# Patient Record
Sex: FEMALE | Race: WHITE | NOT HISPANIC OR LATINO | Employment: OTHER | URBAN - NONMETROPOLITAN AREA
[De-identification: names, ages, dates, MRNs, and addresses within clinical notes are randomized per-mention and may not be internally consistent; named-entity substitution may affect disease eponyms.]

---

## 2017-01-02 DIAGNOSIS — E03.9 HYPOTHYROIDISM, UNSPECIFIED TYPE: Primary | ICD-10-CM

## 2017-01-02 RX ORDER — LEVOTHYROXINE SODIUM 100 UG/1
TABLET ORAL
Qty: 90 TABLET | Refills: 1 | Status: SHIPPED | OUTPATIENT
Start: 2017-01-02 | End: 2017-05-17

## 2017-01-02 NOTE — TELEPHONE ENCOUNTER
Pt is calling because she needs a rush on her two medications because she will be out on Thursday.       metoprolol (LOPRESSOR) 100 MG tablet  Synthroid    Also pt is going to be leaving on 1/11/17 for [place] and would like the rest of her medications be refilled for 3 months because she wont be back until the end of march. Please advise.     amLODIPine (NORVASC) 5 MG tablet  lisinopril-hydrochlorothiazide (PRINZIDE,ZESTORETIC) 20-12.5 MG per tablet  cloNIDine (CATAPRES) 0.1 MG tablet

## 2017-01-02 NOTE — TELEPHONE ENCOUNTER
synthroid     Last Written Prescription Date: 05/20/16  Last Quantity: 90, # refills: 1  Last Office Visit with [facility], P or [facility] prescribing provider: 08/15/16- virtual l visit        TSH   Date Value Ref Range Status   05/13/2016 3.54 0.40 - 4.00 mU/L Final

## 2017-01-02 NOTE — TELEPHONE ENCOUNTER
Prescription approved per [facility] Refill Protocol.  Garcia Holm RN      Other two medications were just filled for 90 and 1    Garcia Holm RN

## 2017-01-03 DIAGNOSIS — E11.65 TYPE 2 DIABETES MELLITUS WITH HYPERGLYCEMIA (H): ICD-10-CM

## 2017-01-03 DIAGNOSIS — I10 ESSENTIAL HYPERTENSION WITH GOAL BLOOD PRESSURE LESS THAN 140/90: Primary | ICD-10-CM

## 2017-01-03 NOTE — TELEPHONE ENCOUNTER
Lisinopril-HCTZ      Last Written Prescription Date: 11/22/16  Last Fill Quantity: 90, # refills: 1    Last Office Visit with [facility], [facility] or [facility] prescribing provider:  7/30/16   Future Office Visit:        BP Readings from Last 3 Encounters:   07/20/16 132/68   05/13/16 130/74   05/11/15 120/76       Khoa contour strips         Last Written Prescription Date: 8/31/16  Last Fill Quantity: 300, # refills: 1  Last Office Visit with [facility], [facility] or [facility] prescribing provider:  7/20/16        BP Readings from Last 3 Encounters:   07/20/16 132/68   05/13/16 130/74   05/11/15 120/76     MICROL        6   8/15/2016  No results found for this basename: microalbumin  CREATININE   Date Value Ref Range Status   09/14/2016 0.85 0.52 - 1.04 mg/dL Final   ]  GFR ESTIMATE   Date Value Ref Range Status   09/14/2016 64 >60 mL/min/1.7m2 Final     Comment:     Non  GFR Calc   10/19/2015 67 >60 mL/min/1.7m2 Final     Comment:     Non  GFR Calc   01/12/2015 66 >60 mL/min/1.7m2 Final     Comment:     Non  GFR Calc     GFR ESTIMATE IF BLACK   Date Value Ref Range Status   09/14/2016 78 >60 mL/min/1.7m2 Final     Comment:      GFR Calc   10/19/2015 81 >60 mL/min/1.7m2 Final     Comment:      GFR Calc   01/12/2015 79 >60 mL/min/1.7m2 Final     Comment:      GFR Calc     CHOL      170   5/13/2016  HDL       51   5/13/2016  LDL       93   5/13/2016  LDL      138   10/3/2014  TRIG      130   5/13/2016  CHOLHDLRATIO      3.6   10/19/2015  AST       30   1/12/2015  ALT       56   1/12/2015  A1C      7.0   9/14/2016  A1C      6.7   5/13/2016  A1C      6.7   10/19/2015  A1C      6.2   5/11/2015  A1C      6.3   10/3/2014  POTASSIUM   Date Value Ref Range Status   09/14/2016 3.6 3.4 - 5.3 mmol/L Final

## 2017-01-04 RX ORDER — LISINOPRIL AND HYDROCHLOROTHIAZIDE 12.5; 2 MG/1; MG/1
TABLET ORAL
Qty: 90 TABLET | Refills: 0 | OUTPATIENT
Start: 2017-01-04

## 2017-01-04 NOTE — TELEPHONE ENCOUNTER
Rx was sent 11/22/2016 for 3 months and 1 refills. Patient should have medication through 5/22/2016 (lisinorpil- HCTZ)  Rx approved for darya test strips.   Patient called and notified of this.   Pharmacy notified via E-prescribe refusal  Cony Vasquez RN

## 2017-04-03 ENCOUNTER — TRANSFERRED RECORDS (OUTPATIENT)
Dept: HEALTH INFORMATION MANAGEMENT | Facility: HOSPITAL | Age: 77
End: 2017-04-03

## 2017-04-03 LAB — HBA1C MFR BLD: 5.9 % (ref 4–6)

## 2017-04-10 DIAGNOSIS — I10 ESSENTIAL HYPERTENSION WITH GOAL BLOOD PRESSURE LESS THAN 140/90: ICD-10-CM

## 2017-04-10 NOTE — TELEPHONE ENCOUNTER
metoprolol      Last Written Prescription Date: 07/06/16  Last Fill Quantity: 180, # refills: 2    Last Office Visit with [facility], [facility] or [facility] prescribing provider:  8/15/16   Future Office Visit:    Next 5 appointments (look out 90 days)     May 17, 2017  8:00 AM CDT   [facility]    [id number] [facility] [address] [phone]                    BP Readings from Last 3 Encounters:   07/20/16 132/68   05/13/16 130/74   05/11/15 120/76

## 2017-04-11 RX ORDER — METOPROLOL TARTRATE 100 MG
TABLET ORAL
Qty: 180 TABLET | Refills: 0 | Status: SHIPPED | OUTPATIENT
Start: 2017-04-11 | End: 2017-05-17

## 2017-04-11 NOTE — TELEPHONE ENCOUNTER
Prescription approved per [facility] Refill Protocol.  Due for follow up in May. Scheduled  Apurva Ocampo RN

## 2017-04-27 ENCOUNTER — HOSPITAL ENCOUNTER (OUTPATIENT)
Dept: MAMMOGRAPHY | Facility: CLINIC | Age: 77
Discharge: HOME | End: 2017-04-27
Attending: FAMILY MEDICINE | Admitting: FAMILY MEDICINE
Payer: COMMERCIAL

## 2017-04-27 DIAGNOSIS — Z12.31 VISIT FOR SCREENING MAMMOGRAM: ICD-10-CM

## 2017-04-27 PROCEDURE — G0202 SCR MAMMO BI INCL CAD: HCPCS

## 2017-05-09 NOTE — PATIENT INSTRUCTIONS
Stop Norvasc or Amlodipine   I have adjusted dose of your Prinzide   Start Metformin     Preventive Health Recommendations  Female Ages 65 +    Yearly exam:     See your health care provider every year in order to  o Review health changes.   o Discuss preventive care.    o Review your medicines if your doctor has prescribed any.      You no longer need a yearly Pap test unless you've had an abnormal Pap test in the past 10 years. If you have vaginal symptoms, such as bleeding or discharge, be sure to talk with your provider about a Pap test.      Every 1 to 2 years, have a mammogram.  If you are over 69, talk with your health care provider about whether or not you want to continue having screening mammograms.      Every 10 years, have a colonoscopy. Or, have a yearly FIT test (stool test). These exams will check for colon cancer.       Have a cholesterol test every 5 years, or more often if your doctor advises it.       Have a diabetes test (fasting glucose) every three years. If you are at risk for diabetes, you should have this test more often.       At age 65, have a bone density scan (DEXA) to check for osteoporosis (brittle bone disease).    Shots:    Get a flu shot each year.    Get a tetanus shot every 10 years.    Talk to your doctor about your pneumonia vaccines. There are now two you should receive - Pneumovax (PPSV 23) and Prevnar (PCV 13).    Talk to your doctor about the shingles vaccine.    Talk to your doctor about the hepatitis B vaccine.    Nutrition:     Eat at least 5 servings of fruits and vegetables each day.      Eat whole-grain bread, whole-wheat pasta and brown rice instead of white grains and rice.      Talk to your provider about Calcium and Vitamin D.     Lifestyle    Exercise at least 150 minutes a week (30 minutes a day, 5 days a week). This will help you control your weight and prevent disease.      Limit alcohol to one drink per day.      No smoking.       Wear sunscreen to prevent  skin cancer.       See your dentist twice a year for an exam and cleaning.      See your eye doctor every 1 to 2 years to screen for conditions such as glaucoma, macular degeneration, cataracts, etc

## 2017-05-09 NOTE — PROGRESS NOTES
SUBJECTIVE:                                                            Ni Maya is a 76 year old female who presents for Preventive Visit.      Are you in the first 12 months of your Medicare Part B coverage?  No    Healthy Habits:    Do you get at least three servings of calcium containing foods daily (dairy, green leafy vegetables, etc.)? yes    Amount of exercise or daily activities, outside of work: 3 day(s) per week    Problems taking medications regularly No    Medication side effects: No    Have you had an eye exam in the past two years? yes    Do you see a dentist twice per year? yes    Do you have sleep apnea, excessive snoring or daytime drowsiness?no    COGNITIVE SCREEN  1) Repeat 3 items (Banana, Sunrise, Chair)    2) Clock draw: NORMAL  3) 3 item recall: Recalls 2 objects   Results: NORMAL clock, 1-2 items recalled: COGNITIVE IMPAIRMENT LESS LIKELY    Mini-CogTM Copyright S Linnea. Licensed by the author for use in [facility] ClickOn; reprinted with permission (chriss@[facility]). All rights reserved.            Diabetes Follow-up    Patient is checking blood sugars: three times daily.   Blood sugar testing frequency justification: 3 times daily   Results are as follows:         am -  130-150          lunchtime - 100-120          bedtime - 110-125    Diabetic concerns: other - Tighten ess in legs      Symptoms of hypoglycemia (low blood sugar): none     Paresthesias (numbness or burning in feet) or sores: No     Date of last diabetic eye exam:      Hyperlipidemia Follow-Up      Rate your low fat/cholesterol diet?: good    Taking statin?  No    Other lipid medications/supplements?:  none     Hypertension Follow-up      Outpatient blood pressures are not being checked.    Low Salt Diet: low salt     Low back :   She reports she continues to have back pain and radiates of the right lower hip , had x rays last year , but she did not want to do more at that time , but pain is worse now and  affecting her walking     Shortness of breath with walking the stairs , no coughing . No chest pain   She reports this has been ongoing for 25 years ,   She has been having on and off  epigastric pain also for 25 years , has had some EGDs in the past.    Foot swelling , ankle feels tight by the end of the day         Amount of exercise or physical activity: None    Problems taking medications regularly: No    Medication side effects: none    Diet: low salt and low fat/cholesterol        Reviewed and updated as needed this visit by clinical staff  Tobacco  Allergies  Meds  Med Hx  Surg Hx  Fam Hx  Soc Hx        Reviewed and updated as needed this visit by Provider        Social History   Substance Use Topics     Smoking status: Never Smoker     Smokeless tobacco: Never Used     Alcohol use No       The patient does not drink >3 drinks per day nor >7 drinks per week.    Today's PHQ-2 Score:   PHQ-2 ( 1999 Pfizer) 5/17/2017 7/20/2016   Q1: Little interest or pleasure in doing things 0 0   Q2: Feeling down, depressed or hopeless 0 0   PHQ-2 Score 0 0       Do you feel safe in your environment - Yes    Do you have a Health Care Directive?: No: Advance care planning reviewed with patient; information given to patient to review.    Current providers sharing in care for this patient include:   Patient Care Team:  Violeta Lin MD as PCP - General (Family Practice)      Hearing impairment: No    Ability to successfully perform activities of daily living: Yes, no assistance needed     Fall risk:       Home safety:  none identified      The following health maintenance items are reviewed in Epic and correct as of today:  Health Maintenance   Topic Date Due     PNEUMOCOCCAL (2 of 2 - PCV13) 07/07/2005     FOOT EXAM Q1 YEAR( NO INBASKET)  05/11/2016     LIPID MONITORING Q6 MO( NO INBASKET)  11/13/2016     A1C Q6 MO( NO INBASKET)  03/14/2017     TSH Q1 YEAR (NO INBASKET)  05/13/2017     FALL RISK ASSESSMENT   05/13/2017     MICROALBUMIN Q1 YEAR( NO INBASKET)  08/15/2017     INFLUENZA VACCINE (SYSTEM ASSIGNED)  09/01/2017     BMP Q1 YR (NO INBASKET)  09/14/2017     EYE EXAM Q1 YEAR( NO INBASKET)  10/05/2017     TSH W/ FREE T4 REFLEX Q2 YEAR (NO INBASKET)  05/13/2018     MAMMO Q2 YR NO INBASKET MESSAGE  04/27/2019     ADVANCE DIRECTIVE PLANNING Q5 YRS (NO INBASKET)  05/17/2021     TETANUS IMMUNIZATION (SYSTEM ASSIGNED)  08/22/2021     COLONOSCOPY Q10 YR INBASKET MESSAGE  09/13/2023     DEXA SCAN SCREENING (SYSTEM ASSIGNED)  Completed         Pneumonia Vaccine:Adults age 65+ who received Pneumovax (PPSV23) at 65 years or older: Should be given PCV13 > 1 year after their most recent PPSV23     ROS:  Constitutional, HEENT, cardiovascular, pulmonary, gi and gu systems are negative, except as otherwise noted.    Problem list, Medication list, Allergies, and Medical/Social/Surgical histories reviewed in [facility] and updated as appropriate.  Labs reviewed in EPIC  BP Readings from Last 3 Encounters:   05/17/17 132/70   07/20/16 132/68   05/13/16 130/74    Wt Readings from Last 3 Encounters:   05/17/17 187 lb 8 oz (85 kg)   07/20/16 189 lb 3.2 oz (85.8 kg)   05/13/16 185 lb (83.9 kg)                  Patient Active Problem List   Diagnosis     Hypothyroidism     Allergic rhinitis     Symptomatic menopausal or female climacteric states     Chronic kidney disease, stage II (mild)     Edema     Obesity     Urethral stricture     MICKIE III (vulvar intraepithelial neoplasia III)     Vulval lesion     CARDIOVASCULAR SCREENING; LDL GOAL LESS THAN 130     Health Care Home     Type 2 diabetes, HbA1C goal < 8% (H)     Hyperlipidemia with target LDL less than 100     HTN, goal below 140/90     Hypothyroidism, unspecified hypothyroidism type     Type 2 diabetes mellitus with hyperglycemia (H)     ACP (advance care planning)     Essential hypertension with goal blood pressure less than 140/90     Type 2 diabetes mellitus with hyperglycemia, without  long-term current use of insulin (H)     Past Surgical History:   Procedure Laterality Date     BIOPSY VULVA/PERINEUM,ADDNL LESN  9/18/09    Wide -excision of MICKIE III- right labia      C LAPAROSCOPY, SURGICAL; W/ VAGINAL HYSTERECTOMY W/WO REMOVAL OVARY(S)/TUBES  1984    for bleeding     C LIGATE FALLOPIAN TUBE,POSTPARTUM  1978    Tubal Ligation     C REMOVAL GALLBLADDER  1995     COLONOSCOPY  9/13/2013    Procedure: COLONOSCOPY;  Colonoscopy;  Surgeon: Yanick Ramsey MD;  Location: [facility]     HC ANGIOGRAPHY ARCH  4-3-98     HC BIOPSY OF BREAST, OPEN INCISIONAL  1960    Benign     HC COLONOSCOPY THRU STOMA, DIAGNOSTIC  4-24-98    Diverticuli - due in 2008     HC ERCP W SPHINCTEROTOMY  1996 6/2/96 and 8/30/96     HC REDUCTION OF LARGE BREAST  1988      [facility] ENDOSCOPY DIAG W OR W/O BRUSH/WASH  7-       Social History   Substance Use Topics     Smoking status: Never Smoker     Smokeless tobacco: Never Used     Alcohol use No     Family History   Problem Relation Age of Onset     Cardiovascular Father      Aortic aneurysm     Hypertension Father      Hypertension Mother      GASTROINTESTINAL DISEASE Mother      GI Bleed     Lipids Sister      Hypertension Sister      Genitourinary Problems Sister      Allergies Sister      CANCER Brother      Lung     Alcohol/Drug Brother      Connective Tissue Disorder Son      Down Syndrome     Respiratory Son      Pneumonia     CANCER Maternal Grandmother      Stomach     Hypertension Maternal Grandfather      Allergies Daughter          Current Outpatient Prescriptions   Medication Sig Dispense Refill     metFORMIN (GLUCOPHAGE-XR) 500 MG 24 hr tablet Take 1 tablet (500 mg) by mouth daily (with dinner) 90 tablet 3     cloNIDine (CATAPRES) 0.1 MG tablet Take 2 tablets (0.2 mg) by mouth At Bedtime 270 tablet 1     lisinopril-hydrochlorothiazide (PRINZIDE/ZESTORETIC) 20-25 MG per tablet Take 1 tablet by mouth daily 90 tablet 3     metoprolol (LOPRESSOR) 100 MG tablet Take 1  tablet (100 mg) by mouth 2 times daily 180 tablet 3     levothyroxine (SYNTHROID/LEVOTHROID) 100 MCG tablet Take 1 tablet (100 mcg) by mouth daily 90 tablet 3     HUMBERTO CONTOUR NEXT test strip TEST THREE TIMES A  each 1     aspirin 81 MG tablet Take 1 tablet (81 mg) by mouth daily 30 tablet 1     Lancets (E-Z JECT LANCET MICRO-THIN 33G) MISC        Blood Glucose Monitoring Suppl W/DEVICE KIT 1 kit 3 times daily. 1 kit 0     fish oil-omega-3 fatty acids (FISH OIL) 1000 MG capsule Take 2 g by mouth daily. 2 caps per day       ALLEGRA 180 MG PO TABS 1 TABLET DAILY 90 Tab 3     NUTRITIONAL SUPPLEMENT OR VIBE       [DISCONTINUED] metoprolol (LOPRESSOR) 100 MG tablet TAKE ONE TABLET BY MOUTH TWICE A  tablet 0     [DISCONTINUED] levothyroxine (SYNTHROID/LEVOTHROID) 100 MCG tablet TAKE ONE TABLET BY MOUTH ONCE DAILY 90 tablet 1     [DISCONTINUED] cloNIDine (CATAPRES) 0.1 MG tablet TAKE ONE TABLET BY MOUTH EVERY MORNING AND TAKE TWO TABLETS BY MOUTH EVERY EVENING 270 tablet 1     [DISCONTINUED] lisinopril-hydrochlorothiazide (PRINZIDE,ZESTORETIC) 20-12.5 MG per tablet TAKE ONE TABLET BY MOUTH ONCE DAILY 90 tablet 1     cephALEXin (KEFLEX) 250 MG capsule Take 1 capsule (250 mg) by mouth 3 times daily (Patient not taking: Reported on 5/17/2017) 30 capsule 0     Allergies   Allergen Reactions     Sulfa Drugs Rash     Recent Labs   Lab Test  05/17/17   0838  09/14/16   0809  09/14/16   0808  05/13/16   0901  10/19/15   0906   01/12/15   1143   04/09/14   1012   A1C  7.2*  7.0*   --   6.7*  6.7*   < >   --    < >  6.5*   LDL  105*   --    --   93  115   < >   --    < >  115   HDL  49*   --    --   51  53   < >   --    --   47*   TRIG  132   --    --   130  113   < >   --    --   144   ALT  50   --    --    --    --    --   56*   --   69*   CR  0.86   --   0.85   --   0.83   --   0.84   --   0.97   GFRESTIMATED  64   --   64   --   67   --   66   --   56*   GFRESTBLACK  77   --   78   --   81   --   79   --   68  "  POTASSIUM  3.6   --   3.6   --   3.4   < >  3.4   --   3.7   TSH  3.27   --    --   3.54  3.41   --    --    < >   --     < > = values in this interval not displayed.      OBJECTIVE:                                                            /70  Pulse 68  Temp 97.7  F (36.5  C) (Temporal)  Resp 14  Ht 5' 5.25\" (1.657 m)  Wt 187 lb 8 oz (85 kg)  Breastfeeding? No  BMI 30.96 kg/m2 Estimated body mass index is 30.96 kg/(m^2) as calculated from the following:    Height as of this encounter: 5' 5.25\" (1.657 m).    Weight as of this encounter: 187 lb 8 oz (85 kg).  EXAM:   GENERAL APPEARANCE:  alert and no distress  EYES: Eyes grossly normal to inspection, PERRL and conjunctivae and sclerae normal  NECK: no adenopathy, no asymmetry, masses, or scars and thyroid normal to palpation  RESP: lungs clear to auscultation - no rales, rhonchi or wheezes  BREAST: Declined   CV: regular rate and rhythm, normal S1 S2, no S3 or S4, no murmur, click or rub, no peripheral edema and peripheral pulses strong  ABDOMEN: soft, nontender, no hepatosplenomegaly, no masses and bowel sounds normal   (female): deferred  MS: no musculoskeletal defects are noted and gait is age appropriate without ataxia  SKIN: no suspicious lesions or rashes  NEURO: Normal strength and tone, sensory exam grossly normal, mentation intact and speech normal  PSYCH: mentation appears normal and affect normal/bright  Results for orders placed or performed in visit on 05/17/17 (from the past 24 hour(s))   Hemoglobin A1c   Result Value Ref Range    Hemoglobin A1C 7.2 (H) 4.3 - 6.0 %   Lipid Profile with reflex to direct LDL   Result Value Ref Range    Cholesterol 180 <200 mg/dL    Triglycerides 132 <150 mg/dL    HDL Cholesterol 49 (L) >49 mg/dL    LDL Cholesterol Calculated 105 (H) <100 mg/dL    Non HDL Cholesterol 131 (H) <130 mg/dL   TSH with free T4 reflex   Result Value Ref Range    TSH 3.27 0.40 - 4.00 mU/L   BNP-N terminal pro   Result Value Ref " Range    N-Terminal Pro Bnp 760 (H) 0 - 450 pg/mL   Comprehensive metabolic panel (BMP + Alb, Alk Phos, ALT, AST, Total. Bili, TP)   Result Value Ref Range    Sodium 142 133 - 144 mmol/L    Potassium 3.6 3.4 - 5.3 mmol/L    Chloride 102 94 - 109 mmol/L    Carbon Dioxide 31 20 - 32 mmol/L    Anion Gap 9 3 - 14 mmol/L    Glucose 160 (H) 70 - 99 mg/dL    Urea Nitrogen 13 7 - 30 mg/dL    Creatinine 0.86 0.52 - 1.04 mg/dL    GFR Estimate 64 >60 mL/min/1.7m2    GFR Estimate If Black 77 >60 mL/min/1.7m2    Calcium 9.3 8.5 - 10.1 mg/dL    Bilirubin Total 0.4 0.2 - 1.3 mg/dL    Albumin 3.8 3.4 - 5.0 g/dL    Protein Total 7.2 6.8 - 8.8 g/dL    Alkaline Phosphatase 82 40 - 150 U/L    ALT 50 0 - 50 U/L    AST 36 0 - 45 U/L   CBC with platelets and differential   Result Value Ref Range    WBC 5.8 4.0 - 11.0 10e9/L    RBC Count 4.77 3.8 - 5.2 10e12/L    Hemoglobin 14.6 11.7 - 15.7 g/dL    Hematocrit 42.7 35.0 - 47.0 %    MCV 90 78 - 100 fl    MCH 30.6 26.5 - 33.0 pg    MCHC 34.2 31.5 - 36.5 g/dL    RDW 13.5 10.0 - 15.0 %    Platelet Count 134 (L) 150 - 450 10e9/L    Diff Method Automated Method     % Neutrophils 59.5 %    % Lymphocytes 23.6 %    % Monocytes 11.1 %    % Eosinophils 5.5 %    % Basophils 0.3 %    Absolute Neutrophil 3.4 1.6 - 8.3 10e9/L    Absolute Lymphocytes 1.4 0.8 - 5.3 10e9/L    Absolute Monocytes 0.6 0.0 - 1.3 10e9/L    Absolute Eosinophils 0.3 0.0 - 0.7 10e9/L    Absolute Basophils 0.0 0.0 - 0.2 10e9/L     ASSESSMENT / PLAN:                                                            1. Medicare annual wellness visit, subsequent      2. Type 2 diabetes mellitus with hyperglycemia, without long-term current use of insulin (H)  Discussed her Hemoglobin A1C with her , it is creeping up , was previously on Metformin , but she stopped it , because she wanted to go natural . Discussed starting extended release 500mg daily  - Hemoglobin A1c  - TSH with free T4 reflex  - Comprehensive metabolic panel (BMP + Alb, Alk  Phos, ALT, AST, Total. Bili, TP)  - Albumin Random Urine Quantitative  - DIABETES EDUCATOR REFERRAL  metFORMIN (GLUCOPHAGE-XR) 500 MG 24 hr tablet; Take 1 tablet (500 mg) by mouth daily (with dinner)  Dispense: 90 tablet; Refill: 3      3. Hyperlipidemia with target LDL less than 100  Patient will not take Statin   - Lipid Profile with reflex to direct LDL    4. Essential hypertension with goal blood pressure less than 140/90  Hold Amlodipine due to pedal edema ,Will increase HCTZ dose of the Prinzide   - Comprehensive metabolic panel (BMP + Alb, Alk Phos, ALT, AST, Total. Bili, TP)  - cloNIDine (CATAPRES) 0.1 MG tablet; Take 2 tablets (0.2 mg) by mouth At Bedtime  Dispense: 270 tablet; Refill: 1  - lisinopril-hydrochlorothiazide (PRINZIDE/ZESTORETIC) 20-25 MG per tablet; Take 1 tablet by mouth daily  Dispense: 90 tablet; Refill: 3  - metoprolol (LOPRESSOR) 100 MG tablet; Take 1 tablet (100 mg) by mouth 2 times daily  Dispense: 180 tablet; Refill: 3    5. Pedal edema  Discussed with patient , will get further work up to rule out heart failure , Amlodipine could also also cause  Swelling    will stop Amlodipine   Will increase HCTZ to 25 mg     6.SOB (shortness of breath)  With exertion , No associated cough    - BNP-N terminal pro  - CBC with platelets and differential  - Echocardiogram Complete; Future    7. Abdominal pain, epigastric  Discussed with patient about getting EGD for further evaluation, she will like to hold off on this for now , informed if cardiac work up is negative , will recommend EGD     8. Midline low back pain with right-sided sciatica, unspecified chronicity  Worsening   - ORTHO  REFERRAL    9. Other specified hypothyroidism    - TSH with free T4 reflex      - 11. Need for vaccination  - Pneumococcal vaccine 13 valent PCV13 IM (Prevnar) [60009]    End of Life Planning:  Patient currently has an advanced directive: No.  I have verified the patient's ablity to prepare an advanced  "directive/make health care decisions.  Literature was provided to assist patient in preparing an advanced directive.    COUNSELING:  Reviewed preventive health counseling, as reflected in patient instructions        Estimated body mass index is 30.96 kg/(m^2) as calculated from the following:    Height as of this encounter: 5' 5.25\" (1.657 m).    Weight as of this encounter: 187 lb 8 oz (85 kg).  Weight management plan: Continue with exercise and diet changes   reports that she has never smoked. She has never used smokeless tobacco.      Appropriate preventive services were discussed with this patient, including applicable screening as appropriate for cardiovascular disease, diabetes, osteopenia/osteoporosis, and glaucoma.  As appropriate for age/gender, discussed screening for colorectal cancer, prostate cancer, breast cancer, and cervical cancer. Checklist reviewing preventive services available has been given to the patient.    Reviewed patients plan of care and provided an AVS. The Basic Care Plan (routine screening as documented in Health Maintenance) for Ni meets the Care Plan requirement. This Care Plan has been established and reviewed with the Patient.    Counseling Resources:  ATP IV Guidelines  Pooled Cohorts Equation Calculator  Breast Cancer Risk Calculator  FRAX Risk Assessment  ICSI Preventive Guidelines  Dietary Guidelines for Americans, 2010  USDA's MyPlate  ASA Prophylaxis  Lung CA Screening      Time: I spent 40 minutes of face- face time with patient  greater than one half devoted to coordination of care for diagnosis and plan above.         Violeta Lin MD, MD  [facility]  "

## 2017-05-17 ENCOUNTER — OFFICE VISIT (OUTPATIENT)
Dept: FAMILY MEDICINE | Facility: OTHER | Age: 77
End: 2017-05-17
Payer: COMMERCIAL

## 2017-05-17 VITALS
RESPIRATION RATE: 14 BRPM | HEART RATE: 68 BPM | TEMPERATURE: 97.7 F | WEIGHT: 187.5 LBS | SYSTOLIC BLOOD PRESSURE: 132 MMHG | HEIGHT: 65 IN | BODY MASS INDEX: 31.24 KG/M2 | DIASTOLIC BLOOD PRESSURE: 70 MMHG

## 2017-05-17 DIAGNOSIS — R10.13 ABDOMINAL PAIN, EPIGASTRIC: ICD-10-CM

## 2017-05-17 DIAGNOSIS — E78.5 HYPERLIPIDEMIA WITH TARGET LDL LESS THAN 100: ICD-10-CM

## 2017-05-17 DIAGNOSIS — E03.8 OTHER SPECIFIED HYPOTHYROIDISM: ICD-10-CM

## 2017-05-17 DIAGNOSIS — E11.65 TYPE 2 DIABETES MELLITUS WITH HYPERGLYCEMIA, WITHOUT LONG-TERM CURRENT USE OF INSULIN (H): ICD-10-CM

## 2017-05-17 DIAGNOSIS — Z00.00 MEDICARE ANNUAL WELLNESS VISIT, SUBSEQUENT: Primary | ICD-10-CM

## 2017-05-17 DIAGNOSIS — Z23 NEED FOR VACCINATION: ICD-10-CM

## 2017-05-17 DIAGNOSIS — R60.0 PEDAL EDEMA: ICD-10-CM

## 2017-05-17 DIAGNOSIS — R06.02 SOB (SHORTNESS OF BREATH): ICD-10-CM

## 2017-05-17 DIAGNOSIS — E11.00 TYPE 2 DIABETES MELLITUS WITH HYPEROSMOLARITY WITHOUT COMA, WITHOUT LONG-TERM CURRENT USE OF INSULIN (H): ICD-10-CM

## 2017-05-17 DIAGNOSIS — I10 ESSENTIAL HYPERTENSION WITH GOAL BLOOD PRESSURE LESS THAN 140/90: ICD-10-CM

## 2017-05-17 DIAGNOSIS — M54.41 MIDLINE LOW BACK PAIN WITH RIGHT-SIDED SCIATICA, UNSPECIFIED CHRONICITY: ICD-10-CM

## 2017-05-17 DIAGNOSIS — E03.9 HYPOTHYROIDISM, UNSPECIFIED TYPE: ICD-10-CM

## 2017-05-17 LAB
ALBUMIN SERPL-MCNC: 3.8 G/DL (ref 3.4–5)
ALP SERPL-CCNC: 82 U/L (ref 40–150)
ALT SERPL W P-5'-P-CCNC: 50 U/L (ref 0–50)
ANION GAP SERPL CALCULATED.3IONS-SCNC: 9 MMOL/L (ref 3–14)
AST SERPL W P-5'-P-CCNC: 36 U/L (ref 0–45)
BASOPHILS # BLD AUTO: 0 10E9/L (ref 0–0.2)
BASOPHILS NFR BLD AUTO: 0.3 %
BILIRUB SERPL-MCNC: 0.4 MG/DL (ref 0.2–1.3)
BUN SERPL-MCNC: 13 MG/DL (ref 7–30)
CALCIUM SERPL-MCNC: 9.3 MG/DL (ref 8.5–10.1)
CHLORIDE SERPL-SCNC: 102 MMOL/L (ref 94–109)
CHOLEST SERPL-MCNC: 180 MG/DL
CO2 SERPL-SCNC: 31 MMOL/L (ref 20–32)
CREAT SERPL-MCNC: 0.86 MG/DL (ref 0.52–1.04)
CREAT UR-MCNC: 80 MG/DL
DIFFERENTIAL METHOD BLD: ABNORMAL
EOSINOPHIL # BLD AUTO: 0.3 10E9/L (ref 0–0.7)
EOSINOPHIL NFR BLD AUTO: 5.5 %
ERYTHROCYTE [DISTWIDTH] IN BLOOD BY AUTOMATED COUNT: 13.5 % (ref 10–15)
GFR SERPL CREATININE-BSD FRML MDRD: 64 ML/MIN/1.7M2
GLUCOSE SERPL-MCNC: 160 MG/DL (ref 70–99)
HBA1C MFR BLD: 7.2 % (ref 4.3–6)
HCT VFR BLD AUTO: 42.7 % (ref 35–47)
HDLC SERPL-MCNC: 49 MG/DL
HGB BLD-MCNC: 14.6 G/DL (ref 11.7–15.7)
LDLC SERPL CALC-MCNC: 105 MG/DL
LYMPHOCYTES # BLD AUTO: 1.4 10E9/L (ref 0.8–5.3)
LYMPHOCYTES NFR BLD AUTO: 23.6 %
MCH RBC QN AUTO: 30.6 PG (ref 26.5–33)
MCHC RBC AUTO-ENTMCNC: 34.2 G/DL (ref 31.5–36.5)
MCV RBC AUTO: 90 FL (ref 78–100)
MICROALBUMIN UR-MCNC: 13 MG/L
MICROALBUMIN/CREAT UR: 16.21 MG/G CR (ref 0–25)
MONOCYTES # BLD AUTO: 0.6 10E9/L (ref 0–1.3)
MONOCYTES NFR BLD AUTO: 11.1 %
NEUTROPHILS # BLD AUTO: 3.4 10E9/L (ref 1.6–8.3)
NEUTROPHILS NFR BLD AUTO: 59.5 %
NONHDLC SERPL-MCNC: 131 MG/DL
NT-PROBNP SERPL-MCNC: 760 PG/ML (ref 0–450)
PLATELET # BLD AUTO: 134 10E9/L (ref 150–450)
POTASSIUM SERPL-SCNC: 3.6 MMOL/L (ref 3.4–5.3)
PROT SERPL-MCNC: 7.2 G/DL (ref 6.8–8.8)
RBC # BLD AUTO: 4.77 10E12/L (ref 3.8–5.2)
SODIUM SERPL-SCNC: 142 MMOL/L (ref 133–144)
TRIGL SERPL-MCNC: 132 MG/DL
TSH SERPL DL<=0.005 MIU/L-ACNC: 3.27 MU/L (ref 0.4–4)
WBC # BLD AUTO: 5.8 10E9/L (ref 4–11)

## 2017-05-17 PROCEDURE — 82043 UR ALBUMIN QUANTITATIVE: CPT | Performed by: FAMILY MEDICINE

## 2017-05-17 PROCEDURE — 90670 PCV13 VACCINE IM: CPT | Performed by: FAMILY MEDICINE

## 2017-05-17 PROCEDURE — 80061 LIPID PANEL: CPT | Performed by: FAMILY MEDICINE

## 2017-05-17 PROCEDURE — 84443 ASSAY THYROID STIM HORMONE: CPT | Performed by: FAMILY MEDICINE

## 2017-05-17 PROCEDURE — G0009 ADMIN PNEUMOCOCCAL VACCINE: HCPCS | Performed by: FAMILY MEDICINE

## 2017-05-17 PROCEDURE — 99214 OFFICE O/P EST MOD 30 MIN: CPT | Mod: 25 | Performed by: FAMILY MEDICINE

## 2017-05-17 PROCEDURE — 80053 COMPREHEN METABOLIC PANEL: CPT | Performed by: FAMILY MEDICINE

## 2017-05-17 PROCEDURE — 36415 COLL VENOUS BLD VENIPUNCTURE: CPT | Performed by: FAMILY MEDICINE

## 2017-05-17 PROCEDURE — 83036 HEMOGLOBIN GLYCOSYLATED A1C: CPT | Performed by: FAMILY MEDICINE

## 2017-05-17 PROCEDURE — 85025 COMPLETE CBC W/AUTO DIFF WBC: CPT | Performed by: FAMILY MEDICINE

## 2017-05-17 PROCEDURE — 83880 ASSAY OF NATRIURETIC PEPTIDE: CPT | Performed by: FAMILY MEDICINE

## 2017-05-17 PROCEDURE — G0439 PPPS, SUBSEQ VISIT: HCPCS | Performed by: FAMILY MEDICINE

## 2017-05-17 RX ORDER — METOPROLOL TARTRATE 100 MG
100 TABLET ORAL 2 TIMES DAILY
Qty: 180 TABLET | Refills: 3 | Status: SHIPPED | OUTPATIENT
Start: 2017-05-17 | End: 2018-04-25

## 2017-05-17 RX ORDER — LEVOTHYROXINE SODIUM 100 UG/1
100 TABLET ORAL DAILY
Qty: 90 TABLET | Refills: 3 | Status: SHIPPED | OUTPATIENT
Start: 2017-05-17 | End: 2018-04-25

## 2017-05-17 RX ORDER — CLONIDINE HYDROCHLORIDE 0.1 MG/1
0.2 TABLET ORAL AT BEDTIME
Qty: 270 TABLET | Refills: 1 | Status: SHIPPED | OUTPATIENT
Start: 2017-05-17 | End: 2017-11-01

## 2017-05-17 RX ORDER — LISINOPRIL AND HYDROCHLOROTHIAZIDE 20; 25 MG/1; MG/1
1 TABLET ORAL DAILY
Qty: 90 TABLET | Refills: 3 | Status: SHIPPED | OUTPATIENT
Start: 2017-05-17 | End: 2017-09-18

## 2017-05-17 RX ORDER — METFORMIN HCL 500 MG
500 TABLET, EXTENDED RELEASE 24 HR ORAL
Qty: 90 TABLET | Refills: 3 | Status: SHIPPED | OUTPATIENT
Start: 2017-05-17 | End: 2018-04-25

## 2017-05-17 ASSESSMENT — PAIN SCALES - GENERAL: PAINLEVEL: MODERATE PAIN (5)

## 2017-05-17 NOTE — MR AVS SNAPSHOT
After Visit Summary   5/17/2017    Ni Maya    MRN: [id number]           Patient Information     Date Of Birth          1940        Visit Information        Provider Department      5/17/2017 8:00 AM Violeta Lin MD [facility]        Today's Diagnoses     Medicare annual wellness visit, subsequent    -  1    Type 2 diabetes mellitus with hyperglycemia, without long-term current use of insulin (H)        Hyperlipidemia with target LDL less than 100        Essential hypertension with goal blood pressure less than 140/90        Pedal edema        SOB (shortness of breath)        Abdominal pain, epigastric        Midline low back pain with right-sided sciatica, unspecified chronicity        Other specified hypothyroidism        Type 2 diabetes mellitus with hyperosmolarity without coma, without long-term current use of insulin (H)        Need for vaccination          Care Instructions     Stop Norvasc or Amlodipine   I have adjusted dose of your Prinzide   Start Metformin     Preventive Health Recommendations  Female Ages 65 +    Yearly exam:     See your health care provider every year in order to  o Review health changes.   o Discuss preventive care.    o Review your medicines if your doctor has prescribed any.      You no longer need a yearly Pap test unless you've had an abnormal Pap test in the past 10 years. If you have vaginal symptoms, such as bleeding or discharge, be sure to talk with your provider about a Pap test.      Every 1 to 2 years, have a mammogram.  If you are over 69, talk with your health care provider about whether or not you want to continue having screening mammograms.      Every 10 years, have a colonoscopy. Or, have a yearly FIT test (stool test). These exams will check for colon cancer.       Have a cholesterol test every 5 years, or more often if your doctor advises it.       Have a diabetes test (fasting glucose) every three years. If  you are at risk for diabetes, you should have this test more often.       At age 65, have a bone density scan (DEXA) to check for osteoporosis (brittle bone disease).    Shots:    Get a flu shot each year.    Get a tetanus shot every 10 years.    Talk to your doctor about your pneumonia vaccines. There are now two you should receive - Pneumovax (PPSV 23) and Prevnar (PCV 13).    Talk to your doctor about the shingles vaccine.    Talk to your doctor about the hepatitis B vaccine.    Nutrition:     Eat at least 5 servings of fruits and vegetables each day.      Eat whole-grain bread, whole-wheat pasta and brown rice instead of white grains and rice.      Talk to your provider about Calcium and Vitamin D.     Lifestyle    Exercise at least 150 minutes a week (30 minutes a day, 5 days a week). This will help you control your weight and prevent disease.      Limit alcohol to one drink per day.      No smoking.       Wear sunscreen to prevent skin cancer.       See your dentist twice a year for an exam and cleaning.      See your eye doctor every 1 to 2 years to screen for conditions such as glaucoma, macular degeneration, cataracts, etc         Follow-ups after your visit        Additional Services     DIABETES EDUCATOR REFERRAL       DIABETES SELF MANAGEMENT TRAINING (DSMT)      Your provider has referred you to Diabetes Education: [facility]: Diabetes Education - All [facility] [phone]   [email or web address]    Type of training and number of hours: Previous Diagnosis: Follow-up DSMT - 2 hours.    Medicare covers: 10 hours of initial DSMT in 12 month period from the time of first visit, plus 2 hours of follow-up DSMT annually, and additional hours as requested for insulin training.    Diabetes Type: Type 2 - Diet Control             Diabetes Co-Morbidities: hypertension               A1C Goal:  <7.0       A1C is: Lab Results       Component                Value                Date                       A1C                      7.0                 09/14/2016              If an urgent visit is needed or A1C is above 12, Care Team to call the Diabetes Education Team at [phone] or send an In Basket message to the Diabetes Education Pool (P DIAB ED-PATIENT CARE).    Diabetes Education Topics: Comprehensive Knowledge Assessment and Instruction, Knowledge: Healthy Eating and Being Active and Other:     Special Educational Needs Requiring Individual DSMT: None       MEDICAL NUTRITION THERAPY (MNT) for Diabetes    Medical Nutrition Therapy with a Registered Dietitian can be provided in coordination with Diabetes Self-Management Training to assist in achieving optimal diabetes management.    MNT Type and Hours: Previous diagnosis: Annual follow-up MNT - 2 hours                       Medicare will cover: 3 hours initial MNT in 12 month period after first visit, plus 2 hours of follow-up MNT annually    Please be aware that coverage of these services is subject to the terms and limitations of your health insurance plan.  Call member services at your health plan to determine Diabetes Self-Management Training benefits and ask which blood glucose monitor brands are covered by your plan.      Please bring the following with you to your appointment:    (1)  List of current medications   (2)  List of Blood Glucose Monitor brands that are covered by your insurance plan  (3)  Blood Glucose Monitor and log book  (4)   Food records for the 3 days prior to your visit    The Certified Diabetes Educator may make diabetes medication adjustments per the CDE Protocol and Collaborative Practice Agreement.            ORTHO  REFERRAL       [facility] is referring you to the Orthopedic  Services at [facility].       The  Representative will assist you in the coordination of your Orthopedic and Musculoskeletal Care as prescribed by your  physician.    The  Representative will call you within 1 business day to help schedule your appointment, or you may contact the  Representative at:    All areas ~ [phone]     Type of Referral : Spine: Lumbar  **Choose Medical Spine Specialist (unless patient was seen by a Medical Spine Specialist within the past 6 months).**  Surgical Evaluation is advised if the patient presents with one or more of the following red flags: Evidence of Spinal Tumor, Infection or Fracture, Cauda Equina Syndrome, Sudden or Progressive Weakness, Loss of Bowel or Bladder Control, or any other documented emergent neurological condition resulting from a Lumbar Spinal Condition. Spine Surgeon        Timeframe requested: 3 - 5 days    Coverage of these services is subject to the terms and limitations of your health insurance plan.  Please call member services at your health plan with any benefit or coverage questions.      If X-rays, CT or MRI's have been performed, please contact the facility where they were done to arrange for , prior to your scheduled appointment.  Please bring this referral request to your appointment and present it to your specialist.                  Your next 10 appointments already scheduled     May 19, 2017  1:00 PM CDT   Ech Complete with PHECHR1   [facility] ([facility])    [address] [place] [address] [phone]           1.  Please bring or wear a comfortable two-piece outfit. 2.  You may eat, drink and take your normal medicines. 3.  For any questions that cannot be answered, please contact the ordering physician              Future tests that were ordered for you today     Open Future Orders        Priority Expected Expires Ordered    Echocardiogram Complete Routine  5/17/2018 5/17/2017            Who to contact     If you have questions or need follow up information about today's clinic visit or your schedule please  "contact [facility] directly at [phone].  Normal or non-critical lab and imaging results will be communicated to you by MyChart, letter or phone within 4 business days after the clinic has received the results. If you do not hear from us within 7 days, please contact the clinic through MyChart or phone. If you have a critical or abnormal lab result, we will notify you by phone as soon as possible.  Submit refill requests through [facility] or call your pharmacy and they will forward the refill request to us. Please allow 3 business days for your refill to be completed.          Additional Information About Your Visit        [email or web address] Information     [facility] lets you send messages to your doctor, view your test results, renew your prescriptions, schedule appointments and more. To sign up, go to [email or web address] . Click on \"Log in\" on the left side of the screen, which will take you to the Welcome page. Then click on \"Sign up Now\" on the right side of the page.     You will be asked to enter the access code listed below, as well as some personal information. Please follow the directions to create your username and password.     Your access code is: [email or web address]  Expires: 8/15/2017  9:19 AM     Your access code will  in 90 days. If you need help or a new code, please call your [facility] clinic or [phone].        Care EveryWhere ID     This is your Care EveryWhere ID. This could be used by other organizations to access your [facility] medical records  [id number]        Your Vitals Were     Pulse Temperature Respirations Height Breastfeeding? BMI (Body Mass Index)    68 97.7  F (36.5  C) (Temporal) 14 5' 5.25\" (1.657 m) No 30.96 kg/m2       Blood Pressure from Last 3 Encounters:   17 132/70   16 132/68   16 130/74    Weight from Last 3 Encounters:   17 187 lb 8 oz (85 kg)   16 189 lb 3.2 oz (85.8 kg)   16 185 lb (83.9 kg)              We Performed the " Following     Albumin Random Urine Quantitative     BNP-N terminal pro     CBC with platelets and differential     Comprehensive metabolic panel (BMP + Alb, Alk Phos, ALT, AST, Total. Bili, TP)     DIABETES EDUCATOR REFERRAL     Hemoglobin A1c     Lipid Profile with reflex to direct LDL     ORTHO  REFERRAL     Pneumococcal vaccine 13 valent PCV13 IM (Prevnar) [86931]     TSH with free T4 reflex          Today's Medication Changes          These changes are accurate as of: 5/17/17  9:19 AM.  If you have any questions, ask your nurse or doctor.               Start taking these medicines.        Dose/Directions    lisinopril-hydrochlorothiazide 20-25 MG per tablet   Commonly known as:  PRINZIDE/ZESTORETIC   Used for:  Essential hypertension with goal blood pressure less than 140/90   Replaces:  lisinopril-hydrochlorothiazide 20-12.5 MG per tablet   Started by:  Violeta Lin MD        Dose:  1 tablet   Take 1 tablet by mouth daily   Quantity:  90 tablet   Refills:  3       metFORMIN 500 MG 24 hr tablet   Commonly known as:  GLUCOPHAGE-XR   Used for:  Type 2 diabetes mellitus with hyperosmolarity without coma, without long-term current use of insulin (H)   Started by:  Viloeta Lin MD        Dose:  500 mg   Take 1 tablet (500 mg) by mouth daily (with dinner)   Quantity:  90 tablet   Refills:  3         These medicines have changed or have updated prescriptions.        Dose/Directions    cloNIDine 0.1 MG tablet   Commonly known as:  CATAPRES   This may have changed:  See the new instructions.   Used for:  Essential hypertension with goal blood pressure less than 140/90   Changed by:  Violeta Lin MD        Dose:  0.2 mg   Take 2 tablets (0.2 mg) by mouth At Bedtime   Quantity:  270 tablet   Refills:  1       metoprolol 100 MG tablet   Commonly known as:  LOPRESSOR   This may have changed:  See the new instructions.   Used for:  Essential hypertension with goal blood pressure  less than 140/90   Changed by:  Violeta Lin MD        Dose:  100 mg   Take 1 tablet (100 mg) by mouth 2 times daily   Quantity:  180 tablet   Refills:  3         Stop taking these medicines if you haven't already. Please contact your care team if you have questions.     amLODIPine 5 MG tablet   Commonly known as:  NORVASC   Stopped by:  Violeta Lin MD           lisinopril-hydrochlorothiazide 20-12.5 MG per tablet   Commonly known as:  PRINZIDE/ZESTORETIC   Replaced by:  lisinopril-hydrochlorothiazide 20-25 MG per tablet   Stopped by:  Violeta Lin MD                Where to get your medicines      These medications were sent to [id number] - [place], [place] - [address] [address]  [address] [address] [facility] [address]     Phone:  [phone]     cloNIDine 0.1 MG tablet    lisinopril-hydrochlorothiazide 20-25 MG per tablet    metFORMIN 500 MG 24 hr tablet    metoprolol 100 MG tablet                Primary Care Provider Office Phone # Fax #    Violeta Lin [phone]       [facility] [address] [place] [address]        Thank you!     Thank you for choosing [facility]  for your care. Our goal is always to provide you with excellent care. Hearing back from our patients is one way we can continue to improve our services. Please take a few minutes to complete the written survey that you may receive in the mail after your visit with us. Thank you!             Your Updated Medication List - Protect others around you: Learn how to safely use, store and throw away your medicines at www.disposemymeds.org.          This list is accurate as of: 5/17/17  9:19 AM.  Always use your most recent med list.                   Brand Name Dispense Instructions for use    ALLEGRA 180 MG tablet   Generic drug:  fexofenadine     90 Tab    1 TABLET DAILY       aspirin 81 MG tablet     30 tablet    Take 1 tablet (81 mg) by mouth daily        HUMBERTO CONTOUR NEXT test strip   Generic drug:  blood glucose monitoring     250 each    TEST THREE TIMES A DAY       Blood Glucose Monitoring Suppl W/DEVICE Kit     1 kit    1 kit 3 times daily.       cephalexin 250 MG capsule    KEFLEX    30 capsule    Take 1 capsule (250 mg) by mouth 3 times daily       cloNIDine 0.1 MG tablet    CATAPRES    270 tablet    Take 2 tablets (0.2 mg) by mouth At Bedtime       E-Z JECT LANCET MICRO-THIN 33G Misc          fish oil-omega-3 fatty acids 1000 MG capsule      Take 2 g by mouth daily. 2 caps per day       levothyroxine 100 MCG tablet    SYNTHROID/LEVOTHROID    90 tablet    TAKE ONE TABLET BY MOUTH ONCE DAILY       lisinopril-hydrochlorothiazide 20-25 MG per tablet    PRINZIDE/ZESTORETIC    90 tablet    Take 1 tablet by mouth daily       metFORMIN 500 MG 24 hr tablet    GLUCOPHAGE-XR    90 tablet    Take 1 tablet (500 mg) by mouth daily (with dinner)       metoprolol 100 MG tablet    LOPRESSOR    180 tablet    Take 1 tablet (100 mg) by mouth 2 times daily       NUTRITIONAL SUPPLEMENT PO      VIBE

## 2017-05-17 NOTE — NURSING NOTE
"Chief Complaint   Patient presents with     Physical     Panel Management     Prevnar, Fall risk, A1C, Foot exam, TSH,        Initial /70  Pulse 68  Temp 97.7  F (36.5  C) (Temporal)  Resp 14  Ht 5' 5.25\" (1.657 m)  Wt 187 lb 8 oz (85 kg)  Breastfeeding? No  BMI 30.96 kg/m2 Estimated body mass index is 30.96 kg/(m^2) as calculated from the following:    Height as of this encounter: 5' 5.25\" (1.657 m).    Weight as of this encounter: 187 lb 8 oz (85 kg).  Medication Reconciliation: complete     Nikky Crabtree MA    "

## 2017-05-19 ENCOUNTER — HOSPITAL ENCOUNTER (OUTPATIENT)
Dept: CARDIOLOGY | Facility: CLINIC | Age: 77
Discharge: HOME | End: 2017-05-19
Attending: FAMILY MEDICINE | Admitting: FAMILY MEDICINE
Payer: COMMERCIAL

## 2017-05-19 DIAGNOSIS — R06.02 SOB (SHORTNESS OF BREATH): ICD-10-CM

## 2017-05-19 PROCEDURE — 25500064 ZZH RX 255 OP 636: Performed by: INTERNAL MEDICINE

## 2017-05-19 PROCEDURE — 40000264 ECHO COMPLETE WITH OPTISON

## 2017-05-19 PROCEDURE — 93306 TTE W/DOPPLER COMPLETE: CPT | Mod: 26 | Performed by: INTERNAL MEDICINE

## 2017-05-19 RX ADMIN — HUMAN ALBUMIN MICROSPHERES AND PERFLUTREN 2 ML: 10; .22 INJECTION, SOLUTION INTRAVENOUS at 13:35

## 2017-05-20 NOTE — TELEPHONE ENCOUNTER
Patient was signed out to me by outgoing ED DO at end of their shift.  Sign-out included relevant details of history, physical, and work-up to date.  Chart has been reviewed, new test results have been noted, and patient has been re-evaluated.     Vitals  Vitals:    05/20/17 1318 05/20/17 1330 05/20/17 1430 05/20/17 1438   BP: 137/84 137/84 120/74 138/75   Pulse: 74 74 68 70   Resp: 15 15 19 13   Temp:       TempSrc:   Oral    SpO2:  96% 96% 96%   Weight:       Height:         Labs  Results for orders placed or performed during the hospital encounter of 05/20/17   CBC & Auto Differential   Result Value Ref Range    WBC 8.5 4.2 - 11.0 K/mcL    RBC 5.48 4.50 - 5.90 mil/mcL    HGB 15.6 13.0 - 17.0 g/dL    HCT 44.9 39.0 - 51.0 %    MCV 81.9 78.0 - 100.0 fl    MCH 28.5 26.0 - 34.0 pg    MCHC 34.7 32.0 - 36.5 g/dL    RDW-CV 13.4 11.0 - 15.0 %     140 - 450 K/mcL    DIFF TYPE AUTOMATED DIFFERENTIAL     Neutrophil 44 %    LYMPH 41 %    MONO 11 %    EOSIN 3 %    BASO 1 %    Absolute Neutrophil 3.9 1.8 - 7.7 K/mcL    Absolute Lymph 3.5 1.0 - 4.8 K/mcL    Absolute Mono 0.9 0.3 - 0.9 K/mcL    Absolute Eos 0.2 0.1 - 0.5 K/mcL    Absolute Baso 0.1 0.0 - 0.3 K/mcL   Prothrombin Time   Result Value Ref Range    PROTIME 11.5 9.7 - 11.8 sec    INR 1.1    Partial Thromboplastin Time   Result Value Ref Range    PTT 28 22 - 30 sec   Comprehensive Metabolic Panel   Result Value Ref Range    Sodium 142 135 - 145 mmol/L    Potassium 4.1 3.4 - 5.1 mmol/L    Chloride 105 98 - 107 mmol/L    Carbon Dioxide 28 21 - 32 mmol/L    Anion Gap 13 10 - 20 mmol/L    Glucose 100 (H) 65 - 99 mg/dL    BUN 18 6 - 20 mg/dL    Creatinine 0.96 0.67 - 1.17 mg/dL    GFR Estimate,  >90     GFR Estimate, Non African American >90     BUN/Creatinine Ratio 19 7 - 25    CALCIUM 9.2 8.4 - 10.2 mg/dL    TOTAL BILIRUBIN 0.5 0.2 - 1.0 mg/dL    AST/SGOT 74 (H) <38 Units/L    ALT/SGPT 233 (H) <79 Units/L    ALK PHOSPHATASE 94 45 - 117 Units/L     Reason for call:  Medication  Reason for Call:  Medication or medication refill:    Do you use a [facility]?  Name of the pharmacy and phone number for the current request:  [facility] - [phone]    Name of the medication requested: cloNIDine (CATAPRES) 0.1 MG tablet  levothyroxine (SYNTHROID/LEVOTHROID) 100 MCG tablet  amLODIPine (NORVASC) 5 MG tablet    Other request:Pt stated she has medication to last her until the end of Feb but she will be in [place] until the end of March wanted to know if she could get these filled     Can we leave a detailed message on this number? YES    Phone number patient can be reached at: Home number on file [phone] (home)    Best Time: anytime     Call taken on 1/4/2017 at 2:45 PM by Rosmery Wood       TOTAL PROTEIN 8.4 (H) 6.4 - 8.2 g/dL    Albumin 4.3 3.6 - 5.1 g/dL    GLOBULIN 4.1 (H) 2.0 - 4.0 g/dL    A/G Ratio, Serum 1.0 1.0 - 2.4   Lipid Panel with Reflex   Result Value Ref Range    CHOLESTEROL 195 <200 mg/dL    CALCULATED  (H) <130 mg/dL    HDL 33 (L) >59 mg/dL    TRIGLYCERIDE 128 <150 mg/dL    CALCULATED NON  mg/dL    CHOL/HDL 5.9 (H) <4.5   Creatinine - Point of Care   Result Value Ref Range    Creatinine POC 0.90 0.67 - 1.17 mg/dL   Troponin I - Point of Care   Result Value Ref Range    Troponin I POC <0.10 <0.10 ng/mL   Metered blood glucose   Result Value Ref Range    Glucose Bedside POC 94 65 - 99 mg/dL     Radiology  Imaging Results         CT Head Level 1 (Final result) Result time:  05/20/17 13:06:22    Final result    Impression:    IMPRESSION:  1. Normal unenhanced computerized tomography of the brain.    2. Results of the examination were discussed with the ordering provider,  PREM NESBITT, immediately following the procedure on 5/20/2017 1:05 PM.      Narrative:    CT HEAD LEVEL 1    DATE OF EXAM:  5/20/2017 12:58 PM.    HISTORY: STROKE, left sided headache right eye lateral visual field deficit    CONTRAST MATERIAL:  None.    COMPARISON: MRI dated 2/3/2015.    TECHNIQUE: 5 mm axial images were obtained through the brain. From the  images reformatted sagittal and coronal images were obtained.    FINDINGS: The ventricles and sulci are normal and symmetrical. The  gray-white matter differentiation is preserved.    No evidence for parenchymal or extraaxial hemorrhage is seen. There is no  mass effect or shift of midline structures.    The bony calvarium is intact.  The overlying soft tissues are unremarkable.      The paranasal sinuses are normally aerated and clear..                   Medications  ED Medication Orders     Start Ordered     Status Ordering Provider    05/20/17 1450 05/20/17 1250  sodium chloride (PF) 0.9 % injection 2 mL  (Capped IV)  2 times per day       Acknowledged PREM NESBITT    05/20/17 1338 05/20/17 1337  ketorolac injection 15 mg  ONCE      Last MAR action:  Given PREM NESBITT    05/20/17 1336 05/20/17 1336  metoCLOPramide (REGLAN) injection 5 mg  ONCE      Last MAR action:  Given PREM NESBITT    05/20/17 1251 05/20/17 1250  sodium chloride (NORMAL SALINE) 0.9 % bolus 500 mL  ONCE      Last MAR action:  Completed PREM NESBITT    05/20/17 1249 05/20/17 1250  sodium chloride (PF) 0.9 % injection 2 mL  (Capped IV)  PRN      Acknowledged PREM NESBITT        ED Course:    2:14 PM I rechecked on the pt who is resting in bed. He reports improvement of his HA, numbness, and his visual disturbance. He does express recent sleep depravation, and he mentions a FHx of migraines. I explained the pt's negative CT scan, his likely migraine, especially when considering his age and lack of medical history (he denies any PMHx or med use); and the plan of care. I discussed recheck after his fluids are finished. The pt understands and agrees to all terms. All questions answered.    3:24 PM I rechecked on the pt who is resting in bed. He reports improved sx further. I discussed his likely migraine and the plan of care. The pt understands and agrees to all terms. All questions answered.    MDM: Young pt with HA and atypical features, now completely resolved. It does not appear to be stroke or TIA. Pt will be d/c with f/u with PMD.    Critical Care time spent on this patient outside of billable procedures:  None      Clinical Impression  ED Diagnosis        Final diagnosis    Atypical migraine           Follow Up:  James oJnas MD  [address]  [facility] [address]  [facility] [address]  [phone]    In 2 days         New Prescriptions    No medications on file       The patient was provided with a recommendation to follow up with a primary care provider and obtain reassessment of their blood pressure within three months.    Pt is discharged in stable  condition.    I have reviewed the information recorded by the scribe for accuracy and agree with its contents.     Trace Latif acting as scribe for Aguila Piña MD    Physician name: Aguila Piña MD  Dictation #[id number]  Scribe: Trace Piña MD  05/20/17 7669

## 2017-05-24 ENCOUNTER — TELEPHONE (OUTPATIENT)
Dept: FAMILY MEDICINE | Facility: OTHER | Age: 77
End: 2017-05-24

## 2017-05-24 NOTE — TELEPHONE ENCOUNTER
Spoke with patient gave her results, patient understands  Closing encounter  Berenice Mosher RT (R)

## 2017-05-24 NOTE — PROGRESS NOTES
Your recent echocardiogram  Shows normal ejection fraction ( normal pumping ability) you do have some mild valvular leaks and the upper parts of the heart are moderately enlarged . No acute concerns , will continue to monitor

## 2017-05-24 NOTE — TELEPHONE ENCOUNTER
Left message on answering machine for patient to call back. When call is returned give results below:      Notes Recorded by Violeta Lin MD on 5/24/2017 at 10:38 AM  Your recent echocardiogram  Shows normal ejection fraction ( normal pumping ability) you do have some mild valvular leaks and the upper parts of the heart are moderately enlarged . No acute concerns , will continue to monitor    Annette Dorado CMA ([facility])

## 2017-05-25 ENCOUNTER — OFFICE VISIT (OUTPATIENT)
Dept: NEUROSURGERY | Facility: OTHER | Age: 77
End: 2017-05-25
Payer: COMMERCIAL

## 2017-05-25 VITALS — WEIGHT: 187 LBS | HEIGHT: 65 IN | TEMPERATURE: 98.2 F | BODY MASS INDEX: 31.16 KG/M2

## 2017-05-25 DIAGNOSIS — M54.16 LUMBAR RADICULOPATHY: Primary | ICD-10-CM

## 2017-05-25 PROCEDURE — 99203 OFFICE O/P NEW LOW 30 MIN: CPT | Performed by: PHYSICIAN ASSISTANT

## 2017-05-25 NOTE — PROGRESS NOTES
Dr. Ángel Soto  [facility]  Neurosurgery Clinic Visit      CC: Low back pain    Primary care Provider: Violeta Lin      Reason For Visit:   I was asked to consult on the patient for low back pain.      HPI: Ni Maya is a 76 year old female who presents for evaluation of low back pain. States the pain has been chronic off and on and has progressive worsened over the past year. Pain is located midline and radiates to the right gluteus and occasionally into the bilateral groin. Describes the pain as intermittent and sharp. Walking causes the pain to worsen. She has been going to the chiropractor, which was providing relief, but no longer is. She cannot recall anything that makes the pain better. Denies injury or trauma. Denies bladder or bowel incontinence.    Current pain: 2/10 At worst: 10/10    Past Medical History:   Diagnosis Date     Allergic rhinitis, cause unspecified      Essential hypertension, benign      Infection of kidney, unspecified 1999     Other specified acquired hypothyroidism      Other specified types of cystitis(595.89)     1999,6-2-01, 10-10-01       Past Medical History reviewed with patient during visit.    Past Surgical History:   Procedure Laterality Date     BIOPSY VULVA/PERINEUM,ADDNL LESN  9/18/09    Wide -excision of MICKIE III- right labia      C LAPAROSCOPY, SURGICAL; W/ VAGINAL HYSTERECTOMY W/WO REMOVAL OVARY(S)/TUBES  1984    for bleeding     C LIGATE FALLOPIAN TUBE,POSTPARTUM  1978    Tubal Ligation     C REMOVAL GALLBLADDER  1995     COLONOSCOPY  9/13/2013    Procedure: COLONOSCOPY;  Colonoscopy;  Surgeon: Yanick Ramsey MD;  Location: [facility]     HC ANGIOGRAPHY ARCH  4-3-98     HC BIOPSY OF BREAST, OPEN INCISIONAL  1960    Benign     HC COLONOSCOPY THRU STOMA, DIAGNOSTIC  4-24-98    Diverticuli - due in 2008     HC ERCP W SPHINCTEROTOMY  1996    6/2/96 and 8/30/96     HC REDUCTION OF LARGE BREAST  1988      [facility]I ENDOSCOPY DIAG W OR W/O  BRUSH/WASH  7-     Past Surgical History reviewed with patient during visit.    Current Outpatient Prescriptions   Medication     metFORMIN (GLUCOPHAGE-XR) 500 MG 24 hr tablet     cloNIDine (CATAPRES) 0.1 MG tablet     lisinopril-hydrochlorothiazide (PRINZIDE/ZESTORETIC) 20-25 MG per tablet     metoprolol (LOPRESSOR) 100 MG tablet     levothyroxine (SYNTHROID/LEVOTHROID) 100 MCG tablet     HUMBERTO CONTOUR NEXT test strip     cephALEXin (KEFLEX) 250 MG capsule     aspirin 81 MG tablet     Lancets (E-Z JECT LANCET MICRO-THIN 33G) MISC     Blood Glucose Monitoring Suppl W/DEVICE KIT     fish oil-omega-3 fatty acids (FISH OIL) 1000 MG capsule     ALLEGRA 180 MG PO TABS     NUTRITIONAL SUPPLEMENT OR     No current facility-administered medications for this visit.        Allergies   Allergen Reactions     Sulfa Drugs Rash       Social History     Social History     Marital status:      Spouse name: Marco Antonio     Number of children: 2     Years of education: 17     Occupational History     Retired in 1994       Retired     Social History Main Topics     Smoking status: Never Smoker     Smokeless tobacco: Never Used     Alcohol use No     Drug use: No     Sexual activity: No     Other Topics Concern     Parent/Sibling W/ Cabg, Mi Or Angioplasty Before 65f 55m? No     Social History Narrative       Family History   Problem Relation Age of Onset     Cardiovascular Father      Aortic aneurysm     Hypertension Father      Hypertension Mother      GASTROINTESTINAL DISEASE Mother      GI Bleed     Lipids Sister      Hypertension Sister      Genitourinary Problems Sister      Allergies Sister      CANCER Brother      Lung     Alcohol/Drug Brother      Connective Tissue Disorder Son      Down Syndrome     Respiratory Son      Pneumonia     CANCER Maternal Grandmother      Stomach     Hypertension Maternal Grandfather      Allergies Daughter           ROS: 10 point ROS neg other than the symptoms noted above in the  HPI.    Vital Signs: There were no vitals taken for this visit.    Examination:  Constitutional:  Alert, well nourished, NAD.  HEENT: Normocephalic, atraumatic.   Pulmonary:  Without shortness of breath, normal effort.   Lymph: no lymphadenopathy to low back or LE.   Integumentary: Skin is free of rashes or lesions.   Cardiovascular:  No pitting edema of BLE.      Neurological:  Awake  Alert  Oriented x 3  Speech clear  Cranial nerves II - XII grossly intact  PERRL  EOMI  Face symmetric  Tongue midline  Motor exam   Shoulder Abduction:  Right:  5/5   Left:  5/5  Biceps:                      Right:  5/5   Left:  5/5  Triceps:                     Right:  5/5   Left:  5/5  Wrist Extensors:       Right:  5/5   Left:  5/5  Wrist Flexors:           Right:  5/5   Left:  5/5  Intrinsics:                   Right:  5/5   Left:  5/5  Hip Flexor:                Right: 5/5  Left:  5/5  Hip Adductor:             Right:  5/5  Left:  5/5  Hip Abductor:             Right:  5/5  Left:  5/5  Gastroc Soleus:        Right:  5/5  Left:  5/5  Tib/Ant:                      Right:  5/5  Left:  5/5  EHL:                          Right:  5/5  Left:  5/5       Sensation normal to bilateral upper and lower extremities.    Reflexes are 2+ in the patellar and Achilles. There is no clonus. Downgoing Babinski.    Reflexes are 2+ in the brachial radialis and triceps. Negative Victorina sign bilaterally.  Musculoskeletal:  Gait: Able to stand from a seated position. Normal non-antalgic, non-myelopathic gait.  Able to heel/toe walk without loss of balance  Lumbar examination reveals tenderness of the spine and right paraspinous muscles.  Hip height is symmetrical. Negative SI joint or sciatic notch tenderness. Right GT tenderness.  Straight leg raise is negative bilaterally.      Imaging:   XR of the lumbar spine from 5/19/2017 was reviewed in the office today. Reveals multilevel degenerative changes of the lumbar spine. No  fractures.    Assessment/Plan:   Ni Maya is a 76 year old female who presents for evaluation of low back pain. States the pain has been chronic off and on and has progressive worsened over the past year. Pain is located midline and radiates to the right gluteus and occasionally into the bilateral groin. She does not have a recent lumbar MRI, so I have ordered updated imaging at this time and will call her with the results. She is in agreement with this plan.        Sarah Briscoe PA-C  [facility]  [address] [address]    Tel [phone]  Pager [phone]

## 2017-05-25 NOTE — MR AVS SNAPSHOT
"              After Visit Summary   2017    Ni Maya    MRN: [id number]           Patient Information     Date Of Birth          1940        Visit Information        Provider Department      2017 9:25 AM Sarah Briscoe PA-C [facility]        Today's Diagnoses     Lumbar radiculopathy    -  1       Follow-ups after your visit        Future tests that were ordered for you today     Open Future Orders        Priority Expected Expires Ordered    MR Lumbar Spine w/o Contrast Routine  2018            Who to contact     If you have questions or need follow up information about today's clinic visit or your schedule please contact [facility] directly at [phone].  Normal or non-critical lab and imaging results will be communicated to you by Hytlehart, letter or phone within 4 business days after the clinic has received the results. If you do not hear from us within 7 days, please contact the clinic through Hytlehart or phone. If you have a critical or abnormal lab result, we will notify you by phone as soon as possible.  Submit refill requests through Pansieve or call your pharmacy and they will forward the refill request to us. Please allow 3 business days for your refill to be completed.          Additional Information About Your Visit        Hytlehart Information     Pansieve lets you send messages to your doctor, view your test results, renew your prescriptions, schedule appointments and more. To sign up, go to [email or web address] . Click on \"Log in\" on the left side of the screen, which will take you to the Welcome page. Then click on \"Sign up Now\" on the right side of the page.     You will be asked to enter the access code listed below, as well as some personal information. Please follow the directions to create your username and password.     Your access code is: [email or web address]  Expires: 8/15/2017  9:19 AM     Your access code will  in 90 " "days. If you need help or a new code, please call your [facility] clinic or [phone].        Care EveryWhere ID     This is your Care EveryWhere ID. This could be used by other organizations to access your [facility] medical records  [id number]        Your Vitals Were     Temperature Height BMI (Body Mass Index)             98.2  F (36.8  C) (Temporal) 5' 5.25\" (1.657 m) 30.88 kg/m2          Blood Pressure from Last 3 Encounters:   05/17/17 132/70   07/20/16 132/68   05/13/16 130/74    Weight from Last 3 Encounters:   05/25/17 187 lb (84.8 kg)   05/17/17 187 lb 8 oz (85 kg)   07/20/16 189 lb 3.2 oz (85.8 kg)               Primary Care Provider Office Phone # Fax #    Violeta Shari Lin [phone]       [facility] [address] [place] [address]        Thank you!     Thank you for choosing [facility]  for your care. Our goal is always to provide you with excellent care. Hearing back from our patients is one way we can continue to improve our services. Please take a few minutes to complete the written survey that you may receive in the mail after your visit with us. Thank you!             Your Updated Medication List - Protect others around you: Learn how to safely use, store and throw away your medicines at www.disposemymeds.org.          This list is accurate as of: 5/25/17  9:34 AM.  Always use your most recent med list.                   Brand Name Dispense Instructions for use    ALLEGRA 180 MG tablet   Generic drug:  fexofenadine     90 Tab    1 TABLET DAILY       aspirin 81 MG tablet     30 tablet    Take 1 tablet (81 mg) by mouth daily       HUMBERTO CONTOUR NEXT test strip   Generic drug:  blood glucose monitoring     250 each    TEST THREE TIMES A DAY       Blood Glucose Monitoring Suppl W/DEVICE Kit     1 kit    1 kit 3 times daily.       cephalexin 250 MG capsule    KEFLEX    30 capsule    Take 1 capsule (250 mg) by mouth 3 times daily       cloNIDine 0.1 " MG tablet    CATAPRES    270 tablet    Take 2 tablets (0.2 mg) by mouth At Bedtime       E-Z JECT LANCET MICRO-THIN 33G Misc          fish oil-omega-3 fatty acids 1000 MG capsule      Take 2 g by mouth daily. 2 caps per day       levothyroxine 100 MCG tablet    SYNTHROID/LEVOTHROID    90 tablet    Take 1 tablet (100 mcg) by mouth daily       lisinopril-hydrochlorothiazide 20-25 MG per tablet    PRINZIDE/ZESTORETIC    90 tablet    Take 1 tablet by mouth daily       metFORMIN 500 MG 24 hr tablet    GLUCOPHAGE-XR    90 tablet    Take 1 tablet (500 mg) by mouth daily (with dinner)       metoprolol 100 MG tablet    LOPRESSOR    180 tablet    Take 1 tablet (100 mg) by mouth 2 times daily       NUTRITIONAL SUPPLEMENT PO      VIBE

## 2017-05-25 NOTE — NURSING NOTE
"Ni Maya is a 76 year old female who presents for:  Chief Complaint   Patient presents with     Consult     midline low back pain with groin pain at times     Neurologic Problem        Initial Vitals:  Temp 98.2  F (36.8  C) (Temporal)  Ht 5' 5.25\" (1.657 m)  Wt 187 lb (84.8 kg)  BMI 30.88 kg/m2 Estimated body mass index is 30.88 kg/(m^2) as calculated from the following:    Height as of this encounter: 5' 5.25\" (1.657 m).    Weight as of this encounter: 187 lb (84.8 kg).. Body surface area is 1.98 meters squared. BP completed using cuff size: NA (Not Taken)  Data Unavailable    Do you feel safe in your environment?  Yes  Do you need any refills today? No    Nursing Comments:         Earl Strange    "

## 2017-05-31 ENCOUNTER — HOSPITAL ENCOUNTER (OUTPATIENT)
Dept: MRI IMAGING | Facility: CLINIC | Age: 77
Discharge: HOME | End: 2017-05-31
Attending: PHYSICIAN ASSISTANT | Admitting: PHYSICIAN ASSISTANT
Payer: COMMERCIAL

## 2017-05-31 DIAGNOSIS — M54.16 LUMBAR RADICULOPATHY: ICD-10-CM

## 2017-05-31 PROCEDURE — 72148 MRI LUMBAR SPINE W/O DYE: CPT

## 2017-06-01 ENCOUNTER — TELEPHONE (OUTPATIENT)
Dept: PALLIATIVE MEDICINE | Facility: CLINIC | Age: 77
End: 2017-06-01

## 2017-06-01 ENCOUNTER — TELEPHONE (OUTPATIENT)
Dept: NEUROSURGERY | Facility: CLINIC | Age: 77
End: 2017-06-01

## 2017-06-01 DIAGNOSIS — M54.16 LUMBAR RADICULOPATHY: Primary | ICD-10-CM

## 2017-06-01 NOTE — TELEPHONE ENCOUNTER
Pre-screening Questions for Radiology Injections:    Injection to be done at which interventional clinic site? [facility]    Procedure ordered by Sarah Briscoe    Procedure ordered? Right L2-3 GABRIELA    What insurance would patient like us to bill for this procedure? [employer]are      Worker's comp-Any injection DO NOT SCHEDULE and route to Portia Jaimes.      [employer] insurance - For SI joint injections, DO NOT SCHEDULE and route Portia Jaimes.      [employer]- MBB's must be scheduled at LEAST two weeks apart      [employer] - Any injection besides hip/shoulder/knee joint DO NOT SCHEDULE and route to Portia Jaimes. She will obtain PA and call pt back to schedule procedure or notify pt of denial.     HP [employer]-PA REQUIRED FOR NON-GABRIELA OR Joint injections    Any chance of pregnancy? Not Applicable   If YES, do NOT schedule and route to RN pool    Is an  needed? No     Patient has a drive home? (mandatory) YES:     Is patient taking any blood thinners (plavix, coumadin, jantoven, warfarin, heparin, pradaxa or dabigatran )? No   If hold needed, do NOT schedule, route to RN pool     Is patient taking any aspirin products? Yes - Pt takes 81mg daily; instructed to hold 0 day(s) prior to procedure.      If more than 325mg/day do NOT schedule; route to RN pool     For CERVICAL procedures, hold all aspirin products for 6 days.      Does the patient have a bleeding or clotting disorder? No     If yes, okay to schedule AND route to RN nurse pool    **For any patients with platelet count <100, must be forwarded to provider**    Is patient diabetic?  Yes  If YES, have them bring their glucometer.    Does patient have an active infection or treated for one within the past week? No     Is patient currently taking any antibiotics?  No     For patients on chronic, preventative, or prophylactic antibiotics, procedures may be scheduled.     For patients on antibiotics for active or recent infection:    Ruth Patel,  Joey Iyer-antibiotic course must have been completed for 4 days    Fam Salgado-antibiotic course must have been completed for 7 days    Is patient currently taking any steroid medications? (i.e. Prednisone, Medrol)  No     For patients on steroid medications:    Ruth Patel [place], [place]-steroid course must have been completed for 4 days    Fam Salgado-steroid course must have been completed for 7 days    Reviewed with patient:  If you are started on any steroids or antibiotics between now and your appointment, you must contact us because it may affect our ability to perform your procedure.  Yes    Is patient actively being treated for cancer or immunocompromised, including the spleen having been removed? No    If YES, do NOT schedule and route to RN pool     **For Dr. Middleton patients without spleens should have the chart sent to her**    Are you able to get on and off an exam table with minimal or no assistance? Yes  If NO, do NOT schedule and route to RN pool    Are you able to roll over and lay on your stomach with minimal or no assistance? Yes  If NO, do NOT schedule and route to RN pool     Any allergies to contrast dye, iodine, shellfish, or numbing and steroid medications? No  If YES, route to RN pool AND add allergy information to appointment notes    Allergies: Sulfa drugs        Has the patient had a flu shot or any other vaccinations within 7 days before or after the procedure.  No       Does patient have an MRI/CT?  YES:   (SI joint, hip injections, lumbar sympathetic blocks, and stellate ganglion blocks do not require an MRI)    Was the MRI done w/in the last 3 years?  Yes    Was MRI done at [facility]? Yes      If not, where was it done? N/A       If MRI was not done at [facility], [facility] or [facility] do NOT schedule and route to nursing.  If pt has an imaging disc, the injection may be scheduled but pt has to bring disc to appt. If they show up w/out disc the  injection cannot be done    Reminders (please tell patient if applicable):       Instructed pt to arrive 30 minutes early for IV start if this is for a cervical procedure, ALL sympathetic (stellate ganglion, hypogastric, or lumbar sympathetic block) and all sedation procedures (RFA, spinal cord stimulation trials).  Not Applicable    -IVs are not routinely placed for Miranda and Egyhazi cervical case       If NPO for sedation, informed patient that it is okay to take medications with sips of water (except if they are to hold blood thinners).  Not Applicable   *DO take blood pressure medication if it is prescribed*      If this is for a cervical GABRIELA, informed patient that aspirin needs to be held for 6 days.   Not Applicable      For all patients not having spinal cord stimulator (SCS) trials or radiofrequency ablations (RFAs), informed patient:  IV sedation is not provided for this procedure.  If you feel that an oral anti-anxiety medication is needed, you can discuss this further with your referring provider or primary care provider.  The Pain Clinic provider will discuss specifics of what the procedure includes at your appointment.  Most procedures last 10-20 minutes.  We use numbing medications to help with any discomfort during the procedure.  Not Applicable      Do not schedule procedures requiring IV placement in the first appointment of the day or first appointment after lunch.       For patients 85 or older we recommend having an adult stay w/ them for the remainder of the day.       Does the patient have any questions?  NO  Lisa Eduardo  [facility]

## 2017-06-01 NOTE — TELEPHONE ENCOUNTER
Called patient with MRI results. Continues to have pain radiating to right gluteus. Have referred her for right L2-3 GABRIELA. Patient in agreement with plan.

## 2017-06-05 NOTE — PROGRESS NOTES
[facility]  [address] [facility] [address]  [phone]  Dept: [phone]    PRE-OP EVALUATION:  Today's date: 2017    Ni Myaa (: 1940) presents for pre-operative evaluation assessment as requested by Dr. Garcia.  She requires evaluation and anesthesia risk assessment prior to undergoing surgery/procedure for treatment of back pain .  Proposed procedure: Inject Epidural Lumbar    Date of Surgery/ Procedure: 2017  Time of Surgery/ Procedure: 10:30am  Hospital/Surgical Facility: [facility]  Fax number for surgical facility:   Primary Physician: Violeta Lin  Type of Anesthesia Anticipated: to be determined    Patient has a Health Care Directive or Living Will:  YES     1. NO - Do you have a history of heart attack, stroke, stent, bypass or surgery on an artery in the head, neck, heart or legs?  2. NO - Do you ever have any pain or discomfort in your chest?  3. NO - Do you have a history of  Heart Failure?  4. NO - Are you troubled by shortness of breath when: walking on the level, up a slight hill or at night?  5. NO - Do you currently have a cold, bronchitis or other respiratory infection?  6. NO - Do you have a cough, shortness of breath or wheezing?  7. NO - Do you sometimes get pains in the calves of your legs when you walk?  8. NO - Do you or anyone in your family have previous history of blood clots?  9. NO - Do you or does anyone in your family have a serious bleeding problem such as prolonged bleeding following surgeries or cuts?  10. NO - Have you ever had problems with anemia or been told to take iron pills?  11. NO - Have you had any abnormal blood loss such as black, tarry or bloody stools, or abnormal vaginal bleeding?  12. NO - Have you ever had a blood transfusion?  13. NO - Have you or any of your relatives ever had problems with anesthesia?  14. NO - Do you have sleep apnea, excessive snoring or daytime drowsiness?  15. NO - Do you  have any prosthetic heart valves?  16. NO - Do you have prosthetic joints?  17. NO - Is there any chance that you may be pregnant?      HPI:                                                      Brief HPI related to upcoming procedure:  76 year old patient scheduled for Epidural injection       See problem list for active medical problems.  Problems all longstanding and stable, except as noted/documented.  See ROS for pertinent symptoms related to these conditions.                                                                                                  .  DIABETES - Patient has a longstanding history of DiabetesType Type II . Patient is being treated with oral agents and denies significant side effects. Control has been good. Complicating factors include but are not limited to: HTN .                                                                                                             .  HYPERTENSION - Patient has longstanding history of mod-severe HTN , currently denies any symptoms referable to elevated blood pressure. Specifically denies chest pain, palpitations, dyspnea, orthopnea, PND or peripheral edema. Blood pressure readings have been in normal range. Current medication regimen is as listed below. Patient denies any side effects of medication.                                                                                                                                                                                          .    MEDICAL HISTORY:                                                      Patient Active Problem List    Diagnosis Date Noted     Type 2 diabetes mellitus with hyperglycemia, without long-term current use of insulin (H) 05/17/2017     Priority: Medium     Essential hypertension with goal blood pressure less than 140/90 05/27/2016     Priority: Medium     Hypothyroidism, unspecified hypothyroidism type 10/14/2015     Priority: Medium     Type 2 diabetes mellitus with  hyperglycemia (H) 10/14/2015     Priority: Medium     HTN, goal below 140/90 11/06/2013     Priority: Medium     ACP (advance care planning) 05/17/2016     Advance Care Planning 5/17/2016: Receipt of ACP document:  Received: Health Care Directive which was witnessed or notarized on 11/22/2013.  Document not previously scanned.  Validation form completed and sent with document to be scanned.  Code Status needs to be updated to reflect choices in most recent ACP document. Notification sent to Dr. Lin for followup.  Confirmed/documented designated decision maker(s).  Added by Kassidy Sousa.             Hyperlipidemia with target LDL less than 100 10/16/2013     Diagnosis updated by automated process. Provider to review and confirm.       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 Final path MICKIE III with free surgical margins       Urethral stricture 01/03/2008     Problem list name updated by automated process. Provider to review       Edema 12/29/2006     Obesity 12/29/2006     Problem list name updated by automated process. Provider to review       Chronic kidney disease, stage II (mild) 11/27/2006     Symptomatic menopausal or female climacteric states 01/13/2005     Allergic rhinitis 10/21/2004     Problem list name updated by automated process. Provider to review       Hypothyroidism 06/25/2002     Problem list name updated by automated process. Provider to review        Past Medical History:   Diagnosis Date     Allergic  rhinitis, cause unspecified      Essential hypertension, benign      Infection of kidney, unspecified 1999     Other specified acquired hypothyroidism      Other specified types of cystitis(595.89)     1999,6-2-01, 10-10-01     Past Surgical History:   Procedure Laterality Date     BIOPSY VULVA/PERINEUM,ADDNL LESN  9/18/09    Wide -excision of MICKIE III- right labia      C LAPAROSCOPY, SURGICAL; W/ VAGINAL HYSTERECTOMY W/WO REMOVAL OVARY(S)/TUBES  1984    for bleeding     C LIGATE FALLOPIAN TUBE,POSTPARTUM  1978    Tubal Ligation     C REMOVAL GALLBLADDER  1995     COLONOSCOPY  9/13/2013    Procedure: COLONOSCOPY;  Colonoscopy;  Surgeon: Yanick Ramsey MD;  Location: [facility]     HC ANGIOGRAPHY ARCH  4-3-98     HC BIOPSY OF BREAST, OPEN INCISIONAL  1960    Benign     HC COLONOSCOPY THRU STOMA, DIAGNOSTIC  4-24-98    Diverticuli - due in 2008     HC ERCP W SPHINCTEROTOMY  1996 6/2/96 and 8/30/96     HC REDUCTION OF LARGE BREAST  1988     HC UGI ENDOSCOPY DIAG W OR W/O BRUSH/WASH  7-     Current Outpatient Prescriptions   Medication Sig Dispense Refill     metFORMIN (GLUCOPHAGE-XR) 500 MG 24 hr tablet Take 1 tablet (500 mg) by mouth daily (with dinner) 90 tablet 3     cloNIDine (CATAPRES) 0.1 MG tablet Take 2 tablets (0.2 mg) by mouth At Bedtime 270 tablet 1     lisinopril-hydrochlorothiazide (PRINZIDE/ZESTORETIC) 20-25 MG per tablet Take 1 tablet by mouth daily 90 tablet 3     metoprolol (LOPRESSOR) 100 MG tablet Take 1 tablet (100 mg) by mouth 2 times daily 180 tablet 3     levothyroxine (SYNTHROID/LEVOTHROID) 100 MCG tablet Take 1 tablet (100 mcg) by mouth daily 90 tablet 3     HUMBERTO CONTOUR NEXT test strip TEST THREE TIMES A  each 1     aspirin 81 MG tablet Take 1 tablet (81 mg) by mouth daily 30 tablet 1     Lancets (E-Z JECT LANCET MICRO-THIN 33G) MISC        Blood Glucose Monitoring Suppl W/DEVICE KIT 1 kit 3 times daily. 1 kit 0     fish oil-omega-3 fatty acids (FISH OIL) 1000 MG capsule Take 2 g  "by mouth daily. 2 caps per day       ALLEGRA 180 MG PO TABS 1 TABLET DAILY 90 Tab 3     NUTRITIONAL SUPPLEMENT OR VIBE       OTC products: None, except as noted above    Allergies   Allergen Reactions     Sulfa Drugs Rash      Latex Allergy: NO    Social History   Substance Use Topics     Smoking status: Never Smoker     Smokeless tobacco: Never Used     Alcohol use No     History   Drug Use No       REVIEW OF SYSTEMS:                                                    C: NEGATIVE for fever, chills, change in weight  I: NEGATIVE for worrisome rashes, moles or lesions  E: NEGATIVE for vision changes or irritation  E/M: NEGATIVE for ear, mouth and throat problems  R: NEGATIVE for significant cough or SOB  B: NEGATIVE for masses, tenderness or discharge  CV: NEGATIVE for chest pain, palpitations or peripheral edema  GI: NEGATIVE for nausea, abdominal pain, heartburn, or change in bowel habits  : NEGATIVE for frequency, dysuria, or hematuria  M: NEGATIVE for significant arthralgias or myalgia  N: NEGATIVE for weakness, dizziness or paresthesias  E: NEGATIVE for temperature intolerance, skin/hair changes  H: NEGATIVE for bleeding problems  P: NEGATIVE for changes in mood or affect    EXAM:                                                    /82  Pulse 84  Temp 98  F (36.7  C) (Temporal)  Resp 14  Ht 5' 5.25\" (1.657 m)  Wt 189 lb (85.7 kg)  SpO2 98%  Breastfeeding? No  BMI 31.21 kg/m2    GENERAL APPEARANCE:  alert and no distress     EYES: EOMI, PERRL     HENT: ear canals and TM's normal and nose and mouth without ulcers or lesions     NECK: no adenopathy, no asymmetry, masses, or scars and thyroid normal to palpation     RESP: lungs clear to auscultation - no rales, rhonchi or wheezes     CV: irregular  rhythm, normal S1 S2, no S3 or S4 and no murmur, click or rub     ABDOMEN:  soft, nontender, no HSM or masses and bowel sounds normal     MS: extremities normal- no gross deformities noted, no evidence of " inflammation in joints, FROM in all extremities.     SKIN: no suspicious lesions or rashes     NEURO: Normal strength and tone, sensory exam grossly normal, mentation intact and speech normal     PSYCH: mentation appears normal. and affect normal/bright     LYMPHATICS: No axillary, cervical, or supraclavicular nodes    DIAGNOSTICS:                                                    EKG: atrial fibrillation, rate , normal axis, normal intervals, no acute ST/T changes c/w ischemia, no LVH by voltage criteria, unchanged from previous tracings, noted on previous EKG when  reviewed today , but no mention in chart     Recent Labs   Lab Test  05/17/17   0838  09/14/16   0809  09/14/16   0808   01/12/15   1143   HGB  14.6   --    --    --   15.3   PLT  134*   --    --    --   134*   INR   --    --    --    --   0.95   NA  142   --   141   < >  140   POTASSIUM  3.6   --   3.6   < >  3.4   CR  0.86   --   0.85   < >  0.84   A1C  7.2*  7.0*   --    < >   --     < > = values in this interval not displayed.        IMPRESSION:                                                    Reason for surgery/procedure: Back pain   Diagnosis/reason for consult: Pre op evaluation for anesthetic risk     The proposed surgical procedure is considered LOW risk.    REVISED CARDIAC RISK INDEX  The patient has the following serious cardiovascular risks for perioperative complications such as (MI, PE, VFib and 3  AV Block):  High risk surgery (>5% cardiac complication risk)  INTERPRETATION: 0 risks: Class I (very low risk - 0.4% complication rate)    The patient has the following additional risks for perioperative complications:  No identified additional risks      ICD-10-CM    1. Preop general physical exam Z01.818 EKG 12-lead complete w/read - Clinics   2. Lumbar radiculopathy M54.16    3. Chronic atrial fibrillation (H) I48.2 CARDIOLOGY EVAL ADULT REFERRAL   4. Essential hypertension with goal blood pressure less than 140/90 I10 CARDIOLOGY EVAL  ADULT REFERRAL   5. Type 2 diabetes mellitus with hyperglycemia, without long-term current use of insulin (H) E11.65    6. Encounter for screening mammogram for breast cancer Z12.31 GENETICS REFERRAL    Daughter with breast lump , Doctor recommend genetic testing for the Brca genes     Noted A fib on EKG , but asymptomatic ,Her [id number] risk stratification score for estimation of stroke risk for nonvalvular atrial fibrillation is 5 points: 7.2% per year  Patient may likely benefit from anticoagulation  I will have her follow with Cardiologist and discuss further     RECOMMENDATIONS:                                                      --Consult hospital rounder / IM to assist post-op medical management    --Patient is to take all scheduled medications on the day of surgery EXCEPT for modifications listed below.    Recommend to defer epidural injection until further evaluation by Cardiologist        Signed Electronically by: Violeta Lin MD, MD    Copy of this evaluation report is provided to requesting physician.    [place] Preop Guidelines

## 2017-06-07 ENCOUNTER — OFFICE VISIT (OUTPATIENT)
Dept: FAMILY MEDICINE | Facility: OTHER | Age: 77
End: 2017-06-07
Payer: COMMERCIAL

## 2017-06-07 VITALS
SYSTOLIC BLOOD PRESSURE: 128 MMHG | RESPIRATION RATE: 14 BRPM | HEIGHT: 65 IN | TEMPERATURE: 98 F | BODY MASS INDEX: 31.49 KG/M2 | DIASTOLIC BLOOD PRESSURE: 82 MMHG | OXYGEN SATURATION: 98 % | WEIGHT: 189 LBS | HEART RATE: 84 BPM

## 2017-06-07 DIAGNOSIS — Z12.31 ENCOUNTER FOR SCREENING MAMMOGRAM FOR BREAST CANCER: ICD-10-CM

## 2017-06-07 DIAGNOSIS — Z01.818 PREOP GENERAL PHYSICAL EXAM: Primary | ICD-10-CM

## 2017-06-07 DIAGNOSIS — M54.16 LUMBAR RADICULOPATHY: ICD-10-CM

## 2017-06-07 DIAGNOSIS — I48.20 CHRONIC ATRIAL FIBRILLATION (H): ICD-10-CM

## 2017-06-07 DIAGNOSIS — E11.65 TYPE 2 DIABETES MELLITUS WITH HYPERGLYCEMIA, WITHOUT LONG-TERM CURRENT USE OF INSULIN (H): ICD-10-CM

## 2017-06-07 DIAGNOSIS — I10 ESSENTIAL HYPERTENSION WITH GOAL BLOOD PRESSURE LESS THAN 140/90: ICD-10-CM

## 2017-06-07 PROCEDURE — 99214 OFFICE O/P EST MOD 30 MIN: CPT | Performed by: FAMILY MEDICINE

## 2017-06-07 PROCEDURE — 93000 ELECTROCARDIOGRAM COMPLETE: CPT | Performed by: FAMILY MEDICINE

## 2017-06-07 ASSESSMENT — PAIN SCALES - GENERAL: PAINLEVEL: MILD PAIN (2)

## 2017-06-07 NOTE — MR AVS SNAPSHOT
After Visit Summary   6/7/2017    Ni Maya    MRN: [id number]           Patient Information     Date Of Birth          1940        Visit Information        Provider Department      6/7/2017 9:30 AM Violeta Lin MD [facility]'s Diagnoses     Preop general physical exam    -  1    Chronic atrial fibrillation (H)        Essential hypertension with goal blood pressure less than 140/90        Type 2 diabetes mellitus with hyperglycemia, without long-term current use of insulin (H)        Encounter for screening mammogram for breast cancer          Care Instructions      Before Your Surgery      Call your surgeon if there is any change in your health. This includes signs of a cold or flu (such as a sore throat, runny nose, cough, rash or fever).    Do not smoke, drink alcohol or take over the counter medicine (unless your surgeon or primary care doctor tells you to) for the 24 hours before and after surgery.    If you take prescribed drugs: Follow your doctor s orders about which medicines to take and which to stop until after surgery.    Eating and drinking prior to surgery: follow the instructions from your surgeon    Take a shower or bath the night before surgery. Use the soap your surgeon gave you to gently clean your skin. If you do not have soap from your surgeon, use your regular soap. Do not shave or scrub the surgery site.  Wear clean pajamas and have clean sheets on your bed.           Follow-ups after your visit        Additional Services     CARDIOLOGY EVAL ADULT REFERRAL       Your provider has referred you to:  FMG: [facility] [phone]   [email or web address]    Please be aware that coverage of these services is subject to the terms and limitations of your health insurance plan.  Call member services at your health plan with any benefit or  coverage questions.      Type of Referral:  New Cardiology Consult    Timeframe requested:  1 Week    Please bring the following to your appointment:  >>   Any x-rays, CTs or MRIs which have been performed.  Contact the facility where they were done to arrange for  prior to your scheduled appointment.    >>   List of current medications  >>   This referral request   >>   Any documents/labs given to you for this referral            GENETICS REFERRAL       Your provider has referred you to: FMG: [facility] [phone]   [email or web address]     Please be aware that coverage of these services is subject to the terms and limitations of your health insurance plan.  Call member services at your health plan with any benefit or coverage questions.      Please bring the following with you to your appointment:    (1) Any X-Rays, CTs or MRIs which have been performed.  Contact the facility where they were done to arrange for  prior to your scheduled appointment.   (2) List of current medications   (3) This referral request   (4) Any documents/labs given to you for this referral                  Your next 10 appointments already scheduled     Jun 08, 2017   Procedure with Kimani Garcia MD   [facility] ([facility])    [address]  [place] [id number] [phone]           From [address]: Exit at [address] on south side of [place]. Turn right on [address]. Turn left at stoplight on [address]. [facility] will be in view two blocks ahead            Jun 08, 2017 10:30 AM CDT   XR FLUORO NEEDLE PLACEMENT SPINE with PHCARM1   [facility] ([facility])    [address] [place] [address] [phone]           For nerve root injection, please send or bring copies of any MRIs or other scans you  have had.  Bring a list of your current medicines to your exam. (Include vitamins, minerals and over-the-counter medicines.) Leave your valuables at home.  Plan to have someone drive you home afterward.  Stop taking the following medicines (but talk to your doctor first):   If you take blood thinners, you may need to stop taking them a few days before treatment. Talk to your doctor before stopping these medicines.Stop taking Coumadin (warfarin) 3 days before treatment. Restart the day after treatment.   If you take Plavix, Ticlid, Pletal or Persantine, please ask your doctor if you should stop these medicines. You may need extra tests on the morning of your scan. You may take your other medicines as normal.  Stop all food and drink (including water) 3 hours before your test or treatment.  Please tell the doctor:   If you are allergic to X-ray dye (contrast fluid).   If you may be pregnant.  Injections take about 30 to 45 minutes. Most people spend up to 2 hours in the clinic or hospital.  Please call the Imaging Department at your exam site with any questions            Jun 21, 2017  1:00 PM CDT   New Visit with S[facility]on MD   [facility]    [address] [place] [address] [phone]              Who to contact     If you have questions or need follow up information about today's clinic visit or your schedule please contact [facility] directly at [phone].  Normal or non-critical lab and imaging results will be communicated to you by MyChart, letter or phone within 4 business days after the clinic has received the results. If you do not hear from us within 7 days, please contact the clinic through MyChart or phone. If you have a critical or abnormal lab result, we will notify you by phone as soon as possible.  Submit refill requests through [facility] or call your pharmacy and they will forward the refill request to us. Please allow 3  "business days for your refill to be completed.          Additional Information About Your Visit        MyChart Information     [employer] lets you send messages to your doctor, view your test results, renew your prescriptions, schedule appointments and more. To sign up, go to [email or web address] . Click on \"Log in\" on the left side of the screen, which will take you to the Welcome page. Then click on \"Sign up Now\" on the right side of the page.     You will be asked to enter the access code listed below, as well as some personal information. Please follow the directions to create your username and password.     Your access code is: [email or web address]  Expires: 8/15/2017  9:19 AM     Your access code will  in 90 days. If you need help or a new code, please call your [facility] clinic or [phone].        Care EveryWhere ID     This is your [facility] EveryWhere ID. This could be used by other organizations to access your [facility] medical records  [id number]        Your Vitals Were     Pulse Temperature Respirations Height Pulse Oximetry Breastfeeding?    84 98  F (36.7  C) (Temporal) 14 5' 5.25\" (1.657 m) 98% No    BMI (Body Mass Index)                   31.21 kg/m2            Blood Pressure from Last 3 Encounters:   17 128/82   17 132/70   16 132/68    Weight from Last 3 Encounters:   17 189 lb (85.7 kg)   17 187 lb (84.8 kg)   17 187 lb 8 oz (85 kg)              We Performed the Following     CARDIOLOGY EVAL ADULT REFERRAL     EKG 12-lead complete w/read - Clinics     GENETICS REFERRAL          Today's Medication Changes          These changes are accurate as of: 17 10:17 AM.  If you have any questions, ask your nurse or doctor.               Stop taking these medicines if you haven't already. Please contact your care team if you have questions.     Multi-vitamin Tabs tablet   Stopped by:  Violeta Lin MD                    Primary Care Provider Office Phone # Fax # "    Violeta Lin [phone]       [facility] [address] [place] [address]        Thank you!     Thank you for choosing [facility]  for your care. Our goal is always to provide you with excellent care. Hearing back from our patients is one way we can continue to improve our services. Please take a few minutes to complete the written survey that you may receive in the mail after your visit with us. Thank you!             Your Updated Medication List - Protect others around you: Learn how to safely use, store and throw away your medicines at www.disposemymeds.org.          This list is accurate as of: 6/7/17 10:17 AM.  Always use your most recent med list.                   Brand Name Dispense Instructions for use    ALLEGRA 180 MG tablet   Generic drug:  fexofenadine     90 Tab    1 TABLET DAILY       aspirin 81 MG tablet     30 tablet    Take 1 tablet (81 mg) by mouth daily       HUMBERTO CONTOUR NEXT test strip   Generic drug:  blood glucose monitoring     250 each    TEST THREE TIMES A DAY       Blood Glucose Monitoring Suppl W/DEVICE Kit     1 kit    1 kit 3 times daily.       cloNIDine 0.1 MG tablet    CATAPRES    270 tablet    Take 2 tablets (0.2 mg) by mouth At Bedtime       E-Z JECT LANCET MICRO-THIN 33G Misc          fish oil-omega-3 fatty acids 1000 MG capsule      Take 2 g by mouth daily. 2 caps per day       glucosamine-chondroitin 500-400 MG Caps per capsule      Take 1 capsule by mouth daily       levothyroxine 100 MCG tablet    SYNTHROID/LEVOTHROID    90 tablet    Take 1 tablet (100 mcg) by mouth daily       lisinopril-hydrochlorothiazide 20-25 MG per tablet    PRINZIDE/ZESTORETIC    90 tablet    Take 1 tablet by mouth daily       metFORMIN 500 MG 24 hr tablet    GLUCOPHAGE-XR    90 tablet    Take 1 tablet (500 mg) by mouth daily (with dinner)       metoprolol 100 MG tablet    LOPRESSOR    180 tablet    Take 1 tablet (100 mg) by mouth 2 times  daily       NUTRITIONAL SUPPLEMENT PO      VIBE

## 2017-06-07 NOTE — NURSING NOTE
"Chief Complaint   Patient presents with     Pre-Op Exam     Panel Management     fall risk, foot exam       Initial /82  Pulse 84  Temp 98  F (36.7  C) (Temporal)  Resp 14  Ht 5' 5.25\" (1.657 m)  Wt 189 lb (85.7 kg)  SpO2 98%  Breastfeeding? No  BMI 31.21 kg/m2 Estimated body mass index is 31.21 kg/(m^2) as calculated from the following:    Height as of this encounter: 5' 5.25\" (1.657 m).    Weight as of this encounter: 189 lb (85.7 kg).  Medication Reconciliation: complete     Nikky Crabtree MA    "

## 2017-06-21 ENCOUNTER — OFFICE VISIT (OUTPATIENT)
Dept: CARDIOLOGY | Facility: CLINIC | Age: 77
End: 2017-06-21
Payer: COMMERCIAL

## 2017-06-21 VITALS
BODY MASS INDEX: 33.56 KG/M2 | HEART RATE: 84 BPM | HEIGHT: 63 IN | SYSTOLIC BLOOD PRESSURE: 122 MMHG | WEIGHT: 189.4 LBS | DIASTOLIC BLOOD PRESSURE: 82 MMHG | OXYGEN SATURATION: 95 %

## 2017-06-21 DIAGNOSIS — I48.91 ATRIAL FIBRILLATION, UNSPECIFIED TYPE (H): Primary | ICD-10-CM

## 2017-06-21 DIAGNOSIS — R07.9 CHEST PAIN, UNSPECIFIED TYPE: ICD-10-CM

## 2017-06-21 PROCEDURE — 99205 OFFICE O/P NEW HI 60 MIN: CPT | Performed by: INTERNAL MEDICINE

## 2017-06-21 NOTE — LETTER
6/21/2017      RE: Ni Maya  [id number] [facility] [id number]       Dear Colleague,    Thank you for the opportunity to participate in the care of your patient, Ni Maya, at the [facility]. Please see a copy of my visit note below.    HPI and Plan:   See dictation(#[id number])    Orders Placed This Encounter   Procedures     NM Lexiscan stress test (nuc card)     Follow-Up with Cardiologist     Holter Monitor 48 hour - Adult       Orders Placed This Encounter   Medications     apixaban ANTICOAGULANT (ELIQUIS) 5 MG tablet     Sig: Take 1 tablet (5 mg) by mouth 2 times daily     Dispense:  90 tablet     Refill:  3       Medications Discontinued During This Encounter   Medication Reason     aspirin 81 MG tablet          Encounter Diagnoses   Name Primary?     Atrial fibrillation, unspecified type (H) Yes     Chest pain, unspecified type        CURRENT MEDICATIONS:  Current Outpatient Prescriptions   Medication Sig Dispense Refill     apixaban ANTICOAGULANT (ELIQUIS) 5 MG tablet Take 1 tablet (5 mg) by mouth 2 times daily 90 tablet 3     metFORMIN (GLUCOPHAGE-XR) 500 MG 24 hr tablet Take 1 tablet (500 mg) by mouth daily (with dinner) 90 tablet 3     cloNIDine (CATAPRES) 0.1 MG tablet Take 2 tablets (0.2 mg) by mouth At Bedtime 270 tablet 1     lisinopril-hydrochlorothiazide (PRINZIDE/ZESTORETIC) 20-25 MG per tablet Take 1 tablet by mouth daily 90 tablet 3     metoprolol (LOPRESSOR) 100 MG tablet Take 1 tablet (100 mg) by mouth 2 times daily 180 tablet 3     levothyroxine (SYNTHROID/LEVOTHROID) 100 MCG tablet Take 1 tablet (100 mcg) by mouth daily 90 tablet 3     Lancets (E-Z JECT LANCET MICRO-THIN 33G) MISC        fish oil-omega-3 fatty acids (FISH OIL) 1000 MG capsule Take 2 g by mouth daily. 2 caps per day       ALLEGRA 180 MG PO TABS 1 TABLET DAILY 90 Tab 3     NUTRITIONAL SUPPLEMENT OR VIBE       HUMBERTO CONTOUR NEXT test strip TEST  THREE TIMES A  each 1     Blood Glucose Monitoring Suppl W/DEVICE KIT 1 kit 3 times daily. 1 kit 0       ALLERGIES     Allergies   Allergen Reactions     Sulfa Drugs Rash       PAST MEDICAL HISTORY:  Past Medical History:   Diagnosis Date     Allergic rhinitis, cause unspecified      Essential hypertension, benign      Infection of kidney, unspecified 1999     Other specified acquired hypothyroidism      Other specified types of cystitis(595.89)     1999,6-2-01, 10-10-01       PAST SURGICAL HISTORY:  Past Surgical History:   Procedure Laterality Date     BIOPSY VULVA/PERINEUM,ADDNL LESN  9/18/09    Wide -excision of MICKIE III- right labia      C LAPAROSCOPY, SURGICAL; W/ VAGINAL HYSTERECTOMY W/WO REMOVAL OVARY(S)/TUBES  1984    for bleeding     C LIGATE FALLOPIAN TUBE,POSTPARTUM  1978    Tubal Ligation     C REMOVAL GALLBLADDER  1995     COLONOSCOPY  9/13/2013    Procedure: COLONOSCOPY;  Colonoscopy;  Surgeon: Yanick Ramsey MD;  Location: [facility]     HC ANGIOGRAPHY ARCH  4-3-98     HC BIOPSY OF BREAST, OPEN INCISIONAL  1960    Benign     HC COLONOSCOPY THRU STOMA, DIAGNOSTIC  4-24-98    Diverticuli - due in 2008     HC ERCP W SPHINCTEROTOMY  1996    6/2/96 and 8/30/96     HC REDUCTION OF LARGE BREAST  1988      UGI ENDOSCOPY DIAG W OR W/O BRUSH/WASH  7-       FAMILY HISTORY:  Family History   Problem Relation Age of Onset     Cardiovascular Father      Aortic aneurysm     Hypertension Father      Hypertension Mother      GASTROINTESTINAL DISEASE Mother      GI Bleed     Lipids Sister      Hypertension Sister      Genitourinary Problems Sister      Allergies Sister      CANCER Brother      Lung     Alcohol/Drug Brother      Connective Tissue Disorder Son      Down Syndrome     Respiratory Son      Pneumonia     CANCER Maternal Grandmother      Stomach     Hypertension Maternal Grandfather      Allergies Daughter        SOCIAL HISTORY:  Social History     Social History     Marital status:       "Spouse name: Marco Antonio     Number of children: 2     Years of education: 17     Occupational History     Retired in 1994       Retired     Social History Main Topics     Smoking status: Never Smoker     Smokeless tobacco: Never Used     Alcohol use No     Drug use: No     Sexual activity: No     Other Topics Concern     Parent/Sibling W/ Cabg, Mi Or Angioplasty Before 65f 55m? No     Social History Narrative       Review of Systems:  Skin:  Negative       Eyes:  Positive for glasses    ENT:  Negative      Respiratory:  Negative       Cardiovascular:  Negative for;palpitations;chest pain;edema;lightheadedness;dizziness;fatigue      Gastroenterology: Positive for   upper gastric pain has had for many years   Genitourinary:  Positive for      Musculoskeletal:  Negative      Neurologic:  Negative      Psychiatric:  Negative      Heme/Lymph/Imm:  Positive for allergies    Endocrine:  Positive for thyroid disorder;diabetes      Physical Exam:  Vitals: /82 (BP Location: Right arm, Patient Position: Fowlers, Cuff Size: Adult Large)  Pulse 84  Ht 1.588 m (5' 2.5\")  Wt 85.9 kg (189 lb 6.4 oz)  SpO2 95%  BMI 34.09 kg/m2    Constitutional:           Skin:           Head:           Eyes:           ENT:           Neck:           Chest:             Cardiac:                    Abdomen:           Vascular:                                          Extremities and Back:                 Neurological:             Sohan Deleon MD    CC  Violeta Lin MD  [facility]  [address] [place] [address]        "

## 2017-06-21 NOTE — LETTER
6/21/2017    Violeta Lin MD  [address] [place] [address]    RE: Ni Maya       Dear Colleague,    I had the pleasure of seeing Ni Maya in the [facility].    Cardiology consult requested by Dr. Lin.      Cardiology consult requested for atrial fibrillation.      Ms. Maya is a very pleasant 76-year-old female with history of hypertension, diabetes, chronic back pain who is being seen in the Cardiology Clinic for atrial fibrillation.  The patient was seen by Dr. Lin earlier this month for a preop assessment for an epidural steroid injection.  She was noted to have irregular heartbeat and also had an EKG that confirmed that she was in atrial fibrillation with ventricular rate of 77 beats per minute.  There were some nonspecific ST-T changes.  Today, the patient is coming to Cardiology Clinic accompanied by her .  The patient tells me that she was kind of surprised to learn that she had an irregular heartbeat.  She never felt any palpitations.  She has been struggling with chronic back pain and at one point, the plan was to try an epidural shot but the patient tells me that she has canceled getting the epidural shot as her back discomfort is getting better with conservative therapy.  Remarkably, I have an EKG done in 01/2015 that also shows the patient was in atrial fibrillation.  The patient was surprised to learn that she had atrial fibrillation documented back in 2015, as she tells me that this is the first time she has ever been told that she has atrial fibrillation.  She and her  regularly go to the gym, [employer], 3x a week.  She used to do treadmill there for 15-20 minutes but due to back issues, has been doing less and less of treadmill but still does upper body core stretches and some rowing leg exercise.  She does not get any shortness of breath or chest discomfort with these exercises but remarkably the patient tells me that  she has a longstanding history of some lower chest tightness which happens when she walks, especially after eating.  Remarkably, she tells me that she had this kind of symptom since age 28.  Her  tells me that the symptom may have progressed over the last 1 year.  Many years ago, probably more than 10-15 years ago, it looks like she had a coronary angiogram done at [facility] in [place] and was told that everything looks okay.  She denies any chest discomfort while doing exercise during the gym or at rest.  She does not have any acid reflux like heartburn symptoms, no history of peptic ulcer disease, melena or bright blood per rectum.  She is on clonidine, lisinopril, hydrochlorothiazide and 100 mg b.i.d. of metoprolol tartrate for hypertension. Today on examination, her heart rate is 84, irregular.  Her initial blood pressure was slightly elevated, systolic 140, diastolic low 90s but on repeat checking, it is 122/82.  No tobacco exposure.  No alcohol intake.  She denies having any snoring or daytime somnolence.  She also has hypothyroidism and is on levothyroxine.  TSH last month was normal.  She also had an echocardiogram done that showed normal LVEF of 60%-65%, elevated E/e prime ratio, moderate biatrial enlargement, mild mitral regurgitation, mild tricuspid regurgitation, mild aortic valve sclerosis, normal aortic root and ascending aorta.      PHYSICAL EXAMINATION:   VITAL SIGNS:  Blood pressure 122/82, heart rate 84 irregular, weight 189 pounds, BMI 34.16.   GENERAL:  The patient appears pleasant, comfortable.   NECK:  Normal JVP, no bruit.   CARDIOVASCULAR SYSTEM:  Irregular, normal rate, no murmur, rub or gallop.   RESPIRATORY SYSTEM:  Clear to auscultation bilaterally.   ABDOMEN:  Soft, nontender.   EXTREMITIES:  No pitting pedal edema.   NEUROLOGICAL:  Alert, oriented x3.   PSYCHIATRIC:  Normal affect.   SKIN:  No obvious rash.   HEENT:  No pallor or icterus.      EKG done on 06/07 was  personally reviewed by me, shows atrial fibrillation with controlled ventricular rate, nonspecific ST-T changes.     Outpatient Encounter Prescriptions as of 6/21/2017   Medication Sig Dispense Refill     apixaban ANTICOAGULANT (ELIQUIS) 5 MG tablet Take 1 tablet (5 mg) by mouth 2 times daily 90 tablet 3     metFORMIN (GLUCOPHAGE-XR) 500 MG 24 hr tablet Take 1 tablet (500 mg) by mouth daily (with dinner) 90 tablet 3     cloNIDine (CATAPRES) 0.1 MG tablet Take 2 tablets (0.2 mg) by mouth At Bedtime 270 tablet 1     lisinopril-hydrochlorothiazide (PRINZIDE/ZESTORETIC) 20-25 MG per tablet Take 1 tablet by mouth daily 90 tablet 3     metoprolol (LOPRESSOR) 100 MG tablet Take 1 tablet (100 mg) by mouth 2 times daily 180 tablet 3     levothyroxine (SYNTHROID/LEVOTHROID) 100 MCG tablet Take 1 tablet (100 mcg) by mouth daily 90 tablet 3     Lancets (E-Z JECT LANCET MICRO-THIN 33G) MISC        fish oil-omega-3 fatty acids (FISH OIL) 1000 MG capsule Take 2 g by mouth daily. 2 caps per day       ALLEGRA 180 MG PO TABS 1 TABLET DAILY 90 Tab 3     NUTRITIONAL SUPPLEMENT OR VIBE       [DISCONTINUED] HUMBERTO CONTOUR NEXT test strip TEST THREE TIMES A  each 1     [DISCONTINUED] aspirin 81 MG tablet Take 1 tablet (81 mg) by mouth daily 30 tablet 1     Blood Glucose Monitoring Suppl W/DEVICE KIT 1 kit 3 times daily. 1 kit 0     No facility-administered encounter medications on file as of 6/21/2017.       IMPRESSION AND PLAN:  A very delightful 76-year-old female with recently diagnosed atrial fibrillation, although documentation of atrial fibrillation on EKG in 2015, other comorbidities of hypertension, diabetes, dyslipidemia.  I had a long discussion with the patient regarding the pathophysiology of atrial fibrillation, rate versus rhythm control strategy and stroke prophylaxis.  Her CHADS2-VASc score is 5 (age, gender, hypertension, diabetes) and oral anticoagulation is recommended for stroke prophylaxis.  We discussed options  of Coumadin versus new oral anticoagulant agents.  We discussed risks and benefits of each of the groups.  After a long discussion, the patient wishes to start apixaban 5 mg b.i.d.  I also gave her information regarding other alternative new oral anticoagulant agents like Xarelto 20 mg daily or Pradaxa 150 mg b.i.d. and in case there is significant cost difference between apixaban and these new agents, we can switch to the one which is more affordable to the patient.  I recommended the patient can discontinue aspirin.  We also had a long discussion regarding rate versus rhythm control strategy.  We discussed about trials showing no advantage of one strategy over the other in terms of mortality benefit.  Remarkably, she appears to be essentially asymptomatic from atrial fibrillation and ventricular rate appears reasonably controlled at rest.  After a long discussion, the patient wishes to proceed with rate control strategy, which I think is reasonable given asymptomatic status, reasonably controlled ventricular rate and moderate atrial enlargement on echocardiogram.  I recommended doing a Holter for 48 hours to see the adequacy of rate control.  Exertion-related lower chest discomfort, especially after eating, is somewhat concerning for angina, although remarkably she had these kind of symptoms for many, many years as noted above.  A long time ago, it looks like she had a normal coronary angiogram but this was at least 10, if not more years ago.  I recommended doing a Lexiscan stress test to make sure there is no significant inducible ischemia that is the cause of her symptoms.  I am setting up a followup in about a month and we will go over the results of the Holter as well as the stress test.      1.  Newly diagnosed atrial fibrillation, however, atrial fibrillation documented on EKG in 2015 as well.  Essentially asymptomatic from atrial fibrillation.  Normal LV function.  CHADS2-VASc score of 5.  On metoprolol  tartrate 100 mg b.i.d.  Ventricular rate seems reasonably controlled at rest.   2.  Hypertension.   3.  Diabetes.   4.  Exertional lower chest discomfort after eating, somewhat concerning for angina.  It looks like the patient had a normal coronary angiogram many years ago.  It looks like the patient also had EGDs a couple of times without any pathology being told to the patient.   5.  Chronic back pain.  The patient was originally scheduled to get an epidural shot but the patient tells me that she has canceled it.      RECOMMENDATIONS:   1.  Start apixaban 5 mg b.i.d.   2.  Discontinue aspirin.   3.  A 48-hour Holter to see the adequacy of rate control.   4.  Lexiscan stress test to evaluate for any inducible ischemia.   5.  It looks like for now the patient has canceled her epidural shot but in case she proceeds with the epidural shot, I recommended withholding apixaban for 3 days before the epidural injection and to consider restarting apixaban after at least 24 hours of the epidural shot.   6.  Follow up in 1 month.      Thank you for involving me in the care of Ms. Maya.  Forty-five minutes of total time was spent with more than 50% counseling and coordination of care.     Sincerely,    Sohan Deleon MD

## 2017-06-21 NOTE — PROGRESS NOTES
HPI and Plan:   See dictation(#[id number])    Orders Placed This Encounter   Procedures     NM Lexiscan stress test (nuc card)     Follow-Up with Cardiologist     Holter Monitor 48 hour - Adult       Orders Placed This Encounter   Medications     apixaban ANTICOAGULANT (ELIQUIS) 5 MG tablet     Sig: Take 1 tablet (5 mg) by mouth 2 times daily     Dispense:  90 tablet     Refill:  3       Medications Discontinued During This Encounter   Medication Reason     aspirin 81 MG tablet          Encounter Diagnoses   Name Primary?     Atrial fibrillation, unspecified type (H) Yes     Chest pain, unspecified type        CURRENT MEDICATIONS:  Current Outpatient Prescriptions   Medication Sig Dispense Refill     apixaban ANTICOAGULANT (ELIQUIS) 5 MG tablet Take 1 tablet (5 mg) by mouth 2 times daily 90 tablet 3     metFORMIN (GLUCOPHAGE-XR) 500 MG 24 hr tablet Take 1 tablet (500 mg) by mouth daily (with dinner) 90 tablet 3     cloNIDine (CATAPRES) 0.1 MG tablet Take 2 tablets (0.2 mg) by mouth At Bedtime 270 tablet 1     lisinopril-hydrochlorothiazide (PRINZIDE/ZESTORETIC) 20-25 MG per tablet Take 1 tablet by mouth daily 90 tablet 3     metoprolol (LOPRESSOR) 100 MG tablet Take 1 tablet (100 mg) by mouth 2 times daily 180 tablet 3     levothyroxine (SYNTHROID/LEVOTHROID) 100 MCG tablet Take 1 tablet (100 mcg) by mouth daily 90 tablet 3     Lancets (E-Z JECT LANCET MICRO-THIN 33G) MISC        fish oil-omega-3 fatty acids (FISH OIL) 1000 MG capsule Take 2 g by mouth daily. 2 caps per day       ALLEGRA 180 MG PO TABS 1 TABLET DAILY 90 Tab 3     NUTRITIONAL SUPPLEMENT OR VIBE       HUMBERTO CONTOUR NEXT test strip TEST THREE TIMES A  each 1     Blood Glucose Monitoring Suppl W/DEVICE KIT 1 kit 3 times daily. 1 kit 0       ALLERGIES     Allergies   Allergen Reactions     Sulfa Drugs Rash       PAST MEDICAL HISTORY:  Past Medical History:   Diagnosis Date     Allergic rhinitis, cause unspecified      Essential hypertension, benign       Infection of kidney, unspecified 1999     Other specified acquired hypothyroidism      Other specified types of cystitis(595.89)     1999,6-2-01, 10-10-01       PAST SURGICAL HISTORY:  Past Surgical History:   Procedure Laterality Date     BIOPSY VULVA/PERINEUM,ADDNL LESN  9/18/09    Wide -excision of MICKIE III- right labia      C LAPAROSCOPY, SURGICAL; W/ VAGINAL HYSTERECTOMY W/WO REMOVAL OVARY(S)/TUBES  1984    for bleeding     C LIGATE FALLOPIAN TUBE,POSTPARTUM  1978    Tubal Ligation     C REMOVAL GALLBLADDER  1995     COLONOSCOPY  9/13/2013    Procedure: COLONOSCOPY;  Colonoscopy;  Surgeon: Yanick Ramsey MD;  Location: [facility]     HC ANGIOGRAPHY ARCH  4-3-98     HC BIOPSY OF BREAST, OPEN INCISIONAL  1960    Benign     HC COLONOSCOPY THRU STOMA, DIAGNOSTIC  4-24-98    Diverticuli - due in 2008     HC ERCP W SPHINCTEROTOMY  1996 6/2/96 and 8/30/96     HC REDUCTION OF LARGE BREAST  1988      UGI ENDOSCOPY DIAG W OR W/O BRUSH/WASH  7-       FAMILY HISTORY:  Family History   Problem Relation Age of Onset     Cardiovascular Father      Aortic aneurysm     Hypertension Father      Hypertension Mother      GASTROINTESTINAL DISEASE Mother      GI Bleed     Lipids Sister      Hypertension Sister      Genitourinary Problems Sister      Allergies Sister      CANCER Brother      Lung     Alcohol/Drug Brother      Connective Tissue Disorder Son      Down Syndrome     Respiratory Son      Pneumonia     CANCER Maternal Grandmother      Stomach     Hypertension Maternal Grandfather      Allergies Daughter        SOCIAL HISTORY:  Social History     Social History     Marital status:      Spouse name: Marco Antonio     Number of children: 2     Years of education: 17     Occupational History     Retired in 1994       Retired     Social History Main Topics     Smoking status: Never Smoker     Smokeless tobacco: Never Used     Alcohol use No     Drug use: No     Sexual activity: No     Other Topics Concern      "Parent/Sibling W/ Cabg, Mi Or Angioplasty Before 65f 55m? No     Social History Narrative       Review of Systems:  Skin:  Negative       Eyes:  Positive for glasses    ENT:  Negative      Respiratory:  Negative       Cardiovascular:  Negative for;palpitations;chest pain;edema;lightheadedness;dizziness;fatigue      Gastroenterology: Positive for   upper gastric pain has had for many years   Genitourinary:  Positive for      Musculoskeletal:  Negative      Neurologic:  Negative      Psychiatric:  Negative      Heme/Lymph/Imm:  Positive for allergies    Endocrine:  Positive for thyroid disorder;diabetes      Physical Exam:  Vitals: /82 (BP Location: Right arm, Patient Position: Fowlers, Cuff Size: Adult Large)  Pulse 84  Ht 1.588 m (5' 2.5\")  Wt 85.9 kg (189 lb 6.4 oz)  SpO2 95%  BMI 34.09 kg/m2    Constitutional:           Skin:           Head:           Eyes:           ENT:           Neck:           Chest:             Cardiac:                    Abdomen:           Vascular:                                          Extremities and Back:                 Neurological:                 CC  Violeta Lin MD  [facility]  [address] [place] [address]                  "

## 2017-06-21 NOTE — MR AVS SNAPSHOT
After Visit Summary   6/21/2017    Ni Maya    MRN: [id number]           Patient Information     Date Of Birth          1940        Visit Information        Provider Department      6/21/2017 1:00 PM Sohan Deleon MD [facility]        Today's Diagnoses     Atrial fibrillation, unspecified type (H)    -  1    Chest pain, unspecified type           Follow-ups after your visit        Additional Services     Follow-Up with Cardiologist                 Your next 10 appointments already scheduled     Aug 01, 2017  2:15 PM CDT   New Visit with Pratibha Mitchell GC   [facility] ([facility])    [address] [address] [place] [id number] [phone]              Future tests that were ordered for you today     Open Future Orders        Priority Expected Expires Ordered    Follow-Up with Cardiologist Routine 7/21/2017 6/21/2018 6/21/2017    NM Lexiscan stress test (nuc card) Routine 6/28/2017 6/21/2018 6/21/2017    Holter Monitor 48 hour - Adult Routine 6/21/2017 6/21/2018 6/21/2017            Who to contact     If you have questions or need follow up information about today's clinic visit or your schedule please contact [facility] directly at [phone].  Normal or non-critical lab and imaging results will be communicated to you by NuPotentialhart, letter or phone within 4 business days after the clinic has received the results. If you do not hear from us within 7 days, please contact the clinic through NuPotentialhart or phone. If you have a critical or abnormal lab result, we will notify you by phone as soon as possible.  Submit refill requests through [employer] or call your pharmacy and they will forward the refill request to us. Please allow 3 business days for your refill to be completed.          Additional Information About Your Visit        Digital Ocean Information     [employer] Ocean lets you send messages to your doctor, view your test results,  "renew your prescriptions, schedule appointments and more. To sign up, go to [email or web address] . Click on \"Log in\" on the left side of the screen, which will take you to the Welcome page. Then click on \"Sign up Now\" on the right side of the page.     You will be asked to enter the access code listed below, as well as some personal information. Please follow the directions to create your username and password.     Your access code is: [email or web address]  Expires: 8/15/2017  9:19 AM     Your access code will  in 90 days. If you need help or a new code, please call your [facility] clinic or [phone].        Care EveryWhere ID     This is your Care EveryWhere ID. This could be used by other organizations to access your [facility] medical records  [id number]        Your Vitals Were     Pulse Height Pulse Oximetry BMI (Body Mass Index)          84 1.588 m (5' 2.5\") 95% 34.09 kg/m2         Blood Pressure from Last 3 Encounters:   17 122/82   17 128/82   17 132/70    Weight from Last 3 Encounters:   17 85.9 kg (189 lb 6.4 oz)   17 85.7 kg (189 lb)   17 84.8 kg (187 lb)                 Today's Medication Changes          These changes are accurate as of: 17  1:40 PM.  If you have any questions, ask your nurse or doctor.               Start taking these medicines.        Dose/Directions    apixaban ANTICOAGULANT 5 MG tablet   Commonly known as:  ELIQUIS   Used for:  Atrial fibrillation, unspecified type (H)        Dose:  5 mg   Take 1 tablet (5 mg) by mouth 2 times daily   Quantity:  90 tablet   Refills:  3         Stop taking these medicines if you haven't already. Please contact your care team if you have questions.     aspirin 81 MG tablet                Where to get your medicines      These medications were sent to [id number] - [place], [place] - [address] [facility]  [address] [facility] [address]     Phone:  [phone]     apixaban ANTICOAGULANT 5 MG tablet "                Primary Care Provider Office Phone # Fax #    Violeta Shari Lin [phone]       [facility] [address] [place] [address]        Equal Access to Services     MYKELHEATHER JEFFERY : Hadii dimitry ku sherrello Soomaali, waaxda luqadaha, qaybta kaalmada adeegyada, david alvaradomarisela angélica. So [facility] [phone].    ATENCIÓN: Si habla español, tiene a yuen disposición servicios gratuitos de asistencia lingüística. Llame al [phone].    We comply with applicable federal civil rights laws and [place] laws. We do not discriminate on the basis of race, color, national origin, age, disability sex, sexual orientation or gender identity.            Thank you!     Thank you for choosing [facility]  for your care. Our goal is always to provide you with excellent care. Hearing back from our patients is one way we can continue to improve our services. Please take a few minutes to complete the written survey that you may receive in the mail after your visit with us. Thank you!             Your Updated Medication List - Protect others around you: Learn how to safely use, store and throw away your medicines at www.disposemymeds.org.          This list is accurate as of: 6/21/17  1:40 PM.  Always use your most recent med list.                   Brand Name Dispense Instructions for use Diagnosis    ALLEGRA 180 MG tablet   Generic drug:  fexofenadine     90 Tab    1 TABLET DAILY    Allergic rhinitis, cause unspecified       apixaban ANTICOAGULANT 5 MG tablet    ELIQUIS    90 tablet    Take 1 tablet (5 mg) by mouth 2 times daily    Atrial fibrillation, unspecified type (H)       HUMBERTO CONTOUR NEXT test strip   Generic drug:  blood glucose monitoring     250 each    TEST THREE TIMES A DAY    Type 2 diabetes mellitus with hyperglycemia (H)       Blood Glucose Monitoring Suppl W/DEVICE Kit     1 kit    1 kit 3 times daily.    Type 2 diabetes, HbA1C goal < 8% (H)        cloNIDine 0.1 MG tablet    CATAPRES    270 tablet    Take 2 tablets (0.2 mg) by mouth At Bedtime    Essential hypertension with goal blood pressure less than 140/90       E-Z JECT LANCET MICRO-THIN 33G Misc           fish oil-omega-3 fatty acids 1000 MG capsule      Take 2 g by mouth daily. 2 caps per day        levothyroxine 100 MCG tablet    SYNTHROID/LEVOTHROID    90 tablet    Take 1 tablet (100 mcg) by mouth daily    Hypothyroidism, unspecified type       lisinopril-hydrochlorothiazide 20-25 MG per tablet    PRINZIDE/ZESTORETIC    90 tablet    Take 1 tablet by mouth daily    Essential hypertension with goal blood pressure less than 140/90       metFORMIN 500 MG 24 hr tablet    GLUCOPHAGE-XR    90 tablet    Take 1 tablet (500 mg) by mouth daily (with dinner)    Type 2 diabetes mellitus with hyperosmolarity without coma, without long-term current use of insulin (H)       metoprolol 100 MG tablet    LOPRESSOR    180 tablet    Take 1 tablet (100 mg) by mouth 2 times daily    Essential hypertension with goal blood pressure less than 140/90       NUTRITIONAL SUPPLEMENT PO      VIBE

## 2017-06-22 NOTE — PROGRESS NOTES
Cardiology consult requested by Dr. Lin.      Cardiology consult requested for atrial fibrillation.      HISTORY OF PRESENT ILLNESS:  Ms. Maya is a very pleasant 76-year-old female with history of hypertension, diabetes, chronic back pain who is being seen in the Cardiology Clinic for atrial fibrillation.  The patient was seen by Dr. Lin earlier this month for a preop assessment for an epidural steroid injection.  She was noted to have irregular heartbeat and also had an EKG that confirmed that she was in atrial fibrillation with ventricular rate of 77 beats per minute.  There were some nonspecific ST-T changes.  Today, the patient is coming to Cardiology Clinic accompanied by her .  The patient tells me that she was kind of surprised to learn that she had an irregular heartbeat.  She never felt any palpitations.  She has been struggling with chronic back pain and at one point, the plan was to try an epidural shot but the patient tells me that she has canceled getting the epidural shot as her back discomfort is getting better with conservative therapy.  Remarkably, I have an EKG done in 01/2015 that also shows the patient was in atrial fibrillation.  The patient was surprised to learn that she had atrial fibrillation documented back in 2015, as she tells me that this is the first time she has ever been told that she has atrial fibrillation.  She and her  regularly go to the gym, [employer], 3x a week.  She used to do treadmill there for 15-20 minutes but due to back issues, has been doing less and less of treadmill but still does upper body core stretches and some rowing leg exercise.  She does not get any shortness of breath or chest discomfort with these exercises but remarkably the patient tells me that she has a longstanding history of some lower chest tightness which happens when she walks, especially after eating.  Remarkably, she tells me that she had this kind of symptom since age 28.  Her   tells me that the symptom may have progressed over the last 1 year.  Many years ago, probably more than 10-15 years ago, it looks like she had a coronary angiogram done at [facility] in [place] and was told that everything looks okay.  She denies any chest discomfort while doing exercise during the gym or at rest.  She does not have any acid reflux like heartburn symptoms, no history of peptic ulcer disease, melena or bright blood per rectum.  She is on clonidine, lisinopril, hydrochlorothiazide and 100 mg b.i.d. of metoprolol tartrate for hypertension. Today on examination, her heart rate is 84, irregular.  Her initial blood pressure was slightly elevated, systolic 140, diastolic low 90s but on repeat checking, it is 122/82.  No tobacco exposure.  No alcohol intake.  She denies having any snoring or daytime somnolence.  She also has hypothyroidism and is on levothyroxine.  TSH last month was normal.  She also had an echocardiogram done that showed normal LVEF of 60%-65%, elevated E/e prime ratio, moderate biatrial enlargement, mild mitral regurgitation, mild tricuspid regurgitation, mild aortic valve sclerosis, normal aortic root and ascending aorta.      PHYSICAL EXAMINATION:   VITAL SIGNS:  Blood pressure 122/82, heart rate 84 irregular, weight 189 pounds, BMI 34.16.   GENERAL:  The patient appears pleasant, comfortable.   NECK:  Normal JVP, no bruit.   CARDIOVASCULAR SYSTEM:  Irregular, normal rate, no murmur, rub or gallop.   RESPIRATORY SYSTEM:  Clear to auscultation bilaterally.   ABDOMEN:  Soft, nontender.   EXTREMITIES:  No pitting pedal edema.   NEUROLOGICAL:  Alert, oriented x3.   PSYCHIATRIC:  Normal affect.   SKIN:  No obvious rash.   HEENT:  No pallor or icterus.      EKG done on 06/07 was personally reviewed by me, shows atrial fibrillation with controlled ventricular rate, nonspecific ST-T changes.      IMPRESSION AND PLAN:  A very delightful 76-year-old female with recently diagnosed  atrial fibrillation, although documentation of atrial fibrillation on EKG in 2015, other comorbidities of hypertension, diabetes, dyslipidemia.  I had a long discussion with the patient regarding the pathophysiology of atrial fibrillation, rate versus rhythm control strategy and stroke prophylaxis.  Her CHADS2-VASc score is 5 (age, gender, hypertension, diabetes) and oral anticoagulation is recommended for stroke prophylaxis.  We discussed options of Coumadin versus new oral anticoagulant agents.  We discussed risks and benefits of each of the groups.  After a long discussion, the patient wishes to start apixaban 5 mg b.i.d.  I also gave her information regarding other alternative new oral anticoagulant agents like Xarelto 20 mg daily or Pradaxa 150 mg b.i.d. and in case there is significant cost difference between apixaban and these new agents, we can switch to the one which is more affordable to the patient.  I recommended the patient can discontinue aspirin.  We also had a long discussion regarding rate versus rhythm control strategy.  We discussed about trials showing no advantage of one strategy over the other in terms of mortality benefit.  Remarkably, she appears to be essentially asymptomatic from atrial fibrillation and ventricular rate appears reasonably controlled at rest.  After a long discussion, the patient wishes to proceed with rate control strategy, which I think is reasonable given asymptomatic status, reasonably controlled ventricular rate and moderate atrial enlargement on echocardiogram.  I recommended doing a Holter for 48 hours to see the adequacy of rate control.  Exertion-related lower chest discomfort, especially after eating, is somewhat concerning for angina, although remarkably she had these kind of symptoms for many, many years as noted above.  A long time ago, it looks like she had a normal coronary angiogram but this was at least 10, if not more years ago.  I recommended doing a  Lexiscan stress test to make sure there is no significant inducible ischemia that is the cause of her symptoms.  I am setting up a followup in about a month and we will go over the results of the Holter as well as the stress test.      1.  Newly diagnosed atrial fibrillation, however, atrial fibrillation documented on EKG in  as well.  Essentially asymptomatic from atrial fibrillation.  Normal LV function.  CHADS2-VASc score of 5.  On metoprolol tartrate 100 mg b.i.d.  Ventricular rate seems reasonably controlled at rest.   2.  Hypertension.   3.  Diabetes.   4.  Exertional lower chest discomfort after eating, somewhat concerning for angina.  It looks like the patient had a normal coronary angiogram many years ago.  It looks like the patient also had EGDs a couple of times without any pathology being told to the patient.   5.  Chronic back pain.  The patient was originally scheduled to get an epidural shot but the patient tells me that she has canceled it.      RECOMMENDATIONS:   1.  Start apixaban 5 mg b.i.d.   2.  Discontinue aspirin.   3.  A 48-hour Holter to see the adequacy of rate control.   4.  Lexiscan stress test to evaluate for any inducible ischemia.   5.  It looks like for now the patient has canceled her epidural shot but in case she proceeds with the epidural shot, I recommended withholding apixaban for 3 days before the epidural injection and to consider restarting apixaban after at least 24 hours of the epidural shot.   6.  Follow up in 1 month.      Thank you for involving me in the care of Ms. Ennis.  Forty-five minutes of total time was spent with more than 50% counseling and coordination of care.      cc:   Violeta Lin MD   [address] [address]         MARY MIRELES MD             D: 2017 13:53   T: 2017 11:21   MT: GEMMA      Name:     YOBANY ENNIS   MRN:      [id number]        Account:      [id number]   :       1940           Service Date: 06/21/2017      Document: [id number]

## 2017-06-27 ENCOUNTER — TELEPHONE (OUTPATIENT)
Dept: CARDIOLOGY | Facility: CLINIC | Age: 77
End: 2017-06-27

## 2017-06-27 ENCOUNTER — HOSPITAL ENCOUNTER (OUTPATIENT)
Dept: CARDIOLOGY | Facility: CLINIC | Age: 77
Discharge: HOME | End: 2017-06-27
Attending: INTERNAL MEDICINE | Admitting: INTERNAL MEDICINE
Payer: COMMERCIAL

## 2017-06-27 ENCOUNTER — HOSPITAL ENCOUNTER (OUTPATIENT)
Dept: NUCLEAR MEDICINE | Facility: CLINIC | Age: 77
Setting detail: NUCLEAR MEDICINE
Discharge: HOME | End: 2017-06-27
Attending: INTERNAL MEDICINE | Admitting: INTERNAL MEDICINE
Payer: COMMERCIAL

## 2017-06-27 DIAGNOSIS — R07.9 CHEST PAIN, UNSPECIFIED TYPE: ICD-10-CM

## 2017-06-27 DIAGNOSIS — I10 ESSENTIAL HYPERTENSION WITH GOAL BLOOD PRESSURE LESS THAN 140/90: ICD-10-CM

## 2017-06-27 DIAGNOSIS — I48.91 ATRIAL FIBRILLATION, UNSPECIFIED TYPE (H): ICD-10-CM

## 2017-06-27 PROCEDURE — 78452 HT MUSCLE IMAGE SPECT MULT: CPT | Mod: 26 | Performed by: INTERNAL MEDICINE

## 2017-06-27 PROCEDURE — 25000128 H RX IP 250 OP 636: Performed by: INTERNAL MEDICINE

## 2017-06-27 PROCEDURE — 93016 CV STRESS TEST SUPVJ ONLY: CPT | Performed by: INTERNAL MEDICINE

## 2017-06-27 PROCEDURE — A9502 TC99M TETROFOSMIN: HCPCS | Performed by: INTERNAL MEDICINE

## 2017-06-27 PROCEDURE — 78452 HT MUSCLE IMAGE SPECT MULT: CPT

## 2017-06-27 PROCEDURE — 34300033 ZZH RX 343: Performed by: INTERNAL MEDICINE

## 2017-06-27 PROCEDURE — 93017 CV STRESS TEST TRACING ONLY: CPT | Performed by: REHABILITATION PRACTITIONER

## 2017-06-27 PROCEDURE — 93227 XTRNL ECG REC<48 HR R&I: CPT | Performed by: INTERNAL MEDICINE

## 2017-06-27 PROCEDURE — 93018 CV STRESS TEST I&R ONLY: CPT | Performed by: INTERNAL MEDICINE

## 2017-06-27 RX ORDER — REGADENOSON 0.08 MG/ML
0.4 INJECTION, SOLUTION INTRAVENOUS ONCE
Status: COMPLETED | OUTPATIENT
Start: 2017-06-27 | End: 2017-06-27

## 2017-06-27 RX ADMIN — REGADENOSON 0.4 MG: 0.08 INJECTION, SOLUTION INTRAVENOUS at 10:07

## 2017-06-27 RX ADMIN — TETROFOSMIN 32.1 MCI.: 1.38 INJECTION, POWDER, LYOPHILIZED, FOR SOLUTION INTRAVENOUS at 10:15

## 2017-06-27 RX ADMIN — TETROFOSMIN 10.1 MCI.: 1.38 INJECTION, POWDER, LYOPHILIZED, FOR SOLUTION INTRAVENOUS at 08:57

## 2017-06-27 NOTE — TELEPHONE ENCOUNTER
Metoprolol 100 mg      Last Written Prescription Date:   Last Fill Quantity: , # refills:     Last Office Visit with [facility], P or [facility] prescribing provider:  6/7/2017   Future Office Visit:    Next 5 appointments (look out 90 days)     Jul 24, 2017 11:00 AM CDT   Return Visit with Sohan Deleon MD   [facility]    [address] [place] [id number] [phone]                    BP Readings from Last 3 Encounters:   06/21/17 122/82   06/07/17 128/82   05/17/17 132/70

## 2017-06-27 NOTE — TELEPHONE ENCOUNTER
Left voicemail on patient's home phone that stress test from today is normal. I asked that she call us at her convenience to review in detail. She had a 48 hr holter placed today and sees Dr. Deleon on 7/24 in follow up. Abhinav

## 2017-06-28 ENCOUNTER — TELEPHONE (OUTPATIENT)
Dept: CARDIOLOGY | Facility: CLINIC | Age: 77
End: 2017-06-28

## 2017-06-28 RX ORDER — METOPROLOL TARTRATE 100 MG
TABLET ORAL
Qty: 180 TABLET | Refills: 2 | OUTPATIENT
Start: 2017-06-28

## 2017-06-28 NOTE — TELEPHONE ENCOUNTER
Phone call to patient yesterday to inform her that her Lexiscan from 6/27 was normal showing no evidence for ischemia.She was in atrial fib at the time of stress test.This was done because of exertion-related lower chest discomfort. She had a 48 hr holter placed yesterday to see adequacy of rate control. She returns 7/24 to see Dr. Deleon. [facility]          Impression  1.  Myocardial perfusion imaging using single isotope technique  demonstrated small fixed distal anteroseptal defect probably secondary  to breast tissue attenuation. There is no evidence for ischemia or  infarction on this study.   2. Gated images demonstrated normal LV cavity size and systolic  function.  The left ventricular systolic function is 64%.  3. Compared to the prior study from no prior study .    Procedure  Pharmacologic stress testing was performed with Lexiscan at a rate of  0.08 mg/ml rapid bolus injection, for 15 seconds, 0.4 mg/5ml  intravenously. Low-level exercise was performed along with the  vasodilator infusion.  The heart rate was 92 at baseline and anthony to  107 beats per minute during the Lexiscan infusion. The rest blood  pressure was 144/68 mmHg and was 144/68 mm Hg during Lexiscan  infusion. The patient experienced no symptoms  during the test.    Myocardial perfusion imaging was performed at rest, approximately 45  minutes after the injection intravenously of 10.1 mCi of Tc-99m  Myoview. At peak pharmacologic effect, 10-20 seconds after Lexiscan,   the patient was injected intravenously with 32.1 mCi of  Tc-99m  Myoview. The post-stress tomographic imaging was performed  approximately 60 minutes after stress.    EKG Findings  The resting EKG demonstrated atrial fibrillation with diffuse  nonspecific ST and T wave abnormalities and poor R wave progression in  the anterior precordial leads. The stress EKG demonstrated  nondiagnostic due to baseline ECG abnormality.    Tomographic Findings  Overall, the study quality is  adequate . On the stress images, there  is a small area of mild to moderate count reduction in the distal  anteroseptal. On the rest images, similar pattern of a small area of  mild to moderate count reduction is again seen in the distal  anteroseptal. Results suggest breast tissue attenuation there is no  evidence for ischemia or infarction on this study . Gated images  demonstrated normal LV cavity size with end-diastolic volumes  measuring 54 and 56 mL and rest and stress respectively. LV systolic  function appears normal. The left ventricular ejection fraction was  calculated to be 64%. TID was absent.    STUART MONTANEZ MD            IMPRESSION AND PLAN:  A very delightful 76-year-old female with recently diagnosed atrial fibrillation, although documentation of atrial fibrillation on EKG in 2015, other comorbidities of hypertension, diabetes, dyslipidemia.  I had a long discussion with the patient regarding the pathophysiology of atrial fibrillation, rate versus rhythm control strategy and stroke prophylaxis.  Her CHADS2-VASc score is 5 (age, gender, hypertension, diabetes) and oral anticoagulation is recommended for stroke prophylaxis.  We discussed options of Coumadin versus new oral anticoagulant agents.  We discussed risks and benefits of each of the groups.  After a long discussion, the patient wishes to start apixaban 5 mg b.i.d.  I also gave her information regarding other alternative new oral anticoagulant agents like Xarelto 20 mg daily or Pradaxa 150 mg b.i.d. and in case there is significant cost difference between apixaban and these new agents, we can switch to the one which is more affordable to the patient.  I recommended the patient can discontinue aspirin.  We also had a long discussion regarding rate versus rhythm control strategy.  We discussed about trials showing no advantage of one strategy over the other in terms of mortality benefit.  Remarkably, she appears to be essentially  asymptomatic from atrial fibrillation and ventricular rate appears reasonably controlled at rest.  After a long discussion, the patient wishes to proceed with rate control strategy, which I think is reasonable given asymptomatic status, reasonably controlled ventricular rate and moderate atrial enlargement on echocardiogram.  I recommended doing a Holter for 48 hours to see the adequacy of rate control.  Exertion-related lower chest discomfort, especially after eating, is somewhat concerning for angina, although remarkably she had these kind of symptoms for many, many years as noted above.  A long time ago, it looks like she had a normal coronary angiogram but this was at least 10, if not more years ago.  I recommended doing a Lexiscan stress test to make sure there is no significant inducible ischemia that is the cause of her symptoms.  I am setting up a followup in about a month and we will go over the results of the Holter as well as the stress test.       1.  Newly diagnosed atrial fibrillation, however, atrial fibrillation documented on EKG in 2015 as well.  Essentially asymptomatic from atrial fibrillation.  Normal LV function.  CHADS2-VASc score of 5.  On metoprolol tartrate 100 mg b.i.d.  Ventricular rate seems reasonably controlled at rest.   2.  Hypertension.   3.  Diabetes.   4.  Exertional lower chest discomfort after eating, somewhat concerning for angina.  It looks like the patient had a normal coronary angiogram many years ago.  It looks like the patient also had EGDs a couple of times without any pathology being told to the patient.   5.  Chronic back pain.  The patient was originally scheduled to get an epidural shot but the patient tells me that she has canceled it.       RECOMMENDATIONS:   1.  Start apixaban 5 mg b.i.d.   2.  Discontinue aspirin.   3.  A 48-hour Holter to see the adequacy of rate control.   4.  Lexiscan stress test to evaluate for any inducible ischemia.   5.  It looks like for  now the patient has canceled her epidural shot but in case she proceeds with the epidural shot, I recommended withholding apixaban for 3 days before the epidural injection and to consider restarting apixaban after at least 24 hours of the epidural shot.   6.  Follow up in 1 month.

## 2017-07-03 ENCOUNTER — DOCUMENTATION ONLY (OUTPATIENT)
Dept: CARDIOLOGY | Facility: CLINIC | Age: 77
End: 2017-07-03

## 2017-07-24 ENCOUNTER — TELEPHONE (OUTPATIENT)
Dept: CARDIOLOGY | Facility: CLINIC | Age: 77
End: 2017-07-24

## 2017-07-24 ENCOUNTER — OFFICE VISIT (OUTPATIENT)
Dept: CARDIOLOGY | Facility: CLINIC | Age: 77
End: 2017-07-24
Payer: COMMERCIAL

## 2017-07-24 VITALS
OXYGEN SATURATION: 99 % | SYSTOLIC BLOOD PRESSURE: 128 MMHG | HEIGHT: 65 IN | HEART RATE: 80 BPM | DIASTOLIC BLOOD PRESSURE: 82 MMHG | BODY MASS INDEX: 31.75 KG/M2 | WEIGHT: 190.6 LBS

## 2017-07-24 DIAGNOSIS — I48.91 ATRIAL FIBRILLATION, UNSPECIFIED TYPE (H): ICD-10-CM

## 2017-07-24 PROCEDURE — 99213 OFFICE O/P EST LOW 20 MIN: CPT | Performed by: INTERNAL MEDICINE

## 2017-07-24 RX ORDER — OMEPRAZOLE 20 MG/1
20 TABLET, DELAYED RELEASE ORAL DAILY
Qty: 30 TABLET | COMMUNITY
Start: 2017-07-24 | End: 2017-11-01

## 2017-07-24 NOTE — LETTER
7/24/2017      RE: Ni Maya  [address] [facility] [id number]       Dear Colleague,    Thank you for the opportunity to participate in the care of your patient, Ni Maya, at the [facility]. Please see a copy of my visit note below.    HPI and Plan:   Ms. Maya is a very pleasant 77-year-old female with history of hypertension, diabetes, chronic back pain, chronic epigastric pain who I saw last month for newly diagnosed atrial fibrillation. She is essentially asymptomatic from atrial fibrillation with a [id number] score 5 and after long discussion weighing pros and cons was started on a peak 7 and rate control strategy with metoprolol. Echocardiogram showing normal LV function with moderate left atrial enlargement. She also had some chest discomfort which sounded atypical and underwent Lexiscan stress test that showed no evidence of ischemia. Today she is coming for routine follow-up. She also had a Holter evaluation to see the adequacy of rate control and the average ventricular rates were 86 bpm. Today she is coming for routine follow-up accompanied by her . Overall patient tells me that she is doing well. However after starting apixaban she has noted that the epigastric discomfort has been more consistent, it's dull, it does not get worse with exertion or with food. Patient tells me that she had this kind of discomfort for many years and in fact had endoscopy done in the past. She has not noticed any melena or bright red blood per rectum or bleeding anywhere else.    Assessment and plan  A very delightful 77-year-old female with atrial fibrillation on rate control strategy and apixaban for stroke prophylaxis. She is essentially asymptomatic from atrial fibrillation. She had long-standing history of atypical chest discomfort and Lexiscan stress test shows no evidence of ischemia. She also has long-standing history of  epigastric discomfort which has become more constant after starting apixaban. Fortunately there is no melena or bright red blood per rectum. I did offer patient to replace apixaban with an alternative oral anticoagulation like xarelto, however patient declined. I also recommend patient a trial of proton pump inhibitor like Prilosec and if she continues to have persistent epigastric pain to see her primary care physician.   If she continues to well cardiac status wise we can see her back in one year, sooner if any change in clinical status like exertional symptoms of shortness of breath chest pain or dizziness.   Orders Placed This Encounter   Procedures     Basic metabolic panel     Follow-Up with Cardiologist       Orders Placed This Encounter   Medications     omeprazole (PRILOSEC OTC) 20 MG tablet     Sig: Take 1 tablet (20 mg) by mouth daily     Dispense:  30 tablet       There are no discontinued medications.      Encounter Diagnosis   Name Primary?     Atrial fibrillation, unspecified type (H)        CURRENT MEDICATIONS:  Current Outpatient Prescriptions   Medication Sig Dispense Refill     omeprazole (PRILOSEC OTC) 20 MG tablet Take 1 tablet (20 mg) by mouth daily 30 tablet      apixaban ANTICOAGULANT (ELIQUIS) 5 MG tablet Take 1 tablet (5 mg) by mouth 2 times daily 90 tablet 3     metFORMIN (GLUCOPHAGE-XR) 500 MG 24 hr tablet Take 1 tablet (500 mg) by mouth daily (with dinner) 90 tablet 3     cloNIDine (CATAPRES) 0.1 MG tablet Take 2 tablets (0.2 mg) by mouth At Bedtime 270 tablet 1     lisinopril-hydrochlorothiazide (PRINZIDE/ZESTORETIC) 20-25 MG per tablet Take 1 tablet by mouth daily 90 tablet 3     metoprolol (LOPRESSOR) 100 MG tablet Take 1 tablet (100 mg) by mouth 2 times daily 180 tablet 3     levothyroxine (SYNTHROID/LEVOTHROID) 100 MCG tablet Take 1 tablet (100 mcg) by mouth daily 90 tablet 3     HUMBERTO CONTOUR NEXT test strip TEST THREE TIMES A  each 1     Lancets (E-Z JECT LANCET MICRO-THIN  33G) MISC        fish oil-omega-3 fatty acids (FISH OIL) 1000 MG capsule Take 2 g by mouth daily. 2 caps per day       ALLEGRA 180 MG PO TABS 1 TABLET DAILY 90 Tab 3     NUTRITIONAL SUPPLEMENT OR VIBE       Blood Glucose Monitoring Suppl W/DEVICE KIT 1 kit 3 times daily. 1 kit 0       ALLERGIES     Allergies   Allergen Reactions     Sulfa Drugs Rash       PAST MEDICAL HISTORY:  Past Medical History:   Diagnosis Date     Allergic rhinitis, cause unspecified      Cystitis NEC     1999,6-2-01, 10-10-01     Essential hypertension, benign      Infection of kidney, unspecified 1999     Other specified acquired hypothyroidism        PAST SURGICAL HISTORY:  Past Surgical History:   Procedure Laterality Date     BIOPSY VULVA/PERINEUM,ADDNL LESN  9/18/09    Wide -excision of MICKIE III- right labia      C LAPAROSCOPY, SURGICAL; W/ VAGINAL HYSTERECTOMY W/WO REMOVAL OVARY(S)/TUBES  1984    for bleeding     C LIGATE FALLOPIAN TUBE,POSTPARTUM  1978    Tubal Ligation     COLONOSCOPY  9/13/2013    Procedure: COLONOSCOPY;  Colonoscopy;  Surgeon: Yanick Ramsey MD;  Location: [facility]     HC ANGIOGRAPHY ARCH  4-3-98     HC BIOPSY OF BREAST, OPEN INCISIONAL  1960    Benign     HC COLONOSCOPY THRU STOMA, DIAGNOSTIC  4-24-98    Diverticuli - due in 2008     HC ERCP W SPHINCTEROTOMY  1996    6/2/96 and 8/30/96     HC REDUCTION OF LARGE BREAST  1988     HC REMOVAL GALLBLADDER  1995      UGI ENDOSCOPY DIAG W OR W/O BRUSH/WASH  7-       FAMILY HISTORY:  Family History   Problem Relation Age of Onset     Cardiovascular Father      Aortic aneurysm     Hypertension Father      Hypertension Mother      GASTROINTESTINAL DISEASE Mother      GI Bleed     Lipids Sister      Hypertension Sister      Genitourinary Problems Sister      Allergies Sister      CANCER Brother      Lung     Alcohol/Drug Brother      Connective Tissue Disorder Son      Down Syndrome     Respiratory Son      Pneumonia     CANCER Maternal Grandmother      Stomach      "Hypertension Maternal Grandfather      Allergies Daughter        SOCIAL HISTORY:  Social History     Social History     Marital status:      Spouse name: Marco Antonio     Number of children: 2     Years of education: 17     Occupational History     Retired in 1994       Retired     Social History Main Topics     Smoking status: Never Smoker     Smokeless tobacco: Never Used     Alcohol use No     Drug use: No     Sexual activity: No     Other Topics Concern     Parent/Sibling W/ Cabg, Mi Or Angioplasty Before 65f 55m? No     Social History Narrative       Review of Systems:  Skin:  Negative       Eyes:  Positive for glasses    ENT:  Negative      Respiratory:  Negative       Cardiovascular:  Negative for;palpitations;chest pain;edema;lightheadedness;dizziness;fatigue      Gastroenterology: Positive for   upper gastric pain has had for many years, now since starting eliquis has had the pain every day which is new, used to only have it if she over ate  Genitourinary:  Negative      Musculoskeletal:  Negative      Neurologic:  Negative      Psychiatric:  Negative      Heme/Lymph/Imm:  Positive for allergies    Endocrine:  Positive for thyroid disorder;diabetes      Physical Exam:  Vitals: /82 (BP Location: Right arm, Patient Position: Fowlers, Cuff Size: Adult Large)  Pulse 80  Ht 1.651 m (5' 5\")  Wt 86.5 kg (190 lb 9.6 oz)  SpO2 99%  BMI 31.72 kg/m2    General- appears comfortable  Neck- normal JVP, no bruit  Cardiovascular system- irregular, normal rate, no m/r/g  Respiratory system- CTA b/l  Abdomen- soft, non tender  Extremities- no pedal edema  Neurological - alert, oriented  Psych- normal affect  HEENT- no pallor        Sohan MD Adrienne    "

## 2017-07-24 NOTE — TELEPHONE ENCOUNTER
Patient called forgot to ask Dr. Deleon today at her appointment if she is okay to have an epidural injection for back pain. Writer huddled with Dr. Deleon per is instructions patient should hold Eliquis 3 days prior to injection and then resume Eliquis 2 days after injection if who ever the provider doing the injection deems it is safe to resume. Patient verbalized understanding.

## 2017-07-24 NOTE — PROGRESS NOTES
HPI and Plan:   Ms. Maya is a very pleasant 77-year-old female with history of hypertension, diabetes, chronic back pain, chronic epigastric pain who I saw last month for newly diagnosed atrial fibrillation. She is essentially asymptomatic from atrial fibrillation with a [id number] score 5 and after long discussion weighing pros and cons was started on a peak 7 and rate control strategy with metoprolol. Echocardiogram showing normal LV function with moderate left atrial enlargement. She also had some chest discomfort which sounded atypical and underwent Lexiscan stress test that showed no evidence of ischemia. Today she is coming for routine follow-up. She also had a Holter evaluation to see the adequacy of rate control and the average ventricular rates were 86 bpm. Today she is coming for routine follow-up accompanied by her . Overall patient tells me that she is doing well. However after starting apixaban she has noted that the epigastric discomfort has been more consistent, it's dull, it does not get worse with exertion or with food. Patient tells me that she had this kind of discomfort for many years and in fact had endoscopy done in the past. She has not noticed any melena or bright red blood per rectum or bleeding anywhere else.    Assessment and plan  A very delightful 77-year-old female with atrial fibrillation on rate control strategy and apixaban for stroke prophylaxis. She is essentially asymptomatic from atrial fibrillation. She had long-standing history of atypical chest discomfort and Lexiscan stress test shows no evidence of ischemia. She also has long-standing history of epigastric discomfort which has become more constant after starting apixaban. Fortunately there is no melena or bright red blood per rectum. I did offer patient to replace apixaban with an alternative oral anticoagulation like xarelto, however patient declined. I also recommend patient a trial of proton pump inhibitor like  Prilosec and if she continues to have persistent epigastric pain to see her primary care physician.   If she continues to well cardiac status wise we can see her back in one year, sooner if any change in clinical status like exertional symptoms of shortness of breath chest pain or dizziness.   Orders Placed This Encounter   Procedures     Basic metabolic panel     Follow-Up with Cardiologist       Orders Placed This Encounter   Medications     omeprazole (PRILOSEC OTC) 20 MG tablet     Sig: Take 1 tablet (20 mg) by mouth daily     Dispense:  30 tablet       There are no discontinued medications.      Encounter Diagnosis   Name Primary?     Atrial fibrillation, unspecified type (H)        CURRENT MEDICATIONS:  Current Outpatient Prescriptions   Medication Sig Dispense Refill     omeprazole (PRILOSEC OTC) 20 MG tablet Take 1 tablet (20 mg) by mouth daily 30 tablet      apixaban ANTICOAGULANT (ELIQUIS) 5 MG tablet Take 1 tablet (5 mg) by mouth 2 times daily 90 tablet 3     metFORMIN (GLUCOPHAGE-XR) 500 MG 24 hr tablet Take 1 tablet (500 mg) by mouth daily (with dinner) 90 tablet 3     cloNIDine (CATAPRES) 0.1 MG tablet Take 2 tablets (0.2 mg) by mouth At Bedtime 270 tablet 1     lisinopril-hydrochlorothiazide (PRINZIDE/ZESTORETIC) 20-25 MG per tablet Take 1 tablet by mouth daily 90 tablet 3     metoprolol (LOPRESSOR) 100 MG tablet Take 1 tablet (100 mg) by mouth 2 times daily 180 tablet 3     levothyroxine (SYNTHROID/LEVOTHROID) 100 MCG tablet Take 1 tablet (100 mcg) by mouth daily 90 tablet 3     HUMBERTO CONTOUR NEXT test strip TEST THREE TIMES A  each 1     Lancets (E-Z JECT LANCET MICRO-THIN 33G) MISC        fish oil-omega-3 fatty acids (FISH OIL) 1000 MG capsule Take 2 g by mouth daily. 2 caps per day       ALLEGRA 180 MG PO TABS 1 TABLET DAILY 90 Tab 3     NUTRITIONAL SUPPLEMENT OR VIBE       Blood Glucose Monitoring Suppl W/DEVICE KIT 1 kit 3 times daily. 1 kit 0       ALLERGIES     Allergies   Allergen  Reactions     Sulfa Drugs Rash       PAST MEDICAL HISTORY:  Past Medical History:   Diagnosis Date     Allergic rhinitis, cause unspecified      Cystitis NEC     1999,6-2-01, 10-10-01     Essential hypertension, benign      Infection of kidney, unspecified 1999     Other specified acquired hypothyroidism        PAST SURGICAL HISTORY:  Past Surgical History:   Procedure Laterality Date     BIOPSY VULVA/PERINEUM,ADDNL LESN  9/18/09    Wide -excision of MICKIE III- right labia      C LAPAROSCOPY, SURGICAL; W/ VAGINAL HYSTERECTOMY W/WO REMOVAL OVARY(S)/TUBES  1984    for bleeding     C LIGATE FALLOPIAN TUBE,POSTPARTUM  1978    Tubal Ligation     COLONOSCOPY  9/13/2013    Procedure: COLONOSCOPY;  Colonoscopy;  Surgeon: Yanick Ramsey MD;  Location: [facility]     HC ANGIOGRAPHY ARCH  4-3-98     HC BIOPSY OF BREAST, OPEN INCISIONAL  1960    Benign     HC COLONOSCOPY THRU STOMA, DIAGNOSTIC  4-24-98    Diverticuli - due in 2008     HC ERCP W SPHINCTEROTOMY  1996 6/2/96 and 8/30/96     HC REDUCTION OF LARGE BREAST  1988     HC REMOVAL GALLBLADDER  1995      UGI ENDOSCOPY DIAG W OR W/O BRUSH/WASH  7-       FAMILY HISTORY:  Family History   Problem Relation Age of Onset     Cardiovascular Father      Aortic aneurysm     Hypertension Father      Hypertension Mother      GASTROINTESTINAL DISEASE Mother      GI Bleed     Lipids Sister      Hypertension Sister      Genitourinary Problems Sister      Allergies Sister      CANCER Brother      Lung     Alcohol/Drug Brother      Connective Tissue Disorder Son      Down Syndrome     Respiratory Son      Pneumonia     CANCER Maternal Grandmother      Stomach     Hypertension Maternal Grandfather      Allergies Daughter        SOCIAL HISTORY:  Social History     Social History     Marital status:      Spouse name: Marco Antonio     Number of children: 2     Years of education: 17     Occupational History     Retired in 1994       Retired     Social History Main Topics     Smoking  "status: Never Smoker     Smokeless tobacco: Never Used     Alcohol use No     Drug use: No     Sexual activity: No     Other Topics Concern     Parent/Sibling W/ Cabg, Mi Or Angioplasty Before 65f 55m? No     Social History Narrative       Review of Systems:  Skin:  Negative       Eyes:  Positive for glasses    ENT:  Negative      Respiratory:  Negative       Cardiovascular:  Negative for;palpitations;chest pain;edema;lightheadedness;dizziness;fatigue      Gastroenterology: Positive for   upper gastric pain has had for many years, now since starting eliquis has had the pain every day which is new, used to only have it if she over ate  Genitourinary:  Negative      Musculoskeletal:  Negative      Neurologic:  Negative      Psychiatric:  Negative      Heme/Lymph/Imm:  Positive for allergies    Endocrine:  Positive for thyroid disorder;diabetes      Physical Exam:  Vitals: /82 (BP Location: Right arm, Patient Position: Fowlers, Cuff Size: Adult Large)  Pulse 80  Ht 1.651 m (5' 5\")  Wt 86.5 kg (190 lb 9.6 oz)  SpO2 99%  BMI 31.72 kg/m2    General- appears comfortable  Neck- normal JVP, no bruit  Cardiovascular system- irregular, normal rate, no m/r/g  Respiratory system- CTA b/l  Abdomen- soft, non tender  Extremities- no pedal edema  Neurological - alert, oriented  Psych- normal affect  HEENT- no pallor          DERIAN Deleon MD   [facility]  [address]  [place] [address]              "

## 2017-07-24 NOTE — MR AVS SNAPSHOT
"              After Visit Summary   7/24/2017    Ni Maya    MRN: [id number]           Patient Information     Date Of Birth          1940        Visit Information        Provider Department      7/24/2017 11:00 AM Sohan Deleon MD [facility]        Today's Diagnoses     Atrial fibrillation, unspecified type (H)           Follow-ups after your visit        Additional Services     Follow-Up with Cardiologist                 Future tests that were ordered for you today     Open Future Orders        Priority Expected Expires Ordered    Follow-Up with Cardiologist Routine 7/24/2018 12/6/2018 7/24/2017    Basic metabolic panel Routine 7/24/2018 8/28/2018 7/24/2017            Who to contact     If you have questions or need follow up information about today's clinic visit or your schedule please contact [facility] directly at [phone].  Normal or non-critical lab and imaging results will be communicated to you by MyChart, letter or phone within 4 business days after the clinic has received the results. If you do not hear from us within 7 days, please contact the clinic through MyChart or phone. If you have a critical or abnormal lab result, we will notify you by phone as soon as possible.  Submit refill requests through [employer] or call your pharmacy and they will forward the refill request to us. Please allow 3 business days for your refill to be completed.          Additional Information About Your Visit        MyChart Information     [employer] lets you send messages to your doctor, view your test results, renew your prescriptions, schedule appointments and more. To sign up, go to [email or web address] . Click on \"Log in\" on the left side of the screen, which will take you to the Welcome page. Then click on \"Sign up Now\" on the right side of the page.     You will be asked to enter the access code listed below, as well as some personal information. Please follow the " "directions to create your username and password.     Your access code is: [email or web address]  Expires: 8/15/2017  9:19 AM     Your access code will  in 90 days. If you need help or a new code, please call your [facility] clinic or [phone].        Care EveryWhere ID     This is your Care EveryWhere ID. This could be used by other organizations to access your [facility] medical records  [id number]        Your Vitals Were     Pulse Height Pulse Oximetry BMI (Body Mass Index)          80 1.651 m (5' 5\") 99% 31.72 kg/m2         Blood Pressure from Last 3 Encounters:   17 128/82   17 122/82   17 128/82    Weight from Last 3 Encounters:   17 86.5 kg (190 lb 9.6 oz)   17 85.9 kg (189 lb 6.4 oz)   17 85.7 kg (189 lb)              We Performed the Following     Follow-Up with Cardiologist        Primary Care Provider Office Phone # Fax #    Violetavivi Lin [phone]       [facility] [address] [place] [address]        Equal Access to Services     [facility] : Hadii dimitry ku hadasho Soomaali, waaxda luqadaha, qaybta kaalmada adeegyada, david cohen haymarisela lindsay. So [facility] [phone].    ATENCIÓN: Si habla español, tiene a yuen disposición servicios gratuitos de asistencia lingüística. Llame al [phone].    We comply with applicable federal civil rights laws and [place] laws. We do not discriminate on the basis of race, color, national origin, age, disability sex, sexual orientation or gender identity.            Thank you!     Thank you for choosing [facility]  for your care. Our goal is always to provide you with excellent care. Hearing back from our patients is one way we can continue to improve our services. Please take a few minutes to complete the written survey that you may receive in the mail after your visit with us. Thank you!             Your Updated Medication List - Protect others around you: Learn " how to safely use, store and throw away your medicines at www.disposemymeds.org.          This list is accurate as of: 7/24/17 11:27 AM.  Always use your most recent med list.                   Brand Name Dispense Instructions for use Diagnosis    ALLEGRA 180 MG tablet   Generic drug:  fexofenadine     90 Tab    1 TABLET DAILY    Allergic rhinitis, cause unspecified       apixaban ANTICOAGULANT 5 MG tablet    ELIQUIS    90 tablet    Take 1 tablet (5 mg) by mouth 2 times daily    Atrial fibrillation, unspecified type (H)       HUMBERTO CONTOUR NEXT test strip   Generic drug:  blood glucose monitoring     250 each    TEST THREE TIMES A DAY    Type 2 diabetes mellitus with hyperglycemia (H)       Blood Glucose Monitoring Suppl W/DEVICE Kit     1 kit    1 kit 3 times daily.    Type 2 diabetes, HbA1C goal < 8% (H)       cloNIDine 0.1 MG tablet    CATAPRES    270 tablet    Take 2 tablets (0.2 mg) by mouth At Bedtime    Essential hypertension with goal blood pressure less than 140/90       E-Z JECT LANCET MICRO-THIN 33G Misc           fish oil-omega-3 fatty acids 1000 MG capsule      Take 2 g by mouth daily. 2 caps per day        levothyroxine 100 MCG tablet    SYNTHROID/LEVOTHROID    90 tablet    Take 1 tablet (100 mcg) by mouth daily    Hypothyroidism, unspecified type       lisinopril-hydrochlorothiazide 20-25 MG per tablet    PRINZIDE/ZESTORETIC    90 tablet    Take 1 tablet by mouth daily    Essential hypertension with goal blood pressure less than 140/90       metFORMIN 500 MG 24 hr tablet    GLUCOPHAGE-XR    90 tablet    Take 1 tablet (500 mg) by mouth daily (with dinner)    Type 2 diabetes mellitus with hyperosmolarity without coma, without long-term current use of insulin (H)       metoprolol 100 MG tablet    LOPRESSOR    180 tablet    Take 1 tablet (100 mg) by mouth 2 times daily    Essential hypertension with goal blood pressure less than 140/90       NUTRITIONAL SUPPLEMENT PO      VIBE        priLOSEC OTC 20 MG  tablet   Generic drug:  omeprazole     30 tablet    Take 1 tablet (20 mg) by mouth daily

## 2017-08-02 NOTE — PROGRESS NOTES
[facility]  [address] [place] [id number]  [phone]  Dept: [phone]    PRE-OP EVALUATION:  Today's date: 2017    Ni Maya (: 1940) presents for pre-operative evaluation assessment as requested by Dr. Garcia.  She requires evaluation and anesthesia risk assessment prior to undergoing surgery/procedure for treatment of back .  Proposed procedure: Inject epidural lumbar    Date of Surgery/ Procedure: 8/10/17  Time of Surgery/ Procedure: 11:00 am  Hospital/Surgical Facility: [facility]  Primary Physician: Violeta Lin  Type of Anesthesia Anticipated: to be determined    Patient has a Health Care Directive or Living Will:  YES     1. NO - Do you have a history of heart attack, stroke, stent, bypass or surgery on an artery in the head, neck, heart or legs?  2. NO - Do you ever have any pain or discomfort in your chest?  3. NO - Do you have a history of  Heart Failure?  4. NO - Are you troubled by shortness of breath when: walking on the level, up a slight hill or at night?  5. NO - Do you currently have a cold, bronchitis or other respiratory infection?  6. NO - Do you have a cough, shortness of breath or wheezing?  7. NO - Do you sometimes get pains in the calves of your legs when you walk?  8. NO - Do you or anyone in your family have previous history of blood clots?  9. NO - Do you or does anyone in your family have a serious bleeding problem such as prolonged bleeding following surgeries or cuts?  10. YES - HAVE YOU EVER HAD PROBLEMS WITH ANEMIA OR BEEN TOLD TO TAKE IRON PILLS? Had anemia with her periods in the past  11. NO - Have you had any abnormal blood loss such as black, tarry or bloody stools, or abnormal vaginal bleeding?  12. NO - Have you ever had a blood transfusion?  13. NO - Have you or any of your relatives ever had problems with anesthesia?  14. NO - Do you have sleep apnea, excessive snoring or daytime  drowsiness?  15. NO - Do you have any prosthetic heart valves?  16. NO - Do you have prosthetic joints?  17. NO - Is there any chance that you may be pregnant?  After eating takes a walk and gets pain in middle area under ribs, and now it's starting to happen when she walks or overeats.        HPI:                                                      Brief HPI related to upcoming procedure: Patient is having a steroid injection for chronic back pain. She has recently seen cardiology for evaluation of atypical pain and new onset A.fib.       A-FIB - Patient has a new history of  A-fib currently on Metoprolol and Apixaban control . Patient does not take coumadin for stroke prevention and denies significant symptoms of lightheadedness, palpitations or dyspnea.                                                                                                                                          .    MEDICAL HISTORY:                                                    Patient Active Problem List    Diagnosis Date Noted     Type 2 diabetes mellitus with hyperglycemia, without long-term current use of insulin (H) 05/17/2017     Priority: Medium     Essential hypertension with goal blood pressure less than 140/90 05/27/2016     Priority: Medium     ACP (advance care planning) 05/17/2016     Priority: Medium     Advance Care Planning 5/17/2016: Receipt of ACP document:  Received: Health Care Directive which was witnessed or notarized on 11/22/2013.  Document not previously scanned.  Validation form completed and sent with document to be scanned.  Code Status needs to be updated to reflect choices in most recent ACP document. Notification sent to Dr. Lin for followup.  Confirmed/documented designated decision maker(s).  Added by Kassidy Sousa.             Hypothyroidism, unspecified hypothyroidism type 10/14/2015     Priority: Medium     Type 2 diabetes mellitus with hyperglycemia (H) 10/14/2015     Priority: Medium     HTN,  goal below 140/90 11/06/2013     Priority: Medium     Hyperlipidemia with target LDL less than 100 10/16/2013     Priority: Medium     Diagnosis updated by automated process. Provider to review and confirm.       Type 2 diabetes, HbA1C goal < 8% (H) 03/15/2013     Priority: Medium     Health Care Home 11/15/2012     Priority: Medium     Nahed Medeiros RN-PHN  FPA / DOLOR[facility]   [phone]    DX V65.8 REPLACED WITH [facility] CARE [facility] (04/08/2013)       CARDIOVASCULAR SCREENING; LDL GOAL LESS THAN 130 08/27/2012     Priority: Medium     Vulval lesion 06/06/2010     Priority: Medium     Noted 6 months after initial excision of vulvar mass, which was MICKIE III with clear margins  This lesion was excised today 7/7/10       MICKIE III (vulvar intraepithelial neoplasia III) 09/01/2009     Priority: Medium     S/p wide excision. Final path MICKIE III with free surgical margins       Urethral stricture 01/03/2008     Priority: Medium     Problem list name updated by automated process. Provider to review       Edema 12/29/2006     Priority: Medium     Obesity 12/29/2006     Priority: Medium     Problem list name updated by automated process. Provider to review       Chronic kidney disease, stage II (mild) 11/27/2006     Priority: Medium     Symptomatic menopausal or female climacteric states 01/13/2005     Priority: Medium     Allergic rhinitis 10/21/2004     Priority: Medium     Problem list name updated by automated process. Provider to review       Hypothyroidism 06/25/2002     Priority: Medium     Problem list name updated by automated process. Provider to review        Past Medical History:   Diagnosis Date     Allergic rhinitis, cause unspecified      Cystitis NEC     1999,6-2-01, 10-10-01     Essential hypertension, benign      Infection of kidney, unspecified 1999     Other specified acquired hypothyroidism      Past Surgical History:   Procedure Laterality Date     BIOPSY VULVA/PERINEUM,ADDNL  BLANCHE  9/18/09    Wide -excision of MICKIE III- right labia      C LAPAROSCOPY, SURGICAL; W/ VAGINAL HYSTERECTOMY W/WO REMOVAL OVARY(S)/TUBES  1984    for bleeding     C LIGATE FALLOPIAN TUBE,POSTPARTUM  1978    Tubal Ligation     COLONOSCOPY  9/13/2013    Procedure: COLONOSCOPY;  Colonoscopy;  Surgeon: Yanick Ramsey MD;  Location: [facility]     HC ANGIOGRAPHY ARCH  4-3-98     HC BIOPSY OF BREAST, OPEN INCISIONAL  1960    Benign     HC COLONOSCOPY THRU STOMA, DIAGNOSTIC  4-24-98    Diverticuli - due in 2008     HC ERCP W SPHINCTEROTOMY  1996 6/2/96 and 8/30/96     HC REDUCTION OF LARGE BREAST  1988     HC REMOVAL GALLBLADDER  1995     HC UGI ENDOSCOPY DIAG W OR W/O BRUSH/WASH  7-     Current Outpatient Prescriptions   Medication Sig Dispense Refill     omeprazole (PRILOSEC OTC) 20 MG tablet Take 1 tablet (20 mg) by mouth daily 30 tablet      apixaban ANTICOAGULANT (ELIQUIS) 5 MG tablet Take 1 tablet (5 mg) by mouth 2 times daily 90 tablet 3     metFORMIN (GLUCOPHAGE-XR) 500 MG 24 hr tablet Take 1 tablet (500 mg) by mouth daily (with dinner) 90 tablet 3     cloNIDine (CATAPRES) 0.1 MG tablet Take 2 tablets (0.2 mg) by mouth At Bedtime 270 tablet 1     lisinopril-hydrochlorothiazide (PRINZIDE/ZESTORETIC) 20-25 MG per tablet Take 1 tablet by mouth daily 90 tablet 3     metoprolol (LOPRESSOR) 100 MG tablet Take 1 tablet (100 mg) by mouth 2 times daily 180 tablet 3     levothyroxine (SYNTHROID/LEVOTHROID) 100 MCG tablet Take 1 tablet (100 mcg) by mouth daily 90 tablet 3     HUMBERTO CONTOUR NEXT test strip TEST THREE TIMES A  each 1     Lancets (E-Z JECT LANCET MICRO-THIN 33G) MISC        Blood Glucose Monitoring Suppl W/DEVICE KIT 1 kit 3 times daily. 1 kit 0     fish oil-omega-3 fatty acids (FISH OIL) 1000 MG capsule Take 2 g by mouth daily. 2 caps per day       ALLEGRA 180 MG PO TABS 1 TABLET DAILY 90 Tab 3     NUTRITIONAL SUPPLEMENT OR VIBE       OTC products: None, except as noted above    Allergies  "  Allergen Reactions     Sulfa Drugs Rash      Latex Allergy: NO    Social History   Substance Use Topics     Smoking status: Never Smoker     Smokeless tobacco: Never Used     Alcohol use No     History   Drug Use No       REVIEW OF SYSTEMS:                                                    Constitutional, neuro, ENT, endocrine, pulmonary, cardiac, gastrointestinal, genitourinary, musculoskeletal, integument and psychiatric systems are negative, except as otherwise noted.      EXAM:                                                    /83 (BP Location: Left arm, Patient Position: Chair, Cuff Size: Adult Regular)  Pulse 84  Temp 97.6  F (36.4  C) (Temporal)  Resp 16  Ht 5' 5\" (1.651 m)  Wt 189 lb 14.4 oz (86.1 kg)  BMI 31.6 kg/m2    GENERAL APPEARANCE: healthy, alert and no distress     EYES: EOMI, PERRL     HENT: ear canals and TM's normal and nose and mouth without ulcers or lesions     NECK: no adenopathy, no asymmetry, masses, or scars and thyroid normal to palpation     RESP: lungs clear to auscultation - no rales, rhonchi or wheezes     CV: regular rates and rhythm     ABDOMEN:  soft, nontender, no HSM or masses and bowel sounds normal     MS: extremities normal- no gross deformities noted, no evidence of inflammation in joints, FROM in all extremities.     SKIN: no suspicious lesions or rashes     NEURO: Normal strength and tone, sensory exam grossly normal, mentation intact and speech normal     PSYCH: mentation appears normal. and affect normal/bright     LYMPHATICS: No axillary, cervical, or supraclavicular nodes    DIAGNOSTICS:                                                    Recent stress test 6/27/17 showed no evidence of ischemia    Recent Labs   Lab Test  05/17/17   0838 04/03/17 09/14/16   0808   01/12/15   1143   HGB  14.6   --    --    --    --   15.3   PLT  134*   --    --    --    --   134*   INR   --    --    --    --    --   0.95   NA  142   --    --   141   < >  140   POTASSIUM  " 3.6   --    --   3.6   < >  3.4   CR  0.86   --    --   0.85   < >  0.84   A1C  7.2*  5.9   < >   --    < >   --     < > = values in this interval not displayed.      Component      Latest Ref Rng & Units 8/7/2017   Sodium      133 - 144 mmol/L 140   Potassium      3.4 - 5.3 mmol/L 3.6   Chloride      94 - 109 mmol/L 103   Carbon Dioxide      20 - 32 mmol/L 28   Anion Gap      3 - 14 mmol/L 9   Glucose      70 - 99 mg/dL 200 (H)   Urea Nitrogen      7 - 30 mg/dL 13   Creatinine      0.52 - 1.04 mg/dL 0.87   GFR Estimate      >60 mL/min/1.7m2 62   GFR Estimate If Black      >60 mL/min/1.7m2 76   Calcium      8.5 - 10.1 mg/dL 8.9   WBC      4.0 - 11.0 10e9/L 5.6   RBC Count      3.8 - 5.2 10e12/L 4.57   Hemoglobin      11.7 - 15.7 g/dL 14.0   Hematocrit      35.0 - 47.0 % 41.2   MCV      78 - 100 fl 90   MCH      26.5 - 33.0 pg 30.6   MCHC      31.5 - 36.5 g/dL 34.0   RDW      10.0 - 15.0 % 14.0   Platelet Count      150 - 450 10e9/L 123 (L)   INR      0.86 - 1.14 0.96     IMPRESSION:                                                    Reason for surgery/procedure: Epidural injection  Diagnosis/reason for consult: Pre Op    The proposed surgical procedure is considered INTERMEDIATE risk.    REVISED CARDIAC RISK INDEX  The patient has the following serious cardiovascular risks for perioperative complications such as (MI, PE, VFib and 3  AV Block):  No serious cardiac risks  INTERPRETATION: 0 risks: Class I (very low risk - 0.4% complication rate)    The patient has the following additional risks for perioperative complications:  No identified additional risks  The 10-year ASCVD risk score (Karena XANDER Jr, et al., 2013) is: 61.5%    Values used to calculate the score:      Age: 77 years      Sex: Female      Is Non- : No      Diabetic: Yes      Tobacco smoker: No      Systolic Blood Pressure: 148 mmHg      Is BP treated: Yes      HDL Cholesterol: 49 mg/dL      Total Cholesterol: 180 mg/dL       ICD-10-CM    1. Preop general physical exam Z01.818 Basic metabolic panel     CBC with platelets     INR   2. Chronic back pain, unspecified back location, unspecified back pain laterality M54.9 Basic metabolic panel    G89.29 CBC with platelets     INR   3. Type 2 diabetes mellitus with hyperglycemia, without long-term current use of insulin (H) E11.65    4. Atrial fibrillation, unspecified type (H) I48.91        RECOMMENDATIONS:                                                        --Patient is to take all scheduled medications on the day of surgery EXCEPT for modifications listed below.    She is off the Apixaban now and will be off 3 days before and 2 days following procedure,  She will hold metformin day of procedure.     APPROVAL GIVEN to proceed with proposed procedure, without further diagnostic evaluation       Signed Electronically by: Johanna Wagner PA-C    Copy of this evaluation report is provided to requesting physician.    [facility] Preop Guidelines

## 2017-08-02 NOTE — PATIENT INSTRUCTIONS
Stay off the blood thinner for 2 days after procedure, hold the Metformin the morning of surgery. Follow up as needed.       Before Your Surgery      Call your surgeon if there is any change in your health. This includes signs of a cold or flu (such as a sore throat, runny nose, cough, rash or fever).    Do not smoke, drink alcohol or take over the counter medicine (unless your surgeon or primary care doctor tells you to) for the 24 hours before and after surgery.    If you take prescribed drugs: Follow your doctor s orders about which medicines to take and which to stop until after surgery.    Eating and drinking prior to surgery: follow the instructions from your surgeon    Take a shower or bath the night before surgery. Use the soap your surgeon gave you to gently clean your skin. If you do not have soap from your surgeon, use your regular soap. Do not shave or scrub the surgery site.  Wear clean pajamas and have clean sheets on your bed.

## 2017-08-07 ENCOUNTER — OFFICE VISIT (OUTPATIENT)
Dept: FAMILY MEDICINE | Facility: OTHER | Age: 77
End: 2017-08-07
Payer: COMMERCIAL

## 2017-08-07 VITALS
WEIGHT: 189.9 LBS | DIASTOLIC BLOOD PRESSURE: 82 MMHG | HEART RATE: 84 BPM | RESPIRATION RATE: 16 BRPM | TEMPERATURE: 97.6 F | SYSTOLIC BLOOD PRESSURE: 148 MMHG | HEIGHT: 65 IN | BODY MASS INDEX: 31.64 KG/M2

## 2017-08-07 DIAGNOSIS — M54.9 CHRONIC BACK PAIN, UNSPECIFIED BACK LOCATION, UNSPECIFIED BACK PAIN LATERALITY: ICD-10-CM

## 2017-08-07 DIAGNOSIS — I48.91 ATRIAL FIBRILLATION, UNSPECIFIED TYPE (H): ICD-10-CM

## 2017-08-07 DIAGNOSIS — Z01.818 PREOP GENERAL PHYSICAL EXAM: Primary | ICD-10-CM

## 2017-08-07 DIAGNOSIS — E11.65 TYPE 2 DIABETES MELLITUS WITH HYPERGLYCEMIA, WITHOUT LONG-TERM CURRENT USE OF INSULIN (H): ICD-10-CM

## 2017-08-07 DIAGNOSIS — G89.29 CHRONIC BACK PAIN, UNSPECIFIED BACK LOCATION, UNSPECIFIED BACK PAIN LATERALITY: ICD-10-CM

## 2017-08-07 LAB
ANION GAP SERPL CALCULATED.3IONS-SCNC: 9 MMOL/L (ref 3–14)
BUN SERPL-MCNC: 13 MG/DL (ref 7–30)
CALCIUM SERPL-MCNC: 8.9 MG/DL (ref 8.5–10.1)
CHLORIDE SERPL-SCNC: 103 MMOL/L (ref 94–109)
CO2 SERPL-SCNC: 28 MMOL/L (ref 20–32)
CREAT SERPL-MCNC: 0.87 MG/DL (ref 0.52–1.04)
ERYTHROCYTE [DISTWIDTH] IN BLOOD BY AUTOMATED COUNT: 14 % (ref 10–15)
GFR SERPL CREATININE-BSD FRML MDRD: 62 ML/MIN/1.7M2
GLUCOSE SERPL-MCNC: 200 MG/DL (ref 70–99)
HCT VFR BLD AUTO: 41.2 % (ref 35–47)
HGB BLD-MCNC: 14 G/DL (ref 11.7–15.7)
INR PPP: 0.96 (ref 0.86–1.14)
MCH RBC QN AUTO: 30.6 PG (ref 26.5–33)
MCHC RBC AUTO-ENTMCNC: 34 G/DL (ref 31.5–36.5)
MCV RBC AUTO: 90 FL (ref 78–100)
PLATELET # BLD AUTO: 123 10E9/L (ref 150–450)
POTASSIUM SERPL-SCNC: 3.6 MMOL/L (ref 3.4–5.3)
RBC # BLD AUTO: 4.57 10E12/L (ref 3.8–5.2)
SODIUM SERPL-SCNC: 140 MMOL/L (ref 133–144)
WBC # BLD AUTO: 5.6 10E9/L (ref 4–11)

## 2017-08-07 PROCEDURE — 85610 PROTHROMBIN TIME: CPT | Performed by: PHYSICIAN ASSISTANT

## 2017-08-07 PROCEDURE — 85027 COMPLETE CBC AUTOMATED: CPT | Performed by: PHYSICIAN ASSISTANT

## 2017-08-07 PROCEDURE — 80048 BASIC METABOLIC PNL TOTAL CA: CPT | Performed by: PHYSICIAN ASSISTANT

## 2017-08-07 PROCEDURE — 36415 COLL VENOUS BLD VENIPUNCTURE: CPT | Performed by: PHYSICIAN ASSISTANT

## 2017-08-07 PROCEDURE — 99214 OFFICE O/P EST MOD 30 MIN: CPT | Performed by: PHYSICIAN ASSISTANT

## 2017-08-07 ASSESSMENT — PAIN SCALES - GENERAL: PAINLEVEL: NO PAIN (0)

## 2017-08-07 NOTE — NURSING NOTE
"Chief Complaint   Patient presents with     Pre-Op Exam     Panel Management     MyChart, Fall risk, ASA, Statin, Foot exam       Initial /82  Pulse 84  Temp 97.6  F (36.4  C) (Temporal)  Resp 16  Ht 5' 5\" (1.651 m)  Wt 189 lb 14.4 oz (86.1 kg)  BMI 31.6 kg/m2 Estimated body mass index is 31.6 kg/(m^2) as calculated from the following:    Height as of this encounter: 5' 5\" (1.651 m).    Weight as of this encounter: 189 lb 14.4 oz (86.1 kg).  Medication Reconciliation: complete  Kathi Ortiz, JENNIFER    "

## 2017-08-07 NOTE — MR AVS SNAPSHOT
After Visit Summary   8/7/2017    Ni Maya    MRN: [id number]           Patient Information     Date Of Birth          1940        Visit Information        Provider Department      8/7/2017 8:40 AM Johanna Wagner PA-C [facility]'s Diagnoses     Preop general physical exam    -  1    Chronic back pain, unspecified back location, unspecified back pain laterality        Type 2 diabetes mellitus with hyperglycemia, without long-term current use of insulin (H)        Atrial fibrillation, unspecified type (H)          Care Instructions    Stay off the blood thinner for 2 days after procedure, hold the Metformin the morning of surgery. Follow up as needed.       Before Your Surgery      Call your surgeon if there is any change in your health. This includes signs of a cold or flu (such as a sore throat, runny nose, cough, rash or fever).    Do not smoke, drink alcohol or take over the counter medicine (unless your surgeon or primary care doctor tells you to) for the 24 hours before and after surgery.    If you take prescribed drugs: Follow your doctor s orders about which medicines to take and which to stop until after surgery.    Eating and drinking prior to surgery: follow the instructions from your surgeon    Take a shower or bath the night before surgery. Use the soap your surgeon gave you to gently clean your skin. If you do not have soap from your surgeon, use your regular soap. Do not shave or scrub the surgery site.  Wear clean pajamas and have clean sheets on your bed.           Follow-ups after your visit        Follow-up notes from your care team     Return if symptoms worsen or fail to improve.      Your next 10 appointments already scheduled     Aug 10, 2017   Procedure with Kimani Garcia MD   [facility] ([facility])    [address] [place] [address]   [phone]           From [address]: Exit at [address]  [address] on [place] side of [place]. Turn right on [address]. Turn left at stoplight on [address]. [facility] will be in view two blocks ahead            Aug 10, 2017 11:00 AM CDT   XR FLUORO NEEDLE PLACEMENT SPINE with PHCARM1   [facility] ([facility])    [address] [address]   [phone]           For nerve root injection, please send or bring copies of any MRIs or other scans you have had.  Bring a list of your current medicines to your exam. (Include vitamins, minerals and over-the-counter medicines.) Leave your valuables at home.  Plan to have someone drive you home afterward.  Stop taking the following medicines (but talk to your doctor first):   If you take blood thinners, you may need to stop taking them a few days before treatment. Talk to your doctor before stopping these medicines.Stop taking Coumadin (warfarin) 3 days before treatment. Restart the day after treatment.   If you take Plavix, Ticlid, Pletal or Persantine, please ask your doctor if you should stop these medicines. You may need extra tests on the morning of your scan. You may take your other medicines as normal.  Stop all food and drink (including water) 3 hours before your test or treatment.  Please tell the doctor:   If you are allergic to X-ray dye (contrast fluid).   If you may be pregnant.  Injections take about 30 to 45 minutes. Most people spend up to 2 hours in the clinic or hospital.  Please call the Imaging Department at your exam site with any questions              Who to contact     If you have questions or need follow up information about today's clinic visit or your schedule please contact [facility] directly at [phone].  Normal or non-critical lab and imaging results will be communicated to you by MyChart, letter or phone within 4 business days after the clinic has received the results. If you do not hear from us within 7  "days, please contact the clinic through [facility] or phone. If you have a critical or abnormal lab result, we will notify you by phone as soon as possible.  Submit refill requests through [employer] [facility] or call your pharmacy and they will forward the refill request to us. Please allow 3 business days for your refill to be completed.          Additional Information About Your Visit        [facility] Information     [facility] lets you send messages to your doctor, view your test results, renew your prescriptions, schedule appointments and more. To sign up, go to [email or web address] . Click on \"Log in\" on the left side of the screen, which will take you to the Welcome page. Then click on \"Sign up Now\" on the right side of the page.     You will be asked to enter the access code listed below, as well as some personal information. Please follow the directions to create your username and password.     Your access code is: [email or web address]  Expires: 8/15/2017  9:19 AM     Your access code will  in 90 days. If you need help or a new code, please call your [place] clinic or [phone].        Care EveryWhere ID     This is your Care EveryWhere ID. This could be used by other organizations to access your [place] medical records  [id number]        Your Vitals Were     Pulse Temperature Respirations Height BMI (Body Mass Index)       84 97.6  F (36.4  C) (Temporal) 16 5' 5\" (1.651 m) 31.6 kg/m2        Blood Pressure from Last 3 Encounters:   17 148/82   17 128/82   17 122/82    Weight from Last 3 Encounters:   17 189 lb 14.4 oz (86.1 kg)   17 190 lb 9.6 oz (86.5 kg)   17 189 lb 6.4 oz (85.9 kg)              We Performed the Following     Basic metabolic panel     CBC with platelets     INR        Primary Care Provider Office Phone # Fax #    Violeta Lin [phone]        [facility] [address] [place] [address]        Equal Access to Services     " BENITEZ [facility]: Hadii aad ku hadgypsyo Sojohnnyali, waaxda luqadaha, qaybta kaalmada adeeglynette, david devinin hayaaemma lovingphill garcia young . So [facility] [phone].    ATENCIÓN: Si habla español, tiene a yuen disposición servicios gratuitos de asistencia lingüística. Llame al [phone].    We comply with applicable federal civil rights laws and [place] laws. We do not discriminate on the basis of race, color, national origin, age, disability sex, sexual orientation or gender identity.            Thank you!     Thank you for choosing [facility]  for your care. Our goal is always to provide you with excellent care. Hearing back from our patients is one way we can continue to improve our services. Please take a few minutes to complete the written survey that you may receive in the mail after your visit with us. Thank you!             Your Updated Medication List - Protect others around you: Learn how to safely use, store and throw away your medicines at www.disposemymeds.org.          This list is accurate as of: 8/7/17  9:44 AM.  Always use your most recent med list.                   Brand Name Dispense Instructions for use Diagnosis    ALLEGRA 180 MG tablet   Generic drug:  fexofenadine     90 Tab    1 TABLET DAILY    Allergic rhinitis, cause unspecified       apixaban ANTICOAGULANT 5 MG tablet    ELIQUIS    90 tablet    Take 1 tablet (5 mg) by mouth 2 times daily    Atrial fibrillation, unspecified type (H)       HUMBERTO CONTOUR NEXT test strip   Generic drug:  blood glucose monitoring     250 each    TEST THREE TIMES A DAY    Type 2 diabetes mellitus with hyperglycemia (H)       Blood Glucose Monitoring Suppl W/DEVICE Kit     1 kit    1 kit 3 times daily.    Type 2 diabetes, HbA1C goal < 8% (H)       cloNIDine 0.1 MG tablet    CATAPRES    270 tablet    Take 2 tablets (0.2 mg) by mouth At Bedtime    Essential hypertension with goal blood pressure less than 140/90       E-Z JECT LANCET MICRO-THIN 33G [facility]            fish oil-omega-3 fatty acids 1000 MG capsule      Take 2 g by mouth daily. 2 caps per day        levothyroxine 100 MCG tablet    SYNTHROID/LEVOTHROID    90 tablet    Take 1 tablet (100 mcg) by mouth daily    Hypothyroidism, unspecified type       lisinopril-hydrochlorothiazide 20-25 MG per tablet    PRINZIDE/ZESTORETIC    90 tablet    Take 1 tablet by mouth daily    Essential hypertension with goal blood pressure less than 140/90       metFORMIN 500 MG 24 hr tablet    GLUCOPHAGE-XR    90 tablet    Take 1 tablet (500 mg) by mouth daily (with dinner)    Type 2 diabetes mellitus with hyperosmolarity without coma, without long-term current use of insulin (H)       metoprolol 100 MG tablet    LOPRESSOR    180 tablet    Take 1 tablet (100 mg) by mouth 2 times daily    Essential hypertension with goal blood pressure less than 140/90       NUTRITIONAL SUPPLEMENT PO      VIBE        priLOSEC OTC 20 MG tablet   Generic drug:  omeprazole     30 tablet    Take 1 tablet (20 mg) by mouth daily

## 2017-08-09 ENCOUNTER — ANESTHESIA EVENT (OUTPATIENT)
Dept: SURGERY | Facility: CLINIC | Age: 77
End: 2017-08-09
Payer: COMMERCIAL

## 2017-08-09 ASSESSMENT — ENCOUNTER SYMPTOMS: DYSRHYTHMIAS: 1

## 2017-08-10 ENCOUNTER — ANESTHESIA (OUTPATIENT)
Dept: SURGERY | Facility: CLINIC | Age: 77
End: 2017-08-10
Payer: COMMERCIAL

## 2017-08-10 ENCOUNTER — HOSPITAL ENCOUNTER (OUTPATIENT)
Facility: CLINIC | Age: 77
Discharge: HOME | End: 2017-08-10
Attending: ANESTHESIOLOGY | Admitting: ANESTHESIOLOGY
Payer: COMMERCIAL

## 2017-08-10 ENCOUNTER — HOSPITAL ENCOUNTER (OUTPATIENT)
Dept: GENERAL RADIOLOGY | Facility: CLINIC | Age: 77
End: 2017-08-10
Attending: ANESTHESIOLOGY | Admitting: ANESTHESIOLOGY
Payer: COMMERCIAL

## 2017-08-10 VITALS — DIASTOLIC BLOOD PRESSURE: 98 MMHG | TEMPERATURE: 97.7 F | RESPIRATION RATE: 16 BRPM | SYSTOLIC BLOOD PRESSURE: 167 MMHG

## 2017-08-10 DIAGNOSIS — M54.50 LUMBAR PAIN: ICD-10-CM

## 2017-08-10 PROCEDURE — 25000128 H RX IP 250 OP 636: Performed by: PAIN MEDICINE

## 2017-08-10 PROCEDURE — 25000128 H RX IP 250 OP 636: Performed by: ANESTHESIOLOGY

## 2017-08-10 PROCEDURE — 37000008 ZZH ANESTHESIA TECHNICAL FEE, 1ST 30 MIN: Performed by: ANESTHESIOLOGY

## 2017-08-10 PROCEDURE — 62323 NJX INTERLAMINAR LMBR/SAC: CPT | Performed by: ANESTHESIOLOGY

## 2017-08-10 PROCEDURE — 25000128 H RX IP 250 OP 636: Performed by: NURSE ANESTHETIST, CERTIFIED REGISTERED

## 2017-08-10 PROCEDURE — 40000277 XR FLUORO NEEDLE PLACEMENT SPINE (WITH PROCEDURE)

## 2017-08-10 PROCEDURE — 25000125 ZZHC RX 250: Performed by: NURSE ANESTHETIST, CERTIFIED REGISTERED

## 2017-08-10 PROCEDURE — 25000125 ZZHC RX 250: Performed by: PAIN MEDICINE

## 2017-08-10 RX ORDER — MEPERIDINE HYDROCHLORIDE 25 MG/ML
12.5 INJECTION INTRAMUSCULAR; INTRAVENOUS; SUBCUTANEOUS
Status: DISCONTINUED | OUTPATIENT
Start: 2017-08-10 | End: 2017-08-10 | Stop reason: HOSPADM

## 2017-08-10 RX ORDER — ONDANSETRON 4 MG/1
4 TABLET, ORALLY DISINTEGRATING ORAL EVERY 30 MIN PRN
Status: DISCONTINUED | OUTPATIENT
Start: 2017-08-10 | End: 2017-08-10 | Stop reason: HOSPADM

## 2017-08-10 RX ORDER — TRIAMCINOLONE ACETONIDE 40 MG/ML
INJECTION, SUSPENSION INTRA-ARTICULAR; INTRAMUSCULAR PRN
Status: DISCONTINUED | OUTPATIENT
Start: 2017-08-10 | End: 2017-08-10 | Stop reason: HOSPADM

## 2017-08-10 RX ORDER — ONDANSETRON 2 MG/ML
4 INJECTION INTRAMUSCULAR; INTRAVENOUS EVERY 30 MIN PRN
Status: DISCONTINUED | OUTPATIENT
Start: 2017-08-10 | End: 2017-08-10 | Stop reason: HOSPADM

## 2017-08-10 RX ORDER — FENTANYL CITRATE 50 UG/ML
25-50 INJECTION, SOLUTION INTRAMUSCULAR; INTRAVENOUS
Status: DISCONTINUED | OUTPATIENT
Start: 2017-08-10 | End: 2017-08-10 | Stop reason: HOSPADM

## 2017-08-10 RX ORDER — SODIUM CHLORIDE, SODIUM LACTATE, POTASSIUM CHLORIDE, CALCIUM CHLORIDE 600; 310; 30; 20 MG/100ML; MG/100ML; MG/100ML; MG/100ML
INJECTION, SOLUTION INTRAVENOUS CONTINUOUS
Status: DISCONTINUED | OUTPATIENT
Start: 2017-08-10 | End: 2017-08-10 | Stop reason: HOSPADM

## 2017-08-10 RX ORDER — IOPAMIDOL 612 MG/ML
INJECTION, SOLUTION INTRATHECAL PRN
Status: DISCONTINUED | OUTPATIENT
Start: 2017-08-10 | End: 2017-08-10 | Stop reason: HOSPADM

## 2017-08-10 RX ORDER — PROPOFOL 10 MG/ML
INJECTION, EMULSION INTRAVENOUS PRN
Status: DISCONTINUED | OUTPATIENT
Start: 2017-08-10 | End: 2017-08-10

## 2017-08-10 RX ORDER — LIDOCAINE HYDROCHLORIDE 20 MG/ML
INJECTION, SOLUTION INFILTRATION; PERINEURAL PRN
Status: DISCONTINUED | OUTPATIENT
Start: 2017-08-10 | End: 2017-08-10

## 2017-08-10 RX ORDER — NALOXONE HYDROCHLORIDE 0.4 MG/ML
.1-.4 INJECTION, SOLUTION INTRAMUSCULAR; INTRAVENOUS; SUBCUTANEOUS
Status: DISCONTINUED | OUTPATIENT
Start: 2017-08-10 | End: 2017-08-10 | Stop reason: HOSPADM

## 2017-08-10 RX ADMIN — LIDOCAINE HYDROCHLORIDE 0.1 ML: 10 INJECTION, SOLUTION EPIDURAL; INFILTRATION; INTRACAUDAL; PERINEURAL at 10:48

## 2017-08-10 RX ADMIN — SODIUM CHLORIDE, POTASSIUM CHLORIDE, SODIUM LACTATE AND CALCIUM CHLORIDE: 600; 310; 30; 20 INJECTION, SOLUTION INTRAVENOUS at 10:47

## 2017-08-10 RX ADMIN — PROPOFOL 20 MG: 10 INJECTION, EMULSION INTRAVENOUS at 11:26

## 2017-08-10 RX ADMIN — PROPOFOL 20 MG: 10 INJECTION, EMULSION INTRAVENOUS at 11:28

## 2017-08-10 RX ADMIN — LIDOCAINE HYDROCHLORIDE 40 MG: 20 INJECTION, SOLUTION INFILTRATION; PERINEURAL at 11:25

## 2017-08-10 RX ADMIN — PROPOFOL 30 MG: 10 INJECTION, EMULSION INTRAVENOUS at 11:25

## 2017-08-10 NOTE — ANESTHESIA POSTPROCEDURE EVALUATION
Patient: Ni Maya    Procedure(s):  Lumbar Epidural Steroid Injection - Wound Class: I-Clean    Diagnosis:lumbar pain  Diagnosis Additional Information: No value filed.    Anesthesia Type:  MAC    Note:  Anesthesia Post Evaluation    Patient location during evaluation: Phase 2 and Bedside  Patient participation: Able to fully participate in evaluation  Level of consciousness: awake  Pain management: adequate  Cardiovascular status: acceptable, stable, hypotensive and blood pressure returned to baseline  Respiratory status: acceptable and room air  Hydration status: acceptable  PONV: none     Anesthetic complications: None          Last vitals:  Vitals:    08/10/17 1050   BP: (!) 172/110   Resp: 16   Temp: 97.7  F (36.5  C)         Electronically Signed By: NIMA Patiño CRNA  August 10, 2017  11:45 AM

## 2017-08-10 NOTE — ANESTHESIA PREPROCEDURE EVALUATION
Anesthesia Evaluation     . Pt has had prior anesthetic. Type: MAC and General    No history of anesthetic complications          ROS/MED HX    ENT/Pulmonary:  - neg pulmonary ROS     Neurologic:  - neg neurologic ROS     Cardiovascular:     (+) hypertension----. : . . . :. dysrhythmias a-fib, Irregular Heartbeat/Palpitations, . Previous cardiac testing date:results:Stress Testdate:6/27/17 results:GATED MYOCARDIAL PERFUSION SCINTIGRAPHY WITH INTRAVENOUS PHARMACOLOGIC  VASODILATATION LEXISCAN -ONE DAY STUDY      6/27/2017 11:20 AM YOBANY ENNIS 76 years Female  1940.     Indication/Clinical History: Chest pain     Impression  1.  Myocardial perfusion imaging using single isotope technique  demonstrated small fixed distal anteroseptal defect probably secondary  to breast tissue attenuation. There is no evidence for ischemia or  infarction on this study.   2. Gated images demonstrated normal LV cavity size and systolic  function.  The left ventricular systolic function is 64%.  3. Compared to the prior study from no prior study .     Procedure  Pharmacologic stress testing was performed with Lexiscan at a rate of  0.08 mg/ml rapid bolus injection, for 15 seconds, 0.4 mg/5ml  intravenously. Low-level exercise was performed along with the  vasodilator infusion.  The heart rate was 92 at baseline and anthony to  107 beats per minute during the Lexiscan infusion. The rest blood  pressure was 144/68 mmHg and was 144/68 mm Hg during Lexiscan  infusion. The patient experienced no symptoms  during the test.     Myocardial perfusion imaging was performed at rest, approximately 45  minutes after the injection intravenously of 10.1 mCi of Tc-99m  Myoview. At peak pharmacologic effect, 10-20 seconds after Lexiscan,   the patient was injected intravenously with 32.1 mCi of  Tc-99m  Myoview. The post-stress tomographic imaging was performed  approximately 60 minutes after stress.     EKG Findings  The resting EKG demonstrated  atrial fibrillation with diffuse  nonspecific ST and T wave abnormalities and poor R wave progression in  the anterior precordial leads. The stress EKG demonstrated  nondiagnostic due to baseline ECG abnormality.     Tomographic Findings  Overall, the study quality is adequate . On the stress images, there  is a small area of mild to moderate count reduction in the distal  anteroseptal. On the rest images, similar pattern of a small area of  mild to moderate count reduction is again seen in the distal  anteroseptal. Results suggest breast tissue attenuation there is no  evidence for ischemia or infarction on this study . Gated images  demonstrated normal LV cavity size with end-diastolic volumes  measuring 54 and 56 mL and rest and stress respectively. LV systolic  function appears normal. The left ventricular ejection fraction was  calculated to be 64%. TID was absent.     STUART MONTANEZ MD    Stress test - no ischemia or chest painECG reviewed date:6/7/17 results:A-Fib nonspecific ST-T wave abn date: results:          METS/Exercise Tolerance:     Hematologic:  - neg hematologic  ROS       Musculoskeletal:   (+) , , other musculoskeletal- Chronic back pain      GI/Hepatic:  - neg GI/hepatic ROS       Renal/Genitourinary:     (+) chronic renal disease, type: CRI, Pt does not require dialysis, Pt has no history of transplant,       Endo:     (+) type II DM Last HgA1c: 7.2 date: 5/17/17 Not using insulin - not using insulin pump Normal glucose range: 200's not previously admitted for DM/DKA thyroid problem hypothyroidism, .      Psychiatric:  - neg psychiatric ROS       Infectious Disease:  - neg infectious disease ROS       Malignancy:      - no malignancy   Other:    (+) H/O Chronic Pain,                   Physical Exam  Normal systems: cardiovascular and pulmonary    Airway   Mallampati: II  TM distance: >3 FB  Neck ROM: full    Dental     Cardiovascular   Rhythm and rate: irregular and normal      Pulmonary     breath sounds clear to auscultation                    Anesthesia Plan      History & Physical Review  History and physical reviewed and following examination; no interval change.    ASA Status:  3 .    NPO Status:  > 8 hours    Plan for MAC with Propofol induction.          Postoperative Care      Consents  Anesthetic plan, risks, benefits and alternatives discussed with:  Patient.  Use of blood products discussed: No .   .                          .

## 2017-08-10 NOTE — DISCHARGE INSTRUCTIONS
Home Care Instructions    [facility]  [phone]               Procedure:  Epidural Spine Injection or Joint injection    Activity:    Rest today    Do not work today    Resume normal activity tomorrow    Pain:    You may experience soreness at the injection site for one or two days    You may use an ice pack for 20 minutes every 2 hours for the first 24 hours    You may use a heating pad after the first 24 hours    You may use Tylenol  (acetaminophen) every 4 hours or other pain medicines as directed by your physician    Safety  Sedation medicine, if given may remain active for many hours.    It is important for the next 24 hours that you do not:    Drive a car    Operate machines or power tools    Consume alcohol, including beer    Sign any important papers or legal documents    You may experience numbness radiating into your legs or arms, (depending on the procedure location)  This numbness may last several hours.  Until the numb sensation returns to normal please use caution in walking, climbing stairs, stepping out of your vehicle, etc.    Common side effects of steroids:  Not everyone will experience corticosteroid side effects. If side effects are experienced they will gradually subside in the 7-10 day period following an injection.    Most common side effects include:    Flushed face and/or chest    Feeling of warmth, particularly in face but could be overall feeling of warmth    Increased blood sugar in diabetic patients    Menstrual irregularities may occur.  If taking hormone based birth control an alternate method of birth control is recommended    Sleep disturbances and/or mood swings are possible    Leg cramps    Please contact us if you have:  Severe pain   Fever more than 101.5 degrees Fahrenheit  Signs of infection (redness, swelling or drainage)      If you have questions, please contact our office at [phone] between the hours of 8:00am and 5:00pm Monday through Friday.  After  office hours you can contact the on call provider by dialing [phone].  If you need immediate attention we recommend that you go to a hospital emergency room or dial [phone].

## 2017-08-10 NOTE — ANESTHESIA CARE TRANSFER NOTE
Patient: Ni Maya    Procedure(s):  Lumbar Epidural Steroid Injection - Wound Class: I-Clean    Diagnosis: lumbar pain  Diagnosis Additional Information: No value filed.    Anesthesia Type:   MAC     Note:  Airway :Room Air  Patient transferred to:Phase II        Vitals: (Last set prior to Anesthesia Care Transfer)    CRNA VITALS  8/10/2017 1107 - 8/10/2017 1144      8/10/2017             Pulse: 87    SpO2: 98 %                Electronically Signed By: NIMA Patiño CRNA  August 10, 2017  11:44 AM

## 2017-08-10 NOTE — OP NOTE
CHIEF COMPLAINT: Back and leg pains secondary to lumbar spondylosis and lumbar disc degeneration  PROCEDURE: L2-3 Interlaminar epidural steroid injection using fluoroscopic guidance with contrast dye.   PROCEDURE DETAILS: After written informed consent was obtained from the patient, the patient was escorted to the procedure room.  The patient was placed in the prone position.  A  time out  was conducted to verify patient identity, procedure to be performed, side, site, allergies and any special requirements.  The skin over the neck and upper back region were prepped and draped in normal sterile fashion. Fluoroscopy was used to identify the L2-3 interspace in an AP view and the skin was anesthetized with 2 mL of 1% lidocaine with bicarbonate buffer.  A 20-gauge 3-1/2 inch Tuohy needle was advanced using the loss of resistance technique with preservative free normal saline with fluoroscopic guidance. After negative aspiration for CSF and blood, 0.5 cc of Isovue contrast dye was injected revealing the appropriate cervical epidurogram without evidence of intrathecal or intravascular spread. Following this, a 3-mL solution of 40 mg of triamcinolone with 2 cc preservative-free normal saline was slowly injected.  After injection of the medication, as the needle tip was withdrawn, it was flushed with local anesthetic.  The patient was monitored with blood pressure and pulse oximetry machines with the assistance of an RN throughout the procedure.  The patient was alert and responsive to questions throughout the procedure.   The patient tolerated the procedure well and was observed in the post-procedural area.  The patient was dismissed without apparent complications.     DIAGNOSIS:  1. Back and leg pains secondary to lumbar spondylosis and lumbar disc degeneration  2. Central lumbar stenosis  PLAN:  1. Performed a L2-3 interlaminar epidural steroid injection. If this is not helpful, may want to consider a different segment  to inject, particularly the L5-S1 segment.  2. The patient was instructed to call in one week with a progress update.      Kimani Garcia MD  Diplomate of the American Board of Anesthesiology, Pain Medicine

## 2017-08-10 NOTE — IP AVS SNAPSHOT
MRN:[id number]                      After Visit Summary   8/10/2017    Ni Maya    MRN: [id number]           Thank you!     Thank you for choosing [facility] for your care. Our goal is always to provide you with excellent care. Hearing back from our patients is one way we can continue to improve our services. Please take a few minutes to complete the written survey that you may receive in the mail after you visit with us. Thank you!        Patient Information     Date Of Birth          1940        About your hospital stay     You were admitted on:  August 10, 2017 You last received care in the:  [facility] Phase II    You were discharged on:  August 10, 2017       Who to Call     For medical emergencies, please call 911.  For non-urgent questions about your medical care, please call your primary care provider or clinic, [phone]  For questions related to your surgery, please call your surgery clinic        Attending Provider     Provider Specialty    Kimani Garcia MD [facility]       Primary Care Provider Office Phone # Fax #    Violeta Shari Lin [phone]      After Care Instructions     Discharge Instructions       Review outpatient procedure discharge instructions as directed by Provider.                  Further instructions from your care team       Home Care Instructions    [email or web address]  [phone]               Procedure:  Epidural Spine Injection or Joint injection    Activity:    Rest today    Do not work today    Resume normal activity tomorrow    Pain:    You may experience soreness at the injection site for one or two days    You may use an ice pack for 20 minutes every 2 hours for the first 24 hours    You may use a heating pad after the first 24 hours    You may use Tylenol  (acetaminophen) every 4 hours or other pain medicines as directed by your physician    Safety  Sedation medicine, if given may remain active for  many hours.    It is important for the next 24 hours that you do not:    Drive a car    Operate machines or power tools    Consume alcohol, including beer    Sign any important papers or legal documents    You may experience numbness radiating into your legs or arms, (depending on the procedure location)  This numbness may last several hours.  Until the numb sensation returns to normal please use caution in walking, climbing stairs, stepping out of your vehicle, etc.    Common side effects of steroids:  Not everyone will experience corticosteroid side effects. If side effects are experienced they will gradually subside in the 7-10 day period following an injection.    Most common side effects include:    Flushed face and/or chest    Feeling of warmth, particularly in face but could be overall feeling of warmth    Increased blood sugar in diabetic patients    Menstrual irregularities may occur.  If taking hormone based birth control an alternate method of birth control is recommended    Sleep disturbances and/or mood swings are possible    Leg cramps    Please contact us if you have:  Severe pain   Fever more than 101.5 degrees Fahrenheit  Signs of infection (redness, swelling or drainage)      If you have questions, please contact our office at [phone] between the hours of 8:00am and 5:00pm Monday through Friday.  After office hours you can contact the on call provider by dialing [phone].  If you need immediate attention we recommend that you go to a hospital emergency room or dial [phone].                 Pending Results     No orders found from 8/8/2017 to 8/11/2017.            Admission Information     Date & Time Provider Department Dept. Phone    8/10/2017 Kimani Garcia MD [facility] [phone]      Your Vitals Were     Blood Pressure Temperature Respirations             172/110 97.7  F (36.5  C) (Oral) 16         MyChart Information     [employer] lets you send messages to your doctor,  "view your test results, renew your prescriptions, schedule appointments and more. To sign up, go to [email or web address] . Click on \"Log in\" on the left side of the screen, which will take you to the Welcome page. Then click on \"Sign up Now\" on the right side of the page.     You will be asked to enter the access code listed below, as well as some personal information. Please follow the directions to create your username and password.     Your access code is: [email or web address]  Expires: 8/15/2017  9:19 AM     Your access code will  in 90 days. If you need help or a new code, please call your [facility] clinic or [phone].        Care EveryWhere ID     This is your Care EveryWhere ID. This could be used by other organizations to access your [facility] medical records  [id number]        Equal Access to Services     [facility] Center[facility] : Jyoti Gray, ian hernandez, jethro kay, david vidal . So [facility] [phone].    ATENCIÓN: Si habla español, tiene a yuen disposición servicios gratuitos de asistencia lingüística. Ivy al [phone].    We comply with applicable federal civil rights laws and [place] laws. We do not discriminate on the basis of race, color, national origin, age, disability sex, sexual orientation or gender identity.               Review of your medicines      CONTINUE these medicines which have NOT CHANGED        Dose / Directions    ALLEGRA 180 MG tablet   Used for:  Allergic rhinitis, cause unspecified   Generic drug:  fexofenadine        1 TABLET DAILY   Quantity:  90 Tab   Refills:  3       apixaban ANTICOAGULANT 5 MG tablet   Commonly known as:  ELIQUIS   Used for:  Atrial fibrillation, unspecified type (H)        Dose:  5 mg   Take 1 tablet (5 mg) by mouth 2 times daily   Quantity:  90 tablet   Refills:  3       HUMBERTO CONTOUR NEXT test strip   Used for:  Type 2 diabetes mellitus with hyperglycemia (H)   Generic drug:  blood glucose " monitoring        TEST THREE TIMES A DAY   Quantity:  250 each   Refills:  1       Blood Glucose Monitoring Suppl W/DEVICE Kit   Used for:  Type 2 diabetes, HbA1C goal < 8% (H)        Dose:  1 kit   1 kit 3 times daily.   Quantity:  1 kit   Refills:  0       cloNIDine 0.1 MG tablet   Commonly known as:  CATAPRES   Used for:  Essential hypertension with goal blood pressure less than 140/90        Dose:  0.2 mg   Take 2 tablets (0.2 mg) by mouth At Bedtime   Quantity:  270 tablet   Refills:  1       E-Z JECT LANCET MICRO-THIN 33G Misc        Refills:  0       fish oil-omega-3 fatty acids 1000 MG capsule        Dose:  2 g   Take 2 g by mouth daily. 2 caps per day   Refills:  0       levothyroxine 100 MCG tablet   Commonly known as:  SYNTHROID/LEVOTHROID   Used for:  Hypothyroidism, unspecified type        Dose:  100 mcg   Take 1 tablet (100 mcg) by mouth daily   Quantity:  90 tablet   Refills:  3       lisinopril-hydrochlorothiazide 20-25 MG per tablet   Commonly known as:  PRINZIDE/ZESTORETIC   Used for:  Essential hypertension with goal blood pressure less than 140/90        Dose:  1 tablet   Take 1 tablet by mouth daily   Quantity:  90 tablet   Refills:  3       metFORMIN 500 MG 24 hr tablet   Commonly known as:  GLUCOPHAGE-XR   Used for:  Type 2 diabetes mellitus with hyperosmolarity without coma, without long-term current use of insulin (H)        Dose:  500 mg   Take 1 tablet (500 mg) by mouth daily (with dinner)   Quantity:  90 tablet   Refills:  3       metoprolol 100 MG tablet   Commonly known as:  LOPRESSOR   Used for:  Essential hypertension with goal blood pressure less than 140/90        Dose:  100 mg   Take 1 tablet (100 mg) by mouth 2 times daily   Quantity:  180 tablet   Refills:  3       NUTRITIONAL SUPPLEMENT PO        VIBE   Refills:  0       priLOSEC OTC 20 MG tablet   Generic drug:  omeprazole        Dose:  20 mg   Take 1 tablet (20 mg) by mouth daily   Quantity:  30 tablet   Refills:  0                 Protect others around you: Learn how to safely use, store and throw away your medicines at www.disposemymeds.org.             Medication List: This is a list of all your medications and when to take them. Check marks below indicate your daily home schedule. Keep this list as a reference.      Medications           Morning Afternoon Evening Bedtime As Needed    ALLEGRA 180 MG tablet   1 TABLET DAILY   Generic drug:  fexofenadine                                apixaban ANTICOAGULANT 5 MG tablet   Commonly known as:  ELIQUIS   Take 1 tablet (5 mg) by mouth 2 times daily                                HUMBERTO CONTOUR NEXT test strip   TEST THREE TIMES A DAY   Generic drug:  blood glucose monitoring                                Blood Glucose Monitoring Suppl W/DEVICE Kit   1 kit 3 times daily.                                cloNIDine 0.1 MG tablet   Commonly known as:  CATAPRES   Take 2 tablets (0.2 mg) by mouth At Bedtime                                E-Z JECT LANCET MICRO-THIN 33G Misc                                fish oil-omega-3 fatty acids 1000 MG capsule   Take 2 g by mouth daily. 2 caps per day                                levothyroxine 100 MCG tablet   Commonly known as:  SYNTHROID/LEVOTHROID   Take 1 tablet (100 mcg) by mouth daily                                lisinopril-hydrochlorothiazide 20-25 MG per tablet   Commonly known as:  PRINZIDE/ZESTORETIC   Take 1 tablet by mouth daily                                metFORMIN 500 MG 24 hr tablet   Commonly known as:  GLUCOPHAGE-XR   Take 1 tablet (500 mg) by mouth daily (with dinner)                                metoprolol 100 MG tablet   Commonly known as:  LOPRESSOR   Take 1 tablet (100 mg) by mouth 2 times daily                                NUTRITIONAL SUPPLEMENT PO   VIBE                                priLOSEC OTC 20 MG tablet   Take 1 tablet (20 mg) by mouth daily   Generic drug:  omeprazole

## 2017-08-10 NOTE — IP AVS SNAPSHOT
[facility] Phase II    [address]     [place] [address]    Phone:  [phone]                                       After Visit Summary   8/10/2017    Ni Maya    MRN: [id number]           After Visit Summary Signature Page     I have received my discharge instructions, and my questions have been answered. I have discussed any challenges I see with this plan with the nurse or doctor.    ..........................................................................................................................................  Patient/Patient Representative Signature      ..........................................................................................................................................  Patient Representative Print Name and Relationship to Patient    ..................................................               ................................................  Date                                            Time    ..........................................................................................................................................  Reviewed by Signature/Title    ...................................................              ..............................................  Date                                                            Time

## 2017-08-28 DIAGNOSIS — E11.65 TYPE 2 DIABETES MELLITUS WITH HYPERGLYCEMIA (H): ICD-10-CM

## 2017-08-28 NOTE — TELEPHONE ENCOUNTER
HUMBERTO CONTOUR NEXT test strip      Last Written Prescription Date: 1/4/17  Last Fill Quantity: 250,  # refills: 1   Last Office Visit with G, [facility] or [facility] prescribing provider: 8/7/17 MARIA

## 2017-08-31 NOTE — TELEPHONE ENCOUNTER
HUMBERTO CONTOUR NEXT test strip  Prescription approved per [facility] Refill Protocol.    Please assist patient in establishing care with a new provider.     Yulisa Cat RN, BSN

## 2017-08-31 NOTE — TELEPHONE ENCOUNTER
Called patient and informed of message below.  She is checking with insurance and will call back to establish care with another provider.     Apryl Lau, [facility]  August 31, 2017

## 2017-09-11 ENCOUNTER — TELEPHONE (OUTPATIENT)
Dept: FAMILY MEDICINE | Facility: OTHER | Age: 77
End: 2017-09-11

## 2017-09-11 NOTE — TELEPHONE ENCOUNTER
Summary:    Patient is due/failing the following:   BP CHECK    Action needed:   Patient needs nurse only appointment.    Type of outreach:    Sent letter.    Questions for provider review:    None                                                                                                                                    Chen Holder       Chart routed to Care Team .      Panel Management Review      Patient has the following on her problem list:     Diabetes    ASA:     Last A1C  Lab Results   Component Value Date    A1C 7.2 05/17/2017    A1C 5.9 04/03/2017    A1C 7.0 09/14/2016    A1C 6.7 05/13/2016    A1C 6.7 10/19/2015     A1C tested: Passed    Last LDL:    Lab Results   Component Value Date    CHOL 180 05/17/2017     Lab Results   Component Value Date    HDL 49 05/17/2017     Lab Results   Component Value Date     05/17/2017     Lab Results   Component Value Date    TRIG 132 05/17/2017     Lab Results   Component Value Date    CHOLHDLRATIO 3.6 10/19/2015     Lab Results   Component Value Date    NHDL 131 05/17/2017       Is the patient on a Statin? NO             Is the patient on Aspirin? NO        Last three blood pressure readings:  BP Readings from Last 3 Encounters:   08/10/17 (!) 167/98   08/07/17 148/82   07/24/17 128/82        Tobacco History:     History   Smoking Status     Never Smoker   Smokeless Tobacco     Never Used         Hypertension   Last three blood pressure readings:  BP Readings from Last 3 Encounters:   08/10/17 (!) 167/98   08/07/17 148/82   07/24/17 128/82     Blood pressure: FAILED    HTN Guidelines:  Age 18-59 BP range:  Less than 140/90  Age 60-85 with Diabetes:  Less than 140/90  Age 60-85 without Diabetes:  less than 150/90          Composite cancer screening  Chart review shows that this patient is due/due soon for the following None

## 2017-09-14 NOTE — PROGRESS NOTES
SUBJECTIVE:                                                    Ni Maya is a 77 year old female who presents to clinic today for the following health issues:    History of Present Illness   Diabetes:     Frequency of checking blood sugars::  3 times a day    Diabetic concerns::  Other (ever since being on metformin her blood sugars are all over the place )    Hypoglycemia symptoms::  None    Paraesthesia present::  No    Eye Exam in the last year::  Yes    Date of last Diabetic Eye Exam:: next month   Hyperlipidemia:     Low fat/chol diet rating::  Fair    Taking Statins::  No    Lipid Medications or Supplements::  Fish oil/Omega 3, without side effects.  Hypertension:     Outpatient blood pressures:  Are not being checked    Dietary sodium intake::  No added salt diet    General  Patient states that she is in very good health.    Diabetes  Lab Results   Component Value Date    A1C 7.2 05/17/2017    A1C 5.9 04/03/2017    A1C 7.0 09/14/2016    A1C 6.7 05/13/2016    A1C 6.7 10/19/2015     Glucose   Date Value Ref Range Status   08/07/2017 200 (H) 70 - 99 mg/dL Final     Patient has no diabetic concerns at this time.     Hyperlipidemia  Recent Labs   Lab Test  05/17/17   0838  05/13/16   0901  10/19/15   0906  05/11/15   1036   CHOL  180  170  191  156   HDL  49*  51  53  50*   LDL  105*  93  115  88   TRIG  132  130  113  90   CHOLHDLRATIO   --    --   3.6  3.1     Patient states that her lipid levels have been higher and she anticipates she will have to get on a statin drug.      Hypertension  BP Readings from Last 3 Encounters:   09/18/17 (!) 134/92   08/10/17 (!) 167/98   08/07/17 148/82     Patient says that she recently went off of Norvasc and still takes Lisinopril and Metoprolol.     Medications  Patient denies any adverse side effects to her medications, but she does think that she is over-medicated at this point.     Memory  Patient says her  believes she has been having some memory problems  recently.     Problem list and histories reviewed & adjusted, as indicated.  Additional history: as documented    Patient Active Problem List   Diagnosis     Hypothyroidism     Allergic rhinitis     Symptomatic menopausal or female climacteric states     Chronic kidney disease, stage II (mild)     Edema     Obesity     Urethral stricture     MICKIE III (vulvar intraepithelial neoplasia III)     Vulval lesion     CARDIOVASCULAR SCREENING; LDL GOAL LESS THAN 130     Health Care Home     Type 2 diabetes, HbA1C goal < 8% (H)     Hyperlipidemia with target LDL less than 100     HTN, goal below 140/90     Hypothyroidism, unspecified hypothyroidism type     Type 2 diabetes mellitus with hyperglycemia (H)     ACP (advance care planning)     Essential hypertension with goal blood pressure less than 140/90     Type 2 diabetes mellitus with hyperglycemia, without long-term current use of insulin (H)     Past Surgical History:   Procedure Laterality Date     BIOPSY VULVA/PERINEUM,ADDNL LESN  9/18/09    Wide -excision of MICKIE III- right labia      C LAPAROSCOPY, SURGICAL; W/ VAGINAL HYSTERECTOMY W/WO REMOVAL OVARY(S)/TUBES  1984    for bleeding     C LIGATE FALLOPIAN TUBE,POSTPARTUM  1978    Tubal Ligation     COLONOSCOPY  9/13/2013    Procedure: COLONOSCOPY;  Colonoscopy;  Surgeon: Yanick Ramsey MD;  Location: [facility]     HC ANGIOGRAPHY ARCH  4-3-98     HC BIOPSY OF BREAST, OPEN INCISIONAL  1960    Benign     HC COLONOSCOPY THRU STOMA, DIAGNOSTIC  4-24-98    Diverticuli - due in 2008     HC ERCP W SPHINCTEROTOMY  1996 6/2/96 and 8/30/96     HC REDUCTION OF LARGE BREAST  1988     HC REMOVAL GALLBLADDER  1995      UGI ENDOSCOPY DIAG W OR W/O BRUSH/WASH  7-     INJECT EPIDURAL LUMBAR N/A 8/10/2017    Procedure: INJECT EPIDURAL LUMBAR;  Lumbar 2-3 Epidural Steroid Injection;  Surgeon: Kimani Garcia MD;  Location:  OR       Social History   Substance Use Topics     Smoking status: Never Smoker     Smokeless tobacco:  Never Used     Alcohol use No     Family History   Problem Relation Age of Onset     Cardiovascular Father      Aortic aneurysm     Hypertension Father      Hypertension Mother      GASTROINTESTINAL DISEASE Mother      GI Bleed     Lipids Sister      Hypertension Sister      Genitourinary Problems Sister      Allergies Sister      CANCER Brother      Lung     Alcohol/Drug Brother      Connective Tissue Disorder Son      Down Syndrome     Respiratory Son      Pneumonia     CANCER Maternal Grandmother      Stomach     Hypertension Maternal Grandfather      Allergies Daughter          ROS:  Constitutional, HEENT, cardiovascular, pulmonary, GI, , musculoskeletal, neuro, skin, endocrine and psych systems are negative, except as in HPI or otherwise noted.     This document serves as a record of the services and decisions personally performed and made by Apryl Olvera MD. It was created on her behalf by Cassius Minaya, a trained medical scribe. The creation of this document is based the provider's statements to the medical scribe.  Cassius Minaya, September 18, 2017 11:30 AM     OBJECTIVE:                                                    BP (!) 134/92  Pulse 72  Temp 97.4  F (36.3  C) (Temporal)  Resp 16  SpO2 98%  There is no height or weight on file to calculate BMI.     GENERAL: healthy, alert, well nourished, well hydrated, no distress  RESP: lungs clear to auscultation - no rales, no rhonchi, no wheezes  CV: regular rates and rhythm, normal S1 S2, no S3 or S4 and no murmur, no click or rub  SKIN: no suspicious lesions, no  To visible skin  PSYCH: Alert and oriented times 3; speech- coherent , normal rate and volume; able to articulate logical thoughts, able to abstract reason, no tangential thoughts, no hallucinations or delusions, affect- normal    No results found for this or any previous visit (from the past 24 hour(s)).     ASSESSMENT/PLAN:                                                        ICD-10-CM     1. Type 2 diabetes mellitus with hyperglycemia, without long-term current use of insulin (H) E11.65 lisinopril (PRINIVIL,ZESTRIL) 30 MG tablet     Hemoglobin A1c     Lipid panel reflex to direct LDL   2. Screening for diabetic retinopathy Z13.5    3. Screening for diabetic peripheral neuropathy Z13.89    4. Need for prophylactic vaccination and inoculation against influenza Z23 Vaccine Administration, Initial [46508]     Vaccine Administration, Initial [56521]     FLU VACCINE, INCREASED ANTIGEN, PRESV FREE, AGE 65+ [53274]     CANCELED: FLU VACCINE, INCREASED ANTIGEN, PRESV FREE, AGE 65+ [61189]   5. Chronic kidney disease, stage II (mild) N18.2 lisinopril (PRINIVIL,ZESTRIL) 30 MG tablet   6. Essential hypertension with goal blood pressure less than 140/90 I10 lisinopril (PRINIVIL,ZESTRIL) 30 MG tablet     hydrochlorothiazide (HYDRODIURIL) 25 MG tablet     Discussed medication adjustments to control blood pressure, which has recently been high, and hyperlipidemia. Information for financial assistance for more expensive medications was given to the patient -- Eliquis. Lisinopril dosage was changed to try to reduce blood pressure. Order placed for Lisinopril 30 mg and hydrochlorothiazide 25 mg (30 mg Lisinopril not offered in combination so this medication needed to be ordered separate). Medication direction, dosage, and side effects discussed with patient. Relaxation techniques to reduce diastolic blood pressure were also discussed with the patient.     Patient was offered to do her diabetic visit early today, but she opts to return in November for this visit.      Vision screening was discussed and patient was given a referral to ophthalmology for a diabetic retinopathy screening.     Patient was offered an influenza vaccination and opted to receive it today. Side effects of vaccination were acknowledged, and patient consented to administration. Flu vaccination administered in the clinic.     Patient Instructions    Follow up in November for diabetic check.     The information in this document, created by the medical scribe for me, accurately reflects the services I personally performed and the decisions made by me. I have reviewed and approved this document for accuracy.   MD Apryl Henao MD, MD  [facility]

## 2017-09-18 ENCOUNTER — OFFICE VISIT (OUTPATIENT)
Dept: FAMILY MEDICINE | Facility: OTHER | Age: 77
End: 2017-09-18
Payer: COMMERCIAL

## 2017-09-18 VITALS
HEART RATE: 72 BPM | DIASTOLIC BLOOD PRESSURE: 92 MMHG | SYSTOLIC BLOOD PRESSURE: 134 MMHG | OXYGEN SATURATION: 98 % | TEMPERATURE: 97.4 F | RESPIRATION RATE: 16 BRPM

## 2017-09-18 DIAGNOSIS — N18.2 CHRONIC KIDNEY DISEASE, STAGE II (MILD): ICD-10-CM

## 2017-09-18 DIAGNOSIS — Z23 NEED FOR PROPHYLACTIC VACCINATION AND INOCULATION AGAINST INFLUENZA: ICD-10-CM

## 2017-09-18 DIAGNOSIS — Z13.89 SCREENING FOR DIABETIC PERIPHERAL NEUROPATHY: ICD-10-CM

## 2017-09-18 DIAGNOSIS — E11.65 TYPE 2 DIABETES MELLITUS WITH HYPERGLYCEMIA, WITHOUT LONG-TERM CURRENT USE OF INSULIN (H): Primary | ICD-10-CM

## 2017-09-18 DIAGNOSIS — Z13.5 SCREENING FOR DIABETIC RETINOPATHY: ICD-10-CM

## 2017-09-18 DIAGNOSIS — E78.5 HYPERLIPIDEMIA WITH TARGET LDL LESS THAN 100: ICD-10-CM

## 2017-09-18 DIAGNOSIS — I10 ESSENTIAL HYPERTENSION WITH GOAL BLOOD PRESSURE LESS THAN 140/90: ICD-10-CM

## 2017-09-18 PROCEDURE — 90662 IIV NO PRSV INCREASED AG IM: CPT | Performed by: FAMILY MEDICINE

## 2017-09-18 PROCEDURE — G0008 ADMIN INFLUENZA VIRUS VAC: HCPCS | Performed by: FAMILY MEDICINE

## 2017-09-18 PROCEDURE — 99214 OFFICE O/P EST MOD 30 MIN: CPT | Mod: 25 | Performed by: FAMILY MEDICINE

## 2017-09-18 RX ORDER — LISINOPRIL 30 MG/1
30 TABLET ORAL DAILY
Qty: 30 TABLET | Refills: 1 | Status: SHIPPED | OUTPATIENT
Start: 2017-09-18 | End: 2017-11-01

## 2017-09-18 RX ORDER — HYDROCHLOROTHIAZIDE 25 MG/1
25 TABLET ORAL DAILY
Qty: 30 TABLET | Refills: 1 | Status: SHIPPED | OUTPATIENT
Start: 2017-09-18 | End: 2017-11-01

## 2017-09-18 ASSESSMENT — PAIN SCALES - GENERAL: PAINLEVEL: NO PAIN (0)

## 2017-09-18 NOTE — MR AVS SNAPSHOT
"              After Visit Summary   9/18/2017    Ni Maya    MRN: [id number]           Patient Information     Date Of Birth          1940        Visit Information        Provider Department      9/18/2017 11:15 AM Apryl Olvera MD [facility]        Today's Diagnoses     Type 2 diabetes mellitus with hyperglycemia, without long-term current use of insulin (H)    -  1    Screening for diabetic retinopathy        Screening for diabetic peripheral neuropathy        Need for prophylactic vaccination and inoculation against influenza        Chronic kidney disease, stage II (mild)        Essential hypertension with goal blood pressure less than 140/90          Care Instructions    Follow up in November for diabetic check.           Follow-ups after your visit        Who to contact     If you have questions or need follow up information about today's clinic visit or your schedule please contact [facility] directly at [phone].  Normal or non-critical lab and imaging results will be communicated to you by Singly[employer], letter or phone within 4 business days after the clinic has received the results. If you do not hear from us within 7 days, please contact the clinic through Singly[employer] or phone. If you have a critical or abnormal lab result, we will notify you by phone as soon as possible.  Submit refill requests through [employer] or call your pharmacy and they will forward the refill request to us. Please allow 3 business days for your refill to be completed.          Additional Information About Your Visit        MyChart Information     [employer] lets you send messages to your doctor, view your test results, renew your prescriptions, schedule appointments and more. To sign up, go to [email or web address] . Click on \"Log in\" on the left side of the screen, which will take you to the Welcome page. Then click on \"Sign up Now\" on the right side of the page.     You will be asked to " enter the access code listed below, as well as some personal information. Please follow the directions to create your username and password.     Your access code is: [email or web address]  Expires: 2017 12:21 PM     Your access code will  in 90 days. If you need help or a new code, please call your [place] clinic or [phone].        Care EveryWhere ID     This is your Care EveryWhere ID. This could be used by other organizations to access your [place] medical records  [id number]        Your Vitals Were     Pulse Temperature Respirations Pulse Oximetry          72 97.4  F (36.3  C) (Temporal) 16 98%         Blood Pressure from Last 3 Encounters:   17 (!) 134/92   08/10/17 (!) 167/98   17 148/82    Weight from Last 3 Encounters:   17 189 lb 14.4 oz (86.1 kg)   17 190 lb 9.6 oz (86.5 kg)   17 189 lb 6.4 oz (85.9 kg)              We Performed the Following     FLU VACCINE, INCREASED ANTIGEN, PRESV FREE, AGE 65+ [78354]     Vaccine Administration, Initial [82158]          Today's Medication Changes          These changes are accurate as of: 17 12:21 PM.  If you have any questions, ask your nurse or doctor.               Start taking these medicines.        Dose/Directions    hydrochlorothiazide 25 MG tablet   Commonly known as:  HYDRODIURIL   Used for:  Essential hypertension with goal blood pressure less than 140/90   Started by:  Apryl Olvera MD        Dose:  25 mg   Take 1 tablet (25 mg) by mouth daily   Quantity:  30 tablet   Refills:  1       lisinopril 30 MG tablet   Commonly known as:  PRINIVIL,ZESTRIL   Used for:  Chronic kidney disease, stage II (mild), Type 2 diabetes mellitus with hyperglycemia, without long-term current use of insulin (H), Essential hypertension with goal blood pressure less than 140/90   Started by:  Apryl Olvera MD        Dose:  30 mg   Take 1 tablet (30 mg) by mouth daily   Quantity:  30 tablet   Refills:  1         Stop taking these  medicines if you haven't already. Please contact your care team if you have questions.     lisinopril-hydrochlorothiazide 20-25 MG per tablet   Commonly known as:  PRINZIDE/ZESTORETIC   Stopped by:  Apryl Olvera MD                Where to get your medicines      These medications were sent to [id number] - [place], [place] - [address]  [address], [place] [address]     Phone:  [phone]     hydrochlorothiazide 25 MG tablet    lisinopril 30 MG tablet                Primary Care Provider Office Phone # Fax #    Violeta Shari Lin [phone]       XXX RESIGNED XXX [address] [place] [address]        Equal Access to Services     [facility]: Hadii dimitry ku hadasho Soomaali, waaxda luqadaha, qaybta kaalmada adeegyada, waxay idiin hayhanhn faviola vidal . So [facility] [phone].    ATENCIÓN: Si habla español, tiene a yuen disposición servicios gratuitos de asistencia lingüística. [facility] [phone].    We comply with applicable federal civil rights laws and [place] laws. We do not discriminate on the basis of race, color, national origin, age, disability sex, sexual orientation or gender identity.            Thank you!     Thank you for choosing [facility]  for your care. Our goal is always to provide you with excellent care. Hearing back from our patients is one way we can continue to improve our services. Please take a few minutes to complete the written survey that you may receive in the mail after your visit with us. Thank you!             Your Updated Medication List - Protect others around you: Learn how to safely use, store and throw away your medicines at www.disposemymeds.org.          This list is accurate as of: 9/18/17 12:21 PM.  Always use your most recent med list.                   Brand Name Dispense Instructions for use Diagnosis    ALLEGRA 180 MG tablet   Generic drug:  fexofenadine     90 Tab    1 TABLET DAILY     Allergic rhinitis, cause unspecified       apixaban ANTICOAGULANT 5 MG tablet    ELIQUIS    90 tablet    Take 1 tablet (5 mg) by mouth 2 times daily    Atrial fibrillation, unspecified type (H)       Blood Glucose Monitoring Suppl W/DEVICE Kit     1 kit    1 kit 3 times daily.    Type 2 diabetes, HbA1C goal < 8% (H)       blood glucose monitoring test strip    HUMBERTO CONTOUR NEXT    250 each    TEST THREE TIMES A DAY    Type 2 diabetes mellitus with hyperglycemia (H)       cloNIDine 0.1 MG tablet    CATAPRES    270 tablet    Take 2 tablets (0.2 mg) by mouth At Bedtime    Essential hypertension with goal blood pressure less than 140/90       E-Z JECT LANCET MICRO-THIN 33G Misc           fish oil-omega-3 fatty acids 1000 MG capsule      Take 2 g by mouth daily. 2 caps per day        hydrochlorothiazide 25 MG tablet    HYDRODIURIL    30 tablet    Take 1 tablet (25 mg) by mouth daily    Essential hypertension with goal blood pressure less than 140/90       levothyroxine 100 MCG tablet    SYNTHROID/LEVOTHROID    90 tablet    Take 1 tablet (100 mcg) by mouth daily    Hypothyroidism, unspecified type       lisinopril 30 MG tablet    PRINIVIL,ZESTRIL    30 tablet    Take 1 tablet (30 mg) by mouth daily    Chronic kidney disease, stage II (mild), Type 2 diabetes mellitus with hyperglycemia, without long-term current use of insulin (H), Essential hypertension with goal blood pressure less than 140/90       metFORMIN 500 MG 24 hr tablet    GLUCOPHAGE-XR    90 tablet    Take 1 tablet (500 mg) by mouth daily (with dinner)    Type 2 diabetes mellitus with hyperosmolarity without coma, without long-term current use of insulin (H)       metoprolol 100 MG tablet    LOPRESSOR    180 tablet    Take 1 tablet (100 mg) by mouth 2 times daily    Essential hypertension with goal blood pressure less than 140/90       NUTRITIONAL SUPPLEMENT PO      VIBE        priLOSEC OTC 20 MG tablet   Generic drug:  omeprazole     30 tablet    Take 1  tablet (20 mg) by mouth daily

## 2017-09-18 NOTE — NURSING NOTE
"Chief Complaint   Patient presents with     Hypertension     Panel Management     terrance, mychart, flu, fall risk, eye exam, foot exam       Initial BP (!) 134/92  Pulse 72  Temp 97.4  F (36.3  C) (Temporal)  Resp 16  SpO2 98% Estimated body mass index is 31.6 kg/(m^2) as calculated from the following:    Height as of 8/7/17: 5' 5\" (1.651 m).    Weight as of 8/7/17: 189 lb 14.4 oz (86.1 kg).  Medication Reconciliation: complete   Abigail Ferrer MA      "

## 2017-09-18 NOTE — PROGRESS NOTES
Injectable Influenza Immunization Documentation    1.  Is the person to be vaccinated sick today? No    2. Does the person to be vaccinated have an allergy to a component   of the vaccine? No    3. Has the person to be vaccinated ever had a serious reaction   to influenza vaccine in the past? No    4. Has the person to be vaccinated ever had Guillain-Barré syndrome? No    Form completed by Ni Maya

## 2017-09-18 NOTE — NURSING NOTE
Prior to injection verified patient identity using patient's name and date of birth.Patient instructed to remain in clinic for 20 minutes afterwards, and to report any adverse reaction to me immediately. Rupali Hayden CMA

## 2017-10-18 ENCOUNTER — TELEPHONE (OUTPATIENT)
Dept: FAMILY MEDICINE | Facility: OTHER | Age: 77
End: 2017-10-18

## 2017-10-26 NOTE — PROGRESS NOTES
SUBJECTIVE:                                                    Ni Maya is a 77 year old female who presents to clinic today for the following health issues:    HPI    Diabetes Follow-up      Patient is checking blood sugars: 3 times a day after each meal    Diabetic concerns: None     Symptoms of hypoglycemia (low blood sugar): none     Paresthesias (numbness or burning in feet) or sores: Yes rash on legs     Date of last diabetic eye exam: 10/2017  Lab Results   Component Value Date    A1C 6.6 11/01/2017    A1C 7.2 05/17/2017    A1C 5.9 04/03/2017    A1C 7.0 09/14/2016    A1C 6.7 05/13/2016       Hyperlipidemia Follow-Up      Rate your low fat/cholesterol diet?: unsure    Taking statin?  No    Other lipid medications/supplements?:  Fish oil/Omega 3  Recent Labs   Lab Test  05/17/17   0838  05/13/16   0901  10/19/15   0906  05/11/15   1036   CHOL  180  170  191  156   HDL  49*  51  53  50*   LDL  105*  93  115  88   TRIG  132  130  113  90   CHOLHDLRATIO   --    --   3.6  3.1       Hypertension Follow-up      Outpatient blood pressures are being checked at home.  Results are around 150/110.    Low Salt Diet: no added salt  BP Readings from Last 3 Encounters:   11/01/17 144/84   09/18/17 (!) 134/92   08/10/17 (!) 167/98     Pt has concerns that her Eliquis is causing bruising on her feet, reports not liking the medication.     Skin: Pt reports getting a rash on her mid-calf from her socks.     Back: Pt has had continuing back pain that radiates to her right side. She has had injections in the past which brought some relief.     HENT: Pt reports constant rhinorrhea. She feels embarrassed that she constantly has to carry around tissues.     Problem list and histories reviewed & adjusted, as indicated.  Additional history: as documented    Patient Active Problem List   Diagnosis     Hypothyroidism     Allergic rhinitis     Symptomatic menopausal or female climacteric states     Chronic kidney disease, stage  II (mild)     Edema     Obesity     Urethral stricture     MICKIE III (vulvar intraepithelial neoplasia III)     Vulval lesion     CARDIOVASCULAR SCREENING; LDL GOAL LESS THAN 130     Health Care Home     Type 2 diabetes, HbA1C goal < 8% (H)     Hyperlipidemia with target LDL less than 100     HTN, goal below 150/90     Hypothyroidism, unspecified hypothyroidism type     Type 2 diabetes mellitus with hyperglycemia (H)     ACP (advance care planning)     Essential hypertension with goal blood pressure less than 140/90     Type 2 diabetes mellitus with hyperglycemia, without long-term current use of insulin (H)     Past Surgical History:   Procedure Laterality Date     BIOPSY VULVA/PERINEUM,ADDNL LESN  9/18/09    Wide -excision of MICKIE III- right labia      C LAPAROSCOPY, SURGICAL; W/ VAGINAL HYSTERECTOMY W/WO REMOVAL OVARY(S)/TUBES  1984    for bleeding     C LIGATE FALLOPIAN TUBE,POSTPARTUM  1978    Tubal Ligation     COLONOSCOPY  9/13/2013    Procedure: COLONOSCOPY;  Colonoscopy;  Surgeon: Yanick Ramsey MD;  Location: [facility]     HC ANGIOGRAPHY ARCH  4-3-98     HC BIOPSY OF BREAST, OPEN INCISIONAL  1960    Benign     HC COLONOSCOPY THRU STOMA, DIAGNOSTIC  4-24-98    Diverticuli - due in 2008     HC ERCP W SPHINCTEROTOMY  1996    6/2/96 and 8/30/96     HC REDUCTION OF LARGE BREAST  1988     HC REMOVAL GALLBLADDER  1995     HC UGI ENDOSCOPY DIAG W OR W/O BRUSH/WASH  7-     INJECT EPIDURAL LUMBAR N/A 8/10/2017    Procedure: INJECT EPIDURAL LUMBAR;  Lumbar 2-3 Epidural Steroid Injection;  Surgeon: Kimani Garcia MD;  Location: [facility]       Social History   Substance Use Topics     Smoking status: Never Smoker     Smokeless tobacco: Never Used     Alcohol use No     Family History   Problem Relation Age of Onset     Cardiovascular Father      Aortic aneurysm     Hypertension Father      Hypertension Mother      GASTROINTESTINAL DISEASE Mother      GI Bleed     Lipids Sister      Hypertension Sister       Genitourinary Problems Sister      Allergies Sister      CANCER Brother      Lung     Alcohol/Drug Brother      Connective Tissue Disorder Son      Down Syndrome     Respiratory Son      Pneumonia     CANCER Maternal Grandmother      Stomach     Hypertension Maternal Grandfather      Allergies Daughter          ROS:  Constitutional, HEENT, cardiovascular, pulmonary, GI, , musculoskeletal, neuro, skin, endocrine and psych systems are negative, except as in HPI or otherwise noted.     This document serves as a record of the services and decisions personally performed and made by Apryl Olvera MD. It was created on her behalf by Annette Villeda, a trained medical scribe. The creation of this document is based the provider's statements to the medical scribe.  Annette Villeda, November 1, 2017 9:50 AM    OBJECTIVE:                                                    /84  Pulse 60  Temp 97  F (36.1  C) (Temporal)  Resp 12  Wt 184 lb (83.5 kg)  BMI 30.62 kg/m2  Body mass index is 30.62 kg/(m^2).   GENERAL: healthy, alert, well nourished, well hydrated, no distress, overweight  HENT: ear canals- normal; TMs- normal; Nose- normal; Mouth- no ulcers, no lesions  NECK: no tenderness, no adenopathy, no asymmetry, no masses, no stiffness; thyroid- normal to palpation  RESP: lungs clear to auscultation - no rales, no rhonchi, no wheezes  CV: regular rates. Irregular rhythm. Normal S1 S2, no S3 or S4 and no murmur, no click or rub, circulation in extremities normal  ABDOMEN: soft, no tenderness, no  hepatosplenomegaly, no masses, normal bowel sounds  SKIN: hematoma on medial aspect of right foot, otherwise no suspicious lesions, no rashes to visible skin  Diabetic foot exam: normal DP and PT pulses, no trophic changes or ulcerative lesions, reduced sensation at 2nd R toe, otherwise normal sensory exam. Calluses present on balls of feet where sensation is reduced.   PSYCH: Alert and oriented times 3; speech- coherent , normal rate  and volume; able to articulate logical thoughts, able to abstract reason, no tangential thoughts, no hallucinations or delusions, affect- normal    Diagnostic test results:  Results for orders placed or performed in visit on 11/01/17 (from the past 24 hour(s))   Hemoglobin A1c   Result Value Ref Range    Hemoglobin A1C 6.6 (H) 4.3 - 6.0 %          ASSESSMENT/PLAN:                                                        ICD-10-CM    1. Type 2 diabetes mellitus with hyperglycemia, without long-term current use of insulin (H) E11.65 Hemoglobin A1c     Lipid panel reflex to direct LDL     lisinopril (PRINIVIL,ZESTRIL) 30 MG tablet   2. At risk for falling Z91.81    3. Screening for diabetic retinopathy Z13.5    4. Screening for diabetic peripheral neuropathy Z13.89 FOOT EXAM  NO CHARGE [28435.114]   5. Essential hypertension with goal blood pressure less than 140/90 I10 Basic metabolic panel  (Ca, Cl, CO2, Creat, Gluc, K, Na, BUN)     lisinopril (PRINIVIL,ZESTRIL) 30 MG tablet     cloNIDine (CATAPRES) 0.1 MG tablet     hydrochlorothiazide (HYDRODIURIL) 25 MG tablet   6. Chronic kidney disease, stage II (mild) N18.2 lisinopril (PRINIVIL,ZESTRIL) 30 MG tablet   7. Venous stasis dermatitis of both lower extremities I87.2    8. Chronic atrial fibrillation (H) I48.2      HTN: Discussed stroke and bleeding risk for pt and reduction with certain medications, Aspirin, Coumadin, Eliquis. Advised pt that Eliquis is the preferred medication for her risk category. Re-checked blood pressure. Advised to avoid added salt, ham, preserved meats.     Cardio: Follow-up in 6 months for heart check-up.      Skin: Advised pt that latex band in her socks may be causing the rash on her legs, recommended to switch to diabetic socks.    Back: Advised Pt that she should continue to drink water and exercise. May refer to surgeon for disk surgery.    Medications: Reviewed and refilled medications.     Patient Instructions   Follow-up in 6 months for  Diabetic and BP Recheck      The information in this document, created by the medical scribe for me, accurately reflects the services I personally performed and the decisions made by me. I have reviewed and approved this document for accuracy.   MD Apryl Henao MD, MD  [facility]

## 2017-11-01 ENCOUNTER — OFFICE VISIT (OUTPATIENT)
Dept: FAMILY MEDICINE | Facility: OTHER | Age: 77
End: 2017-11-01
Payer: COMMERCIAL

## 2017-11-01 VITALS
DIASTOLIC BLOOD PRESSURE: 84 MMHG | RESPIRATION RATE: 12 BRPM | HEART RATE: 60 BPM | SYSTOLIC BLOOD PRESSURE: 144 MMHG | WEIGHT: 184 LBS | TEMPERATURE: 97 F | BODY MASS INDEX: 30.62 KG/M2

## 2017-11-01 DIAGNOSIS — Z13.89 SCREENING FOR DIABETIC PERIPHERAL NEUROPATHY: ICD-10-CM

## 2017-11-01 DIAGNOSIS — I48.20 CHRONIC ATRIAL FIBRILLATION (H): ICD-10-CM

## 2017-11-01 DIAGNOSIS — E11.65 TYPE 2 DIABETES MELLITUS WITH HYPERGLYCEMIA, WITHOUT LONG-TERM CURRENT USE OF INSULIN (H): Primary | ICD-10-CM

## 2017-11-01 DIAGNOSIS — I10 ESSENTIAL HYPERTENSION WITH GOAL BLOOD PRESSURE LESS THAN 140/90: ICD-10-CM

## 2017-11-01 DIAGNOSIS — Z13.5 SCREENING FOR DIABETIC RETINOPATHY: ICD-10-CM

## 2017-11-01 DIAGNOSIS — Z91.81 AT RISK FOR FALLING: ICD-10-CM

## 2017-11-01 DIAGNOSIS — I87.2 VENOUS STASIS DERMATITIS OF BOTH LOWER EXTREMITIES: ICD-10-CM

## 2017-11-01 DIAGNOSIS — N18.2 CHRONIC KIDNEY DISEASE, STAGE II (MILD): ICD-10-CM

## 2017-11-01 LAB
ANION GAP SERPL CALCULATED.3IONS-SCNC: 7 MMOL/L (ref 3–14)
BUN SERPL-MCNC: 15 MG/DL (ref 7–30)
CALCIUM SERPL-MCNC: 8.9 MG/DL (ref 8.5–10.1)
CHLORIDE SERPL-SCNC: 98 MMOL/L (ref 94–109)
CHOLEST SERPL-MCNC: 179 MG/DL
CO2 SERPL-SCNC: 32 MMOL/L (ref 20–32)
CREAT SERPL-MCNC: 1.01 MG/DL (ref 0.52–1.04)
GFR SERPL CREATININE-BSD FRML MDRD: 53 ML/MIN/1.7M2
GLUCOSE SERPL-MCNC: 130 MG/DL (ref 70–99)
HBA1C MFR BLD: 6.6 % (ref 4.3–6)
HDLC SERPL-MCNC: 52 MG/DL
LDLC SERPL CALC-MCNC: 101 MG/DL
NONHDLC SERPL-MCNC: 127 MG/DL
POTASSIUM SERPL-SCNC: 3.5 MMOL/L (ref 3.4–5.3)
SODIUM SERPL-SCNC: 137 MMOL/L (ref 133–144)
TRIGL SERPL-MCNC: 130 MG/DL

## 2017-11-01 PROCEDURE — 80061 LIPID PANEL: CPT | Performed by: FAMILY MEDICINE

## 2017-11-01 PROCEDURE — 99207 C FOOT EXAM  NO CHARGE: CPT | Mod: 25 | Performed by: FAMILY MEDICINE

## 2017-11-01 PROCEDURE — 83036 HEMOGLOBIN GLYCOSYLATED A1C: CPT | Performed by: FAMILY MEDICINE

## 2017-11-01 PROCEDURE — 36415 COLL VENOUS BLD VENIPUNCTURE: CPT | Performed by: FAMILY MEDICINE

## 2017-11-01 PROCEDURE — 80048 BASIC METABOLIC PNL TOTAL CA: CPT | Performed by: FAMILY MEDICINE

## 2017-11-01 PROCEDURE — 99214 OFFICE O/P EST MOD 30 MIN: CPT | Performed by: FAMILY MEDICINE

## 2017-11-01 RX ORDER — HYDROCHLOROTHIAZIDE 25 MG/1
25 TABLET ORAL DAILY
Qty: 90 TABLET | Refills: 3 | Status: SHIPPED | OUTPATIENT
Start: 2017-11-01 | End: 2018-11-08

## 2017-11-01 RX ORDER — LISINOPRIL 30 MG/1
30 TABLET ORAL DAILY
Qty: 90 TABLET | Refills: 3 | Status: SHIPPED | OUTPATIENT
Start: 2017-11-01 | End: 2018-11-08

## 2017-11-01 RX ORDER — CLONIDINE HYDROCHLORIDE 0.1 MG/1
0.2 TABLET ORAL AT BEDTIME
Qty: 270 TABLET | Refills: 1 | Status: SHIPPED | OUTPATIENT
Start: 2017-11-01 | End: 2017-12-19

## 2017-11-01 NOTE — MR AVS SNAPSHOT
"              After Visit Summary   11/1/2017    Ni Maya    MRN: [id number]           Patient Information     Date Of Birth          1940        Visit Information        Provider Department      11/1/2017 9:00 AM Apryl Olvera MD [facility]        Today's Diagnoses     Type 2 diabetes mellitus with hyperglycemia, without long-term current use of insulin (H)    -  1    At risk for falling        Screening for diabetic retinopathy        Screening for diabetic peripheral neuropathy        Essential hypertension with goal blood pressure less than 140/90        Chronic kidney disease, stage II (mild)        Venous stasis dermatitis of both lower extremities        Chronic atrial fibrillation (H)          Care Instructions    Follow-up in 6 months for Diabetic Recheck          Follow-ups after your visit        Follow-up notes from your care team     Return in about 6 months (around 5/1/2018).      Who to contact     If you have questions or need follow up information about today's clinic visit or your schedule please contact [facility] directly at [phone].  Normal or non-critical lab and imaging results will be communicated to you by [employer], letter or phone within 4 business days after the clinic has received the results. If you do not hear from us within 7 days, please contact the clinic through [employer] or phone. If you have a critical or abnormal lab result, we will notify you by phone as soon as possible.  Submit refill requests through [employer] or call your pharmacy and they will forward the refill request to us. Please allow 3 business days for your refill to be completed.          Additional Information About Your Visit        MyChart Information     [employer] lets you send messages to your doctor, view your test results, renew your prescriptions, schedule appointments and more. To sign up, go to [email or web address] . Click on \"Log in\" on the left side of " "the screen, which will take you to the Welcome page. Then click on \"Sign up Now\" on the right side of the page.     You will be asked to enter the access code listed below, as well as some personal information. Please follow the directions to create your username and password.     Your access code is: [email or web address]  Expires: 2017 12:21 PM     Your access code will  in 90 days. If you need help or a new code, please call your [facility] or [phone].        Care EveryWhere ID     This is your Care EveryWhere ID. This could be used by other organizations to access your [place] medical records  [id number]        Your Vitals Were     Pulse Temperature Respirations BMI (Body Mass Index)          60 97  F (36.1  C) (Temporal) 12 30.62 kg/m2         Blood Pressure from Last 3 Encounters:   17 144/84   17 (!) 134/92   08/10/17 (!) 167/98    Weight from Last 3 Encounters:   17 184 lb (83.5 kg)   17 189 lb 14.4 oz (86.1 kg)   17 190 lb 9.6 oz (86.5 kg)              We Performed the Following     Basic metabolic panel  (Ca, Cl, CO2, Creat, Gluc, K, Na, BUN)     FOOT EXAM  NO CHARGE [22766.114]     Hemoglobin A1c     Lipid panel reflex to direct LDL          Today's Medication Changes          These changes are accurate as of: 17 10:10 AM.  If you have any questions, ask your nurse or doctor.               Stop taking these medicines if you haven't already. Please contact your care team if you have questions.     priLOSEC OTC 20 MG tablet   Generic drug:  omeprazole   Stopped by:  Apryl Olvera MD                Where to get your medicines      These medications were sent to [facility] # - [place], [place] - [address] [address]  [address] [address] [address]     Phone:  [phone]     cloNIDine 0.1 MG tablet    hydrochlorothiazide 25 MG tablet    lisinopril 30 MG tablet                Primary Care Provider Office Phone # Fax #    Apryl Olvera MD " [phone]       [facility] [address] [facility] [address]        Equal Access to Services     BENITEZ GIL : Hadii aad ku hadgypsyfrancesco Marina, waaxda luqadaha, qaybta kaalmada eliza, david devinin hayaaemma lovingphill garcia laRowenamarisela lindsay. So [facility] [phone].    ATENCIÓN: Si habla español, tiene a yuen disposición servicios gratuitos de asistencia lingüística. Ivy al [phone].    We comply with applicable federal civil rights laws and [place] laws. We do not discriminate on the basis of race, color, national origin, age, disability, sex, sexual orientation, or gender identity.            Thank you!     Thank you for choosing [facility]  for your care. Our goal is always to provide you with excellent care. Hearing back from our patients is one way we can continue to improve our services. Please take a few minutes to complete the written survey that you may receive in the mail after your visit with us. Thank you!             Your Updated Medication List - Protect others around you: Learn how to safely use, store and throw away your medicines at www.disposemymeds.org.          This list is accurate as of: 11/1/17 10:10 AM.  Always use your most recent med list.                   Brand Name Dispense Instructions for use Diagnosis    ALLEGRA 180 MG tablet   Generic drug:  fexofenadine     90 Tab    1 TABLET DAILY    Allergic rhinitis, cause unspecified       apixaban ANTICOAGULANT 5 MG tablet    ELIQUIS    90 tablet    Take 1 tablet (5 mg) by mouth 2 times daily    Atrial fibrillation, unspecified type (H)       Blood Glucose Monitoring Suppl W/DEVICE Kit     1 kit    1 kit 3 times daily.    Type 2 diabetes, HbA1C goal < 8% (H)       blood glucose monitoring test strip    HUMBERTO CONTOUR NEXT    250 each    TEST THREE TIMES A DAY    Type 2 diabetes mellitus with hyperglycemia (H)       cloNIDine 0.1 MG tablet    CATAPRES    270 tablet    Take 2 tablets (0.2 mg) by mouth At Bedtime    Essential  hypertension with goal blood pressure less than 140/90       E-Z JECT LANCET MICRO-THIN 33G Misc           fish oil-omega-3 fatty acids 1000 MG capsule      Take 2 g by mouth daily. 2 caps per day        hydrochlorothiazide 25 MG tablet    HYDRODIURIL    90 tablet    Take 1 tablet (25 mg) by mouth daily    Essential hypertension with goal blood pressure less than 140/90       levothyroxine 100 MCG tablet    SYNTHROID/LEVOTHROID    90 tablet    Take 1 tablet (100 mcg) by mouth daily    Hypothyroidism, unspecified type       lisinopril 30 MG tablet    PRINIVIL,ZESTRIL    90 tablet    Take 1 tablet (30 mg) by mouth daily    Chronic kidney disease, stage II (mild), Type 2 diabetes mellitus with hyperglycemia, without long-term current use of insulin (H), Essential hypertension with goal blood pressure less than 140/90       metFORMIN 500 MG 24 hr tablet    GLUCOPHAGE-XR    90 tablet    Take 1 tablet (500 mg) by mouth daily (with dinner)    Type 2 diabetes mellitus with hyperosmolarity without coma, without long-term current use of insulin (H)       metoprolol 100 MG tablet    LOPRESSOR    180 tablet    Take 1 tablet (100 mg) by mouth 2 times daily    Essential hypertension with goal blood pressure less than 140/90       NUTRITIONAL SUPPLEMENT PO      VIBE

## 2017-11-01 NOTE — NURSING NOTE
"Chief Complaint   Patient presents with     Skin Spots     Panel Management     fall risk, lipid, asa on med list, a1c, eye exam, foot exam       Initial BP (!) 176/115 (BP Location: Left arm, Patient Position: Chair, Cuff Size: Adult Regular)  Pulse 60  Temp 97  F (36.1  C) (Temporal)  Resp 12  Wt 184 lb (83.5 kg)  BMI 30.62 kg/m2 Estimated body mass index is 30.62 kg/(m^2) as calculated from the following:    Height as of 8/7/17: 5' 5\" (1.651 m).    Weight as of this encounter: 184 lb (83.5 kg).  Medication Reconciliation: complete     Apryl Lau, [facility]  November 1, 2017      "

## 2017-11-02 ENCOUNTER — TELEPHONE (OUTPATIENT)
Dept: FAMILY MEDICINE | Facility: OTHER | Age: 77
End: 2017-11-02

## 2017-11-02 NOTE — TELEPHONE ENCOUNTER
----- Message from Apryl Olvera MD sent at 11/1/2017  7:58 PM CDT -----  Kidneys look a little dry.  Please attempt to stay hydrated. Will plan to recheck at your next visit in 6 months.  Apryl Olvera MD

## 2017-11-02 NOTE — PROGRESS NOTES
Kidneys look a little dry.  Please attempt to stay hydrated. Will plan to recheck at your next visit in 6 months.  Apryl Olvera MD

## 2017-11-13 ENCOUNTER — TELEPHONE (OUTPATIENT)
Dept: FAMILY MEDICINE | Facility: OTHER | Age: 77
End: 2017-11-13

## 2017-11-13 DIAGNOSIS — E11.65 TYPE 2 DIABETES MELLITUS WITH HYPERGLYCEMIA, WITHOUT LONG-TERM CURRENT USE OF INSULIN (H): Primary | ICD-10-CM

## 2017-11-13 RX ORDER — GLUCOSAMINE HCL/CHONDROITIN SU 500-400 MG
CAPSULE ORAL
Qty: 100 EACH | Refills: 3 | Status: SHIPPED | OUTPATIENT
Start: 2017-11-13

## 2017-11-13 RX ORDER — LANCETS
EACH MISCELLANEOUS
Qty: 100 EACH | Refills: 6 | Status: SHIPPED | OUTPATIENT
Start: 2017-11-13

## 2017-11-13 NOTE — TELEPHONE ENCOUNTER
Order in and flexible so she can choose what works for her.  Form requesting change in meter.  Apryl Olvera MD

## 2017-11-13 NOTE — TELEPHONE ENCOUNTER
Reason for Call:  Form, our goal is to have forms completed with 72 hours, however, some forms may require a visit or additional information.    Type of letter, form or note:  medical    Who is the form from?: Patient    Where did the form come from: Patient or family brought in       What clinic location was the form placed at?: [facility] - [phone]    Where the form was placed: [facility]    What number is listed as a contact on the form?: [phone]       Additional comments: none    Call taken on 11/13/2017 at 10:07 AM by Niyah Luther

## 2017-11-13 NOTE — TELEPHONE ENCOUNTER
Contacted pt, order was already sent to [facility] for DME/Glucose Meter.  Pt states no other questions or concerns.  Rupali Hayden, CMA

## 2017-11-24 ENCOUNTER — OFFICE VISIT (OUTPATIENT)
Dept: FAMILY MEDICINE | Facility: CLINIC | Age: 77
End: 2017-11-24
Payer: COMMERCIAL

## 2017-11-24 VITALS
HEART RATE: 95 BPM | RESPIRATION RATE: 16 BRPM | OXYGEN SATURATION: 96 % | SYSTOLIC BLOOD PRESSURE: 139 MMHG | DIASTOLIC BLOOD PRESSURE: 70 MMHG | TEMPERATURE: 97.1 F

## 2017-11-24 DIAGNOSIS — L03.031 CELLULITIS OF TOE OF RIGHT FOOT: Primary | ICD-10-CM

## 2017-11-24 PROCEDURE — 99213 OFFICE O/P EST LOW 20 MIN: CPT | Performed by: FAMILY MEDICINE

## 2017-11-24 RX ORDER — CEFUROXIME AXETIL 250 MG/1
TABLET ORAL
Qty: 30 TABLET | Refills: 0 | Status: ON HOLD | OUTPATIENT
Start: 2017-11-24 | End: 2017-11-28

## 2017-11-24 ASSESSMENT — PAIN SCALES - GENERAL: PAINLEVEL: EXTREME PAIN (8)

## 2017-11-24 NOTE — PROGRESS NOTES
SUBJECTIVE:                                                    Ni Maya is a 77 year old female who presents to clinic today for the following health issues:      HPI    Right foot pain --    For the past few weeks as patient said some pain involving the dorsal aspect of her right second toe. Over the last 3 days the pain has become more constant and increased in intensity along with soft tissue swelling, redness and increased warmth. She has a past history of chronic hammertoe deformities but has no injury. She is diabetic but does not use tobacco products. She has no known vascular disease. It appears her diabetes is relatively well controlled. The last A1c was 6.6%.    Onset: Ongoing for a couple of weeks     Description: Woke up with possible infected toe on right foot.   Location: 2nd toe   Character: Sharp pain while walking, Dull pain while sitting     Intensity: moderate    Progression of Symptoms: same    Accompanying Signs & Symptoms:  Other symptoms: swelling and redness    History:   Previous similar pain: no       Precipitating factors:   Trauma or overuse: no       Therapies Tried and outcome: No therapies tried.           Problem list and histories reviewed & adjusted, as indicated.  Additional history: as documented            ROS:   ROS: 10 point ROS neg or unchanged other than the symptoms noted above in the HPI.      OBJECTIVE:                                                    /70  Pulse 95  Temp 97.1  F (36.2  C) (Temporal)  Resp 16  SpO2 96%  There is no height or weight on file to calculate BMI.  GENERAL: healthy, alert and no distress  HENT: ear canals- normal; TMs- normal; Nose- normal; Mouth- no ulcers, no lesions  NECK: no tenderness, no adenopathy, no asymmetry, no masses, no stiffness; thyroid- normal to palpation  RESP: lungs clear to auscultation - no rales, no rhonchi, no wheezes  CV: regular rates and rhythm, normal S1 S2, no S3 or S4 and no murmur, no click or  rub -  ABDOMEN: soft, no tenderness, no  hepatosplenomegaly, no masses, normal bowel sounds  MS: extremities- no gross deformities noted, no edema but with redness, soft tissue swelling, tenderness and increased warmth over the dorsal aspect of the right second toe consistent with cellulitis. Strong bilateral dorsalis pedis and posterior tibial pulses. No suggestion of underlying ischemia.  NEURO: strength and tone- normal, sensory exam- grossly normal, mentation- intact, speech- normal, reflexes- symmetric  Diabetic foot exam: normal DP and PT pulses, no trophic changes or ulcerative lesions, normal sensory exam and cellulitic changes on the dorsum of the right second toe  PSYCH: Alert and oriented times 3; speech- coherent , normal rate and volume; able to articulate logical thoughts, able to abstract reason, no tangential thoughts, no hallucinations or delusions, affect- normal    Diagnostic test results:  Diagnostic Test Results:  none        ASSESSMENT/PLAN:                                                    1. Cellulitis of toe of right foot     - cefuroxime (CEFTIN) 250 MG tablet; Two tablets twice daily for five days then one tablet twice daily till finished  Dispense: 30 tablet; Refill: 0      Follow up with Provider - Follow-up for the cellulitis as needed. Follow-up for ongoing diabetic care as planned.   See Patient Instructions    The patient understood the rational for the diagnosis and treatment plan. All questions were answered to best of my ability and the patient's satisfaction.    Note: Chart documentation done in part with Dragon Voice Recognition software. Although reviewed after completion, some word and grammatical errors may remain.  Please consider this when interpreting information in this chart.    Tha Smith MD  [facility]

## 2017-11-24 NOTE — PATIENT INSTRUCTIONS
These are new changes to your current plan of care based on today's visit:    Medications stopped    Medications to be started Ceftin 250 mg -  Take two tablets twice daily for five days and then one twice daily till finished   Change dose of this medication    New treatments        Follow up appointments:    1.  FOLLOW UP WITH YOUR PRIMARY CARE PROVIDER: As needed    Tha Smith MD

## 2017-11-24 NOTE — MR AVS SNAPSHOT
"              After Visit Summary   11/24/2017    Ni Maya    MRN: [id number]           Patient Information     Date Of Birth          1940        Visit Information        Provider Department      11/24/2017 10:40 AM Tha Smith MD [facility]        Today's Diagnoses     Cellulitis of toe of right foot    -  1      Care Instructions    These are new changes to your current plan of care based on today's visit:    Medications stopped    Medications to be started Ceftin 250 mg -  Take two tablets twice daily for five days and then one twice daily till finished   Change dose of this medication    New treatments        Follow up appointments:    1.  FOLLOW UP WITH YOUR PRIMARY CARE PROVIDER: As needed    Tha Smith MD                Follow-ups after your visit        Follow-up notes from your care team     Return if symptoms worsen or fail to improve.      Who to contact     If you have questions or need follow up information about today's clinic visit or your schedule please contact [facility] directly at [phone].  Normal or non-critical lab and imaging results will be communicated to you by SaleMovehart, letter or phone within 4 business days after the clinic has received the results. If you do not hear from us within 7 days, please contact the clinic through SaleMovehart or phone. If you have a critical or abnormal lab result, we will notify you by phone as soon as possible.  Submit refill requests through Clctin or call your pharmacy and they will forward the refill request to us. Please allow 3 business days for your refill to be completed.          Additional Information About Your Visit        MyChart Information     Clctin lets you send messages to your doctor, view your test results, renew your prescriptions, schedule appointments and more. To sign up, go to [email or web address] . Click on \"Log in\" on the left side of the screen, which will take you to the " "Welcome page. Then click on \"Sign up Now\" on the right side of the page.     You will be asked to enter the access code listed below, as well as some personal information. Please follow the directions to create your username and password.     Your access code is: [email or web address]  Expires: 2017 11:21 AM     Your access code will  in 90 days. If you need help or a new code, please call your [place] clinic or [phone].        Care EveryWhere ID     This is your Care EveryWhere ID. This could be used by other organizations to access your [place] medical records  [id number]        Your Vitals Were     Pulse Temperature Respirations Pulse Oximetry          95 97.1  F (36.2  C) (Temporal) 16 96%         Blood Pressure from Last 3 Encounters:   17 140/70   17 144/84   17 (!) 134/92    Weight from Last 3 Encounters:   17 184 lb (83.5 kg)   17 189 lb 14.4 oz (86.1 kg)   17 190 lb 9.6 oz (86.5 kg)              Today, you had the following     No orders found for display         Today's Medication Changes          These changes are accurate as of: 17 11:06 AM.  If you have any questions, ask your nurse or doctor.               Start taking these medicines.        Dose/Directions    cefuroxime 250 MG tablet   Commonly known as:  CEFTIN   Used for:  Cellulitis of toe of right foot   Started by:  Tha Smith MD        Two tablets twice daily for five days then one tablet twice daily till finished   Quantity:  30 tablet   Refills:  0            Where to get your medicines      These medications were sent to [facility] # - [place], [place] - [address] [address]   [facility] [address]     Phone:  [phone]     cefuroxime 250 MG tablet                Primary Care Provider Office Phone # Fax #    Apryl Olvera [phone]       [address] [facility] [address]        Equal Access to Services     [facility] JEFFERY AH: Jyoti ross " eloy Gray, watada luqadaha, qaybta kadinh kay, david santillanemma angélica. So [facility] [phone].    ATENCIÓN: Si habla sduhir, tiene a yuen disposición servicios gratuitos de asistencia lingüística. Ivy al [phone].    We comply with applicable federal civil rights laws and [place] laws. We do not discriminate on the basis of race, color, national origin, age, disability, sex, sexual orientation, or gender identity.            Thank you!     Thank you for choosing [facility]  for your care. Our goal is always to provide you with excellent care. Hearing back from our patients is one way we can continue to improve our services. Please take a few minutes to complete the written survey that you may receive in the mail after your visit with us. Thank you!             Your Updated Medication List - Protect others around you: Learn how to safely use, store and throw away your medicines at www.disposemymeds.org.          This list is accurate as of: 11/24/17 11:06 AM.  Always use your most recent med list.                   Brand Name Dispense Instructions for use Diagnosis    alcohol swab prep pads     100 each    Use to swab area of injection/geoffrey as directed.    Type 2 diabetes mellitus with hyperglycemia, without long-term current use of insulin (H)       ALLEGRA 180 MG tablet   Generic drug:  fexofenadine     90 Tab    1 TABLET DAILY    Allergic rhinitis, cause unspecified       apixaban ANTICOAGULANT 5 MG tablet    ELIQUIS    90 tablet    Take 1 tablet (5 mg) by mouth 2 times daily    Atrial fibrillation, unspecified type (H)       blood glucose calibration solution    NO BRAND SPECIFIED    1 Bottle    To accompany: Blood Glucose Monitor Brands: per insurance.    Type 2 diabetes mellitus with hyperglycemia, without long-term current use of insulin (H)       * Blood Glucose Monitoring Suppl W/DEVICE Kit     1 kit    1 kit 3 times daily.    Type 2 diabetes, HbA1C goal <  8% (H)       * blood glucose monitoring meter device kit    no brand specified    1 kit    Use to test blood sugar 3 times daily or as directed. Preferred blood glucose meter OR supplies to accompany: Blood Glucose Monitor Brands: per insurance.    Type 2 diabetes mellitus with hyperglycemia, without long-term current use of insulin (H)       * blood glucose monitoring test strip    HUMBERTO CONTOUR NEXT    250 each    TEST THREE TIMES A DAY    Type 2 diabetes mellitus with hyperglycemia (H)       * blood glucose monitoring test strip    no brand specified    100 strip    Use to test blood sugar 3 times daily or as directed. To accompany: Blood Glucose Monitor Brands: per insurance.    Type 2 diabetes mellitus with hyperglycemia, without long-term current use of insulin (H)       cefuroxime 250 MG tablet    CEFTIN    30 tablet    Two tablets twice daily for five days then one tablet twice daily till finished    Cellulitis of toe of right foot       cloNIDine 0.1 MG tablet    CATAPRES    270 tablet    Take 2 tablets (0.2 mg) by mouth At Bedtime    Essential hypertension with goal blood pressure less than 140/90       * E-Z JECT LANCET MICRO-THIN 33G Misc           * thin lancets    NO BRAND SPECIFIED    100 each    Use with lanceting device. To accompany: Blood Glucose Monitor Brands: per insurance.    Type 2 diabetes mellitus with hyperglycemia, without long-term current use of insulin (H)       fish oil-omega-3 fatty acids 1000 MG capsule      Take 2 g by mouth daily. 2 caps per day        hydrochlorothiazide 25 MG tablet    HYDRODIURIL    90 tablet    Take 1 tablet (25 mg) by mouth daily    Essential hypertension with goal blood pressure less than 140/90       levothyroxine 100 MCG tablet    SYNTHROID/LEVOTHROID    90 tablet    Take 1 tablet (100 mcg) by mouth daily    Hypothyroidism, unspecified type       lisinopril 30 MG tablet    PRINIVIL,ZESTRIL    90 tablet    Take 1 tablet (30 mg) by mouth daily    Chronic  kidney disease, stage II (mild), Type 2 diabetes mellitus with hyperglycemia, without long-term current use of insulin (H), Essential hypertension with goal blood pressure less than 140/90       metFORMIN 500 MG 24 hr tablet    GLUCOPHAGE-XR    90 tablet    Take 1 tablet (500 mg) by mouth daily (with dinner)    Type 2 diabetes mellitus with hyperosmolarity without coma, without long-term current use of insulin (H)       metoprolol 100 MG tablet    LOPRESSOR    180 tablet    Take 1 tablet (100 mg) by mouth 2 times daily    Essential hypertension with goal blood pressure less than 140/90       NUTRITIONAL SUPPLEMENT PO      VIBE        * Notice:  This list has 6 medication(s) that are the same as other medications prescribed for you. Read the directions carefully, and ask your doctor or other care provider to review them with you.

## 2017-11-24 NOTE — NURSING NOTE
"Chief Complaint   Patient presents with     Toe Injury     Panel Management     eye exam, fall risk assesment       Initial /70  Pulse 95  Temp 97.1  F (36.2  C) (Temporal)  Resp 16  SpO2 96% Estimated body mass index is 30.62 kg/(m^2) as calculated from the following:    Height as of 8/7/17: 5' 5\" (1.651 m).    Weight as of 11/1/17: 184 lb (83.5 kg).  Medication Reconciliation: complete     Najma Cash MA       "

## 2017-11-26 ENCOUNTER — HOSPITAL ENCOUNTER (INPATIENT)
Facility: CLINIC | Age: 77
LOS: 2 days | Discharge: HOME | DRG: 603 | End: 2017-11-28
Attending: EMERGENCY MEDICINE | Admitting: FAMILY MEDICINE
Payer: COMMERCIAL

## 2017-11-26 DIAGNOSIS — L03.115 CELLULITIS OF RIGHT LOWER EXTREMITY: ICD-10-CM

## 2017-11-26 DIAGNOSIS — M10.9 ACUTE GOUT INVOLVING TOE OF RIGHT FOOT, UNSPECIFIED CAUSE: Primary | ICD-10-CM

## 2017-11-26 DIAGNOSIS — I10 ESSENTIAL HYPERTENSION WITH GOAL BLOOD PRESSURE LESS THAN 140/90: ICD-10-CM

## 2017-11-26 DIAGNOSIS — L03.031 CELLULITIS OF TOE OF RIGHT FOOT: ICD-10-CM

## 2017-11-26 PROBLEM — I48.20 CHRONIC ATRIAL FIBRILLATION (H): Status: ACTIVE | Noted: 2017-11-26

## 2017-11-26 PROBLEM — D69.6 THROMBOCYTOPENIA (H): Status: ACTIVE | Noted: 2017-11-26

## 2017-11-26 LAB
ANION GAP SERPL CALCULATED.3IONS-SCNC: 7 MMOL/L (ref 3–14)
BASOPHILS # BLD AUTO: 0 10E9/L (ref 0–0.2)
BASOPHILS NFR BLD AUTO: 0.2 %
BUN SERPL-MCNC: 13 MG/DL (ref 7–30)
CALCIUM SERPL-MCNC: 9 MG/DL (ref 8.5–10.1)
CHLORIDE SERPL-SCNC: 101 MMOL/L (ref 94–109)
CO2 SERPL-SCNC: 31 MMOL/L (ref 20–32)
CREAT SERPL-MCNC: 1 MG/DL (ref 0.52–1.04)
DIFFERENTIAL METHOD BLD: ABNORMAL
EOSINOPHIL # BLD AUTO: 0.2 10E9/L (ref 0–0.7)
EOSINOPHIL NFR BLD AUTO: 2.8 %
ERYTHROCYTE [DISTWIDTH] IN BLOOD BY AUTOMATED COUNT: 13.9 % (ref 10–15)
GFR SERPL CREATININE-BSD FRML MDRD: 54 ML/MIN/1.7M2
GLUCOSE BLDC GLUCOMTR-MCNC: 107 MG/DL (ref 70–99)
GLUCOSE BLDC GLUCOMTR-MCNC: 136 MG/DL (ref 70–99)
GLUCOSE SERPL-MCNC: 132 MG/DL (ref 70–99)
HCT VFR BLD AUTO: 43.9 % (ref 35–47)
HGB BLD-MCNC: 14.4 G/DL (ref 11.7–15.7)
IMM GRANULOCYTES # BLD: 0 10E9/L (ref 0–0.4)
IMM GRANULOCYTES NFR BLD: 0.1 %
LYMPHOCYTES # BLD AUTO: 1.3 10E9/L (ref 0.8–5.3)
LYMPHOCYTES NFR BLD AUTO: 15.4 %
MCH RBC QN AUTO: 29.4 PG (ref 26.5–33)
MCHC RBC AUTO-ENTMCNC: 32.8 G/DL (ref 31.5–36.5)
MCV RBC AUTO: 90 FL (ref 78–100)
MONOCYTES # BLD AUTO: 0.9 10E9/L (ref 0–1.3)
MONOCYTES NFR BLD AUTO: 10.3 %
NEUTROPHILS # BLD AUTO: 6.2 10E9/L (ref 1.6–8.3)
NEUTROPHILS NFR BLD AUTO: 71.2 %
PLATELET # BLD AUTO: 122 10E9/L (ref 150–450)
POTASSIUM SERPL-SCNC: 3.4 MMOL/L (ref 3.4–5.3)
RBC # BLD AUTO: 4.9 10E12/L (ref 3.8–5.2)
SODIUM SERPL-SCNC: 139 MMOL/L (ref 133–144)
WBC # BLD AUTO: 8.7 10E9/L (ref 4–11)

## 2017-11-26 PROCEDURE — 87081 CULTURE SCREEN ONLY: CPT | Performed by: FAMILY MEDICINE

## 2017-11-26 PROCEDURE — 96375 TX/PRO/DX INJ NEW DRUG ADDON: CPT

## 2017-11-26 PROCEDURE — 25000128 H RX IP 250 OP 636: Performed by: EMERGENCY MEDICINE

## 2017-11-26 PROCEDURE — 99222 1ST HOSP IP/OBS MODERATE 55: CPT | Mod: AI | Performed by: FAMILY MEDICINE

## 2017-11-26 PROCEDURE — 87040 BLOOD CULTURE FOR BACTERIA: CPT | Performed by: EMERGENCY MEDICINE

## 2017-11-26 PROCEDURE — 99285 EMERGENCY DEPT VISIT HI MDM: CPT | Mod: 25 | Performed by: EMERGENCY MEDICINE

## 2017-11-26 PROCEDURE — 96365 THER/PROPH/DIAG IV INF INIT: CPT

## 2017-11-26 PROCEDURE — 00000146 ZZHCL STATISTIC GLUCOSE BY METER IP

## 2017-11-26 PROCEDURE — 80048 BASIC METABOLIC PNL TOTAL CA: CPT | Performed by: EMERGENCY MEDICINE

## 2017-11-26 PROCEDURE — 99285 EMERGENCY DEPT VISIT HI MDM: CPT | Mod: 25

## 2017-11-26 PROCEDURE — 85025 COMPLETE CBC W/AUTO DIFF WBC: CPT | Performed by: EMERGENCY MEDICINE

## 2017-11-26 PROCEDURE — 12000000 ZZH R&B MED SURG/OB

## 2017-11-26 PROCEDURE — 25000128 H RX IP 250 OP 636: Performed by: FAMILY MEDICINE

## 2017-11-26 PROCEDURE — 25000132 ZZH RX MED GY IP 250 OP 250 PS 637: Performed by: FAMILY MEDICINE

## 2017-11-26 RX ORDER — SODIUM CHLORIDE 9 MG/ML
INJECTION, SOLUTION INTRAVENOUS CONTINUOUS
Status: DISCONTINUED | OUTPATIENT
Start: 2017-11-26 | End: 2017-11-27

## 2017-11-26 RX ORDER — NALOXONE HYDROCHLORIDE 0.4 MG/ML
.1-.4 INJECTION, SOLUTION INTRAMUSCULAR; INTRAVENOUS; SUBCUTANEOUS
Status: DISCONTINUED | OUTPATIENT
Start: 2017-11-26 | End: 2017-11-28 | Stop reason: HOSPADM

## 2017-11-26 RX ORDER — METOPROLOL TARTRATE 100 MG
100 TABLET ORAL 2 TIMES DAILY
Status: DISCONTINUED | OUTPATIENT
Start: 2017-11-26 | End: 2017-11-28 | Stop reason: HOSPADM

## 2017-11-26 RX ORDER — ACETAMINOPHEN 325 MG/1
650 TABLET ORAL EVERY 4 HOURS PRN
Status: DISCONTINUED | OUTPATIENT
Start: 2017-11-26 | End: 2017-11-27

## 2017-11-26 RX ORDER — DEXTROSE MONOHYDRATE 25 G/50ML
25-50 INJECTION, SOLUTION INTRAVENOUS
Status: DISCONTINUED | OUTPATIENT
Start: 2017-11-26 | End: 2017-11-28 | Stop reason: HOSPADM

## 2017-11-26 RX ORDER — ONDANSETRON 2 MG/ML
4 INJECTION INTRAMUSCULAR; INTRAVENOUS EVERY 6 HOURS PRN
Status: DISCONTINUED | OUTPATIENT
Start: 2017-11-26 | End: 2017-11-28 | Stop reason: HOSPADM

## 2017-11-26 RX ORDER — LISINOPRIL 10 MG/1
30 TABLET ORAL DAILY
Status: DISCONTINUED | OUTPATIENT
Start: 2017-11-27 | End: 2017-11-28 | Stop reason: HOSPADM

## 2017-11-26 RX ORDER — AMOXICILLIN 250 MG
2 CAPSULE ORAL 2 TIMES DAILY PRN
Status: DISCONTINUED | OUTPATIENT
Start: 2017-11-26 | End: 2017-11-28 | Stop reason: HOSPADM

## 2017-11-26 RX ORDER — CLONIDINE HYDROCHLORIDE 0.1 MG/1
0.2 TABLET ORAL AT BEDTIME
Status: DISCONTINUED | OUTPATIENT
Start: 2017-11-26 | End: 2017-11-28 | Stop reason: HOSPADM

## 2017-11-26 RX ORDER — HYDRALAZINE HYDROCHLORIDE 20 MG/ML
5 INJECTION INTRAMUSCULAR; INTRAVENOUS EVERY 6 HOURS PRN
Status: DISCONTINUED | OUTPATIENT
Start: 2017-11-26 | End: 2017-11-28 | Stop reason: HOSPADM

## 2017-11-26 RX ORDER — ONDANSETRON 4 MG/1
4 TABLET, ORALLY DISINTEGRATING ORAL EVERY 6 HOURS PRN
Status: DISCONTINUED | OUTPATIENT
Start: 2017-11-26 | End: 2017-11-28 | Stop reason: HOSPADM

## 2017-11-26 RX ORDER — HYDROCHLOROTHIAZIDE 25 MG/1
25 TABLET ORAL DAILY
Status: DISCONTINUED | OUTPATIENT
Start: 2017-11-27 | End: 2017-11-28 | Stop reason: HOSPADM

## 2017-11-26 RX ORDER — CLONIDINE HYDROCHLORIDE 0.1 MG/1
0.2 TABLET ORAL AT BEDTIME
Status: DISCONTINUED | OUTPATIENT
Start: 2017-11-26 | End: 2017-11-26

## 2017-11-26 RX ORDER — CEFAZOLIN SODIUM 1 G/50ML
1250 SOLUTION INTRAVENOUS EVERY 24 HOURS
Status: DISCONTINUED | OUTPATIENT
Start: 2017-11-26 | End: 2017-11-28 | Stop reason: HOSPADM

## 2017-11-26 RX ORDER — AMOXICILLIN 250 MG
1 CAPSULE ORAL 2 TIMES DAILY PRN
Status: DISCONTINUED | OUTPATIENT
Start: 2017-11-26 | End: 2017-11-28 | Stop reason: HOSPADM

## 2017-11-26 RX ORDER — METFORMIN HCL 500 MG
500 TABLET, EXTENDED RELEASE 24 HR ORAL
Status: DISCONTINUED | OUTPATIENT
Start: 2017-11-26 | End: 2017-11-28 | Stop reason: HOSPADM

## 2017-11-26 RX ORDER — HYDROCODONE BITARTRATE AND ACETAMINOPHEN 5; 325 MG/1; MG/1
1-2 TABLET ORAL EVERY 6 HOURS PRN
Status: DISCONTINUED | OUTPATIENT
Start: 2017-11-26 | End: 2017-11-28 | Stop reason: HOSPADM

## 2017-11-26 RX ORDER — CLONIDINE HYDROCHLORIDE 0.1 MG/1
0.1 TABLET ORAL EVERY MORNING
Status: DISCONTINUED | OUTPATIENT
Start: 2017-11-27 | End: 2017-11-28 | Stop reason: HOSPADM

## 2017-11-26 RX ORDER — FEXOFENADINE HCL 180 MG/1
180 TABLET ORAL DAILY
Status: DISCONTINUED | OUTPATIENT
Start: 2017-11-27 | End: 2017-11-28 | Stop reason: HOSPADM

## 2017-11-26 RX ORDER — PROCHLORPERAZINE MALEATE 5 MG
5 TABLET ORAL EVERY 6 HOURS PRN
Status: DISCONTINUED | OUTPATIENT
Start: 2017-11-26 | End: 2017-11-28 | Stop reason: HOSPADM

## 2017-11-26 RX ORDER — LEVOTHYROXINE SODIUM 100 UG/1
100 TABLET ORAL DAILY
Status: DISCONTINUED | OUTPATIENT
Start: 2017-11-27 | End: 2017-11-28 | Stop reason: HOSPADM

## 2017-11-26 RX ORDER — NICOTINE POLACRILEX 4 MG
15-30 LOZENGE BUCCAL
Status: DISCONTINUED | OUTPATIENT
Start: 2017-11-26 | End: 2017-11-28 | Stop reason: HOSPADM

## 2017-11-26 RX ORDER — PROCHLORPERAZINE 25 MG
12.5 SUPPOSITORY, RECTAL RECTAL EVERY 12 HOURS PRN
Status: DISCONTINUED | OUTPATIENT
Start: 2017-11-26 | End: 2017-11-28 | Stop reason: HOSPADM

## 2017-11-26 RX ADMIN — METFORMIN HYDROCHLORIDE 500 MG: 500 TABLET, EXTENDED RELEASE ORAL at 17:18

## 2017-11-26 RX ADMIN — HYDROCODONE BITARTRATE AND ACETAMINOPHEN 1 TABLET: 5; 325 TABLET ORAL at 16:28

## 2017-11-26 RX ADMIN — VANCOMYCIN HYDROCHLORIDE 1250 MG: 1 INJECTION, POWDER, LYOPHILIZED, FOR SOLUTION INTRAVENOUS at 14:47

## 2017-11-26 RX ADMIN — ACETAMINOPHEN 650 MG: 325 TABLET ORAL at 20:58

## 2017-11-26 RX ADMIN — METOPROLOL TARTRATE 100 MG: 100 TABLET, FILM COATED ORAL at 20:44

## 2017-11-26 RX ADMIN — TAZOBACTAM SODIUM AND PIPERACILLIN SODIUM 3.38 G: 375; 3 INJECTION, SOLUTION INTRAVENOUS at 20:59

## 2017-11-26 RX ADMIN — CLONIDINE HYDROCHLORIDE 0.2 MG: 0.1 TABLET ORAL at 20:43

## 2017-11-26 RX ADMIN — HYDRALAZINE HYDROCHLORIDE 5 MG: 20 INJECTION INTRAMUSCULAR; INTRAVENOUS at 16:28

## 2017-11-26 RX ADMIN — TAZOBACTAM SODIUM AND PIPERACILLIN SODIUM 3.38 G: 375; 3 INJECTION, SOLUTION INTRAVENOUS at 14:17

## 2017-11-26 RX ADMIN — SODIUM CHLORIDE: 9 INJECTION, SOLUTION INTRAVENOUS at 16:36

## 2017-11-26 RX ADMIN — APIXABAN 5 MG: 5 TABLET, FILM COATED ORAL at 20:44

## 2017-11-26 ASSESSMENT — ACTIVITIES OF DAILY LIVING (ADL)
AMBULATION: 0-->INDEPENDENT
DRESS: 0-->INDEPENDENT
SWALLOWING: 0-->SWALLOWS FOODS/LIQUIDS WITHOUT DIFFICULTY
RETIRED_EATING: 0-->INDEPENDENT
COGNITION: 0 - NO COGNITION ISSUES REPORTED
RETIRED_COMMUNICATION: 0-->UNDERSTANDS/COMMUNICATES WITHOUT DIFFICULTY
TOILETING: 0-->INDEPENDENT
TRANSFERRING: 0-->INDEPENDENT
FALL_HISTORY_WITHIN_LAST_SIX_MONTHS: NO
BATHING: 0-->INDEPENDENT

## 2017-11-26 ASSESSMENT — ENCOUNTER SYMPTOMS
VOMITING: 0
FEVER: 0
NAUSEA: 1
CHILLS: 1

## 2017-11-26 NOTE — IP AVS SNAPSHOT
[address] [facility]    [address] [place] [address]    Phone:  [phone]                                       After Visit Summary   11/26/2017    Ni Maya    MRN: [id number]           After Visit Summary Signature Page     I have received my discharge instructions, and my questions have been answered. I have discussed any challenges I see with this plan with the nurse or doctor.    ..........................................................................................................................................  Patient/Patient Representative Signature      ..........................................................................................................................................  Patient Representative Print Name and Relationship to Patient    ..................................................               ................................................  Date                                            Time    ..........................................................................................................................................  Reviewed by Signature/Title    ...................................................              ..............................................  Date                                                            Time

## 2017-11-26 NOTE — PROGRESS NOTES
S-(situation): Patient arrives to room 251 via cart from ED and transferred self to bed after using the bathroom.     B-(background): Cellulitis of right second toe    A-(assessment): C/o severe headache as she got into bed. BP was 207/110 and had not received any antihypertensives or analgesics in ED. Right second toe is very tender to touch, reddish purple in color,  and hot. Some redness to right foot, but patient stated it has already improved from when she came in. Elevated right foot on pillow. Afebrile. Vancomycin infusing. Scab to top of right wrist. Alert and oriented x4.     R-(recommendations): Orders reviewed with pt. Will monitor patient per MD orders.     Inpatient nursing criteria listed below were met:    Health care directives status obtained and documented: Yes  Core Measures assessed (SSI): Yes  SCD's Documented: Yes; not needed as is on Eliquis  Vaccine assessment done and vaccines ordered if appropriate: Yes  Skin issues/needs documented:Yes  Isolation needs addressed, if appropriate: NA  Fall Prevention: Care plan updated, Education given and documented Yes  MRSA swab completed for patient 55 years and older (exclude BALTA and TKA): Yes  My Chart patient sign up addressed and documented: Yes  Care Plan initiated: Yes  Education Assessment documented:Yes  Education Documented (Pre-existing chronic infection such as, MRSA/VRE need education on admission): Yes  New medication patient education completed and documented (Possible Side Effects of Common Medications handout): Yes  Home medications if not able to send immediately home with family stored here: NA   Reminder note placed in discharge instructions: NA  Discharge planning review completed (admission navigator) Yes

## 2017-11-26 NOTE — PHARMACY-VANCOMYCIN DOSING SERVICE
Pharmacy Vancomycin Initial Note  Date of Service 2017  Patient's  1940  77 year old, female    Indication: Skin and Soft Tissue Infection    Current estimated CrCl = Estimated Creatinine Clearance: 48.8 mL/min (based on Cr of 1).    Creatinine for last 3 days  2017:  1:50 PM Creatinine 1.00 mg/dL    Recent Vancomycin Level(s) for last 3 days  No results found for requested labs within last 72 hours.      Vancomycin IV Administrations (past 72 hours)      No vancomycin orders with administrations in past 72 hours.                Nephrotoxins and other renal medications (Future)    Start     Dose/Rate Route Frequency Ordered Stop    17 1400  vancomycin 1250 mg in 0.9% NaCl 250 mL PREMIX      1,250 mg  166.7 mL/hr over 90 Minutes Intravenous EVERY 24 HOURS 17 1346      17 1341  piperacillin-tazobactam (ZOSYN) infusion 3.375 g     Comments:  DO NOT USE THIS FIELD FOR ADMIN INSTRUCTIONS; INFORMATION DOES NOT SHOW ON MAR. USE THE FIELD ABOVE MARKED ADMIN INSTRUCTIONS    3.375 g  100 mL/hr over 30 Minutes Intravenous ONCE 17 1340            Contrast Orders - past 72 hours     None                Plan:  1.  Start vancomycin  1250 mg IV q24h.   2.  Goal Trough Level: 10-15 mg/L   3.  Pharmacy will check trough levels as appropriate in 1-3 Days.    4. Serum creatinine levels will be ordered daily for the first week of therapy and at least twice weekly for subsequent weeks.    5. [place] method utilized to dose vancomycin therapy: Method 2    Pau Ramos

## 2017-11-26 NOTE — H&P
[facility] History and Physical    Ni Maya MRN# [id number]   Age: 77 year old YOB: 1940     Date of Admission:  November 26, 2017    Home clinic: [facility]  Primary care provider: Apryl Olvera          Assessment and Plan:   Assessment/Plan:   Principal Problem:    Cellulitis of right lower extremity    Assessment: Patient presented with a five-day history of warmth, tenderness, and swelling of the right second toe with Ceftin 500 mg b.i.d. started 2 days ago in the clinic setting but since that time patient has had significant and rapid worsening of the erythema, warmth, and swelling with progression into the dorsal foot as well as the medial calf in association with chills but no known fever.  White blood cell count is normal and patient is not tachycardic however she is on a beta blocker chronically which might suppress cardiac response.  There is no concern for sepsis or systemic illness at this time, however given rapid worsening in a relatively short timeframe with onset of chills despite oral outpatient regimen in a patient with underlying diabetes, decision has been made to admit the patient for ongoing IV antibiotics and close monitoring.  Blood cultures ×2 obtained in the emergency department    Plan:  We'll continue with IV vancomycin and Zosyn - the area of severe erythema will be demarcated and monitored closely. Will perform vitals every 4 hours and recheck white blood cell count tomorrow to ensure stability. We'll monitor blood sugars closely and try and optimize blood sugar control during time of acute illness. Given patient's clinical presentation and failed outpatient management, it is expected patient will be hospitalized for at least 2 midnights at this time.    Active Problems:    HTN, goal below 150/90    Assessment: Patient's blood pressure is significantly elevated in the emergency room and currently is 189/102 following 190/122 on initial  presentation. Patient reports that she has been having difficulty with blood pressure control on an outpatient basis and had recent changes to her home regimen over the past few months. She has been started on metoprolol when atrial fibrillation was diagnosed and just had a increase in her lisinopril 3 weeks ago.  On review of her chart blood pressures in the clinic have been ranging from the 130s to 170s systolic and 80s to 110s diastolic.  Patient has been checking her blood pressures in the home environment and they have been ranging on average around 140-150/100-110.    Plan: We'll resume home regimen of metoprolol 100 mg twice a day, lisinopril 30 mg daily, and hydrochlorothiazide 25 mg daily but increase clonidine back to the 0.1 mg in AM and 0.2 mg qhs she has tolerated well in the past. We'll also place a p.r.n. hydralazine order for 5 mg every 6 hours as needed if systolic is over 180 or diastolic is over 120 and monitor blood pressure closely. If blood pressure does remain consistently elevated, would consider dose adjustment of her oral regimen going forward.      Hyperlipidemia with target LDL less than 100    Assessment: recent LDL was 101 and patient is not currently on a statin and had decreased it from 137 six months earlier with dietary change    Plan: no medication will be started during this hospitalization, continue outpatient follow up with PCP      Hypothyroidism    Assessment: on synthroid    Plan: continue without change      Type 2 diabetes mellitus with hyperglycemia, without long-term current use of insulin (H)    Assessment: hemoglobin A1C earlier this month was 6.6    Plan: continue with diabetic diet and metformin.  Will start monitoring blood sugars QID and provide SSI if needed to maximize diabetic control during time of acute illness      Chronic atrial fibrillation (H)    Assessment: rate controlled on the metoprolol, on Eliquis     Plan: continue home regimen without change        Thrombocytopenia (H)    Assessment: Chronic and stable in the 120 to 1:30 range for unclear etiology    Plan:  Continue to monitor      VTE:  Eliquis   Code Status:  Full Code     Core Measures   Acute MI, CHF, or stroke core measures do not apply for this admission            Chief Complaint:   Worsening right foot infection despite being on by mouth antibiotics started 2 days ago    History is obtained from the patient and her spouse         History of Present Illness:   This patient is a 77 year old  female with a significant past medical history of hypertension, type 2 diabetes controlled with metformin, chronic kidney disease stage II, and chronic A. fib on Eliquis who presents with a five-day history of right second toe warmth and swelling with worsening erythema. She was seen in the clinic 2 days ago and was felt to have a cellulitis and started on Ceftin 500 mg twice a day. Despite this the patient's symptoms have progressively worsened and now involve the dorsum of her foot as well as the medial portion of her right inner calf and she started noticing chills but no fevers. In the emergency room her heart rate is normal, however patient is on a chronic beta blocker for atrial fibrillation the blood pressure is significantly elevated in the 180s to 190s.  Patient has been afebrile and white blood cell count is normal without concern for sepsis, however given the rapid regression of the cellulitis and failed outpatient management decision has been made to admit the patient for ongoing IV antibiotics. Patient was given an initial dose of vancomycin and Zosyn in the emergency room department.          Past Medical History:     Past Medical History:   Diagnosis Date     Allergic rhinitis, cause unspecified      Essential hypertension, benign      Infection of kidney, unspecified 1999     Other specified acquired hypothyroidism      Other specified types of cystitis(595.89)     1999,6-2-01, 10-10-01              Past Surgical History:     Past Surgical History:   Procedure Laterality Date     BIOPSY VULVA/PERINEUM,ADDNL LESN  9/18/09    Wide -excision of MICKIE III- right labia      C LAPAROSCOPY, SURGICAL; W/ VAGINAL HYSTERECTOMY W/WO REMOVAL OVARY(S)/TUBES  1984    for bleeding     C LIGATE FALLOPIAN TUBE,POSTPARTUM  1978    Tubal Ligation     COLONOSCOPY  9/13/2013    Procedure: COLONOSCOPY;  Colonoscopy;  Surgeon: Yanick Ramsey MD;  Location: [facility]     HC ANGIOGRAPHY ARCH  4-3-98     HC BIOPSY OF BREAST, OPEN INCISIONAL  1960    Benign     HC COLONOSCOPY THRU STOMA, DIAGNOSTIC  4-24-98    Diverticuli - due in 2008     HC ERCP W SPHINCTEROTOMY  1996 6/2/96 and 8/30/96     HC REDUCTION OF LARGE BREAST  1988     HC REMOVAL GALLBLADDER  1995     HC UGI ENDOSCOPY DIAG W OR W/O BRUSH/WASH  7-     INJECT EPIDURAL LUMBAR N/A 8/10/2017    Procedure: INJECT EPIDURAL LUMBAR;  Lumbar 2-3 Epidural Steroid Injection;  Surgeon: Kimani Garcia MD;  Location: [facility]            Family History:     Family History   Problem Relation Age of Onset     Cardiovascular Father      Aortic aneurysm     Hypertension Father      Hypertension Mother      GASTROINTESTINAL DISEASE Mother      GI Bleed     Lipids Sister      Hypertension Sister      Genitourinary Problems Sister      Allergies Sister      CANCER Brother      Lung     Alcohol/Drug Brother      Connective Tissue Disorder Son      Down Syndrome     Respiratory Son      Pneumonia     CANCER Maternal Grandmother      Stomach     Hypertension Maternal Grandfather      Allergies Daughter             Social History:     Social History     Social History     Marital status:      Spouse name: Marco Antnoio     Number of children: 2     Years of education: 17     Occupational History     Retired in 1994       Retired     Social History Main Topics     Smoking status: Never Smoker     Smokeless tobacco: Never Used     Alcohol use No     Drug use: No     Sexual activity: No      Other Topics Concern     Parent/Sibling W/ Cabg, Mi Or Angioplasty Before 65f 55m? No     Social History Narrative             Allergies:     Allergies   Allergen Reactions     Sulfa Drugs Rash             Medications:     Current Outpatient Prescriptions   Medication Sig Dispense Refill     cefuroxime (CEFTIN) 250 MG tablet Two tablets twice daily for five days then one tablet twice daily till finished 30 tablet 0     lisinopril (PRINIVIL,ZESTRIL) 30 MG tablet Take 1 tablet (30 mg) by mouth daily 90 tablet 3     cloNIDine (CATAPRES) 0.1 MG tablet Take 2 tablets (0.2 mg) by mouth At Bedtime 270 tablet 1     hydrochlorothiazide (HYDRODIURIL) 25 MG tablet Take 1 tablet (25 mg) by mouth daily 90 tablet 3     apixaban ANTICOAGULANT (ELIQUIS) 5 MG tablet Take 1 tablet (5 mg) by mouth 2 times daily 90 tablet 3     metFORMIN (GLUCOPHAGE-XR) 500 MG 24 hr tablet Take 1 tablet (500 mg) by mouth daily (with dinner) 90 tablet 3     metoprolol (LOPRESSOR) 100 MG tablet Take 1 tablet (100 mg) by mouth 2 times daily 180 tablet 3     levothyroxine (SYNTHROID/LEVOTHROID) 100 MCG tablet Take 1 tablet (100 mcg) by mouth daily 90 tablet 3     fish oil-omega-3 fatty acids (FISH OIL) 1000 MG capsule Take 2 g by mouth daily. 2 caps per day       ALLEGRA 180 MG PO TABS 1 TABLET DAILY 90 Tab 3     NUTRITIONAL SUPPLEMENT OR VIBE       blood glucose monitoring (NO BRAND SPECIFIED) meter device kit Use to test blood sugar 3 times daily or as directed. Preferred blood glucose meter OR supplies to accompany: Blood Glucose Monitor Brands: per insurance. 1 kit 0     blood glucose monitoring (NO BRAND SPECIFIED) test strip Use to test blood sugar 3 times daily or as directed. To accompany: Blood Glucose Monitor Brands: per insurance. 100 strip 6     blood glucose calibration (NO BRAND SPECIFIED) solution To accompany: Blood Glucose Monitor Brands: per insurance. 1 Bottle 3     thin (NO BRAND SPECIFIED) lancets Use with lanceting device. To  accompany: Blood Glucose Monitor Brands: per insurance. 100 each 6     alcohol swab prep pads Use to swab area of injection/geoffrey as directed. 100 each 3     blood glucose monitoring (HUMBERTO CONTOUR NEXT) test strip TEST THREE TIMES A  each 2     Lancets (E-Z JECT LANCET MICRO-THIN 33G) MISC        Blood Glucose Monitoring Suppl W/DEVICE KIT 1 kit 3 times daily. 1 kit 0             Review of Systems:   CONSTITUTIONAL: Positive for intermittent worsening chills in the past 2 days as her cellulitis has been worsening. Patient has not taken her temperature in the home environment but has not felt flushed or feverish  INTEGUMENTARY/SKIN: Positive for right second toe redness, warmth, and swelling with progressive worsening, especially in the past 2 days with now dorsal foot and right inner calf involvement as above  E: NEGATIVE for vision changes or irritation  E/M: NEGATIVE for ear, mouth and throat problems  R: NEGATIVE for significant cough or SOB  CV: NEGATIVE for chest pain, palpitations or peripheral edema  GI: Patient did have some nausea with Ceftin antibiotic recently prescribed, however she did not miss any doses secondary to this. She has not had any vomiting, abdominal pain, or change in bowel movement  : NEGATIVE for frequency, dysuria, or hematuria  MUSCULOSKELETAL: Patient has some mild weakness secondary to tightness of her right lower foot but no other changes from her baseline  NEURO: Patient does note some numbness in her right lower foot which she attributes to the swelling pressure.  No pins and needle sensation  H: NEGATIVE for bleeding problems  P: NEGATIVE for changes in mood or affect          Physical Exam:   Blood pressure (!) 196/132, temperature 97.5  F (36.4  C), temperature source Oral, resp. rate 18, weight 185 lb (83.9 kg), SpO2 99 %, not currently breastfeeding.  Constitutional:   awake, alert, cooperative, no apparent distress, and appears stated age     Eyes:   Lids and lashes  normal, pupils equal, round and reactive to light, extra ocular muscles intact, sclera clear, conjunctiva normal     Lungs:   No increased work of breathing, good air exchange, clear to auscultation bilaterally, no crackles or wheezing     Cardiovascular:    irregularly irregular rhythm without murmur      Abdomen:    bowel sounds are present, abdomen is soft and nontender      Musculoskeletal:    patient does have bright red erythema with notable swelling and warmth present on the right second toe but also extending into the right great toe and the dorsum of the foot. Patient does have more faint erythema and warmth present on the medial aspect of the calf on the right side.  No other significant edema is noted. Patient does have normal strength on plantar flexion and dorsiflexion. She does have decreased range of motion on the right great and second toe, likely secondary to the swelling      Neurologic:   Awake, alert, oriented to name, place and time.       Skin:    erythema, warmth, and swelling in the right great and second toe in addition to the dorsum and twist smaller fact the medial right calf as described above              Data:   All laboratory data reviewed     Attestation:    I have reviewed today's vital signs, notes, medications, labs and imaging.    Electronically Signed:  Keila Garrett MD    Note: Chart documentation done in part with Dragon Voice Recognition software. Although reviewed after completion, some word and grammatical errors may remain.

## 2017-11-26 NOTE — ED PROVIDER NOTES
History     Chief Complaint   Patient presents with     Wound Check     The history is provided by the patient.     Ni Maya is a 77 year old female with a history of hypothyroidism, hyperlipidemia, hypertension, type 2 diabetes mellitus, chronic atrial fibrillation, and chronic kidney disease (stage II) who presents to the ED complaining of a wound check. Patient saw Dr. Smith on 11/24 for her red and swollen right big toe. She was started on an antibiotic at that time and has experienced nausea from it. Patient presents today because the redness and swelling has spread throughout her entire foot and now FCI up her right calf. Patient has chills but no fever. She denies emesis. Ni recently was diagnosed with atrial fibrillation.  Pain is dull, moderate and achy.        Problem List:    Patient Active Problem List    Diagnosis Date Noted     Cellulitis of right lower extremity 11/26/2017     Priority: Medium     Chronic atrial fibrillation (H) 11/26/2017     Priority: Medium     Thrombocytopenia (H) 11/26/2017     Priority: Medium     Type 2 diabetes mellitus with hyperglycemia, without long-term current use of insulin (H) 05/17/2017     Priority: Medium     Essential hypertension with goal blood pressure less than 140/90 05/27/2016     Priority: Medium     ACP (advance care planning) 05/17/2016     Priority: Medium     Advance Care Planning 5/17/2016: Receipt of ACP document:  Received: Health Care Directive which was witnessed or notarized on 11/22/2013.  Document not previously scanned.  Validation form completed and sent with document to be scanned.  Code Status needs to be updated to reflect choices in most recent ACP document. Notification sent to Dr. Lin for followup.  Confirmed/documented designated decision maker(s).  Added by Kassidy Sousa.             HTN, goal below 150/90 11/06/2013     Priority: Medium     Hyperlipidemia with target LDL less than 100 10/16/2013     Priority: Medium      Diagnosis updated by automated process. Provider to review and confirm.       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 Final path MICKIE III with free surgical margins       Urethral stricture 01/03/2008     Priority: Medium     Problem list name updated by automated process. Provider to review       Edema 12/29/2006     Priority: Medium     Obesity 12/29/2006     Priority: Medium     Problem list name updated by automated process. Provider to review       Chronic kidney disease, stage II (mild) 11/27/2006     Priority: Medium     Symptomatic menopausal or female climacteric states 01/13/2005     Priority: Medium     Allergic rhinitis 10/21/2004     Priority: Medium     Problem list name updated by automated process. Provider to review       Hypothyroidism 06/25/2002     Priority: Medium     Problem list name updated by automated process. Provider to review          Past Medical History:    Past Medical History:   Diagnosis Date     Allergic rhinitis, cause unspecified      Essential hypertension, benign      Infection of kidney, unspecified 1999     Other specified acquired hypothyroidism      Other specified types of cystitis(595.89)        Past Surgical History:    Past Surgical History:   Procedure Laterality Date     BIOPSY VULVA/PERINEUM,ADDNL LESN  9/18/09    Wide -excision of MICKIE III- right labia      C  LAPAROSCOPY, SURGICAL; W/ VAGINAL HYSTERECTOMY W/WO REMOVAL OVARY(S)/TUBES  1984    for bleeding     C LIGATE FALLOPIAN TUBE,POSTPARTUM  1978    Tubal Ligation     COLONOSCOPY  9/13/2013    Procedure: COLONOSCOPY;  Colonoscopy;  Surgeon: Yancik Ramsey MD;  Location: [facility]     HC ANGIOGRAPHY ARCH  4-3-98     HC BIOPSY OF BREAST, OPEN INCISIONAL  1960    Benign     HC COLONOSCOPY THRU STOMA, DIAGNOSTIC  4-24-98    Diverticuli - due in 2008     HC ERCP W SPHINCTEROTOMY  1996 6/2/96 and 8/30/96     HC REDUCTION OF LARGE BREAST  1988     HC REMOVAL GALLBLADDER  1995     HC UGI ENDOSCOPY DIAG W OR W/O BRUSH/WASH  7-     INJECT EPIDURAL LUMBAR N/A 8/10/2017    Procedure: INJECT EPIDURAL LUMBAR;  Lumbar 2-3 Epidural Steroid Injection;  Surgeon: Kimani Garcia MD;  Location: [facility] OR       Family History:    Family History   Problem Relation Age of Onset     Cardiovascular Father      Aortic aneurysm     Hypertension Father      Hypertension Mother      GASTROINTESTINAL DISEASE Mother      GI Bleed     Lipids Sister      Hypertension Sister      Genitourinary Problems Sister      Allergies Sister      CANCER Brother      Lung     Alcohol/Drug Brother      Connective Tissue Disorder Son      Down Syndrome     Respiratory Son      Pneumonia     CANCER Maternal Grandmother      Stomach     Hypertension Maternal Grandfather      Allergies Daughter        Social History:  Marital Status:   [2]  Social History   Substance Use Topics     Smoking status: Never Smoker     Smokeless tobacco: Never Used     Alcohol use No        Medications:      No current outpatient prescriptions on file.      Review of Systems   Constitutional: Positive for chills. Negative for fever.   Gastrointestinal: Positive for nausea (from anitbiotics). Negative for vomiting.   Musculoskeletal:        Red and swollen great and second toes to right foot. Swelling and redness has spread throughout right foot and half-way up her right calf.  "  All other systems reviewed and are negative.      Physical Exam   BP: (!) 190/122  Pulse: 95  Heart Rate: 91  Temp: 97.5  F (36.4  C)  Resp: 18  Height: 165.1 cm (5' 5\")  Weight: 83.9 kg (185 lb)  SpO2: 99 %      Physical Exam   Constitutional: She is oriented to person, place, and time. She appears well-developed and well-nourished.   HENT:   Head: Normocephalic and atraumatic.   Eyes: Conjunctivae and EOM are normal.   Neck: Normal range of motion. Neck supple.   Cardiovascular: Normal rate, regular rhythm, normal heart sounds and intact distal pulses.    Pulmonary/Chest: Effort normal and breath sounds normal. No respiratory distress.   Abdominal: Soft. Bowel sounds are normal.   Musculoskeletal: Normal range of motion. She exhibits edema (throughout second and great right toes, right foot, and proximally up the tibia). She exhibits no deformity.   Neurological: She is alert and oriented to person, place, and time.   Skin: Skin is warm. There is erythema (right great and second toe, has now spread throughout right foot and proximally custodial up the tibia).   Good capillary refill. Warm and tender throughout foot and proximally custodial up the tibia.   Psychiatric: She has a normal mood and affect. Her behavior is normal.   Nursing note and vitals reviewed.      ED Course     ED Course     Procedures               Critical Care time:  none              Results for orders placed or performed during the hospital encounter of 11/26/17 (from the past 24 hour(s))   CBC with platelets differential   Result Value Ref Range    WBC 8.7 4.0 - 11.0 10e9/L    RBC Count 4.90 3.8 - 5.2 10e12/L    Hemoglobin 14.4 11.7 - 15.7 g/dL    Hematocrit 43.9 35.0 - 47.0 %    MCV 90 78 - 100 fl    MCH 29.4 26.5 - 33.0 pg    MCHC 32.8 31.5 - 36.5 g/dL    RDW 13.9 10.0 - 15.0 %    Platelet Count 122 (L) 150 - 450 10e9/L    Diff Method Automated Method     % Neutrophils 71.2 %    % Lymphocytes 15.4 %    % Monocytes 10.3 %    % Eosinophils " 2.8 %    % Basophils 0.2 %    % Immature Granulocytes 0.1 %    Absolute Neutrophil 6.2 1.6 - 8.3 10e9/L    Absolute Lymphocytes 1.3 0.8 - 5.3 10e9/L    Absolute Monocytes 0.9 0.0 - 1.3 10e9/L    Absolute Eosinophils 0.2 0.0 - 0.7 10e9/L    Absolute Basophils 0.0 0.0 - 0.2 10e9/L    Abs Immature Granulocytes 0.0 0 - 0.4 10e9/L   Basic metabolic panel   Result Value Ref Range    Sodium 139 133 - 144 mmol/L    Potassium 3.4 3.4 - 5.3 mmol/L    Chloride 101 94 - 109 mmol/L    Carbon Dioxide 31 20 - 32 mmol/L    Anion Gap 7 3 - 14 mmol/L    Glucose 132 (H) 70 - 99 mg/dL    Urea Nitrogen 13 7 - 30 mg/dL    Creatinine 1.00 0.52 - 1.04 mg/dL    GFR Estimate 54 (L) >60 mL/min/1.7m2    GFR Estimate If Black 65 >60 mL/min/1.7m2    Calcium 9.0 8.5 - 10.1 mg/dL   Glucose by meter   Result Value Ref Range    Glucose 107 (H) 70 - 99 mg/dL       Medications   vancomycin 1250 mg in 0.9% NaCl 250 mL PREMIX (1,250 mg Intravenous New Bag 11/26/17 1447)   fexofenadine (ALLEGRA) tablet 180 mg (not administered)   apixaban ANTICOAGULANT (ELIQUIS) tablet 5 mg (not administered)   hydrochlorothiazide (HYDRODIURIL) tablet 25 mg (not administered)   levothyroxine (SYNTHROID/LEVOTHROID) tablet 100 mcg (not administered)   lisinopril (PRINIVIL/ZESTRIL) tablet 30 mg (not administered)   metFORMIN (GLUCOPHAGE-XR) 24 hr tablet 500 mg (500 mg Oral Given 11/26/17 1718)   metoprolol (LOPRESSOR) tablet 100 mg (not administered)   naloxone (NARCAN) injection 0.1-0.4 mg (not administered)   Patient is already receiving anticoagulation with heparin, enoxaparin (LOVENOX), warfarin (COUMADIN)  or other anticoagulant medication (not administered)   0.9% sodium chloride infusion ( Intravenous New Bag 11/26/17 1636)   senna-docusate (SENOKOT-S;PERICOLACE) 8.6-50 MG per tablet 1 tablet (not administered)     Or   senna-docusate (SENOKOT-S;PERICOLACE) 8.6-50 MG per tablet 2 tablet (not administered)   ondansetron (ZOFRAN-ODT) ODT tab 4 mg (not administered)      Or   ondansetron (ZOFRAN) injection 4 mg (not administered)   prochlorperazine (COMPAZINE) injection 5 mg (not administered)     Or   prochlorperazine (COMPAZINE) tablet 5 mg (not administered)     Or   prochlorperazine (COMPAZINE) Suppository 12.5 mg (not administered)   piperacillin-tazobactam (ZOSYN) infusion 3.375 g (not administered)   glucose 40 % gel 15-30 g (not administered)     Or   dextrose 50 % injection 25-50 mL (not administered)     Or   glucagon injection 1 mg (not administered)   acetaminophen (TYLENOL) tablet 650 mg (not administered)   HYDROcodone-acetaminophen (NORCO) 5-325 MG per tablet 1-2 tablet (1 tablet Oral Given 11/26/17 1628)   insulin aspart (NovoLOG) inj (RAPID ACTING) (1 Units Subcutaneous Not Given 11/26/17 1717)   insulin aspart (NovoLOG) inj (RAPID ACTING) (not administered)   hydrALAZINE (APRESOLINE) injection 5 mg (5 mg Intravenous Given 11/26/17 1628)   cloNIDine (CATAPRES) tablet 0.2 mg (not administered)   cloNIDine (CATAPRES) tablet 0.1 mg (not administered)   piperacillin-tazobactam (ZOSYN) infusion 3.375 g (0 g Intravenous Stopped 11/26/17 1447)       Assessments & Plan (with Medical Decision Making)  77-year-old diabetic female with cellulitis of the right foot that has failed outpatient treatment.  She is admitted to Dr. Garrett on IV antibiotics.       I have reviewed the nursing notes.    I have reviewed the findings, diagnosis, plan and need for follow up with the patient.       Current Discharge Medication List          Final diagnoses:   Cellulitis of right lower extremity     This document serves as a record of services personally performed by Adolfo Bishop MD. It was created on their behalf by Herve Garrett, a trained medical scribe. The creation of this record is based on the provider's personal observations and the statements of the patient. This document has been checked and approved by the attending provider.    Note: Chart documentation done in part with  [employer] Voice Recognition software. Although reviewed after completion, some word and grammatical errors may remain.    11/26/2017   [facility] EMERGENCY DEPARTMENT     Adolfo Bishop MD  11/26/17 1947

## 2017-11-26 NOTE — ED NOTES
Right big toe was red on Tuesday, saw the doctor on Friday and started on an antibiotic. Here today because of increased redness and now swelling to the right big toe, second toe and foot.

## 2017-11-26 NOTE — IP AVS SNAPSHOT
MRN:[id number]                      After Visit Summary   11/26/2017    Ni Maya    MRN: [id number]           Thank you!     Thank you for choosing [facility] for your care. Our goal is always to provide you with excellent care. Hearing back from our patients is one way we can continue to improve our services. Please take a few minutes to complete the written survey that you may receive in the mail after you visit with us. Thank you!        Patient Information     Date Of Birth          1940        Designated Caregiver       Most Recent Value    Caregiver    Will someone help with your care after discharge? yes    Name of designated caregiver Marco Antonio    Phone number of caregiver [phone]    Caregiver address same      About your hospital stay     You were admitted on:  November 26, 2017 You last received care in the:  [address]    You were discharged on:  November 28, 2017        Reason for your hospital stay       Hospitalized for suspected infection in toe (cellulitis) and probable gout in toe and improved                  Who to Call     For medical emergencies, please call 911.  For non-urgent questions about your medical care, please call your primary care provider or clinic, [phone]          Attending Provider     Provider Specialty    Adolfo Bishop MD Emergency Medicine    Keila Garrett MD Family Practice    Clinton Cuba MD Internal Medicine       Primary Care Provider Office Phone # Fax #    Apryl Olvera [phone]      After Care Instructions     Activity       Your activity upon discharge: activity as tolerated            Diet       Follow this diet upon discharge: Orders Placed This Encounter      Moderate Consistent CHO Diet                  Follow-up Appointments     Follow-up and recommended labs and tests        Follow up with primary care provider, Apryl Olvera MD, within 2 weeks, to evaluate  "medication change and for hospital follow- up.                  Your next 10 appointments already scheduled     Dec 14, 2017 12:00 PM CST   [facility] Visit with [facility]a MD   [facility]    [address] [place] [id number] [phone]           Bring a current list of meds and any records pertaining to this visit. For Physicals, please bring immunization records and any forms needing to be filled out. Please arrive 10 minutes early to complete paperwork.              Pending Results     Date and Time Order Name Status Description    11/26/2017 1340 Blood culture Preliminary     11/26/2017 1340 Blood culture Preliminary             Statement of Approval     Ordered          11/28/17 0955  I have reviewed and agree with all the recommendations and orders detailed in this document.  EFFECTIVE NOW     Approved and electronically signed by:  Clinton Cuba MD             Admission Information     Date & Time Provider Department Dept. Phone    11/26/2017 Clinton Cuba MD [address] [facility] [phone]      Your Vitals Were     Blood Pressure Pulse Temperature Respirations Height Weight    169/93 (BP Location: Left arm) 75 95.4  F (35.2  C) (Oral) 18 1.651 m (5' 5\") 86.8 kg (191 lb 5.8 oz)    Pulse Oximetry BMI (Body Mass Index)                96% 31.84 kg/m2          [employer] Information     datatracker lets you send messages to your doctor, view your test results, renew your prescriptions, schedule appointments and more. To sign up, go to [email or web address] . Click on \"Log in\" on the left side of the screen, which will take you to the Welcome page. Then click on \"Sign up Now\" on the right side of the page.     You will be asked to enter the access code listed below, as well as some personal information. Please follow the directions to create your username and password.     Your access code is: [email or web address]  Expires: 12/17/2017 " 11:21 AM     Your access code will  in 90 days. If you need help or a new code, please call your [facility] clinic or [phone].        Care EveryWhere ID     This is your Care EveryWhere ID. This could be used by other organizations to access your [facility] medical records  [id number]        Equal Access to Services     MYKELHEATHER JEFFERY : Hadii dimitry ku hadgypsyo Soomaali, waaxda luqadaha, qaybta kaalmada adecandieda, david cohen delanoemma salmeron dentonarianna vidla . So [facility] [phone].    ATENCIÓN: Si habla español, tiene a yuen disposición servicios gratuitos de asistencia lingüística. Ivy al [phone].    We comply with applicable federal civil rights laws and [place] laws. We do not discriminate on the basis of race, color, national origin, age, disability, sex, sexual orientation, or gender identity.               Review of your medicines      START taking        Dose / Directions    acetaminophen 325 MG tablet   Commonly known as:  TYLENOL        Dose:  975 mg   Take 3 tablets (975 mg) by mouth every 6 hours as needed for mild pain   Quantity:  100 tablet   Refills:  0       clindamycin 150 MG capsule   Commonly known as:  CLEOCIN        Dose:  150 mg   Take 1 capsule (150 mg) by mouth 3 times daily for 7 days   Quantity:  21 capsule   Refills:  0       colchicine 0.6 MG tablet        Take 2 tablets (1.2 mg) twice daily for 5 days, then take 2 tablets (1.2 mg) once daily   Quantity:  70 tablet   Refills:  0       naproxen 500 MG tablet   Commonly known as:  NAPROSYN        Dose:  500 mg   Take 1 tablet (500 mg) by mouth 2 times daily (with meals) for 5 days   Quantity:  10 tablet   Refills:  0         CONTINUE these medicines which may have CHANGED, or have new prescriptions. If we are uncertain of the size of tablets/capsules you have at home, strength may be listed as something that might have changed.        Dose / Directions    * cloNIDine 0.1 MG tablet   Commonly known as:  CATAPRES   This may have  changed:  Another medication with the same name was added. Make sure you understand how and when to take each.   Used for:  Essential hypertension with goal blood pressure less than 140/90        Dose:  0.2 mg   Take 2 tablets (0.2 mg) by mouth At Bedtime   Quantity:  270 tablet   Refills:  1       * cloNIDine 0.1 MG tablet   Commonly known as:  CATAPRES   This may have changed:  You were already taking a medication with the same name, and this prescription was added. Make sure you understand how and when to take each.   Used for:  Essential hypertension with goal blood pressure less than 140/90        Dose:  0.1 mg   Start taking on:  11/29/2017   Take 1 tablet (0.1 mg) by mouth every morning   Quantity:  30 tablet   Refills:  0       * Notice:  This list has 2 medication(s) that are the same as other medications prescribed for you. Read the directions carefully, and ask your doctor or other care provider to review them with you.      CONTINUE these medicines which have NOT CHANGED        Dose / Directions    alcohol swab prep pads   Used for:  Type 2 diabetes mellitus with hyperglycemia, without long-term current use of insulin (H)        Use to swab area of injection/geoffrey as directed.   Quantity:  100 each   Refills:  3       ALLEGRA 180 MG tablet   Used for:  Allergic rhinitis, cause unspecified   Generic drug:  fexofenadine        1 TABLET DAILY   Quantity:  90 Tab   Refills:  3       apixaban ANTICOAGULANT 5 MG tablet   Commonly known as:  ELIQUIS   Used for:  Atrial fibrillation, unspecified type (H)        Dose:  5 mg   Take 1 tablet (5 mg) by mouth 2 times daily   Quantity:  90 tablet   Refills:  3       blood glucose calibration solution   Commonly known as:  NO BRAND SPECIFIED   Used for:  Type 2 diabetes mellitus with hyperglycemia, without long-term current use of insulin (H)        To accompany: Blood Glucose Monitor Brands: per insurance.   Quantity:  1 Bottle   Refills:  3       * Blood Glucose  Monitoring Suppl W/DEVICE Kit   Used for:  Type 2 diabetes, HbA1C goal < 8% (H)        Dose:  1 kit   1 kit 3 times daily.   Quantity:  1 kit   Refills:  0       * blood glucose monitoring meter device kit   Commonly known as:  no brand specified   Used for:  Type 2 diabetes mellitus with hyperglycemia, without long-term current use of insulin (H)        Use to test blood sugar 3 times daily or as directed. Preferred blood glucose meter OR supplies to accompany: Blood Glucose Monitor Brands: per insurance.   Quantity:  1 kit   Refills:  0       * blood glucose monitoring test strip   Commonly known as:  HUMBERTO CONTOUR NEXT   Used for:  Type 2 diabetes mellitus with hyperglycemia (H)        TEST THREE TIMES A DAY   Quantity:  250 each   Refills:  2       * blood glucose monitoring test strip   Commonly known as:  no brand specified   Used for:  Type 2 diabetes mellitus with hyperglycemia, without long-term current use of insulin (H)        Use to test blood sugar 3 times daily or as directed. To accompany: Blood Glucose Monitor Brands: per insurance.   Quantity:  100 strip   Refills:  6       * E-Z JECT LANCET MICRO-THIN 33G Misc        Refills:  0       * thin lancets   Commonly known as:  NO BRAND SPECIFIED   Used for:  Type 2 diabetes mellitus with hyperglycemia, without long-term current use of insulin (H)        Use with lanceting device. To accompany: Blood Glucose Monitor Brands: per insurance.   Quantity:  100 each   Refills:  6       fish oil-omega-3 fatty acids 1000 MG capsule        Dose:  2 g   Take 2 g by mouth daily. 2 caps per day   Refills:  0       hydrochlorothiazide 25 MG tablet   Commonly known as:  HYDRODIURIL   Used for:  Essential hypertension with goal blood pressure less than 140/90        Dose:  25 mg   Take 1 tablet (25 mg) by mouth daily   Quantity:  90 tablet   Refills:  3       levothyroxine 100 MCG tablet   Commonly known as:  SYNTHROID/LEVOTHROID   Used for:  Hypothyroidism, unspecified  type        Dose:  100 mcg   Take 1 tablet (100 mcg) by mouth daily   Quantity:  90 tablet   Refills:  3       lisinopril 30 MG tablet   Commonly known as:  PRINIVIL,ZESTRIL   Used for:  Chronic kidney disease, stage II (mild), Type 2 diabetes mellitus with hyperglycemia, without long-term current use of insulin (H), Essential hypertension with goal blood pressure less than 140/90        Dose:  30 mg   Take 1 tablet (30 mg) by mouth daily   Quantity:  90 tablet   Refills:  3       metFORMIN 500 MG 24 hr tablet   Commonly known as:  GLUCOPHAGE-XR   Used for:  Type 2 diabetes mellitus with hyperosmolarity without coma, without long-term current use of insulin (H)        Dose:  500 mg   Take 1 tablet (500 mg) by mouth daily (with dinner)   Quantity:  90 tablet   Refills:  3       metoprolol 100 MG tablet   Commonly known as:  LOPRESSOR   Used for:  Essential hypertension with goal blood pressure less than 140/90        Dose:  100 mg   Take 1 tablet (100 mg) by mouth 2 times daily   Quantity:  180 tablet   Refills:  3       * NUTRITIONAL SUPPLEMENT PO        VIBE   Refills:  0       * NUTRITIONAL SUPPLEMENT PO        Dose:  1 oz   Take 1 oz by mouth daily Flex   Refills:  0       * Notice:  This list has 8 medication(s) that are the same as other medications prescribed for you. Read the directions carefully, and ask your doctor or other care provider to review them with you.      STOP taking     cefuroxime 250 MG tablet   Commonly known as:  CEFTIN                Where to get your medicines      These medications were sent to [id number] - [place], [place] - [address] [facility]  [address] [facility] [address]     Phone:  [phone]     clindamycin 150 MG capsule    cloNIDine 0.1 MG tablet    colchicine 0.6 MG tablet    naproxen 500 MG tablet         Some of these will need a paper prescription and others can be bought over the counter. Ask your nurse if you have questions.     You don't need a prescription  for these medications     acetaminophen 325 MG tablet               ANTIBIOTIC INSTRUCTION     You've Been Prescribed an Antibiotic - Now What?  Your healthcare team thinks that you or your loved one might have an infection. Some infections can be treated with antibiotics, which are powerful, life-saving drugs. Like all medications, antibiotics have side effects and should only be used when necessary. There are some important things you should know about your antibiotic treatment.      Your healthcare team may run tests before you start taking an antibiotic.    Your team may take samples (e.g., from your blood, urine or other areas) to run tests to look for bacteria. These test can be important to determine if you need an antibiotic at all and, if you do, which antibiotic will work best.      Within a few days, your healthcare team might change or even stop your antibiotic.    Your team may start you on an antibiotic while they are working to find out what is making you sick.    Your team might change your antibiotic because test results show that a different antibiotic would be better to treat your infection.    In some cases, once your team has more information, they learn that you do not need an antibiotic at all. They may find out that you don't have an infection, or that the antibiotic you're taking won't work against your infection. For example, an infection caused by a virus can't be treated with antibiotics. Staying on an antibiotic when you don't need it is more likely to be harmful than helpful.      You may experience side effects from your antibiotic.    Like all medications, antibiotics have side effects. Some of these can be serious.    Let you healthcare team know if you have any known allergies when you are admitted to the hospital.    One significant side effect of nearly all antibiotics is the risk of severe and sometimes deadly diarrhea caused by Clostridium difficile (C. Difficile). This occurs  when a person takes antibiotics because some good germs are destroyed. Antibiotic use allows C. diificile to take over, putting patients at high risk for this serious infection.    As a patient or caregiver, it is important to understand your or your loved one's antibiotic treatment. It is especially important for caregivers to speak up when patients can't speak for themselves. Here are some important questions to ask your healthcare team.    What infection is this antibiotic treating and how do you know I have that infection?    What side effects might occur from this antibiotic?    How long will I need to take this antibiotic?    Is it safe to take this antibiotic with other medications or supplements (e.g., vitamins) that I am taking?     Are there any special directions I need to know about taking this antibiotic? For example, should I take it with food?    How will I be monitored to know whether my infection is responding to the antibiotic?    What tests may help to make sure the right antibiotic is prescribed for me?      Information provided by:  www.cdc.gov/getsmart  U.S. Department of Health and Human Services  Centers for disease Control and Prevention  National Center for Emerging and Zoonotic Infectious Diseases  Division of Healthcare Quality Promotion         Protect others around you: Learn how to safely use, store and throw away your medicines at www.disposemymeds.org.             Medication List: This is a list of all your medications and when to take them. Check marks below indicate your daily home schedule. Keep this list as a reference.      Medications           Morning Afternoon Evening Bedtime As Needed    acetaminophen 325 MG tablet   Commonly known as:  TYLENOL   Take 3 tablets (975 mg) by mouth every 6 hours as needed for mild pain   Last time this was given:  650 mg on 11/27/2017  2:48 AM                                   alcohol swab prep pads   Use to swab area of injection/geoffrey as  directed.                                   ALLEGRA 180 MG tablet   1 TABLET DAILY   Last time this was given:  180 mg on 11/28/2017  8:39 AM   Generic drug:  fexofenadine                                   apixaban ANTICOAGULANT 5 MG tablet   Commonly known as:  ELIQUIS   Take 1 tablet (5 mg) by mouth 2 times daily   Last time this was given:  5 mg on 11/28/2017  8:38 AM                                      blood glucose calibration solution   Commonly known as:  NO BRAND SPECIFIED   To accompany: Blood Glucose Monitor Brands: per insurance.                                * Blood Glucose Monitoring Suppl W/DEVICE Kit   1 kit 3 times daily.                                * blood glucose monitoring meter device kit   Commonly known as:  no brand specified   Use to test blood sugar 3 times daily or as directed. Preferred blood glucose meter OR supplies to accompany: Blood Glucose Monitor Brands: per insurance.                                * blood glucose monitoring test strip   Commonly known as:  AdYapper CONTOUR NEXT   TEST THREE TIMES A DAY                                * blood glucose monitoring test strip   Commonly known as:  no brand specified   Use to test blood sugar 3 times daily or as directed. To accompany: Blood Glucose Monitor Brands: per insurance.                                clindamycin 150 MG capsule   Commonly known as:  CLEOCIN   Take 1 capsule (150 mg) by mouth 3 times daily for 7 days                                         * cloNIDine 0.1 MG tablet   Commonly known as:  CATAPRES   Take 2 tablets (0.2 mg) by mouth At Bedtime   Last time this was given:  0.1 mg on 11/28/2017  8:39 AM                                   * cloNIDine 0.1 MG tablet   Commonly known as:  CATAPRES   Take 1 tablet (0.1 mg) by mouth every morning   Start taking on:  11/29/2017   Last time this was given:  0.1 mg on 11/28/2017  8:39 AM                                   colchicine 0.6 MG tablet   Take 2 tablets (1.2 mg)  twice daily for 5 days, then take 2 tablets (1.2 mg) once daily   Last time this was given:  1.2 mg on 11/28/2017  8:38 AM            Twice daily 2 tablets and then take 2 tablets once daily.                          * E-Z JECT LANCET MICRO-THIN 33G Misc                                * thin lancets   Commonly known as:  NO BRAND SPECIFIED   Use with lanceting device. To accompany: Blood Glucose Monitor Brands: per insurance.                                fish oil-omega-3 fatty acids 1000 MG capsule   Take 2 g by mouth daily. 2 caps per day                                      hydrochlorothiazide 25 MG tablet   Commonly known as:  HYDRODIURIL   Take 1 tablet (25 mg) by mouth daily   Last time this was given:  25 mg on 11/28/2017  8:39 AM                                   levothyroxine 100 MCG tablet   Commonly known as:  SYNTHROID/LEVOTHROID   Take 1 tablet (100 mcg) by mouth daily   Last time this was given:  100 mcg on 11/28/2017  6:07 AM                                   lisinopril 30 MG tablet   Commonly known as:  PRINIVIL,ZESTRIL   Take 1 tablet (30 mg) by mouth daily   Last time this was given:  30 mg on 11/28/2017  8:39 AM                                   metFORMIN 500 MG 24 hr tablet   Commonly known as:  GLUCOPHAGE-XR   Take 1 tablet (500 mg) by mouth daily (with dinner)   Last time this was given:  500 mg on 11/27/2017  5:09 PM                                      metoprolol 100 MG tablet   Commonly known as:  LOPRESSOR   Take 1 tablet (100 mg) by mouth 2 times daily   Last time this was given:  100 mg on 11/28/2017  8:39 AM                                      naproxen 500 MG tablet   Commonly known as:  NAPROSYN   Take 1 tablet (500 mg) by mouth 2 times daily (with meals) for 5 days   Last time this was given:  500 mg on 11/28/2017  8:39 AM                                      * NUTRITIONAL SUPPLEMENT PO   VIBE                                * NUTRITIONAL SUPPLEMENT PO   Take 1 oz by mouth daily Flex                                    * Notice:  This list has 10 medication(s) that are the same as other medications prescribed for you. Read the directions carefully, and ask your doctor or other care provider to review them with you.              More Information        Patient Education    Colchicine Oral capsule    Colchicine Oral tablet    Colchicine Solution for injection  Colchicine Oral tablet  What is this medicine?  COLCHICINE (KOL chi seen) is for joint pain and swelling due to attacks of acute gouty arthritis. The medicine is also used to treat familial Mediterranean fever.  This medicine may be used for other purposes; ask your health care provider or pharmacist if you have questions.  What should I tell my health care provider before I take this medicine?  They need to know if you have any of these conditions:    anemia    blood disorders like leukemia or lymphoma    heart disease    immune system problems    intestinal disease    kidney disease    liver disease    muscle pain or weakness    take other medicines    stomach problems    an unusual or allergic reaction to colchicine, other medicines, lactose, foods, dyes, or preservatives    pregnant or trying to get pregnant    breast-feeding  How should I use this medicine?  Take this medicine by mouth with a full glass of water. Follow the directions on the prescription label. You can take it with or without food. If it upsets your stomach, take it with food. Take your medicine at regular intervals. Do not take your medicine more often than directed.  A special MedGuide will be given to you by the pharmacist with each prescription and refill. Be sure to read this information carefully each time.  Talk to your pediatrician regarding the use of this medicine in children. While this drug may be prescribed for children as young as 4 years old for selected conditions, precautions do apply.  Patients over 65 years old may have a stronger reaction and need a  smaller dose.  Overdosage: If you think you have taken too much of this medicine contact a poison control center or emergency room at once.  NOTE: This medicine is only for you. Do not share this medicine with others.  What if I miss a dose?  If you miss a dose, take it as soon as you can. If it is almost time for your next dose, take only that dose. Do not take double or extra doses.  What may interact with this medicine?  Do not take this medicine with any of the following medications:    certain medicines for fungal infections like itraconazole  This medicine may also interact with the following medications:    alcohol    certain medicines for cholesterol like atorvastatin    certain medicines for coughs and colds    certain medicines to help you breathe better    cyclosporine    digoxin    epinephrine    grapefruit or grapefruit juice    methenamine    other medicines for fungal infection    sodium bicarbonate    some antibiotics like clarithromycin, erythromycin, and telithromycin    some medicines for an irregular heartbeat or other heart problems    some medicines for cancer, like lapatinib and tamoxifen    some medicines for HIV  This list may not describe all possible interactions. Give your health care provider a list of all the medicines, herbs, non-prescription drugs, or dietary supplements you use. Also tell them if you smoke, drink alcohol, or use illegal drugs. Some items may interact with your medicine.  What should I watch for while using this medicine?  Visit your doctor or health care professional for regular checks on your progress. You may need periodic blood checks.  Alcohol can increase the chance of getting stomach problems and gout attacks. Do not drink alcohol.  What side effects may I notice from receiving this medicine?  Side effects that you should report to your doctor or health care professional as soon as possible:    allergic reactions like skin rash, itching or hives, swelling of  the face, lips, or tongue    fever, chills, or sore throat    muscle tenderness, pain, or weakness    numbness or tingling in hands or feet    unusual bleeding or bruising    unusually weak or tired    vomiting  Side effects that usually do not require medical attention (report to your doctor or health care professional if they continue or are bothersome):    diarrhea    hair loss    loss of appetite    stomach pain or nausea  This list may not describe all possible side effects. Call your doctor for medical advice about side effects. You may report side effects to FDA at 6-560-FDA-6059.  Where should I keep my medicine?  Keep out of the reach of children.  Store at room temperature between 15 and 30 degrees C (59 and 86 degrees F). Keep container tightly closed. Protect from light. Throw away any unused medicine after the expiration date.  NOTE:This sheet is a summary. It may not cover all possible information. If you have questions about this medicine, talk to your doctor, pharmacist, or health care provider. Copyright  2016 Gold Standard

## 2017-11-26 NOTE — ED NOTES
ED Nursing criteria listed below was addressed during verbal handoff:     Abnormal vitals: Yes, hypertensive non symptomatic  Abnormal results: No  Med Reconciliation completed: Yes  Meds given in ED: Yes, antibiotics one finished and the second running.  Any Overdue Meds: No  Core Measures: Yes  Isolation: N/A  Special needs: N/A  Skin assessment: Yes    Observation Patient  Education provided: N/A

## 2017-11-27 ENCOUNTER — HOME INFUSION (PRE-WILLOW HOME INFUSION) (OUTPATIENT)
Dept: PHARMACY | Facility: CLINIC | Age: 77
End: 2017-11-27

## 2017-11-27 ENCOUNTER — APPOINTMENT (OUTPATIENT)
Dept: GENERAL RADIOLOGY | Facility: CLINIC | Age: 77
DRG: 603 | End: 2017-11-27
Attending: PODIATRIST
Payer: COMMERCIAL

## 2017-11-27 PROBLEM — L03.031 CELLULITIS OF TOE OF RIGHT FOOT: Status: ACTIVE | Noted: 2017-11-26

## 2017-11-27 PROBLEM — M25.571 TOE JOINT PAIN, RIGHT: Status: ACTIVE | Noted: 2017-11-27

## 2017-11-27 LAB
ANION GAP SERPL CALCULATED.3IONS-SCNC: 10 MMOL/L (ref 3–14)
BUN SERPL-MCNC: 12 MG/DL (ref 7–30)
CALCIUM SERPL-MCNC: 8.1 MG/DL (ref 8.5–10.1)
CHLORIDE SERPL-SCNC: 102 MMOL/L (ref 94–109)
CO2 SERPL-SCNC: 27 MMOL/L (ref 20–32)
CREAT SERPL-MCNC: 0.85 MG/DL (ref 0.52–1.04)
ERYTHROCYTE [DISTWIDTH] IN BLOOD BY AUTOMATED COUNT: 14 % (ref 10–15)
GFR SERPL CREATININE-BSD FRML MDRD: 64 ML/MIN/1.7M2
GLUCOSE BLDC GLUCOMTR-MCNC: 103 MG/DL (ref 70–99)
GLUCOSE BLDC GLUCOMTR-MCNC: 120 MG/DL (ref 70–99)
GLUCOSE BLDC GLUCOMTR-MCNC: 140 MG/DL (ref 70–99)
GLUCOSE BLDC GLUCOMTR-MCNC: 169 MG/DL (ref 70–99)
GLUCOSE SERPL-MCNC: 134 MG/DL (ref 70–99)
HCT VFR BLD AUTO: 40.1 % (ref 35–47)
HGB BLD-MCNC: 13.1 G/DL (ref 11.7–15.7)
MCH RBC QN AUTO: 29.6 PG (ref 26.5–33)
MCHC RBC AUTO-ENTMCNC: 32.7 G/DL (ref 31.5–36.5)
MCV RBC AUTO: 91 FL (ref 78–100)
PLATELET # BLD AUTO: 120 10E9/L (ref 150–450)
POTASSIUM SERPL-SCNC: 3.2 MMOL/L (ref 3.4–5.3)
POTASSIUM SERPL-SCNC: 3.7 MMOL/L (ref 3.4–5.3)
RBC # BLD AUTO: 4.43 10E12/L (ref 3.8–5.2)
SODIUM SERPL-SCNC: 139 MMOL/L (ref 133–144)
URATE SERPL-MCNC: 6.1 MG/DL (ref 2.6–6)
WBC # BLD AUTO: 7.8 10E9/L (ref 4–11)

## 2017-11-27 PROCEDURE — 25000128 H RX IP 250 OP 636: Performed by: FAMILY MEDICINE

## 2017-11-27 PROCEDURE — 85027 COMPLETE CBC AUTOMATED: CPT | Performed by: FAMILY MEDICINE

## 2017-11-27 PROCEDURE — 36415 COLL VENOUS BLD VENIPUNCTURE: CPT | Performed by: PEDIATRICS

## 2017-11-27 PROCEDURE — 73630 X-RAY EXAM OF FOOT: CPT | Mod: TC,RT

## 2017-11-27 PROCEDURE — 84132 ASSAY OF SERUM POTASSIUM: CPT | Performed by: PEDIATRICS

## 2017-11-27 PROCEDURE — 99222 1ST HOSP IP/OBS MODERATE 55: CPT | Performed by: PODIATRIST

## 2017-11-27 PROCEDURE — 84550 ASSAY OF BLOOD/URIC ACID: CPT | Performed by: PEDIATRICS

## 2017-11-27 PROCEDURE — 25000132 ZZH RX MED GY IP 250 OP 250 PS 637: Performed by: FAMILY MEDICINE

## 2017-11-27 PROCEDURE — 25000128 H RX IP 250 OP 636: Performed by: EMERGENCY MEDICINE

## 2017-11-27 PROCEDURE — 25000132 ZZH RX MED GY IP 250 OP 250 PS 637: Performed by: PEDIATRICS

## 2017-11-27 PROCEDURE — 00000146 ZZHCL STATISTIC GLUCOSE BY METER IP

## 2017-11-27 PROCEDURE — 99233 SBSQ HOSP IP/OBS HIGH 50: CPT | Performed by: PEDIATRICS

## 2017-11-27 PROCEDURE — 80048 BASIC METABOLIC PNL TOTAL CA: CPT | Performed by: FAMILY MEDICINE

## 2017-11-27 PROCEDURE — 12000000 ZZH R&B MED SURG/OB

## 2017-11-27 PROCEDURE — 25000131 ZZH RX MED GY IP 250 OP 636 PS 637: Performed by: FAMILY MEDICINE

## 2017-11-27 PROCEDURE — 36415 COLL VENOUS BLD VENIPUNCTURE: CPT | Performed by: FAMILY MEDICINE

## 2017-11-27 RX ORDER — POTASSIUM CHLORIDE 1.5 G/1.58G
20-40 POWDER, FOR SOLUTION ORAL
Status: DISCONTINUED | OUTPATIENT
Start: 2017-11-27 | End: 2017-11-28 | Stop reason: HOSPADM

## 2017-11-27 RX ORDER — ACETAMINOPHEN 325 MG/1
975 TABLET ORAL EVERY 6 HOURS PRN
Status: DISCONTINUED | OUTPATIENT
Start: 2017-11-27 | End: 2017-11-28 | Stop reason: HOSPADM

## 2017-11-27 RX ORDER — POTASSIUM CHLORIDE 29.8 MG/ML
20 INJECTION INTRAVENOUS
Status: DISCONTINUED | OUTPATIENT
Start: 2017-11-27 | End: 2017-11-27 | Stop reason: CLARIF

## 2017-11-27 RX ORDER — POTASSIUM CHLORIDE 1500 MG/1
20-40 TABLET, EXTENDED RELEASE ORAL
Status: DISCONTINUED | OUTPATIENT
Start: 2017-11-27 | End: 2017-11-28 | Stop reason: HOSPADM

## 2017-11-27 RX ORDER — NAPROXEN 250 MG/1
500 TABLET ORAL 2 TIMES DAILY WITH MEALS
Status: DISCONTINUED | OUTPATIENT
Start: 2017-11-27 | End: 2017-11-28 | Stop reason: HOSPADM

## 2017-11-27 RX ORDER — POTASSIUM CHLORIDE 7.45 MG/ML
10 INJECTION INTRAVENOUS
Status: DISCONTINUED | OUTPATIENT
Start: 2017-11-27 | End: 2017-11-28 | Stop reason: HOSPADM

## 2017-11-27 RX ORDER — COLCHICINE 0.6 MG/1
1.2 TABLET ORAL DAILY
Status: DISCONTINUED | OUTPATIENT
Start: 2017-11-27 | End: 2017-11-28 | Stop reason: HOSPADM

## 2017-11-27 RX ORDER — POTASSIUM CL/LIDO/0.9 % NACL 10MEQ/0.1L
10 INTRAVENOUS SOLUTION, PIGGYBACK (ML) INTRAVENOUS
Status: DISCONTINUED | OUTPATIENT
Start: 2017-11-27 | End: 2017-11-28 | Stop reason: HOSPADM

## 2017-11-27 RX ADMIN — HYDROCHLOROTHIAZIDE 25 MG: 25 TABLET ORAL at 08:42

## 2017-11-27 RX ADMIN — INSULIN ASPART 1 UNITS: 100 INJECTION, SOLUTION INTRAVENOUS; SUBCUTANEOUS at 17:09

## 2017-11-27 RX ADMIN — TAZOBACTAM SODIUM AND PIPERACILLIN SODIUM 3.38 G: 375; 3 INJECTION, SOLUTION INTRAVENOUS at 02:39

## 2017-11-27 RX ADMIN — TAZOBACTAM SODIUM AND PIPERACILLIN SODIUM 3.38 G: 375; 3 INJECTION, SOLUTION INTRAVENOUS at 13:48

## 2017-11-27 RX ADMIN — SODIUM CHLORIDE: 9 INJECTION, SOLUTION INTRAVENOUS at 02:51

## 2017-11-27 RX ADMIN — APIXABAN 5 MG: 5 TABLET, FILM COATED ORAL at 08:41

## 2017-11-27 RX ADMIN — TAZOBACTAM SODIUM AND PIPERACILLIN SODIUM 3.38 G: 375; 3 INJECTION, SOLUTION INTRAVENOUS at 08:07

## 2017-11-27 RX ADMIN — SENNOSIDES AND DOCUSATE SODIUM 1 TABLET: 8.6; 5 TABLET ORAL at 09:12

## 2017-11-27 RX ADMIN — METFORMIN HYDROCHLORIDE 500 MG: 500 TABLET, EXTENDED RELEASE ORAL at 17:09

## 2017-11-27 RX ADMIN — VANCOMYCIN HYDROCHLORIDE 1250 MG: 1 INJECTION, POWDER, LYOPHILIZED, FOR SOLUTION INTRAVENOUS at 14:35

## 2017-11-27 RX ADMIN — TAZOBACTAM SODIUM AND PIPERACILLIN SODIUM 3.38 G: 375; 3 INJECTION, SOLUTION INTRAVENOUS at 20:28

## 2017-11-27 RX ADMIN — INSULIN ASPART 1 UNITS: 100 INJECTION, SOLUTION INTRAVENOUS; SUBCUTANEOUS at 12:49

## 2017-11-27 RX ADMIN — POTASSIUM CHLORIDE 40 MEQ: 1500 TABLET, EXTENDED RELEASE ORAL at 16:04

## 2017-11-27 RX ADMIN — METOPROLOL TARTRATE 100 MG: 100 TABLET, FILM COATED ORAL at 08:42

## 2017-11-27 RX ADMIN — LEVOTHYROXINE SODIUM 100 MCG: 100 TABLET ORAL at 06:36

## 2017-11-27 RX ADMIN — ACETAMINOPHEN 650 MG: 325 TABLET ORAL at 02:48

## 2017-11-27 RX ADMIN — POTASSIUM CHLORIDE 20 MEQ: 1500 TABLET, EXTENDED RELEASE ORAL at 17:29

## 2017-11-27 RX ADMIN — COLCHICINE 1.2 MG: 0.6 TABLET, FILM COATED ORAL at 17:29

## 2017-11-27 RX ADMIN — CLONIDINE HYDROCHLORIDE 0.2 MG: 0.1 TABLET ORAL at 20:29

## 2017-11-27 RX ADMIN — CLONIDINE HYDROCHLORIDE 0.1 MG: 0.1 TABLET ORAL at 08:42

## 2017-11-27 RX ADMIN — LISINOPRIL 30 MG: 10 TABLET ORAL at 08:54

## 2017-11-27 RX ADMIN — APIXABAN 5 MG: 5 TABLET, FILM COATED ORAL at 20:28

## 2017-11-27 RX ADMIN — METOPROLOL TARTRATE 100 MG: 100 TABLET, FILM COATED ORAL at 20:29

## 2017-11-27 RX ADMIN — NAPROXEN 500 MG: 250 TABLET ORAL at 17:08

## 2017-11-27 RX ADMIN — FEXOFENADINE HYDROCHLORIDE 180 MG: 180 TABLET, FILM COATED ORAL at 08:54

## 2017-11-27 NOTE — PLAN OF CARE
Problem: Infection, Risk/Actual (Adult)  Goal: Infection Prevention/Resolution  Patient will demonstrate the desired outcomes by discharge/transition of care.   Outcome: Therapy, progress toward functional goals as expected  Less redness to right foot and pain has decreased. Up to bathroom to void. IV fluids and abx infusing. Good appetite. Will continue to monitor pain, redness, labs, activity tolerance, safety.

## 2017-11-27 NOTE — PLAN OF CARE
Problem: Patient Care Overview  Goal: Plan of Care/Patient Progress Review  Outcome: Improving  Cellulitis to R second toe is deep red in color, surrounding toes and foot slight pink in color and foot has trace edema, toe with 2+ edema. Pedal pulses 2+. Pt is able to wiggle toes and dorsiflex and plantarflex RLE but is more slow and limited due to R second toe swelling. Pt has been ambulating to the bathroom and able to bear weight to RLE. Has requested Tylenol for frontal headache and this has helped. BP improving, see vs. Has declined pain meds for R foot, has only wanted Tylenol for headache pain. Denies any tingling or numbness to RLE. Will continue with plan of care, monitor labs, abx.

## 2017-11-27 NOTE — CONSULTS
[facility]  Podiatry Consultation         Tho Louie DPM    Ni Maya MRN# [id number]   YOB: 1940 Age: 77 year old      Date of Consult: 11/27/2017         Assessment and Plan:   Ni Maya is a 77 year old female who presented with history, exam, laboratory and imaging most consistent with probable gout right foot. She developed symptoms just a few days ago of a red-hot swollen right second toe that has now progressed to the hallux. She remains afebrile throughout. WBC normal. She did have an upset stomach possible chills however this was after utilizing oral antibiotics. She did not respond to oral antibiotics.  Her father had quite severe gout throughout his later years. The PIPJ, DIPJ, and MPJ are symptomatic. Typically a septic joint will not provide symptoms at multiple joint levels. Also I identify no obvious source for septic joint.     I would be most suspicious for etiology of gout with normal uric acid after the acute event.  Further treatment of gout possibly to include colchicine and hydration may be of benefit. If she does not respond I could consider local anesthesia and attempt to aspirate the joint with a large bore needle to look for negatively birefringent crystals under a polarized light versus positive Gram stain from joint fluid. however there is typically less reliable or very small volume of fluid able to be obtained from these small joints.      ICD-10-CM    1. Cellulitis of right lower extremity L03.115 CBC with platelets differential     Basic metabolic panel     Blood culture     Blood culture     Methicillin resistant staph aureus cult     Glucose by meter     Glucose by meter     Glucose by meter     Glucose by meter     CBC with platelets     Basic metabolic panel     Glucose by meter     Glucose by meter     Glucose by meter     Glucose by meter     Uric acid     Glucose by meter     Glucose by meter               Code Status:    Full Code         Primary Care Physician:   Apryl Olvera         Requesting Physician:      No ref. provider found         Chief Complaint:     Chief Complaint   Patient presents with     Wound Check       History is obtained from the patient         History of Present Illness:   Ni Maya is a 77 year old female who presents on consultation with red-hot swollen foot starting about the second toe now the second toe resolving and now worsening in the hallux. This started after a couple days after Thanksgiving dinner. The past medical history, past surgical history, family history, social history and review of systems was performed and as noted.           Past Medical History:     Past Medical History:   Diagnosis Date     Allergic rhinitis, cause unspecified      Essential hypertension, benign      Infection of kidney, unspecified 1999     Other specified acquired hypothyroidism      Other specified types of cystitis(595.89)     1999,6-2-01, 10-10-01             Past Surgical History:     Past Surgical History:   Procedure Laterality Date     BIOPSY VULVA/PERINEUM,ADDNL LESN  9/18/09    Wide -excision of MICKIE III- right labia      C LAPAROSCOPY, SURGICAL; W/ VAGINAL HYSTERECTOMY W/WO REMOVAL OVARY(S)/TUBES  1984    for bleeding     C LIGATE FALLOPIAN TUBE,POSTPARTUM  1978    Tubal Ligation     COLONOSCOPY  9/13/2013    Procedure: COLONOSCOPY;  Colonoscopy;  Surgeon: Yanick Ramsey MD;  Location: [facility]     HC ANGIOGRAPHY ARCH  4-3-98     HC BIOPSY OF BREAST, OPEN INCISIONAL  1960    Benign     HC COLONOSCOPY THRU STOMA, DIAGNOSTIC  4-24-98    Diverticuli - due in 2008     HC ERCP W SPHINCTEROTOMY  1996 6/2/96 and 8/30/96     HC REDUCTION OF LARGE BREAST  1988     HC REMOVAL GALLBLADDER  1995     HC UGI ENDOSCOPY DIAG W OR W/O BRUSH/WASH  7-     INJECT EPIDURAL LUMBAR N/A 8/10/2017    Procedure: INJECT EPIDURAL LUMBAR;  Lumbar 2-3 Epidural Steroid Injection;  Surgeon: Kimani Garcia MD;   Location:  OR            Home Medications:     Prior to Admission medications    Medication Sig Last Dose Taking? Auth Provider   Nutritional Supplements (NUTRITIONAL SUPPLEMENT PO) Take 1 oz by mouth daily Flex 11/26/2017 at AM Yes Reported, Patient   cefuroxime (CEFTIN) 250 MG tablet Two tablets twice daily for five days then one tablet twice daily till finished 11/26/2017 at AM Yes Tha Smith MD   alcohol swab prep pads Use to swab area of injection/geoffrey as directed. 11/26/2017 at AM Yes Apryl Olvera MD   lisinopril (PRINIVIL,ZESTRIL) 30 MG tablet Take 1 tablet (30 mg) by mouth daily 11/26/2017 at AM Yes Apryl Olvera MD   cloNIDine (CATAPRES) 0.1 MG tablet Take 2 tablets (0.2 mg) by mouth At Bedtime 11/25/2017 at PM Yes Apryl Olvera MD   hydrochlorothiazide (HYDRODIURIL) 25 MG tablet Take 1 tablet (25 mg) by mouth daily 11/26/2017 at Am Yes Apryl Olvera MD   apixaban ANTICOAGULANT (ELIQUIS) 5 MG tablet Take 1 tablet (5 mg) by mouth 2 times daily 11/26/2017 at AM Yes Sohan Deleon MD   metFORMIN (GLUCOPHAGE-XR) 500 MG 24 hr tablet Take 1 tablet (500 mg) by mouth daily (with dinner) 11/25/2017 at PM Yes Violeta Lin MD   metoprolol (LOPRESSOR) 100 MG tablet Take 1 tablet (100 mg) by mouth 2 times daily 11/26/2017 at AM Yes Violeta Lin MD   levothyroxine (SYNTHROID/LEVOTHROID) 100 MCG tablet Take 1 tablet (100 mcg) by mouth daily 11/26/2017 at AM Yes Violeta Lin MD   fish oil-omega-3 fatty acids (FISH OIL) 1000 MG capsule Take 2 g by mouth daily. 2 caps per day 11/25/2017 at AM Yes Reported, Patient   ALLEGRA 180 MG PO TABS 1 TABLET DAILY 11/26/2017 at AM Yes Michelle Kemp MD   NUTRITIONAL SUPPLEMENT OR VIBE 11/25/2017 at AM Yes Reported, Patient   blood glucose monitoring (NO BRAND SPECIFIED) meter device kit Use to test blood sugar 3 times daily or as directed. Preferred blood glucose meter OR supplies to accompany: Blood Glucose Monitor  Brands: per insurance.   Apryl Olvera MD   blood glucose monitoring (NO BRAND SPECIFIED) test strip Use to test blood sugar 3 times daily or as directed. To accompany: Blood Glucose Monitor Brands: per insurance.   Apryl Olvera MD   blood glucose calibration (NO BRAND SPECIFIED) solution To accompany: Blood Glucose Monitor Brands: per insurance.   Apryl Ovlera MD   thin (NO BRAND SPECIFIED) lancets Use with lanceting device. To accompany: Blood Glucose Monitor Brands: per insurance.   Apryl Olvera MD   blood glucose monitoring (HUMBERTO CONTOUR NEXT) test strip TEST THREE TIMES A DAY   Carly Frazier MD   Lancets (E-Z JECT LANCET MICRO-THIN 33G) MISC    Violeta Lin MD   Blood Glucose Monitoring Suppl W/DEVICE KIT 1 kit 3 times daily.   Violeta Lin MD            Current Medications:           vancomycin (VANCOCIN) IV  1,250 mg Intravenous Q24H     fexofenadine  180 mg Oral Daily     apixaban ANTICOAGULANT  5 mg Oral BID     hydrochlorothiazide  25 mg Oral Daily     levothyroxine  100 mcg Oral Daily     lisinopril  30 mg Oral Daily     metFORMIN  500 mg Oral Daily with supper     metoprolol  100 mg Oral BID     piperacillin-tazobactam  3.375 g Intravenous Q6H     insulin aspart  1-3 Units Subcutaneous TID AC     insulin aspart  1-3 Units Subcutaneous At Bedtime     cloNIDine  0.2 mg Oral At Bedtime     cloNIDine  0.1 mg Oral QAM     acetaminophen, potassium chloride, potassium chloride, potassium chloride, potassium chloride with lidocaine, naloxone, - MEDICATION INSTRUCTIONS -, senna-docusate **OR** senna-docusate, ondansetron **OR** ondansetron, prochlorperazine **OR** prochlorperazine **OR** prochlorperazine, glucose **OR** dextrose **OR** glucagon, HYDROcodone-acetaminophen, hydrALAZINE         Allergies:     Allergies   Allergen Reactions     Sulfa Drugs Rash            Social History:   Ni Maya  reports that she has never smoked. She has never used  "smokeless tobacco. She reports that she does not drink alcohol or use illicit drugs.          Family History:     Family History   Problem Relation Age of Onset     Cardiovascular Father      Aortic aneurysm     Hypertension Father      Hypertension Mother      GASTROINTESTINAL DISEASE Mother      GI Bleed     Lipids Sister      Hypertension Sister      Genitourinary Problems Sister      Allergies Sister      CANCER Brother      Lung     Alcohol/Drug Brother      Connective Tissue Disorder Son      Down Syndrome     Respiratory Son      Pneumonia     CANCER Maternal Grandmother      Stomach     Hypertension Maternal Grandfather      Allergies Daughter              Review of Systems:   The 10 point Review of Systems is negative other than noted in the HPI or here.           Physical Exam:   Heart Rate: 98,   Vitals: /70 (BP Location: Right arm)  Pulse 98  Temp 98.6  F (37  C) (Oral)  Resp 18  Ht 5' 5\" (1.651 m)  Wt 191 lb 5.8 oz (86.8 kg)  SpO2 97%  BMI 31.84 kg/m2  BMI= Body mass index is 31.84 kg/(m^2).    Constitutional: Awake, alert, no acute distress.  Psychiatric: The patient is alert and oriented times 3.  The patient's affect is not blunted and mood is appropriate.  Cardiovascular: DP pulse 1/4 PT pulse 1/4 bilateral, CFT less than 3 seconds, temperature warm to warm proximal to distal. Considerable rubor is noted about the first and second digits of the right foot with a subtle erythema localized to the toe extending over the metatarsal phalangeal joint subtle edema also localized to the first and second toes of the right foot.  Neurologic: Light touch and plantar response equal intact bilateral  Skin: No skin rashes or lesions to inspection.  No petechia.    Wounds: No open wound or laceration or abrasions noted.  Musculoskeletal: Gross deformities noted discomfort is noted with firm palpation of the PIPJ and DIPJ and MPJ of the right second digit as well as the MPJ and IPJ of the right hallux. " Discomfort is noted with range of motion of each of these joints in addition to palpation. Her discomfort is not localized to one joint.         Data:   All new lab and imaging data was reviewed.   Results for orders placed or performed during the hospital encounter of 11/26/17 (from the past 24 hour(s))   CBC with platelets differential   Result Value Ref Range    WBC 8.7 4.0 - 11.0 10e9/L    RBC Count 4.90 3.8 - 5.2 10e12/L    Hemoglobin 14.4 11.7 - 15.7 g/dL    Hematocrit 43.9 35.0 - 47.0 %    MCV 90 78 - 100 fl    MCH 29.4 26.5 - 33.0 pg    MCHC 32.8 31.5 - 36.5 g/dL    RDW 13.9 10.0 - 15.0 %    Platelet Count 122 (L) 150 - 450 10e9/L    Diff Method Automated Method     % Neutrophils 71.2 %    % Lymphocytes 15.4 %    % Monocytes 10.3 %    % Eosinophils 2.8 %    % Basophils 0.2 %    % Immature Granulocytes 0.1 %    Absolute Neutrophil 6.2 1.6 - 8.3 10e9/L    Absolute Lymphocytes 1.3 0.8 - 5.3 10e9/L    Absolute Monocytes 0.9 0.0 - 1.3 10e9/L    Absolute Eosinophils 0.2 0.0 - 0.7 10e9/L    Absolute Basophils 0.0 0.0 - 0.2 10e9/L    Abs Immature Granulocytes 0.0 0 - 0.4 10e9/L   Basic metabolic panel   Result Value Ref Range    Sodium 139 133 - 144 mmol/L    Potassium 3.4 3.4 - 5.3 mmol/L    Chloride 101 94 - 109 mmol/L    Carbon Dioxide 31 20 - 32 mmol/L    Anion Gap 7 3 - 14 mmol/L    Glucose 132 (H) 70 - 99 mg/dL    Urea Nitrogen 13 7 - 30 mg/dL    Creatinine 1.00 0.52 - 1.04 mg/dL    GFR Estimate 54 (L) >60 mL/min/1.7m2    GFR Estimate If Black 65 >60 mL/min/1.7m2    Calcium 9.0 8.5 - 10.1 mg/dL   Blood culture   Result Value Ref Range    Specimen Description Right Arm     Special Requests 30     Culture Micro No growth after 18 hours    Blood culture   Result Value Ref Range    Specimen Description Left Hand     Special Requests 30     Culture Micro No growth after 18 hours    Glucose by meter   Result Value Ref Range    Glucose 107 (H) 70 - 99 mg/dL   Glucose by meter   Result Value Ref Range    Glucose 136  (H) 70 - 99 mg/dL   Glucose by meter   Result Value Ref Range    Glucose 103 (H) 70 - 99 mg/dL   CBC with platelets   Result Value Ref Range    WBC 7.8 4.0 - 11.0 10e9/L    RBC Count 4.43 3.8 - 5.2 10e12/L    Hemoglobin 13.1 11.7 - 15.7 g/dL    Hematocrit 40.1 35.0 - 47.0 %    MCV 91 78 - 100 fl    MCH 29.6 26.5 - 33.0 pg    MCHC 32.7 31.5 - 36.5 g/dL    RDW 14.0 10.0 - 15.0 %    Platelet Count 120 (L) 150 - 450 10e9/L   Basic metabolic panel   Result Value Ref Range    Sodium 139 133 - 144 mmol/L    Potassium 3.2 (L) 3.4 - 5.3 mmol/L    Chloride 102 94 - 109 mmol/L    Carbon Dioxide 27 20 - 32 mmol/L    Anion Gap 10 3 - 14 mmol/L    Glucose 134 (H) 70 - 99 mg/dL    Urea Nitrogen 12 7 - 30 mg/dL    Creatinine 0.85 0.52 - 1.04 mg/dL    GFR Estimate 64 >60 mL/min/1.7m2    GFR Estimate If Black 78 >60 mL/min/1.7m2    Calcium 8.1 (L) 8.5 - 10.1 mg/dL   Uric acid   Result Value Ref Range    Uric Acid 6.1 (H) 2.6 - 6.0 mg/dL   Glucose by meter   Result Value Ref Range    Glucose 120 (H) 70 - 99 mg/dL   Care Transition RN/SW IP Consult    Narrative    Anastasia Morse RN     11/27/2017 11:37 AM  Care Transition Initial Assessment - RN  Reason For Consult: other (see comments)   Met with: Patient.    DATA   Principal Problem:    Cellulitis of toe of right foot  Active Problems:    Hypothyroidism    Hyperlipidemia with target LDL less than 100    HTN, goal below 150/90    Type 2 diabetes mellitus with hyperglycemia, without long-term   current use of insulin (H)    Chronic atrial fibrillation (H)    Thrombocytopenia (H)    Toe joint pain, right great toe       Primary Care Clinic Name: [facility]  Primary Care MD Name: Dr Olvera  Contact information and PCP information verified: Yes      ASSESSMENT  Cognitive Status: awake, alert and oriented.             Lives With: spouse  Living Arrangements: house  Quality Of Family Relationships: supportive, involved  Description of Support System: Supportive, Involved   Who is your  support system?:    Support Assessment: Adequate family and caregiver support   Insurance Concerns: No Insurance issues identified          This writer met with pt in her room, introduced self and role.   Patient currently lives at home with her  with no home   care needs writer ran benefits for home infusion and patient does   not have coverage and would need to go to a TCU for short term   rehab or come into Infusion therapy if IV Abx were needed.   Pateint prefers to come to the infusion therapy.      PLAN    Discharge home with family support if IV abx are needed patient   will come to infusion. No further needs      Anastasia Morse CTS RN [facility] [phone] [facility] [phone]    Discharge Planner   Discharge Plans in progress: Home with   Barriers to discharge plan: none  Follow up plan: Clinic follow up.       Entered by: Anastasia Morse 11/27/2017 11:33 AM          Glucose by meter   Result Value Ref Range    Glucose 140 (H) 70 - 99 mg/dL   X-ray rt Foot G/E 3 vws*    Narrative    RIGHT FOOT THREE OR MORE VIEWS   11/27/2017 12:32 PM     HISTORY: Cellulitis from second toe.     COMPARISON: None.     FINDINGS: Degenerative changes are seen at the interphalangeal joints  of the second and third toes, worst at the distal interphalangeal  joint of the third toe. No fracture or malalignment. No cortical  irregularity to suggest osteomyelitis. Enthesopathic changes are seen  at the Achilles tendon insertion into the calcaneus. Arterial  calcifications are incidentally noted and could be related to diabetes  mellitus in the appropriate clinical setting.      Impression    IMPRESSION:  1. No fracture, malalignment, or evidence for osteolysis is seen.  2. Degenerative changes of the interphalangeal joints of the second  and third toes are noted.  3. Enthesopathic changes of the calcaneus.  4. Arterial calcifications along the anterior aspect of the lower leg  could be secondary to diabetes mellitus  in the appropriate clinical  setting. Recommend clinical correlation.       Extremity x-ray of the right foot::  Ordered but not yet available for review.      Tho Louie DPM  [facility]

## 2017-11-27 NOTE — PROGRESS NOTES
SPIRITUAL HEALTH SERVICES  SPIRITUAL ASSESSMENT Progress Note  [facility]      During Rounding,  introduced himself to Ni Maya and informed her of his availability.    Dick Mullins M.Div., [facility]  Staff   Office tel: [phone]

## 2017-11-27 NOTE — PLAN OF CARE
Problem: Patient Care Overview  Goal: Plan of Care/Patient Progress Review  Outcome: No Change  Patient states her right great toe is also sore to touch now, where yesterday it was only the second toe. Redness noted, ice to site to try to help take inflammation down. Declines any need for pain medication, although is aware Norco and tylenol are available.  Did give oral Senna today at patient request.  Awaiting 3 view foot xray and podiatry consult as ordered. Able to ambulate into the bathroom independently although she does say toes are sore when she walks on it.

## 2017-11-27 NOTE — PROGRESS NOTES
[facility]    Hospitalist Progress Note    Date of Service (when I saw the patient): 11/27/2017    Assessment & Plan   Ni Maya is a 77 year old female who was admitted on 11/26/2017 with concern for worsening cellulitis in the right foot despite recent outpatient antibiotic therapy.  Initial symptoms were greatest in the second toe, but today, she now has increasing pain and swelling in the great toe along with ongoing signs of inflammation.  Clinical findings are concerning for arthritis involving the IP and MTP joint of the great toe of the right foot.  In this clinical context, septic arthritis is possible.  She does not have history of gout, but acute gouty arthritis is also in the differential diagnosis.  Although she is diabetic, she is not known to have underlying peripheral neuropathy or peripheral artery disease.  Glycemic control has been good during her hospital stay.  Control of blood pressure has significantly improved after medication adjustments yesterday, and headache that was present yesterday at the time of severely elevated blood pressure has resolved.  No other neurologic sequelae of severe hypertension are apparent clinically today.  Rate of chronic atrial fibrillation remains adequately controlled, and she continues on Eliquis for prophylaxis of thromboembolism from atrial fibrillation.  Chronic thrombocytopenia is stable.  She has developed mild hypopotassemia without overt symptoms, probably due to hydrochlorothiazide therapy.    Principal Problem:    Cellulitis of toe of right foot  Active Problems:    Toe joint pain, right great toe    HTN, goal below 150/90    Type 2 diabetes mellitus with hyperglycemia, without long-term current use of insulin (H)    Chronic atrial fibrillation (H)    Thrombocytopenia (H)    Hypothyroidism    Hyperlipidemia with target LDL less than 100    Recommended evaluation by orthopedic surgery or podiatry today for possible  septic arthritis in the right great toe  Continue present empiric parenteral antibiotics pending culture results including vancomycin with dosing adjustment depending upon serum levels  Discontinue IV fluids  Start potassium chloride supplementation according to protocol    DVT Prophylaxis: Eliquis  Code Status: Full Code    Disposition: Possible discharge in 1-3 days.    Clinton Cuba    Interval History   She says that her right foot looks less red today and that the pain in her right second toe is much improved.  However, she is having increasing pain in her right great toe today.  Pain in the right great toe is worse with weightbearing or movement.  Pain in the right great toe is worse than it was previously.  She tried taking Norco but did not like side effects of how it made her feel.  She denies any recent injury.  She remains afebrile.  Heart rate has been stable.  Blood pressure has improved.  Global headache that was present yesterday has resolved coinciding with improvement in her blood pressure.  Oxygenation remains normal.  She is tolerating good oral intake.    Additional history is obtained from the patient today.  Diabetes has been well-controlled.  She denies any history of diabetic peripheral neuropathy.  She denies any history of peripheral artery disease.  She denies any personal history of gout.    -Data reviewed today: I reviewed all new labs and imaging results over the last 24 hours. I personally reviewed no images or EKG's today.    Physical Exam   Temp: 98.1  F (36.7  C) Temp src: Oral BP: 148/70 Pulse: 98 Heart Rate: 98 Resp: 20 SpO2: 95 % O2 Device: None (Room air)    Vitals:    11/26/17 1308 11/26/17 1600 11/27/17 0700   Weight: 83.9 kg (185 lb) 84.8 kg (186 lb 15.2 oz) 86.8 kg (191 lb 5.8 oz)     Vital Signs with Ranges  Temp:  [97.4  F (36.3  C)-99  F (37.2  C)] 98.1  F (36.7  C)  Pulse:  [] 98  Heart Rate:  [] 98  Resp:  [18-20] 20  BP: (115-208)/() 148/70  SpO2:   [94 %-99 %] 95 %  I/O last 3 completed shifts:  In: 2830 [P.O.:1450; I.V.:1380]  Out: 1400 [Urine:1400]    Constitutional: No acute distress sitting in bed  Cardiovascular: Regular rate and rhythm, good pedal pulses in the right foot  Skin/Integumen: Faint erythema present over the distal forefoot at the base of the first and second toes and extending into those toes with warmth and soft tissue swelling in that area as well as mild tenderness, no tenderness on the plantar aspect of the right forefoot, second toe has superficial ecchymosis in the middle of the toe, second toe is also mildly tender with minimal discomfort during passive flexion and extension of that toe, right great toe is tender throughout and passive flexion and extension of the IP joint and MTP joint are very painful  Neuro: Reports subjectively normal touch sensation in the right foot  Other: No pitting edema in the lower extremities    Medications     - MEDICATION INSTRUCTIONS -       NaCl 100 mL/hr at 11/27/17 0840       vancomycin (VANCOCIN) IV  1,250 mg Intravenous Q24H     fexofenadine  180 mg Oral Daily     apixaban ANTICOAGULANT  5 mg Oral BID     hydrochlorothiazide  25 mg Oral Daily     levothyroxine  100 mcg Oral Daily     lisinopril  30 mg Oral Daily     metFORMIN  500 mg Oral Daily with supper     metoprolol  100 mg Oral BID     piperacillin-tazobactam  3.375 g Intravenous Q6H     insulin aspart  1-3 Units Subcutaneous TID AC     insulin aspart  1-3 Units Subcutaneous At Bedtime     cloNIDine  0.2 mg Oral At Bedtime     cloNIDine  0.1 mg Oral QAM       Data   Data reviewed today:  I personally reviewed no images or EKG's today.    Recent Labs  Lab 11/27/17  0533 11/26/17  1350   WBC 7.8 8.7   HGB 13.1 14.4   MCV 91 90   * 122*    139   POTASSIUM 3.2* 3.4   CHLORIDE 102 101   CO2 27 31   BUN 12 13   CR 0.85 1.00   ANIONGAP 10 7   RODNEY 8.1* 9.0   * 132*     Blood cultures are negative at less than 24 hours    Blood  sugars have ranged between 103 and 136 over the last 24 hours

## 2017-11-27 NOTE — PROGRESS NOTES
"SPIRITUAL HEALTH SERVICES  SPIRITUAL ASSESSMENT Progress Note  [facility]      During Rounding,  introduced himself to Alexander \"Pillo\" Jared and informed him of his availability.    Dick Mullins M.Div., [facility]  Staff   Office tel: [phone]    "

## 2017-11-27 NOTE — CONSULTS
Care Transition Initial Assessment - RN  Reason For Consult: other (see comments)   Met with: Patient.    DATA   Principal Problem:    Cellulitis of toe of right foot  Active Problems:    Hypothyroidism    Hyperlipidemia with target LDL less than 100    HTN, goal below 150/90    Type 2 diabetes mellitus with hyperglycemia, without long-term current use of insulin (H)    Chronic atrial fibrillation (H)    Thrombocytopenia (H)    Toe joint pain, right great toe       Primary Care Clinic Name: [facility]  Primary Care MD Name: Dr Olvera  Contact information and PCP information verified: Yes      ASSESSMENT  Cognitive Status: awake, alert and oriented.             Lives With: spouse  Living Arrangements: house  Quality Of Family Relationships: supportive, involved  Description of Support System: Supportive, Involved   Who is your support system?:    Support Assessment: Adequate family and caregiver support   Insurance Concerns: No Insurance issues identified          This writer met with pt in her room, introduced self and role. Patient currently lives at home with her  with no home care needs writer ran benefits for home infusion and patient does not have coverage and would need to go to a TCU for short term rehab or come into Infusion therapy if IV Abx were needed. Pateint prefers to come to the infusion therapy.      PLAN    Discharge home with family support if IV abx are needed patient will come to infusion. No further needs      Anastasia Morse CTS RN [facility] [phone] [facility] [phone]    Discharge Planner   Discharge Plans in progress: Home with   Barriers to discharge plan: none  Follow up plan: Clinic follow up.       Entered by: Anastasia Morse 11/27/2017 11:33 AM

## 2017-11-27 NOTE — PROGRESS NOTES
Therapy: ABX  Insurance: [facility] (pt has no IV ABX coverage in the home, but would have coverage in OP or short term TCU)   Ded: $  Met: $    Co-Insurance:  Max Out of Pocket: $  Met: $    Please contact Intake with any questions, [phone] or In Basket pool, [facility] ([id number]).  In reference to admission date 11/15/17 to check IV ABX coverage

## 2017-11-27 NOTE — PROGRESS NOTES
Hospitalist note    Patient was evaluated by podiatry today with impression was that the patient's great toe symptoms were due to crystal-induced arthritis rather than septic arthritis.  Podiatry recommended empiric treatment for gout with colchicine and/or alternative agents and advised against joint aspiration and/or other invasive procedure at this time.    Will order naproxen 500 mg twice daily and colchicine 1.2 mg daily to start today for empiric treatment of possible acute gouty arthritis.    Clinton Cuba MD

## 2017-11-28 VITALS
SYSTOLIC BLOOD PRESSURE: 169 MMHG | TEMPERATURE: 95.4 F | HEART RATE: 75 BPM | HEIGHT: 65 IN | WEIGHT: 191.36 LBS | DIASTOLIC BLOOD PRESSURE: 93 MMHG | OXYGEN SATURATION: 96 % | RESPIRATION RATE: 18 BRPM | BODY MASS INDEX: 31.88 KG/M2

## 2017-11-28 PROBLEM — M10.9 ACUTE GOUT INVOLVING TOE OF RIGHT FOOT: Status: ACTIVE | Noted: 2017-11-27

## 2017-11-28 LAB
BACTERIA SPEC CULT: NORMAL
GLUCOSE BLDC GLUCOMTR-MCNC: 119 MG/DL (ref 70–99)
GLUCOSE BLDC GLUCOMTR-MCNC: 125 MG/DL (ref 70–99)
POTASSIUM SERPL-SCNC: 3.7 MMOL/L (ref 3.4–5.3)
SPECIMEN SOURCE: NORMAL

## 2017-11-28 PROCEDURE — 99238 HOSP IP/OBS DSCHRG MGMT 30/<: CPT | Performed by: PEDIATRICS

## 2017-11-28 PROCEDURE — 25000128 H RX IP 250 OP 636: Performed by: FAMILY MEDICINE

## 2017-11-28 PROCEDURE — 84132 ASSAY OF SERUM POTASSIUM: CPT | Performed by: PEDIATRICS

## 2017-11-28 PROCEDURE — 25000132 ZZH RX MED GY IP 250 OP 250 PS 637: Performed by: FAMILY MEDICINE

## 2017-11-28 PROCEDURE — 25000132 ZZH RX MED GY IP 250 OP 250 PS 637: Performed by: PEDIATRICS

## 2017-11-28 PROCEDURE — 00000146 ZZHCL STATISTIC GLUCOSE BY METER IP

## 2017-11-28 PROCEDURE — 36415 COLL VENOUS BLD VENIPUNCTURE: CPT | Performed by: PEDIATRICS

## 2017-11-28 RX ORDER — NAPROXEN 500 MG/1
500 TABLET ORAL 2 TIMES DAILY WITH MEALS
Qty: 10 TABLET | Refills: 0 | Status: SHIPPED | OUTPATIENT
Start: 2017-11-28 | End: 2017-12-03

## 2017-11-28 RX ORDER — ACETAMINOPHEN 325 MG/1
975 TABLET ORAL EVERY 6 HOURS PRN
Qty: 100 TABLET | COMMUNITY
Start: 2017-11-28 | End: 2018-05-08

## 2017-11-28 RX ORDER — CLINDAMYCIN HCL 150 MG
150 CAPSULE ORAL 3 TIMES DAILY
Qty: 21 CAPSULE | Refills: 0 | Status: SHIPPED | OUTPATIENT
Start: 2017-11-28 | End: 2017-12-05

## 2017-11-28 RX ORDER — COLCHICINE 0.6 MG/1
TABLET ORAL
Qty: 70 TABLET | Refills: 0 | Status: SHIPPED | OUTPATIENT
Start: 2017-11-28 | End: 2017-12-14

## 2017-11-28 RX ORDER — CLONIDINE HYDROCHLORIDE 0.1 MG/1
0.1 TABLET ORAL EVERY MORNING
Qty: 30 TABLET | Refills: 0 | Status: SHIPPED | OUTPATIENT
Start: 2017-11-29 | End: 2017-12-14

## 2017-11-28 RX ADMIN — METOPROLOL TARTRATE 100 MG: 100 TABLET, FILM COATED ORAL at 08:39

## 2017-11-28 RX ADMIN — CLONIDINE HYDROCHLORIDE 0.1 MG: 0.1 TABLET ORAL at 08:39

## 2017-11-28 RX ADMIN — NAPROXEN 500 MG: 250 TABLET ORAL at 08:39

## 2017-11-28 RX ADMIN — COLCHICINE 1.2 MG: 0.6 TABLET, FILM COATED ORAL at 08:38

## 2017-11-28 RX ADMIN — APIXABAN 5 MG: 5 TABLET, FILM COATED ORAL at 08:38

## 2017-11-28 RX ADMIN — TAZOBACTAM SODIUM AND PIPERACILLIN SODIUM 3.38 G: 375; 3 INJECTION, SOLUTION INTRAVENOUS at 08:28

## 2017-11-28 RX ADMIN — LEVOTHYROXINE SODIUM 100 MCG: 100 TABLET ORAL at 06:07

## 2017-11-28 RX ADMIN — FEXOFENADINE HYDROCHLORIDE 180 MG: 180 TABLET, FILM COATED ORAL at 08:39

## 2017-11-28 RX ADMIN — HYDROCHLOROTHIAZIDE 25 MG: 25 TABLET ORAL at 08:39

## 2017-11-28 RX ADMIN — LISINOPRIL 30 MG: 10 TABLET ORAL at 08:39

## 2017-11-28 RX ADMIN — TAZOBACTAM SODIUM AND PIPERACILLIN SODIUM 3.38 G: 375; 3 INJECTION, SOLUTION INTRAVENOUS at 02:06

## 2017-11-28 NOTE — DISCHARGE SUMMARY
[facility]    Discharge Summary  Hospitalist    Date of Admission:  11/26/2017  Date of Discharge:  11/28/2017  Discharging Provider: Clinton Cuba  Date of Service (when I saw the patient): 11/28/17    Discharge Diagnoses     Cellulitis of toe of right foot    Acute gout involving toe of right foot    HTN, goal below 150/90    Type 2 diabetes mellitus with hyperglycemia, without long-term current use of insulin (H)    Chronic atrial fibrillation (H)    Thrombocytopenia (H)    Hypothyroidism    Hyperlipidemia with target LDL less than 100    * No resolved hospital problems. *      History of Present Illness   Ni Maya is an 77 year old female with multiple chronic medical problems including type 2 diabetes who presented with several day history of worsening pain, swelling, and redness of the right second toe.  Despite starting antibiotics a few days prior to admission, the swelling and redness of the right foot worsened and extended to the dorsum of the foot and the great toe.   Due to concern for worsening cellulitis and failure of previous outpatient antibiotic treatment, she was hospitalized. See admission history and physical for details.    Hospital Course   Ni Maya was admitted on 11/26/2017.  The following problems were addressed during her hospitalization:    Problem #1 cellulitis of the right foot and second toe.  Blood cultures were negative during this hospital stay.  Nasal culture for MRSA was negative.  X-rays of the right foot did not demonstrate any fractures or signs of osteomyelitis.  She was treated empirically with IV vancomycin and Zosyn.  Podiatry was consulted and recommended nonoperative management.  Signs of cellulitis improved.  She did not have fever or leukocytosis, and sepsis was not suspected.  After investigation, nonpurulent cellulitis of the right foot and second toe was suspected.  She was advised to complete a course of oral  clindamycin treatment after discharge.    Problem #2 probable acute gouty arthritis of the right great toe.  During her hospital stay, she developed worsening pain, swelling, and redness of the right great toe including significant pain with weightbearing and passive movement of the right great toe.  Uric acid was elevated at 6.1.  Podiatry expressed clinical impression of crystal-induced arthritis of the right great toe, probably due to gout.  Podiatry advised against diagnostic arthrocentesis during this hospital stay as their clinical suspicion for septic arthritis was low.  Suspected acute gout was treated with a combination of naproxen and colchicine with rapid improvement.  As pain improved, she was able to tolerate advancing activity including weightbearing on the right foot by discharge.  Patient was advised to follow-up with her primary care provider to discuss the new diagnosis of gout and other options for medical management of gout including consideration of limiting or eliminating hydrochlorothiazide therapy and/or adding allopurinol for treatment of hyperuricemia and gout.    Problem #3 uncontrolled hypertension.  Initial blood pressure was severely elevated and she did have headache.  A dose of clonidine during the day was added to her other antihypertensive medications.  Blood pressure subsequently improved and headache resolved.  She was advised to continue a morning dose of clonidine along with her previous bedtime dose of clonidine until follow-up with her primary care provider for reevaluation of hypertension after discharge.    Clinton Cuba    Significant Results and Procedures   No procedures performed during this admission    Pending Results   These results will be followed up by PCP  Unresulted Labs Ordered in the Past 30 Days of this Admission     Date and Time Order Name Status Description    11/26/2017 1340 Blood culture Preliminary     11/26/2017 1340 Blood culture Preliminary      "      Code Status   Full Code       Primary Care Physician   Apryl Olvera MD    Physical Exam   191 lbs 5.75 oz  BP (!) 169/93 (BP Location: Left arm)  Pulse 75  Temp 95.4  F (35.2  C) (Oral)  Resp 18  Ht 1.651 m (5' 5\")  Wt 86.8 kg (191 lb 5.8 oz)  SpO2 96%  BMI 31.84 kg/m2    No acute distress resting in bed  Faint pink discoloration of the dorsum of the distal right medial forefoot at the base of the great toe and second toe and extending onto the great toe and second toe, mild soft tissue tenderness in that area without any palpable masses, first and second toes are swollen and mildly tender but she tolerates passive range of motion of those toes with minimal discomfort    Discharge Disposition   Discharged to home  Condition at discharge: Stable    Consultations This Hospital Stay   PHARMACY TO DOSE VANCO  PHARMACY TO DOSE VANCO  ORTHOPEDIC SURGERY IP CONSULT  PODIATRY IP CONSULT  CARE TRANSITION RN/SW IP CONSULT    Time Spent on this Encounter   I, Clinton Cuba, personally saw the patient today and spent less than or equal to 30 minutes discharging this patient.    Discharge Orders     Reason for your hospital stay   Hospitalized for suspected infection in toe (cellulitis) and probable gout in toe and improved     Follow-up and recommended labs and tests    Follow up with primary care provider, Apryl Olvera MD, within 2 weeks, to evaluate medication change and for hospital follow- up.     Activity   Your activity upon discharge: activity as tolerated     Full Code     Diet   Follow this diet upon discharge: Orders Placed This Encounter     Moderate Consistent CHO Diet       Discharge Medications   Current Discharge Medication List      START taking these medications    Details   acetaminophen (TYLENOL) 325 MG tablet Take 3 tablets (975 mg) by mouth every 6 hours as needed for mild pain  Qty: 100 tablet    Associated Diagnoses: Acute gout involving toe of right foot, unspecified cause      naproxen " (NAPROSYN) 500 MG tablet Take 1 tablet (500 mg) by mouth 2 times daily (with meals) for 5 days  Qty: 10 tablet, Refills: 0    Associated Diagnoses: Acute gout involving toe of right foot, unspecified cause; Cellulitis of toe of right foot      !! cloNIDine (CATAPRES) 0.1 MG tablet Take 1 tablet (0.1 mg) by mouth every morning  Qty: 30 tablet, Refills: 0    Associated Diagnoses: Essential hypertension with goal blood pressure less than 140/90      colchicine 0.6 MG tablet Take 2 tablets (1.2 mg) twice daily for 5 days, then take 2 tablets (1.2 mg) once daily  Qty: 70 tablet, Refills: 0    Associated Diagnoses: Acute gout involving toe of right foot, unspecified cause      clindamycin (CLEOCIN) 150 MG capsule Take 1 capsule (150 mg) by mouth 3 times daily for 7 days  Qty: 21 capsule, Refills: 0    Associated Diagnoses: Cellulitis of toe of right foot       !! - Potential duplicate medications found. Please discuss with provider.      CONTINUE these medications which have NOT CHANGED    Details   !! Nutritional Supplements (NUTRITIONAL SUPPLEMENT PO) Take 1 oz by mouth daily Flex      alcohol swab prep pads Use to swab area of injection/geoffrey as directed.  Qty: 100 each, Refills: 3    Associated Diagnoses: Type 2 diabetes mellitus with hyperglycemia, without long-term current use of insulin (H)      lisinopril (PRINIVIL,ZESTRIL) 30 MG tablet Take 1 tablet (30 mg) by mouth daily  Qty: 90 tablet, Refills: 3    Associated Diagnoses: Chronic kidney disease, stage II (mild); Type 2 diabetes mellitus with hyperglycemia, without long-term current use of insulin (H); Essential hypertension with goal blood pressure less than 140/90      !! cloNIDine (CATAPRES) 0.1 MG tablet Take 2 tablets (0.2 mg) by mouth At Bedtime  Qty: 270 tablet, Refills: 1    Associated Diagnoses: Essential hypertension with goal blood pressure less than 140/90      hydrochlorothiazide (HYDRODIURIL) 25 MG tablet Take 1 tablet (25 mg) by mouth daily  Qty:  90 tablet, Refills: 3    Associated Diagnoses: Essential hypertension with goal blood pressure less than 140/90      apixaban ANTICOAGULANT (ELIQUIS) 5 MG tablet Take 1 tablet (5 mg) by mouth 2 times daily  Qty: 90 tablet, Refills: 3    Associated Diagnoses: Atrial fibrillation, unspecified type (H)      metFORMIN (GLUCOPHAGE-XR) 500 MG 24 hr tablet Take 1 tablet (500 mg) by mouth daily (with dinner)  Qty: 90 tablet, Refills: 3    Associated Diagnoses: Type 2 diabetes mellitus with hyperosmolarity without coma, without long-term current use of insulin (H)      metoprolol (LOPRESSOR) 100 MG tablet Take 1 tablet (100 mg) by mouth 2 times daily  Qty: 180 tablet, Refills: 3    Comments: The patient requests that this prescription be held on file for filling in the near future.  Associated Diagnoses: Essential hypertension with goal blood pressure less than 140/90      levothyroxine (SYNTHROID/LEVOTHROID) 100 MCG tablet Take 1 tablet (100 mcg) by mouth daily  Qty: 90 tablet, Refills: 3    Associated Diagnoses: Hypothyroidism, unspecified type      fish oil-omega-3 fatty acids (FISH OIL) 1000 MG capsule Take 2 g by mouth daily. 2 caps per day      ALLEGRA 180 MG PO TABS 1 TABLET DAILY  Qty: 90 Tab, Refills: 3    Associated Diagnoses: Allergic rhinitis, cause unspecified      !! NUTRITIONAL SUPPLEMENT OR VIBE      blood glucose monitoring (NO BRAND SPECIFIED) meter device kit Use to test blood sugar 3 times daily or as directed. Preferred blood glucose meter OR supplies to accompany: Blood Glucose Monitor Brands: per insurance.  Qty: 1 kit, Refills: 0    Associated Diagnoses: Type 2 diabetes mellitus with hyperglycemia, without long-term current use of insulin (H)      !! blood glucose monitoring (NO BRAND SPECIFIED) test strip Use to test blood sugar 3 times daily or as directed. To accompany: Blood Glucose Monitor Brands: per insurance.  Qty: 100 strip, Refills: 6    Associated Diagnoses: Type 2 diabetes mellitus with  hyperglycemia, without long-term current use of insulin (H)      blood glucose calibration (NO BRAND SPECIFIED) solution To accompany: Blood Glucose Monitor Brands: per insurance.  Qty: 1 Bottle, Refills: 3    Associated Diagnoses: Type 2 diabetes mellitus with hyperglycemia, without long-term current use of insulin (H)      !! thin (NO BRAND SPECIFIED) lancets Use with lanceting device. To accompany: Blood Glucose Monitor Brands: per insurance.  Qty: 100 each, Refills: 6    Associated Diagnoses: Type 2 diabetes mellitus with hyperglycemia, without long-term current use of insulin (H)      !! blood glucose monitoring ([employer] CONTOUR NEXT) test strip TEST THREE TIMES A DAY  Qty: 250 each, Refills: 2    Associated Diagnoses: Type 2 diabetes mellitus with hyperglycemia (H)      !! Lancets (E-Z JECT LANCET MICRO-THIN 33G) MISC       Blood Glucose Monitoring Suppl W/DEVICE KIT 1 kit 3 times daily.  Qty: 1 kit, Refills: 0    Comments: Use for blood sugar monitoring 3 times daily  Associated Diagnoses: Type 2 diabetes, HbA1C goal < 8% (H)       !! - Potential duplicate medications found. Please discuss with provider.      STOP taking these medications       cefuroxime (CEFTIN) 250 MG tablet Comments:   Reason for Stopping:             Allergies   Allergies   Allergen Reactions     Sulfa Drugs Rash     Data   Most Recent 3 CBC's:  Recent Labs   Lab Test  11/27/17   0533  11/26/17   1350  08/07/17   0921   WBC  7.8  8.7  5.6   HGB  13.1  14.4  14.0   MCV  91  90  90   PLT  120*  122*  123*      Most Recent 3 BMP's:  Recent Labs   Lab Test  11/28/17   0615  11/27/17 2127  11/27/17   0533  11/26/17   1350  11/01/17   0836   NA   --    --   139  139  137   POTASSIUM  3.7  3.7  3.2*  3.4  3.5   CHLORIDE   --    --   102  101  98   CO2   --    --   27  31  32   BUN   --    --   12  13  15   CR   --    --   0.85  1.00  1.01   ANIONGAP   --    --   10  7  7   RODNEY   --    --   8.1*  9.0  8.9   GLC   --    --   134*  132*  130*      Most Recent Cholesterol Panel:  Recent Labs   Lab Test  11/01/17   0836   CHOL  179   LDL  101*   HDL  52   TRIG  130     Most Recent 6 Bacteria Isolates From Any Culture (See EPIC Reports for Culture Details):  Recent Labs   Lab Test  11/26/17   1640  11/26/17   1410  11/26/17   1350  07/20/16   0950  12/18/09   1322   CULT  No MRSA isolated  No growth after 18 hours  No growth after 18 hours  >100,000 colonies/mL Enterococcus faecium*  >100,000 colonies/mL Escherichia coli     Most Recent TSH, T4 and A1c Labs:  Recent Labs   Lab Test  11/01/17   0836  05/17/17   0838   TSH   --   3.27   A1C  6.6*  7.2*     Results for orders placed or performed during the hospital encounter of 11/26/17   X-ray rt Foot G/E 3 vws*    Narrative    RIGHT FOOT THREE OR MORE VIEWS   11/27/2017 12:32 PM     HISTORY: Cellulitis from second toe.     COMPARISON: None.     FINDINGS: Degenerative changes are seen at the interphalangeal joints  of the second and third toes, worst at the distal interphalangeal  joint of the third toe. No fracture or malalignment. No cortical  irregularity to suggest osteomyelitis. Enthesopathic changes are seen  at the Achilles tendon insertion into the calcaneus. Arterial  calcifications are incidentally noted and could be related to diabetes  mellitus in the appropriate clinical setting.      Impression    IMPRESSION:  1. No fracture, malalignment, or evidence for osteomyelitis is seen.  2. Degenerative changes of the interphalangeal joints of the second  and third toes are noted.  3. Enthesopathic changes of the calcaneus.  4. Arterial calcifications along the anterior aspect of the lower leg  could be secondary to diabetes mellitus in the appropriate clinical  setting. Recommend clinical correlation.    SYDNEY RIVAS MD

## 2017-11-28 NOTE — PLAN OF CARE
Problem: Patient Care Overview  Goal: Plan of Care/Patient Progress Review  Outcome: No Change  Patients K+ replaced; 3.2. Patients  and 169 tonight. Patients vitals stable on room air. Patients right toe is swollen, red and purple. Patients K+ at 2130 was 3.7. Pain rates pain mild except with activity. Denies need for pain intervention at this time.

## 2017-11-28 NOTE — PLAN OF CARE
Problem: Patient Care Overview  Goal: Plan of Care/Patient Progress Review  Outcome: No Change  VSS. Afebrile. C/o minimal pain/discomfort. Declines pain medication at this time. R great toe remains red in color but within outlined area. Pt states that redness is fading and area is much less painful. No complaints this shift.

## 2017-11-28 NOTE — PROGRESS NOTES
S-(situation): Patient discharged to home via w/c with     B-(background): gout/infection    A-(assessment): afebrile, RA. HR 70-80's BP controlled with medication. Redness and edema is decreased and below the markings. Declines pain medication stating it is at a tolerable level. Up with SBA, good UOP and tolerating a regular diet.      R-(recommendations): Discharge instructions reviewed with patient and spouse. Listed belongings gathered and returned to patient.          Discharge Nursing Criteria:     Care Plan and Patient education resolved: Yes    New Medications- pt has been educated about purpose and side effects: Yes    Vaccines  Pneumonia Vaccine verified at discharge: Yes  Influenza status verified at discharge:  Yes

## 2017-11-29 ENCOUNTER — TELEPHONE (OUTPATIENT)
Dept: FAMILY MEDICINE | Facility: OTHER | Age: 77
End: 2017-11-29

## 2017-11-29 NOTE — TELEPHONE ENCOUNTER
RN to call for hospital follow up:    Reason for follow up: Ni SAUL YarbroughZulma appeared on our list for being seen in an Emergency Room or a recent Hospital discharge.    Admitting date: 11/26/2017  Discharge date: 11/28/2017  Location: [facility]  Reason for visit: Acute Gout Involving Toe Of Right Foot, Unspecified Cause, Cellulitis Of Right Lower Extremity    Yulisa Cat RN, BSN

## 2017-11-29 NOTE — TELEPHONE ENCOUNTER
"Hospital/TCU/ED for chronic condition Discharge Protocol    \"Hi, my name is Yulisa Cat, a registered nurse, and I am calling from [facility].  I am calling to follow up and see how things are going for you after your recent emergency visit/hospital/TCU stay.\"    Tell me how you are doing now that you are home? \"Much better, the swelling is coming down.\"      Discharge Instructions    \"Let's review your discharge instructions.  What is/are the follow-up recommendations?  Pt. Response: within 2 weeks    \"Has an appointment with your primary care provider been scheduled?\"   Yes. (confirm)    \"When you see the provider, I would recommend that you bring your medications with you.\"    Medications    \"Tell me what changed about your medicines when you discharged?\"    Changes to chronic meds?    0-1    \"What questions do you have about your medications?\"    None stated she is not taking Colchicine 0.6mg due to cost and would like to discuss the Hctz at her OV     New diagnoses of heart failure, COPD, diabetes, or MI?    No       Medication reconciliation completed? Yes  Was MTM referral placed (*Make sure to put transitions as reason for referral)?   No    Call Summary    \"What questions or concerns do you have about your recent visit and your follow-up care?\"     none    \"If you have questions or things don't continue to improve, we encourage you contact us through the main clinic number (give number).  Even if the clinic is not open, triage nurses are available 24/7 to help you.     We would like you to know that our clinic has extended hours (provide information).  We also have urgent care (provide details on closest location and hours/contact info)\"      \"Thank you for your time and take care!\"       Yulisa Cat, RICARDO, BSN     "

## 2017-11-30 NOTE — PROGRESS NOTES
"Ni Maya  Gender: female  : 1940  [id number] [facility] [address] [phone] (home) NONE (work)    Medical Record: [id number]  Pharmacy: [id number] - [place] - [address] [facility]  Primary Care Provider: Apryl Olvera    Parent's names are: Data Unavailable (mother) and Data Unavailable (father).      [facility]  2017     Discharge Phone Call:  Key Words/Key Times      Introduction - AIDET (Acknowledge, Introduce, Duration, Explanation)      Empathy-   We are calling to see how you are since your recent stay in the hospital?     Call back COMMENTS: I'm doing better. I still have some pain, but I am able to walk on it.      Clinical Questions -  (f/u appts, medication side effects/purpose, ability to care for self at home) \"For your safety, it is important to us that you understand the purpose and side effects of your medications, can you tell me what your new medications are?\"     Call back COMMENTS: Yes, I am taking the naproxen and antibiotic. But I am not taking the Colchicine because it was $800. I can't afford that, so I am taking cherry juice instead.       Staff Recognition -  We like to recognize staff and physicians who have done an excellent job.  Do you remember any people from your care team that you would like recognize?     Call back COMMENTS: Ambar was great. Everyone was really great.       Very Good Care -  We want to provide very good care to all patients.  How was your care?     Call back COMMENTS: Wonderful.      Opportunities for Improvement -  Our goal is to be the best.  Do you have any suggestions for things that we could improve upon?     Call back COMMENTS: The food was ok, some things were great and some things weren't. But I let them pick for me because I had a diabetic diet.       Thank You           "

## 2017-12-02 LAB
BACTERIA SPEC CULT: NORMAL
BACTERIA SPEC CULT: NORMAL
Lab: 30
Lab: 30
SPECIMEN SOURCE: NORMAL
SPECIMEN SOURCE: NORMAL

## 2017-12-07 ENCOUNTER — TELEPHONE (OUTPATIENT)
Dept: FAMILY MEDICINE | Facility: OTHER | Age: 77
End: 2017-12-07

## 2017-12-07 NOTE — TELEPHONE ENCOUNTER
Ni Maya is a 77 year old female who calls with possible yeast infection.    NURSING ASSESSMENT:  Description:  Vaginal itching. Not sure if there is discharge.  Onset/duration:  5 days ago  Precip. factors:  Went to ED a week ago for foot swelling. Per pt diagnosed with infection vs goat. They started her on an antibiotic.  Associated symptoms:  Has a little odor, no pain or irritation, no fever. No pain or burning when urinating.  Improves/worsens symptoms:  Has not tried anything    Allergies:   Allergies   Allergen Reactions     Sulfa Drugs Rash       RECOMMENDED DISPOSITION:  Home care advice - soak in warm tub, wear loose fitting cotton underwear, avoid anything scented. Try OTC products such as Monistat  Will comply with recommendation: Yes, She will call back if symptoms are not getting better with home measures. Has appointment 12/14/17 for hospital F/U    If further questions/concerns or if symptoms do not improve, worsen or new symptoms develop, call your PCP or [facility] as soon as possible.      Guideline used: Vaginal itching  Telephone Triage Protocols for Nurses, Fifth Edition, Shonda Hernandez RN

## 2017-12-08 NOTE — PROGRESS NOTES
SUBJECTIVE:                                                    Ni Maya is a 77 year old female who presents to clinic today for the following health issues:    HPI    Hospital Follow-up Visit:    Hospital/Nursing Home/IP Rehab Facility: [facility]  Date of Admission: 11/26/17  Date of Discharge: 11/28/2017  Reason(s) for Admission: Gout in right foot            Problems taking medications regularly:  None       Medication changes since discharge: None       Problems adhering to non-medication therapy:  NA    Summary of hospitalization:  [facility] discharge summary reviewed  Diagnostic Tests/Treatments reviewed.  Follow up needed: none  Other Healthcare Providers Involved in Patient s Care:         None  Update since discharge: improved.     Post Discharge Medication Reconciliation: discharge medications reconciled, continue medications without change.  Plan of care communicated with patient     Coding guidelines for this visit:  Type of Medical   Decision Making Face-to-Face Visit       within 7 Days of discharge Face-to-Face Visit        within 14 days of discharge   Moderate Complexity [id number]   High Complexity [id number]          Big toe on right foot started out red, then her whole foot swelled up, then the toes turned dark purple with lighter purple rings. Currently it is a dark red color. Several different doctors looked at her foot. They had determined that it might be gout and should consider stopping HCTZ. Her uric acid level, when checked in the hospital was slightly high, at a 6.1.   She currently drinks about 32 oz of water when she wakes up every day. She doesn't report eating many of the foods on the list of foods to avoid for gout.       She states that at this time, the biggest factor affecting her quality of life is her back pain. She was seeing Dr. Garcia for this and had some injections, but they weren't improving her pain much.       Problem list and  histories reviewed & adjusted, as indicated.  Additional history: as documented    Patient Active Problem List   Diagnosis     Hypothyroidism     Allergic rhinitis     Symptomatic menopausal or female climacteric states     Chronic kidney disease, stage II (mild)     Edema     Obesity     Urethral stricture     MICKIE III (vulvar intraepithelial neoplasia III)     Vulval lesion     CARDIOVASCULAR SCREENING; LDL GOAL LESS THAN 130     Health Care Home     Type 2 diabetes, HbA1C goal < 8% (H)     Hyperlipidemia with target LDL less than 100     HTN, goal below 150/90     ACP (advance care planning)     Essential hypertension with goal blood pressure less than 140/90     Type 2 diabetes mellitus with hyperglycemia, without long-term current use of insulin (H)     Cellulitis of toe of right foot     Chronic atrial fibrillation (H)     Thrombocytopenia (H)     Acute gout involving toe of right foot     Past Surgical History:   Procedure Laterality Date     BIOPSY VULVA/PERINEUM,ADDNL LESN  9/18/09    Wide -excision of MICKIE III- right labia      C LAPAROSCOPY, SURGICAL; W/ VAGINAL HYSTERECTOMY W/WO REMOVAL OVARY(S)/TUBES  1984    for bleeding     C LIGATE FALLOPIAN TUBE,POSTPARTUM  1978    Tubal Ligation     COLONOSCOPY  9/13/2013    Procedure: COLONOSCOPY;  Colonoscopy;  Surgeon: Yanick Ramsey MD;  Location: [facility]     HC ANGIOGRAPHY ARCH  4-3-98     HC BIOPSY OF BREAST, OPEN INCISIONAL  1960    Benign     HC COLONOSCOPY THRU STOMA, DIAGNOSTIC  4-24-98    Diverticuli - due in 2008     HC ERCP W SPHINCTEROTOMY  1996 6/2/96 and 8/30/96     HC REDUCTION OF LARGE BREAST  1988     HC REMOVAL GALLBLADDER  1995      UGI ENDOSCOPY DIAG W OR W/O BRUSH/WASH  7-     INJECT EPIDURAL LUMBAR N/A 8/10/2017    Procedure: INJECT EPIDURAL LUMBAR;  Lumbar 2-3 Epidural Steroid Injection;  Surgeon: Kimani Garcia MD;  Location: [facility]       Social History   Substance Use Topics     Smoking status: Never Smoker     Smokeless  "tobacco: Never Used     Alcohol use No     Family History   Problem Relation Age of Onset     Cardiovascular Father      Aortic aneurysm     Hypertension Father      Hypertension Mother      GASTROINTESTINAL DISEASE Mother      GI Bleed     Lipids Sister      Hypertension Sister      Genitourinary Problems Sister      Allergies Sister      CANCER Brother      Lung     Alcohol/Drug Brother      Connective Tissue Disorder Son      Down Syndrome     Respiratory Son      Pneumonia     CANCER Maternal Grandmother      Stomach     Hypertension Maternal Grandfather      Allergies Daughter            ROS:  Constitutional, HEENT, cardiovascular, pulmonary, GI, , musculoskeletal, neuro, skin, endocrine and psych systems are negative, except as in HPI or otherwise noted.     This document serves as a record of the services and decisions personally performed and made by Apryl Olvera MD. It was created on her behalf by Annette Villeda, a trained medical scribe. The creation of this document is based the provider's statements to the medical scribe.  Annette Villeda, December 14, 2017 12:11 PM      OBJECTIVE:                                                    /80  Pulse 58  Temp 97.1  F (36.2  C) (Temporal)  Resp 15  Ht 1.665 m (5' 5.55\")  Wt 83.9 kg (185 lb)  SpO2 98%  Breastfeeding? No  BMI 30.27 kg/m2  Body mass index is 30.27 kg/(m^2).   GENERAL: healthy, alert, well nourished, well hydrated, no distress, overweight  SKIN: Right foot is significantly more erythematous than left foot.   PSYCH: Alert and oriented times 3; speech- coherent , normal rate and volume; able to articulate logical thoughts, able to abstract reason, no tangential thoughts, no hallucinations or delusions, affect- normal    Diagnostic test results:  No results found for this or any previous visit (from the past 24 hour(s)).       ASSESSMENT/PLAN:                                                        ICD-10-CM    1. HTN, goal below 150/90 I10    2. " Screening for diabetic retinopathy Z13.5    3. Acute gouty arthritis M10.9    4. Essential hypertension with goal blood pressure less than 140/90 I10 cloNIDine (CATAPRES) 0.1 MG tablet     Hospital Follow-Up: Discussed dietary changes the pt can make to prevent gout attacks, foods to avoid, and foods to eat. Suggested and provided a list of foods to avoid eating, organ meats, red meats, alcohol, certain fish and vegetables. Try to consume more dark berries like cherries, whole, avoid drinking too much juice as it can worsen diabetes. Explained affect of HCTZ on gout, importance of staying hydrated. Since she is staying so well hydrated it is probably not necessary to remove it at this time. Refilled Catapres prescription.     Back Pain: Recommended she follow-up with Dr. Garcia for her back pain.       Patient Instructions       Gout Diet  Gout is a painful condition caused by an excess of uric acid, a waste product made by the body. Uric acid forms crystals that collect in the joints. The immune response to these crystals brings on symptoms of joint pain and swelling. This is called a gout attack. Often, medications and diet changes are combined to manage gout. Below are some guidelines for changing your diet to help you manage gout and prevent attacks. Your health care provider will help you determine the best eating plan for you.     Eating to Prevent Gout  Gout is a painful form of arthritis caused by an excess of uric acid. This is a waste product made by the body. It builds up in the body and forms crystals that collect in the joints, bringing on a gout attack. Alcohol and certain foods can trigger a gout attack. Below are some guidelines for changing your diet to help you manage gout. Your healthcare provider can work with you to determine the best eating plan for you. Know that diet is only one part of managing gout. Take your medicines as prescribed and follow the other guidelines your healthcare provider  has given you.  Foods to limit  Eating too many foods containing purines may increase the levels of uric acid in your body and increase your risk for a gout attack. It may be best to limit these high-purine foods:    Alcohol (beer, red wine). You may be told to avoid alcohol completely.    Certain fish (anchovies, sardines, fish roes, herring, tuna, mussels, codfish, scallops, trout, and keila)    Certain meats (red meat, processed meat, lombardi, turkey, wild game, and goose)    Sauces and gravies made with meat    Organ meats (such as liver, kidneys, sweetbreads, and tripe)    Legumes (such as dried beans, peas)    Mushrooms, spinach, asparagus, and cauliflower    Yeast and yeast extract supplements  Foods to try  Some foods may be helpful for people with gout. You may want to try adding some of the following foods to your diet:    Dark berries: These include blueberries, blackberries, and cherries. These berries contain chemicals that may lower uric acid.    Tofu: Tofu, which is made from soy, is a good source of protein. Studies have shown that it may be a better choice than meat for people with gout.    Omega fatty acids: These acids are found in fatty fish (such as salmon), certain oils (such as flax, olive, or nut oils), or nuts. They may help prevent inflammation due to gout.    Drink plenty of water, about 64 oz. Daily.     The following guidelines are recommended by the American Medical Association for people with gout. Your diet should be:    High in fiber, whole grains, fruits, and vegetables.    Low in protein (15% of calories should come from protein. Choose lean sources such as soy, lean meats, and poultry).    Low in fat (no more than 30% of calories should come from fat, with only 10% coming from animal fat).     Date Last Reviewed: 6/17/2015 2000-2017 [employer]. [address] [place] [address], [place] [address]. All rights reserved. This information is not intended as a substitute for  professional medical care. Always follow your healthcare professional's instructions.              The information in this document, created by the medical scribe for me, accurately reflects the services I personally performed and the decisions made by me. I have reviewed and approved this document for accuracy.   MD Apryl Henao MD, MD  [facility]

## 2017-12-14 ENCOUNTER — OFFICE VISIT (OUTPATIENT)
Dept: FAMILY MEDICINE | Facility: OTHER | Age: 77
End: 2017-12-14
Payer: COMMERCIAL

## 2017-12-14 VITALS
HEART RATE: 58 BPM | WEIGHT: 185 LBS | DIASTOLIC BLOOD PRESSURE: 80 MMHG | TEMPERATURE: 97.1 F | OXYGEN SATURATION: 98 % | RESPIRATION RATE: 15 BRPM | BODY MASS INDEX: 29.73 KG/M2 | SYSTOLIC BLOOD PRESSURE: 142 MMHG | HEIGHT: 66 IN

## 2017-12-14 DIAGNOSIS — I10 ESSENTIAL HYPERTENSION WITH GOAL BLOOD PRESSURE LESS THAN 140/90: ICD-10-CM

## 2017-12-14 DIAGNOSIS — I10 HTN, GOAL BELOW 150/90: Primary | ICD-10-CM

## 2017-12-14 DIAGNOSIS — Z13.5 SCREENING FOR DIABETIC RETINOPATHY: ICD-10-CM

## 2017-12-14 DIAGNOSIS — M10.9 ACUTE GOUTY ARTHRITIS: ICD-10-CM

## 2017-12-14 PROCEDURE — 99214 OFFICE O/P EST MOD 30 MIN: CPT | Performed by: FAMILY MEDICINE

## 2017-12-14 RX ORDER — CLONIDINE HYDROCHLORIDE 0.1 MG/1
0.1 TABLET ORAL EVERY MORNING
Qty: 90 TABLET | Refills: 1 | Status: SHIPPED | OUTPATIENT
Start: 2017-12-14 | End: 2017-12-19

## 2017-12-14 ASSESSMENT — PAIN SCALES - GENERAL: PAINLEVEL: NO PAIN (0)

## 2017-12-14 NOTE — NURSING NOTE
"Chief Complaint   Patient presents with     Hospital F/U     Panel Management     mychart, height, eye exam       Initial /80  Pulse 58  Temp 97.1  F (36.2  C) (Temporal)  Resp 15  Ht 5' 5.55\" (1.665 m)  Wt 185 lb (83.9 kg)  SpO2 98%  Breastfeeding? No  BMI 30.27 kg/m2 Estimated body mass index is 30.27 kg/(m^2) as calculated from the following:    Height as of this encounter: 5' 5.55\" (1.665 m).    Weight as of this encounter: 185 lb (83.9 kg).  Medication Reconciliation: complete  "

## 2017-12-14 NOTE — MR AVS SNAPSHOT
After Visit Summary   12/14/2017    Ni Maya    MRN: [id number]           Patient Information     Date Of Birth          1940        Visit Information        Provider Department      12/14/2017 12:00 PM Apryl Olvera MD [facility]        Today's Diagnoses     HTN, goal below 150/90    -  1    Screening for diabetic retinopathy        Acute gouty arthritis        Essential hypertension with goal blood pressure less than 140/90          Care Instructions      Gout Diet  Gout is a painful condition caused by an excess of uric acid, a waste product made by the body. Uric acid forms crystals that collect in the joints. The immune response to these crystals brings on symptoms of joint pain and swelling. This is called a gout attack. Often, medications and diet changes are combined to manage gout. Below are some guidelines for changing your diet to help you manage gout and prevent attacks. Your health care provider will help you determine the best eating plan for you.     Eating to Prevent Gout  Gout is a painful form of arthritis caused by an excess of uric acid. This is a waste product made by the body. It builds up in the body and forms crystals that collect in the joints, bringing on a gout attack. Alcohol and certain foods can trigger a gout attack. Below are some guidelines for changing your diet to help you manage gout. Your healthcare provider can work with you to determine the best eating plan for you. Know that diet is only one part of managing gout. Take your medicines as prescribed and follow the other guidelines your healthcare provider has given you.  Foods to limit  Eating too many foods containing purines may increase the levels of uric acid in your body and increase your risk for a gout attack. It may be best to limit these high-purine foods:    Alcohol (beer, red wine). You may be told to avoid alcohol completely.    Certain fish (anchovies, sardines, fish  roes, herring, tuna, mussels, codfish, scallops, trout, and keila)    Certain meats (red meat, processed meat, lombardi, turkey, wild game, and goose)    Sauces and gravies made with meat    Organ meats (such as liver, kidneys, sweetbreads, and tripe)    Legumes (such as dried beans, peas)    Mushrooms, spinach, asparagus, and cauliflower    Yeast and yeast extract supplements  Foods to try  Some foods may be helpful for people with gout. You may want to try adding some of the following foods to your diet:    Dark berries: These include blueberries, blackberries, and cherries. These berries contain chemicals that may lower uric acid.    Tofu: Tofu, which is made from soy, is a good source of protein. Studies have shown that it may be a better choice than meat for people with gout.    Omega fatty acids: These acids are found in fatty fish (such as salmon), certain oils (such as flax, olive, or nut oils), or nuts. They may help prevent inflammation due to gout.    Drink plenty of water, about 64 oz. Daily.     The following guidelines are recommended by the American Medical Association for people with gout. Your diet should be:    High in fiber, whole grains, fruits, and vegetables.    Low in protein (15% of calories should come from protein. Choose lean sources such as soy, lean meats, and poultry).    Low in fat (no more than 30% of calories should come from fat, with only 10% coming from animal fat).     Date Last Reviewed: 6/17/2015 2000-2017 [employer]. [address] [place] [address], [place] [address]. All rights reserved. This information is not intended as a substitute for professional medical care. Always follow your healthcare professional's instructions.                    Follow-ups after your visit        Who to contact     If you have questions or need follow up information about today's clinic visit or your schedule please contact [facility] directly at [phone].  Normal or  "non-critical lab and imaging results will be communicated to you by MyChart, letter or phone within 4 business days after the clinic has received the results. If you do not hear from us within 7 days, please contact the clinic through [employer] or phone. If you have a critical or abnormal lab result, we will notify you by phone as soon as possible.  Submit refill requests through [employer] or call your pharmacy and they will forward the refill request to us. Please allow 3 business days for your refill to be completed.          Additional Information About Your Visit        Sav[employer] Information     [employer] lets you send messages to your doctor, view your test results, renew your prescriptions, schedule appointments and more. To sign up, go to [email or web address] . Click on \"Log in\" on the left side of the screen, which will take you to the Welcome page. Then click on \"Sign up Now\" on the right side of the page.     You will be asked to enter the access code listed below, as well as some personal information. Please follow the directions to create your username and password.     Your access code is: [email or web address]  Expires: 2017 11:21 AM     Your access code will  in 90 days. If you need help or a new code, please call your [facility] clinic or [phone].        Care EveryWhere ID     This is your Care EveryWhere ID. This could be used by other organizations to access your [facility] medical records  [id number]        Your Vitals Were     Pulse Temperature Respirations Height Pulse Oximetry Breastfeeding?    58 97.1  F (36.2  C) (Temporal) 15 1.665 m (5' 5.55\") 98% No    BMI (Body Mass Index)                   30.27 kg/m2            Blood Pressure from Last 3 Encounters:   17 142/80   17 (!) 169/93   17 139/70    Weight from Last 3 Encounters:   17 83.9 kg (185 lb)   17 86.8 kg (191 lb 5.8 oz)   17 83.5 kg (184 lb)              Today, you had the following     No orders " found for display         Today's Medication Changes          These changes are accurate as of: 12/14/17 12:37 PM.  If you have any questions, ask your nurse or doctor.               Stop taking these medicines if you haven't already. Please contact your care team if you have questions.     colchicine 0.6 MG tablet   Stopped by:  Apryl Olvera MD                Where to get your medicines      These medications were sent to [id number] - [place], [place] - [address] [facility]  [address] [facility] [address]     Phone:  [phone]     cloNIDine 0.1 MG tablet                Primary Care Provider Office Phone # Fax #    Apryl Olvera [phone]       [address] [place] [address]        Equal Access to Services     [facility] : Hadii aad ku hadasho Sojohnnyali, waaxda luqadaha, qaybta kaalmada adeegyada, david vidal . So [facility] [phone].    ATENCIÓN: Si habla español, tiene a yuen disposición servicios gratuitos de asistencia lingüística. [facility] [phone].    We comply with applicable federal civil rights laws and [place] laws. We do not discriminate on the basis of race, color, national origin, age, disability, sex, sexual orientation, or gender identity.            Thank you!     Thank you for choosing [facility]  for your care. Our goal is always to provide you with excellent care. Hearing back from our patients is one way we can continue to improve our services. Please take a few minutes to complete the written survey that you may receive in the mail after your visit with us. Thank you!             Your Updated Medication List - Protect others around you: Learn how to safely use, store and throw away your medicines at www.disposemymeds.org.          This list is accurate as of: 12/14/17 12:37 PM.  Always use your most recent med list.                   Brand Name Dispense Instructions for use Diagnosis    acetaminophen  325 MG tablet    TYLENOL    100 tablet    Take 3 tablets (975 mg) by mouth every 6 hours as needed for mild pain    Acute gout involving toe of right foot, unspecified cause       alcohol swab prep pads     100 each    Use to swab area of injection/geoffrey as directed.    Type 2 diabetes mellitus with hyperglycemia, without long-term current use of insulin (H)       ALLEGRA 180 MG tablet   Generic drug:  fexofenadine     90 Tab    1 TABLET DAILY    Allergic rhinitis, cause unspecified       apixaban ANTICOAGULANT 5 MG tablet    ELIQUIS    90 tablet    Take 1 tablet (5 mg) by mouth 2 times daily    Atrial fibrillation, unspecified type (H)       blood glucose calibration solution    NO BRAND SPECIFIED    1 Bottle    To accompany: Blood Glucose Monitor Brands: per insurance.    Type 2 diabetes mellitus with hyperglycemia, without long-term current use of insulin (H)       * Blood Glucose Monitoring Suppl W/DEVICE Kit     1 kit    1 kit 3 times daily.    Type 2 diabetes, HbA1C goal < 8% (H)       * blood glucose monitoring meter device kit    no brand specified    1 kit    Use to test blood sugar 3 times daily or as directed. Preferred blood glucose meter OR supplies to accompany: Blood Glucose Monitor Brands: per insurance.    Type 2 diabetes mellitus with hyperglycemia, without long-term current use of insulin (H)       * blood glucose monitoring test strip    Verivue CONTOUR NEXT    250 each    TEST THREE TIMES A DAY    Type 2 diabetes mellitus with hyperglycemia (H)       * blood glucose monitoring test strip    no brand specified    100 strip    Use to test blood sugar 3 times daily or as directed. To accompany: Blood Glucose Monitor Brands: per insurance.    Type 2 diabetes mellitus with hyperglycemia, without long-term current use of insulin (H)       * cloNIDine 0.1 MG tablet    CATAPRES    270 tablet    Take 2 tablets (0.2 mg) by mouth At Bedtime    Essential hypertension with goal blood pressure less than 140/90        * cloNIDine 0.1 MG tablet    CATAPRES    90 tablet    Take 1 tablet (0.1 mg) by mouth every morning    Essential hypertension with goal blood pressure less than 140/90       * E-Z JECT LANCET MICRO-THIN 33G Misc           * thin lancets    NO BRAND SPECIFIED    100 each    Use with lanceting device. To accompany: Blood Glucose Monitor Brands: per insurance.    Type 2 diabetes mellitus with hyperglycemia, without long-term current use of insulin (H)       fish oil-omega-3 fatty acids 1000 MG capsule      Take 2 g by mouth daily. 2 caps per day        hydrochlorothiazide 25 MG tablet    HYDRODIURIL    90 tablet    Take 1 tablet (25 mg) by mouth daily    Essential hypertension with goal blood pressure less than 140/90       levothyroxine 100 MCG tablet    SYNTHROID/LEVOTHROID    90 tablet    Take 1 tablet (100 mcg) by mouth daily    Hypothyroidism, unspecified type       lisinopril 30 MG tablet    PRINIVIL,ZESTRIL    90 tablet    Take 1 tablet (30 mg) by mouth daily    Chronic kidney disease, stage II (mild), Type 2 diabetes mellitus with hyperglycemia, without long-term current use of insulin (H), Essential hypertension with goal blood pressure less than 140/90       metFORMIN 500 MG 24 hr tablet    GLUCOPHAGE-XR    90 tablet    Take 1 tablet (500 mg) by mouth daily (with dinner)    Type 2 diabetes mellitus with hyperosmolarity without coma, without long-term current use of insulin (H)       metoprolol 100 MG tablet    LOPRESSOR    180 tablet    Take 1 tablet (100 mg) by mouth 2 times daily    Essential hypertension with goal blood pressure less than 140/90       * NUTRITIONAL SUPPLEMENT PO      VIBE        * NUTRITIONAL SUPPLEMENT PO      Take 1 oz by mouth daily Flex        * Notice:  This list has 10 medication(s) that are the same as other medications prescribed for you. Read the directions carefully, and ask your doctor or other care provider to review them with you.

## 2017-12-14 NOTE — PATIENT INSTRUCTIONS
Gout Diet  Gout is a painful condition caused by an excess of uric acid, a waste product made by the body. Uric acid forms crystals that collect in the joints. The immune response to these crystals brings on symptoms of joint pain and swelling. This is called a gout attack. Often, medications and diet changes are combined to manage gout. Below are some guidelines for changing your diet to help you manage gout and prevent attacks. Your health care provider will help you determine the best eating plan for you.     Eating to Prevent Gout  Gout is a painful form of arthritis caused by an excess of uric acid. This is a waste product made by the body. It builds up in the body and forms crystals that collect in the joints, bringing on a gout attack. Alcohol and certain foods can trigger a gout attack. Below are some guidelines for changing your diet to help you manage gout. Your healthcare provider can work with you to determine the best eating plan for you. Know that diet is only one part of managing gout. Take your medicines as prescribed and follow the other guidelines your healthcare provider has given you.  Foods to limit  Eating too many foods containing purines may increase the levels of uric acid in your body and increase your risk for a gout attack. It may be best to limit these high-purine foods:    Alcohol (beer, red wine). You may be told to avoid alcohol completely.    Certain fish (anchovies, sardines, fish roes, herring, tuna, mussels, codfish, scallops, trout, and keila)    Certain meats (red meat, processed meat, lombardi, turkey, wild game, and goose)    Sauces and gravies made with meat    Organ meats (such as liver, kidneys, sweetbreads, and tripe)    Legumes (such as dried beans, peas)    Mushrooms, spinach, asparagus, and cauliflower    Yeast and yeast extract supplements  Foods to try  Some foods may be helpful for people with gout. You may want to try adding some of the following foods to your  diet:    Dark berries: These include blueberries, blackberries, and cherries. These berries contain chemicals that may lower uric acid.    Tofu: Tofu, which is made from soy, is a good source of protein. Studies have shown that it may be a better choice than meat for people with gout.    Omega fatty acids: These acids are found in fatty fish (such as salmon), certain oils (such as flax, olive, or nut oils), or nuts. They may help prevent inflammation due to gout.    Drink plenty of water, about 64 oz. Daily.     The following guidelines are recommended by the American Medical Association for people with gout. Your diet should be:    High in fiber, whole grains, fruits, and vegetables.    Low in protein (15% of calories should come from protein. Choose lean sources such as soy, lean meats, and poultry).    Low in fat (no more than 30% of calories should come from fat, with only 10% coming from animal fat).     Date Last Reviewed: 6/17/2015 2000-2017 [employer]. [address] [place] [address], [place] [address]. All rights reserved. This information is not intended as a substitute for professional medical care. Always follow your healthcare professional's instructions.

## 2017-12-15 ENCOUNTER — TELEPHONE (OUTPATIENT)
Dept: FAMILY MEDICINE | Facility: OTHER | Age: 77
End: 2017-12-15

## 2017-12-15 DIAGNOSIS — I10 ESSENTIAL HYPERTENSION WITH GOAL BLOOD PRESSURE LESS THAN 140/90: ICD-10-CM

## 2017-12-15 NOTE — TELEPHONE ENCOUNTER
Pt has told pharmacy that during hospital stay she was told to go back to her previous dosing one in the morning and 2 in the evening on the Clonidine.  Do you still only want one in the morning?

## 2017-12-15 NOTE — TELEPHONE ENCOUNTER
Reason for Call:  Medication or medication refill:    Do you use a [facility]?  Name of the pharmacy and phone number for the current request:  [facility] - [phone]    Name of the medication requested: clonidine     Other request: pharmacy calling to clarify directions. Is she supposed to do one tablet in the morning or one tablet in the morning and two tablets in the evening?     Can we leave a detailed message on this number? YES    Phone number patient can be reached at: Home number on file [phone] (home)    Best Time: any    Call taken on 12/15/2017 at 2:21 PM by Carmen Edmonds

## 2017-12-18 NOTE — TELEPHONE ENCOUNTER
No she needs 0.1 in AM and 0.2 in PM per the medication reconciliation -- on her med list and ours. Not sure why the confusion.  Apryl Olvera MD

## 2017-12-19 RX ORDER — CLONIDINE HYDROCHLORIDE 0.1 MG/1
0.2 TABLET ORAL AT BEDTIME
Qty: 270 TABLET | Refills: 1 | Status: SHIPPED | OUTPATIENT
Start: 2017-12-19 | End: 2018-04-25

## 2017-12-19 NOTE — TELEPHONE ENCOUNTER
"Pt called and states that she would like all of her pills for Clonidine in 1 bottle. She states \"taking her medications are becoming more difficult.\"  I spoke with pharmacist and she said Clonidine can come as a 0.2, but pt does not want 2 separate bottles for AM and PM doses. I left message letting pt know that I will route this to her PCP to review/advise to make sure this is OK.     Routing to AE for further review.  Candis Mckinley, [facility]      "

## 2018-01-22 ENCOUNTER — TELEPHONE (OUTPATIENT)
Dept: FAMILY MEDICINE | Facility: OTHER | Age: 78
End: 2018-01-22

## 2018-01-22 NOTE — TELEPHONE ENCOUNTER
Summary:    Patient is due/failing the following:   BP CHECK    Action needed:   Patient needs nurse only appointment.    Type of outreach:    Phone, spoke to patient.  patient is in [place] until April.     Questions for provider review:    None                                                                                                                                    Chen Holder       Chart routed to Care Team .    Panel Management Review      Patient has the following on her problem list:     Diabetes    ASA:     Last A1C  Lab Results   Component Value Date    A1C 6.6 11/01/2017    A1C 7.2 05/17/2017    A1C 5.9 04/03/2017    A1C 7.0 09/14/2016    A1C 6.7 05/13/2016     A1C tested: Passed    Last LDL:    Lab Results   Component Value Date    CHOL 179 11/01/2017     Lab Results   Component Value Date    HDL 52 11/01/2017     Lab Results   Component Value Date     11/01/2017     Lab Results   Component Value Date    TRIG 130 11/01/2017     Lab Results   Component Value Date    CHOLHDLRATIO 3.6 10/19/2015     Lab Results   Component Value Date    NHDL 127 11/01/2017       Is the patient on a Statin? NO             Is the patient on Aspirin? NO        Last three blood pressure readings:  BP Readings from Last 3 Encounters:   12/14/17 142/80   11/28/17 (!) 169/93   11/24/17 139/70            Tobacco History:     History   Smoking Status     Never Smoker   Smokeless Tobacco     Never Used         Hypertension   Last three blood pressure readings:  BP Readings from Last 3 Encounters:   12/14/17 142/80   11/28/17 (!) 169/93   11/24/17 139/70     Blood pressure: FAILED    HTN Guidelines:  Age 18-59 BP range:  Less than 140/90  Age 60-85 with Diabetes:  Less than 140/90  Age 60-85 without Diabetes:  less than 150/90          Composite cancer screening  Chart review shows that this patient is due/due soon for the following None

## 2018-03-19 DIAGNOSIS — E11.9 TYPE 2 DIABETES, HBA1C GOAL < 8% (H): Primary | ICD-10-CM

## 2018-03-20 NOTE — TELEPHONE ENCOUNTER
blood glucose monitoring (NO BRAND SPECIFIED) test strip  Prescription approved per [facility] Refill Protocol.  Yulisa Cat RN, BSN

## 2018-04-19 NOTE — PROGRESS NOTES
SUBJECTIVE:   Ni Maya is a 77 year old female who presents to clinic today for the following health issues:    HPI     Acute Illness   Acute illness concerns: Sinus drainage  Onset: 3 - 4 months    Fever: no     Chills/Sweats: no     Headache (location?): YES    Sinus Pressure:no    Conjunctivitis:  no    Ear Pain: no    Rhinorrhea: YES    Congestion: no     Sore Throat: no      Cough: YES    Wheeze: no     Decreased Appetite: no     Nausea: no     Vomiting: no     Diarrhea:  no     Dysuria/Freq.: no     Fatigue/Achiness: no     Sick/Strep Exposure: no      Therapies Tried and outcome: Mucinex, Zycam, cough drops- somewhat helpful.    Started right before she went on vacation, and continued while she was in [place]. She had a lot of mucous, was coughing, snorting, and coughed up large wads of mucous a few times. She took some over the counter medications while she was there as she didn't want to go to a clinic. Took a medicine (Coricidin) recommended by the pharmacist that wouldn't affect her blood pressure. She had to sleep on 3 pillows when it was at its worst. It has been getting better bit by bit since she returned from vacation. She isn't coughing up mucous anymore.     Back Pain       Duration: 3 years        Specific cause: none    Description:   Location of pain: low back right  Character of pain: dull ache and constant  Pain radiation: radiates into the right buttocks and groin, and into the knee now (this radiation is new)  New numbness or weakness in legs, not attributed to pain:  no     Intensity: Currently 5/10, At its worst 7/10    History:   Pain interferes with job: Not applicable  History of back problems: Yes  Any previous MRI or X-rays: Yes- MRI at [facility].    Sees a specialist for back pain:  In the past, had injections  Therapies tried without relief: chiropractor, cold, heat and steroid injection    Alleviating factors:   Improved by: NSAIDs      Precipitating factors:  Worsened by:  Bending, Sitting to standing, worse at night    Functional and Psychosocial Screen (Rajesh STarT Back):      Not performed today      Accompanying Signs & Symptoms:  Risk of Fracture:  Age >64  Risk of Cauda Equina:  None  Risk of Infection:  None  Risk of Cancer:  None  Risk of Ankylosing Spondylitis:  Onset at age <35, male, AND morning back stiffness. no       She had an injection with Dr. Garcia, he picked one of the three spots that she had. She reports no relief of symptoms at all. She was thinking that she wanted a referral to a laser back surgeon, but they aren't located in the state. She would rather consider surgery to fix some of the arthritis. She is also now having pain radiating to her knee that she thinks might be related to the back pain.     Diabetes Follow-up    Patient is checking blood sugars: twice daily.    Blood sugar testing frequency justification: Uncontrolled diabetes  Results are as follows:         am - 125         lunchtime - 100    Diabetic concerns: None     Symptoms of hypoglycemia (low blood sugar): none     Paresthesias (numbness or burning in feet) or sores: No but has itching     Date of last diabetic eye exam: within last year    Has been having some difficulties with her new glucose meter. Sounds like she hasn't calibrated it yet, as it is consistently higher than when she checks with her old meter.     Hemoglobin A1C   Date Value Ref Range Status   04/25/2018 6.9 (H) 0 - 6.4 % Final     Comment:     Normal <5.7% Prediabetes 5.7-6.4%  Diabetes 6.5% or higher - adopted from ADA   consensus guidelines.     11/01/2017 6.6 (H) 4.3 - 6.0 % Final   05/17/2017 7.2 (H) 4.3 - 6.0 % Final   04/03/2017 5.9 4.0 - 6.0 % Final     Hyperlipidemia Follow-Up      Rate your low fat/cholesterol diet?: fair    Taking statin?  No    Other lipid medications/supplements?:  Fish oil/Omega 3, dose without side effects    Hypertension Follow-up      Outpatient blood pressures are not being  checked.    Low Salt Diet: low salt    BP Readings from Last 2 Encounters:   04/25/18 134/74   12/14/17 142/80     Hemoglobin A1C (%)   Date Value   04/25/2018 6.9 (H)   11/01/2017 6.6 (H)     LDL Cholesterol Calculated (mg/dL)   Date Value   11/01/2017 101 (H)   05/17/2017 105 (H)     Problem list and histories reviewed & adjusted, as indicated.  Additional history: as documented    Patient Active Problem List   Diagnosis     Hypothyroidism     Allergic rhinitis     Symptomatic menopausal or female climacteric states     Chronic kidney disease, stage II (mild)     Edema     Obesity     Urethral stricture     MICKIE III (vulvar intraepithelial neoplasia III)     Vulval lesion     CARDIOVASCULAR SCREENING; LDL GOAL LESS THAN 130     Health Care Home     Type 2 diabetes, HbA1C goal < 8% (H)     Hyperlipidemia with target LDL less than 100     HTN, goal below 150/90     ACP (advance care planning)     Essential hypertension with goal blood pressure less than 140/90     Type 2 diabetes mellitus with hyperglycemia, without long-term current use of insulin (H)     Cellulitis of toe of right foot     Chronic atrial fibrillation (H)     Thrombocytopenia (H)     Acute gout involving toe of right foot     Past Surgical History:   Procedure Laterality Date     BIOPSY VULVA/PERINEUM,ADDNL LESN  9/18/09    Wide -excision of MICKIE III- right labia      C LAPAROSCOPY, SURGICAL; W/ VAGINAL HYSTERECTOMY W/WO REMOVAL OVARY(S)/TUBES  1984    for bleeding     C LIGATE FALLOPIAN TUBE,POSTPARTUM  1978    Tubal Ligation     COLONOSCOPY  9/13/2013    Procedure: COLONOSCOPY;  Colonoscopy;  Surgeon: Yanick Ramsey MD;  Location: [facility]     HC ANGIOGRAPHY ARCH  4-3-98     HC BIOPSY OF BREAST, OPEN INCISIONAL  1960    Benign     HC COLONOSCOPY THRU STOMA, DIAGNOSTIC  4-24-98    Diverticuli - due in 2008     HC ERCP W SPHINCTEROTOMY  1996    6/2/96 and 8/30/96     HC REDUCTION OF LARGE BREAST  1988     HC REMOVAL GALLBLADDER  1995     HC UGI  "ENDOSCOPY DIAG W OR W/O BRUSH/WASH  7-     INJECT EPIDURAL LUMBAR N/A 8/10/2017    Procedure: INJECT EPIDURAL LUMBAR;  Lumbar 2-3 Epidural Steroid Injection;  Surgeon: Kimani Garcia MD;  Location:  OR       Social History   Substance Use Topics     Smoking status: Never Smoker     Smokeless tobacco: Never Used     Alcohol use No     Family History   Problem Relation Age of Onset     Cardiovascular Father      Aortic aneurysm     Hypertension Father      Hypertension Mother      GASTROINTESTINAL DISEASE Mother      GI Bleed     Lipids Sister      Hypertension Sister      Genitourinary Problems Sister      Allergies Sister      CANCER Brother      Lung     Alcohol/Drug Brother      Connective Tissue Disorder Son      Down Syndrome     Respiratory Son      Pneumonia     CANCER Maternal Grandmother      Stomach     Hypertension Maternal Grandfather      Allergies Daughter            ROS:  Constitutional, HEENT, cardiovascular, pulmonary, GI, , musculoskeletal, neuro, skin, endocrine and psych systems are negative, except as in HPI or otherwise noted.     This document serves as a record of the services and decisions personally performed and made by Apryl Olvera MD. It was created on her behalf by Annette Villeda, a trained medical scribe. The creation of this document is based the provider's statements to the medical scribe.  Annette Villeda, April 25, 2018 9:36 AM       OBJECTIVE:                                                    /74  Pulse 84  Temp 97.2  F (36.2  C) (Temporal)  Ht 1.651 m (5' 5\")  Wt 83.5 kg (184 lb)  SpO2 97%  Breastfeeding? No  BMI 30.62 kg/m2  Body mass index is 30.62 kg/(m^2).   GENERAL: healthy, alert, well nourished, well hydrated, no distress, overweight  HENT: ear canals- impacted cerumen on the right; TMs- normal; Nose- normal; Mouth- post nasal drip  NECK: no tenderness, no adenopathy, no asymmetry, no masses, no stiffness; thyroid- normal to palpation  RESP: lungs clear to " auscultation - no rales, no rhonchi, no wheezes  CV: regular rates and rhythm, normal S1 S2, no S3 or S4 and no murmur, no click or rub  SKIN: no suspicious lesions, no rashes to visible skin  PSYCH: Alert and oriented times 3; speech- coherent , normal rate and volume; able to articulate logical thoughts, able to abstract reason, no tangential thoughts, no hallucinations or delusions, affect- normal    Diagnostic test results:  Results for orders placed or performed in visit on 04/25/18 (from the past 24 hour(s))   HEMOGLOBIN A1C   Result Value Ref Range    Hemoglobin A1C 6.9 (H) 0 - 6.4 %        ASSESSMENT/PLAN:                                                        ICD-10-CM    1. Type 2 diabetes mellitus with hyperglycemia, without long-term current use of insulin (H) E11.65 COMPREHENSIVE METABOLIC PANEL     HEMOGLOBIN A1C     Lipid panel reflex to direct LDL Fasting     Albumin Random Urine Quantitative with Creat Ratio     TSH WITH FREE T4 REFLEX     OPTOMETRY REFERRAL     COMPREHENSIVE METABOLIC PANEL     HEMOGLOBIN A1C     Lipid panel reflex to direct LDL Fasting     Albumin Random Urine Quantitative with Creat Ratio     TSH WITH FREE T4 REFLEX   2. Chronic kidney disease, stage II (mild) N18.2 COMPREHENSIVE METABOLIC PANEL     Albumin Random Urine Quantitative with Creat Ratio     COMPREHENSIVE METABOLIC PANEL     Albumin Random Urine Quantitative with Creat Ratio   3. Acquired hypothyroidism E03.9 TSH WITH FREE T4 REFLEX     TSH WITH FREE T4 REFLEX   4. Hyperlipidemia with target LDL less than 100 E78.5 Lipid panel reflex to direct LDL Fasting     Lipid panel reflex to direct LDL Fasting   5. HTN, goal below 150/90 I10 COMPREHENSIVE METABOLIC PANEL     Albumin Random Urine Quantitative with Creat Ratio     COMPREHENSIVE METABOLIC PANEL     Albumin Random Urine Quantitative with Creat Ratio     Diabetes: Recommended she calibrate her new meter, following the instructional booklet that came with her meter.  Will refill her medications when her lab results come in. Her A1c is looking well controlled, a bit up from last visit, but still under 7.0. Will continue with current medication and treatment plan.     Back Pain: Discussed the causes of her back pain and the three locations that were found on her imaging. The first injection was a guess for which of these sites is the main cause of the pain and radiation. The first guess obviously wasn't right, so will want to get her back to Dr. Garcia to get another injection at one of the other sites. She is now reporting some knee radiation.    Acute URI: Recommended hot, steamy liquids to loosen the mucous in the throat. Can try some tea with lemon and honey which can coat the throat and provide some relief. Can try hot showers as well. Stay hydrated and get plenty of rest.     Order placed for labs that the patient will complete today: CMP, Albumin, Lipid panel, A1c, TSH.       Patient Instructions     Recommended hot, steamy liquids to loosen the mucous in the throat. Can try some tea with lemon and honey which can coat the throat and provide some relief. Can try hot showers as well. Stay hydrated and get plenty of rest.     Follow-up with Dr. Garcia again to discuss your back pain and possibly repeat the injection at a different site. The injections may be able to located which of the three spots in your back are causing you the pain.     Your A1c is looking good today, continue with your current medications, and don't forget to calibrate your new glucose meter. There should be instructions on how to do this in the owner's manual that came in the box with the meter.     Hemoglobin A1C   Date Value Ref Range Status   04/25/2018 6.9 (H) 0 - 6.4 % Final     Comment:     Normal <5.7% Prediabetes 5.7-6.4%  Diabetes 6.5% or higher - adopted from ADA   consensus guidelines.     11/01/2017 6.6 (H) 4.3 - 6.0 % Final   05/17/2017 7.2 (H) 4.3 - 6.0 % Final   04/03/2017 5.9 4.0 - 6.0 %  Final       The information in this document, created by the medical scribe for me, accurately reflects the services I personally performed and the decisions made by me. I have reviewed and approved this document for accuracy.     Apryl Olvera MD, MD  [facility]

## 2018-04-25 ENCOUNTER — OFFICE VISIT (OUTPATIENT)
Dept: FAMILY MEDICINE | Facility: OTHER | Age: 78
End: 2018-04-25
Payer: COMMERCIAL

## 2018-04-25 VITALS
BODY MASS INDEX: 30.66 KG/M2 | DIASTOLIC BLOOD PRESSURE: 74 MMHG | OXYGEN SATURATION: 97 % | TEMPERATURE: 97.2 F | WEIGHT: 184 LBS | HEIGHT: 65 IN | SYSTOLIC BLOOD PRESSURE: 134 MMHG | HEART RATE: 84 BPM

## 2018-04-25 DIAGNOSIS — E03.9 ACQUIRED HYPOTHYROIDISM: ICD-10-CM

## 2018-04-25 DIAGNOSIS — N18.2 CHRONIC KIDNEY DISEASE, STAGE II (MILD): ICD-10-CM

## 2018-04-25 DIAGNOSIS — I10 HTN, GOAL BELOW 150/90: ICD-10-CM

## 2018-04-25 DIAGNOSIS — E03.9 HYPOTHYROIDISM, UNSPECIFIED TYPE: ICD-10-CM

## 2018-04-25 DIAGNOSIS — E11.00 TYPE 2 DIABETES MELLITUS WITH HYPEROSMOLARITY WITHOUT COMA, WITHOUT LONG-TERM CURRENT USE OF INSULIN (H): ICD-10-CM

## 2018-04-25 DIAGNOSIS — I10 ESSENTIAL HYPERTENSION WITH GOAL BLOOD PRESSURE LESS THAN 140/90: ICD-10-CM

## 2018-04-25 DIAGNOSIS — E78.5 HYPERLIPIDEMIA WITH TARGET LDL LESS THAN 100: ICD-10-CM

## 2018-04-25 DIAGNOSIS — E11.65 TYPE 2 DIABETES MELLITUS WITH HYPERGLYCEMIA, WITHOUT LONG-TERM CURRENT USE OF INSULIN (H): Primary | ICD-10-CM

## 2018-04-25 LAB
ALBUMIN SERPL-MCNC: 3.7 G/DL (ref 3.4–5)
ALP SERPL-CCNC: 83 U/L (ref 40–150)
ALT SERPL W P-5'-P-CCNC: 45 U/L (ref 0–50)
ANION GAP SERPL CALCULATED.3IONS-SCNC: 7 MMOL/L (ref 3–14)
AST SERPL W P-5'-P-CCNC: 35 U/L (ref 0–45)
BILIRUB SERPL-MCNC: 0.6 MG/DL (ref 0.2–1.3)
BUN SERPL-MCNC: 12 MG/DL (ref 7–30)
CALCIUM SERPL-MCNC: 9 MG/DL (ref 8.5–10.1)
CHLORIDE SERPL-SCNC: 101 MMOL/L (ref 94–109)
CHOLEST SERPL-MCNC: 175 MG/DL
CO2 SERPL-SCNC: 31 MMOL/L (ref 20–32)
CREAT SERPL-MCNC: 0.91 MG/DL (ref 0.52–1.04)
GFR SERPL CREATININE-BSD FRML MDRD: 60 ML/MIN/1.7M2
GLUCOSE SERPL-MCNC: 198 MG/DL (ref 70–99)
HBA1C MFR BLD: 6.9 % (ref 0–6.4)
HDLC SERPL-MCNC: 47 MG/DL
LDLC SERPL CALC-MCNC: 95 MG/DL
NONHDLC SERPL-MCNC: 128 MG/DL
POTASSIUM SERPL-SCNC: 3.8 MMOL/L (ref 3.4–5.3)
PROT SERPL-MCNC: 7.3 G/DL (ref 6.8–8.8)
SODIUM SERPL-SCNC: 139 MMOL/L (ref 133–144)
TRIGL SERPL-MCNC: 167 MG/DL
TSH SERPL DL<=0.005 MIU/L-ACNC: 2.8 MU/L (ref 0.4–4)

## 2018-04-25 PROCEDURE — 83036 HEMOGLOBIN GLYCOSYLATED A1C: CPT | Performed by: FAMILY MEDICINE

## 2018-04-25 PROCEDURE — 99214 OFFICE O/P EST MOD 30 MIN: CPT | Performed by: FAMILY MEDICINE

## 2018-04-25 PROCEDURE — 84443 ASSAY THYROID STIM HORMONE: CPT | Performed by: FAMILY MEDICINE

## 2018-04-25 PROCEDURE — 36415 COLL VENOUS BLD VENIPUNCTURE: CPT | Performed by: FAMILY MEDICINE

## 2018-04-25 PROCEDURE — 80053 COMPREHEN METABOLIC PANEL: CPT | Performed by: FAMILY MEDICINE

## 2018-04-25 PROCEDURE — 80061 LIPID PANEL: CPT | Performed by: FAMILY MEDICINE

## 2018-04-25 RX ORDER — LEVOTHYROXINE SODIUM 100 UG/1
100 TABLET ORAL DAILY
Qty: 90 TABLET | Refills: 3 | Status: SHIPPED | OUTPATIENT
Start: 2018-04-25 | End: 2019-03-15

## 2018-04-25 RX ORDER — CLONIDINE HYDROCHLORIDE 0.1 MG/1
0.2 TABLET ORAL AT BEDTIME
Qty: 270 TABLET | Refills: 1 | Status: SHIPPED | OUTPATIENT
Start: 2018-04-25 | End: 2018-11-08

## 2018-04-25 RX ORDER — METOPROLOL TARTRATE 100 MG
100 TABLET ORAL 2 TIMES DAILY
Qty: 180 TABLET | Refills: 3 | Status: SHIPPED | OUTPATIENT
Start: 2018-04-25 | End: 2019-03-15 | Stop reason: DRUGHIGH

## 2018-04-25 RX ORDER — METFORMIN HCL 500 MG
500 TABLET, EXTENDED RELEASE 24 HR ORAL
Qty: 90 TABLET | Refills: 1 | Status: SHIPPED | OUTPATIENT
Start: 2018-04-25 | End: 2018-11-08

## 2018-04-25 ASSESSMENT — PAIN SCALES - GENERAL: PAINLEVEL: MODERATE PAIN (5)

## 2018-04-25 NOTE — PATIENT INSTRUCTIONS
Recommended hot, steamy liquids to loosen the mucous in the throat. Can try some tea with lemon and honey which can coat the throat and provide some relief. Can try hot showers as well. Stay hydrated and get plenty of rest.     Follow-up with Dr. Garcia again to discuss your back pain and possibly repeat the injection at a different site. The injections may be able to located which of the three spots in your back are causing you the pain.     Your A1c is looking good today, continue with your current medications, and don't forget to calibrate your new glucose meter. There should be instructions on how to do this in the owner's manual that came in the box with the meter.     Hemoglobin A1C   Date Value Ref Range Status   04/25/2018 6.9 (H) 0 - 6.4 % Final     Comment:     Normal <5.7% Prediabetes 5.7-6.4%  Diabetes 6.5% or higher - adopted from ADA   consensus guidelines.     11/01/2017 6.6 (H) 4.3 - 6.0 % Final   05/17/2017 7.2 (H) 4.3 - 6.0 % Final   04/03/2017 5.9 4.0 - 6.0 % Final

## 2018-04-25 NOTE — MR AVS SNAPSHOT
After Visit Summary   4/25/2018    Ni Maya    MRN: [id number]           Patient Information     Date Of Birth          1940        Visit Information        Provider Department      4/25/2018 9:00 AM Apryl Olvera MD [facility]        Today's Diagnoses     Type 2 diabetes mellitus with hyperglycemia, without long-term current use of insulin (H)    -  1    Chronic kidney disease, stage II (mild)        Acquired hypothyroidism        Hyperlipidemia with target LDL less than 100        HTN, goal below 150/90          Care Instructions    Recommended hot, steamy liquids to loosen the mucous in the throat. Can try some tea with lemon and honey which can coat the throat and provide some relief. Can try hot showers as well. Stay hydrated and get plenty of rest.     Follow-up with Dr. Garcia again to discuss your back pain and possibly repeat the injection at a different site. The injections may be able to located which of the three spots in your back are causing you the pain.     Your A1c is looking good today, continue with your current medications, and don't forget to calibrate your new glucose meter. There should be instructions on how to do this in the owner's manual that came in the box with the meter.     Hemoglobin A1C   Date Value Ref Range Status   04/25/2018 6.9 (H) 0 - 6.4 % Final     Comment:     Normal <5.7% Prediabetes 5.7-6.4%  Diabetes 6.5% or higher - adopted from ADA   consensus guidelines.     11/01/2017 6.6 (H) 4.3 - 6.0 % Final   05/17/2017 7.2 (H) 4.3 - 6.0 % Final   04/03/2017 5.9 4.0 - 6.0 % Final               Follow-ups after your visit        Follow-up notes from your care team     Return in about 6 months (around 10/25/2018) for Diabetic Re-check.      Who to contact     If you have questions or need follow up information about today's clinic visit or your schedule please contact [facility] directly at [phone].  Normal or  "non-critical lab and imaging results will be communicated to you by MyChart, letter or phone within 4 business days after the clinic has received the results. If you do not hear from us within 7 days, please contact the clinic through [employer] or phone. If you have a critical or abnormal lab result, we will notify you by phone as soon as possible.  Submit refill requests through [employer] or call your pharmacy and they will forward the refill request to us. Please allow 3 business days for your refill to be completed.          Additional Information About Your Visit        [employer] Information     [employer] lets you send messages to your doctor, view your test results, renew your prescriptions, schedule appointments and more. To sign up, go to [email or web address] . Click on \"Log in\" on the left side of the screen, which will take you to the Welcome page. Then click on \"Sign up Now\" on the right side of the page.     You will be asked to enter the access code listed below, as well as some personal information. Please follow the directions to create your username and password.     Your access code is: [email or web address]  Expires: 2018 10:02 AM     Your access code will  in 90 days. If you need help or a new code, please call your [facility] clinic or [phone].        Care EveryWhere ID     This is your Care EveryWhere ID. This could be used by other organizations to access your [facility] medical records  [id number]        Your Vitals Were     Pulse Temperature Height Pulse Oximetry Breastfeeding? BMI (Body Mass Index)    84 97.2  F (36.2  C) (Temporal) 1.651 m (5' 5\") 97% No 30.62 kg/m2       Blood Pressure from Last 3 Encounters:   18 134/74   17 142/80   17 (!) 169/93    Weight from Last 3 Encounters:   18 83.5 kg (184 lb)   17 83.9 kg (185 lb)   17 86.8 kg (191 lb 5.8 oz)              We Performed the Following     COMPREHENSIVE METABOLIC PANEL     HEMOGLOBIN A1C     Lipid " panel reflex to direct LDL Fasting     TSH WITH FREE T4 REFLEX        Primary Care Provider Office Phone # Fax #    Apryl Olvera [phone]       [address] [place] [id number]        Equal Access to Services     BENITEZ GIL : Jyoti ross eloy Soraul, waaxda luqadaha, qaybta kaalmada adeclaudia, david devinin hayaaemma lovingphill garcia young lindsay. So [facility] [phone].    ATENCIÓN: Si habla español, tiene a yuen disposición servicios gratuitos de asistencia lingüística. Llame al [phone].    We comply with applicable federal civil rights laws and [place] laws. We do not discriminate on the basis of race, color, national origin, age, disability, sex, sexual orientation, or gender identity.            Thank you!     Thank you for choosing [facility]  for your care. Our goal is always to provide you with excellent care. Hearing back from our patients is one way we can continue to improve our services. Please take a few minutes to complete the written survey that you may receive in the mail after your visit with us. Thank you!             Your Updated Medication List - Protect others around you: Learn how to safely use, store and throw away your medicines at www.disposemymeds.org.          This list is accurate as of 4/25/18 10:02 AM.  Always use your most recent med list.                   Brand Name Dispense Instructions for use Diagnosis    acetaminophen 325 MG tablet    TYLENOL    100 tablet    Take 3 tablets (975 mg) by mouth every 6 hours as needed for mild pain    Acute gout involving toe of right foot, unspecified cause       alcohol swab prep pads     100 each    Use to swab area of injection/geoffrey as directed.    Type 2 diabetes mellitus with hyperglycemia, without long-term current use of insulin (H)       ALLEGRA 180 MG tablet   Generic drug:  fexofenadine     90 Tab    1 TABLET DAILY    Allergic rhinitis, cause unspecified       apixaban ANTICOAGULANT 5 MG tablet     ELIQUIS    90 tablet    Take 1 tablet (5 mg) by mouth 2 times daily    Atrial fibrillation, unspecified type (H)       blood glucose calibration solution    NO BRAND SPECIFIED    1 Bottle    To accompany: Blood Glucose Monitor Brands: per insurance.    Type 2 diabetes mellitus with hyperglycemia, without long-term current use of insulin (H)       * Blood Glucose Monitoring Suppl w/Device Kit     1 kit    1 kit 3 times daily.    Type 2 diabetes, HbA1C goal < 8% (H)       * blood glucose monitoring meter device kit    no brand specified    1 kit    Use to test blood sugar 3 times daily or as directed. Preferred blood glucose meter OR supplies to accompany: Blood Glucose Monitor Brands: per insurance.    Type 2 diabetes mellitus with hyperglycemia, without long-term current use of insulin (H)       * blood glucose monitoring test strip    WALTOP CONTOUR NEXT    250 each    TEST THREE TIMES A DAY    Type 2 diabetes mellitus with hyperglycemia (H)       * blood glucose monitoring test strip    no brand specified    100 strip    Use to test blood sugar 3 times daily or as directed. To accompany: Blood Glucose Monitor Brands: per insurance.    Type 2 diabetes mellitus with hyperglycemia, without long-term current use of insulin (H)       * ONETOUCH ULTRA test strip   Generic drug:  blood glucose monitoring     100 each    USE TO TEST BLOOD SUGARS THREE TIMES A DAY    Type 2 diabetes, HbA1C goal < 8% (H)       cloNIDine 0.1 MG tablet    CATAPRES    270 tablet    Take 2 tablets (0.2 mg) by mouth At Bedtime AND take 1 tablet in the AM    Essential hypertension with goal blood pressure less than 140/90       * E-Z JECT LANCET MICRO-THIN 33G Misc           * thin lancets    NO BRAND SPECIFIED    100 each    Use with lanceting device. To accompany: Blood Glucose Monitor Brands: per insurance.    Type 2 diabetes mellitus with hyperglycemia, without long-term current use of insulin (H)       fish oil-omega-3 fatty acids 1000 MG  capsule      Take 2 g by mouth daily. 2 caps per day        hydrochlorothiazide 25 MG tablet    HYDRODIURIL    90 tablet    Take 1 tablet (25 mg) by mouth daily    Essential hypertension with goal blood pressure less than 140/90       levothyroxine 100 MCG tablet    SYNTHROID/LEVOTHROID    90 tablet    Take 1 tablet (100 mcg) by mouth daily    Hypothyroidism, unspecified type       lisinopril 30 MG tablet    PRINIVIL,ZESTRIL    90 tablet    Take 1 tablet (30 mg) by mouth daily    Chronic kidney disease, stage II (mild), Type 2 diabetes mellitus with hyperglycemia, without long-term current use of insulin (H), Essential hypertension with goal blood pressure less than 140/90       metFORMIN 500 MG 24 hr tablet    GLUCOPHAGE-XR    90 tablet    Take 1 tablet (500 mg) by mouth daily (with dinner)    Type 2 diabetes mellitus with hyperosmolarity without coma, without long-term current use of insulin (H)       metoprolol tartrate 100 MG tablet    LOPRESSOR    180 tablet    Take 1 tablet (100 mg) by mouth 2 times daily    Essential hypertension with goal blood pressure less than 140/90       * NUTRITIONAL SUPPLEMENT PO      VIBE        * NUTRITIONAL SUPPLEMENT PO      Take 1 oz by mouth daily Flex        * Notice:  This list has 9 medication(s) that are the same as other medications prescribed for you. Read the directions carefully, and ask your doctor or other care provider to review them with you.

## 2018-04-26 ENCOUNTER — TELEPHONE (OUTPATIENT)
Dept: SURGERY | Facility: CLINIC | Age: 78
End: 2018-04-26

## 2018-04-26 ENCOUNTER — TELEPHONE (OUTPATIENT)
Dept: FAMILY MEDICINE | Facility: OTHER | Age: 78
End: 2018-04-26

## 2018-04-26 DIAGNOSIS — M54.16 LUMBAR RADICULOPATHY: Primary | ICD-10-CM

## 2018-04-26 NOTE — TELEPHONE ENCOUNTER
Looks like she needs repeat injection by Radha. It looked like there was an order previously for xr fluoro needle placement     Instructiosn from us: patient should hold Eliquis 3 days prior to injection and then resume Eliquis 2 days after injection if who ever the provider doing the injection deems it is safe to resume.   She wants repeat of the 6/1 order, attempted to repeat order.  Apryl Olvera MD

## 2018-04-26 NOTE — TELEPHONE ENCOUNTER
Reason for call:  Other  Reason for Call: Request for an order or referral:    Order or referral being requested: order for steroid injection in her back to be done a [facility]    Date needed: as soon as possible    Has the patient been seen by the PCP for this problem? YES    Additional comments: needs the orders before she can schedule this    Phone number Patient can be reached at:  Home number on file [phone] (home)    Best Time:  asap    Can we leave a detailed message on this number?  YES    Call taken on 4/26/2018 at 11:48 AM by Ashlee Stapleton

## 2018-04-26 NOTE — TELEPHONE ENCOUNTER
Patient called to schedule GABRIELA. Orders placed by Adela. Patient is scheduled on 5/24/2018 @ 900 w/Radha, arriving @ 800. Notified patient that she will need a  and H&P.

## 2018-05-07 NOTE — PATIENT INSTRUCTIONS
- Patient should hold Eliquis 3 days prior to injection and then resume Eliquis 2 days after injection if who ever the provider doing the injection deems it is safe to resume.     Morning of procedure   - No Metformin and No Lisinopril   - May take the rest of the medications as prescribed           Before Your Surgery      Call your surgeon if there is any change in your health. This includes signs of a cold or flu (such as a sore throat, runny nose, cough, rash or fever).    Do not smoke, drink alcohol or take over the counter medicine (unless your surgeon or primary care doctor tells you to) for the 24 hours before and after surgery.    If you take prescribed drugs: Follow your doctor s orders about which medicines to take and which to stop until after surgery.    Eating and drinking prior to surgery: follow the instructions from your surgeon    Take a shower or bath the night before surgery. Use the soap your surgeon gave you to gently clean your skin. If you do not have soap from your surgeon, use your regular soap. Do not shave or scrub the surgery site.  Wear clean pajamas and have clean sheets on your bed.

## 2018-05-07 NOTE — PROGRESS NOTES
[address]  [facility] [address]  [phone]  Dept: [phone]    EKG, no labs -CDL     PRE-OP EVALUATION:  Today's date: 2018    Ni Maya (: 1940) presents for pre-operative evaluation assessment as requested by Dr. Garcia.  She requires evaluation and anesthesia risk assessment prior to undergoing surgery/procedure for treatment of Lumbar radiculopathy.    Proposed Surgery/ Procedure: steroid injection  Date of Surgery/ Procedure: 5/10/18  Time of Surgery/ Procedure: [address] [place] [address]/Surgical Facility: [facility]    Primary Physician: Apryl Olvera  Type of Anesthesia Anticipated: Local    Patient has a Health Care Directive or Living Will:  YES     1. NO - Do you have a history of heart attack, stroke, stent, bypass or surgery on an artery in the head, neck, heart or legs?  2. NO - Do you ever have any pain or discomfort in your chest?  3. NO - Do you have a history of  Heart Failure?  4. YES - ARE YOUR TROUBLED BY SHORTNESS OF BREATH WHEN WALKING ON THE LEVEL, UP A SLIGHT HILL OR AT NIGHT? If walks after eating   5. NO - Do you currently have a cold, bronchitis or other respiratory infection?  6. NO - Do you have a cough, shortness of breath or wheezing?  7. NO - Do you sometimes get pains in the calves of your legs when you walk?  8. NO - Do you or anyone in your family have previous history of blood clots?  9. NO - Do you or does anyone in your family have a serious bleeding problem such as prolonged bleeding following surgeries or cuts?  10. YES - HAVE YOU EVER HAD PROBLEMS WITH ANEMIA OR BEEN TOLD TO TAKE IRON PILLS? Had anemia with periods in the past   11. NO - Have you had any abnormal blood loss such as black, tarry or bloody stools, or abnormal vaginal bleeding?  12. NO - HAVE YOU EVER HAD A BLOOD TRANSFUSION?   13. NO - Have you or any of your relatives ever had problems with anesthesia?  14. NO - Do you have sleep apnea,  excessive snoring or daytime drowsiness?  15. NO - Do you have any prosthetic heart valves?  16. NO - Do you have prosthetic joints?  17. NO - Is there any chance that you may be pregnant?      HPI:     HPI related to upcoming procedure: Patient with continued chronic low back pain, didn't improve after last injection on 8/10/17    See problem list for active medical problems.  Problems all longstanding and stable, except as noted/documented.  See ROS for pertinent symptoms related to these conditions.                                                                                                                                                            A-FIB - Patient has a history of chronic A-fib currently on rate control. Current treatment regimen includes Apixaban and Metoprolol for stroke prevention and denies significant symptoms of lightheadedness, palpitations or dyspnea.                                                                                                                                                                              DIABETES - Patient has a history of DiabetesType II. Patient is being treated with oral agents and denies significant side effects. Control has been good. Complicating factors include but are not limited to: hypertension and hyperlipidemia.                                                                                                                             HYPERLIPIDEMIA - Patient has a history of hyperlipidemia requiring medication for treatment with recent good control. Patient reports no problems or side effects with the medication.                                                                                                                                                        HYPERTENSION - Patient has a history of hypertension, currently denies any symptoms referable to elevated blood pressure. Specifically denies chest pain, palpitations, dyspnea,  orthopnea, PND or peripheral edema. Blood pressure readings have been in normal range. Current medication regimen is as listed below. Patient denies any side effects of medication.                                                                                                                                                                                           HYPOTHYROIDISM - Patient has a history of chronic hypothyroidism. Patient has been doing well, noting no tremor, insomnia, hair loss or changes in skin texture. Continues to take medications as directed, without adverse reactions or side effects. Last TSH   Lab Results   Component Value Date    TSH 2.80 04/25/2018                                                                                                                                                                                                                            MEDICAL HISTORY:     Patient Active Problem List    Diagnosis Date Noted     Lumbar radiculopathy 05/08/2018     Priority: Medium     Acute gout involving toe of right foot 11/27/2017     Priority: Medium     Cellulitis of toe of right foot 11/26/2017     Priority: Medium     Chronic atrial fibrillation (H) 11/26/2017     Priority: Medium     Thrombocytopenia (H) 11/26/2017     Priority: Medium     Type 2 diabetes mellitus with hyperglycemia, without long-term current use of insulin (H) 05/17/2017     Priority: Medium     Essential hypertension with goal blood pressure less than 140/90 05/27/2016     Priority: Medium     ACP (advance care planning) 05/17/2016     Priority: Medium     Advance Care Planning 5/17/2016: Receipt of ACP document:  Received: Health Care Directive which was witnessed or notarized on 11/22/2013.  Document not previously scanned.  Validation form completed and sent with document to be scanned.  Code Status needs to be updated to reflect choices in most recent ACP document. Notification sent to Dr. Lin for  followup.  Confirmed/documented designated decision maker(s).  Added by Kassidy Sousa.             HTN, goal below 150/90 11/06/2013     Priority: Medium     Hyperlipidemia with target LDL less than 100 10/16/2013     Priority: Medium     Diagnosis updated by automated process. Provider to review and confirm.       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 Final path MICKIE III with free surgical margins       Urethral stricture 01/03/2008     Priority: Medium     Problem list name updated by automated process. Provider to review       Edema 12/29/2006     Priority: Medium     Obesity 12/29/2006     Priority: Medium     Problem list name updated by automated process. Provider to review       Chronic kidney disease, stage II (mild) 11/27/2006     Priority: Medium     Symptomatic menopausal or female climacteric states 01/13/2005     Priority: Medium     Allergic rhinitis 10/21/2004     Priority: Medium     Problem list name updated by automated process. Provider to review       Hypothyroidism 06/25/2002     Priority: Medium     Problem list name updated by automated process. Provider to review        Past Medical History:   Diagnosis Date     Allergic rhinitis, cause unspecified      Essential hypertension, benign      Infection of kidney, unspecified 1999     Other specified acquired hypothyroidism      Other specified types of cystitis(595.89)     1999,6-2-01, 10-10-01     Past Surgical History:   Procedure  Laterality Date     BIOPSY VULVA/PERINEUM,ADDNL LESN  9/18/09    Wide -excision of MICKIE III- right labia      C LAPAROSCOPY, SURGICAL; W/ VAGINAL HYSTERECTOMY W/WO REMOVAL OVARY(S)/TUBES  1984    for bleeding     C LIGATE FALLOPIAN TUBE,POSTPARTUM  1978    Tubal Ligation     COLONOSCOPY  9/13/2013    Procedure: COLONOSCOPY;  Colonoscopy;  Surgeon: Yanick Ramsey MD;  Location: [facility]     HC ANGIOGRAPHY ARCH  4-3-98     HC BIOPSY OF BREAST, OPEN INCISIONAL  1960    Benign     HC COLONOSCOPY THRU STOMA, DIAGNOSTIC  4-24-98    Diverticuli - due in 2008     HC ERCP W SPHINCTEROTOMY  1996 6/2/96 and 8/30/96     HC REDUCTION OF LARGE BREAST  1988     HC REMOVAL GALLBLADDER  1995     HC UGI ENDOSCOPY DIAG W OR W/O BRUSH/WASH  7-     INJECT EPIDURAL LUMBAR N/A 8/10/2017    Procedure: INJECT EPIDURAL LUMBAR;  Lumbar 2-3 Epidural Steroid Injection;  Surgeon: Kimani Garcia MD;  Location:  OR     Current Outpatient Prescriptions   Medication Sig Dispense Refill     alcohol swab prep pads Use to swab area of injection/geoffrey as directed. 100 each 3     ALLEGRA 180 MG PO TABS 1 TABLET DAILY 90 Tab 3     apixaban ANTICOAGULANT (ELIQUIS) 5 MG tablet Take 1 tablet (5 mg) by mouth 2 times daily 90 tablet 3     blood glucose calibration (NO BRAND SPECIFIED) solution To accompany: Blood Glucose Monitor Brands: per insurance. 1 Bottle 3     blood glucose monitoring (HUMBERTO CONTOUR NEXT) test strip TEST THREE TIMES A  each 2     blood glucose monitoring (NO BRAND SPECIFIED) meter device kit Use to test blood sugar 3 times daily or as directed. Preferred blood glucose meter OR supplies to accompany: Blood Glucose Monitor Brands: per insurance. 1 kit 0     blood glucose monitoring (NO BRAND SPECIFIED) test strip Use to test blood sugar 3 times daily or as directed. To accompany: Blood Glucose Monitor Brands: per insurance. 100 strip 6     Blood Glucose Monitoring Suppl W/DEVICE KIT 1 kit 3 times daily. 1 kit 0      "cloNIDine (CATAPRES) 0.1 MG tablet Take 2 tablets (0.2 mg) by mouth At Bedtime AND take 1 tablet in the  tablet 1     fish oil-omega-3 fatty acids (FISH OIL) 1000 MG capsule Take 2 g by mouth daily. 2 caps per day       hydrochlorothiazide (HYDRODIURIL) 25 MG tablet Take 1 tablet (25 mg) by mouth daily 90 tablet 3     Lancets (E-Z JECT LANCET MICRO-THIN 33G) MISC        levothyroxine (SYNTHROID/LEVOTHROID) 100 MCG tablet Take 1 tablet (100 mcg) by mouth daily 90 tablet 3     lisinopril (PRINIVIL,ZESTRIL) 30 MG tablet Take 1 tablet (30 mg) by mouth daily 90 tablet 3     metFORMIN (GLUCOPHAGE-XR) 500 MG 24 hr tablet Take 1 tablet (500 mg) by mouth daily (with dinner) 90 tablet 1     metoprolol tartrate (LOPRESSOR) 100 MG tablet Take 1 tablet (100 mg) by mouth 2 times daily 180 tablet 3     NUTRITIONAL SUPPLEMENT OR VIBE       Nutritional Supplements (NUTRITIONAL SUPPLEMENT PO) Take 1 oz by mouth daily Flex       ONETOUCH ULTRA test strip USE TO TEST BLOOD SUGARS THREE TIMES A  each 1     thin (NO BRAND SPECIFIED) lancets Use with lanceting device. To accompany: Blood Glucose Monitor Brands: per insurance. 100 each 6     OTC products: None, except as noted above    Allergies   Allergen Reactions     Sulfa Drugs Rash      Latex Allergy: NO    Social History   Substance Use Topics     Smoking status: Never Smoker     Smokeless tobacco: Never Used     Alcohol use No     History   Drug Use No       REVIEW OF SYSTEMS:   Constitutional, neuro, ENT, endocrine, pulmonary, cardiac, gastrointestinal, genitourinary, musculoskeletal, integument and psychiatric systems are negative, except as otherwise noted.    EXAM:   Pulse 84  Temp 98.4  F (36.9  C) (Oral)  Resp 20  Ht 5' 4.96\" (1.65 m)  Wt 185 lb (83.9 kg)  SpO2 98%  BMI 30.82 kg/m2    GENERAL APPEARANCE: healthy, alert and no distress     EYES: EOMI, PERRL     HENT: ear canals and TM's normal and nose and mouth without ulcers or lesions     NECK: no " adenopathy, no asymmetry, masses, or scars and thyroid normal to palpation     RESP: lungs clear to auscultation - no rales, rhonchi or wheezes     CV: regular rates and rhythm, normal S1 S2, no S3 or S4 and no murmur, click or rub     ABDOMEN:  soft, nontender, no HSM or masses and bowel sounds normal     MS: extremities normal- no gross deformities noted, no evidence of inflammation in joints, FROM in all extremities.     SKIN: no suspicious lesions or rashes     NEURO: Normal strength and tone, sensory exam grossly normal, mentation intact and speech normal     PSYCH: mentation appears normal. and affect normal/bright     LYMPHATICS: No cervical adenopathy    DIAGNOSTICS:   EKG: atrial fibrillation, rate 85, normal axis, normal intervals, no acute ST/T changes c/w ischemia, no LVH by voltage criteria, unchanged from previous tracings    Recent Labs   Lab Test  04/25/18   0900  11/28/17   0615   11/27/17   0533  11/26/17   1350  11/01/17   0836  08/07/17   0921   01/12/15   1143   HGB   --    --    --   13.1  14.4   --   14.0   < >  15.3   PLT   --    --    --   120*  122*   --   123*   < >  134*   INR   --    --    --    --    --    --   0.96   --   0.95   NA  139   --    --   139  139  137  140   < >  140   POTASSIUM  3.8  3.7   < >  3.2*  3.4  3.5  3.6   < >  3.4   CR  0.91   --    --   0.85  1.00  1.01  0.87   < >  0.84   A1C  6.9*   --    --    --    --   6.6*   --    < >   --     < > = values in this interval not displayed.        IMPRESSION:   Reason for surgery/procedure: Lumbar radiculopathy   Diagnosis/reason for consult: Pre operative consult     The proposed surgical procedure is considered INTERMEDIATE risk.    REVISED CARDIAC RISK INDEX  The patient has the following serious cardiovascular risks for perioperative complications such as (MI, PE, VFib and 3  AV Block):  No serious cardiac risks  INTERPRETATION: 0 risks: Class I (very low risk - 0.4% complication rate)    The patient has the following  additional risks for perioperative complications:  No identified additional risks      ICD-10-CM    1. Preop general physical exam Z01.818    2. Lumbar radiculopathy M54.16    3. Type 2 diabetes mellitus with hyperglycemia, without long-term current use of insulin (H) E11.65    4. Essential hypertension with goal blood pressure less than 140/90 I10    5. Chronic atrial fibrillation (H) I48.2    6. Thrombocytopenia (H) D69.6    7. Hyperlipidemia with target LDL less than 100 E78.5    8. Acquired hypothyroidism E03.9          RECOMMENDATIONS:     Cardiovascular Risk  Patient is already on a Beta Blocker. Continue Betablocker therapy before and after surgery, using Beta blocker order set as necessary for NPO status.      --Patient is to take all scheduled medications on the day of surgery EXCEPT for modifications listed below.    Diabetes Medication Use  -----Hold usual oral and non-insulin diabetic meds (e.g. Metformin, Actos, Glipizide) while NPO.     Anticoagulant or Antiplatelet Medication Use  Per PCP note: patient should hold Eliquis 3 days prior to injection and then resume Eliquis 2 days after injection if who ever the provider doing the injection deems it is safe to resume.         ACE Inhibitor or Angiotensin Receptor Blocker (ARB) Use  Ace inhibitor or Angiotensin Receptor Blocker (ARB) and should HOLD this medication for the 24 hours prior to surgery.    APPROVAL GIVEN to proceed with proposed procedure, without further diagnostic evaluation       Signed Electronically by: Leslee Knight PA-C    Copy of this evaluation report is provided to requesting physician.    [facility] Preop Guidelines    Revised Cardiac Risk Index

## 2018-05-08 ENCOUNTER — OFFICE VISIT (OUTPATIENT)
Dept: FAMILY MEDICINE | Facility: OTHER | Age: 78
End: 2018-05-08
Payer: COMMERCIAL

## 2018-05-08 VITALS
RESPIRATION RATE: 20 BRPM | HEART RATE: 84 BPM | TEMPERATURE: 98.4 F | HEIGHT: 65 IN | BODY MASS INDEX: 30.82 KG/M2 | OXYGEN SATURATION: 98 % | WEIGHT: 185 LBS

## 2018-05-08 DIAGNOSIS — Z01.818 PREOP GENERAL PHYSICAL EXAM: Primary | ICD-10-CM

## 2018-05-08 DIAGNOSIS — D69.6 THROMBOCYTOPENIA (H): ICD-10-CM

## 2018-05-08 DIAGNOSIS — I48.20 CHRONIC ATRIAL FIBRILLATION (H): ICD-10-CM

## 2018-05-08 DIAGNOSIS — M54.16 LUMBAR RADICULOPATHY: ICD-10-CM

## 2018-05-08 DIAGNOSIS — E03.9 ACQUIRED HYPOTHYROIDISM: ICD-10-CM

## 2018-05-08 DIAGNOSIS — I10 ESSENTIAL HYPERTENSION WITH GOAL BLOOD PRESSURE LESS THAN 140/90: ICD-10-CM

## 2018-05-08 DIAGNOSIS — E78.5 HYPERLIPIDEMIA WITH TARGET LDL LESS THAN 100: ICD-10-CM

## 2018-05-08 DIAGNOSIS — E11.65 TYPE 2 DIABETES MELLITUS WITH HYPERGLYCEMIA, WITHOUT LONG-TERM CURRENT USE OF INSULIN (H): ICD-10-CM

## 2018-05-08 PROCEDURE — 93000 ELECTROCARDIOGRAM COMPLETE: CPT | Performed by: PHYSICIAN ASSISTANT

## 2018-05-08 PROCEDURE — 99215 OFFICE O/P EST HI 40 MIN: CPT | Performed by: PHYSICIAN ASSISTANT

## 2018-05-08 NOTE — MR AVS SNAPSHOT
After Visit Summary   5/8/2018    Ni Maya    MRN: [id number]           Patient Information     Date Of Birth          1940        Visit Information        Provider Department      5/8/2018 1:30 PM Leslee Knight PA-C [facility]        Today's Diagnoses     Preop general physical exam    -  1    Lumbar radiculopathy        Type 2 diabetes mellitus with hyperglycemia, without long-term current use of insulin (H)        Essential hypertension with goal blood pressure less than 140/90        Chronic atrial fibrillation (H)        Thrombocytopenia (H)        Hyperlipidemia with target LDL less than 100        Acquired hypothyroidism          Care Instructions    - Patient should hold Eliquis 3 days prior to injection and then resume Eliquis 2 days after injection if who ever the provider doing the injection deems it is safe to resume.     Morning of procedure   - No Metformin and No Lisinopril   - May take the rest of the medications as prescribed           Before Your Surgery      Call your surgeon if there is any change in your health. This includes signs of a cold or flu (such as a sore throat, runny nose, cough, rash or fever).    Do not smoke, drink alcohol or take over the counter medicine (unless your surgeon or primary care doctor tells you to) for the 24 hours before and after surgery.    If you take prescribed drugs: Follow your doctor s orders about which medicines to take and which to stop until after surgery.    Eating and drinking prior to surgery: follow the instructions from your surgeon    Take a shower or bath the night before surgery. Use the soap your surgeon gave you to gently clean your skin. If you do not have soap from your surgeon, use your regular soap. Do not shave or scrub the surgery site.  Wear clean pajamas and have clean sheets on your bed.           Follow-ups after your visit        Follow-up notes from your care team     Return  if symptoms worsen or fail to improve.      Your next 10 appointments already scheduled     May 10, 2018   Procedure with Kimani Garcia MD   [facility] ([facility])    [address]  [place] [id number] [phone]           From [address]: Exit at [address] on south side of [place]. Turn right on [address]. Turn left at stoplight on "[address]. [facility] will be in view two blocks ahead            May 10, 2018  7:30 AM CDT   XR FLUORO NEEDLE PLACEMENT SPINE with PHCARM1   [facility] ([facility])    [address] [place] [id number] [phone]           For nerve root injection, please send or bring copies of any MRIs or other scans you have had.  Bring a list of your current medicines to your exam. (Include vitamins, minerals and over-the-counter medicines.) Leave your valuables at home.  Plan to have someone drive you home afterward.  Stop taking the following medicines (but talk to your doctor first):   If you take blood thinners, you may need to stop taking them a few days before treatment. Talk to your doctor before stopping these medicines.Stop taking Coumadin (warfarin) 3 days before treatment. Restart the day after treatment.   If you take Plavix, Ticlid, Pletal or Persantine, please ask your doctor if you should stop these medicines. You may need extra tests on the morning of your scan.   If you take Xarelto (Rivaroxaban), you may need to stop taking it 24 hours before treatment. Talk to your doctor before stopping this medicine. Restart the day after treatment.  You may take your other medicines as normal.  Stop all food and drink (including water) 3 hours before your test or treatment.  Please tell the doctor:   If you are allergic to X-ray dye (contrast fluid).   If you may be pregnant.  Injections take about 30 to 45 minutes. Most people spend up to 2 hours in the clinic or  "hospital.  Please call the Imaging Department at your exam site with any questions.              Future tests that were ordered for you today     Open Future Orders        Priority Expected Expires Ordered    XR Fluoro Needle Placement Spine (With Procedure) Routine 2018            Who to contact     If you have questions or need follow up information about today's clinic visit or your schedule please contact [facility] directly at [phone].  Normal or non-critical lab and imaging results will be communicated to you by [employer], letter or phone within 4 business days after the clinic has received the results. If you do not hear from us within 7 days, please contact the clinic through DivXt or phone. If you have a critical or abnormal lab result, we will notify you by phone as soon as possible.  Submit refill requests through ENEFpro or call your pharmacy and they will forward the refill request to us. Please allow 3 business days for your refill to be completed.          Additional Information About Your Visit        ENEFpro Information     ENEFpro lets you send messages to your doctor, view your test results, renew your prescriptions, schedule appointments and more. To sign up, go to [email or web address] . Click on \"Log in\" on the left side of the screen, which will take you to the Welcome page. Then click on \"Sign up Now\" on the right side of the page.     You will be asked to enter the access code listed below, as well as some personal information. Please follow the directions to create your username and password.     Your access code is: [email or web address]  Expires: 2018 10:02 AM     Your access code will  in 90 days. If you need help or a new code, please call your [facility] clinic or [phone].        Care EveryWhere ID     This is your Care EveryWhere ID. This could be used by other organizations to access your [facility] medical records  [id number]      " "  Your Vitals Were     Pulse Temperature Respirations Height Pulse Oximetry BMI (Body Mass Index)    84 98.4  F (36.9  C) (Oral) 20 5' 4.96\" (1.65 m) 98% 30.82 kg/m2       Blood Pressure from Last 3 Encounters:   04/25/18 134/74   12/14/17 142/80   11/28/17 (!) 169/93    Weight from Last 3 Encounters:   05/08/18 185 lb (83.9 kg)   04/25/18 184 lb (83.5 kg)   12/14/17 185 lb (83.9 kg)              We Performed the Following     EKG 12-lead complete w/read - Clinics        Primary Care Provider Office Phone # Fax #    Apryl Olvera [phone]       [address] [place] [id number]        Equal Access to Services     [facility] JEFFERY : Hadii aad ku hadasho Soraul, waaxda luqadaha, qaybta kaalmada adephillyanicky, david vidal . So [facility] [phone].    ATENCIÓN: Si habla español, tiene a yuen disposición servicios gratuitos de asistencia lingüística. Ivy al [phone].    We comply with applicable federal civil rights laws and [place] laws. We do not discriminate on the basis of race, color, national origin, age, disability, sex, sexual orientation, or gender identity.            Thank you!     Thank you for choosing [facility]  for your care. Our goal is always to provide you with excellent care. Hearing back from our patients is one way we can continue to improve our services. Please take a few minutes to complete the written survey that you may receive in the mail after your visit with us. Thank you!             Your Updated Medication List - Protect others around you: Learn how to safely use, store and throw away your medicines at www.disposemymeds.org.          This list is accurate as of 5/8/18  1:57 PM.  Always use your most recent med list.                   Brand Name Dispense Instructions for use Diagnosis    alcohol swab prep pads     100 each    Use to swab area of injection/geoffrey as directed.    Type 2 diabetes mellitus with hyperglycemia, " without long-term current use of insulin (H)       ALLEGRA 180 MG tablet   Generic drug:  fexofenadine     90 Tab    1 TABLET DAILY    Allergic rhinitis, cause unspecified       apixaban ANTICOAGULANT 5 MG tablet    ELIQUIS    90 tablet    Take 1 tablet (5 mg) by mouth 2 times daily    Atrial fibrillation, unspecified type (H)       blood glucose calibration solution    NO BRAND SPECIFIED    1 Bottle    To accompany: Blood Glucose Monitor Brands: per insurance.    Type 2 diabetes mellitus with hyperglycemia, without long-term current use of insulin (H)       * Blood Glucose Monitoring Suppl w/Device Kit     1 kit    1 kit 3 times daily.    Type 2 diabetes, HbA1C goal < 8% (H)       * blood glucose monitoring meter device kit    no brand specified    1 kit    Use to test blood sugar 3 times daily or as directed. Preferred blood glucose meter OR supplies to accompany: Blood Glucose Monitor Brands: per insurance.    Type 2 diabetes mellitus with hyperglycemia, without long-term current use of insulin (H)       * blood glucose monitoring test strip    HUMBERTO CONTOUR NEXT    250 each    TEST THREE TIMES A DAY    Type 2 diabetes mellitus with hyperglycemia (H)       * blood glucose monitoring test strip    no brand specified    100 strip    Use to test blood sugar 3 times daily or as directed. To accompany: Blood Glucose Monitor Brands: per insurance.    Type 2 diabetes mellitus with hyperglycemia, without long-term current use of insulin (H)       * ONETOUCH ULTRA test strip   Generic drug:  blood glucose monitoring     100 each    USE TO TEST BLOOD SUGARS THREE TIMES A DAY    Type 2 diabetes, HbA1C goal < 8% (H)       cloNIDine 0.1 MG tablet    CATAPRES    270 tablet    Take 2 tablets (0.2 mg) by mouth At Bedtime AND take 1 tablet in the AM    Essential hypertension with goal blood pressure less than 140/90       * E-Z JECT LANCET MICRO-THIN 33G Misc           * thin lancets    NO BRAND SPECIFIED    100 each    Use with  lanceting device. To accompany: Blood Glucose Monitor Brands: per insurance.    Type 2 diabetes mellitus with hyperglycemia, without long-term current use of insulin (H)       fish oil-omega-3 fatty acids 1000 MG capsule      Take 2 g by mouth daily. 2 caps per day        hydrochlorothiazide 25 MG tablet    HYDRODIURIL    90 tablet    Take 1 tablet (25 mg) by mouth daily    Essential hypertension with goal blood pressure less than 140/90       levothyroxine 100 MCG tablet    SYNTHROID/LEVOTHROID    90 tablet    Take 1 tablet (100 mcg) by mouth daily    Hypothyroidism, unspecified type       lisinopril 30 MG tablet    PRINIVIL,ZESTRIL    90 tablet    Take 1 tablet (30 mg) by mouth daily    Chronic kidney disease, stage II (mild), Type 2 diabetes mellitus with hyperglycemia, without long-term current use of insulin (H), Essential hypertension with goal blood pressure less than 140/90       metFORMIN 500 MG 24 hr tablet    GLUCOPHAGE-XR    90 tablet    Take 1 tablet (500 mg) by mouth daily (with dinner)    Type 2 diabetes mellitus with hyperosmolarity without coma, without long-term current use of insulin (H)       metoprolol tartrate 100 MG tablet    LOPRESSOR    180 tablet    Take 1 tablet (100 mg) by mouth 2 times daily    Essential hypertension with goal blood pressure less than 140/90       * NUTRITIONAL SUPPLEMENT PO      VIBE        * NUTRITIONAL SUPPLEMENT PO      Take 1 oz by mouth daily Flex        * Notice:  This list has 9 medication(s) that are the same as other medications prescribed for you. Read the directions carefully, and ask your doctor or other care provider to review them with you.

## 2018-05-09 ENCOUNTER — ANESTHESIA EVENT (OUTPATIENT)
Dept: SURGERY | Facility: CLINIC | Age: 78
End: 2018-05-09
Payer: COMMERCIAL

## 2018-05-09 ASSESSMENT — ENCOUNTER SYMPTOMS: DYSRHYTHMIAS: 1

## 2018-05-10 ENCOUNTER — ANESTHESIA (OUTPATIENT)
Dept: SURGERY | Facility: CLINIC | Age: 78
End: 2018-05-10
Payer: COMMERCIAL

## 2018-05-10 ENCOUNTER — HOSPITAL ENCOUNTER (OUTPATIENT)
Facility: CLINIC | Age: 78
Discharge: HOME | End: 2018-05-10
Attending: ANESTHESIOLOGY | Admitting: ANESTHESIOLOGY
Payer: COMMERCIAL

## 2018-05-10 ENCOUNTER — HOSPITAL ENCOUNTER (OUTPATIENT)
Dept: GENERAL RADIOLOGY | Facility: CLINIC | Age: 78
End: 2018-05-10
Attending: ANESTHESIOLOGY | Admitting: ANESTHESIOLOGY
Payer: COMMERCIAL

## 2018-05-10 VITALS
HEIGHT: 65 IN | TEMPERATURE: 97.7 F | WEIGHT: 185 LBS | SYSTOLIC BLOOD PRESSURE: 157 MMHG | DIASTOLIC BLOOD PRESSURE: 91 MMHG | RESPIRATION RATE: 16 BRPM | OXYGEN SATURATION: 97 % | BODY MASS INDEX: 30.82 KG/M2

## 2018-05-10 DIAGNOSIS — M54.16 LUMBAR RADICULOPATHY: ICD-10-CM

## 2018-05-10 PROCEDURE — 25000128 H RX IP 250 OP 636: Performed by: NURSE ANESTHETIST, CERTIFIED REGISTERED

## 2018-05-10 PROCEDURE — 62323 NJX INTERLAMINAR LMBR/SAC: CPT | Performed by: ANESTHESIOLOGY

## 2018-05-10 PROCEDURE — 25000125 ZZHC RX 250: Performed by: NURSE ANESTHETIST, CERTIFIED REGISTERED

## 2018-05-10 PROCEDURE — 25000128 H RX IP 250 OP 636: Performed by: ANESTHESIOLOGY

## 2018-05-10 PROCEDURE — 37000008 ZZH ANESTHESIA TECHNICAL FEE, 1ST 30 MIN: Performed by: ANESTHESIOLOGY

## 2018-05-10 PROCEDURE — 40000277 XR FLUORO NEEDLE PLACEMENT SPINE (WITH PROCEDURE)

## 2018-05-10 PROCEDURE — 64483 NJX AA&/STRD TFRM EPI L/S 1: CPT | Mod: RT | Performed by: ANESTHESIOLOGY

## 2018-05-10 RX ORDER — TRIAMCINOLONE ACETONIDE 40 MG/ML
INJECTION, SUSPENSION INTRA-ARTICULAR; INTRAMUSCULAR PRN
Status: DISCONTINUED | OUTPATIENT
Start: 2018-05-10 | End: 2018-05-10 | Stop reason: HOSPADM

## 2018-05-10 RX ORDER — IOPAMIDOL 612 MG/ML
INJECTION, SOLUTION INTRATHECAL PRN
Status: DISCONTINUED | OUTPATIENT
Start: 2018-05-10 | End: 2018-05-10 | Stop reason: HOSPADM

## 2018-05-10 RX ORDER — SODIUM CHLORIDE, SODIUM LACTATE, POTASSIUM CHLORIDE, CALCIUM CHLORIDE 600; 310; 30; 20 MG/100ML; MG/100ML; MG/100ML; MG/100ML
INJECTION, SOLUTION INTRAVENOUS CONTINUOUS
Status: DISCONTINUED | OUTPATIENT
Start: 2018-05-10 | End: 2018-05-10 | Stop reason: HOSPADM

## 2018-05-10 RX ORDER — LIDOCAINE HYDROCHLORIDE 20 MG/ML
INJECTION, SOLUTION INFILTRATION; PERINEURAL PRN
Status: DISCONTINUED | OUTPATIENT
Start: 2018-05-10 | End: 2018-05-10

## 2018-05-10 RX ORDER — PROPOFOL 10 MG/ML
INJECTION, EMULSION INTRAVENOUS PRN
Status: DISCONTINUED | OUTPATIENT
Start: 2018-05-10 | End: 2018-05-10

## 2018-05-10 RX ADMIN — PROPOFOL 20 MG: 10 INJECTION, EMULSION INTRAVENOUS at 07:41

## 2018-05-10 RX ADMIN — PROPOFOL 30 MG: 10 INJECTION, EMULSION INTRAVENOUS at 07:37

## 2018-05-10 RX ADMIN — PROPOFOL 20 MG: 10 INJECTION, EMULSION INTRAVENOUS at 07:44

## 2018-05-10 RX ADMIN — LIDOCAINE HYDROCHLORIDE 40 MG: 20 INJECTION, SOLUTION INFILTRATION; PERINEURAL at 07:37

## 2018-05-10 RX ADMIN — LIDOCAINE HYDROCHLORIDE 1 ML: 10 INJECTION, SOLUTION EPIDURAL; INFILTRATION; INTRACAUDAL; PERINEURAL at 07:02

## 2018-05-10 RX ADMIN — PROPOFOL 30 MG: 10 INJECTION, EMULSION INTRAVENOUS at 07:39

## 2018-05-10 NOTE — IP AVS SNAPSHOT
MRN:[id number]                      After Visit Summary   5/10/2018    Ni Maya    MRN: [id number]           Thank you!     Thank you for choosing [facility] for your care. Our goal is always to provide you with excellent care. Hearing back from our patients is one way we can continue to improve our services. Please take a few minutes to complete the written survey that you may receive in the mail after you visit with us. Thank you!        Patient Information     Date Of Birth          1940        About your hospital stay     You were admitted on:  May 10, 2018 You last received care in the:  [facility]    You were discharged on:  May 10, 2018       Who to Call     For medical emergencies, please call 911.  For non-urgent questions about your medical care, please call your primary care provider or clinic, [phone]  For questions related to your surgery, please call your surgery clinic        Attending Provider     Provider Specialty    [facility]       Primary Care Provider Office Phone # Fax #    Apryl Olvera [phone]      After Care Instructions     Discharge Instructions       Review outpatient procedure discharge instructions as directed by Provider.                  Further instructions from your care team       Home Care Instructions                Procedure:  Epidural Steroid Injection or Joint injection    Activity:    Rest today    Do not work today    Resume normal activity tomorrow    Pain:    You may experience soreness at the injection site for one or two days    You may use an ice pack for 20 minutes every 2 hours for the first 24 hours    You may use a heating pad after the first 24 hours    You may use Tylenol  (acetaminophen) every 4 hours or other pain medicines as directed by your physician    Safety  Sedation medicine, if given may remain active for many hours.    It is important for the next 24 hours  that you do not:    Drive a car    Operate machines or power tools    Consume alcohol, including beer    Sign any important papers or legal documents    You may experience numbness radiating into your legs or arms, (depending on the procedure location)  This numbness may last several hours.  Until the numb sensation returns to normal please use caution in walking, climbing stairs, stepping out of your vehicle, etc.    Common side effects of steroids:  Not everyone will experience corticosteroid side effects. If side effects are experienced they will gradually subside in the 7-10 day period following an injection.    Most common side effects include:    Flushed face and/or chest    Feeling of warmth, particularly in face but could be overall feeling of warmth    Increased blood sugar in diabetic patients    Menstrual irregularities may occur.  If taking hormone based birth control an alternate method of birth control is recommended    Sleep disturbances and/or mood swings are possible    Leg cramps    Please contact us if you have:  Severe pain   Fever more than 101.5 degrees Fahrenheit  Signs of infection (redness, swelling or drainage)      If you have questions during normal business hours (8am-5pm Monday-Friday) contact the [facility] at [phone]. If you need help after hours, we recommend that you go to a hospital emergency room or dial 911.           Same-Day Surgery Anesthesia   Adult Discharge Orders & Instructions     For 24 hours after surgery    1. Get plenty of rest.  A responsible adult must stay with you for at least 24 hours after you leave the hospital.   2. Do not drive or use heavy equipment.  If you have weakness or tingling, don't drive or use heavy equipment until this feeling goes away.  3. Do not drink alcohol.  4. Avoid strenuous or risky activities.  Ask for help when climbing stairs.   5. You may feel lightheaded.  IF so, sit for a few minutes before standing.  Have someone  help you get up.   6. You may have a slight fever. Call the doctor if your fever is over 100 F (37.7 C) (taken under the tongue) or lasts longer than 24 hours.  7. You may have a dry mouth, a sore throat, muscle aches or trouble sleeping.  These should go away after 24 hours.  8. Do not make important or legal decisions.  Based on the surgery/procedure that you had today, we do not anticipate that you will have any problems.  However, given the various responses that patients can have to the surgical experience, we want to ensure that you have information available to manage pain or nausea and what to do if you observe bleeding or you develop any signs and symptoms of infection:  Methods to control pain include:  Prescription pain medication or over the counter medications as prescribed or suggested by your physician.  In addition, ice packs and periods of rest are often helpful.  If your pain is not managed with the above methods, contact your physician.  Methods to control nausea include:  Anti-nausea medication approved by your physician.  Drink clear liquids such as apple juice, ginger ale, broth or 7-Up. Be sure to drink enough fluids.  Move to a regular diet as you feel able.  Rest may also help.  Bleeding:  It's not uncommon to see a little blood staining on the dressing, about the size of a quarter in the first 24 hours; if you see this, there is no reason to be alarmed.  However, should this continue to increase in size, apply pressure if able, ant notify your physician.  Infection:  We do not anticipate that you will acquire an infection, but if you should experience any of the following symptoms:  redness, swelling, heat, increasing pain or abnormal drainage at your surgery site, fever or chills, please notify your physician.  Nurse advice line: [phone]        Pending Results     No orders found from 5/8/2018 to 5/11/2018.            Admission Information     Date & Time Provider Department Dept.  "Phone    5/10/2018 Kimani Garcia MD [facility] Phase [phone]      Your Vitals Were     Blood Pressure Temperature Respirations Height Weight Pulse Oximetry    164/101 97.7  F (36.5  C) (Oral) 16 1.65 m (5' 4.96\") 83.9 kg (185 lb) 98%    BMI (Body Mass Index)                   30.82 kg/m2           [employer] [facility] Information     [employer] [facility] lets you send messages to your doctor, view your test results, renew your prescriptions, schedule appointments and more. To sign up, go to [email or web address] . Click on \"Log in\" on the left side of the screen, which will take you to the Welcome page. Then click on \"Sign up Now\" on the right side of the page.     You will be asked to enter the access code listed below, as well as some personal information. Please follow the directions to create your username and password.     Your access code is: [email or web address]  Expires: 2018 10:02 AM     Your access code will  in 90 days. If you need help or a new code, please call your [facility] clinic or [phone].        Care EveryWhere ID     This is your Care EveryWhere ID. This could be used by other organizations to access your [facility] medical records  [id number]        Equal Access to Services     BENITEZ GIL : Hadii dimitry wynneo Sorual, waaxda luqadaha, qaybta kaalmada eliza, david lindsay. So [facility] [phone].    ATENCIÓN: Si habla español, tiene a yuen disposición servicios gratuitos de asistencia lingüística. Ivy al [phone].    We comply with applicable federal civil rights laws and [place] laws. We do not discriminate on the basis of race, color, national origin, age, disability, sex, sexual orientation, or gender identity.               Review of your medicines      CONTINUE these medicines which have NOT CHANGED        Dose / Directions    alcohol swab prep pads   Used for:  Type 2 diabetes mellitus with hyperglycemia, without long-term current use of insulin (H) "        Use to swab area of injection/geoffrey as directed.   Quantity:  100 each   Refills:  3       ALLEGRA 180 MG tablet   Used for:  Allergic rhinitis, cause unspecified   Generic drug:  fexofenadine        1 TABLET DAILY   Quantity:  90 Tab   Refills:  3       apixaban ANTICOAGULANT 5 MG tablet   Commonly known as:  ELIQUIS   Used for:  Atrial fibrillation, unspecified type (H)        Dose:  5 mg   Take 1 tablet (5 mg) by mouth 2 times daily   Quantity:  90 tablet   Refills:  3       blood glucose calibration solution   Commonly known as:  NO BRAND SPECIFIED   Used for:  Type 2 diabetes mellitus with hyperglycemia, without long-term current use of insulin (H)        To accompany: Blood Glucose Monitor Brands: per insurance.   Quantity:  1 Bottle   Refills:  3       * Blood Glucose Monitoring Suppl w/Device Kit   Used for:  Type 2 diabetes, HbA1C goal < 8% (H)        Dose:  1 kit   1 kit 3 times daily.   Quantity:  1 kit   Refills:  0       * blood glucose monitoring meter device kit   Commonly known as:  no brand specified   Used for:  Type 2 diabetes mellitus with hyperglycemia, without long-term current use of insulin (H)        Use to test blood sugar 3 times daily or as directed. Preferred blood glucose meter OR supplies to accompany: Blood Glucose Monitor Brands: per insurance.   Quantity:  1 kit   Refills:  0       * blood glucose monitoring test strip   Commonly known as:  HUMBERTO CONTOUR NEXT   Used for:  Type 2 diabetes mellitus with hyperglycemia (H)        TEST THREE TIMES A DAY   Quantity:  250 each   Refills:  2       * blood glucose monitoring test strip   Commonly known as:  no brand specified   Used for:  Type 2 diabetes mellitus with hyperglycemia, without long-term current use of insulin (H)        Use to test blood sugar 3 times daily or as directed. To accompany: Blood Glucose Monitor Brands: per insurance.   Quantity:  100 strip   Refills:  6       * ONETOUCH ULTRA test strip   Used for:  Type 2  diabetes, HbA1C goal < 8% (H)   Generic drug:  blood glucose monitoring        USE TO TEST BLOOD SUGARS THREE TIMES A DAY   Quantity:  100 each   Refills:  1       cloNIDine 0.1 MG tablet   Commonly known as:  CATAPRES   Used for:  Essential hypertension with goal blood pressure less than 140/90        Dose:  0.2 mg   Take 2 tablets (0.2 mg) by mouth At Bedtime AND take 1 tablet in the AM   Quantity:  270 tablet   Refills:  1       * E-Z JECT LANCET MICRO-THIN 33G Misc        Refills:  0       * thin lancets   Commonly known as:  NO BRAND SPECIFIED   Used for:  Type 2 diabetes mellitus with hyperglycemia, without long-term current use of insulin (H)        Use with lanceting device. To accompany: Blood Glucose Monitor Brands: per insurance.   Quantity:  100 each   Refills:  6       fish oil-omega-3 fatty acids 1000 MG capsule        Dose:  2 g   Take 2 g by mouth daily. 2 caps per day   Refills:  0       hydrochlorothiazide 25 MG tablet   Commonly known as:  HYDRODIURIL   Used for:  Essential hypertension with goal blood pressure less than 140/90        Dose:  25 mg   Take 1 tablet (25 mg) by mouth daily   Quantity:  90 tablet   Refills:  3       levothyroxine 100 MCG tablet   Commonly known as:  SYNTHROID/LEVOTHROID   Used for:  Hypothyroidism, unspecified type        Dose:  100 mcg   Take 1 tablet (100 mcg) by mouth daily   Quantity:  90 tablet   Refills:  3       lisinopril 30 MG tablet   Commonly known as:  PRINIVIL,ZESTRIL   Used for:  Chronic kidney disease, stage II (mild), Type 2 diabetes mellitus with hyperglycemia, without long-term current use of insulin (H), Essential hypertension with goal blood pressure less than 140/90        Dose:  30 mg   Take 1 tablet (30 mg) by mouth daily   Quantity:  90 tablet   Refills:  3       metFORMIN 500 MG 24 hr tablet   Commonly known as:  GLUCOPHAGE-XR   Used for:  Type 2 diabetes mellitus with hyperosmolarity without coma, without long-term current use of insulin (H)         Dose:  500 mg   Take 1 tablet (500 mg) by mouth daily (with dinner)   Quantity:  90 tablet   Refills:  1       metoprolol tartrate 100 MG tablet   Commonly known as:  LOPRESSOR   Used for:  Essential hypertension with goal blood pressure less than 140/90        Dose:  100 mg   Take 1 tablet (100 mg) by mouth 2 times daily   Quantity:  180 tablet   Refills:  3       * NUTRITIONAL SUPPLEMENT PO        VIBE   Refills:  0       * NUTRITIONAL SUPPLEMENT PO        Dose:  1 oz   Take 1 oz by mouth daily Flex   Refills:  0       * Notice:  This list has 9 medication(s) that are the same as other medications prescribed for you. Read the directions carefully, and ask your doctor or other care provider to review them with you.             Protect others around you: Learn how to safely use, store and throw away your medicines at [email or web address].             Medication List: This is a list of all your medications and when to take them. Check marks below indicate your daily home schedule. Keep this list as a reference.      Medications           Morning Afternoon Evening Bedtime As Needed    alcohol swab prep pads   Use to swab area of injection/geoffrey as directed.                                ALLEGRA 180 MG tablet   1 TABLET DAILY   Generic drug:  fexofenadine                                apixaban ANTICOAGULANT 5 MG tablet   Commonly known as:  ELIQUIS   Take 1 tablet (5 mg) by mouth 2 times daily                                blood glucose calibration solution   Commonly known as:  NO BRAND SPECIFIED   To accompany: Blood Glucose Monitor Brands: per insurance.                                * Blood Glucose Monitoring Suppl w/Device Kit   1 kit 3 times daily.                                * blood glucose monitoring meter device kit   Commonly known as:  no brand specified   Use to test blood sugar 3 times daily or as directed. Preferred blood glucose meter OR supplies to accompany: Blood Glucose Monitor Brands:  per insurance.                                * blood glucose monitoring test strip   Commonly known as:  Ecofoot CONTOUR NEXT   TEST THREE TIMES A DAY                                * blood glucose monitoring test strip   Commonly known as:  no brand specified   Use to test blood sugar 3 times daily or as directed. To accompany: Blood Glucose Monitor Brands: per insurance.                                * ONETOUCH ULTRA test strip   USE TO TEST BLOOD SUGARS THREE TIMES A DAY   Generic drug:  blood glucose monitoring                                cloNIDine 0.1 MG tablet   Commonly known as:  CATAPRES   Take 2 tablets (0.2 mg) by mouth At Bedtime AND take 1 tablet in the AM                                * E-Z JECT LANCET MICRO-THIN 33G Misc                                * thin lancets   Commonly known as:  NO BRAND SPECIFIED   Use with lanceting device. To accompany: Blood Glucose Monitor Brands: per insurance.                                fish oil-omega-3 fatty acids 1000 MG capsule   Take 2 g by mouth daily. 2 caps per day                                hydrochlorothiazide 25 MG tablet   Commonly known as:  HYDRODIURIL   Take 1 tablet (25 mg) by mouth daily                                levothyroxine 100 MCG tablet   Commonly known as:  SYNTHROID/LEVOTHROID   Take 1 tablet (100 mcg) by mouth daily                                lisinopril 30 MG tablet   Commonly known as:  PRINIVIL,ZESTRIL   Take 1 tablet (30 mg) by mouth daily                                metFORMIN 500 MG 24 hr tablet   Commonly known as:  GLUCOPHAGE-XR   Take 1 tablet (500 mg) by mouth daily (with dinner)                                metoprolol tartrate 100 MG tablet   Commonly known as:  LOPRESSOR   Take 1 tablet (100 mg) by mouth 2 times daily                                * NUTRITIONAL SUPPLEMENT PO   VIBE                                * NUTRITIONAL SUPPLEMENT PO   Take 1 oz by mouth daily Flex                                *  Notice:  This list has 9 medication(s) that are the same as other medications prescribed for you. Read the directions carefully, and ask your doctor or other care provider to review them with you.

## 2018-05-10 NOTE — OP NOTE
PRIMARY PROBLEM: Low back pain and right leg pains    PROCEDURE: Right L2-3  Transforaminal Epidural Steroid Injection with fluoroscopic guidance and contrast.     PROCEDURE DETAILS: After written informed consent was obtained from the patient, the patient was escorted to the procedure room.  The patient was placed in the prone position.  A  time out  was conducted to verify patient identity, procedure to be performed, side, site, allergies and any special requirements.  The skin over the thoracolumbar region was prepped and draped in normal sterile fashion. Fluoroscopy was used to identify the neural foramen in AP view and the skin was anesthetized with 2 mL of 1% lidocaine with bicarbonate buffer. A 25-gauge Quincke spinal needle was advanced through this location and advanced under fluoroscopic guidance towards the neural foramen.  The target zone was the 6 o clock position of the pedicle.   Prior to entering the foramen, the depth of the needle was gauged with a lateral view on fluoroscopy. While still in a lateral view, the needle was slowly advanced to avoid injury to the spinal nerve.  Then, in the oblique view (approximately 28 degrees), after negative aspiration, 1.5 mL of Omnipaque contrast dye was injected revealing epidural spread without evidence of intravascular or intrathecal spread.  Then a 3cc solution of 40 mg of Triamcinolone in 2 mL of  Preservative-Free saline was slowly injected into the epidural space at each segment.  After injection of the medication, as the needle tip was withdrawn, it was flushed with local anesthetic.   The patient was monitored with blood pressure and pulse oximetry machines with the assistance of an RN throughout the procedure.  The patient was alert and responsive to questions throughout the procedure.   The patient tolerated the procedure well and was observed in the post-procedural area.  The patient was dismissed without apparent complications.     DIAGNOSIS:  1.  Right Lumbar radiculopathy    PLAN:  1. Performed a right L2-3  transforaminal epidural steroid injection. I may use these injections as a pain management tool roughly 3-4 times a year.  2. The patient was instructed to follow-up per Dr. Garcia's instructions.      Kimani Garcia MD  Diplomate of the American Board of Anesthesiology, Pain Medicine

## 2018-05-10 NOTE — DISCHARGE INSTRUCTIONS
Home Care Instructions                Procedure:  Epidural Steroid Injection or Joint injection    Activity:    Rest today    Do not work today    Resume normal activity tomorrow    Pain:    You may experience soreness at the injection site for one or two days    You may use an ice pack for 20 minutes every 2 hours for the first 24 hours    You may use a heating pad after the first 24 hours    You may use Tylenol  (acetaminophen) every 4 hours or other pain medicines as directed by your physician    Safety  Sedation medicine, if given may remain active for many hours.    It is important for the next 24 hours that you do not:    Drive a car    Operate machines or power tools    Consume alcohol, including beer    Sign any important papers or legal documents    You may experience numbness radiating into your legs or arms, (depending on the procedure location)  This numbness may last several hours.  Until the numb sensation returns to normal please use caution in walking, climbing stairs, stepping out of your vehicle, etc.    Common side effects of steroids:  Not everyone will experience corticosteroid side effects. If side effects are experienced they will gradually subside in the 7-10 day period following an injection.    Most common side effects include:    Flushed face and/or chest    Feeling of warmth, particularly in face but could be overall feeling of warmth    Increased blood sugar in diabetic patients    Menstrual irregularities may occur.  If taking hormone based birth control an alternate method of birth control is recommended    Sleep disturbances and/or mood swings are possible    Leg cramps    Please contact us if you have:  Severe pain   Fever more than 101.5 degrees Fahrenheit  Signs of infection (redness, swelling or drainage)      If you have questions during normal business hours (8am-5pm Monday-Friday) contact the [facility] at [phone]. If you need help after hours, we recommend that  you go to a hospital emergency room or dial 911.           Same-Day Surgery Anesthesia   Adult Discharge Orders & Instructions     For 24 hours after surgery    1. Get plenty of rest.  A responsible adult must stay with you for at least 24 hours after you leave the hospital.   2. Do not drive or use heavy equipment.  If you have weakness or tingling, don't drive or use heavy equipment until this feeling goes away.  3. Do not drink alcohol.  4. Avoid strenuous or risky activities.  Ask for help when climbing stairs.   5. You may feel lightheaded.  IF so, sit for a few minutes before standing.  Have someone help you get up.   6. You may have a slight fever. Call the doctor if your fever is over 100 F (37.7 C) (taken under the tongue) or lasts longer than 24 hours.  7. You may have a dry mouth, a sore throat, muscle aches or trouble sleeping.  These should go away after 24 hours.  8. Do not make important or legal decisions.  Based on the surgery/procedure that you had today, we do not anticipate that you will have any problems.  However, given the various responses that patients can have to the surgical experience, we want to ensure that you have information available to manage pain or nausea and what to do if you observe bleeding or you develop any signs and symptoms of infection:  Methods to control pain include:  Prescription pain medication or over the counter medications as prescribed or suggested by your physician.  In addition, ice packs and periods of rest are often helpful.  If your pain is not managed with the above methods, contact your physician.  Methods to control nausea include:  Anti-nausea medication approved by your physician.  Drink clear liquids such as apple juice, ginger ale, broth or 7-Up. Be sure to drink enough fluids.  Move to a regular diet as you feel able.  Rest may also help.  Bleeding:  It's not uncommon to see a little blood staining on the dressing, about the size of a quarter in the  first 24 hours; if you see this, there is no reason to be alarmed.  However, should this continue to increase in size, apply pressure if able, ant notify your physician.  Infection:  We do not anticipate that you will acquire an infection, but if you should experience any of the following symptoms:  redness, swelling, heat, increasing pain or abnormal drainage at your surgery site, fever or chills, please notify your physician.  Nurse advice line: [phone]

## 2018-05-10 NOTE — ANESTHESIA POSTPROCEDURE EVALUATION
Patient: Ni Maya    Procedure(s):  right lumbar 2 - lumbar 3 epidural steroid injection - Wound Class: I-Clean    Diagnosis:lumbar radiculopathy  Diagnosis Additional Information: No value filed.    Anesthesia Type:  MAC    Note:  Anesthesia Post Evaluation    Patient location during evaluation: Phase 2 and Bedside  Patient participation: Able to fully participate in evaluation  Level of consciousness: awake  Pain management: adequate  Airway patency: patent  Cardiovascular status: acceptable and hemodynamically stable  Respiratory status: acceptable, room air and nonlabored ventilation  Hydration status: stable  PONV: none     Anesthetic complications: None    Comments: Patient was happy with the anesthesia care received and no anesthesia related complications were noted.  I will follow up with the patient again if it is needed.        Last vitals:  Vitals:    05/10/18 0652 05/10/18 0749 05/10/18 0751   BP: (!) 175/98  (!) 164/101   Resp: 18 14 16   Temp: 97.7  F (36.5  C)     SpO2:  99% 98%         Electronically Signed By: NIMA Patiño CRNA  May 10, 2018  7:56 AM

## 2018-05-10 NOTE — ANESTHESIA CARE TRANSFER NOTE
Patient: Ni Maya    Procedure(s):  right lumbar 2 - lumbar 3 epidural steroid injection - Wound Class: I-Clean    Diagnosis: lumbar radiculopathy  Diagnosis Additional Information: No value filed.    Anesthesia Type:   MAC     Note:  Airway :Nasal Cannula  Patient transferred to:Phase II  Handoff Report: Identifed the Patient, Identified the Reponsible Provider, Reviewed the pertinent medical history, Discussed the surgical course, Reviewed Intra-OP anesthesia mangement and issues during anesthesia, Set expectations for post-procedure period and Allowed opportunity for questions and acknowledgement of understanding      Vitals: (Last set prior to Anesthesia Care Transfer)    CRNA VITALS  5/10/2018 0717 - 5/10/2018 0753      5/10/2018             Pulse: 89    SpO2: 100 %                Electronically Signed By: NIMA Patiño CRNA  May 10, 2018  7:53 AM

## 2018-05-10 NOTE — IP AVS SNAPSHOT
[facility] Phase II    [address]     [place] [address]    Phone:  [phone]                                       After Visit Summary   5/10/2018    Ni Maya    MRN: [id number]           After Visit Summary Signature Page     I have received my discharge instructions, and my questions have been answered. I have discussed any challenges I see with this plan with the nurse or doctor.    ..........................................................................................................................................  Patient/Patient Representative Signature      ..........................................................................................................................................  Patient Representative Print Name and Relationship to Patient    ..................................................               ................................................  Date                                            Time    ..........................................................................................................................................  Reviewed by Signature/Title    ...................................................              ..............................................  Date                                                            Time

## 2018-05-10 NOTE — ANESTHESIA PREPROCEDURE EVALUATION
Anesthesia Evaluation     . Pt has had prior anesthetic. Type: MAC and General    No history of anesthetic complications          ROS/MED HX    ENT/Pulmonary:  - neg pulmonary ROS     Neurologic:  - neg neurologic ROS     Cardiovascular:     (+) hypertension----. : . . . :. dysrhythmias a-fib, Irregular Heartbeat/Palpitations, . Previous cardiac testing date:results:Stress Testdate:6/27/17 results:GATED MYOCARDIAL PERFUSION SCINTIGRAPHY WITH INTRAVENOUS PHARMACOLOGIC  VASODILATATION LEXISCAN -ONE DAY STUDY      6/27/2017 11:20 AM YOBANY ENNIS 76 years Female  1940.     Indication/Clinical History: Chest pain     Impression  1.  Myocardial perfusion imaging using single isotope technique  demonstrated small fixed distal anteroseptal defect probably secondary  to breast tissue attenuation. There is no evidence for ischemia or  infarction on this study.   2. Gated images demonstrated normal LV cavity size and systolic  function.  The left ventricular systolic function is 64%.  3. Compared to the prior study from no prior study .     Procedure  Pharmacologic stress testing was performed with Lexiscan at a rate of  0.08 mg/ml rapid bolus injection, for 15 seconds, 0.4 mg/5ml  intravenously. Low-level exercise was performed along with the  vasodilator infusion.  The heart rate was 92 at baseline and anthony to  107 beats per minute during the Lexiscan infusion. The rest blood  pressure was 144/68 mmHg and was 144/68 mm Hg during Lexiscan  infusion. The patient experienced no symptoms  during the test.     Myocardial perfusion imaging was performed at rest, approximately 45  minutes after the injection intravenously of 10.1 mCi of Tc-99m  Myoview. At peak pharmacologic effect, 10-20 seconds after Lexiscan,   the patient was injected intravenously with 32.1 mCi of  Tc-99m  Myoview. The post-stress tomographic imaging was performed  approximately 60 minutes after stress.     EKG Findings  The resting EKG demonstrated  atrial fibrillation with diffuse  nonspecific ST and T wave abnormalities and poor R wave progression in  the anterior precordial leads. The stress EKG demonstrated  nondiagnostic due to baseline ECG abnormality.     Tomographic Findings  Overall, the study quality is adequate . On the stress images, there  is a small area of mild to moderate count reduction in the distal  anteroseptal. On the rest images, similar pattern of a small area of  mild to moderate count reduction is again seen in the distal  anteroseptal. Results suggest breast tissue attenuation there is no  evidence for ischemia or infarction on this study . Gated images  demonstrated normal LV cavity size with end-diastolic volumes  measuring 54 and 56 mL and rest and stress respectively. LV systolic  function appears normal. The left ventricular ejection fraction was  calculated to be 64%. TID was absent.     STUART MONTANEZ MD    Stress test - no ischemia or chest painECG reviewed date:6/7/17 and 5/8/18 results:A-Fib nonspecific ST-T wave abn  5/2018- A-fib date: results:          METS/Exercise Tolerance:     Hematologic:  - neg hematologic  ROS       Musculoskeletal:   (+) , , other musculoskeletal- Chronic back pain      GI/Hepatic:  - neg GI/hepatic ROS       Renal/Genitourinary:     (+) chronic renal disease, type: CRI, Pt does not require dialysis, Pt has no history of transplant,       Endo:     (+) type II DM (6.9) Last HgA1c: 6.9 date: 4/25/18 Not using insulin - not using insulin pump Normal glucose range: 200's not previously admitted for DM/DKA thyroid problem hypothyroidism, .      Psychiatric:  - neg psychiatric ROS       Infectious Disease:  - neg infectious disease ROS       Malignancy:      - no malignancy   Other:    (+) H/O Chronic Pain,                   Physical Exam  Normal systems: cardiovascular, pulmonary and dental    Airway   Mallampati: II  TM distance: >3 FB  Neck ROM: full    Dental     Cardiovascular   Rhythm and rate:  irregular and normal      Pulmonary    breath sounds clear to auscultation                        Anesthesia Plan      History & Physical Review  History and physical reviewed and following examination; no interval change.    ASA Status:  3 .    NPO Status:  > 8 hours    Plan for MAC with Propofol induction. Reason for MAC:  Deep or markedly invasive procedure (G8)         Postoperative Care      Consents  Anesthetic plan, risks, benefits and alternatives discussed with:  Patient.  Use of blood products discussed: No .   .                          .

## 2018-06-21 DIAGNOSIS — I48.91 ATRIAL FIBRILLATION, UNSPECIFIED TYPE (H): ICD-10-CM

## 2018-07-25 ENCOUNTER — OFFICE VISIT (OUTPATIENT)
Dept: CARDIOLOGY | Facility: CLINIC | Age: 78
End: 2018-07-25
Payer: COMMERCIAL

## 2018-07-25 VITALS
DIASTOLIC BLOOD PRESSURE: 86 MMHG | WEIGHT: 184 LBS | HEART RATE: 84 BPM | HEIGHT: 64 IN | BODY MASS INDEX: 31.41 KG/M2 | OXYGEN SATURATION: 97 % | RESPIRATION RATE: 14 BRPM | SYSTOLIC BLOOD PRESSURE: 148 MMHG

## 2018-07-25 DIAGNOSIS — I48.91 ATRIAL FIBRILLATION, UNSPECIFIED TYPE (H): ICD-10-CM

## 2018-07-25 LAB
ANION GAP SERPL CALCULATED.3IONS-SCNC: 7 MMOL/L (ref 3–14)
BUN SERPL-MCNC: 14 MG/DL (ref 7–30)
CALCIUM SERPL-MCNC: 8.8 MG/DL (ref 8.5–10.1)
CHLORIDE SERPL-SCNC: 103 MMOL/L (ref 94–109)
CO2 SERPL-SCNC: 34 MMOL/L (ref 20–32)
CREAT SERPL-MCNC: 1 MG/DL (ref 0.52–1.04)
GFR SERPL CREATININE-BSD FRML MDRD: 53 ML/MIN/1.7M2
GLUCOSE SERPL-MCNC: 148 MG/DL (ref 70–99)
POTASSIUM SERPL-SCNC: 3.6 MMOL/L (ref 3.4–5.3)
SODIUM SERPL-SCNC: 144 MMOL/L (ref 133–144)

## 2018-07-25 PROCEDURE — 99213 OFFICE O/P EST LOW 20 MIN: CPT | Performed by: INTERNAL MEDICINE

## 2018-07-25 PROCEDURE — 80048 BASIC METABOLIC PNL TOTAL CA: CPT | Performed by: INTERNAL MEDICINE

## 2018-07-25 PROCEDURE — 36415 COLL VENOUS BLD VENIPUNCTURE: CPT | Performed by: INTERNAL MEDICINE

## 2018-07-25 ASSESSMENT — PAIN SCALES - GENERAL: PAINLEVEL: NO PAIN (0)

## 2018-07-25 NOTE — LETTER
7/25/2018    Apryl Olvera MD, MD  [address] [facility] [address]    RE: Ni Maya       Dear Colleague,    I had the pleasure of seeing Ni Maya in the [facility].    HPI and Plan:   See dictation(#[id number])    Orders Placed This Encounter   Procedures     Basic metabolic panel     Follow-Up with Cardiologist       No orders of the defined types were placed in this encounter.      There are no discontinued medications.      Encounter Diagnosis   Name Primary?     Atrial fibrillation, unspecified type (H)        CURRENT MEDICATIONS:  Current Outpatient Prescriptions   Medication Sig Dispense Refill     alcohol swab prep pads Use to swab area of injection/geoffrey as directed. 100 each 3     ALLEGRA 180 MG PO TABS 1 TABLET DAILY 90 Tab 3     apixaban ANTICOAGULANT (ELIQUIS) 5 MG tablet Take 1 tablet (5 mg) by mouth 2 times daily 180 tablet 0     blood glucose calibration (NO BRAND SPECIFIED) solution To accompany: Blood Glucose Monitor Brands: per insurance. 1 Bottle 3     blood glucose monitoring (HUMBERTO CONTOUR NEXT) test strip TEST THREE TIMES A  each 2     blood glucose monitoring (NO BRAND SPECIFIED) meter device kit Use to test blood sugar 3 times daily or as directed. Preferred blood glucose meter OR supplies to accompany: Blood Glucose Monitor Brands: per insurance. 1 kit 0     blood glucose monitoring (NO BRAND SPECIFIED) test strip Use to test blood sugar 3 times daily or as directed. To accompany: Blood Glucose Monitor Brands: per insurance. 100 strip 6     Blood Glucose Monitoring Suppl W/DEVICE KIT 1 kit 3 times daily. 1 kit 0     cloNIDine (CATAPRES) 0.1 MG tablet Take 2 tablets (0.2 mg) by mouth At Bedtime AND take 1 tablet in the  tablet 1     fish oil-omega-3 fatty acids (FISH OIL) 1000 MG capsule Take 2 g by mouth daily. 2 caps per day       hydrochlorothiazide (HYDRODIURIL) 25 MG tablet Take 1 tablet (25 mg) by mouth daily 90 tablet 3      Lancets (E-Z JECT LANCET MICRO-THIN 33G) MISC        levothyroxine (SYNTHROID/LEVOTHROID) 100 MCG tablet Take 1 tablet (100 mcg) by mouth daily 90 tablet 3     lisinopril (PRINIVIL,ZESTRIL) 30 MG tablet Take 1 tablet (30 mg) by mouth daily 90 tablet 3     metFORMIN (GLUCOPHAGE-XR) 500 MG 24 hr tablet Take 1 tablet (500 mg) by mouth daily (with dinner) 90 tablet 1     metoprolol tartrate (LOPRESSOR) 100 MG tablet Take 1 tablet (100 mg) by mouth 2 times daily 180 tablet 3     NUTRITIONAL SUPPLEMENT OR VIBE       Nutritional Supplements (NUTRITIONAL SUPPLEMENT PO) Take 1 oz by mouth daily Flex       ONETOUCH ULTRA test strip USE TO TEST BLOOD SUGARS THREE TIMES A  each 1     thin (NO BRAND SPECIFIED) lancets Use with lanceting device. To accompany: Blood Glucose Monitor Brands: per insurance. 100 each 6       ALLERGIES     Allergies   Allergen Reactions     Sulfa Drugs Rash       PAST MEDICAL HISTORY:  Past Medical History:   Diagnosis Date     Allergic rhinitis, cause unspecified      Essential hypertension, benign      Infection of kidney, unspecified 1999     Other specified acquired hypothyroidism      Other specified types of cystitis(595.89)     1999,6-2-01, 10-10-01       PAST SURGICAL HISTORY:  Past Surgical History:   Procedure Laterality Date     BIOPSY VULVA/PERINEUM,ADDNL LESN  9/18/09    Wide -excision of MICKIE III- right labia      C LAPAROSCOPY, SURGICAL; W/ VAGINAL HYSTERECTOMY W/WO REMOVAL OVARY(S)/TUBES  1984    for bleeding     C LIGATE FALLOPIAN TUBE,POSTPARTUM  1978    Tubal Ligation     COLONOSCOPY  9/13/2013    Procedure: COLONOSCOPY;  Colonoscopy;  Surgeon: Yanick Ramsey MD;  Location: [facility]     HC ANGIOGRAPHY ARCH  4-3-98     HC BIOPSY OF BREAST, OPEN INCISIONAL  1960    Benign     HC COLONOSCOPY THRU STOMA, DIAGNOSTIC  4-24-98    Diverticuli - due in 2008     HC ERCP W SPHINCTEROTOMY  1996 6/2/96 and 8/30/96     HC REDUCTION OF LARGE BREAST  1988     HC REMOVAL GALLBLADDER  1995      HC UGI ENDOSCOPY DIAG W OR W/O BRUSH/WASH  7-     INJECT EPIDURAL LUMBAR N/A 8/10/2017    Procedure: INJECT EPIDURAL LUMBAR;  Lumbar 2-3 Epidural Steroid Injection;  Surgeon: Kimani Garcia MD;  Location: [facility]     INJECT EPIDURAL LUMBAR Right 5/10/2018    Procedure: INJECT EPIDURAL LUMBAR;  right lumbar 2 - lumbar 3 epidural steroid injection;  Surgeon: Kimani Garcia MD;  Location: [facility]       FAMILY HISTORY:  Family History   Problem Relation Age of Onset     Cardiovascular Father      Aortic aneurysm     Hypertension Father      Hypertension Mother      GASTROINTESTINAL DISEASE Mother      GI Bleed     Lipids Sister      Hypertension Sister      Genitourinary Problems Sister      Allergies Sister      Cancer Brother      Lung     Alcohol/Drug Brother      Connective Tissue Disorder Son      Down Syndrome     Respiratory Son      Pneumonia     Cancer Maternal Grandmother      Stomach     Hypertension Maternal Grandfather      Allergies Daughter        SOCIAL HISTORY:  Social History     Social History     Marital status:      Spouse name: Marco Antonio     Number of children: 2     Years of education: 17     Occupational History     Retired in 1994       Retired     Social History Main Topics     Smoking status: Never Smoker     Smokeless tobacco: Never Used     Alcohol use No     Drug use: No     Sexual activity: No     Other Topics Concern     Parent/Sibling W/ Cabg, Mi Or Angioplasty Before 65f 55m? No     Social History Narrative       Review of Systems:  Skin:  Positive for bruising on eliquis   Eyes:  Positive for glasses;cataracts getting looked at by eye MD  ENT:  Negative      Respiratory:  Negative       Cardiovascular:  Negative for;palpitations;chest pain;edema;lightheadedness;dizziness;fatigue      Gastroenterology: Positive for      Genitourinary:  Negative      Musculoskeletal:  Positive for back pain    Neurologic:  Negative      Psychiatric:  Negative      Heme/Lymph/Imm:  Positive for  "allergies    Endocrine:  Positive for thyroid disorder;diabetes      Physical Exam:  Vitals: /86 (BP Location: Right arm, Cuff Size: Adult Regular)  Pulse 84  Resp 14  Ht 1.626 m (5' 4\")  Wt 83.5 kg (184 lb)  SpO2 97%  BMI 31.58 kg/m2    Constitutional:           Skin:             Head:           Eyes:           Lymph:      ENT:           Neck:           Respiratory:            Cardiac:                                                           GI:           Extremities and Muscular Skeletal:                 Neurological:           Psych:             CC  No referring provider defined for this encounter.                    Thank you for allowing me to participate in the care of your patient.      Sincerely,     Sohan Deleon MD     [facility]    cc:   No referring provider defined for this encounter.        "

## 2018-07-25 NOTE — PROGRESS NOTES
HPI and Plan:   See dictation(#[id number])    Orders Placed This Encounter   Procedures     Basic metabolic panel     Follow-Up with Cardiologist       No orders of the defined types were placed in this encounter.      There are no discontinued medications.      Encounter Diagnosis   Name Primary?     Atrial fibrillation, unspecified type (H)        CURRENT MEDICATIONS:  Current Outpatient Prescriptions   Medication Sig Dispense Refill     alcohol swab prep pads Use to swab area of injection/geoffrey as directed. 100 each 3     ALLEGRA 180 MG PO TABS 1 TABLET DAILY 90 Tab 3     apixaban ANTICOAGULANT (ELIQUIS) 5 MG tablet Take 1 tablet (5 mg) by mouth 2 times daily 180 tablet 0     blood glucose calibration (NO BRAND SPECIFIED) solution To accompany: Blood Glucose Monitor Brands: per insurance. 1 Bottle 3     blood glucose monitoring (HUMBERTO CONTOUR NEXT) test strip TEST THREE TIMES A  each 2     blood glucose monitoring (NO BRAND SPECIFIED) meter device kit Use to test blood sugar 3 times daily or as directed. Preferred blood glucose meter OR supplies to accompany: Blood Glucose Monitor Brands: per insurance. 1 kit 0     blood glucose monitoring (NO BRAND SPECIFIED) test strip Use to test blood sugar 3 times daily or as directed. To accompany: Blood Glucose Monitor Brands: per insurance. 100 strip 6     Blood Glucose Monitoring Suppl W/DEVICE KIT 1 kit 3 times daily. 1 kit 0     cloNIDine (CATAPRES) 0.1 MG tablet Take 2 tablets (0.2 mg) by mouth At Bedtime AND take 1 tablet in the  tablet 1     fish oil-omega-3 fatty acids (FISH OIL) 1000 MG capsule Take 2 g by mouth daily. 2 caps per day       hydrochlorothiazide (HYDRODIURIL) 25 MG tablet Take 1 tablet (25 mg) by mouth daily 90 tablet 3     Lancets (E-Z JECT LANCET MICRO-THIN 33G) MISC        levothyroxine (SYNTHROID/LEVOTHROID) 100 MCG tablet Take 1 tablet (100 mcg) by mouth daily 90 tablet 3     lisinopril (PRINIVIL,ZESTRIL) 30 MG tablet Take 1 tablet (30  mg) by mouth daily 90 tablet 3     metFORMIN (GLUCOPHAGE-XR) 500 MG 24 hr tablet Take 1 tablet (500 mg) by mouth daily (with dinner) 90 tablet 1     metoprolol tartrate (LOPRESSOR) 100 MG tablet Take 1 tablet (100 mg) by mouth 2 times daily 180 tablet 3     NUTRITIONAL SUPPLEMENT OR VIBE       Nutritional Supplements (NUTRITIONAL SUPPLEMENT PO) Take 1 oz by mouth daily Flex       ONETOUCH ULTRA test strip USE TO TEST BLOOD SUGARS THREE TIMES A  each 1     thin (NO BRAND SPECIFIED) lancets Use with lanceting device. To accompany: Blood Glucose Monitor Brands: per insurance. 100 each 6       ALLERGIES     Allergies   Allergen Reactions     Sulfa Drugs Rash       PAST MEDICAL HISTORY:  Past Medical History:   Diagnosis Date     Allergic rhinitis, cause unspecified      Essential hypertension, benign      Infection of kidney, unspecified 1999     Other specified acquired hypothyroidism      Other specified types of cystitis(595.89)     1999,6-2-01, 10-10-01       PAST SURGICAL HISTORY:  Past Surgical History:   Procedure Laterality Date     BIOPSY VULVA/PERINEUM,ADDNL LESN  9/18/09    Wide -excision of MICKIE III- right labia      C LAPAROSCOPY, SURGICAL; W/ VAGINAL HYSTERECTOMY W/WO REMOVAL OVARY(S)/TUBES  1984    for bleeding     C LIGATE FALLOPIAN TUBE,POSTPARTUM  1978    Tubal Ligation     COLONOSCOPY  9/13/2013    Procedure: COLONOSCOPY;  Colonoscopy;  Surgeon: Yanick Ramsey MD;  Location:  GI     HC ANGIOGRAPHY ARCH  4-3-98     HC BIOPSY OF BREAST, OPEN INCISIONAL  1960    Benign     HC COLONOSCOPY THRU STOMA, DIAGNOSTIC  4-24-98    Diverticuli - due in 2008     HC ERCP W SPHINCTEROTOMY  1996 6/2/96 and 8/30/96     HC REDUCTION OF LARGE BREAST  1988     HC REMOVAL GALLBLADDER  1995      UGI ENDOSCOPY DIAG W OR W/O BRUSH/WASH  7-     INJECT EPIDURAL LUMBAR N/A 8/10/2017    Procedure: INJECT EPIDURAL LUMBAR;  Lumbar 2-3 Epidural Steroid Injection;  Surgeon: Kimani Garcia MD;  Location:  OR  "    INJECT EPIDURAL LUMBAR Right 5/10/2018    Procedure: INJECT EPIDURAL LUMBAR;  right lumbar 2 - lumbar 3 epidural steroid injection;  Surgeon: Kimani Garcia MD;  Location: [facility]       FAMILY HISTORY:  Family History   Problem Relation Age of Onset     Cardiovascular Father      Aortic aneurysm     Hypertension Father      Hypertension Mother      GASTROINTESTINAL DISEASE Mother      GI Bleed     Lipids Sister      Hypertension Sister      Genitourinary Problems Sister      Allergies Sister      Cancer Brother      Lung     Alcohol/Drug Brother      Connective Tissue Disorder Son      Down Syndrome     Respiratory Son      Pneumonia     Cancer Maternal Grandmother      Stomach     Hypertension Maternal Grandfather      Allergies Daughter        SOCIAL HISTORY:  Social History     Social History     Marital status:      Spouse name: Marco Antonio     Number of children: 2     Years of education: 17     Occupational History     Retired in 1994       Retired     Social History Main Topics     Smoking status: Never Smoker     Smokeless tobacco: Never Used     Alcohol use No     Drug use: No     Sexual activity: No     Other Topics Concern     Parent/Sibling W/ Cabg, Mi Or Angioplasty Before 65f 55m? No     Social History Narrative       Review of Systems:  Skin:  Positive for bruising on eliquis   Eyes:  Positive for glasses;cataracts getting looked at by eye MD  ENT:  Negative      Respiratory:  Negative       Cardiovascular:  Negative for;palpitations;chest pain;edema;lightheadedness;dizziness;fatigue      Gastroenterology: Positive for      Genitourinary:  Negative      Musculoskeletal:  Positive for back pain    Neurologic:  Negative      Psychiatric:  Negative      Heme/Lymph/Imm:  Positive for allergies    Endocrine:  Positive for thyroid disorder;diabetes      Physical Exam:  Vitals: /86 (BP Location: Right arm, Cuff Size: Adult Regular)  Pulse 84  Resp 14  Ht 1.626 m (5' 4\")  Wt 83.5 kg (184 lb)  " SpO2 97%  BMI 31.58 kg/m2    Constitutional:           Skin:             Head:           Eyes:           Lymph:      ENT:           Neck:           Respiratory:            Cardiac:                                                           GI:           Extremities and Muscular Skeletal:                 Neurological:           Psych:             CC  No referring provider defined for this encounter.

## 2018-07-25 NOTE — MR AVS SNAPSHOT
"              After Visit Summary   7/25/2018    Ni Maya    MRN: [id number]           Patient Information     Date Of Birth          1940        Visit Information        Provider Department      7/25/2018 9:30 AM Sohan Deleon MD [facility]        Today's Diagnoses     Atrial fibrillation, unspecified type (H)           Follow-ups after your visit        Additional Services     Follow-Up with Cardiologist                 Future tests that were ordered for you today     Open Future Orders        Priority Expected Expires Ordered    Follow-Up with Cardiologist Routine 7/25/2019 7/26/2019 7/25/2018    Basic metabolic panel Routine 7/25/2019 7/26/2019 7/25/2018            Who to contact     If you have questions or need follow up information about today's clinic visit or your schedule please contact [facility] directly at [phone].  Normal or non-critical lab and imaging results will be communicated to you by MyChart, letter or phone within 4 business days after the clinic has received the results. If you do not hear from us within 7 days, please contact the clinic through MyChart or phone. If you have a critical or abnormal lab result, we will notify you by phone as soon as possible.  Submit refill requests through [employer] or call your pharmacy and they will forward the refill request to us. Please allow 3 business days for your refill to be completed.          Additional Information About Your Visit        MyChart Information     [employer] lets you send messages to your doctor, view your test results, renew your prescriptions, schedule appointments and more. To sign up, go to [email or web address] . Click on \"Log in\" on the left side of the screen, which will take you to the Welcome page. Then click on \"Sign up Now\" on the right side of the page.     You will be asked to enter the access code listed below, as well " "as some personal information. Please follow the directions to create your username and password.     Your access code is: [email or web address]  Expires: 10/23/2018  9:58 AM     Your access code will  in 90 days. If you need help or a new code, please call your [place] clinic or [phone].        Care EveryWhere ID     This is your Care EveryWhere ID. This could be used by other organizations to access your [place] medical records  [id number]        Your Vitals Were     Pulse Respirations Height Pulse Oximetry BMI (Body Mass Index)       84 14 1.626 m (5' 4\") 97% 31.58 kg/m2        Blood Pressure from Last 3 Encounters:   18 148/86   05/10/18 (!) 157/91   18 134/74    Weight from Last 3 Encounters:   18 83.5 kg (184 lb)   05/10/18 83.9 kg (185 lb)   18 83.9 kg (185 lb)              We Performed the Following     Basic metabolic panel        Primary Care Provider Office Phone # Fax #    Apryl Cheli Olvera [phone]       [address] [place] [address]        Equal Access to Services     HEATHER GIL : Hadii dimitry ku hadasho Sojohnnyali, waaxda luqadaha, qaybta kaalmada adeegyada, david vidal . So [facility] [phone].    ATENCIÓN: Si habla español, tiene a yuen disposición servicios gratuitos de asistencia lingüística. Llame al [phone].    We comply with applicable federal civil rights laws and [place] laws. We do not discriminate on the basis of race, color, national origin, age, disability, sex, sexual orientation, or gender identity.            Thank you!     Thank you for choosing [facility]  for your care. Our goal is always to provide you with excellent care. Hearing back from our patients is one way we can continue to improve our services. Please take a few minutes to complete the written survey that you may receive in the mail after your visit with us. Thank you!             Your Updated " Medication List - Protect others around you: Learn how to safely use, store and throw away your medicines at www.disposemymeds.org.          This list is accurate as of 7/25/18  9:58 AM.  Always use your most recent med list.                   Brand Name Dispense Instructions for use Diagnosis    alcohol swab prep pads     100 each    Use to swab area of injection/geoffrey as directed.    Type 2 diabetes mellitus with hyperglycemia, without long-term current use of insulin (H)       ALLEGRA 180 MG tablet   Generic drug:  fexofenadine     90 Tab    1 TABLET DAILY    Allergic rhinitis, cause unspecified       apixaban ANTICOAGULANT 5 MG tablet    ELIQUIS    180 tablet    Take 1 tablet (5 mg) by mouth 2 times daily    Atrial fibrillation, unspecified type (H)       blood glucose calibration solution    NO BRAND SPECIFIED    1 Bottle    To accompany: Blood Glucose Monitor Brands: per insurance.    Type 2 diabetes mellitus with hyperglycemia, without long-term current use of insulin (H)       * Blood Glucose Monitoring Suppl w/Device Kit     1 kit    1 kit 3 times daily.    Type 2 diabetes, HbA1C goal < 8% (H)       * blood glucose monitoring meter device kit    no brand specified    1 kit    Use to test blood sugar 3 times daily or as directed. Preferred blood glucose meter OR supplies to accompany: Blood Glucose Monitor Brands: per insurance.    Type 2 diabetes mellitus with hyperglycemia, without long-term current use of insulin (H)       * blood glucose monitoring test strip    ZanAqua CONTOUR NEXT    250 each    TEST THREE TIMES A DAY    Type 2 diabetes mellitus with hyperglycemia (H)       * blood glucose monitoring test strip    no brand specified    100 strip    Use to test blood sugar 3 times daily or as directed. To accompany: Blood Glucose Monitor Brands: per insurance.    Type 2 diabetes mellitus with hyperglycemia, without long-term current use of insulin (H)       * ONETOUCH ULTRA test strip   Generic drug:   blood glucose monitoring     100 each    USE TO TEST BLOOD SUGARS THREE TIMES A DAY    Type 2 diabetes, HbA1C goal < 8% (H)       cloNIDine 0.1 MG tablet    CATAPRES    270 tablet    Take 2 tablets (0.2 mg) by mouth At Bedtime AND take 1 tablet in the AM    Essential hypertension with goal blood pressure less than 140/90       * E-Z JECT LANCET MICRO-THIN 33G Misc           * thin lancets    NO BRAND SPECIFIED    100 each    Use with lanceting device. To accompany: Blood Glucose Monitor Brands: per insurance.    Type 2 diabetes mellitus with hyperglycemia, without long-term current use of insulin (H)       fish oil-omega-3 fatty acids 1000 MG capsule      Take 2 g by mouth daily. 2 caps per day        hydrochlorothiazide 25 MG tablet    HYDRODIURIL    90 tablet    Take 1 tablet (25 mg) by mouth daily    Essential hypertension with goal blood pressure less than 140/90       levothyroxine 100 MCG tablet    SYNTHROID/LEVOTHROID    90 tablet    Take 1 tablet (100 mcg) by mouth daily    Hypothyroidism, unspecified type       lisinopril 30 MG tablet    PRINIVIL,ZESTRIL    90 tablet    Take 1 tablet (30 mg) by mouth daily    Chronic kidney disease, stage II (mild), Type 2 diabetes mellitus with hyperglycemia, without long-term current use of insulin (H), Essential hypertension with goal blood pressure less than 140/90       metFORMIN 500 MG 24 hr tablet    GLUCOPHAGE-XR    90 tablet    Take 1 tablet (500 mg) by mouth daily (with dinner)    Type 2 diabetes mellitus with hyperosmolarity without coma, without long-term current use of insulin (H)       metoprolol tartrate 100 MG tablet    LOPRESSOR    180 tablet    Take 1 tablet (100 mg) by mouth 2 times daily    Essential hypertension with goal blood pressure less than 140/90       * NUTRITIONAL SUPPLEMENT PO      VIBE        * NUTRITIONAL SUPPLEMENT PO      Take 1 oz by mouth daily Flex        * Notice:  This list has 9 medication(s) that are the same as other medications  prescribed for you. Read the directions carefully, and ask your doctor or other care provider to review them with you.

## 2018-07-25 NOTE — LETTER
7/25/2018      Apryl Olvera MD, MD  [address] [facility] [address]      RE: Ni Gardineron       Dear Colleague,    I had the pleasure of seeing Ni Maya in the [facility].    Service Date: 07/25/2018      REASON FOR CLINIC VISIT:  Followup atrial fibrillation.      HISTORY OF PRESENT ILLNESS:  Ms. Maya is a very pleasant 78-year-old female with history of chronic atrial fibrillation on rate control strategy, other comorbidities of hypertension, diabetes, chronic back pain.  CHADS2-VASc score of 5, on apixaban for stroke prophylaxis, normal LV function on echocardiogram with moderate left atrial enlargement and normal stress test.  Today, she is coming for routine followup.  She is accompanied by her .  The patient has no specific cardiac complaints.  She feels quite well.  She regularly goes to gym where she does mostly core exercise.  No shortness of breath or chest discomfort at rest or with physical activity.  Her blood pressure this morning is running a little high.  Patient tells me that she skipped taking her morning blood pressure medications.  Otherwise, it looks like blood pressure has been reasonably well controlled.  She is tolerating apixaban quite well without any issues.  Kidney function today shows creatinine of 1, potassium 3.6, sodium 144.      PHYSICAL EXAMINATION:   VITAL SIGNS:  Blood pressure 142/86, heart rate 84 and irregular, weight 184 pounds, BMI 31.65.   GENERAL:  The patient appears pleasant, comfortable.   NECK:  Normal JVP, no bruit.   CARDIOVASCULAR SYSTEM:  Irregular, normal rate, no murmur, rub or gallop.   RESPIRATORY SYSTEM:  Clear to auscultation bilaterally.   GASTROINTESTINAL SYSTEM:  Abdomen soft, nontender.   EXTREMITIES:  No pitting pedal edema.   NEUROLOGICAL:  Alert, oriented x3.   PSYCHIATRIC:  Normal affect.   SKIN:  No obvious rash.   HEENT:  No pallor or icterus.      IMPRESSION AND PLAN:  A very pleasant 78-year-old  female with atrial fibrillation on rate control strategy, now chronic atrial fibrillation, essentially asymptomatic from it.  CHADS2-VASc score of 5, on apixaban for stroke prophylaxis.  Overall, cardiac status-wise, she is doing quite well without any issues.  We discussed again rationale for oral anticoagulation, pros and cons of it.  We also discussed other oral anticoagulants like Xarelto and Coumadin.  The patient tells me that she is tolerating apixaban quite well and it is not cost prohibitive and at this time, we will continue with the same.  Her blood pressure is slightly elevated today, most likely in the setting of missing her morning dose of blood pressure medication.  Otherwise, historically, it has been quite well controlled.  If patient continues to feel well cardiac status-wise, we can see her back in 1 year, sooner if she notes any change in clinical status, especially any exertional-related symptoms.         MARY MIRELES MD             D: 2018   T: 2018   MT: SHAHID      Name:     YOBANY ENNIS   MRN:      [id number]        Account:      [id number]   :      1940           Service Date: 2018      Document: [id number]         Outpatient Encounter Prescriptions as of 2018   Medication Sig Dispense Refill     alcohol swab prep pads Use to swab area of injection/geoffrey as directed. 100 each 3     ALLEGRA 180 MG PO TABS 1 TABLET DAILY 90 Tab 3     apixaban ANTICOAGULANT (ELIQUIS) 5 MG tablet Take 1 tablet (5 mg) by mouth 2 times daily 180 tablet 0     blood glucose calibration (NO BRAND SPECIFIED) solution To accompany: Blood Glucose Monitor Brands: per insurance. 1 Bottle 3     blood glucose monitoring (HUMBERTO CONTOUR NEXT) test strip TEST THREE TIMES A  each 2     blood glucose monitoring (NO BRAND SPECIFIED) meter device kit Use to test blood sugar 3 times daily or as directed. Preferred blood glucose meter OR supplies to accompany: Blood Glucose Monitor Brands:  per insurance. 1 kit 0     blood glucose monitoring (NO BRAND SPECIFIED) test strip Use to test blood sugar 3 times daily or as directed. To accompany: Blood Glucose Monitor Brands: per insurance. 100 strip 6     Blood Glucose Monitoring Suppl W/DEVICE KIT 1 kit 3 times daily. 1 kit 0     cloNIDine (CATAPRES) 0.1 MG tablet Take 2 tablets (0.2 mg) by mouth At Bedtime AND take 1 tablet in the  tablet 1     fish oil-omega-3 fatty acids (FISH OIL) 1000 MG capsule Take 2 g by mouth daily. 2 caps per day       hydrochlorothiazide (HYDRODIURIL) 25 MG tablet Take 1 tablet (25 mg) by mouth daily 90 tablet 3     Lancets (E-Z JECT LANCET MICRO-THIN 33G) MISC        levothyroxine (SYNTHROID/LEVOTHROID) 100 MCG tablet Take 1 tablet (100 mcg) by mouth daily 90 tablet 3     lisinopril (PRINIVIL,ZESTRIL) 30 MG tablet Take 1 tablet (30 mg) by mouth daily 90 tablet 3     metFORMIN (GLUCOPHAGE-XR) 500 MG 24 hr tablet Take 1 tablet (500 mg) by mouth daily (with dinner) 90 tablet 1     metoprolol tartrate (LOPRESSOR) 100 MG tablet Take 1 tablet (100 mg) by mouth 2 times daily 180 tablet 3     NUTRITIONAL SUPPLEMENT OR VIBE       Nutritional Supplements (NUTRITIONAL SUPPLEMENT PO) Take 1 oz by mouth daily Flex       ONETOUCH ULTRA test strip USE TO TEST BLOOD SUGARS THREE TIMES A  each 1     thin (NO BRAND SPECIFIED) lancets Use with lanceting device. To accompany: Blood Glucose Monitor Brands: per insurance. 100 each 6     No facility-administered encounter medications on file as of 7/25/2018.        Again, thank you for allowing me to participate in the care of your patient.      Sincerely,    Sohan Deleon MD     [facility]

## 2018-07-25 NOTE — PROGRESS NOTES
Service Date: 07/25/2018      REASON FOR CLINIC VISIT:  Followup atrial fibrillation.      HISTORY OF PRESENT ILLNESS:  Ms. Maya is a very pleasant 78-year-old female with history of chronic atrial fibrillation on rate control strategy, other comorbidities of hypertension, diabetes, chronic back pain.  CHADS2-VASc score of 5, on apixaban for stroke prophylaxis, normal LV function on echocardiogram with moderate left atrial enlargement and normal stress test.  Today, she is coming for routine followup.  She is accompanied by her .  The patient has no specific cardiac complaints.  She feels quite well.  She regularly goes to gym where she does mostly core exercise.  No shortness of breath or chest discomfort at rest or with physical activity.  Her blood pressure this morning is running a little high.  Patient tells me that she skipped taking her morning blood pressure medications.  Otherwise, it looks like blood pressure has been reasonably well controlled.  She is tolerating apixaban quite well without any issues.  Kidney function today shows creatinine of 1, potassium 3.6, sodium 144.      PHYSICAL EXAMINATION:   VITAL SIGNS:  Blood pressure 142/86, heart rate 84 and irregular, weight 184 pounds, BMI 31.65.   GENERAL:  The patient appears pleasant, comfortable.   NECK:  Normal JVP, no bruit.   CARDIOVASCULAR SYSTEM:  Irregular, normal rate, no murmur, rub or gallop.   RESPIRATORY SYSTEM:  Clear to auscultation bilaterally.   GASTROINTESTINAL SYSTEM:  Abdomen soft, nontender.   EXTREMITIES:  No pitting pedal edema.   NEUROLOGICAL:  Alert, oriented x3.   PSYCHIATRIC:  Normal affect.   SKIN:  No obvious rash.   HEENT:  No pallor or icterus.      IMPRESSION AND PLAN:  A very pleasant 78-year-old female with atrial fibrillation on rate control strategy, now chronic atrial fibrillation, essentially asymptomatic from it.  CHADS2-VASc score of 5, on apixaban for stroke prophylaxis.  Overall, cardiac status-wise,  she is doing quite well without any issues.  We discussed again rationale for oral anticoagulation, pros and cons of it.  We also discussed other oral anticoagulants like Xarelto and Coumadin.  The patient tells me that she is tolerating apixaban quite well and it is not cost prohibitive and at this time, we will continue with the same.  Her blood pressure is slightly elevated today, most likely in the setting of missing her morning dose of blood pressure medication.  Otherwise, historically, it has been quite well controlled.  If patient continues to feel well cardiac status-wise, we can see her back in 1 year, sooner if she notes any change in clinical status, especially any exertional-related symptoms.         MARY MIRELES MD             D: 2018   T: 2018   MT: SHAHID      Name:     YOBANY ENNIS   MRN:      [id number]        Account:      [id number]   :      1940           Service Date: 2018      Document: [id number]

## 2018-07-27 ENCOUNTER — TRANSFERRED RECORDS (OUTPATIENT)
Dept: HEALTH INFORMATION MANAGEMENT | Facility: CLINIC | Age: 78
End: 2018-07-27

## 2018-08-07 ENCOUNTER — HOSPITAL ENCOUNTER (OUTPATIENT)
Dept: MRI IMAGING | Facility: CLINIC | Age: 78
Discharge: HOME | End: 2018-08-07
Attending: PHYSICIAN ASSISTANT | Admitting: PHYSICIAN ASSISTANT
Payer: COMMERCIAL

## 2018-08-07 DIAGNOSIS — M79.605 BILATERAL LEG PAIN: ICD-10-CM

## 2018-08-07 DIAGNOSIS — M79.604 BILATERAL LEG PAIN: ICD-10-CM

## 2018-08-07 DIAGNOSIS — M54.50 ACUTE LOW BACK PAIN: ICD-10-CM

## 2018-08-07 PROCEDURE — 72148 MRI LUMBAR SPINE W/O DYE: CPT

## 2018-08-13 DIAGNOSIS — E11.9 TYPE 2 DIABETES, HBA1C GOAL < 8% (H): ICD-10-CM

## 2018-08-14 NOTE — TELEPHONE ENCOUNTER
"Requested Prescriptions   Pending Prescriptions Disp Refills     ONETOUCH ULTRA test strip [Pharmacy Med Name: ONETOUCH ULTRA BLUE  STRP] 100 each 1     Sig: USE TO TEST BLOOD SUGARS THREE TIMES A DAY    Diabetic Supplies Protocol Passed    8/13/2018 11:26 AM       Passed - Patient is 18 years of age or older       Passed - Recent (6 mo) or future (30 days) visit within the authorizing provider's specialty    Patient had office visit in the last 6 months or has a visit in the next 30 days with authorizing provider.  See \"Patient Info\" tab in inbasket, or \"Choose Columns\" in Meds & Orders section of the refill encounter.            ONETOUCH ULTRA test strip  Prescription approved per [facility] Refill Protocol.    Due for diabetic 6 month follow up 10/25/2018. Yulisa Cat RN, BSN     "

## 2018-08-29 ENCOUNTER — TRANSFERRED RECORDS (OUTPATIENT)
Dept: HEALTH INFORMATION MANAGEMENT | Facility: CLINIC | Age: 78
End: 2018-08-29

## 2018-09-25 DIAGNOSIS — I48.91 ATRIAL FIBRILLATION, UNSPECIFIED TYPE (H): ICD-10-CM

## 2018-10-26 ENCOUNTER — ALLIED HEALTH/NURSE VISIT (OUTPATIENT)
Dept: FAMILY MEDICINE | Facility: OTHER | Age: 78
End: 2018-10-26
Payer: COMMERCIAL

## 2018-10-26 DIAGNOSIS — Z23 NEED FOR PROPHYLACTIC VACCINATION AND INOCULATION AGAINST INFLUENZA: Primary | ICD-10-CM

## 2018-10-26 PROCEDURE — 90662 IIV NO PRSV INCREASED AG IM: CPT

## 2018-10-26 PROCEDURE — G0008 ADMIN INFLUENZA VIRUS VAC: HCPCS

## 2018-10-26 PROCEDURE — 99207 ZZC NO CHARGE NURSE ONLY: CPT

## 2018-10-26 NOTE — PROGRESS NOTES
"SUBJECTIVE:   Ni Maya is a 78 year old female who presents for Preventive Visit.  Are you in the first 12 months of your Medicare coverage?  No    Annual Wellness Visit     In general, how would you rate your overall health?  Good    Frequency of exercise:  2-3 days/week    Duration of exercise:  30-45 minutes    Do you usually eat at least 4 servings of fruit and vegetables a day, include whole grains    & fiber and avoid regularly eating high fat or \"junk\" foods?  No    Taking medications regularly:  Yes    Medication side effects:  None    Ability to successfully perform activities of daily living:  No assistance needed    Home Safety:  No safety concerns identified    Hearing Impairment:  No hearing concerns    In the past 6 months, have you been bothered by leaking of urine?  No   In general, how would you rate your overall mental or emotional health?  Good    PHQ-2 Total Score:    Additional concerns today:  No    Fall risk:  Fallen 2 or more times in the past year?: No  Any fall with injury in the past year?: No    COGNITIVE SCREEN  1) Repeat 3 items (Leader, Season, Table)    2) Clock draw: NORMAL  3) 3 item recall: Recalls 3 objects  Results: 3 items recalled: COGNITIVE IMPAIRMENT LESS LIKELY    Mini-CogTM Copyright S Linnea. Licensed by the author for use in [facility]; reprinted with permission (sojoe@.[facility]). All rights reserved.      Gout  She reports still having flairs of gout, and is currently having symptoms. The pain is not intolerable, and mainly only hurts when she is walking, but is somewhat controlled with Biofreeze.     Diabetes  Ni reports having a hard time keeping her blood glucose in check after steroid injections.  Hemoglobin A1C   Date Value Ref Range Status   11/08/2018 7.1 (H) 0 - 5.6 % Final     Comment:     Normal <5.7% Prediabetes 5.7-6.4%  Diabetes 6.5% or higher - adopted from ADA   consensus guidelines.     04/25/2018 6.9 (H) 0 - 6.4 % Final     Comment: "     Normal <5.7% Prediabetes 5.7-6.4%  Diabetes 6.5% or higher - adopted from ADA   consensus guidelines.     11/01/2017 6.6 (H) 4.3 - 6.0 % Final   05/17/2017 7.2 (H) 4.3 - 6.0 % Final     BP Readings from Last 3 Encounters:   11/08/18 (!) 152/94   07/25/18 148/86   05/10/18 (!) 157/91     Wt Readings from Last 4 Encounters:   11/08/18 83.5 kg (184 lb)   07/25/18 83.5 kg (184 lb)   05/10/18 83.9 kg (185 lb)   05/08/18 83.9 kg (185 lb)       Reviewed and updated as needed this visit by clinical staff  Tobacco  Allergies  Meds  Problems  Med Hx  Surg Hx  Fam Hx  Soc Hx          Reviewed and updated as needed this visit by Provider  Allergies  Meds  Problems        Social History   Substance Use Topics     Smoking status: Never Smoker     Smokeless tobacco: Never Used     Alcohol use No       No flowsheet data found.  AUDIT - Alcohol Use Disorders Identification Test - Reproduced from the World Health Organization Audit 2001 (Second Edition) 11/8/2018   1.  How often do you have a drink containing alcohol? Never       Today's PHQ-2 Score:   PHQ-2 ( 1999 Pfizer) 12/14/2017   Q1: Little interest or pleasure in doing things 0   Q2: Feeling down, depressed or hopeless 0   PHQ-2 Score 0       Do you feel safe in your environment - Yes    Do you have a Health Care Directive?: Yes: Advance Directive has been received and scanned.    Current providers sharing in care for this patient include:   Patient Care Team:  Apryl Olvera MD as PCP - General (Family Practice)    The following health maintenance items are reviewed in Epic and correct as of today:  Health Maintenance   Topic Date Due     EYE EXAM Q1 YEAR  10/05/2017     MICROALBUMIN Q1 YEAR  05/17/2018     A1C Q6 MO  10/25/2018     LIPID MONITORING Q6 MO  10/25/2018     FOOT EXAM Q1 YEAR  11/24/2018     FALL RISK ASSESSMENT  11/24/2018     PHQ-2 Q1 YR  12/14/2018     TSH Q1 YEAR  04/25/2019     CMP Q1 YR  04/25/2019     TSH W/ FREE T4 REFLEX Q2 YEAR   "04/25/2020     ADVANCE DIRECTIVE PLANNING Q5 YRS  05/17/2021     TETANUS IMMUNIZATION (SYSTEM ASSIGNED)  08/22/2021     COLONOSCOPY Q10 YR  09/13/2023     DEXA SCAN SCREENING (SYSTEM ASSIGNED)  Completed     PNEUMOCOCCAL  Completed     INFLUENZA VACCINE  Completed     Labs reviewed in EPIC    Review of Systems  Constitutional, HEENT, cardiovascular, pulmonary, GI, , musculoskeletal, neuro, skin, endocrine and psych systems are negative, except as in HPI or otherwise noted.     This document serves as a record of the services and decisions personally performed and made by Apryl Olvera MD. It was created on her behalf by Fritz Daly, a trained medical scribe. The creation of this document is based the provider's statements to the medical scribe.  Fritz Daly, November 8, 2018 9:25 AM    OBJECTIVE:   BP (!) 152/94 (BP Location: Left arm, Patient Position: Chair, Cuff Size: Adult Large)  Pulse 83  Temp 97.5  F (36.4  C) (Temporal)  Resp 16  Ht 1.66 m (5' 5.35\")  Wt 83.5 kg (184 lb)  SpO2 96%  BMI 30.29 kg/m2 Estimated body mass index is 30.29 kg/(m^2) as calculated from the following:    Height as of this encounter: 1.66 m (5' 5.35\").    Weight as of this encounter: 83.5 kg (184 lb).  Physical Exam  GENERAL APPEARANCE: healthy, alert and no distress, obese   EYES: Eyes grossly normal to inspection, conjunctivae and sclerae normal  RESP: lungs clear to auscultation - no rales, rhonchi or wheezes  CV: regular rate and rhythm, normal S1 S2, no S3 or S4, no murmur, click or rub, no peripheral edema and peripheral pulses strong  MS: no musculoskeletal defects are noted and gait is age appropriate without ataxia  SKIN: no suspicious lesions or rashes to visible skin  NEURO: Normal strength and tone, sensory exam grossly normal, mentation intact and speech normal  PSYCH: mentation appears normal and affect normal/bright    Results for orders placed or performed in visit on 11/08/18 (from the past 24 hour(s)) "   Hemoglobin A1c   Result Value Ref Range    Hemoglobin A1C 7.1 (H) 0 - 5.6 %       ASSESSMENT / PLAN:       ICD-10-CM    1. Encounter for routine adult health examination without abnormal findings Z00.00    2. Screening for diabetic peripheral neuropathy Z13.89 FOOT EXAM  NO CHARGE [45401.114]   3. Hyperlipidemia with target LDL less than 100 E78.5    4. HTN, goal below 150/90 I10    5. Type 2 diabetes mellitus with hyperglycemia, without long-term current use of insulin (H) E11.65 lisinopril (PRINIVIL,ZESTRIL) 30 MG tablet     Albumin Random Urine Quantitative with Creat Ratio     Hemoglobin A1c     Hemoglobin A1c     TSH with free T4 reflex     Comprehensive metabolic panel   6. Chronic atrial fibrillation (H) I48.2    7. Acquired hypothyroidism E03.9 TSH with free T4 reflex   8. Chronic kidney disease, stage II (mild) N18.2 lisinopril (PRINIVIL,ZESTRIL) 30 MG tablet     Comprehensive metabolic panel   9. Essential hypertension with goal blood pressure less than 140/90 I10 lisinopril (PRINIVIL,ZESTRIL) 30 MG tablet     cloNIDine (CATAPRES) 0.1 MG tablet   10. Type 2 diabetes mellitus with hyperosmolarity without coma, without long-term current use of insulin (H) E11.00 metFORMIN (GLUCOPHAGE-XR) 500 MG 24 hr tablet     Hemoglobin A1c     TSH with free T4 reflex     Comprehensive metabolic panel   11. Chronic back pain, unspecified back location, unspecified back pain laterality M54.9 acetaminophen (TYLENOL) 650 MG CR tablet    G89.29      Hypertension:  Removed hydrochlorothiazide due to gout flair, will recheck blood pressure in 2 weeks. Will consider increasing lisinopril dose if blood pressure increases after taking off hydrochlorothiazide. Refilled prescriptions today.    Diabetes:  Pt doing well on current medication and treatment plan. No change at this time. Refilled metformin prescription today. Discussed dietary changes to help better control her blood sugar. Return to clinic in 6  "months.    Arthritis:  Will prescribe tylenol arthritis PRN to help manage pain.     Gout:  Will remove hydrochlorothiazide to prevent gout, encouraged increasing water intake as well.     End of Life Planning:  Patient currently has an advanced directive: Yes.  Practitioner is supportive of decision.    COUNSELING:  Reviewed preventive health counseling, as reflected in patient instructions  Special attention given to:       Healthy diet/nutrition    BP Readings from Last 1 Encounters:   11/08/18 (!) 152/94     Estimated body mass index is 30.29 kg/(m^2) as calculated from the following:    Height as of this encounter: 1.66 m (5' 5.35\").    Weight as of this encounter: 83.5 kg (184 lb).      Weight management plan: Discussed healthy diet and exercise guidelines and patient will follow up in 12 months in clinic to re-evaluate.     reports that she has never smoked. She has never used smokeless tobacco.      Appropriate preventive services were discussed with this patient, including applicable screening as appropriate for cardiovascular disease, diabetes, osteopenia/osteoporosis, and glaucoma.  As appropriate for age/gender, discussed screening for colorectal cancer, prostate cancer, breast cancer, and cervical cancer. Checklist reviewing preventive services available has been given to the patient.    Reviewed patients plan of care and provided an AVS. The Basic Care Plan (routine screening as documented in Health Maintenance) for Ni meets the Care Plan requirement. This Care Plan has been established and reviewed with the Patient.    Counseling Resources:  ATP IV Guidelines  Pooled Cohorts Equation Calculator  Breast Cancer Risk Calculator  FRAX Risk Assessment  ICSI Preventive Guidelines  Dietary Guidelines for Americans, 2010  USDA's MyPlate  ASA Prophylaxis  Lung CA Screening    The information in this document, created by the medical scribe for me, accurately reflects the services I personally performed and " the decisions made by me. I have reviewed and approved this document for accuracy.   Apryl Olvera MD   [facility]

## 2018-10-26 NOTE — PROGRESS NOTES

## 2018-10-26 NOTE — MR AVS SNAPSHOT
"              After Visit Summary   10/26/2018    Ni Maya    MRN: [id number]           Patient Information     Date Of Birth          1940        Visit Information        Provider Department      10/26/2018 11:00 AM NL FLU SHOT [facility]        Today's Diagnoses     Need for prophylactic vaccination and inoculation against influenza    -  1       Follow-ups after your visit        Who to contact     If you have questions or need follow up information about today's clinic visit or your schedule please contact [facility] directly at [phone].  Normal or non-critical lab and imaging results will be communicated to you by [employer], letter or phone within 4 business days after the clinic has received the results. If you do not hear from us within 7 days, please contact the clinic through [employer] or phone. If you have a critical or abnormal lab result, we will notify you by phone as soon as possible.  Submit refill requests through Wire or call your pharmacy and they will forward the refill request to us. Please allow 3 business days for your refill to be completed.          Additional Information About Your Visit        MyChart Information     Wire lets you send messages to your doctor, view your test results, renew your prescriptions, schedule appointments and more. To sign up, go to [email or web address] . Click on \"Log in\" on the left side of the screen, which will take you to the Welcome page. Then click on \"Sign up Now\" on the right side of the page.     You will be asked to enter the access code listed below, as well as some personal information. Please follow the directions to create your username and password.     Your access code is: [email or web address]  Expires: 2019 11:27 AM     Your access code will  in 90 days. If you need help or a new code, please call your [facility] or [phone].        Care EveryWhere ID     This is your Care " EveryWhere ID. This could be used by other organizations to access your [facility] medical records  [id number]         Blood Pressure from Last 3 Encounters:   07/25/18 148/86   05/10/18 (!) 157/91   04/25/18 134/74    Weight from Last 3 Encounters:   07/25/18 184 lb (83.5 kg)   05/10/18 185 lb (83.9 kg)   05/08/18 185 lb (83.9 kg)              We Performed the Following     FLU VACCINE, INCREASED ANTIGEN, PRESV FREE, AGE 65+ [62205]     Vaccine Administration, Initial [[id number]]        Primary Care Provider Office Phone # Fax #    Apryl Olvera [phone]       [address] [place] [id number]        Equal Access to Services     BENITEZ GIL : Hadcandace wynneo Soraul, waaxda luqadaha, qaybta kaalmada adephillyanicky, david vidal . So [facility] [phone].    ATENCIÓN: Si habla español, tiene a yuen disposición servicios gratuitos de asistencia lingüística. Llame al [phone].    We comply with applicable federal civil rights laws and [place] laws. We do not discriminate on the basis of race, color, national origin, age, disability, sex, sexual orientation, or gender identity.            Thank you!     Thank you for choosing [facility]  for your care. Our goal is always to provide you with excellent care. Hearing back from our patients is one way we can continue to improve our services. Please take a few minutes to complete the written survey that you may receive in the mail after your visit with us. Thank you!             Your Updated Medication List - Protect others around you: Learn how to safely use, store and throw away your medicines at www.disposemymeds.org.          This list is accurate as of 10/26/18 11:27 AM.  Always use your most recent med list.                   Brand Name Dispense Instructions for use Diagnosis    alcohol swab prep pads     100 each    Use to swab area of injection/geoffrey as directed.    Type 2 diabetes mellitus with  hyperglycemia, without long-term current use of insulin (H)       ALLEGRA 180 MG tablet   Generic drug:  fexofenadine     90 Tab    1 TABLET DAILY    Allergic rhinitis, cause unspecified       apixaban ANTICOAGULANT 5 MG tablet    ELIQUIS    180 tablet    Take 1 tablet (5 mg) by mouth 2 times daily    Atrial fibrillation, unspecified type (H)       blood glucose calibration solution    NO BRAND SPECIFIED    1 Bottle    To accompany: Blood Glucose Monitor Brands: per insurance.    Type 2 diabetes mellitus with hyperglycemia, without long-term current use of insulin (H)       * Blood Glucose Monitoring Suppl w/Device Kit     1 kit    1 kit 3 times daily.    Type 2 diabetes, HbA1C goal < 8% (H)       * blood glucose monitoring meter device kit    no brand specified    1 kit    Use to test blood sugar 3 times daily or as directed. Preferred blood glucose meter OR supplies to accompany: Blood Glucose Monitor Brands: per insurance.    Type 2 diabetes mellitus with hyperglycemia, without long-term current use of insulin (H)       * blood glucose monitoring test strip    HUMBERTO CONTOUR NEXT    250 each    TEST THREE TIMES A DAY    Type 2 diabetes mellitus with hyperglycemia (H)       * blood glucose monitoring test strip    no brand specified    100 strip    Use to test blood sugar 3 times daily or as directed. To accompany: Blood Glucose Monitor Brands: per insurance.    Type 2 diabetes mellitus with hyperglycemia, without long-term current use of insulin (H)       * ONETOUCH ULTRA test strip   Generic drug:  blood glucose monitoring     100 each    USE TO TEST BLOOD SUGARS THREE TIMES A DAY    Type 2 diabetes, HbA1C goal < 8% (H)       cloNIDine 0.1 MG tablet    CATAPRES    270 tablet    Take 2 tablets (0.2 mg) by mouth At Bedtime AND take 1 tablet in the AM    Essential hypertension with goal blood pressure less than 140/90       * E-Z JECT LANCET MICRO-THIN 33G Misc           * thin lancets    NO BRAND SPECIFIED    100  each    Use with lanceting device. To accompany: Blood Glucose Monitor Brands: per insurance.    Type 2 diabetes mellitus with hyperglycemia, without long-term current use of insulin (H)       fish oil-omega-3 fatty acids 1000 MG capsule      Take 2 g by mouth daily. 2 caps per day        hydrochlorothiazide 25 MG tablet    HYDRODIURIL    90 tablet    Take 1 tablet (25 mg) by mouth daily    Essential hypertension with goal blood pressure less than 140/90       levothyroxine 100 MCG tablet    SYNTHROID/LEVOTHROID    90 tablet    Take 1 tablet (100 mcg) by mouth daily    Hypothyroidism, unspecified type       lisinopril 30 MG tablet    PRINIVIL,ZESTRIL    90 tablet    Take 1 tablet (30 mg) by mouth daily    Chronic kidney disease, stage II (mild), Type 2 diabetes mellitus with hyperglycemia, without long-term current use of insulin (H), Essential hypertension with goal blood pressure less than 140/90       metFORMIN 500 MG 24 hr tablet    GLUCOPHAGE-XR    90 tablet    Take 1 tablet (500 mg) by mouth daily (with dinner)    Type 2 diabetes mellitus with hyperosmolarity without coma, without long-term current use of insulin (H)       metoprolol tartrate 100 MG tablet    LOPRESSOR    180 tablet    Take 1 tablet (100 mg) by mouth 2 times daily    Essential hypertension with goal blood pressure less than 140/90       * NUTRITIONAL SUPPLEMENT PO      VIBE        * NUTRITIONAL SUPPLEMENT PO      Take 1 oz by mouth daily Flex        * Notice:  This list has 9 medication(s) that are the same as other medications prescribed for you. Read the directions carefully, and ask your doctor or other care provider to review them with you.

## 2018-11-08 ENCOUNTER — OFFICE VISIT (OUTPATIENT)
Dept: FAMILY MEDICINE | Facility: OTHER | Age: 78
End: 2018-11-08
Payer: COMMERCIAL

## 2018-11-08 VITALS
SYSTOLIC BLOOD PRESSURE: 134 MMHG | RESPIRATION RATE: 16 BRPM | HEIGHT: 65 IN | OXYGEN SATURATION: 96 % | HEART RATE: 83 BPM | WEIGHT: 184 LBS | BODY MASS INDEX: 30.66 KG/M2 | TEMPERATURE: 97.5 F | DIASTOLIC BLOOD PRESSURE: 82 MMHG

## 2018-11-08 DIAGNOSIS — E11.00 TYPE 2 DIABETES MELLITUS WITH HYPEROSMOLARITY WITHOUT COMA, WITHOUT LONG-TERM CURRENT USE OF INSULIN (H): ICD-10-CM

## 2018-11-08 DIAGNOSIS — M54.9 CHRONIC BACK PAIN, UNSPECIFIED BACK LOCATION, UNSPECIFIED BACK PAIN LATERALITY: ICD-10-CM

## 2018-11-08 DIAGNOSIS — I48.20 CHRONIC ATRIAL FIBRILLATION (H): ICD-10-CM

## 2018-11-08 DIAGNOSIS — Z00.00 ENCOUNTER FOR ROUTINE ADULT HEALTH EXAMINATION WITHOUT ABNORMAL FINDINGS: Primary | ICD-10-CM

## 2018-11-08 DIAGNOSIS — Z13.89 SCREENING FOR DIABETIC PERIPHERAL NEUROPATHY: ICD-10-CM

## 2018-11-08 DIAGNOSIS — G89.29 CHRONIC BACK PAIN, UNSPECIFIED BACK LOCATION, UNSPECIFIED BACK PAIN LATERALITY: ICD-10-CM

## 2018-11-08 DIAGNOSIS — I10 ESSENTIAL HYPERTENSION WITH GOAL BLOOD PRESSURE LESS THAN 140/90: ICD-10-CM

## 2018-11-08 DIAGNOSIS — E11.65 TYPE 2 DIABETES MELLITUS WITH HYPERGLYCEMIA, WITHOUT LONG-TERM CURRENT USE OF INSULIN (H): ICD-10-CM

## 2018-11-08 DIAGNOSIS — I10 HTN, GOAL BELOW 150/90: ICD-10-CM

## 2018-11-08 DIAGNOSIS — N18.2 CHRONIC KIDNEY DISEASE, STAGE II (MILD): ICD-10-CM

## 2018-11-08 DIAGNOSIS — E78.5 HYPERLIPIDEMIA WITH TARGET LDL LESS THAN 100: ICD-10-CM

## 2018-11-08 DIAGNOSIS — E03.9 ACQUIRED HYPOTHYROIDISM: ICD-10-CM

## 2018-11-08 LAB
CREAT UR-MCNC: 63 MG/DL
HBA1C MFR BLD: 7.1 % (ref 0–5.6)
MICROALBUMIN UR-MCNC: 37 MG/L
MICROALBUMIN/CREAT UR: 58.7 MG/G CR (ref 0–25)

## 2018-11-08 PROCEDURE — 82043 UR ALBUMIN QUANTITATIVE: CPT | Performed by: FAMILY MEDICINE

## 2018-11-08 PROCEDURE — 99207 C FOOT EXAM  NO CHARGE: CPT | Performed by: FAMILY MEDICINE

## 2018-11-08 PROCEDURE — G0439 PPPS, SUBSEQ VISIT: HCPCS | Performed by: FAMILY MEDICINE

## 2018-11-08 PROCEDURE — 83036 HEMOGLOBIN GLYCOSYLATED A1C: CPT | Performed by: FAMILY MEDICINE

## 2018-11-08 PROCEDURE — 36415 COLL VENOUS BLD VENIPUNCTURE: CPT | Performed by: FAMILY MEDICINE

## 2018-11-08 RX ORDER — SENNOSIDES 8.6 MG
650 CAPSULE ORAL EVERY 8 HOURS PRN
Qty: 180 TABLET | Refills: 3 | Status: SHIPPED | OUTPATIENT
Start: 2018-11-08 | End: 2021-11-17

## 2018-11-08 RX ORDER — LISINOPRIL 30 MG/1
30 TABLET ORAL DAILY
Qty: 90 TABLET | Refills: 3 | Status: SHIPPED | OUTPATIENT
Start: 2018-11-08 | End: 2018-11-08

## 2018-11-08 RX ORDER — METFORMIN HCL 500 MG
500 TABLET, EXTENDED RELEASE 24 HR ORAL
Qty: 90 TABLET | Refills: 1 | Status: SHIPPED | OUTPATIENT
Start: 2018-11-08 | End: 2019-03-25

## 2018-11-08 RX ORDER — LISINOPRIL 40 MG/1
40 TABLET ORAL DAILY
Qty: 90 TABLET | Refills: 1 | Status: SHIPPED | OUTPATIENT
Start: 2018-11-08 | End: 2019-05-21

## 2018-11-08 RX ORDER — CLONIDINE HYDROCHLORIDE 0.1 MG/1
0.2 TABLET ORAL AT BEDTIME
Qty: 270 TABLET | Refills: 1 | Status: SHIPPED | OUTPATIENT
Start: 2018-11-08 | End: 2018-11-27

## 2018-11-08 RX ORDER — HYDROCHLOROTHIAZIDE 25 MG/1
25 TABLET ORAL DAILY
Qty: 90 TABLET | Refills: 3 | Status: CANCELLED | OUTPATIENT
Start: 2018-11-08

## 2018-11-08 ASSESSMENT — ACTIVITIES OF DAILY LIVING (ADL): CURRENT_FUNCTION: NO ASSISTANCE NEEDED

## 2018-11-08 NOTE — MR AVS SNAPSHOT
After Visit Summary   11/8/2018    Ni Maya    MRN: [id number]           Patient Information     Date Of Birth          1940        Visit Information        Provider Department      11/8/2018 9:00 AM Apryl Olvera MD [facility]        Today's Diagnoses     Encounter for routine adult health examination without abnormal findings    -  1    Screening for diabetic peripheral neuropathy        Hyperlipidemia with target LDL less than 100        HTN, goal below 150/90        Type 2 diabetes mellitus with hyperglycemia, without long-term current use of insulin (H)        Chronic atrial fibrillation (H)        Acquired hypothyroidism        Chronic kidney disease, stage II (mild)        Essential hypertension with goal blood pressure less than 140/90        Type 2 diabetes mellitus with hyperosmolarity without coma, without long-term current use of insulin (H)        Chronic back pain, unspecified back location, unspecified back pain laterality          Care Instructions    Recheck bp in 2 weeks - will increase lisinopril if needed.  Goal <150/90  Apryl Olvera MD            Follow-ups after your visit        Follow-up notes from your care team     Return in about 1 month (around 12/8/2018), or if symptoms worsen or fail to improve, for BP Recheck.      Future tests that were ordered for you today     Open Future Orders        Priority Expected Expires Ordered    Hemoglobin A1c Routine  5/7/2019 11/8/2018    TSH with free T4 reflex Routine  5/7/2019 11/8/2018    Comprehensive metabolic panel Routine  5/7/2019 11/8/2018            Who to contact     If you have questions or need follow up information about today's clinic visit or your schedule please contact [facility] directly at [phone].  Normal or non-critical lab and imaging results will be communicated to you by MyChart, letter or phone within 4 business days after the clinic has received the results. If  "you do not hear from us within 7 days, please contact the clinic through [employer] or phone. If you have a critical or abnormal lab result, we will notify you by phone as soon as possible.  Submit refill requests through [employer] or call your pharmacy and they will forward the refill request to us. Please allow 3 business days for your refill to be completed.          Additional Information About Your Visit        [employer] Information     [employer] lets you send messages to your doctor, view your test results, renew your prescriptions, schedule appointments and more. To sign up, go to [email or web address] . Click on \"Log in\" on the left side of the screen, which will take you to the Welcome page. Then click on \"Sign up Now\" on the right side of the page.     You will be asked to enter the access code listed below, as well as some personal information. Please follow the directions to create your username and password.     Your access code is: [email or web address]  Expires: 2019 10:27 AM     Your access code will  in 90 days. If you need help or a new code, please call your [place] clinic or [phone].        Care EveryWhere ID     This is your Care EveryWhere ID. This could be used by other organizations to access your [place] medical records  [id number]        Your Vitals Were     Pulse Temperature Respirations Height Pulse Oximetry BMI (Body Mass Index)    83 97.5  F (36.4  C) (Temporal) 16 5' 5.35\" (1.66 m) 96% 30.29 kg/m2       Blood Pressure from Last 3 Encounters:   18 134/82   18 148/86   05/10/18 (!) 157/91    Weight from Last 3 Encounters:   18 184 lb (83.5 kg)   18 184 lb (83.5 kg)   05/10/18 185 lb (83.9 kg)              We Performed the Following     Albumin Random Urine Quantitative with Creat Ratio     FOOT EXAM  NO CHARGE [61044.114]     Hemoglobin A1c          Today's Medication Changes          These changes are accurate as of 18  9:48 AM.  If you have any " questions, ask your nurse or doctor.               Start taking these medicines.        Dose/Directions    acetaminophen 650 MG CR tablet   Commonly known as:  TYLENOL   Used for:  Chronic back pain, unspecified back location, unspecified back pain laterality   Started by:  Apryl Olvera MD        Dose:  650 mg   Take 1 tablet (650 mg) by mouth every 8 hours as needed for mild pain or fever   Quantity:  180 tablet   Refills:  3         Stop taking these medicines if you haven't already. Please contact your care team if you have questions.     hydrochlorothiazide 25 MG tablet   Commonly known as:  HYDRODIURIL   Stopped by:  Apryl Olvera MD                Where to get your medicines      These medications were sent to [id number] - [place], [place] - [address]  [address] [address] [address]     Phone:  [phone]     acetaminophen 650 MG CR tablet    cloNIDine 0.1 MG tablet    lisinopril 30 MG tablet    metFORMIN 500 MG 24 hr tablet                Primary Care Provider Office Phone # Fax #    Apryl Olvera [phone]       [address] [place] [address]        Equal Access to Services     [facility]: Hadii aad ku hadasho Soomaali, waaxda luqadaha, qaybta kaalmada adeegyada, waxay noel vidal . So [facility] [phone].    ATENCIÓN: Si habla español, tiene a yuen disposición servicios gratuitos de asistencia lingüística. [facility] [phone].    We comply with applicable federal civil rights laws and [place] laws. We do not discriminate on the basis of race, color, national origin, age, disability, sex, sexual orientation, or gender identity.            Thank you!     Thank you for choosing [facility]  for your care. Our goal is always to provide you with excellent care. Hearing back from our patients is one way we can continue to improve our services. Please take a few minutes to complete the written survey that you may  receive in the mail after your visit with us. Thank you!             Your Updated Medication List - Protect others around you: Learn how to safely use, store and throw away your medicines at www.disposemymeds.org.          This list is accurate as of 11/8/18  9:48 AM.  Always use your most recent med list.                   Brand Name Dispense Instructions for use Diagnosis    acetaminophen 650 MG CR tablet    TYLENOL    180 tablet    Take 1 tablet (650 mg) by mouth every 8 hours as needed for mild pain or fever    Chronic back pain, unspecified back location, unspecified back pain laterality       alcohol swab prep pads     100 each    Use to swab area of injection/geoffrey as directed.    Type 2 diabetes mellitus with hyperglycemia, without long-term current use of insulin (H)       ALLEGRA 180 MG tablet   Generic drug:  fexofenadine     90 Tab    1 TABLET DAILY    Allergic rhinitis, cause unspecified       apixaban ANTICOAGULANT 5 MG tablet    ELIQUIS    180 tablet    Take 1 tablet (5 mg) by mouth 2 times daily    Atrial fibrillation, unspecified type (H)       blood glucose calibration solution    NO BRAND SPECIFIED    1 Bottle    To accompany: Blood Glucose Monitor Brands: per insurance.    Type 2 diabetes mellitus with hyperglycemia, without long-term current use of insulin (H)       * Blood Glucose Monitoring Suppl w/Device Kit     1 kit    1 kit 3 times daily.    Type 2 diabetes, HbA1C goal < 8% (H)       * blood glucose monitoring meter device kit    no brand specified    1 kit    Use to test blood sugar 3 times daily or as directed. Preferred blood glucose meter OR supplies to accompany: Blood Glucose Monitor Brands: per insurance.    Type 2 diabetes mellitus with hyperglycemia, without long-term current use of insulin (H)       cloNIDine 0.1 MG tablet    CATAPRES    270 tablet    Take 2 tablets (0.2 mg) by mouth At Bedtime AND take 1 tablet in the AM    Essential hypertension with goal blood pressure less  than 140/90       * E-Z JECT LANCET MICRO-THIN 33G Misc           * thin lancets    NO BRAND SPECIFIED    100 each    Use with lanceting device. To accompany: Blood Glucose Monitor Brands: per insurance.    Type 2 diabetes mellitus with hyperglycemia, without long-term current use of insulin (H)       fish oil-omega-3 fatty acids 1000 MG capsule      Take 2 g by mouth daily. 2 caps per day        levothyroxine 100 MCG tablet    SYNTHROID/LEVOTHROID    90 tablet    Take 1 tablet (100 mcg) by mouth daily    Hypothyroidism, unspecified type       lisinopril 30 MG tablet    PRINIVIL,ZESTRIL    90 tablet    Take 1 tablet (30 mg) by mouth daily    Chronic kidney disease, stage II (mild), Type 2 diabetes mellitus with hyperglycemia, without long-term current use of insulin (H), Essential hypertension with goal blood pressure less than 140/90       metFORMIN 500 MG 24 hr tablet    GLUCOPHAGE-XR    90 tablet    Take 1 tablet (500 mg) by mouth daily (with dinner)    Type 2 diabetes mellitus with hyperosmolarity without coma, without long-term current use of insulin (H)       metoprolol tartrate 100 MG tablet    LOPRESSOR    180 tablet    Take 1 tablet (100 mg) by mouth 2 times daily    Essential hypertension with goal blood pressure less than 140/90       * NUTRITIONAL SUPPLEMENT PO      VIBE        * NUTRITIONAL SUPPLEMENT PO      Take 1 oz by mouth daily Flex        * RELION PRIME TEST test strip   Generic drug:  blood glucose monitoring      Use to test blood sugar 3 times daily or as directed.        * ONETOUCH ULTRA test strip   Generic drug:  blood glucose monitoring     100 each    USE TO TEST BLOOD SUGARS THREE TIMES A DAY    Type 2 diabetes, HbA1C goal < 8% (H)       * Notice:  This list has 8 medication(s) that are the same as other medications prescribed for you. Read the directions carefully, and ask your doctor or other care provider to review them with you.

## 2018-11-09 NOTE — PROGRESS NOTES
Had considered waiting 2 weeks for lisinopril increase, but the kidney strain seen on the urine suggests that we should be trying it earlier as it can help protect the kidneys. The lisinopril was increased.  Apryl Olvera MD

## 2018-11-27 ENCOUNTER — ALLIED HEALTH/NURSE VISIT (OUTPATIENT)
Dept: FAMILY MEDICINE | Facility: OTHER | Age: 78
End: 2018-11-27
Payer: COMMERCIAL

## 2018-11-27 ENCOUNTER — TELEPHONE (OUTPATIENT)
Dept: FAMILY MEDICINE | Facility: OTHER | Age: 78
End: 2018-11-27

## 2018-11-27 VITALS — SYSTOLIC BLOOD PRESSURE: 174 MMHG | DIASTOLIC BLOOD PRESSURE: 83 MMHG | HEART RATE: 91 BPM

## 2018-11-27 DIAGNOSIS — I10 ESSENTIAL HYPERTENSION WITH GOAL BLOOD PRESSURE LESS THAN 140/90: ICD-10-CM

## 2018-11-27 DIAGNOSIS — I10 HTN, GOAL BELOW 150/90: Primary | ICD-10-CM

## 2018-11-27 RX ORDER — CLONIDINE HYDROCHLORIDE 0.2 MG/1
0.2 TABLET ORAL 2 TIMES DAILY
Qty: 180 TABLET | Refills: 0 | Status: SHIPPED | OUTPATIENT
Start: 2018-11-27 | End: 2019-01-02

## 2018-11-27 NOTE — TELEPHONE ENCOUNTER
BP from today in clinic 174/83 P 91. See below as well - please advise if further increase in medication is needed.     Aliyah Pichardo, RN, BSN

## 2018-11-27 NOTE — TELEPHONE ENCOUNTER
Reason for call:  Patient reporting a symptom    Symptom or request: High BP    Duration (how long have symptoms been present): Since yesterday at the dentist patients BP has been high. BP reading this morning was 208/115    Have you been treated for this before? Yes    Additional comments:     Phone Number patient can be reached at:  Home number on file [phone] (home)    Best Time:  Anytime    Can we leave a detailed message on this number:  YES    Call taken on 11/27/2018 at 8:11 AM by Alexia Isbell

## 2018-11-27 NOTE — MR AVS SNAPSHOT
"              After Visit Summary   11/27/2018    Ni Maya    MRN: [id number]           Patient Information     Date Of Birth          1940        Visit Information        Provider Department      11/27/2018 2:00 PM MATEUSZ TYLER TEAM B, [facility]        Today's Diagnoses     HTN, goal below 150/90    -  1       Follow-ups after your visit        Your next 10 appointments already scheduled     Nov 27, 2018  2:00 PM CST   Nurse Only with MATEUSZ TYLER TEAM B, [facility] ([facility])    [address]  [place] [address]   [phone]              Who to contact     If you have questions or need follow up information about today's clinic visit or your schedule please contact [facility] directly at [phone].  Normal or non-critical lab and imaging results will be communicated to you by [employer], letter or phone within 4 business days after the clinic has received the results. If you do not hear from us within 7 days, please contact the clinic through MyChart or phone. If you have a critical or abnormal lab result, we will notify you by phone as soon as possible.  Submit refill requests through [employer] or call your pharmacy and they will forward the refill request to us. Please allow 3 business days for your refill to be completed.          Additional Information About Your Visit        MyChart Information     [employer] lets you send messages to your doctor, view your test results, renew your prescriptions, schedule appointments and more. To sign up, go to [email or web address] . Click on \"Log in\" on the left side of the screen, which will take you to the Welcome page. Then click on \"Sign up Now\" on the right side of the page.     You will be asked to enter the access code listed below, as well as some personal information. Please follow the directions to create your username and password.     Your access code is: " [email or web address]  Expires: 2019  1:02 PM     Your access code will  in 90 days. If you need help or a new code, please call your [place] clinic or [phone].        Care EveryWhere ID     This is your Care EveryWhere ID. This could be used by other organizations to access your [place] medical records  [id number]        Your Vitals Were     Pulse                   91            Blood Pressure from Last 3 Encounters:   18 174/83   18 134/82   18 148/86    Weight from Last 3 Encounters:   18 184 lb (83.5 kg)   18 184 lb (83.5 kg)   05/10/18 185 lb (83.9 kg)              Today, you had the following     No orders found for display       Primary Care Provider Office Phone # Fax #    Apryl Cheli Olvera [phone]       [address] [address]        Equal Access to Services     [facility]: Hadii aad ku hadasho Soomaali, waaxda luqadaha, qaybta kaalmada adeegyada, waxay idiin hayhanhn faviola vidal . So [facility] [phone].    ATENCIÓN: Si habla español, tiene a yuen disposición servicios gratuitos de asistencia lingüística. Llame al [phone].    We comply with applicable federal civil rights laws and [place] laws. We do not discriminate on the basis of race, color, national origin, age, disability, sex, sexual orientation, or gender identity.            Thank you!     Thank you for choosing [facility]  for your care. Our goal is always to provide you with excellent care. Hearing back from our patients is one way we can continue to improve our services. Please take a few minutes to complete the written survey that you may receive in the mail after your visit with us. Thank you!             Your Updated Medication List - Protect others around you: Learn how to safely use, store and throw away your medicines at www.disposemymeds.org.          This list is accurate as of 18  1:26 PM.  Always use your most recent med list.                    Brand Name Dispense Instructions for use Diagnosis    acetaminophen 650 MG CR tablet    TYLENOL    180 tablet    Take 1 tablet (650 mg) by mouth every 8 hours as needed for mild pain or fever    Chronic back pain, unspecified back location, unspecified back pain laterality       alcohol swab prep pads     100 each    Use to swab area of injection/geoffrey as directed.    Type 2 diabetes mellitus with hyperglycemia, without long-term current use of insulin (H)       ALLEGRA 180 MG tablet   Generic drug:  fexofenadine     90 Tab    1 TABLET DAILY    Allergic rhinitis, cause unspecified       apixaban ANTICOAGULANT 5 MG tablet    ELIQUIS    180 tablet    Take 1 tablet (5 mg) by mouth 2 times daily    Atrial fibrillation, unspecified type (H)       blood glucose calibration solution    NO BRAND SPECIFIED    1 Bottle    To accompany: Blood Glucose Monitor Brands: per insurance.    Type 2 diabetes mellitus with hyperglycemia, without long-term current use of insulin (H)       * Blood Glucose Monitoring Suppl w/Device Kit     1 kit    1 kit 3 times daily.    Type 2 diabetes, HbA1C goal < 8% (H)       * blood glucose monitoring meter device kit    NO BRAND SPECIFIED    1 kit    Use to test blood sugar 3 times daily or as directed. Preferred blood glucose meter OR supplies to accompany: Blood Glucose Monitor Brands: per insurance.    Type 2 diabetes mellitus with hyperglycemia, without long-term current use of insulin (H)       cloNIDine 0.1 MG tablet    CATAPRES    270 tablet    Take 2 tablets (0.2 mg) by mouth At Bedtime AND take 1 tablet in the AM    Essential hypertension with goal blood pressure less than 140/90       * E-Z JECT LANCET MICRO-THIN 33G Misc           * thin lancets    NO BRAND SPECIFIED    100 each    Use with lanceting device. To accompany: Blood Glucose Monitor Brands: per insurance.    Type 2 diabetes mellitus with hyperglycemia, without long-term current use of insulin (H)       fish  oil-omega-3 fatty acids 1000 MG capsule      Take 2 g by mouth daily. 2 caps per day        levothyroxine 100 MCG tablet    SYNTHROID/LEVOTHROID    90 tablet    Take 1 tablet (100 mcg) by mouth daily    Hypothyroidism, unspecified type       lisinopril 40 MG tablet    PRINIVIL/ZESTRIL    90 tablet    Take 1 tablet (40 mg) by mouth daily    Chronic kidney disease, stage II (mild), Type 2 diabetes mellitus with hyperglycemia, without long-term current use of insulin (H), Essential hypertension with goal blood pressure less than 140/90       metFORMIN 500 MG 24 hr tablet    GLUCOPHAGE-XR    90 tablet    Take 1 tablet (500 mg) by mouth daily (with dinner)    Type 2 diabetes mellitus with hyperosmolarity without coma, without long-term current use of insulin (H)       metoprolol tartrate 100 MG tablet    LOPRESSOR    180 tablet    Take 1 tablet (100 mg) by mouth 2 times daily    Essential hypertension with goal blood pressure less than 140/90       * NUTRITIONAL SUPPLEMENT PO      VIBE        * NUTRITIONAL SUPPLEMENT PO      Take 1 oz by mouth daily Flex        * RELION PRIME TEST test strip   Generic drug:  blood glucose monitoring      Use to test blood sugar 3 times daily or as directed.        * ONETOUCH ULTRA test strip   Generic drug:  blood glucose monitoring     100 each    USE TO TEST BLOOD SUGARS THREE TIMES A DAY    Type 2 diabetes, HbA1C goal < 8% (H)       * Notice:  This list has 8 medication(s) that are the same as other medications prescribed for you. Read the directions carefully, and ask your doctor or other care provider to review them with you.

## 2018-11-27 NOTE — TELEPHONE ENCOUNTER
I spoke with the pt, she confirmed she is taking the following medications:    Clonidine 0.2mg at bedtime and 0.1mg in the morning.   Lisinopril 40 mg daily  Metoprolol 100mg BID    She would like to increase the Clonidine 0.2mg BID, she will continue to check her B/P at home and return to clinic in the next week or so to recheck. She has enough 0.1mg tablet for approximately 1 week, new prescription pended.     Pharmacy, [facility] in [place]    Joan Hernandez, RN, BSN

## 2018-11-27 NOTE — NURSING NOTE
Ni Maya is a 78 year old patient who comes in today for a Blood Pressure check.  Initial BP:  /83 (BP Location: Left arm, Patient Position: Chair, Cuff Size: Adult Regular)  Pulse 91     91  Disposition: Spoke with RN regarding blood pressures.  Carly Gallego CMA (AAMA)

## 2018-11-27 NOTE — TELEPHONE ENCOUNTER
Looks like we maxed her on lisinopril at last visit 20 days ago. Already on metoprolol at max as well as using clonidine. hydrochlorothiazide stopped due to gout.  Can have choice of increased clonidine vs a calcium channel blocker (like norvasc). Seems most logical to make clonidine BID next. Though goal for age is 150/90 and below - updated problem list.   Apryl Olvera MD

## 2018-11-27 NOTE — TELEPHONE ENCOUNTER
Next 5 appointments (look out 90 days)     Nov 27, 2018  2:00 PM CST   Nurse Only with NL FLOAT TEAM B, [facility] ([facility])    [address]  [facility] [place] [id number] [phone]                Will leave encounter open for follow up this afternoon.     Aliyah Pichardo, RN, BSN

## 2018-11-27 NOTE — TELEPHONE ENCOUNTER
Routing to provider pool to address - please note if ok to wait for PCP. Patient continues to have high BP since med changes two weeks ago. Asymptomatic. Please advise next steps.    Ni Maya is a 78 year old female who calls with hypertension.    NURSING ASSESSMENT:  Description:  Patient went to the dentist yesterday and her initial BP was 200/110, recheck was 169/84. Readings were taken with a wrist cuff. Patient checked her BP with her 's arm cuff this morning (8:15 am) and it was 208/115 - she was walking around prior to the reading. She took her medication 15 minutes prior to the reading (8 am). Recheck at 8:30 was 194/105 pulse 93  Onset/duration:  Yesterday  Precip. factors:  BP medication was changed two weeks ago  Associated symptoms:  Asymptomatic  Improves/worsens symptoms: Resting improves  Pain scale (0-10)   0/10  Last exam/Treatment:  On file  Allergies:   Allergies   Allergen Reactions     Sulfa Drugs Rash       MEDICATIONS:   Taking medication(s) as prescribed? Yes  Any medication side effects? No significant side effects    Any barriers to taking medication(s) as prescribed?  No  Medication(s) improving/managing symptoms?  No  Medication reconciliation completed: Yes    RECOMMENDED DISPOSITION: Will have provider review to see if med changes can be made without an appointment or if patient should seek care at ED or clinic.  Will comply with recommendation: yes - patient will wait for call back  If further questions/concerns or if Sx do not improve, worsen or new Sx develop, call your PCP or [facility] as soon as possible.    NOTES:  Disposition was determined by the first positive assessment question, therefore all previous assessment questions were negative.     Guideline used:  Telephone Triage Protocols for Nurses, Fifth Edition, Shonda Pichardo, RN, BSN

## 2018-12-03 ENCOUNTER — HOSPITAL ENCOUNTER (OUTPATIENT)
Facility: CLINIC | Age: 78
End: 2018-12-03
Attending: FAMILY MEDICINE
Payer: COMMERCIAL

## 2018-12-03 ENCOUNTER — TELEPHONE (OUTPATIENT)
Dept: FAMILY MEDICINE | Facility: OTHER | Age: 78
End: 2018-12-03

## 2018-12-03 DIAGNOSIS — I10 BENIGN HYPERTENSION: Primary | ICD-10-CM

## 2018-12-03 LAB
ANION GAP SERPL CALCULATED.3IONS-SCNC: 8 MMOL/L (ref 3–14)
BUN SERPL-MCNC: 15 MG/DL (ref 7–30)
CALCIUM SERPL-MCNC: 8.9 MG/DL (ref 8.5–10.1)
CHLORIDE SERPL-SCNC: 105 MMOL/L (ref 94–109)
CO2 SERPL-SCNC: 30 MMOL/L (ref 20–32)
CREAT SERPL-MCNC: 1.03 MG/DL (ref 0.52–1.04)
GFR SERPL CREATININE-BSD FRML MDRD: 52 ML/MIN/1.7M2
GLUCOSE SERPL-MCNC: 90 MG/DL (ref 70–99)
POTASSIUM SERPL-SCNC: 4.4 MMOL/L (ref 3.4–5.3)
SODIUM SERPL-SCNC: 143 MMOL/L (ref 133–144)

## 2018-12-03 PROCEDURE — 36415 COLL VENOUS BLD VENIPUNCTURE: CPT | Performed by: FAMILY MEDICINE

## 2018-12-03 PROCEDURE — 80048 BASIC METABOLIC PNL TOTAL CA: CPT | Performed by: FAMILY MEDICINE

## 2018-12-03 NOTE — TELEPHONE ENCOUNTER
Ni Maya is a 78 year old female who presents with blood pressure tightness in chest .    NURSING ASSESSMENT:  Description:  Concern of elevated blood pressure and shortness of breath with chest tightness when walking. Has been monitoring blood pressure at home, blood pressure medications changes 11/08/2018 (HCTZ removed due to Gout flare up, lisinopril increased) and clonidine increase 11/27/2018. Blood pressures at home have been 180-200/. Pulse varies, has known AFib. Today in clinic blood pressure at 12:48pm: 192/122 with puilse , rechecked at 12:57pm: 183/111 with pulse 70-92. Denies chest pain, severe shortness of breath, headache, hearing changes, fluttering in chest.   Onset/duration:  Ongoing, changes since medication changes in November 2018  Precip. factors:  HTN, Afib  Associated symptoms:  Chest tightness and short of breath with walking, elevated blood pressure  Improves/worsens symptoms:  Ongoing   Pain scale (0-10)   3/10 chest tightness, resolved with deep breathing   LMP/preg/breast feeding: No LMP recorded. Patient has had a hysterectomy.   Last exam/Treatment:  11/08/2018  Allergies:   Allergies   Allergen Reactions     Sulfa Drugs Rash     NURSING PLAN: Huddle with provider, plan includes will complete BMP lab draw today and follow up with AE in clinic in 1-2 weeks for HTN recheck, will consider Calcium Channel Blocker at that time if needed    RECOMMENDED DISPOSITION:  See above   Will comply with recommendation: Yes  If further questions/concerns or if symptoms do not improve, worsen or new symptoms develop, call your PCP or [facility] as soon as possible.    NOTES:  Disposition was determined by the first positive assessment question, therefore all previous assessment questions were negative    Guideline used:  Telephone Triage Protocols for Nurses, Fifth Edition, Shonda Brasher  Hypertension   Nursing Judgment  Huddled with AE    Yulisa Cat, RN, BSN

## 2018-12-07 NOTE — PROGRESS NOTES
SUBJECTIVE:   Ni Maya is a 78 year old female who presents to clinic today for the following health issues:    Patient brings with her a list of her recent blood pressure readings. Her readings have started to increase significantly since her last visit.     Hypertension     Hypertension:     Outpatient blood pressures:  Are not being checked    Dietary sodium intake::  No added salt diet    She reports having worsening shortness of breath lately when going for short walks, such as from one end of [employer] to her car. Ni states she does cardio exercises about 3 times per week.     PHQ-2 Score:     PHQ-2 ( 1999 [employer]) 12/13/2018 12/14/2017   Q1: Little interest or pleasure in doing things 0 0   Q2: Feeling down, depressed or hopeless 0 0   PHQ-2 Score 0 0     Problem list and histories reviewed & adjusted, as indicated.  Additional history: as documented    Patient Active Problem List   Diagnosis     Hypothyroidism     Allergic rhinitis     Symptomatic menopausal or female climacteric states     Chronic kidney disease, stage II (mild)     Edema     Obesity     Urethral stricture     MICKIE III (vulvar intraepithelial neoplasia III)     Vulval lesion     Health Care Home     Type 2 diabetes, HbA1C goal < 8% (H)     Hyperlipidemia with target LDL less than 100     HTN, goal below 150/90     ACP (advance care planning)     Essential hypertension with goal blood pressure less than 150/90     Type 2 diabetes mellitus with hyperglycemia, without long-term current use of insulin (H)     Cellulitis of toe of right foot     Chronic atrial fibrillation (H)     Thrombocytopenia (H)     Acute gout involving toe of right foot     Lumbar radiculopathy     Past Surgical History:   Procedure Laterality Date     BIOPSY VULVA/PERINEUM,ADDNL LESN  9/18/09    Wide -excision of MICKIE III- right labia      C LAPAROSCOPY, SURGICAL; W/ VAGINAL HYSTERECTOMY W/WO REMOVAL OVARY(S)/TUBES  1984    for bleeding     C LIGATE FALLOPIAN  TUBE,POSTPARTUM  1978    Tubal Ligation     COLONOSCOPY  9/13/2013    Procedure: COLONOSCOPY;  Colonoscopy;  Surgeon: Yanick Ramsey MD;  Location: [facility]     HC ANGIOGRAPHY ARCH  4-3-98     HC BIOPSY OF BREAST, OPEN INCISIONAL  1960    Benign     HC COLONOSCOPY THRU STOMA, DIAGNOSTIC  4-24-98    Diverticuli - due in 2008     HC ERCP W SPHINCTEROTOMY  1996 6/2/96 and 8/30/96     HC REDUCTION OF LARGE BREAST  1988     HC REMOVAL GALLBLADDER  1995     HC UGI ENDOSCOPY DIAG W OR W/O BRUSH/WASH  7-     INJECT EPIDURAL LUMBAR N/A 8/10/2017    Procedure: INJECT EPIDURAL LUMBAR;  Lumbar 2-3 Epidural Steroid Injection;  Surgeon: Kimani Garcia MD;  [facility]     INJECT EPIDURAL LUMBAR Right 5/10/2018    Procedure: INJECT EPIDURAL LUMBAR;  right lumbar 2 - lumbar 3 epidural steroid injection;  Surgeon: Kimani Garcia MD;  Location: [facility]       Social History     Tobacco Use     Smoking status: Never Smoker     Smokeless tobacco: Never Used   Substance Use Topics     Alcohol use: No     Family History   Problem Relation Age of Onset     Cardiovascular Father         Aortic aneurysm     Hypertension Father      Hypertension Mother      Gastrointestinal Disease Mother         GI Bleed     Lipids Sister      Hypertension Sister      Genitourinary Problems Sister      Allergies Sister      Cancer Brother         Lung     Alcohol/Drug Brother      Connective Tissue Disorder Son         Down Syndrome     Respiratory Son         Pneumonia     Cancer Maternal Grandmother         Stomach     Hypertension Maternal Grandfather      Allergies Daughter            ROS:  Constitutional, HEENT, cardiovascular, pulmonary, GI, , musculoskeletal, neuro, skin, endocrine and psych systems are negative, except as in HPI or otherwise noted.     This document serves as a record of the services and decisions personally performed and made by Apryl Olvera MD. It was created on her behalf by Fritz Daly, a trained medical scribe.  The creation of this document is based the provider's statements to the medical scribe.  Fritz Daly, December 13, 2018 11:14 AM    OBJECTIVE:                                                    /90 (BP Location: Right arm, Patient Position: Chair, Cuff Size: Adult Large)   Pulse 74   Temp 97.1  F (36.2  C) (Temporal)   Resp 16   Wt 84.9 kg (187 lb 3.2 oz)   SpO2 98%   Breastfeeding? No   BMI 30.82 kg/m    Body mass index is 30.82 kg/m .   GENERAL: healthy, alert, well nourished, well hydrated, no distress, obese  RESP: lungs clear to auscultation - no rales, no rhonchi, no wheezes  CV: regular rates and rhythm, normal S1 S2, no S3 or S4 and no murmur, no click or rub -  SKIN: no suspicious lesions, no rashes to visible skin  PSYCH: Alert and oriented times 3; speech- coherent , normal rate and volume; able to articulate logical thoughts, able to abstract reason, no tangential thoughts, no hallucinations or delusions, affect- normal    Diagnostic test results:  No results found for this or any previous visit (from the past 24 hour(s)).     ASSESSMENT/PLAN:                                                        ICD-10-CM    1. HTN, goal below 150/90 I10      Hypertension:  Discussed [employer] study and enrolment qualifications, which she does not qualify for. Blood pressures have likely stabilized with the new medication doses, and are at acceptable levels. Difference between patients values and pressures measured here are likely attributed to different cuffs, and altering anxiety levels. Will not change her medications at this time, refills given. Return to clinic in March for blood pressure recheck.         The information in this document, created by the medical scribe for me, accurately reflects the services I personally performed and the decisions made by me. I have reviewed and approved this document for accuracy.   MD Apryl Henao MD, MD  [facility]

## 2018-12-13 ENCOUNTER — OFFICE VISIT (OUTPATIENT)
Dept: FAMILY MEDICINE | Facility: OTHER | Age: 78
End: 2018-12-13
Payer: COMMERCIAL

## 2018-12-13 VITALS
OXYGEN SATURATION: 98 % | RESPIRATION RATE: 16 BRPM | BODY MASS INDEX: 30.82 KG/M2 | TEMPERATURE: 97.1 F | DIASTOLIC BLOOD PRESSURE: 90 MMHG | SYSTOLIC BLOOD PRESSURE: 132 MMHG | WEIGHT: 187.2 LBS | HEART RATE: 74 BPM

## 2018-12-13 DIAGNOSIS — I10 HTN, GOAL BELOW 150/90: Primary | ICD-10-CM

## 2018-12-13 PROCEDURE — 99213 OFFICE O/P EST LOW 20 MIN: CPT | Performed by: FAMILY MEDICINE

## 2018-12-14 ENCOUNTER — HOSPITAL ENCOUNTER (EMERGENCY)
Facility: CLINIC | Age: 78
Discharge: HOME | End: 2018-12-14
Attending: EMERGENCY MEDICINE | Admitting: EMERGENCY MEDICINE
Payer: COMMERCIAL

## 2018-12-14 ENCOUNTER — TELEPHONE (OUTPATIENT)
Dept: FAMILY MEDICINE | Facility: OTHER | Age: 78
End: 2018-12-14

## 2018-12-14 VITALS
DIASTOLIC BLOOD PRESSURE: 103 MMHG | SYSTOLIC BLOOD PRESSURE: 203 MMHG | OXYGEN SATURATION: 98 % | WEIGHT: 185 LBS | HEART RATE: 88 BPM | RESPIRATION RATE: 16 BRPM | BODY MASS INDEX: 30.82 KG/M2 | TEMPERATURE: 97.2 F | HEIGHT: 65 IN

## 2018-12-14 DIAGNOSIS — I10 ESSENTIAL HYPERTENSION: ICD-10-CM

## 2018-12-14 PROCEDURE — 99283 EMERGENCY DEPT VISIT LOW MDM: CPT | Performed by: EMERGENCY MEDICINE

## 2018-12-14 PROCEDURE — 99284 EMERGENCY DEPT VISIT MOD MDM: CPT | Mod: Z6 | Performed by: EMERGENCY MEDICINE

## 2018-12-14 PROCEDURE — 25000132 ZZH RX MED GY IP 250 OP 250 PS 637: Performed by: EMERGENCY MEDICINE

## 2018-12-14 RX ORDER — LORAZEPAM 0.5 MG/1
0.5 TABLET ORAL EVERY 8 HOURS PRN
Qty: 5 TABLET | Refills: 0 | Status: SHIPPED | OUTPATIENT
Start: 2018-12-14 | End: 2019-03-25

## 2018-12-14 RX ORDER — CLONIDINE HYDROCHLORIDE 0.1 MG/1
0.2 TABLET ORAL ONCE
Status: COMPLETED | OUTPATIENT
Start: 2018-12-14 | End: 2018-12-14

## 2018-12-14 RX ADMIN — CLONIDINE HYDROCHLORIDE 0.2 MG: 0.1 TABLET ORAL at 17:56

## 2018-12-14 ASSESSMENT — MIFFLIN-ST. JEOR: SCORE: 1310.03

## 2018-12-14 NOTE — TELEPHONE ENCOUNTER
Ni Maya is a 78 year old female who calls with hypertension.    NURSING ASSESSMENT:  Description:  Patient's BP at 3 pm was 198/128.   Onset/duration:  Today  Precip. factors:  She went to have her nerve endings burned in her back, but her BP was elevated. Patient did take her medications this morning.  Associated symptoms: NONE  Improves/worsens symptoms: Patent states they monitored her for 2.5 hours.   Pain scale (0-10)   0/10  Last exam/Treatment:  12/13/18  Allergies:   Allergies   Allergen Reactions     Sulfa Drugs Rash       RECOMMENDED DISPOSITION:  To ED  Will comply with recommendation: YES   If further questions/concerns or if Sx do not improve, worsen or new Sx develop, call your PCP or [facility] as soon as possible.    NOTES:  Disposition was determined by the first positive assessment question, therefore all previous assessment questions were negative.     Guideline used:  Telephone Triage Protocols for Nurses, Fifth Edition, Shonda Freeman with Cristin Pichardo, RN, BSN

## 2018-12-14 NOTE — ED TRIAGE NOTES
She was at an appointment for a rhizotomy today and her blood pressure was 200-228/115-151 so they would not do the procedure.

## 2018-12-14 NOTE — ED PROVIDER NOTES
History     Chief Complaint   Patient presents with     Hypertension     The history is provided by the patient.     Ni Maya is a 78 year old female who presents to the emergency department with concerns of hypertension. The patient was at an appointment today in [place] to get a Rhizotomy procedure done. The patient's blood pressure was elevated during her visit so they were not able to do the procedure. They checked her blood pressure several times while she was there and it was always above 200 systolic. While the patient was leaving the hospital, she called her PCP who recommended that she come to the ED. The patient reports that she checks her blood pressure regularly at home and it has been very elevated over the last 4 weeks. It has been around 190/90 while at home. The patient had been taking hydrochlorothiazide, Lisinopril, and Clonidine for her hypertension. She went to her PCP yesterday who took her off of the Hydrochlorothiazide because she was getting gout in her feet. Her PCP also increased her doses of Lisinopril and Clonidine. The only symptom of hypertension that the patient is having today is a headache. She says that she gets headaches frequently because of arthritis in her neck. The patient had not eaten all day today because of the procedure, but she says that when she finally ate after leaving the clinic her headache mostly went away. Patient notes that she has had increased shortness of breath over the last several years. She was walking through [employer] a few days ago and was out of breath just from walking from the front of the store to the back. Patient does not have any leg swelling or chest pain. She says that she has mentioned this shortness of breath to her PCP, but they did not seem concerned about it. Patient is not a smoker.     Problem List:    Patient Active Problem List    Diagnosis Date Noted     Lumbar radiculopathy 05/08/2018     Priority: Medium     Acute gout  involving toe of right foot 11/27/2017     Priority: Medium     Cellulitis of toe of right foot 11/26/2017     Priority: Medium     Chronic atrial fibrillation (H) 11/26/2017     Priority: Medium     Thrombocytopenia (H) 11/26/2017     Priority: Medium     Type 2 diabetes mellitus with hyperglycemia, without long-term current use of insulin (H) 05/17/2017     Priority: Medium     Essential hypertension with goal blood pressure less than 150/90 05/27/2016     Priority: Medium     ACP (advance care planning) 05/17/2016     Priority: Medium     Advance Care Planning 5/17/2016: Receipt of ACP document:  Received: Health Care Directive which was witnessed or notarized on 11/22/2013.  Document not previously scanned.  Validation form completed and sent with document to be scanned.  Code Status needs to be updated to reflect choices in most recent ACP document. Notification sent to Dr. Lin for followup.  Confirmed/documented designated decision maker(s).  Added by Kassidy Sousa.             HTN, goal below 150/90 11/06/2013     Priority: Medium     Hyperlipidemia with target LDL less than 100 10/16/2013     Priority: Medium     Diagnosis updated by automated process. Provider to review and confirm.       Type 2 diabetes, HbA1C goal < 8% (H) 03/15/2013     Priority: Medium     Health Care Home 11/15/2012     Priority: Medium     Nahed Medeiros RN-PHN  FPA / [facility]   [phone]    DX V65.8 REPLACED WITH 01328 HEALTH CARE HOME (04/08/2013)       Vulval lesion 06/06/2010     Priority: Medium     Noted 6 months after initial excision of vulvar mass, which was MICKIE III with clear margins  This lesion was excised today 7/7/10       MICKIE III (vulvar intraepithelial neoplasia III) 09/01/2009     Priority: Medium     S/p wide excision. Final path MICKIE III with free surgical margins       Urethral stricture 01/03/2008     Priority: Medium     Problem list name updated by automated process. Provider to  review       Edema 12/29/2006     Priority: Medium     Obesity 12/29/2006     Priority: Medium     Problem list name updated by automated process. Provider to review       Chronic kidney disease, stage II (mild) 11/27/2006     Priority: Medium     Symptomatic menopausal or female climacteric states 01/13/2005     Priority: Medium     Allergic rhinitis 10/21/2004     Priority: Medium     Problem list name updated by automated process. Provider to review       Hypothyroidism 06/25/2002     Priority: Medium     Problem list name updated by automated process. Provider to review          Past Medical History:    Past Medical History:   Diagnosis Date     Allergic rhinitis, cause unspecified      Essential hypertension, benign      Infection of kidney, unspecified 1999     Other specified acquired hypothyroidism      Other specified types of cystitis(595.89)        Past Surgical History:    Past Surgical History:   Procedure Laterality Date     BIOPSY VULVA/PERINEUM,ADDNL LESN  9/18/09    Wide -excision of MICKIE III- right labia      C LAPAROSCOPY, SURGICAL; W/ VAGINAL HYSTERECTOMY W/WO REMOVAL OVARY(S)/TUBES  1984    for bleeding     C LIGATE FALLOPIAN TUBE,POSTPARTUM  1978    Tubal Ligation     COLONOSCOPY  9/13/2013    Procedure: COLONOSCOPY;  Colonoscopy;  Surgeon: Yanick Ramsey MD;  [facility]     HC ANGIOGRAPHY ARCH  4-3-98     HC BIOPSY OF BREAST, OPEN INCISIONAL  1960    Benign     HC COLONOSCOPY THRU STOMA, DIAGNOSTIC  4-24-98    Diverticuli - due in 2008     HC ERCP W SPHINCTEROTOMY  1996 6/2/96 and 8/30/96     HC REDUCTION OF LARGE BREAST  1988     HC REMOVAL GALLBLADDER  1995     HC UGI ENDOSCOPY DIAG W OR W/O BRUSH/WASH  7-     INJECT EPIDURAL LUMBAR N/A 8/10/2017    Procedure: INJECT EPIDURAL LUMBAR;  Lumbar 2-3 Epidural Steroid Injection;  Surgeon: Kimani Garcia MD;  [facility]     INJECT EPIDURAL LUMBAR Right 5/10/2018    Procedure: INJECT EPIDURAL LUMBAR;  right lumbar 2 - lumbar 3  epidural steroid injection;  Surgeon: Kimani Garcia MD;  Location: [facility]       Family History:    Family History   Problem Relation Age of Onset     Cardiovascular Father         Aortic aneurysm     Hypertension Father      Hypertension Mother      Gastrointestinal Disease Mother         GI Bleed     Lipids Sister      Hypertension Sister      Genitourinary Problems Sister      Allergies Sister      Cancer Brother         Lung     Alcohol/Drug Brother      Connective Tissue Disorder Son         Down Syndrome     Respiratory Son         Pneumonia     Cancer Maternal Grandmother         Stomach     Hypertension Maternal Grandfather      Allergies Daughter        Social History:  Marital Status:   [2]  Social History     Tobacco Use     Smoking status: Never Smoker     Smokeless tobacco: Never Used   Substance Use Topics     Alcohol use: No     Drug use: No        Medications:      LORazepam (ATIVAN) 0.5 MG tablet   acetaminophen (TYLENOL) 650 MG CR tablet   alcohol swab prep pads   ALLEGRA 180 MG PO TABS   apixaban ANTICOAGULANT (ELIQUIS) 5 MG tablet   blood glucose calibration (NO BRAND SPECIFIED) solution   blood glucose monitoring (NO BRAND SPECIFIED) meter device kit   blood glucose monitoring (RELION PRIME TEST) test strip   Blood Glucose Monitoring Suppl W/DEVICE KIT   cloNIDine (CATAPRES) 0.2 MG tablet   fish oil-omega-3 fatty acids (FISH OIL) 1000 MG capsule   Lancets (E-Z JECT LANCET MICRO-THIN 33G) MISC   levothyroxine (SYNTHROID/LEVOTHROID) 100 MCG tablet   lisinopril (PRINIVIL/ZESTRIL) 40 MG tablet   metFORMIN (GLUCOPHAGE-XR) 500 MG 24 hr tablet   metoprolol tartrate (LOPRESSOR) 100 MG tablet   NUTRITIONAL SUPPLEMENT OR   Nutritional Supplements (NUTRITIONAL SUPPLEMENT PO)   ONETOUCH ULTRA test strip   thin (NO BRAND SPECIFIED) lancets         Review of Systems   All other systems reviewed and are negative.      Physical Exam   BP: (!) 216/113  Pulse: 100  Temp: 97.2  F (36.2  C)  Resp:  "18  Height: 165.1 cm (5' 5\")  Weight: 83.9 kg (185 lb)  SpO2: 98 %      Physical Exam   Constitutional: She is oriented to person, place, and time. She appears well-developed and well-nourished. No distress.   HENT:   Head: Normocephalic and atraumatic.   Eyes: EOM are normal. No scleral icterus.   Neck: Normal range of motion. Neck supple.   Pulmonary/Chest: Effort normal. No respiratory distress.   Musculoskeletal: Normal range of motion. She exhibits no edema.   Neurological: She is alert and oriented to person, place, and time.   Skin: Skin is warm and dry. No rash noted. She is not diaphoretic. No erythema. No pallor.       ED Course        Procedures                   No results found for this or any previous visit (from the past 24 hour(s)).    Medications   cloNIDine (CATAPRES) tablet 0.2 mg (0.2 mg Oral Given 12/14/18 1756)       Assessments & Plan (with Medical Decision Making)  78-year-old with hypertension.  On 11/8/2018 her hydrochlorothiazide was discontinued due to concerns that it may be contributing to gouty flares.  She continued on her other medications including lisinopril, 30 mg, clonidine 0.1 mg in the morning and 0.2 mg in the evening.  Metoprolol 100 mg twice daily.  She had discontinued her Eliquis for 3 days prior to having the rhizotomy which was scheduled for today.   Given her high blood pressure they did not do the procedure.  On 1127 since her blood pressure was still running high her lisinopril had been increased to 40 mg a day and her clonidine up to 0.2 mg twice daily.  Yesterday when she was seen in the clinic her blood pressure was 132/90.  I think what is contributing to her elevated blood pressure today is anxiety, pain, recent change in blood pressure medications.  I think it is multifactorial.  Given this I am concerned that her blood pressure dropped too low we started her on something new.    I discussed this case with Dr. Abad Salcedo who recommended that the patient " continue her medications as above and if her systolic blood pressure is greater than 160 in the morning that she should take an additional dose of her clonidine.  I have advised her to continue her usual medications and check her blood pressure before and 1 hour after her blood pressure medications in the morning.  In addition it may be helpful for her to take a half a milligram tablet of Ativan approximately 30-60 minutes prior to arriving for her procedure.  This may help to keep her blood pressure down a little bit if her anxiety is in check.  In addition of advised her to restart her Eliquis until 3 days prior to her reschedule procedure on Thursday.  At that point she should discontinue as planned.  The risks.     I have reviewed the nursing notes.    I have reviewed the findings, diagnosis, plan and need for follow up with the patient.         Medication List      Started    LORazepam 0.5 MG tablet  Commonly known as:  ATIVAN  0.5 mg, Oral, EVERY 8 HOURS PRN            Final diagnoses:   Essential hypertension     This document serves as a record of services personally performed by Kurt Andrews MD. It was created on their behalf by Lesley Baker, a trained medical scribe. The creation of this record is based on the provider's personal observations and the statements of the patient. This document has been checked and approved by the attending provider.  Note: Chart documentation done in part with Dragon Voice Recognition software. Although reviewed after completion, some word and grammatical errors may remain.    12/14/2018   [facility] EMERGENCY DEPARTMENT     Kurt Andrews MD  12/14/18 1916

## 2018-12-14 NOTE — TELEPHONE ENCOUNTER
Reason for call:  Patient reporting a symptom    Symptom or request: Patient went to have a procedure done today the procedure had to be canceled due to High BP. Patient was advised to contact her Dr.     BP today:   210/146  193/115  228/160    Patient had to stop taking eliquis 3 days before appt, patient wants to know what to do about the medication       Duration (how long have symptoms been present): \    Have you been treated for this before?     Additional comments:     Phone Number patient can be reached at:  Cell number on file:    Telephone Information:   Mobile [phone]       Best Time:  Anytime     Can we leave a detailed message on this number:  YES    Call taken on 12/14/2018 at 3:28 PM by Alexia Isbell

## 2018-12-14 NOTE — ED AVS SNAPSHOT
[facility] Emergency Department  [address] [place] [address]  Phone:  [phone]  Fax:  [phone]                                    Ni Maya   MRN: [id number]    Department:  [facility] Emergency Department   Date of Visit:  12/14/2018           After Visit Summary Signature Page    I have received my discharge instructions, and my questions have been answered. I have discussed any challenges I see with this plan with the nurse or doctor.    ..........................................................................................................................................  Patient/Patient Representative Signature      ..........................................................................................................................................  Patient Representative Print Name and Relationship to Patient    ..................................................               ................................................  Date                                   Time    ..........................................................................................................................................  Reviewed by Signature/Title    ...................................................              ..............................................  Date                                               Time          22EPIC Rev 08/18

## 2018-12-14 NOTE — TELEPHONE ENCOUNTER
Patient was being transferred to RN and her phone went out of services. When she called back again I tried to reach the RN but she had either already hung up or had bad service again. Please call her

## 2018-12-15 NOTE — DISCHARGE INSTRUCTIONS
As discussed, your blood pressure was quite high today.  I do think this is multifactorial including the fact that your blood pressure medication was changed fairly recently, the fact that you are in pain and were anxious about the procedure today.  I have spoken to the on-call doctor, Dr. Abad Salcedo regarding your blood pressure today.  He recommended that you take an additional dose of your clonidine if your blood pressure is over 160 systolic.  Continue taking your normal medications including her metoprolol, lisinopril, clonidine.  He may take your usual clonidine dosing this evening.  Restart your Eliquis but stop it 3 days prior to your rhizotomy procedure as previously planned.  Make a follow-up appointment with your doctor or a doctor who can cover for your doctor early next week.  Return to the ER if you develop chest pain or shortness of breath severe headache or other new symptoms.

## 2018-12-17 ENCOUNTER — OFFICE VISIT (OUTPATIENT)
Dept: FAMILY MEDICINE | Facility: OTHER | Age: 78
End: 2018-12-17
Payer: COMMERCIAL

## 2018-12-17 ENCOUNTER — TELEPHONE (OUTPATIENT)
Dept: FAMILY MEDICINE | Facility: OTHER | Age: 78
End: 2018-12-17

## 2018-12-17 VITALS
WEIGHT: 185 LBS | HEART RATE: 86 BPM | SYSTOLIC BLOOD PRESSURE: 198 MMHG | BODY MASS INDEX: 30.82 KG/M2 | TEMPERATURE: 97.9 F | OXYGEN SATURATION: 98 % | HEIGHT: 65 IN | DIASTOLIC BLOOD PRESSURE: 123 MMHG | RESPIRATION RATE: 16 BRPM

## 2018-12-17 DIAGNOSIS — I16.9 HYPERTENSIVE CRISIS: ICD-10-CM

## 2018-12-17 DIAGNOSIS — I10 BENIGN ESSENTIAL HYPERTENSION: Primary | ICD-10-CM

## 2018-12-17 DIAGNOSIS — F41.9 ANXIETY: ICD-10-CM

## 2018-12-17 PROCEDURE — 99214 OFFICE O/P EST MOD 30 MIN: CPT | Performed by: FAMILY MEDICINE

## 2018-12-17 RX ORDER — AMLODIPINE BESYLATE 5 MG/1
5 TABLET ORAL DAILY
Qty: 30 TABLET | Refills: 0 | Status: SHIPPED | OUTPATIENT
Start: 2018-12-17 | End: 2019-01-02

## 2018-12-17 ASSESSMENT — MIFFLIN-ST. JEOR: SCORE: 1310.03

## 2018-12-17 ASSESSMENT — PAIN SCALES - GENERAL: PAINLEVEL: NO PAIN (0)

## 2018-12-17 NOTE — PROGRESS NOTES
SUBJECTIVE:   Ni Maya is a 78 year old female who presents to clinic today for the following health issues:      HPI     ED/UC Followup:    Facility:  [facility]  Date of visit: 12/14/18  Reason for visit: Hypertention  Current Status: Checking at home- Still high       Problem list and histories reviewed & adjusted, as indicated.  Additional history: as documented        Patient Active Problem List   Diagnosis     Hypothyroidism     Allergic rhinitis     Symptomatic menopausal or female climacteric states     Chronic kidney disease, stage II (mild)     Edema     Obesity     Urethral stricture     MICKIE III (vulvar intraepithelial neoplasia III)     Vulval lesion     Health Care Home     Type 2 diabetes, HbA1C goal < 8% (H)     Hyperlipidemia with target LDL less than 100     HTN, goal below 150/90     ACP (advance care planning)     Essential hypertension with goal blood pressure less than 150/90     Type 2 diabetes mellitus with hyperglycemia, without long-term current use of insulin (H)     Cellulitis of toe of right foot     Chronic atrial fibrillation (H)     Thrombocytopenia (H)     Acute gout involving toe of right foot     Lumbar radiculopathy     Past Surgical History:   Procedure Laterality Date     BIOPSY VULVA/PERINEUM,ADDNL LESN  9/18/09    Wide -excision of MICKIE III- right labia      C LAPAROSCOPY, SURGICAL; W/ VAGINAL HYSTERECTOMY W/WO REMOVAL OVARY(S)/TUBES  1984    for bleeding     C LIGATE FALLOPIAN TUBE,POSTPARTUM  1978    Tubal Ligation     COLONOSCOPY  9/13/2013    Procedure: COLONOSCOPY;  Colonoscopy;  Surgeon: Yanick Ramsey MD;  Location: [facility]      ANGIOGRAPHY ARCH  4-3-98     HC BIOPSY OF BREAST, OPEN INCISIONAL  1960    Benign     HC COLONOSCOPY THRU STOMA, DIAGNOSTIC  4-24-98    Diverticuli - due in 2008      ERCP W SPHINCTEROTOMY  1996 6/2/96 and 8/30/96      REDUCTION OF LARGE BREAST  1988     HC REMOVAL GALLBLADDER  1995      UGI  ENDOSCOPY DIAG W OR W/O BRUSH/WASH  7-     INJECT EPIDURAL LUMBAR N/A 8/10/2017    Procedure: INJECT EPIDURAL LUMBAR;  Lumbar 2-3 Epidural Steroid Injection;  Surgeon: Kimani Garcia MD;  Location: [facility]     INJECT EPIDURAL LUMBAR Right 5/10/2018    Procedure: INJECT EPIDURAL LUMBAR;  right lumbar 2 - lumbar 3 epidural steroid injection;  Surgeon: Kimani Garcia MD;  Location: [facility]       Social History     Tobacco Use     Smoking status: Never Smoker     Smokeless tobacco: Never Used   Substance Use Topics     Alcohol use: No     Family History   Problem Relation Age of Onset     Cardiovascular Father         Aortic aneurysm     Hypertension Father      Hypertension Mother      Gastrointestinal Disease Mother         GI Bleed     Lipids Sister      Hypertension Sister      Genitourinary Problems Sister      Allergies Sister      Cancer Brother         Lung     Alcohol/Drug Brother      Connective Tissue Disorder Son         Down Syndrome     Respiratory Son         Pneumonia     Cancer Maternal Grandmother         Stomach     Hypertension Maternal Grandfather      Allergies Daughter          Current Outpatient Medications   Medication Sig Dispense Refill     acetaminophen (TYLENOL) 650 MG CR tablet Take 1 tablet (650 mg) by mouth every 8 hours as needed for mild pain or fever 180 tablet 3     alcohol swab prep pads Use to swab area of injection/geoffrey as directed. 100 each 3     ALLEGRA 180 MG PO TABS 1 TABLET DAILY 90 Tab 3     amLODIPine (NORVASC) 5 MG tablet Take 1 tablet (5 mg) by mouth daily 30 tablet 0     apixaban ANTICOAGULANT (ELIQUIS) 5 MG tablet Take 1 tablet (5 mg) by mouth 2 times daily 180 tablet 3     blood glucose calibration (NO BRAND SPECIFIED) solution To accompany: Blood Glucose Monitor Brands: per insurance. 1 Bottle 3     blood glucose monitoring (NO BRAND SPECIFIED) meter device kit Use to test blood sugar 3 times daily or as directed. Preferred blood glucose meter OR supplies to  accompany: Blood Glucose Monitor Brands: per insurance. 1 kit 0     blood glucose monitoring (RELION PRIME TEST) test strip Use to test blood sugar 3 times daily or as directed.       Blood Glucose Monitoring Suppl W/DEVICE KIT 1 kit 3 times daily. 1 kit 0     cloNIDine (CATAPRES) 0.2 MG tablet Take 1 tablet (0.2 mg) by mouth 2 times daily 180 tablet 0     fish oil-omega-3 fatty acids (FISH OIL) 1000 MG capsule Take 2 g by mouth daily. 2 caps per day       Lancets (E-Z JECT LANCET MICRO-THIN 33G) MISC        levothyroxine (SYNTHROID/LEVOTHROID) 100 MCG tablet Take 1 tablet (100 mcg) by mouth daily 90 tablet 3     lisinopril (PRINIVIL/ZESTRIL) 40 MG tablet Take 1 tablet (40 mg) by mouth daily 90 tablet 1     LORazepam (ATIVAN) 0.5 MG tablet Take 1 tablet (0.5 mg) by mouth every 8 hours as needed for anxiety (take 30-60 minutes prior to arriving for your procedure) 5 tablet 0     metFORMIN (GLUCOPHAGE-XR) 500 MG 24 hr tablet Take 1 tablet (500 mg) by mouth daily (with dinner) 90 tablet 1     metoprolol tartrate (LOPRESSOR) 100 MG tablet Take 1 tablet (100 mg) by mouth 2 times daily 180 tablet 3     NUTRITIONAL SUPPLEMENT OR VIBE       Nutritional Supplements (NUTRITIONAL SUPPLEMENT PO) Take 1 oz by mouth daily Flex       ONETOUCH ULTRA test strip USE TO TEST BLOOD SUGARS THREE TIMES A  each 3     sertraline (ZOLOFT) 50 MG tablet Take 1 tablet (50 mg) by mouth daily 30 tablet 1     thin (NO BRAND SPECIFIED) lancets Use with lanceting device. To accompany: Blood Glucose Monitor Brands: per insurance. 100 each 6     Allergies   Allergen Reactions     Sulfa Drugs Rash     BP Readings from Last 3 Encounters:   12/17/18 (!) 198/123   12/14/18 (!) 203/103   12/13/18 132/90    Wt Readings from Last 3 Encounters:   12/17/18 83.9 kg (185 lb)   12/14/18 83.9 kg (185 lb)   12/13/18 84.9 kg (187 lb 3.2 oz)                  Labs reviewed in EPIC    ROS:  Constitutional, HEENT, cardiovascular, pulmonary, gi and gu systems are  "negative, except as otherwise noted.    OBJECTIVE:     BP (!) 198/123 (BP Location: Left arm, Patient Position: Chair, Cuff Size: Adult Regular)   Pulse 86   Temp 97.9  F (36.6  C) (Temporal)   Resp 16   Ht 1.651 m (5' 5\")   Wt 83.9 kg (185 lb)   SpO2 98%   BMI 30.79 kg/m    Body mass index is 30.79 kg/m .   Physical Exam   Constitutional: She is oriented to person, place, and time. No distress.   HENT:   Head: Normocephalic and atraumatic.   Eyes: EOM are normal.   Neck: Normal range of motion.   Cardiovascular: Normal rate, regular rhythm, normal heart sounds and intact distal pulses.   Pulmonary/Chest: Effort normal and breath sounds normal.   Abdominal: Soft. Bowel sounds are normal.   Musculoskeletal: Normal range of motion.   Neurological: She is alert and oriented to person, place, and time.   Skin: Skin is warm.   Psychiatric: She has a normal mood and affect.         Diagnostic Test Results:  none     ASSESSMENT/PLAN:   Patient is a pleasant 78-year-old who is here for a post hospital follow-up.  She has been evaluated to the emergency department for significantly elevated blood pressures and was advised to be seen by the primary clinic for close follow-up.  She was just evaluated by her PCP less than a week ago where her blood pressures were essentially normal.  It was noted that patient has significant anxiety which could be contributing to her elevated blood pressures.  Her repeat blood pressures have also been significantly elevated today.  I offered adding amlodipine to help control the diastolic blood pressures.  She is agreeable to do this.  I also offered trying a small dose of Zoloft to help with her anxiety.  We will try these for the next 2 weeks.  She will return to clinic in 2 weeks for follow-up.  There is no sign of endorgan damage on exam today and hence I feel reassured that she could be discharged home with a close follow-up.  Patient and her  agree with above plan.  Problem " List Items Addressed This Visit     None      Visit Diagnoses     Benign essential hypertension    -  Primary    Relevant Medications    amLODIPine (NORVASC) 5 MG tablet    Anxiety        Relevant Medications    sertraline (ZOLOFT) 50 MG tablet    Hypertensive crisis               Sun Briones MD  [facility]

## 2018-12-17 NOTE — TELEPHONE ENCOUNTER
I spoke with the pt who stated she was seen in the ED and was told to F/U in clinic. B/P this morning 198/115, 20 minutes after taking the meds. Took Lisinopril, clondine, metoprolol around 0800. No symptoms. [facility] She will check her blood pressure again in an hour. I not lower she will callback. Was told by ED she can take another Clonidine if needed.      Next 5 appointments (look out 90 days)    Dec 17, 2018  3:00 PM CST  Office Visit with Sun Briones MD  [facility] [address]  [facility] [id number]  [phone]        Joan Hernandez, RN, BSN

## 2018-12-18 ENCOUNTER — TELEPHONE (OUTPATIENT)
Dept: FAMILY MEDICINE | Facility: OTHER | Age: 78
End: 2018-12-18

## 2018-12-18 NOTE — TELEPHONE ENCOUNTER
I would recommend taking half a dose of zoloft in the morning to see if that would be tolerable. Its ok to monitor the sympotms as long as there are no signs of end organ damage like tingling , numbness, weakness , vision loss, headaches etc. She has significant anxiety which could also be contributing. Advise ER visit if noticing any of the baove signs of end organ damage. If not , reassure her and advise compliance to meds

## 2018-12-18 NOTE — TELEPHONE ENCOUNTER
Patient returned phone call to clinic about message below.  Patient stated she does not want to try this and will speak with RK at her appointment.  Carly Gallego CMA ([facility])

## 2018-12-18 NOTE — TELEPHONE ENCOUNTER
AE and RK to review and advise. Patient has ongoing HTN concerns past month. Was taken off hydrochlorothiazide by AE due to gout flare. Blood pressure 198/116, pulse 124, blood sugar was 154. Has taken blood pressure medications: Lisinopril and Clonidine since initial blood pressure reading and will recheck in 30-60 minutes. Tried Zoloft last night and was unable to sleep, now has a slight headache due to this - also stopping Zoloft due to flush sensation when she took the medication. Per RN protocol, should go to ED for further evaluation, but patient has been seen in ED and clinic. Please review and advise on plan and if/where you would like patient seen.       Ni Maya is a 78 year old female who calls with high blood pressure with a headache.    NURSING ASSESSMENT:  Description: Blood pressure has been elevated. Recently went to the ED as she was unable to have a procedure due to high blood pressure. Yesterday followed up with RK. Started Amlodipine yesterday. Having anxiety and started Zoloft last night, had an all over flush sensation. Was unable to sleep last night after taking Zoloft, unable to get comfortable, could feel heart beating. Only slept about 2 hours last night. Blood pressure 198/116, pulse 124, blood sugar was 154. Has taken blood pressure medications: Lisinopril and Clonidine. Was taken off hydrochlorothiazide by AE due to gout flare. Have been instructed to follow up with a Cardiologist. Has new diarrhea that is loose water like. Diarrhea since this morning, 2 episodes. Slight headache in forehead, believes this is from not sleeping last night. Currently off Eliquis. Denies chest pain, shortness of breath, hearing changes, blurred vision, lightheaded/dizzy.   Onset/duration:  Ongoing past month   Precip. factors:  HTN   Associated symptoms:  Still having ongoing elevated blood pressure, has had medication changes, unable to sleep last night, slight headache this  morning  Improves/worsens symptoms:  Ongoing past month   Pain scale (0-10)   Mild headache   LMP/preg/breast feeding:No LMP recorded. Patient has had a hysterectomy.  Last exam/Treatment:  12/18/2018  Allergies:   Allergies   Allergen Reactions     Sulfa Drugs Rash     NURSING PLAN: Routed to provider Yes    RECOMMENDED DISPOSITION:  TBD as patient was already seen in ED and Office   Will comply with recommendation: Yes  If further questions/concerns or if symptoms do not improve, worsen or new symptoms develop, call your PCP or [facility] as soon as possible.    NOTES:  Disposition was determined by the first positive assessment question, therefore all previous assessment questions were negative    Guideline used:  Telephone Triage Protocols for Nurses, Fifth Edition, Shonda Brasher  Hypertension  Nursing Judgment    Yulisa Cat RN, BSN

## 2018-12-18 NOTE — TELEPHONE ENCOUNTER
LM for patient to return phone call to clinic about message below.  Carly Gallego CMA ([facility])

## 2018-12-27 NOTE — PROGRESS NOTES
SUBJECTIVE:   Ni Maya is a 78 year old female who presents to clinic today for the following health issues:      HPI     Hypertension Follow-up      Outpatient blood pressures are being checked at home.    Low Salt Diet: no added salt  Also here to discuss med refills   Problem list and histories reviewed & adjusted, as indicated.  Additional history: as documented        Patient Active Problem List   Diagnosis     Hypothyroidism     Allergic rhinitis     Symptomatic menopausal or female climacteric states     Chronic kidney disease, stage II (mild)     Edema     Obesity     Urethral stricture     MICKIE III (vulvar intraepithelial neoplasia III)     Vulval lesion     Health Care Home     Type 2 diabetes, HbA1C goal < 8% (H)     Hyperlipidemia with target LDL less than 100     HTN, goal below 150/90     ACP (advance care planning)     Essential hypertension with goal blood pressure less than 150/90     Type 2 diabetes mellitus with hyperglycemia, without long-term current use of insulin (H)     Cellulitis of toe of right foot     Chronic atrial fibrillation (H)     Thrombocytopenia (H)     Acute gout involving toe of right foot     Lumbar radiculopathy     Past Surgical History:   Procedure Laterality Date     BIOPSY VULVA/PERINEUM,ADDNL LESN  9/18/09    Wide -excision of MICKIE III- right labia      C LAPAROSCOPY, SURGICAL; W/ VAGINAL HYSTERECTOMY W/WO REMOVAL OVARY(S)/TUBES  1984    for bleeding     C LIGATE FALLOPIAN TUBE,POSTPARTUM  1978    Tubal Ligation     COLONOSCOPY  9/13/2013    Procedure: COLONOSCOPY;  Colonoscopy;  Surgeon: Yanick Ramsey MD;  [facility]: [facility]     HC ANGIOGRAPHY ARCH  4-3-98     HC BIOPSY OF BREAST, OPEN INCISIONAL  1960    Benign     HC COLONOSCOPY THRU STOMA, DIAGNOSTIC  4-24-98    Diverticuli - due in 2008     HC ERCP W SPHINCTEROTOMY  1996 6/2/96 and 8/30/96     HC REDUCTION OF LARGE BREAST  1988     HC REMOVAL GALLBLADDER  1995      UGI ENDOSCOPY DIAG W OR W/O BRUSH/WASH   7-     INJECT EPIDURAL LUMBAR N/A 8/10/2017    Procedure: INJECT EPIDURAL LUMBAR;  Lumbar 2-3 Epidural Steroid Injection;  Surgeon: Kimani Garcia MD;  [facility]     INJECT EPIDURAL LUMBAR Right 5/10/2018    Procedure: INJECT EPIDURAL LUMBAR;  right lumbar 2 - lumbar 3 epidural steroid injection;  Surgeon: Kimani Garcia MD;  [facility]: [facility]       Social History     Tobacco Use     Smoking status: Never Smoker     Smokeless tobacco: Never Used   Substance Use Topics     Alcohol use: No     Family History   Problem Relation Age of Onset     Cardiovascular Father         Aortic aneurysm     Hypertension Father      Hypertension Mother      Gastrointestinal Disease Mother         GI Bleed     Lipids Sister      Hypertension Sister      Genitourinary Problems Sister      Allergies Sister      Cancer Brother         Lung     Alcohol/Drug Brother      Connective Tissue Disorder Son         Down Syndrome     Respiratory Son         Pneumonia     Cancer Maternal Grandmother         Stomach     Hypertension Maternal Grandfather      Allergies Daughter          Current Outpatient Medications   Medication Sig Dispense Refill     acetaminophen (TYLENOL) 650 MG CR tablet Take 1 tablet (650 mg) by mouth every 8 hours as needed for mild pain or fever 180 tablet 3     alcohol swab prep pads Use to swab area of injection/geoffrey as directed. 100 each 3     ALLEGRA 180 MG PO TABS 1 TABLET DAILY 90 Tab 3     amLODIPine (NORVASC) 5 MG tablet Take 1 tablet (5 mg) by mouth daily 90 tablet 1     apixaban ANTICOAGULANT (ELIQUIS) 5 MG tablet Take 1 tablet (5 mg) by mouth 2 times daily 180 tablet 3     blood glucose calibration (NO BRAND SPECIFIED) solution To accompany: Blood Glucose Monitor Brands: per insurance. 1 Bottle 3     blood glucose monitoring (NO BRAND SPECIFIED) meter device kit Use to test blood sugar 3 times daily or as directed. Preferred blood glucose meter OR supplies to accompany: Blood Glucose Monitor Brands:  per insurance. 1 kit 0     blood glucose monitoring (RELION PRIME TEST) test strip Use to test blood sugar 3 times daily or as directed.       Blood Glucose Monitoring Suppl W/DEVICE KIT 1 kit 3 times daily. 1 kit 0     cloNIDine (CATAPRES) 0.2 MG tablet Take 1 tablet (0.2 mg) by mouth 2 times daily 180 tablet 0     fish oil-omega-3 fatty acids (FISH OIL) 1000 MG capsule Take 2 g by mouth daily. 2 caps per day       Lancets (E-Z JECT LANCET MICRO-THIN 33G) MISC        levothyroxine (SYNTHROID/LEVOTHROID) 100 MCG tablet Take 1 tablet (100 mcg) by mouth daily 90 tablet 3     lisinopril (PRINIVIL/ZESTRIL) 40 MG tablet Take 1 tablet (40 mg) by mouth daily 90 tablet 1     LORazepam (ATIVAN) 0.5 MG tablet Take 1 tablet (0.5 mg) by mouth every 8 hours as needed for anxiety (take 30-60 minutes prior to arriving for your procedure) 5 tablet 0     metFORMIN (GLUCOPHAGE-XR) 500 MG 24 hr tablet Take 1 tablet (500 mg) by mouth daily (with dinner) 90 tablet 1     metoprolol tartrate (LOPRESSOR) 100 MG tablet Take 1 tablet (100 mg) by mouth 2 times daily 180 tablet 3     NUTRITIONAL SUPPLEMENT OR VIBE       Nutritional Supplements (NUTRITIONAL SUPPLEMENT PO) Take 1 oz by mouth daily Flex       ONETOUCH ULTRA test strip USE TO TEST BLOOD SUGARS THREE TIMES A  each 3     thin (NO BRAND SPECIFIED) lancets Use with lanceting device. To accompany: Blood Glucose Monitor Brands: per insurance. 100 each 6     Allergies   Allergen Reactions     Sulfa Drugs Rash     BP Readings from Last 3 Encounters:   01/02/19 128/82   12/17/18 (!) 198/123   12/14/18 (!) 203/103    Wt Readings from Last 3 Encounters:   01/02/19 83.5 kg (184 lb)   12/17/18 83.9 kg (185 lb)   12/14/18 83.9 kg (185 lb)                  Labs reviewed in EPIC    ROS:  Constitutional, HEENT, cardiovascular, pulmonary, gi and gu systems are negative, except as otherwise noted.    OBJECTIVE:     /82 (BP Location: Right arm, Patient Position: Chair, Cuff Size: Adult  "Regular)   Pulse 91   Temp 97.6  F (36.4  C) (Temporal)   Resp 16   Ht 1.651 m (5' 5\")   Wt 83.5 kg (184 lb)   SpO2 98%   BMI 30.62 kg/m    Body mass index is 30.62 kg/m .   Physical Exam   Constitutional: She appears well-developed and well-nourished.   HENT:   Head: Normocephalic and atraumatic.   Eyes: EOM are normal.   Neck: Normal range of motion. Neck supple.   Cardiovascular: Normal rate, regular rhythm, normal heart sounds and intact distal pulses. Exam reveals no gallop and no friction rub.   No murmur heard.  Pulmonary/Chest: Effort normal and breath sounds normal. No stridor. No respiratory distress. She has no wheezes. She has no rales. She exhibits no tenderness.         Diagnostic Test Results:  none     ASSESSMENT/PLAN:     Problem List Items Addressed This Visit     Type 2 diabetes, HbA1C goal < 8% (H)     -Last a1c-7.1  -Home fasting Blood sugars- 120's  -Last eye exam- March 2018. [facility]  -Diabetic foot exam - Uptodate  -Last urine microalbumin-on ACE  -Last LDL-  -Continue current medications- On metformin  -Discussed diet , lifestyle modifications, exercise and weight loss  -RTC in  6 months for follow up             Hyperlipidemia with target LDL less than 100     Declined statin         Relevant Orders    Lipid panel reflex to direct LDL Fasting    HTN, goal below 150/90     Improved blood pressures on the current dose of clonidine, Amlodpine and metoprolol         Relevant Medications    cloNIDine (CATAPRES) 0.2 MG tablet    Chronic atrial fibrillation (H)     On Eliquis. Rate controlled on metoprolol         Thrombocytopenia (H)     Non specific thrombocytopenia. Recheck cbc to check platelet levels to monitor         Relevant Orders    CBC with platelets      Other Visit Diagnoses     Essential hypertension with goal blood pressure less than 140/90    -  Primary    Relevant Medications    cloNIDine (CATAPRES) 0.2 MG tablet    Benign essential hypertension        Relevant " Medications    amLODIPine (NORVASC) 5 MG tablet    cloNIDine (CATAPRES) 0.2 MG tablet           Sun Briones MD  [facility]

## 2019-01-02 ENCOUNTER — OFFICE VISIT (OUTPATIENT)
Dept: FAMILY MEDICINE | Facility: OTHER | Age: 79
End: 2019-01-02
Payer: COMMERCIAL

## 2019-01-02 VITALS
DIASTOLIC BLOOD PRESSURE: 82 MMHG | OXYGEN SATURATION: 98 % | SYSTOLIC BLOOD PRESSURE: 128 MMHG | HEIGHT: 65 IN | TEMPERATURE: 97.6 F | WEIGHT: 184 LBS | HEART RATE: 91 BPM | BODY MASS INDEX: 30.66 KG/M2 | RESPIRATION RATE: 16 BRPM

## 2019-01-02 DIAGNOSIS — D69.6 THROMBOCYTOPENIA (H): ICD-10-CM

## 2019-01-02 DIAGNOSIS — I48.20 CHRONIC ATRIAL FIBRILLATION (H): ICD-10-CM

## 2019-01-02 DIAGNOSIS — E78.5 HYPERLIPIDEMIA WITH TARGET LDL LESS THAN 100: ICD-10-CM

## 2019-01-02 DIAGNOSIS — I10 HTN, GOAL BELOW 150/90: ICD-10-CM

## 2019-01-02 DIAGNOSIS — I10 ESSENTIAL HYPERTENSION WITH GOAL BLOOD PRESSURE LESS THAN 140/90: Primary | ICD-10-CM

## 2019-01-02 DIAGNOSIS — E11.65 TYPE 2 DIABETES MELLITUS WITH HYPERGLYCEMIA, WITHOUT LONG-TERM CURRENT USE OF INSULIN (H): ICD-10-CM

## 2019-01-02 DIAGNOSIS — I10 BENIGN ESSENTIAL HYPERTENSION: ICD-10-CM

## 2019-01-02 PROCEDURE — 99214 OFFICE O/P EST MOD 30 MIN: CPT | Performed by: FAMILY MEDICINE

## 2019-01-02 RX ORDER — AMLODIPINE BESYLATE 5 MG/1
5 TABLET ORAL DAILY
Qty: 90 TABLET | Refills: 1 | Status: SHIPPED | OUTPATIENT
Start: 2019-01-02 | End: 2019-07-11

## 2019-01-02 RX ORDER — CLONIDINE HYDROCHLORIDE 0.2 MG/1
0.2 TABLET ORAL 2 TIMES DAILY
Qty: 180 TABLET | Refills: 0 | Status: SHIPPED | OUTPATIENT
Start: 2019-01-02 | End: 2019-03-15 | Stop reason: DRUGHIGH

## 2019-01-02 ASSESSMENT — PAIN SCALES - GENERAL: PAINLEVEL: NO PAIN (0)

## 2019-01-02 ASSESSMENT — MIFFLIN-ST. JEOR: SCORE: 1305.5

## 2019-01-02 NOTE — ASSESSMENT & PLAN NOTE
-Last a1c-7.1  -Home fasting Blood sugars- 120's  -Last eye exam- March 2018. [facility]  -Diabetic foot exam - Uptodate  -Last urine microalbumin-on ACE  -Last LDL-  -Continue current medications- On metformin  -Discussed diet , lifestyle modifications, exercise and weight loss  -RTC in  6 months for follow up

## 2019-01-04 ENCOUNTER — TELEPHONE (OUTPATIENT)
Dept: FAMILY MEDICINE | Facility: OTHER | Age: 79
End: 2019-01-04

## 2019-01-04 DIAGNOSIS — D69.6 THROMBOCYTOPENIA (H): ICD-10-CM

## 2019-01-04 DIAGNOSIS — E78.5 HYPERLIPIDEMIA WITH TARGET LDL LESS THAN 100: ICD-10-CM

## 2019-01-04 LAB
CHOLEST SERPL-MCNC: 191 MG/DL
ERYTHROCYTE [DISTWIDTH] IN BLOOD BY AUTOMATED COUNT: 13.7 % (ref 10–15)
HCT VFR BLD AUTO: 43.4 % (ref 35–47)
HDLC SERPL-MCNC: 49 MG/DL
HGB BLD-MCNC: 14.3 G/DL (ref 11.7–15.7)
LDLC SERPL CALC-MCNC: 98 MG/DL
MCH RBC QN AUTO: 30.6 PG (ref 26.5–33)
MCHC RBC AUTO-ENTMCNC: 32.9 G/DL (ref 31.5–36.5)
MCV RBC AUTO: 93 FL (ref 78–100)
NONHDLC SERPL-MCNC: 142 MG/DL
PLATELET # BLD AUTO: 116 10E9/L (ref 150–450)
RBC # BLD AUTO: 4.67 10E12/L (ref 3.8–5.2)
TRIGL SERPL-MCNC: 220 MG/DL
WBC # BLD AUTO: 7.3 10E9/L (ref 4–11)

## 2019-01-04 PROCEDURE — 80061 LIPID PANEL: CPT | Performed by: FAMILY MEDICINE

## 2019-01-04 PROCEDURE — 36415 COLL VENOUS BLD VENIPUNCTURE: CPT | Performed by: FAMILY MEDICINE

## 2019-01-04 PROCEDURE — 85027 COMPLETE CBC AUTOMATED: CPT | Performed by: FAMILY MEDICINE

## 2019-01-04 NOTE — TELEPHONE ENCOUNTER
----- Message from Sun Briones MD sent at 1/4/2019  4:57 PM CST -----  Mildly worsened cholesterol levels . Normal blood counts. Low platelet levels which are stable compared to previous test

## 2019-01-07 ENCOUNTER — TELEPHONE (OUTPATIENT)
Dept: FAMILY MEDICINE | Facility: OTHER | Age: 79
End: 2019-01-07

## 2019-01-07 NOTE — TELEPHONE ENCOUNTER
Reason for Call:  Other     Detailed comments: Mari calling from [facility] states looking to speak with edman or nurse. Mari states pt is on eliquis and wondering if pt needs to hold medication or not as pt has an extraction procedure today or tomorrow. pt is there now but depending on instruction for medication determines date of procedure.  Please advise and call [facility] asap at phone [phone]    Phone Number Patient can be reached at: Cell number on file:    Telephone Information:   Mobile [phone]       Best Time: ANY    Can we leave a detailed message on this number? YES    Call taken on 1/7/2019 at 1:14 PM by Annel Booker

## 2019-02-09 ENCOUNTER — TRANSFERRED RECORDS (OUTPATIENT)
Dept: HEALTH INFORMATION MANAGEMENT | Facility: CLINIC | Age: 79
End: 2019-02-09

## 2019-02-10 LAB — EJECTION FRACTION: 69

## 2019-03-15 ENCOUNTER — TELEPHONE (OUTPATIENT)
Dept: FAMILY MEDICINE | Facility: OTHER | Age: 79
End: 2019-03-15

## 2019-03-15 DIAGNOSIS — E78.5 HYPERLIPIDEMIA WITH TARGET LDL LESS THAN 100: Primary | ICD-10-CM

## 2019-03-15 DIAGNOSIS — E03.9 HYPOTHYROIDISM, UNSPECIFIED TYPE: ICD-10-CM

## 2019-03-15 DIAGNOSIS — I10 ESSENTIAL HYPERTENSION WITH GOAL BLOOD PRESSURE LESS THAN 150/90: ICD-10-CM

## 2019-03-15 DIAGNOSIS — E11.65 TYPE 2 DIABETES MELLITUS WITH HYPERGLYCEMIA, WITHOUT LONG-TERM CURRENT USE OF INSULIN (H): ICD-10-CM

## 2019-03-15 RX ORDER — METOPROLOL TARTRATE 50 MG
50 TABLET ORAL 2 TIMES DAILY
Start: 2019-03-15 | End: 2019-12-04

## 2019-03-15 RX ORDER — CLONIDINE HYDROCHLORIDE 0.1 MG/1
0.1 TABLET ORAL 2 TIMES DAILY
Start: 2019-03-15 | End: 2019-06-13

## 2019-03-15 RX ORDER — ATORVASTATIN CALCIUM 40 MG/1
40 TABLET, FILM COATED ORAL DAILY
Qty: 30 TABLET | Refills: 0 | Status: SHIPPED | OUTPATIENT
Start: 2019-03-15 | End: 2019-04-10

## 2019-03-15 RX ORDER — LEVOTHYROXINE SODIUM 100 UG/1
100 TABLET ORAL DAILY
Qty: 30 TABLET | Refills: 0 | Status: SHIPPED | OUTPATIENT
Start: 2019-03-15 | End: 2019-04-16

## 2019-03-15 NOTE — TELEPHONE ENCOUNTER
Patient presents to the clinic today as she thought her appointment was at 1 pm.   Medications were updated and refills were sent to get her through until her next appointment.   Overall she is feeling well.     Next 5 appointments (look out 90 days)    Mar 25, 2019  2:00 PM CDT  Office Visit with Apryl Olvera MD  [facility] ([facility]) [address] [place] [id number] [phone]   Apr 15, 2019  1:00 PM CDT  Return Visit with Sohan Deleon MD  [facility] ([facility]) [address] [id number] [phone]        Aliyah Pichardo, RN, BSN

## 2019-03-15 NOTE — PROGRESS NOTES
SUBJECTIVE:   Ni Maya is a 78 year old female who presents to clinic today for the following health issues:    HPI    Hospital Follow-up Visit:    Hospital/Nursing Home/IP Rehab Facility: [facility]  Date of Admission: 2-9-2019  Date of Discharge: 2-  Reason(s) for Admission: Cerebellar ischemic stroke            Problems taking medications regularly:  None       Medication changes since discharge: Atorvastatin    Problems adhering to non-medication therapy:  None    Summary of hospitalization:  CareEverywhere information obtained and reviewed  Diagnostic Tests/Treatments reviewed.  Follow up needed: none  Other Healthcare Providers Involved in Patient s Care:         None  Update since discharge: improved. Patient states that she has been having problems with vertigo and being shaky since discharge. Also states that she is having a hard time forming sentences.    Post Discharge Medication Reconciliation: discharge medications reconciled, continue medications without change.  Plan of care communicated with patient     Coding guidelines for this visit:  Type of Medical   Decision Making Face-to-Face Visit       within 7 Days of discharge Face-to-Face Visit        within 14 days of discharge   Moderate Complexity [id number]   High Complexity [id number]          She was frustrated by lower extremity edema, which was alleviated in the hospital by stopping Norvasc and Eliquis. She has since restarted Norvasc, and has not had any edema.     Diabetes  Her blood sugar has been well controlled lately, and she reports her numbers have typically been around 100.     Problem list and histories reviewed & adjusted, as indicated.  Additional history: as documented    Patient Active Problem List   Diagnosis     Hypothyroidism     Allergic rhinitis     Symptomatic menopausal or female climacteric states     Chronic kidney disease, stage II (mild)     Edema     Obesity     Urethral  stricture     MICKIE III (vulvar intraepithelial neoplasia III)     Vulval lesion     Health Care Home     Type 2 diabetes, HbA1C goal < 8% (H)     Hyperlipidemia with target LDL less than 100     HTN, goal below 150/90     ACP (advance care planning)     Essential hypertension with goal blood pressure less than 150/90     Type 2 diabetes mellitus with hyperglycemia, without long-term current use of insulin (H)     Cellulitis of toe of right foot     Chronic atrial fibrillation (H)     Thrombocytopenia (H)     Acute gout involving toe of right foot     Lumbar radiculopathy     Cerebral infarction, unspecified mechanism (H)     Past Surgical History:   Procedure Laterality Date     BIOPSY VULVA/PERINEUM,ADDNL LESN  9/18/09    Wide -excision of MICKIE III- right labia      C LAPAROSCOPY, SURGICAL; W/ VAGINAL HYSTERECTOMY W/WO REMOVAL OVARY(S)/TUBES  1984    for bleeding     C LIGATE FALLOPIAN TUBE,POSTPARTUM  1978    Tubal Ligation     COLONOSCOPY  9/13/2013    Procedure: COLONOSCOPY;  Colonoscopy;  Surgeon: Yanick Ramsey MD;  Location:  GI     HC ANGIOGRAPHY ARCH  4-3-98     HC BIOPSY OF BREAST, OPEN INCISIONAL  1960    Benign     HC COLONOSCOPY THRU STOMA, DIAGNOSTIC  4-24-98    Diverticuli - due in 2008     HC ERCP W SPHINCTEROTOMY  1996    6/2/96 and 8/30/96     HC REDUCTION OF LARGE BREAST  1988     HC REMOVAL GALLBLADDER  1995     HC UGI ENDOSCOPY DIAG W OR W/O BRUSH/WASH  7-     INJECT EPIDURAL LUMBAR N/A 8/10/2017    Procedure: INJECT EPIDURAL LUMBAR;  Lumbar 2-3 Epidural Steroid Injection;  Surgeon: Kimani Garcia MD;  [facility]     INJECT EPIDURAL LUMBAR Right 5/10/2018    Procedure: INJECT EPIDURAL LUMBAR;  right lumbar 2 - lumbar 3 epidural steroid injection;  Surgeon: Kimani Garcia MD;  Location: [facility]       Social History     Tobacco Use     Smoking status: Never Smoker     Smokeless tobacco: Never Used   Substance Use Topics     Alcohol use: No     Family History   Problem Relation Age of  "Onset     Cardiovascular Father         Aortic aneurysm     Hypertension Father      Hypertension Mother      Gastrointestinal Disease Mother         GI Bleed     Lipids Sister      Hypertension Sister      Genitourinary Problems Sister      Allergies Sister      Cancer Brother         Lung     Alcohol/Drug Brother      Connective Tissue Disorder Son         Down Syndrome     Respiratory Son         Pneumonia     Cancer Maternal Grandmother         Stomach     Hypertension Maternal Grandfather      Allergies Daughter            ROS:  Constitutional, HEENT, cardiovascular, pulmonary, GI, , musculoskeletal, neuro, skin, endocrine and psych systems are negative, except as in HPI or otherwise noted.     This document serves as a record of the services and decisions personally performed and made by Apryl Olvera MD. It was created on her behalf by Fritz Daly, a trained medical scribe. The creation of this document is based the provider's statements to the medical scribe.  Fritz Daly, March 25, 2019 2:31 PM    OBJECTIVE:                                                    /68 (BP Location: Right arm, Patient Position: Chair, Cuff Size: Adult Regular)   Pulse 88   Temp 98.1  F (36.7  C) (Temporal)   Resp 16   Ht 1.651 m (5' 5\")   Wt 80.8 kg (178 lb 3.2 oz)   SpO2 98%   BMI 29.65 kg/m    Body mass index is 29.65 kg/m .   GENERAL: healthy, alert, well nourished, well hydrated, no distress  HENT: ear canals- normal; TMs- normal; Nose- normal; Mouth- no ulcers, no lesions, mild post-nasal drip  NECK: no tenderness, no adenopathy, no asymmetry, no masses, no stiffness; thyroid- normal to palpation  RESP: lungs clear to auscultation - no rales, no rhonchi, no wheezes  CV: regular rates and rhythm, normal S1 S2, no S3 or S4 and no murmur, no click or rub -  SKIN: no suspicious lesions, no rashes to visible skin  PSYCH: Alert and oriented times 3; speech- coherent , normal rate and volume; able to articulate " logical thoughts, able to abstract reason, no tangential thoughts, no hallucinations or delusions, affect- normal    Diagnostic test results:  No results found for this or any previous visit (from the past 24 hour(s)).     ASSESSMENT/PLAN:                                                        ICD-10-CM    1. Hyperlipidemia with target LDL less than 100 E78.5    2. HTN, goal below 150/90 I10    3. Type 2 diabetes mellitus with hyperglycemia, without long-term current use of insulin (H) E11.65    4. Cerebral infarction, unspecified mechanism (H) I63.9    5. Chronic atrial fibrillation (H) I48.2      Stroke Follow-up:  Obtained SAEID to receive records from hospital. Will review records and contact patient with any instructions for medications and continuing care. Recommended following up with Dr. Deleon (Cardiology) as scheduled to help manage medications, will discuss plan for medications with him prior to visit after reviewing records.  Records revealed that Oh was held at intake to hospital per cardiology consult and she was directed to f/u with cardiology after the hospitalization to determine when to restart and she did not have coverage for cardiology there, so just waited until she returned to [place] to follow up. She has been having less leg swelling, so she feels this must have been related to the Eliquis and does not want to return to it. Called after received records and discussed the plan and she is ok with restarting and I did tell her I would keep Dr. Deleon in the loop as well.    A fib: Eliquis planned to restart and not currently in a fib.     t2dm - 7.0 a1c in [place], but had high blood sugars at the hospital and increased metformin, blood sugars are 100-120 now (which she reports it was prior to the hospital as well. (possibly stress increased) asked to return with her meter and do a trend analysis on her blood sugars to see if maybe she doesn't have to maintain that high of metformin dosing.    HTN:  Pt  doing well on current medication and treatment plan. No change at this time.     Length of visit was 45 minutes with more than 50 percent of that time used for discussing medical concerns and education.     The information in this document, created by the medical scribe for me, accurately reflects the services I personally performed and the decisions made by me. I have reviewed and approved this document for accuracy.   MD Apryl Henao MD, MD  [facility]

## 2019-03-25 ENCOUNTER — OFFICE VISIT (OUTPATIENT)
Dept: FAMILY MEDICINE | Facility: OTHER | Age: 79
End: 2019-03-25
Payer: COMMERCIAL

## 2019-03-25 VITALS
RESPIRATION RATE: 16 BRPM | TEMPERATURE: 98.1 F | HEIGHT: 65 IN | SYSTOLIC BLOOD PRESSURE: 124 MMHG | OXYGEN SATURATION: 98 % | HEART RATE: 88 BPM | WEIGHT: 178.2 LBS | BODY MASS INDEX: 29.69 KG/M2 | DIASTOLIC BLOOD PRESSURE: 68 MMHG

## 2019-03-25 DIAGNOSIS — E11.65 TYPE 2 DIABETES MELLITUS WITH HYPERGLYCEMIA, WITHOUT LONG-TERM CURRENT USE OF INSULIN (H): ICD-10-CM

## 2019-03-25 DIAGNOSIS — I48.20 CHRONIC ATRIAL FIBRILLATION (H): ICD-10-CM

## 2019-03-25 DIAGNOSIS — I10 HTN, GOAL BELOW 150/90: ICD-10-CM

## 2019-03-25 DIAGNOSIS — E78.5 HYPERLIPIDEMIA WITH TARGET LDL LESS THAN 100: Primary | ICD-10-CM

## 2019-03-25 DIAGNOSIS — I63.9 CEREBRAL INFARCTION, UNSPECIFIED MECHANISM (H): ICD-10-CM

## 2019-03-25 PROCEDURE — 99215 OFFICE O/P EST HI 40 MIN: CPT | Performed by: FAMILY MEDICINE

## 2019-03-25 RX ORDER — UBIDECARENONE 100 MG
100 CAPSULE ORAL DAILY
COMMUNITY

## 2019-03-25 ASSESSMENT — MIFFLIN-ST. JEOR: SCORE: 1279.19

## 2019-03-25 NOTE — Clinical Note
FYI, patient had acute frontal cerebral infarct while in [place] and they stopped the Eliquis while in the hospital. Per discharge notes, she was to see cardiology urgently to determine when to restart Eliquis and could not and does not have appt until next month with you. It seemed likely you would be restarting it as I couldn't see a reason not to and told her so. Please contact her if you have reason for her not to take it. (PS - she is convinced it caused her leg swelling as well)Apryl Olvera MD

## 2019-03-27 ENCOUNTER — TRANSFERRED RECORDS (OUTPATIENT)
Dept: HEALTH INFORMATION MANAGEMENT | Facility: CLINIC | Age: 79
End: 2019-03-27

## 2019-04-10 DIAGNOSIS — E78.5 HYPERLIPIDEMIA WITH TARGET LDL LESS THAN 100: ICD-10-CM

## 2019-04-10 NOTE — TELEPHONE ENCOUNTER
Patient calling in regards to refill request below. Patient would like a 90 day supply . please call patient with questions or once filled at [phone]

## 2019-04-11 RX ORDER — ATORVASTATIN CALCIUM 40 MG/1
TABLET, FILM COATED ORAL
Qty: 90 TABLET | Refills: 2 | Status: SHIPPED | OUTPATIENT
Start: 2019-04-11 | End: 2019-12-31

## 2019-04-11 NOTE — TELEPHONE ENCOUNTER
Lipitor  Prescription approved per [facility] Refill Protocol.    Aliyah Pichardo, RN, BSN

## 2019-04-15 ENCOUNTER — OFFICE VISIT (OUTPATIENT)
Dept: CARDIOLOGY | Facility: CLINIC | Age: 79
End: 2019-04-15
Payer: COMMERCIAL

## 2019-04-15 VITALS
WEIGHT: 176.1 LBS | DIASTOLIC BLOOD PRESSURE: 80 MMHG | BODY MASS INDEX: 29.34 KG/M2 | HEART RATE: 80 BPM | OXYGEN SATURATION: 97 % | SYSTOLIC BLOOD PRESSURE: 134 MMHG | HEIGHT: 65 IN

## 2019-04-15 DIAGNOSIS — I48.20 CHRONIC ATRIAL FIBRILLATION (H): Primary | ICD-10-CM

## 2019-04-15 DIAGNOSIS — Z86.73 HISTORY OF STROKE: ICD-10-CM

## 2019-04-15 PROCEDURE — 99214 OFFICE O/P EST MOD 30 MIN: CPT | Performed by: INTERNAL MEDICINE

## 2019-04-15 ASSESSMENT — MIFFLIN-ST. JEOR: SCORE: 1269.66

## 2019-04-15 NOTE — PROGRESS NOTES
HPI and Plan:   See dictation(#[id number], patient is not taking asa)    Orders Placed This Encounter   Procedures     Follow-Up with Cardiologist     NEUROLOGY ADULT REFERRAL       No orders of the defined types were placed in this encounter.      There are no discontinued medications.      Encounter Diagnoses   Name Primary?     Chronic atrial fibrillation (H) Yes     History of stroke        CURRENT MEDICATIONS:  Current Outpatient Medications   Medication Sig Dispense Refill     acetaminophen (TYLENOL) 650 MG CR tablet Take 1 tablet (650 mg) by mouth every 8 hours as needed for mild pain or fever 180 tablet 3     alcohol swab prep pads Use to swab area of injection/geoffrey as directed. 100 each 3     ALLEGRA 180 MG PO TABS 1 TABLET DAILY 90 Tab 3     amLODIPine (NORVASC) 5 MG tablet Take 1 tablet (5 mg) by mouth daily 90 tablet 1     apixaban ANTICOAGULANT (ELIQUIS) 5 MG tablet Take 5 mg by mouth 2 times daily       aspirin (ASA) 81 MG EC tablet Take 1 tablet (81 mg) by mouth daily       atorvastatin (LIPITOR) 40 MG tablet TAKE ONE TABLET BY MOUTH ONCE DAILY 90 tablet 2     blood glucose calibration (NO BRAND SPECIFIED) solution To accompany: Blood Glucose Monitor Brands: per insurance. 1 Bottle 3     blood glucose monitoring (NO BRAND SPECIFIED) meter device kit Use to test blood sugar 3 times daily or as directed. Preferred blood glucose meter OR supplies to accompany: Blood Glucose Monitor Brands: per insurance. 1 kit 0     blood glucose monitoring (RELION PRIME TEST) test strip Use to test blood sugar 3 times daily or as directed.       Blood Glucose Monitoring Suppl W/DEVICE KIT 1 kit 3 times daily. 1 kit 0     cloNIDine (CATAPRES) 0.1 MG tablet Take 1 tablet (0.1 mg) by mouth 2 times daily       co-enzyme Q-10 100 MG CAPS capsule Take 100 mg by mouth daily       fish oil-omega-3 fatty acids (FISH OIL) 1000 MG capsule Take 2 g by mouth daily. 2 caps per day       Lancets (E-Z JECT LANCET MICRO-THIN 33G) [facility]         levothyroxine (SYNTHROID/LEVOTHROID) 100 MCG tablet Take 1 tablet (100 mcg) by mouth daily 30 tablet 0     lisinopril (PRINIVIL/ZESTRIL) 40 MG tablet Take 1 tablet (40 mg) by mouth daily 90 tablet 1     metFORMIN (GLUCOPHAGE) 1000 MG tablet Take 1 tablet (1,000 mg) by mouth 2 times daily (with meals) 60 tablet 0     metoprolol tartrate (LOPRESSOR) 50 MG tablet Take 1 tablet (50 mg) by mouth 2 times daily       NUTRITIONAL SUPPLEMENT OR VIBE       Nutritional Supplements (NUTRITIONAL SUPPLEMENT PO) Take 1 oz by mouth daily Flex       ONETOUCH ULTRA test strip USE TO TEST BLOOD SUGARS THREE TIMES A  each 3     thin (NO BRAND SPECIFIED) lancets Use with lanceting device. To accompany: Blood Glucose Monitor Brands: per insurance. 100 each 6       ALLERGIES     Allergies   Allergen Reactions     Sulfa Drugs Rash       PAST MEDICAL HISTORY:  Past Medical History:   Diagnosis Date     Allergic rhinitis, cause unspecified      Essential hypertension, benign      Infection of kidney, unspecified 1999     Other specified acquired hypothyroidism      Other specified types of cystitis(595.89)     1999,6-2-01, 10-10-01       PAST SURGICAL HISTORY:  Past Surgical History:   Procedure Laterality Date     BIOPSY VULVA/PERINEUM,ADDNL LESN  9/18/09    Wide -excision of MICKIE III- right labia      C LAPAROSCOPY, SURGICAL; W/ VAGINAL HYSTERECTOMY W/WO REMOVAL OVARY(S)/TUBES  1984    for bleeding     C LIGATE FALLOPIAN TUBE,POSTPARTUM  1978    Tubal Ligation     COLONOSCOPY  9/13/2013    Procedure: COLONOSCOPY;  Colonoscopy;  Surgeon: Yanick Ramsey MD;  [facility]     HC ANGIOGRAPHY ARCH  4-3-98     HC BIOPSY OF BREAST, OPEN INCISIONAL  1960    Benign     HC COLONOSCOPY THRU STOMA, DIAGNOSTIC  4-24-98    Diverticuli - due in 2008     HC ERCP W SPHINCTEROTOMY  1996    6/2/96 and 8/30/96     HC REDUCTION OF LARGE BREAST  1988     HC REMOVAL GALLBLADDER  1995     HC UGI ENDOSCOPY DIAG W OR W/O BRUSH/WASH  7-      INJECT EPIDURAL LUMBAR N/A 8/10/2017    Procedure: INJECT EPIDURAL LUMBAR;  Lumbar 2-3 Epidural Steroid Injection;  Surgeon: Kimani Garcia MD;  [facility]     INJECT EPIDURAL LUMBAR Right 5/10/2018    Procedure: INJECT EPIDURAL LUMBAR;  right lumbar 2 - lumbar 3 epidural steroid injection;  Surgeon: Kimani Garcia MD;  [facility]       FAMILY HISTORY:  Family History   Problem Relation Age of Onset     Cardiovascular Father         Aortic aneurysm     Hypertension Father      Hypertension Mother      Gastrointestinal Disease Mother         GI Bleed     Lipids Sister      Hypertension Sister      Genitourinary Problems Sister      Allergies Sister      Cancer Brother         Lung     Alcohol/Drug Brother      Connective Tissue Disorder Son         Down Syndrome     Respiratory Son         Pneumonia     Cancer Maternal Grandmother         Stomach     Hypertension Maternal Grandfather      Allergies Daughter        SOCIAL HISTORY:  Social History     Socioeconomic History     Marital status:      Spouse name: Marco Antonio     Number of children: 2     Years of education: 17     Highest education level: Not on file   Occupational History     Occupation: Retired in 1994     Employer: RETIRED   Social Needs     Financial resource strain: Not on file     Food insecurity:     Worry: Not on file     Inability: Not on file     Transportation needs:     Medical: Not on file     Non-medical: Not on file   Tobacco Use     Smoking status: Never Smoker     Smokeless tobacco: Never Used   Substance and Sexual Activity     Alcohol use: No     Drug use: No     Sexual activity: Never     Partners: Male   Lifestyle     Physical activity:     Days per week: Not on file     Minutes per session: Not on file     Stress: Not on file   Relationships     Social connections:     Talks on phone: Not on file     Gets together: Not on file     Attends Muslim service: Not on file     Active member of club or organization: Not on  "file     Attends meetings of clubs or organizations: Not on file     Relationship status: Not on file     Intimate partner violence:     Fear of current or ex partner: Not on file     Emotionally abused: Not on file     Physically abused: Not on file     Forced sexual activity: Not on file   Other Topics Concern     Parent/sibling w/ CABG, MI or angioplasty before 65F 55M? No   Social History Narrative     Not on file       Review of Systems:  Skin:  Negative       Eyes:  Positive for glasses;cataracts    ENT:  Negative      Respiratory:  Negative       Cardiovascular:  Negative for;palpitations;chest pain;lightheadedness;dizziness Positive for;edema possibly swelling in calf area  Gastroenterology: Negative      Genitourinary:  Negative      Musculoskeletal:  Positive for back pain has some balance issues   Neurologic:  Positive for stroke    Psychiatric:  Negative      Heme/Lymph/Imm:  Positive for allergies    Endocrine:  Positive for thyroid disorder;diabetes      Physical Exam:  Vitals: /80 (BP Location: Right arm, Patient Position: Fowlers, Cuff Size: Adult Regular)   Pulse 80   Ht 1.651 m (5' 5\")   Wt 79.9 kg (176 lb 1.6 oz)   SpO2 97%   BMI 29.30 kg/m      Constitutional:           Skin:             Head:           Eyes:           Lymph:      ENT:           Neck:           Respiratory:            Cardiac:                                                           GI:           Extremities and Muscular Skeletal:                 Neurological:           Psych:             CC  No referring provider defined for this encounter.                  "

## 2019-04-15 NOTE — PROGRESS NOTES
Service Date: 04/15/2019      REASON FOR CARDIOLOGY VISIT:  Followup atrial fibrillation.      HISTORY OF PRESENT ILLNESS:  Ms. Maya is a very pleasant 78-year-old female with history of chronic atrial fibrillation, essentially asymptomatic from it, on rate control strategy and on Eliquis for stroke prophylaxis with CHADS2-VASc score initially of 5 but now 7 (please see below), who recently had a stroke while they were in [place].  Today, the patient is coming for routine followup.  I have a few records from [place].  It looks like patient was admitted with some speech problem and was diagnosed with a cerebral stroke.  It was felt this may be likely because of hypertension.  No brain bleed was found.  She also was noted to have atrial fibrillation with rapid ventricular rate at that time, aphasia and slurred speech.  It looks like she recovered from her aphasia and slurred speech quite well.  Briefly, her Eliquis was withheld.  She also had an echocardiogram that showed normal LV function, mild tricuspid regurgitation, physiological mitral regurgitation.  Today the patient is coming for routine followup.  Her blood pressure is now well controlled on several antihypertensive medications that include amlodipine, clonidine, metoprolol.  She denies any chest discomfort or shortness of breath.  To be noted, she had a negative stress test in 2017.  Her ventricular rates are reasonably controlled, at rest they are in the 80s.  She denies any presyncope or syncope.  To be noted, she has not seen a neurologist.  They are not sure whether they actually saw a neurologist while they were in [place].      PHYSICAL EXAMINATION:   VITAL SIGNS:  Blood pressure 134/80, heart rate 80 irregular.  Weight 176 pounds, BMI 29.3.   GENERAL:  The patient appears pleasant, comfortable.   NECK:  Normal JVP, no bruit.   CARDIOVASCULAR SYSTEM:  Irregular, normal rate, no murmur, rub or gallop.   RESPIRATORY SYSTEM:  Clear to auscultation  bilaterally.   GASTROINTESTINAL SYSTEM:  Abdomen soft, nontender.   EXTREMITIES:  No pitting pedal edema.   NEUROLOGICAL:  Alert and oriented x3.   PSYCH:  Normal affect.   SKIN:  No obvious rash.   HEENT:  No pallor or icterus.      IMPRESSION AND PLAN:  A very pleasant 78-year-old female with chronic atrial fibrillation, CHADS2-VASc score of 7, on apixaban for stroke prophylaxis, normal LV function, negative stress test, clinically no symptoms of angina, who recently had a stroke.  Available records suggest this could have been a lacunar stroke in the setting of hypertension.  There is no evidence of record of any hemorrhage in the brain.  I do not think the patient has seen a neurologist.  I recommended the patient establish care with a neurologist.  In terms of cardiac status, I think she is doing well.  We again had a long discussion regarding options of rate control strategy versus rhythm control strategy for atrial fibrillation, how neither of these strategies is superior in terms of mortality benefit or in terms of stroke reduction.  For now, we will continue rate control strategy as patient is essentially asymptomatic with atrial fibrillation and ventricular rates are well controlled.  If she continues to feel stable cardiac status-wise, we can see her back in 1 year, sooner if she notes any change in clinical status.  She will continue to follow up with her primary care physician regarding hypertension, which looks like quite well controlled on current medical therapy.      cc:   [place] [address]         MARY MIRELES MD             D: 04/15/2019   T: 04/15/2019   MT: JAYA      Name:     YOBANY ENNIS   MRN:      [id number]        Account:      [id number]   :      1940           Service Date: 04/15/2019      Document: [id number]

## 2019-04-15 NOTE — LETTER
4/15/2019      Apryl Olvera MD, MD  [address] [facility] [address]      RE: Ni Gardineron       Dear Colleague,    I had the pleasure of seeing Ni Maya in the [facility].    Service Date: 04/15/2019      REASON FOR CARDIOLOGY VISIT:  Followup atrial fibrillation.      HISTORY OF PRESENT ILLNESS:  Ms. Maya is a very pleasant 78-year-old female with history of chronic atrial fibrillation, essentially asymptomatic from it, on rate control strategy and on Eliquis for stroke prophylaxis with CHADS2-VASc score initially of 5 but now 7 (please see below), who recently had a stroke while they were in [place].  Today, the patient is coming for routine followup.  I have a few records from [place].  It looks like patient was admitted with some speech problem and was diagnosed with a cerebral stroke.  It was felt this may be likely because of hypertension.  No brain bleed was found.  She also was noted to have atrial fibrillation with rapid ventricular rate at that time, aphasia and slurred speech.  It looks like she recovered from her aphasia and slurred speech quite well.  Briefly, her Eliquis was withheld.  She also had an echocardiogram that showed normal LV function, mild tricuspid regurgitation, physiological mitral regurgitation.  Today the patient is coming for routine followup.  Her blood pressure is now well controlled on several antihypertensive medications that include amlodipine, clonidine, metoprolol.  She denies any chest discomfort or shortness of breath.  To be noted, she had a negative stress test in 2017.  Her ventricular rates are reasonably controlled, at rest they are in the 80s.  She denies any presyncope or syncope.  To be noted, she has not seen a neurologist.  They are not sure whether they actually saw a neurologist while they were in [place].      PHYSICAL EXAMINATION:   VITAL SIGNS:  Blood pressure 134/80, heart rate 80 irregular.  Weight 176 pounds, BMI  29.3.   GENERAL:  The patient appears pleasant, comfortable.   NECK:  Normal JVP, no bruit.   CARDIOVASCULAR SYSTEM:  Irregular, normal rate, no murmur, rub or gallop.   RESPIRATORY SYSTEM:  Clear to auscultation bilaterally.   GASTROINTESTINAL SYSTEM:  Abdomen soft, nontender.   EXTREMITIES:  No pitting pedal edema.   NEUROLOGICAL:  Alert and oriented x3.   PSYCH:  Normal affect.   SKIN:  No obvious rash.   HEENT:  No pallor or icterus.      IMPRESSION AND PLAN:  A very pleasant 78-year-old female with chronic atrial fibrillation, CHADS2-VASc score of 7, on apixaban for stroke prophylaxis, normal LV function, negative stress test, clinically no symptoms of angina, who recently had a stroke.  Available records suggest this could have been a lacunar stroke in the setting of hypertension.  There is no evidence of record of any hemorrhage in the brain.  I do not think the patient has seen a neurologist.  I recommended the patient establish care with a neurologist.  In terms of cardiac status, I think she is doing well.  We again had a long discussion regarding options of rate control strategy versus rhythm control strategy for atrial fibrillation, how neither of these strategies is superior in terms of mortality benefit or in terms of stroke reduction.  For now, we will continue rate control strategy as patient is essentially asymptomatic with atrial fibrillation and ventricular rates are well controlled.  If she continues to feel stable cardiac status-wise, we can see her back in 1 year, sooner if she notes any change in clinical status.  She will continue to follow up with her primary care physician regarding hypertension, which looks like quite well controlled on current medical therapy.      cc:   [place] [address]         MARY MIRELES MD             D: 04/15/2019   T: 04/15/2019   MT: JAYA      Name:     YOBANY ENNIS   MRN:       [id number]        Account:      [id number]   :      1940           Service Date: 04/15/2019      Document: [id number]         Outpatient Encounter Medications as of 4/15/2019   Medication Sig Dispense Refill     acetaminophen (TYLENOL) 650 MG CR tablet Take 1 tablet (650 mg) by mouth every 8 hours as needed for mild pain or fever 180 tablet 3     alcohol swab prep pads Use to swab area of injection/geoffrey as directed. 100 each 3     ALLEGRA 180 MG PO TABS 1 TABLET DAILY 90 Tab 3     amLODIPine (NORVASC) 5 MG tablet Take 1 tablet (5 mg) by mouth daily 90 tablet 1     apixaban ANTICOAGULANT (ELIQUIS) 5 MG tablet Take 5 mg by mouth 2 times daily       aspirin (ASA) 81 MG EC tablet Take 1 tablet (81 mg) by mouth daily       atorvastatin (LIPITOR) 40 MG tablet TAKE ONE TABLET BY MOUTH ONCE DAILY 90 tablet 2     blood glucose calibration (NO BRAND SPECIFIED) solution To accompany: Blood Glucose Monitor Brands: per insurance. 1 Bottle 3     blood glucose monitoring (NO BRAND SPECIFIED) meter device kit Use to test blood sugar 3 times daily or as directed. Preferred blood glucose meter OR supplies to accompany: Blood Glucose Monitor Brands: per insurance. 1 kit 0     blood glucose monitoring (RELION PRIME TEST) test strip Use to test blood sugar 3 times daily or as directed.       Blood Glucose Monitoring Suppl W/DEVICE KIT 1 kit 3 times daily. 1 kit 0     cloNIDine (CATAPRES) 0.1 MG tablet Take 1 tablet (0.1 mg) by mouth 2 times daily       co-enzyme Q-10 100 MG CAPS capsule Take 100 mg by mouth daily       fish oil-omega-3 fatty acids (FISH OIL) 1000 MG capsule Take 2 g by mouth daily. 2 caps per day       Lancets (E-Z JECT LANCET MICRO-THIN 33G) MISC        lisinopril (PRINIVIL/ZESTRIL) 40 MG tablet Take 1 tablet (40 mg) by mouth daily 90 tablet 1     metoprolol tartrate (LOPRESSOR) 50 MG tablet Take 1 tablet (50 mg) by mouth 2 times daily       NUTRITIONAL SUPPLEMENT OR VIBE       Nutritional Supplements  (NUTRITIONAL SUPPLEMENT PO) Take 1 oz by mouth daily Flex       ONETOUCH ULTRA test strip USE TO TEST BLOOD SUGARS THREE TIMES A  each 3     thin (NO BRAND SPECIFIED) lancets Use with lanceting device. To accompany: Blood Glucose Monitor Brands: per insurance. 100 each 6     [DISCONTINUED] levothyroxine (SYNTHROID/LEVOTHROID) 100 MCG tablet Take 1 tablet (100 mcg) by mouth daily 30 tablet 0     [DISCONTINUED] metFORMIN (GLUCOPHAGE) 1000 MG tablet Take 1 tablet (1,000 mg) by mouth 2 times daily (with meals) 60 tablet 0     No facility-administered encounter medications on file as of 4/15/2019.        Again, thank you for allowing me to participate in the care of your patient.      Sincerely,    Sohan Deleon MD     [facility]

## 2019-04-15 NOTE — LETTER
4/15/2019    Apryl Olvera MD, MD  [address] [facility] [address]    RE: Ni Maya       Dear Colleague,    I had the pleasure of seeing Ni Maya in the [facility].    HPI and Plan:   See dictation(#[id number], patient is not taking asa)    Orders Placed This Encounter   Procedures     Follow-Up with Cardiologist     NEUROLOGY ADULT REFERRAL       No orders of the defined types were placed in this encounter.      There are no discontinued medications.      Encounter Diagnoses   Name Primary?     Chronic atrial fibrillation (H) Yes     History of stroke        CURRENT MEDICATIONS:  Current Outpatient Medications   Medication Sig Dispense Refill     acetaminophen (TYLENOL) 650 MG CR tablet Take 1 tablet (650 mg) by mouth every 8 hours as needed for mild pain or fever 180 tablet 3     alcohol swab prep pads Use to swab area of injection/geoffrey as directed. 100 each 3     ALLEGRA 180 MG PO TABS 1 TABLET DAILY 90 Tab 3     amLODIPine (NORVASC) 5 MG tablet Take 1 tablet (5 mg) by mouth daily 90 tablet 1     apixaban ANTICOAGULANT (ELIQUIS) 5 MG tablet Take 5 mg by mouth 2 times daily       aspirin (ASA) 81 MG EC tablet Take 1 tablet (81 mg) by mouth daily       atorvastatin (LIPITOR) 40 MG tablet TAKE ONE TABLET BY MOUTH ONCE DAILY 90 tablet 2     blood glucose calibration (NO BRAND SPECIFIED) solution To accompany: Blood Glucose Monitor Brands: per insurance. 1 Bottle 3     blood glucose monitoring (NO BRAND SPECIFIED) meter device kit Use to test blood sugar 3 times daily or as directed. Preferred blood glucose meter OR supplies to accompany: Blood Glucose Monitor Brands: per insurance. 1 kit 0     blood glucose monitoring (RELION PRIME TEST) test strip Use to test blood sugar 3 times daily or as directed.       Blood Glucose Monitoring Suppl W/DEVICE KIT 1 kit 3 times daily. 1 kit 0     cloNIDine (CATAPRES) 0.1 MG tablet Take 1 tablet (0.1 mg) by mouth 2 times daily        co-enzyme Q-10 100 MG CAPS capsule Take 100 mg by mouth daily       fish oil-omega-3 fatty acids (FISH OIL) 1000 MG capsule Take 2 g by mouth daily. 2 caps per day       Lancets (E-Z JECT LANCET MICRO-THIN 33G) MISC        levothyroxine (SYNTHROID/LEVOTHROID) 100 MCG tablet Take 1 tablet (100 mcg) by mouth daily 30 tablet 0     lisinopril (PRINIVIL/ZESTRIL) 40 MG tablet Take 1 tablet (40 mg) by mouth daily 90 tablet 1     metFORMIN (GLUCOPHAGE) 1000 MG tablet Take 1 tablet (1,000 mg) by mouth 2 times daily (with meals) 60 tablet 0     metoprolol tartrate (LOPRESSOR) 50 MG tablet Take 1 tablet (50 mg) by mouth 2 times daily       NUTRITIONAL SUPPLEMENT OR VIBE       Nutritional Supplements (NUTRITIONAL SUPPLEMENT PO) Take 1 oz by mouth daily Flex       ONETOUCH ULTRA test strip USE TO TEST BLOOD SUGARS THREE TIMES A  each 3     thin (NO BRAND SPECIFIED) lancets Use with lanceting device. To accompany: Blood Glucose Monitor Brands: per insurance. 100 each 6       ALLERGIES     Allergies   Allergen Reactions     Sulfa Drugs Rash       PAST MEDICAL HISTORY:  Past Medical History:   Diagnosis Date     Allergic rhinitis, cause unspecified      Essential hypertension, benign      Infection of kidney, unspecified 1999     Other specified acquired hypothyroidism      Other specified types of cystitis(595.89)     1999,6-2-01, 10-10-01       PAST SURGICAL HISTORY:  Past Surgical History:   Procedure Laterality Date     BIOPSY VULVA/PERINEUM,ADDNL LESN  9/18/09    Wide -excision of MICKIE III- right labia      C LAPAROSCOPY, SURGICAL; W/ VAGINAL HYSTERECTOMY W/WO REMOVAL OVARY(S)/TUBES  1984    for bleeding     C LIGATE FALLOPIAN TUBE,POSTPARTUM  1978    Tubal Ligation     COLONOSCOPY  9/13/2013    Procedure: COLONOSCOPY;  Colonoscopy;  Surgeon: Yanick Ramsey MD;  Location: [facility]     HC ANGIOGRAPHY ARCH  4-3-98     HC BIOPSY OF BREAST, OPEN INCISIONAL  1960    Benign     HC COLONOSCOPY THRU STOMA, DIAGNOSTIC  4-24-98     Diverticuli - due in 2008     HC ERCP W SPHINCTEROTOMY  1996 6/2/96 and 8/30/96     HC REDUCTION OF LARGE BREAST  1988     HC REMOVAL GALLBLADDER  1995     HC UGI ENDOSCOPY DIAG W OR W/O BRUSH/WASH  7-     INJECT EPIDURAL LUMBAR N/A 8/10/2017    Procedure: INJECT EPIDURAL LUMBAR;  Lumbar 2-3 Epidural Steroid Injection;  Surgeon: Kimani Garcia MD;  Location: [facility]     INJECT EPIDURAL LUMBAR Right 5/10/2018    Procedure: INJECT EPIDURAL LUMBAR;  right lumbar 2 - lumbar 3 epidural steroid injection;  Surgeon: Kimani Garcia MD;  Location: [facility]       FAMILY HISTORY:  Family History   Problem Relation Age of Onset     Cardiovascular Father         Aortic aneurysm     Hypertension Father      Hypertension Mother      Gastrointestinal Disease Mother         GI Bleed     Lipids Sister      Hypertension Sister      Genitourinary Problems Sister      Allergies Sister      Cancer Brother         Lung     Alcohol/Drug Brother      Connective Tissue Disorder Son         Down Syndrome     Respiratory Son         Pneumonia     Cancer Maternal Grandmother         Stomach     Hypertension Maternal Grandfather      Allergies Daughter        SOCIAL HISTORY:  Social History     Socioeconomic History     Marital status:      Spouse name: Marco Antonio     Number of children: 2     Years of education: 17     Highest education level: Not on file   Occupational History     Occupation: Retired in 1994     Employer: RETIRED   Social Needs     Financial resource strain: Not on file     Food insecurity:     Worry: Not on file     Inability: Not on file     Transportation needs:     Medical: Not on file     Non-medical: Not on file   Tobacco Use     Smoking status: Never Smoker     Smokeless tobacco: Never Used   Substance and Sexual Activity     Alcohol use: No     Drug use: No     Sexual activity: Never     Partners: Male   Lifestyle     Physical activity:     Days per week: Not on file     Minutes per session: Not on file      "Stress: Not on file   Relationships     Social connections:     Talks on phone: Not on file     Gets together: Not on file     Attends [employer] service: Not on file     Active member of club or organization: Not on file     Attends meetings of clubs or organizations: Not on file     Relationship status: Not on file     Intimate partner violence:     Fear of current or ex partner: Not on file     Emotionally abused: Not on file     Physically abused: Not on file     Forced sexual activity: Not on file   Other Topics Concern     Parent/sibling w/ CABG, MI or angioplasty before 65F 55M? No   Social History Narrative     Not on file       Review of Systems:  Skin:  Negative       Eyes:  Positive for glasses;cataracts    ENT:  Negative      Respiratory:  Negative       Cardiovascular:  Negative for;palpitations;chest pain;lightheadedness;dizziness Positive for;edema possibly swelling in calf area  Gastroenterology: Negative      Genitourinary:  Negative      Musculoskeletal:  Positive for back pain has some balance issues   Neurologic:  Positive for stroke    Psychiatric:  Negative      Heme/Lymph/Imm:  Positive for allergies    Endocrine:  Positive for thyroid disorder;diabetes      Physical Exam:  Vitals: /80 (BP Location: Right arm, Patient Position: Fowlers, Cuff Size: Adult Regular)   Pulse 80   Ht 1.651 m (5' 5\")   Wt 79.9 kg (176 lb 1.6 oz)   SpO2 97%   BMI 29.30 kg/m       Constitutional:           Skin:             Head:           Eyes:           Lymph:      ENT:           Neck:           Respiratory:            Cardiac:                                                           GI:           Extremities and Muscular Skeletal:                 Neurological:           Psych:             CC  No referring provider defined for this encounter.                    Thank you for allowing me to participate in the care of your patient.      Sincerely,     Sohan Deleon MD     [facility]    cc:   No referring provider defined for this encounter.

## 2019-04-16 DIAGNOSIS — E11.65 TYPE 2 DIABETES MELLITUS WITH HYPERGLYCEMIA, WITHOUT LONG-TERM CURRENT USE OF INSULIN (H): ICD-10-CM

## 2019-04-16 DIAGNOSIS — E03.9 HYPOTHYROIDISM, UNSPECIFIED TYPE: ICD-10-CM

## 2019-04-16 RX ORDER — LEVOTHYROXINE SODIUM 100 UG/1
TABLET ORAL
Qty: 30 TABLET | Refills: 0 | Status: SHIPPED | OUTPATIENT
Start: 2019-04-16 | End: 2019-05-21

## 2019-04-16 NOTE — TELEPHONE ENCOUNTER
Metformin    Prescription approved per [facility] Refill [facility].      Levothyroxine    Prescription approved per [facility] Refill [facility].    Joan Hernandez, RN, BSN

## 2019-04-26 ENCOUNTER — TELEPHONE (OUTPATIENT)
Dept: FAMILY MEDICINE | Facility: OTHER | Age: 79
End: 2019-04-26

## 2019-04-26 NOTE — TELEPHONE ENCOUNTER
Form placed in out of office bin for review/signature in AE absence, if appropriate.  Thelma Marsh, CMA

## 2019-04-26 NOTE — TELEPHONE ENCOUNTER
Reason for Call:  Form, our goal is to have forms completed with 72 hours, however, some forms may require a visit or additional information.    Type of letter, form or note:  medical    Who is the form from?: Patient    Where did the form come from: pt brought in    What clinic location was the form placed at?: [facility] - [phone]    Where the form was placed: box Box/Folder    What number is listed as a contact on the form?: [phone]       Additional comments: please call pt when ready    Call taken on 4/26/2019 at 10:10 AM by Niyah Luther

## 2019-04-30 ENCOUNTER — TELEPHONE (OUTPATIENT)
Dept: FAMILY MEDICINE | Facility: OTHER | Age: 79
End: 2019-04-30

## 2019-04-30 DIAGNOSIS — N18.2 CHRONIC KIDNEY DISEASE, STAGE II (MILD): ICD-10-CM

## 2019-04-30 DIAGNOSIS — E78.5 HYPERLIPIDEMIA WITH TARGET LDL LESS THAN 100: ICD-10-CM

## 2019-04-30 DIAGNOSIS — E11.00 TYPE 2 DIABETES MELLITUS WITH HYPEROSMOLARITY WITHOUT COMA, WITHOUT LONG-TERM CURRENT USE OF INSULIN (H): ICD-10-CM

## 2019-04-30 DIAGNOSIS — E03.9 ACQUIRED HYPOTHYROIDISM: ICD-10-CM

## 2019-04-30 DIAGNOSIS — E11.65 TYPE 2 DIABETES MELLITUS WITH HYPERGLYCEMIA, WITHOUT LONG-TERM CURRENT USE OF INSULIN (H): ICD-10-CM

## 2019-04-30 LAB
ALBUMIN SERPL-MCNC: 3.8 G/DL (ref 3.4–5)
ALP SERPL-CCNC: 83 U/L (ref 40–150)
ALT SERPL W P-5'-P-CCNC: 27 U/L (ref 0–50)
ANION GAP SERPL CALCULATED.3IONS-SCNC: 4 MMOL/L (ref 3–14)
AST SERPL W P-5'-P-CCNC: 21 U/L (ref 0–45)
BILIRUB SERPL-MCNC: 0.8 MG/DL (ref 0.2–1.3)
BUN SERPL-MCNC: 13 MG/DL (ref 7–30)
CALCIUM SERPL-MCNC: 8.7 MG/DL (ref 8.5–10.1)
CHLORIDE SERPL-SCNC: 107 MMOL/L (ref 94–109)
CHOLEST SERPL-MCNC: 99 MG/DL
CO2 SERPL-SCNC: 30 MMOL/L (ref 20–32)
CREAT SERPL-MCNC: 0.84 MG/DL (ref 0.52–1.04)
GFR SERPL CREATININE-BSD FRML MDRD: 65 ML/MIN/{1.73_M2}
GLUCOSE SERPL-MCNC: 108 MG/DL (ref 70–99)
HBA1C MFR BLD: 6.1 % (ref 0–5.6)
HDLC SERPL-MCNC: 45 MG/DL
LDLC SERPL CALC-MCNC: 33 MG/DL
NONHDLC SERPL-MCNC: 54 MG/DL
POTASSIUM SERPL-SCNC: 3.9 MMOL/L (ref 3.4–5.3)
PROT SERPL-MCNC: 7.1 G/DL (ref 6.8–8.8)
SODIUM SERPL-SCNC: 141 MMOL/L (ref 133–144)
T4 FREE SERPL-MCNC: 1.35 NG/DL (ref 0.76–1.46)
TRIGL SERPL-MCNC: 107 MG/DL
TSH SERPL DL<=0.005 MIU/L-ACNC: 0.32 MU/L (ref 0.4–4)

## 2019-04-30 PROCEDURE — 80061 LIPID PANEL: CPT | Performed by: FAMILY MEDICINE

## 2019-04-30 PROCEDURE — 36415 COLL VENOUS BLD VENIPUNCTURE: CPT | Performed by: FAMILY MEDICINE

## 2019-04-30 PROCEDURE — 83036 HEMOGLOBIN GLYCOSYLATED A1C: CPT | Performed by: FAMILY MEDICINE

## 2019-04-30 PROCEDURE — 84443 ASSAY THYROID STIM HORMONE: CPT | Performed by: FAMILY MEDICINE

## 2019-04-30 PROCEDURE — 80053 COMPREHEN METABOLIC PANEL: CPT | Performed by: FAMILY MEDICINE

## 2019-04-30 PROCEDURE — 84439 ASSAY OF FREE THYROXINE: CPT | Performed by: FAMILY MEDICINE

## 2019-04-30 NOTE — TELEPHONE ENCOUNTER
----- Message from Apryl Ovlera MD sent at 4/30/2019  2:26 PM CDT -----  Just a little high on thyroid which probably is close enough as she has been stable for so many years. I would recommend rechecking in the next 2-3 months though or if you are feeling more jittery, nervous or having palpitations as we can monitor a little closer since it is technically just a little high.  Otherwise labs look good.  Apryl Olvera MD

## 2019-04-30 NOTE — RESULT ENCOUNTER NOTE
Just a little high on thyroid which probably is close enough as she has been stable for so many years. I would recommend rechecking in the next 2-3 months though or if you are feeling more jittery, nervous or having palpitations as we can monitor a little closer since it is technically just a little high.  Otherwise labs look good.  Apryl Olvera MD

## 2019-05-21 DIAGNOSIS — E11.65 TYPE 2 DIABETES MELLITUS WITH HYPERGLYCEMIA, WITHOUT LONG-TERM CURRENT USE OF INSULIN (H): ICD-10-CM

## 2019-05-21 DIAGNOSIS — I10 ESSENTIAL HYPERTENSION WITH GOAL BLOOD PRESSURE LESS THAN 140/90: ICD-10-CM

## 2019-05-21 DIAGNOSIS — N18.2 CHRONIC KIDNEY DISEASE, STAGE II (MILD): ICD-10-CM

## 2019-05-21 DIAGNOSIS — E03.9 HYPOTHYROIDISM, UNSPECIFIED TYPE: ICD-10-CM

## 2019-05-21 RX ORDER — LEVOTHYROXINE SODIUM 100 UG/1
TABLET ORAL
Qty: 30 TABLET | Refills: 1 | Status: SHIPPED | OUTPATIENT
Start: 2019-05-21 | End: 2019-07-24

## 2019-05-21 RX ORDER — LISINOPRIL 40 MG/1
TABLET ORAL
Qty: 90 TABLET | Refills: 1 | Status: SHIPPED | OUTPATIENT
Start: 2019-05-21 | End: 2019-11-18

## 2019-05-21 NOTE — TELEPHONE ENCOUNTER
"Requested Prescriptions   Pending Prescriptions Disp Refills     lisinopril (PRINIVIL/ZESTRIL) 40 MG tablet [Pharmacy Med Name: LISINOPRIL 40MG TABS] 90 tablet 1     Sig: TAKE ONE TABLET BY MOUTH ONCE DAILY       ACE Inhibitors (Including Combos) Protocol Passed - 5/21/2019 10:06 AM        Passed - Blood pressure under 140/90 in past 12 months     BP Readings from Last 3 Encounters:   04/15/19 134/80   03/25/19 124/68   01/02/19 128/82           Passed - Recent (12 mo) or future (30 days) visit within the authorizing provider's specialty     Patient had office visit in the last 12 months or has a visit in the next 30 days with authorizing provider or within the authorizing provider's specialty.  See \"Patient Info\" tab in inbasket, or \"Choose Columns\" in Meds & Orders section of the refill encounter.            Passed - Medication is active on med list        Passed - Patient is age 18 or older        Passed - No active pregnancy on record        Passed - Normal serum creatinine on file in past 12 months     Recent Labs   Lab Test 04/30/19  0852   CR 0.84           Passed - Normal serum potassium on file in past 12 months     Recent Labs   Lab Test 04/30/19  0852   POTASSIUM 3.9           Passed - No positive pregnancy test within past 12 months        levothyroxine (SYNTHROID/LEVOTHROID) 100 MCG tablet [Pharmacy Med Name: LEVOTHYROXINE 100MCG TABS] 30 tablet 0     Sig: TAKE ONE TABLET BY MOUTH ONCE DAILY       Thyroid Protocol Failed - 5/21/2019 10:06 AM        Failed - Normal TSH on file in past 12 months     Recent Labs   Lab Test 04/30/19  0852   TSH 0.32*              Passed - Patient is 12 years or older        Passed - Recent (12 mo) or future (30 days) visit within the authorizing provider's specialty     Patient had office visit in the last 12 months or has a visit in the next 30 days with authorizing provider or within the authorizing provider's specialty.  See \"Patient Info\" tab in inbasket, or \"Choose " "Columns\" in Meds & Orders section of the refill encounter.              Passed - Medication is active on med list        Passed - No active pregnancy on record     If patient is pregnant or has had a positive pregnancy test, please check TSH.          Passed - No positive pregnancy test in past 12 months     If patient is pregnant or has had a positive pregnancy test, please check TSH.          Prescription approved per [facility] Refill Protocol.\  Garcia Holm RN, BSN        "

## 2019-06-05 DIAGNOSIS — I10 ESSENTIAL HYPERTENSION WITH GOAL BLOOD PRESSURE LESS THAN 140/90: ICD-10-CM

## 2019-06-06 RX ORDER — METOPROLOL TARTRATE 100 MG
TABLET ORAL
Qty: 180 TABLET | Refills: 0 | Status: SHIPPED | OUTPATIENT
Start: 2019-06-06 | End: 2019-06-13

## 2019-06-06 NOTE — TELEPHONE ENCOUNTER
Prescription approved per [facility] Refill Protocol.  Garcia Holm, RN, BSN      Next 5 appointments (look out 90 days)    Jun 13, 2019  2:45 PM CDT  [facility] [address] [place] [id number]  [phone]

## 2019-06-06 NOTE — PROGRESS NOTES
Subjective     Ni Maya is a 78 year old female who presents to clinic today for the following health issues:    HPI   Diarrhea  Onset: 4 months    Description:   Consistency of stool: watery, runny and loose  Blood in stool: no   Number of loose stools in past 24 hours: 1    Progression of Symptoms:  same    Accompanying Signs & Symptoms:  Fever: no   Nausea or vomiting; no   Abdominal pain: YES Upper GI pain   Episodes of constipation: no   Weight loss: YES    History:   Ill contacts: no   Recent use of antibiotics: no    Recent travels: no          Recent medication-new or changes(Rx or OTC): no     Precipitating factors:   NA    Alleviating factors:   Na  Therapies Tried and outcome:  NA    She thinks it is related to her medications, at it started after increasing to 2000 mg daily of metformin as well as starting atorvastatin and Eliquis. She takes her metformin in the morning and evening, and typically has the worse diarrhea in the morning.     Vaginal symptoms   Onset: 2-3 months     Description:  Vaginal Discharge: clear   Itching (Pruritis): YES  Burning sensation:  no   Odor: no     Accompanying Signs & Symptoms:  Pain with Urination: YES  Abdominal Pain: YES  Fever: no     History:   Sexually active: no   New Partner: no   Possibility of Pregnancy:  No    Precipitating factors:   Recent Antibiotic Use: no     Alleviating factors:  Therapies Tried and outcome: Monistat 7 did not help at all.       Skin Lesion  She reports having a skin lesion on the left temple area which has been worsening.     Voice Change:  Ni complains of a hoarse voice with a lot of mucus for a number of weeks. No other cold or flu-like symptoms.     Patient Active Problem List   Diagnosis     Hypothyroidism     Allergic rhinitis     Symptomatic menopausal or female climacteric states     Chronic kidney disease, stage II (mild)     Edema     Obesity     Urethral stricture     MICKIE III (vulvar intraepithelial neoplasia III)      Vulval lesion     Health Care Home     Type 2 diabetes, HbA1C goal < 8% (H)     Hyperlipidemia with target LDL less than 100     HTN, goal below 150/90     ACP (advance care planning)     Essential hypertension with goal blood pressure less than 150/90     Type 2 diabetes mellitus with hyperglycemia, without long-term current use of insulin (H)     Cellulitis of toe of right foot     Chronic atrial fibrillation (H)     Thrombocytopenia (H)     Acute gout involving toe of right foot     Lumbar radiculopathy     Cerebral infarction, unspecified mechanism (H)     Past Surgical History:   Procedure Laterality Date     BIOPSY VULVA/PERINEUM,ADDNL LESN  9/18/09    Wide -excision of MICKIE III- right labia      C LAPAROSCOPY, SURGICAL; W/ VAGINAL HYSTERECTOMY W/WO REMOVAL OVARY(S)/TUBES  1984    for bleeding     C LIGATE FALLOPIAN TUBE,POSTPARTUM  1978    Tubal Ligation     COLONOSCOPY  9/13/2013    Procedure: COLONOSCOPY;  Colonoscopy;  Surgeon: Yanick Ramsey MD;  Location: [facility]     HC ANGIOGRAPHY ARCH  4-3-98     HC BIOPSY OF BREAST, OPEN INCISIONAL  1960    Benign     HC COLONOSCOPY THRU STOMA, DIAGNOSTIC  4-24-98    Diverticuli - due in 2008     HC ERCP W SPHINCTEROTOMY  1996    6/2/96 and 8/30/96     HC REDUCTION OF LARGE BREAST  1988     HC REMOVAL GALLBLADDER  1995     HC UGI ENDOSCOPY DIAG W OR W/O BRUSH/WASH  7-     INJECT EPIDURAL LUMBAR N/A 8/10/2017    Procedure: INJECT EPIDURAL LUMBAR;  Lumbar 2-3 Epidural Steroid Injection;  Surgeon: Kimani Garcia MD;  Location[facility]     INJECT EPIDURAL LUMBAR Right 5/10/2018    Procedure: INJECT EPIDURAL LUMBAR;  right lumbar 2 - lumbar 3 epidural steroid injection;  Surgeon: Kimani Garcia MD;  Location: [facility]       Social History     Tobacco Use     Smoking status: Never Smoker     Smokeless tobacco: Never Used   Substance Use Topics     Alcohol use: No     Family History   Problem Relation Age of Onset     Cardiovascular Father         Aortic aneurysm      "Hypertension Father      Hypertension Mother      Gastrointestinal Disease Mother         GI Bleed     Lipids Sister      Hypertension Sister      Genitourinary Problems Sister      Allergies Sister      Cancer Brother         Lung     Alcohol/Drug Brother      Connective Tissue Disorder Son         Down Syndrome     Respiratory Son         Pneumonia     Cancer Maternal Grandmother         Stomach     Hypertension Maternal Grandfather      Allergies Daughter            Reviewed and updated as needed this visit by Provider  Tobacco  Allergies  Meds  Problems  Med Hx  Surg Hx  Fam Hx       Review of Systems   ROS COMP: Constitutional, HEENT, cardiovascular, pulmonary, gi and gu systems are negative, except as otherwise noted.      Objective    /62 (BP Location: Left arm, Patient Position: Chair, Cuff Size: Adult Regular)   Pulse 78   Temp 97.9  F (36.6  C) (Temporal)   Resp 12   Ht 1.651 m (5' 5\")   Wt 78.5 kg (173 lb)   SpO2 98%   Breastfeeding? No   BMI 28.79 kg/m    Body mass index is 28.79 kg/m .  Physical Exam   GENERAL: healthy, alert and no distress  EYES: Eyes grossly normal to inspection, PERRL and conjunctivae and sclerae normal  HENT: ear canals and TM's normal, nose and mouth without ulcers or lesions  NECK: no adenopathy, no asymmetry, masses, or scars and thyroid normal to palpation  RESP: lungs clear to auscultation - no rales, rhonchi or wheezes  BREAST: normal without masses, tenderness or nipple discharge and no palpable axillary masses or adenopathy  CV: regular rate and rhythm, normal S1 S2, no S3 or S4, no murmur, click or rub, no peripheral edema and peripheral pulses strong  ABDOMEN: soft, nontender, no hepatosplenomegaly, no masses and bowel sounds normal  MS: no gross musculoskeletal defects noted, no edema  SKIN: Dime-size erythematous lesion on left temple consistent with skin cancer.   NEURO: Normal strength and tone, mentation intact and speech normal  PSYCH: mentation " "appears normal, affect normal/bright   (female): normal female external genitalia, normal urethral meatus, vaginal mucosa, normal cervix/adnexa/uterus without masses or discharge    Diagnostic Test Results:  Labs reviewed in Epic  Results for orders placed or performed in visit on 06/13/19 (from the past 24 hour(s))   Wet prep   Result Value Ref Range    Specimen Description Vagina     Wet Prep No Trichomonas seen     Wet Prep No clue cells seen     Wet Prep No yeast seen     Wet Prep Few  WBC'S seen              Assessment & Plan     ICD-10-CM    1. Vaginitis and vulvovaginitis N76.0 Wet prep   2. Type 2 diabetes mellitus with hyperglycemia, without long-term current use of insulin (H) E11.65 metFORMIN (GLUCOPHAGE) 500 MG tablet   3. Skin lesion of face L98.9 DERMATOLOGY REFERRAL   4. Diarrhea, unspecified type R19.7    5. Change in voice R49.9    6. Seasonal allergic rhinitis, unspecified trigger J30.2    7. Essential hypertension with goal blood pressure less than 150/90 I10       Diarrhea/Diabetes:  Diarrhea is a common side effect of metformin. As her diabetes have been well controlled, we can decrease her metformin to 500 mg BID to hopefully avoid diarrhea from metformin. If she continues to have diarrhea, we can consider changing diabetic management medication, as metformin is the most likely of her medications to cause diarrhea, though we can consider changing atorvastatin if change in metformin does not make a difference.     Vaginal Discharge:  Wet prep obtained today which was negative. Recommended using a topical cream such as Vagisil to help reduce irritation.     Skin Lesion:  Referral given to dermatology for consultation and management.     Voice Change:  Recommended using an OTC anti-histamine to counteract any chronic allergen exposure and dry up mucus.     BMI:   Estimated body mass index is 28.79 kg/m  as calculated from the following:    Height as of this encounter: 1.651 m (5' 5\").    Weight " as of this encounter: 78.5 kg (173 lb).     Return in about 3 months (around 9/13/2019) for Diabetes Recheck.    This document serves as a record of the services and decisions personally performed and made by Apryl Olvera MD. It was created on her behalf by Fritz Daly, a trained medical scribe. The creation of this document is based the provider's statements to the medical scribe.  Fritz Daly, June 13, 2019 2:58 PM    The information in this document, created by the medical scribe for me, accurately reflects the services I personally performed and the decisions made by me. I have reviewed and approved this document for accuracy.    Apryl Olvera MD, MD  [facility]

## 2019-06-13 ENCOUNTER — OFFICE VISIT (OUTPATIENT)
Dept: FAMILY MEDICINE | Facility: OTHER | Age: 79
End: 2019-06-13
Payer: COMMERCIAL

## 2019-06-13 VITALS
RESPIRATION RATE: 12 BRPM | SYSTOLIC BLOOD PRESSURE: 100 MMHG | TEMPERATURE: 97.9 F | OXYGEN SATURATION: 98 % | BODY MASS INDEX: 28.82 KG/M2 | HEART RATE: 78 BPM | DIASTOLIC BLOOD PRESSURE: 62 MMHG | WEIGHT: 173 LBS | HEIGHT: 65 IN

## 2019-06-13 DIAGNOSIS — L98.9 SKIN LESION OF FACE: ICD-10-CM

## 2019-06-13 DIAGNOSIS — R49.9 CHANGE IN VOICE: ICD-10-CM

## 2019-06-13 DIAGNOSIS — J30.2 SEASONAL ALLERGIC RHINITIS, UNSPECIFIED TRIGGER: ICD-10-CM

## 2019-06-13 DIAGNOSIS — I10 ESSENTIAL HYPERTENSION WITH GOAL BLOOD PRESSURE LESS THAN 150/90: ICD-10-CM

## 2019-06-13 DIAGNOSIS — R19.7 DIARRHEA, UNSPECIFIED TYPE: ICD-10-CM

## 2019-06-13 DIAGNOSIS — N76.0 VAGINITIS AND VULVOVAGINITIS: Primary | ICD-10-CM

## 2019-06-13 DIAGNOSIS — E11.65 TYPE 2 DIABETES MELLITUS WITH HYPERGLYCEMIA, WITHOUT LONG-TERM CURRENT USE OF INSULIN (H): ICD-10-CM

## 2019-06-13 LAB
SPECIMEN SOURCE: NORMAL
WET PREP SPEC: NORMAL

## 2019-06-13 PROCEDURE — 87210 SMEAR WET MOUNT SALINE/INK: CPT | Performed by: FAMILY MEDICINE

## 2019-06-13 PROCEDURE — 99214 OFFICE O/P EST MOD 30 MIN: CPT | Performed by: FAMILY MEDICINE

## 2019-06-13 ASSESSMENT — MIFFLIN-ST. JEOR: SCORE: 1255.6

## 2019-06-28 ENCOUNTER — TELEPHONE (OUTPATIENT)
Dept: FAMILY MEDICINE | Facility: OTHER | Age: 79
End: 2019-06-28

## 2019-06-28 DIAGNOSIS — E11.65 TYPE 2 DIABETES MELLITUS WITH HYPERGLYCEMIA, WITHOUT LONG-TERM CURRENT USE OF INSULIN (H): ICD-10-CM

## 2019-06-28 RX ORDER — METFORMIN HCL 500 MG
500 TABLET, EXTENDED RELEASE 24 HR ORAL
Qty: 90 TABLET | Refills: 0 | Status: SHIPPED | OUTPATIENT
Start: 2019-06-28 | End: 2019-09-12

## 2019-06-28 NOTE — TELEPHONE ENCOUNTER
Reason for Call:  Other call back    Detailed comments: Rosalba from [facility] calling with questions regarding patients Metformin.  Please advise    Phone Number Patient can be reached at: Other phone number:  [phone]     Best Time: any    Can we leave a detailed message on this number? YES    Call taken on 6/28/2019 at 12:08 PM by Ramona Muñiz

## 2019-06-28 NOTE — TELEPHONE ENCOUNTER
Spoke with Rosalba from [facility], she stated the patient was confused as to the dosage she should be taking on her Metformin due to issues with diarrhea.  Per office notes below from 06/13/19 visit with Dr Olvera:    Diarrhea/Diabetes:  Diarrhea is a common side effect of metformin. As her diabetes have been well controlled, we can decrease her metformin to 500 mg BID to hopefully avoid diarrhea from metformin. If she continues to have diarrhea, we can consider changing diabetic management medication, as metformin is the most likely of her medications to cause diarrhea, though we can consider changing atorvastatin if change in metformin does not make a difference.     Pharmacy is wondering if they can get Rx for the decreased dose?  They also stated the 500mg Extended Release seems to have less side effects with the diarrhea

## 2019-07-11 DIAGNOSIS — I10 BENIGN ESSENTIAL HYPERTENSION: ICD-10-CM

## 2019-07-11 RX ORDER — AMLODIPINE BESYLATE 5 MG/1
TABLET ORAL
Qty: 90 TABLET | Refills: 0 | Status: SHIPPED | OUTPATIENT
Start: 2019-07-11 | End: 2019-10-07

## 2019-07-11 NOTE — TELEPHONE ENCOUNTER
Norvasc  Prescription approved per [facility] Refill Protocol.    Next 5 appointments (look out 90 days)    Sep 12, 2019 10:00 AM CDT  Office Visit with Apryl [facility]a MD  [facility] [address] [place] [id number]  [phone]   Sep 17, 2019  9:00 AM CDT  Return Visit with Sohan Deleon MD  [facility] ([facility]) [address] [place] [id number] [phone]        Aliyah Pichardo, RN, BSN

## 2019-07-22 DIAGNOSIS — E03.9 HYPOTHYROIDISM, UNSPECIFIED TYPE: ICD-10-CM

## 2019-07-23 NOTE — TELEPHONE ENCOUNTER
Recommended checking thyroid again before refill.  Needs an appointment scheduled, then mary ann/return to me for refill until the lab if needed.  Apryl Olvera MD

## 2019-07-23 NOTE — TELEPHONE ENCOUNTER
Synthroid  Routing refill request to provider for review/approval because:  Labs out of range:  TSH    Next 5 appointments (look out 90 days)    Sep 12, 2019 10:00 AM CDT  [facility] Visit with [facility]  [facility] [address] [address]  [phone]   Sep 17, 2019  9:00 AM CDT  Return Visit with Sohan Deleon MD  [facility] ([facility]) [address] [place] [address]  [phone]        Aliyah Pichardo, RN, BSN

## 2019-07-24 RX ORDER — LEVOTHYROXINE SODIUM 100 UG/1
100 TABLET ORAL DAILY
Qty: 30 TABLET | Refills: 0 | Status: SHIPPED | OUTPATIENT
Start: 2019-07-24 | End: 2019-07-25

## 2019-07-24 RX ORDER — LEVOTHYROXINE SODIUM 100 UG/1
TABLET ORAL
Qty: 30 TABLET | Refills: 1 | OUTPATIENT
Start: 2019-07-24

## 2019-07-25 DIAGNOSIS — E11.65 TYPE 2 DIABETES MELLITUS WITH HYPERGLYCEMIA, WITHOUT LONG-TERM CURRENT USE OF INSULIN (H): ICD-10-CM

## 2019-07-25 DIAGNOSIS — E03.9 HYPOTHYROIDISM, UNSPECIFIED TYPE: ICD-10-CM

## 2019-07-25 LAB
CHOLEST SERPL-MCNC: 118 MG/DL
CREAT UR-MCNC: 38 MG/DL
HBA1C MFR BLD: 6.1 % (ref 0–5.6)
HDLC SERPL-MCNC: 50 MG/DL
LDLC SERPL CALC-MCNC: 38 MG/DL
MICROALBUMIN UR-MCNC: 15 MG/L
MICROALBUMIN/CREAT UR: 39.31 MG/G CR (ref 0–25)
NONHDLC SERPL-MCNC: 68 MG/DL
T4 FREE SERPL-MCNC: 1.3 NG/DL (ref 0.76–1.46)
TRIGL SERPL-MCNC: 149 MG/DL
TSH SERPL DL<=0.005 MIU/L-ACNC: 0.15 MU/L (ref 0.4–4)

## 2019-07-25 PROCEDURE — 84443 ASSAY THYROID STIM HORMONE: CPT | Performed by: FAMILY MEDICINE

## 2019-07-25 PROCEDURE — 83036 HEMOGLOBIN GLYCOSYLATED A1C: CPT | Performed by: FAMILY MEDICINE

## 2019-07-25 PROCEDURE — 36415 COLL VENOUS BLD VENIPUNCTURE: CPT | Performed by: FAMILY MEDICINE

## 2019-07-25 PROCEDURE — 84439 ASSAY OF FREE THYROXINE: CPT | Performed by: FAMILY MEDICINE

## 2019-07-25 PROCEDURE — 80061 LIPID PANEL: CPT | Performed by: FAMILY MEDICINE

## 2019-07-25 PROCEDURE — 82043 UR ALBUMIN QUANTITATIVE: CPT | Performed by: FAMILY MEDICINE

## 2019-07-25 RX ORDER — LEVOTHYROXINE SODIUM 88 UG/1
88 TABLET ORAL DAILY
Qty: 90 TABLET | Refills: 1 | Status: SHIPPED | OUTPATIENT
Start: 2019-07-25 | End: 2019-12-31

## 2019-07-25 NOTE — RESULT ENCOUNTER NOTE
Urine lab is improving. But thyroid is worsening. Looks like we need to reduce the thyroid dosing and check again in a 2-3 months.  Apryl Olvera MD

## 2019-08-05 ENCOUNTER — TELEPHONE (OUTPATIENT)
Dept: DERMATOLOGY | Facility: CLINIC | Age: 79
End: 2019-08-05

## 2019-08-05 NOTE — TELEPHONE ENCOUNTER
Dermatology Pre-visit Call:    Reason for visit : Forehead spot comes and goes but  more concerned about a couple of spots on back     Any personal history of skin cancers: No- but had Pre cancer spot removed from vagina    Any family history of skin cancers: No    Was the patient referred: Dr. Alexandru Thompson [place]    If the patient was referred, are records obtained: On file.     Has the patient seen a dermatologist in the past: No. Records obtained: No    Patient Reminders Given:  --Please, make sure you bring an updated list of your medications, allergies and insurance information.  --Plan on being in our facility for approximately one hour, this includes the registration process, office visit, education and check-out process.  If you are having a procedure, more time may be required.     --We are located on the second floor of the building, check in desk D.   --If you need to cancel or reschedule, call [phone].  --We look forward to seeing you in Dermatology Clinic.       Stacey

## 2019-08-13 ENCOUNTER — OFFICE VISIT (OUTPATIENT)
Dept: DERMATOLOGY | Facility: CLINIC | Age: 79
End: 2019-08-13
Payer: COMMERCIAL

## 2019-08-13 DIAGNOSIS — D18.01 CHERRY ANGIOMA: ICD-10-CM

## 2019-08-13 DIAGNOSIS — L81.4 SOLAR LENTIGO: ICD-10-CM

## 2019-08-13 DIAGNOSIS — L57.8 SUN-DAMAGED SKIN: ICD-10-CM

## 2019-08-13 DIAGNOSIS — L82.1 SEBORRHEIC KERATOSIS: ICD-10-CM

## 2019-08-13 DIAGNOSIS — D22.9 MULTIPLE BENIGN NEVI: ICD-10-CM

## 2019-08-13 DIAGNOSIS — D48.9 NEOPLASM OF UNCERTAIN BEHAVIOR: Primary | ICD-10-CM

## 2019-08-13 PROCEDURE — 99203 OFFICE O/P NEW LOW 30 MIN: CPT | Mod: 25 | Performed by: DERMATOLOGY

## 2019-08-13 PROCEDURE — 88313 SPECIAL STAINS GROUP 2: CPT | Mod: TC | Performed by: DERMATOLOGY

## 2019-08-13 PROCEDURE — 11103 TANGNTL BX SKIN EA SEP/ADDL: CPT | Performed by: DERMATOLOGY

## 2019-08-13 PROCEDURE — 88305 TISSUE EXAM BY PATHOLOGIST: CPT | Mod: TC | Performed by: DERMATOLOGY

## 2019-08-13 PROCEDURE — 11102 TANGNTL BX SKIN SINGLE LES: CPT | Performed by: DERMATOLOGY

## 2019-08-13 ASSESSMENT — PAIN SCALES - GENERAL: PAINLEVEL: NO PAIN (0)

## 2019-08-13 NOTE — NURSING NOTE
The following medication was given:     MEDICATION:  Lidocaine with epinephrine 1% 1:[id number]  ROUTE: SQ  SITE: see procedure note  DOSE: 2cc  LOT #: [id number]  : Hannah  EXPIRATION DATE: 12/1/2019  NDC#: 6350-2678-30   Was there drug waste? No  Multi-dose vial: Yes    China Levy LPN  August 13, 2019

## 2019-08-13 NOTE — NURSING NOTE
@Ni Maya's goals for this visit include:   Chief Complaint   Patient presents with     Skin Check     lesion on right temple, groin and back       She requests these members of her care team be copied on today's visit information: NO    PCP: Apryl Olvera    Referring Provider:  No referring provider defined for this encounter.    There were no vitals taken for this visit.    Do you need any medication refills at today's visit? NO    Eri Coleman CMA

## 2019-08-13 NOTE — LETTER
"    8/13/2019         RE: Ni Maya  [id number] [facility] [address]        Dear Colleague,    Thank you for referring your patient, Ni Maya, to the [facility]. Please see a copy of my visit note below.    [facility] Dermatology Note      Dermatology Problem List:  1. \"Precancerous\" lesion removed from the vagina  -s/p removal, patient thought done in [place] but no surg path reports available   2. NUB A, right superior lateral forehead, s/p biopsy 8/13/19  3. NUB B, right medial thigh, s/p biopsy 8/13/19    Encounter Date: Aug 13, 2019    CC:  Chief Complaint   Patient presents with     Skin Check     lesion on right temple, groin and back       History of Present Illness:  Ms. Ni Maya is a 79 year old female who presents in self referral for a skin check. She has a history of removal of a precancerous lesion in her vagina. She thought this was done at [place], but we do not have records of the surg path. She states this was not a cervical lesion. She has lesions of concern on her right temple, groin, and back. These lesions itch but do not hurt. She is also concerned about the lesions on her left lateral cheek that she describes as brown spots, as people often as why she has a bruise there. No personal or family history of skin cancer. No other concerns addressed today.    Past Medical History:   Patient Active Problem List   Diagnosis     Hypothyroidism     Allergic rhinitis     Symptomatic menopausal or female climacteric states     Chronic kidney disease, stage II (mild)     Edema     Obesity     Urethral stricture     MICKIE III (vulvar intraepithelial neoplasia III)     Vulval lesion     Health Care Home     Type 2 diabetes, HbA1C goal < 8% (H)     Hyperlipidemia with target LDL less than 100     HTN, goal below 150/90     ACP (advance care planning)     Essential hypertension with goal blood pressure less than 150/90     Type 2 diabetes " mellitus with hyperglycemia, without long-term current use of insulin (H)     Cellulitis of toe of right foot     Chronic atrial fibrillation (H)     Thrombocytopenia (H)     Acute gout involving toe of right foot     Lumbar radiculopathy     Cerebral infarction, unspecified mechanism (H)     Past Medical History:   Diagnosis Date     Allergic rhinitis, cause unspecified      Essential hypertension, benign      Infection of kidney, unspecified 1999     Other specified acquired hypothyroidism      Other specified types of cystitis(595.89)     1999,6-2-01, 10-10-01     Past Surgical History:   Procedure Laterality Date     BIOPSY VULVA/PERINEUM,ADDNL LESN  9/18/09    Wide -excision of MICKIE III- right labia      C LAPAROSCOPY, SURGICAL; W/ VAGINAL HYSTERECTOMY W/WO REMOVAL OVARY(S)/TUBES  1984    for bleeding     C LIGATE FALLOPIAN TUBE,POSTPARTUM  1978    Tubal Ligation     COLONOSCOPY  9/13/2013    Procedure: COLONOSCOPY;  Colonoscopy;  Surgeon: Yanick Ramsey MD;  [facility]: [facility]     HC ANGIOGRAPHY ARCH  4-3-98     HC BIOPSY OF BREAST, OPEN INCISIONAL  1960    Benign     HC COLONOSCOPY THRU STOMA, DIAGNOSTIC  4-24-98    Diverticuli - due in 2008     HC ERCP W SPHINCTEROTOMY  1996    6/2/96 and 8/30/96     HC REDUCTION OF LARGE BREAST  1988     HC REMOVAL GALLBLADDER  1995     HC UGI ENDOSCOPY DIAG W OR W/O BRUSH/WASH  7-     INJECT EPIDURAL LUMBAR N/A 8/10/2017    Procedure: INJECT EPIDURAL LUMBAR;  Lumbar 2-3 Epidural Steroid Injection;  Surgeon: Kimani Garcia MD;  [facility]     INJECT EPIDURAL LUMBAR Right 5/10/2018    Procedure: INJECT EPIDURAL LUMBAR;  right lumbar 2 - lumbar 3 epidural steroid injection;  Surgeon: Kimani Garcia MD;  Location:  OR     Social History:  Patient is a retired teacher    Family History:  No family history of skin cancer    Medications:  Current Outpatient Medications   Medication Sig Dispense Refill     acetaminophen (TYLENOL) 650 MG CR tablet Take 1 tablet (650  mg) by mouth every 8 hours as needed for mild pain or fever 180 tablet 3     alcohol swab prep pads Use to swab area of injection/geoffrey as directed. 100 each 3     ALLEGRA 180 MG PO TABS 1 TABLET DAILY 90 Tab 3     amLODIPine (NORVASC) 5 MG tablet TAKE ONE TABLET BY MOUTH ONCE DAILY 90 tablet 0     apixaban ANTICOAGULANT (ELIQUIS) 5 MG tablet Take 5 mg by mouth 2 times daily       atorvastatin (LIPITOR) 40 MG tablet TAKE ONE TABLET BY MOUTH ONCE DAILY 90 tablet 2     blood glucose calibration (NO BRAND SPECIFIED) solution To accompany: Blood Glucose Monitor Brands: per insurance. 1 Bottle 3     blood glucose monitoring (NO BRAND SPECIFIED) meter device kit Use to test blood sugar 3 times daily or as directed. Preferred blood glucose meter OR supplies to accompany: Blood Glucose Monitor Brands: per insurance. 1 kit 0     blood glucose monitoring (RELION PRIME TEST) test strip Use to test blood sugar 3 times daily or as directed.       Blood Glucose Monitoring Suppl W/DEVICE KIT 1 kit 3 times daily. 1 kit 0     co-enzyme Q-10 100 MG CAPS capsule Take 100 mg by mouth daily       fish oil-omega-3 fatty acids (FISH OIL) 1000 MG capsule Take 2 g by mouth daily. 2 caps per day       Lancets (E-Z JECT LANCET MICRO-THIN 33G) MISC        levothyroxine (SYNTHROID/LEVOTHROID) 88 MCG tablet Take 1 tablet (88 mcg) by mouth daily 90 tablet 1     lisinopril (PRINIVIL/ZESTRIL) 40 MG tablet TAKE ONE TABLET BY MOUTH ONCE DAILY 90 tablet 1     metFORMIN (GLUCOPHAGE-XR) 500 MG 24 hr tablet Take 1 tablet (500 mg) by mouth daily (with dinner) 90 tablet 0     metoprolol tartrate (LOPRESSOR) 50 MG tablet Take 1 tablet (50 mg) by mouth 2 times daily       NUTRITIONAL SUPPLEMENT OR VIBE       Nutritional Supplements (NUTRITIONAL SUPPLEMENT PO) Take 1 oz by mouth daily Flex       ONETOUCH ULTRA test strip USE TO TEST BLOOD SUGARS THREE TIMES A  each 3     thin (NO BRAND SPECIFIED) lancets Use with lanceting device. To accompany: Blood  Glucose Monitor Brands: per insurance. 100 each 6     metFORMIN (GLUCOPHAGE) 500 MG tablet Take 2 tablets (1,000 mg) by mouth 2 times daily (with meals) (Patient not taking: Reported on 8/13/2019) 180 tablet 0       Allergies   Allergen Reactions     Sulfa Drugs Rash       Review of Systems:  -Constitutional: Patient is otherwise feeling well, in usual state of health.   -Skin: As above in HPI. No additional skin concerns.    Physical exam:  Vitals: There were no vitals taken for this visit.  GEN: This is a well developed, well-nourished female in no acute distress, in a pleasant mood.    SKIN: Total skin excluding the undergarment areas was performed. The exam included the head/face, neck, both arms, chest, back, abdomen, both legs, digits and/or nails and buttocks.   - Franks Type II  - Scattered brown macules on sun exposed areas.  - NUB A)  Right superior lateral forehead there is 1.5 cm sclerotic pink plaque. On dermoscopy there are arborizing vessels within and peripheral blue grey pigmentation  - There are dome shaped bright red papules on the trunk.   - Multiple regular brown pigmented macules and papules are identified on the body.   - There are waxy stuck on tan to brown papules on the trunk (area of patient concern).  - NUB B) Right medial thigh, 1 cm brown slightly shiny papule. On dermoscopy, it appears mostly as a solar lentigo but with areas of elevation  DDX: SK vs SCC vs DN vs other   - No other lesions of concern on areas examined.               Impression/Plan:    1. Sun damaged skin with solar lentigines    Recommend sunscreens SPF #30 or greater, protective clothing and avoidance of tanning beds.    Discussed that the best thing she can do to fade these is be very diligent with sun protection.    Patient not interested in out of pocket cosmetic procedures.     2. Benign findings including: seborrheic keratoses, cherry angioma    No further intervention required. Patient to report changes.      Patient reassured of the benign nature of these lesions.    3. Multiple clinically benign nevi    No further intervention required. Patient to report changes.     Patient reassured of the benign nature of these lesions.    4. Neoplasm of uncertain behavior on the right superior lateral forehead. The differential diagnosis includes BCC vs other .     Shave biopsy:  After discussion of benefits and risks including but not limited to bleeding/bruising, pain/swelling, infection, scar, incomplete removal, nerve damage/numbness, recurrence, and non-diagnostic biopsy, written consent, verbal consent and photographs were obtained. Time-out was performed. The area was cleaned with isopropyl alcohol. 0.5ml of 1% lidocaine with 1:100,000 epinephrine was injected to obtain adequate anesthesia. A shave biopsy was performed. Hemostasis was achieved with aluminium chloride. Vaseline and a sterile dressing were applied. The patient tolerated the procedure and no complications were noted. The patient was provided with verbal and written post care instructions.    5. Neoplasm of uncertain behavior on the right medial thigh. The differential diagnosis includes SK vs solar lentigo vs DN vs other.     Shave biopsy:  After discussion of benefits and risks including but not limited to bleeding/bruising, pain/swelling, infection, scar, incomplete removal, nerve damage/numbness, recurrence, and non-diagnostic biopsy, written consent, verbal consent and photographs were obtained. Time-out was performed. The area was cleaned with isopropyl alcohol. 0.5ml of 1% lidocaine with 1:100,000 epinephrine was injected to obtain adequate anesthesia. A shave biopsy was performed. Hemostasis was achieved with aluminium chloride. Vaseline and a sterile dressing were applied. The patient tolerated the procedure and no complications were noted. The patient was provided with verbal and written post care instructions.    Follow-up to be determined based  on biopsy results above.       Staff Involved:  Scribe/Staff    Scribe Disclosure  I, Misti Mcgee, am serving as a scribe to document services personally performed by Dr. Bre Grover MD, based on data collection and the provider's statements to me.     Provider Disclosure:   The documentation recorded by the scribe accurately reflects the services I personally performed and the decisions made by me.    Bre Grover MD    Department of Dermatology  [facility]: Phone: [phone], Fax:[phone]  [facility]: Phone: [phone], Fax: [phone]                Again, thank you for allowing me to participate in the care of your patient.        Sincerely,        Bre Grover MD

## 2019-08-13 NOTE — PATIENT INSTRUCTIONS

## 2019-08-13 NOTE — PROGRESS NOTES
"Formerly [facility] Dermatology Note      Dermatology Problem List:  1. \"Precancerous\" lesion removed from the vagina  -s/p removal, patient thought done in [place] but no surg path reports available   2. NUB A, right superior lateral forehead, s/p biopsy 8/13/19  3. NUB B, right medial thigh, s/p biopsy 8/13/19    Encounter Date: Aug 13, 2019    CC:  Chief Complaint   Patient presents with     Skin Check     lesion on right temple, groin and back       History of Present Illness:  Ms. Ni Maya is a 79 year old female who presents in self referral for a skin check. She has a history of removal of a precancerous lesion in her vagina. She thought this was done at [place], but we do not have records of the surg path. She states this was not a cervical lesion. She has lesions of concern on her right temple, groin, and back. These lesions itch but do not hurt. She is also concerned about the lesions on her left lateral cheek that she describes as brown spots, as people often as why she has a bruise there. No personal or family history of skin cancer. No other concerns addressed today.    Past Medical History:   Patient Active Problem List   Diagnosis     Hypothyroidism     Allergic rhinitis     Symptomatic menopausal or female climacteric states     Chronic kidney disease, stage II (mild)     Edema     Obesity     Urethral stricture     MICKIE III (vulvar intraepithelial neoplasia III)     Vulval lesion     Health Care Home     Type 2 diabetes, HbA1C goal < 8% (H)     Hyperlipidemia with target LDL less than 100     HTN, goal below 150/90     ACP (advance care planning)     Essential hypertension with goal blood pressure less than 150/90     Type 2 diabetes mellitus with hyperglycemia, without long-term current use of insulin (H)     Cellulitis of toe of right foot     Chronic atrial fibrillation (H)     Thrombocytopenia (H)     Acute gout involving toe of right foot     Lumbar radiculopathy     " Cerebral infarction, unspecified mechanism (H)     Past Medical History:   Diagnosis Date     Allergic rhinitis, cause unspecified      Essential hypertension, benign      Infection of kidney, unspecified 1999     Other specified acquired hypothyroidism      Other specified types of cystitis(595.89)     1999,6-2-01, 10-10-01     Past Surgical History:   Procedure Laterality Date     BIOPSY VULVA/PERINEUM,ADDNL LESN  9/18/09    Wide -excision of MICKIE III- right labia      C LAPAROSCOPY, SURGICAL; W/ VAGINAL HYSTERECTOMY W/WO REMOVAL OVARY(S)/TUBES  1984    for bleeding     C LIGATE FALLOPIAN TUBE,POSTPARTUM  1978    Tubal Ligation     COLONOSCOPY  9/13/2013    Procedure: COLONOSCOPY;  Colonoscopy;  Surgeon: Yanick Ramsey MD;  Location: [facility]     HC ANGIOGRAPHY ARCH  4-3-98     HC BIOPSY OF BREAST, OPEN INCISIONAL  1960    Benign     HC COLONOSCOPY THRU STOMA, DIAGNOSTIC  4-24-98    Diverticuli - due in 2008     HC ERCP W SPHINCTEROTOMY  1996 6/2/96 and 8/30/96     HC REDUCTION OF LARGE BREAST  1988     HC REMOVAL GALLBLADDER  1995     HC UGI ENDOSCOPY DIAG W OR W/O BRUSH/WASH  7-     INJECT EPIDURAL LUMBAR N/A 8/10/2017    Procedure: INJECT EPIDURAL LUMBAR;  Lumbar 2-3 Epidural Steroid Injection;  Surgeon: Kimani Garcia MD;  Location: [facility]     INJECT EPIDURAL LUMBAR Right 5/10/2018    Procedure: INJECT EPIDURAL LUMBAR;  right lumbar 2 - lumbar 3 epidural steroid injection;  Surgeon: Kimani Garcia MD;  Location: [facility]     Social History:  Patient is a retired teacher    Family History:  No family history of skin cancer    Medications:  Current Outpatient Medications   Medication Sig Dispense Refill     acetaminophen (TYLENOL) 650 MG CR tablet Take 1 tablet (650 mg) by mouth every 8 hours as needed for mild pain or fever 180 tablet 3     alcohol swab prep pads Use to swab area of injection/geoffrey as directed. 100 each 3     ALLEGRA 180 MG PO TABS 1 TABLET DAILY 90 Tab 3     amLODIPine (NORVASC) 5 MG  tablet TAKE ONE TABLET BY MOUTH ONCE DAILY 90 tablet 0     apixaban ANTICOAGULANT (ELIQUIS) 5 MG tablet Take 5 mg by mouth 2 times daily       atorvastatin (LIPITOR) 40 MG tablet TAKE ONE TABLET BY MOUTH ONCE DAILY 90 tablet 2     blood glucose calibration (NO BRAND SPECIFIED) solution To accompany: Blood Glucose Monitor Brands: per insurance. 1 Bottle 3     blood glucose monitoring (NO BRAND SPECIFIED) meter device kit Use to test blood sugar 3 times daily or as directed. Preferred blood glucose meter OR supplies to accompany: Blood Glucose Monitor Brands: per insurance. 1 kit 0     blood glucose monitoring (RELION PRIME TEST) test strip Use to test blood sugar 3 times daily or as directed.       Blood Glucose Monitoring Suppl W/DEVICE KIT 1 kit 3 times daily. 1 kit 0     co-enzyme Q-10 100 MG CAPS capsule Take 100 mg by mouth daily       fish oil-omega-3 fatty acids (FISH OIL) 1000 MG capsule Take 2 g by mouth daily. 2 caps per day       Lancets (E-Z JECT LANCET MICRO-THIN 33G) MISC        levothyroxine (SYNTHROID/LEVOTHROID) 88 MCG tablet Take 1 tablet (88 mcg) by mouth daily 90 tablet 1     lisinopril (PRINIVIL/ZESTRIL) 40 MG tablet TAKE ONE TABLET BY MOUTH ONCE DAILY 90 tablet 1     metFORMIN (GLUCOPHAGE-XR) 500 MG 24 hr tablet Take 1 tablet (500 mg) by mouth daily (with dinner) 90 tablet 0     metoprolol tartrate (LOPRESSOR) 50 MG tablet Take 1 tablet (50 mg) by mouth 2 times daily       NUTRITIONAL SUPPLEMENT OR VIBE       Nutritional Supplements (NUTRITIONAL SUPPLEMENT PO) Take 1 oz by mouth daily Flex       ONETOUCH ULTRA test strip USE TO TEST BLOOD SUGARS THREE TIMES A  each 3     thin (NO BRAND SPECIFIED) lancets Use with lanceting device. To accompany: Blood Glucose Monitor Brands: per insurance. 100 each 6     metFORMIN (GLUCOPHAGE) 500 MG tablet Take 2 tablets (1,000 mg) by mouth 2 times daily (with meals) (Patient not taking: Reported on 8/13/2019) 180 tablet 0       Allergies   Allergen  Reactions     Sulfa Drugs Rash       Review of Systems:  -Constitutional: Patient is otherwise feeling well, in usual state of health.   -Skin: As above in HPI. No additional skin concerns.    Physical exam:  Vitals: There were no vitals taken for this visit.  GEN: This is a well developed, well-nourished female in no acute distress, in a pleasant mood.    SKIN: Total skin excluding the undergarment areas was performed. The exam included the head/face, neck, both arms, chest, back, abdomen, both legs, digits and/or nails and buttocks.   - Franks Type II  - Scattered brown macules on sun exposed areas.  - NUB A)  Right superior lateral forehead there is 1.5 cm sclerotic pink plaque. On dermoscopy there are arborizing vessels within and peripheral blue grey pigmentation  - There are dome shaped bright red papules on the trunk.   - Multiple regular brown pigmented macules and papules are identified on the body.   - There are waxy stuck on tan to brown papules on the trunk (area of patient concern).  - NUB B) Right medial thigh, 1 cm brown slightly shiny papule. On dermoscopy, it appears mostly as a solar lentigo but with areas of elevation  DDX: SK vs SCC vs DN vs other   - No other lesions of concern on areas examined.               Impression/Plan:    1. Sun damaged skin with solar lentigines    Recommend sunscreens SPF #30 or greater, protective clothing and avoidance of tanning beds.    Discussed that the best thing she can do to fade these is be very diligent with sun protection.    Patient not interested in out of pocket cosmetic procedures.     2. Benign findings including: seborrheic keratoses, cherry angioma    No further intervention required. Patient to report changes.     Patient reassured of the benign nature of these lesions.    3. Multiple clinically benign nevi    No further intervention required. Patient to report changes.     Patient reassured of the benign nature of these lesions.    4. Neoplasm  of uncertain behavior on the right superior lateral forehead. The differential diagnosis includes BCC vs other .     Shave biopsy:  After discussion of benefits and risks including but not limited to bleeding/bruising, pain/swelling, infection, scar, incomplete removal, nerve damage/numbness, recurrence, and non-diagnostic biopsy, written consent, verbal consent and photographs were obtained. Time-out was performed. The area was cleaned with isopropyl alcohol. 0.5ml of 1% lidocaine with 1:100,000 epinephrine was injected to obtain adequate anesthesia. A shave biopsy was performed. Hemostasis was achieved with aluminium chloride. Vaseline and a sterile dressing were applied. The patient tolerated the procedure and no complications were noted. The patient was provided with verbal and written post care instructions.    5. Neoplasm of uncertain behavior on the right medial thigh. The differential diagnosis includes SK vs solar lentigo vs DN vs other.     Shave biopsy:  After discussion of benefits and risks including but not limited to bleeding/bruising, pain/swelling, infection, scar, incomplete removal, nerve damage/numbness, recurrence, and non-diagnostic biopsy, written consent, verbal consent and photographs were obtained. Time-out was performed. The area was cleaned with isopropyl alcohol. 0.5ml of 1% lidocaine with 1:100,000 epinephrine was injected to obtain adequate anesthesia. A shave biopsy was performed. Hemostasis was achieved with aluminium chloride. Vaseline and a sterile dressing were applied. The patient tolerated the procedure and no complications were noted. The patient was provided with verbal and written post care instructions.    Follow-up to be determined based on biopsy results above.       Staff Involved:  Scribe/Staff    Scribe Disclosure  I, Misti Mcgee am serving as a scribe to document services personally performed by Dr. Bre Grover MD, based on data collection and the provider's  statements to me.     Provider Disclosure:   The documentation recorded by the scribe accurately reflects the services I personally performed and the decisions made by me.    Bre Grover MD    Department of Dermatology  [facility]: Phone: [phone], Fax:[phone]  [facility]: Phone: [phone], Fax: [phone]

## 2019-08-21 LAB — COPATH REPORT: NORMAL

## 2019-08-26 ENCOUNTER — OFFICE VISIT (OUTPATIENT)
Dept: DERMATOLOGY | Facility: CLINIC | Age: 79
End: 2019-08-26
Payer: COMMERCIAL

## 2019-08-26 DIAGNOSIS — C44.319 BASAL CELL CARCINOMA (BCC) OF RIGHT FOREHEAD: Primary | ICD-10-CM

## 2019-08-26 PROCEDURE — 99212 OFFICE O/P EST SF 10 MIN: CPT | Mod: 57 | Performed by: DERMATOLOGY

## 2019-08-26 ASSESSMENT — PAIN SCALES - GENERAL: PAINLEVEL: NO PAIN (0)

## 2019-08-26 NOTE — NURSING NOTE
[facility] Dermatologic Surgery Pre-Surgery Screening Note     Pre-screening Information:  Derm Surgery Pre-Surgery Screening 8/26/2019   Transplant No   Immunocompromised No   Hepatitis No   HIV/AIDS No   Bleeding Disorder(s) No   Pacemaker No   Defibrillator No   Brain/Nerve Stimulator No   Patient wears CPAP mask No   Prosthetic Heart Valve No   Lesion on Leg/Groin No   Prophylactic Antibiotic Needed No   Hx of Skin Cancer No   Hx of Mohs Surgery No   Current Tobacco Use No   Current Anticoagulant(s) Fish Oil;Other (Comments)       Medications/Allergies  Medications and allergies review with patient: Yes     Current Outpatient Medications   Medication Sig Dispense Refill     acetaminophen (TYLENOL) 650 MG CR tablet Take 1 tablet (650 mg) by mouth every 8 hours as needed for mild pain or fever 180 tablet 3     alcohol swab prep pads Use to swab area of injection/geoffrey as directed. 100 each 3     ALLEGRA 180 MG PO TABS 1 TABLET DAILY 90 Tab 3     amLODIPine (NORVASC) 5 MG tablet TAKE ONE TABLET BY MOUTH ONCE DAILY 90 tablet 0     apixaban ANTICOAGULANT (ELIQUIS) 5 MG tablet Take 5 mg by mouth 2 times daily       atorvastatin (LIPITOR) 40 MG tablet TAKE ONE TABLET BY MOUTH ONCE DAILY 90 tablet 2     blood glucose calibration (NO BRAND SPECIFIED) solution To accompany: Blood Glucose Monitor Brands: per insurance. 1 Bottle 3     blood glucose monitoring (NO BRAND SPECIFIED) meter device kit Use to test blood sugar 3 times daily or as directed. Preferred blood glucose meter OR supplies to accompany: Blood Glucose Monitor Brands: per insurance. 1 kit 0     blood glucose monitoring (RELION PRIME TEST) test strip Use to test blood sugar 3 times daily or as directed.       Blood Glucose Monitoring Suppl W/DEVICE KIT 1 kit 3 times daily. 1 kit 0     co-enzyme Q-10 100 MG CAPS capsule Take 100 mg by mouth daily       fish oil-omega-3 fatty acids (FISH OIL) 1000 MG capsule Take 2 g by mouth daily. 2 caps  per day       Lancets (E-Z JECT LANCET MICRO-THIN 33G) MISC        levothyroxine (SYNTHROID/LEVOTHROID) 88 MCG tablet Take 1 tablet (88 mcg) by mouth daily 90 tablet 1     lisinopril (PRINIVIL/ZESTRIL) 40 MG tablet TAKE ONE TABLET BY MOUTH ONCE DAILY 90 tablet 1     metFORMIN (GLUCOPHAGE-XR) 500 MG 24 hr tablet Take 1 tablet (500 mg) by mouth daily (with dinner) 90 tablet 0     metoprolol tartrate (LOPRESSOR) 50 MG tablet Take 1 tablet (50 mg) by mouth 2 times daily       NUTRITIONAL SUPPLEMENT OR VIBE       Nutritional Supplements (NUTRITIONAL SUPPLEMENT PO) Take 1 oz by mouth daily Flex       ONETOUCH ULTRA test strip USE TO TEST BLOOD SUGARS THREE TIMES A  each 3     thin (NO BRAND SPECIFIED) lancets Use with lanceting device. To accompany: Blood Glucose Monitor Brands: per insurance. 100 each 6     metFORMIN (GLUCOPHAGE) 500 MG tablet Take 2 tablets (1,000 mg) by mouth 2 times daily (with meals) (Patient not taking: Reported on 8/13/2019) 180 tablet 0     Allergies   Allergen Reactions     Sulfa Drugs Rash       Pertinent Labs:  Last Creatine:   Creatinine   Date Value Ref Range Status   04/30/2019 0.84 0.52 - 1.04 mg/dL Final     Last INR:   INR   Date Value Ref Range Status   08/07/2017 0.96 0.86 - 1.14 Final        Other Questions:     needed? No    Do you have any mobility issues: No    Do you use any assistive devices/DME? No    Do you have any issues with lying for long periods of time? No    Do you have any other health issue that we should know about: stroke in feb memory issues post    Reminded patient to take any order prophylactic antibiotics 1 hour prior to appointment: Yes, No    If on a prescribed anticoagulant, advised patient to continue or check with prescribing provider: Yes    If on an OTC anticoagulant/supplement, advised patient to hold these medications 10-14 days prior to surgery and resume 48 hrs after surgery: Yes    Please be aware that this can be an all day procedure.  Please bring your daily medications and food/cash. Encourage patient to eat prior to procedure(s). After care instructions were reviewed with patient.    If any positives, send to RN to initiate antibiotic prophylaxis protocol and/or anticoagulation management protocol    Reviewed by:    Jerica Thayer LPN

## 2019-08-26 NOTE — PROGRESS NOTES
"Formerly [facility] Dermatology Note      Dermatology Problem List:  1. \"Precancerous\" lesion removed from the vagina  -s/p removal, patient thought done in [place] but no surg path reports available   2. BCC, right superior lateral forehead, pending Mohs     Encounter Date: Aug 26, 2019    CC:  Chief Complaint   Patient presents with     Consult For     BCC rigth superior lateral forehad         History of Present Illness:  Ms. Ni Maya is a 79 year old female who presents today for a Mohs consultation regarding a basal cell carcinoma that was diagnosed on her right superior lateral forehead. The biopsy was done by Dr. Grover on 8/13/19. This is her first diagnosed skin cancer. Her other biopsy site on the right medial thigh is healing fine without any concern. The patient is otherwise feeling well. There are no other skin concerns at this time.      Past Medical History:   Patient Active Problem List   Diagnosis     Hypothyroidism     Allergic rhinitis     Symptomatic menopausal or female climacteric states     Chronic kidney disease, stage II (mild)     Edema     Obesity     Urethral stricture     MICKIE III (vulvar intraepithelial neoplasia III)     Vulval lesion     Health Care Home     Type 2 diabetes, HbA1C goal < 8% (H)     Hyperlipidemia with target LDL less than 100     HTN, goal below 150/90     ACP (advance care planning)     Essential hypertension with goal blood pressure less than 150/90     Type 2 diabetes mellitus with hyperglycemia, without long-term current use of insulin (H)     Cellulitis of toe of right foot     Chronic atrial fibrillation (H)     Thrombocytopenia (H)     Acute gout involving toe of right foot     Lumbar radiculopathy     Cerebral infarction, unspecified mechanism (H)     Past Medical History:   Diagnosis Date     Allergic rhinitis, cause unspecified      Essential hypertension, benign      Infection of kidney, unspecified 1999     Other specified acquired " hypothyroidism      Other specified types of cystitis(595.89)     1999,6-2-01, 10-10-01     Past Surgical History:   Procedure Laterality Date     BIOPSY VULVA/PERINEUM,ADDNL LESN  9/18/09    Wide -excision of MICKIE III- right labia      C LAPAROSCOPY, SURGICAL; W/ VAGINAL HYSTERECTOMY W/WO REMOVAL OVARY(S)/TUBES  1984    for bleeding     C LIGATE FALLOPIAN TUBE,POSTPARTUM  1978    Tubal Ligation     COLONOSCOPY  9/13/2013    Procedure: COLONOSCOPY;  Colonoscopy;  Surgeon: Yanick Ramsey MD;  Location: [facility]     HC ANGIOGRAPHY ARCH  4-3-98     HC BIOPSY OF BREAST, OPEN INCISIONAL  1960    Benign     HC COLONOSCOPY THRU STOMA, DIAGNOSTIC  4-24-98    Diverticuli - due in 2008     HC ERCP W SPHINCTEROTOMY  1996 6/2/96 and 8/30/96     HC REDUCTION OF LARGE BREAST  1988     HC REMOVAL GALLBLADDER  1995     HC UGI ENDOSCOPY DIAG W OR W/O BRUSH/WASH  7-     INJECT EPIDURAL LUMBAR N/A 8/10/2017    Procedure: INJECT EPIDURAL LUMBAR;  Lumbar 2-3 Epidural Steroid Injection;  Surgeon: Kimani Garcia MD;  [facility]     INJECT EPIDURAL LUMBAR Right 5/10/2018    Procedure: INJECT EPIDURAL LUMBAR;  right lumbar 2 - lumbar 3 epidural steroid injection;  Surgeon: Kimani Garcia MD;  [facility]: [facility]       Social History:  Social History     Socioeconomic History     Marital status:      Spouse name: Marco Antonio     Number of children: 2     Years of education: 17     Highest education level: None   Occupational History     Occupation: Retired in 1994     Employer: RETIRED   Social Needs     Financial resource strain: None     Food insecurity:     Worry: None     Inability: None     Transportation needs:     Medical: None     Non-medical: None   Tobacco Use     Smoking status: Never Smoker     Smokeless tobacco: Never Used   Substance and Sexual Activity     Alcohol use: No     Drug use: No     Sexual activity: Never     Partners: Male   Lifestyle     Physical activity:     Days per week: None     Minutes per  session: None     Stress: None   Relationships     Social connections:     Talks on phone: None     Gets together: None     Attends Nondenominational service: None     Active member of club or organization: None     Attends meetings of clubs or organizations: None     Relationship status: None     Intimate partner violence:     Fear of current or ex partner: None     Emotionally abused: None     Physically abused: None     Forced sexual activity: None   Other Topics Concern     Parent/sibling w/ CABG, MI or angioplasty before 65F 55M? No   Social History Narrative     None       Family History:  Family History   Problem Relation Age of Onset     Cardiovascular Father         Aortic aneurysm     Hypertension Father      Hypertension Mother      Gastrointestinal Disease Mother         GI Bleed     Lipids Sister      Hypertension Sister      Genitourinary Problems Sister      Allergies Sister      Cancer Brother         Lung     Alcohol/Drug Brother      Connective Tissue Disorder Son         Down Syndrome     Respiratory Son         Pneumonia     Cancer Maternal Grandmother         Stomach     Hypertension Maternal Grandfather      Allergies Daughter         Medications:  Current Outpatient Medications   Medication Sig Dispense Refill     acetaminophen (TYLENOL) 650 MG CR tablet Take 1 tablet (650 mg) by mouth every 8 hours as needed for mild pain or fever 180 tablet 3     alcohol swab prep pads Use to swab area of injection/geoffrey as directed. 100 each 3     ALLEGRA 180 MG PO TABS 1 TABLET DAILY 90 Tab 3     amLODIPine (NORVASC) 5 MG tablet TAKE ONE TABLET BY MOUTH ONCE DAILY 90 tablet 0     apixaban ANTICOAGULANT (ELIQUIS) 5 MG tablet Take 5 mg by mouth 2 times daily       atorvastatin (LIPITOR) 40 MG tablet TAKE ONE TABLET BY MOUTH ONCE DAILY 90 tablet 2     blood glucose calibration (NO BRAND SPECIFIED) solution To accompany: Blood Glucose Monitor Brands: per insurance. 1 Bottle 3     blood glucose monitoring (NO BRAND  SPECIFIED) meter device kit Use to test blood sugar 3 times daily or as directed. Preferred blood glucose meter OR supplies to accompany: Blood Glucose Monitor Brands: per insurance. 1 kit 0     blood glucose monitoring (RELION PRIME TEST) test strip Use to test blood sugar 3 times daily or as directed.       Blood Glucose Monitoring Suppl W/DEVICE KIT 1 kit 3 times daily. 1 kit 0     co-enzyme Q-10 100 MG CAPS capsule Take 100 mg by mouth daily       fish oil-omega-3 fatty acids (FISH OIL) 1000 MG capsule Take 2 g by mouth daily. 2 caps per day       Lancets (E-Z JECT LANCET MICRO-THIN 33G) MISC        levothyroxine (SYNTHROID/LEVOTHROID) 88 MCG tablet Take 1 tablet (88 mcg) by mouth daily 90 tablet 1     lisinopril (PRINIVIL/ZESTRIL) 40 MG tablet TAKE ONE TABLET BY MOUTH ONCE DAILY 90 tablet 1     metFORMIN (GLUCOPHAGE-XR) 500 MG 24 hr tablet Take 1 tablet (500 mg) by mouth daily (with dinner) 90 tablet 0     metoprolol tartrate (LOPRESSOR) 50 MG tablet Take 1 tablet (50 mg) by mouth 2 times daily       NUTRITIONAL SUPPLEMENT OR VIBE       Nutritional Supplements (NUTRITIONAL SUPPLEMENT PO) Take 1 oz by mouth daily Flex       ONETOUCH ULTRA test strip USE TO TEST BLOOD SUGARS THREE TIMES A  each 3     thin (NO BRAND SPECIFIED) lancets Use with lanceting device. To accompany: Blood Glucose Monitor Brands: per insurance. 100 each 6     metFORMIN (GLUCOPHAGE) 500 MG tablet Take 2 tablets (1,000 mg) by mouth 2 times daily (with meals) (Patient not taking: Reported on 8/13/2019) 180 tablet 0       Allergies   Allergen Reactions     Sulfa Drugs Rash       Review of Systems:  -Skin Establ Pt: The patient denies any new rash, pruritus, or lesions that are symptomatic, changing or bleeding, except as per HPI.  -Constitutional: The patient is feeling generally well.    Physical exam:  Vitals: There were no vitals taken for this visit.  GEN: This is a well developed, well-nourished female in no acute distress, in a  pleasant mood.    SKIN: Focused examination of the face was performed.  - 1.6 x 1.2 cm pink pearly poorly defined plaque on the right superior lateral forehead.   - No other lesions of concern on areas examined.       Impression/Plan:  1. Basal cell carcinoma, superficial type - right superior lateral forehead     Reviewed pathology report and nature of diagnosis.     Risks, benefits, and process of Mohs micrographic surgery were discussed including possibility of damage to surrounding anatomical structures and infection. Patient is comfortable proceeding with the surgery; consent form was obtained. She will be scheduled at her convenience.    No indication for pre-op antibiotics.    Pre-op written instructions provided.     Follow-up as scheduled for MMS.       Staff Involved:    Scribe Disclosure  I, Enmanuel Luis, carter serving as a scribe to document services personally performed by Dr. Zach Hardin DO, based on data collection and the provider's statements to me.     Provider Disclosure:   The documentation recorded by the scribe accurately reflects the services I personally performed and the decisions made by me.    Zach Hardin DO    Department of Dermatology  [facility]: Phone: [phone], Fax:[phone]  [facility]: Phone: [phone], Fax: [phone]

## 2019-08-26 NOTE — PATIENT INSTRUCTIONS
"You have an upcoming appointment with Dr. Hardin for Mohs surgery. It is scheduled for 9-9-19 at 8:00 AM. Please check-in 15 minutes prior to your appointment at check-in desk \"D\" on the 2nd floor. Our address is [address] [address] [facility].  Please review these important reminders:    1) Expect to be here the majority of the morning.  The Mohs surgical procedure can be an extensive surgery requiring multiple stages. Each stage may take 1-2 hours.     2) You may eat breakfast. You may bring snacks or beverages with you.  3) Take all normal medications morning of surgery -- unless otherwise indicated by your physician   If you have an artificial heart valve we will prescribe an antibiotic. Please take one hour prior to surgery.     4) You will need a  to be with you if your surgical site is close to your eyes. You can get swelling/bruising immediately after surgery and will go home with a big bulky bandage that could obstruct your view of driving safely.     5) Wear comfortable clothing -- preferably a button down shirt or a loose fitted shirt (to avoid pulling over pressure bandage off when you get home). If your surgery site is on your leg, try wearing loose fitted pants. If your surgery site is on your arm, try wearing a short-sleeve shirt.     6) Bring reading material or other items to help pass time. We do have internet access available if you own a laptop, iPad, etc.    7) Women - do NOT wear make-up. We will be washing it off anyone needing surgery on the face     8) Do NOT stop blood thinning medication unless instructed to do so by your surgeon. This will include: Warfarin, Coumadin, Eliquis, Jantoven, Aspirin, Plavix and Pradaxa. If you are taking Warfarin or Coumadin, please have your INR blood test results sent to our office no more than 7 days prior to your surgery.     9) Tylenol is a good alternative to take for headaches and pain, and can be taken throughout your surgery and postoperative " recovery.    If you need to reschedule or have any questions or concerns, please call me at [phone].

## 2019-08-26 NOTE — LETTER
"    8/26/2019         RE: Ni Maya  [address] [facility] [address]        Dear Colleague,    Thank you for referring your patient, Ni Maya, to the [facility]. Please see a copy of my visit note below.    [facility] Dermatology Note      Dermatology Problem List:  1. \"Precancerous\" lesion removed from the vagina  -s/p removal, patient thought done in [place] but no surg path reports available   2. BCC, right superior lateral forehead, pending Mohs     Encounter Date: Aug 26, 2019    CC:  Chief Complaint   Patient presents with     Consult For     BCC rigth superior lateral forehad         History of Present Illness:  Ms. Ni Maya is a 79 year old female who presents today for a Mohs consultation regarding a basal cell carcinoma that was diagnosed on her right superior lateral forehead. The biopsy was done by Dr. Grover on 8/13/19. This is her first diagnosed skin cancer. Her other biopsy site on the right medial thigh is healing fine without any concern. The patient is otherwise feeling well. There are no other skin concerns at this time.      Past Medical History:   Patient Active Problem List   Diagnosis     Hypothyroidism     Allergic rhinitis     Symptomatic menopausal or female climacteric states     Chronic kidney disease, stage II (mild)     Edema     Obesity     Urethral stricture     MICKIE III (vulvar intraepithelial neoplasia III)     Vulval lesion     Health Care Home     Type 2 diabetes, HbA1C goal < 8% (H)     Hyperlipidemia with target LDL less than 100     HTN, goal below 150/90     ACP (advance care planning)     Essential hypertension with goal blood pressure less than 150/90     Type 2 diabetes mellitus with hyperglycemia, without long-term current use of insulin (H)     Cellulitis of toe of right foot     Chronic atrial fibrillation (H)     Thrombocytopenia (H)     Acute gout involving toe of right foot     Lumbar " radiculopathy     Cerebral infarction, unspecified mechanism (H)     Past Medical History:   Diagnosis Date     Allergic rhinitis, cause unspecified      Essential hypertension, benign      Infection of kidney, unspecified 1999     Other specified acquired hypothyroidism      Other specified types of cystitis(595.89)     1999,6-2-01, 10-10-01     Past Surgical History:   Procedure Laterality Date     BIOPSY VULVA/PERINEUM,ADDNL LESN  9/18/09    Wide -excision of MICKIE III- right labia      C LAPAROSCOPY, SURGICAL; W/ VAGINAL HYSTERECTOMY W/WO REMOVAL OVARY(S)/TUBES  1984    for bleeding     C LIGATE FALLOPIAN TUBE,POSTPARTUM  1978    Tubal Ligation     COLONOSCOPY  9/13/2013    Procedure: COLONOSCOPY;  Colonoscopy;  Surgeon: Yanick Ramsey MD;  Location: [facility]     HC ANGIOGRAPHY ARCH  4-3-98     HC BIOPSY OF BREAST, OPEN INCISIONAL  1960    Benign     HC COLONOSCOPY THRU STOMA, DIAGNOSTIC  4-24-98    Diverticuli - due in 2008     HC ERCP W SPHINCTEROTOMY  1996 6/2/96 and 8/30/96     HC REDUCTION OF LARGE BREAST  1988     HC REMOVAL GALLBLADDER  1995      UGI ENDOSCOPY DIAG W OR W/O BRUSH/WASH  7-     INJECT EPIDURAL LUMBAR N/A 8/10/2017    Procedure: INJECT EPIDURAL LUMBAR;  Lumbar 2-3 Epidural Steroid Injection;  Surgeon: Kimani Garcia MD;  Location: [facility] OR     INJECT EPIDURAL LUMBAR Right 5/10/2018    Procedure: INJECT EPIDURAL LUMBAR;  right lumbar 2 - lumbar 3 epidural steroid injection;  Surgeon: Kimani Garcia MD;  Location: [facility] OR       Social History:  Social History     Socioeconomic History     Marital status:      Spouse name: Marco Antonio     Number of children: 2     Years of education: 17     Highest education level: None   Occupational History     Occupation: Retired in 1994     Employer: RETIRED   Social Needs     Financial resource strain: None     Food insecurity:     Worry: None     Inability: None     Transportation needs:     Medical: None     Non-medical: None   Tobacco Use      Smoking status: Never Smoker     Smokeless tobacco: Never Used   Substance and Sexual Activity     Alcohol use: No     Drug use: No     Sexual activity: Never     Partners: Male   Lifestyle     Physical activity:     Days per week: None     Minutes per session: None     Stress: None   Relationships     Social connections:     Talks on phone: None     Gets together: None     Attends Yazidi service: None     Active member of club or organization: None     Attends meetings of clubs or organizations: None     Relationship status: None     Intimate partner violence:     Fear of current or ex partner: None     Emotionally abused: None     Physically abused: None     Forced sexual activity: None   Other Topics Concern     Parent/sibling w/ CABG, MI or angioplasty before 65F 55M? No   Social History Narrative     None       Family History:  Family History   Problem Relation Age of Onset     Cardiovascular Father         Aortic aneurysm     Hypertension Father      Hypertension Mother      Gastrointestinal Disease Mother         GI Bleed     Lipids Sister      Hypertension Sister      Genitourinary Problems Sister      Allergies Sister      Cancer Brother         Lung     Alcohol/Drug Brother      Connective Tissue Disorder Son         Down Syndrome     Respiratory Son         Pneumonia     Cancer Maternal Grandmother         Stomach     Hypertension Maternal Grandfather      Allergies Daughter         Medications:  Current Outpatient Medications   Medication Sig Dispense Refill     acetaminophen (TYLENOL) 650 MG CR tablet Take 1 tablet (650 mg) by mouth every 8 hours as needed for mild pain or fever 180 tablet 3     alcohol swab prep pads Use to swab area of injection/geoffrey as directed. 100 each 3     ALLEGRA 180 MG PO TABS 1 TABLET DAILY 90 Tab 3     amLODIPine (NORVASC) 5 MG tablet TAKE ONE TABLET BY MOUTH ONCE DAILY 90 tablet 0     apixaban ANTICOAGULANT (ELIQUIS) 5 MG tablet Take 5 mg by mouth 2 times daily        atorvastatin (LIPITOR) 40 MG tablet TAKE ONE TABLET BY MOUTH ONCE DAILY 90 tablet 2     blood glucose calibration (NO BRAND SPECIFIED) solution To accompany: Blood Glucose Monitor Brands: per insurance. 1 Bottle 3     blood glucose monitoring (NO BRAND SPECIFIED) meter device kit Use to test blood sugar 3 times daily or as directed. Preferred blood glucose meter OR supplies to accompany: Blood Glucose Monitor Brands: per insurance. 1 kit 0     blood glucose monitoring (RELION PRIME TEST) test strip Use to test blood sugar 3 times daily or as directed.       Blood Glucose Monitoring Suppl W/DEVICE KIT 1 kit 3 times daily. 1 kit 0     co-enzyme Q-10 100 MG CAPS capsule Take 100 mg by mouth daily       fish oil-omega-3 fatty acids (FISH OIL) 1000 MG capsule Take 2 g by mouth daily. 2 caps per day       Lancets (E-Z JECT LANCET MICRO-THIN 33G) MISC        levothyroxine (SYNTHROID/LEVOTHROID) 88 MCG tablet Take 1 tablet (88 mcg) by mouth daily 90 tablet 1     lisinopril (PRINIVIL/ZESTRIL) 40 MG tablet TAKE ONE TABLET BY MOUTH ONCE DAILY 90 tablet 1     metFORMIN (GLUCOPHAGE-XR) 500 MG 24 hr tablet Take 1 tablet (500 mg) by mouth daily (with dinner) 90 tablet 0     metoprolol tartrate (LOPRESSOR) 50 MG tablet Take 1 tablet (50 mg) by mouth 2 times daily       NUTRITIONAL SUPPLEMENT OR VIBE       Nutritional Supplements (NUTRITIONAL SUPPLEMENT PO) Take 1 oz by mouth daily Flex       ONETOUCH ULTRA test strip USE TO TEST BLOOD SUGARS THREE TIMES A  each 3     thin (NO BRAND SPECIFIED) lancets Use with lanceting device. To accompany: Blood Glucose Monitor Brands: per insurance. 100 each 6     metFORMIN (GLUCOPHAGE) 500 MG tablet Take 2 tablets (1,000 mg) by mouth 2 times daily (with meals) (Patient not taking: Reported on 8/13/2019) 180 tablet 0       Allergies   Allergen Reactions     Sulfa Drugs Rash       Review of Systems:  -Skin Establ Pt: The patient denies any new rash, pruritus, or lesions that are  symptomatic, changing or bleeding, except as per HPI.  -Constitutional: The patient is feeling generally well.    Physical exam:  Vitals: There were no vitals taken for this visit.  GEN: This is a well developed, well-nourished female in no acute distress, in a pleasant mood.    SKIN: Focused examination of the face was performed.  - 1.6 x 1.2 cm pink pearly poorly defined plaque on the right superior lateral forehead.   - No other lesions of concern on areas examined.       Impression/Plan:  1. Basal cell carcinoma, superficial type - right superior lateral forehead     Reviewed pathology report and nature of diagnosis.     Risks, benefits, and process of Mohs micrographic surgery were discussed including possibility of damage to surrounding anatomical structures and infection. Patient is comfortable proceeding with the surgery; consent form was obtained. She will be scheduled at her convenience.    No indication for pre-op antibiotics.    Pre-op written instructions provided.     Follow-up as scheduled for MMS.       Staff Involved:    Scribe Disclosure  I, Enmanuel Luis, am serving as a scribe to document services personally performed by Dr. Zach Hardin DO, based on data collection and the provider's statements to me.     Provider Disclosure:   The documentation recorded by the scribe accurately reflects the services I personally performed and the decisions made by me.    Zach Hardin DO    Department of Dermatology  [facility]: Phone: [phone], Fax:[phone]  [facility]: Phone: [phone], Fax: [phone]          Again, thank you for allowing me to participate in the care of your patient.        Sincerely,        Zach Hardin MD

## 2019-08-31 ENCOUNTER — HOSPITAL ENCOUNTER (EMERGENCY)
Facility: CLINIC | Age: 79
Discharge: HOME | End: 2019-09-01
Attending: FAMILY MEDICINE | Admitting: FAMILY MEDICINE
Payer: COMMERCIAL

## 2019-08-31 ENCOUNTER — HOSPITAL ENCOUNTER (EMERGENCY)
Facility: CLINIC | Age: 79
Discharge: HOME | End: 2019-08-31
Attending: EMERGENCY MEDICINE | Admitting: EMERGENCY MEDICINE
Payer: COMMERCIAL

## 2019-08-31 VITALS
SYSTOLIC BLOOD PRESSURE: 134 MMHG | BODY MASS INDEX: 29.45 KG/M2 | TEMPERATURE: 96.6 F | OXYGEN SATURATION: 99 % | DIASTOLIC BLOOD PRESSURE: 78 MMHG | HEART RATE: 124 BPM | WEIGHT: 177 LBS | RESPIRATION RATE: 13 BRPM

## 2019-08-31 DIAGNOSIS — K06.8 BLEEDING GUMS: ICD-10-CM

## 2019-08-31 DIAGNOSIS — Z79.01 LONG TERM CURRENT USE OF ANTICOAGULANT THERAPY: ICD-10-CM

## 2019-08-31 PROCEDURE — 99284 EMERGENCY DEPT VISIT MOD MDM: CPT | Mod: Z6 | Performed by: FAMILY MEDICINE

## 2019-08-31 PROCEDURE — 99283 EMERGENCY DEPT VISIT LOW MDM: CPT

## 2019-08-31 NOTE — ED AVS SNAPSHOT
[facility] Emergency Department  [address] [place] [address]  Phone:  [phone]  Fax:  [phone]                                    Ni Maya   MRN: [id number]    Department:  [facility] Emergency Department   Date of Visit:  8/31/2019           After Visit Summary Signature Page    I have received my discharge instructions, and my questions have been answered. I have discussed any challenges I see with this plan with the nurse or doctor.    ..........................................................................................................................................  Patient/Patient Representative Signature      ..........................................................................................................................................  Patient Representative Print Name and Relationship to Patient    ..................................................               ................................................  Date                                   Time    ..........................................................................................................................................  Reviewed by Signature/Title    ...................................................              ..............................................  Date                                               Time          22EPIC Rev 08/18

## 2019-08-31 NOTE — ED PROVIDER NOTES
History     Chief Complaint   Patient presents with     Coagulation Disorder     HPI  Ni Maya is a 79 year old female who resents to the emergency department complaining of bleeding from the mouth.  12 days ago she had a dental procedure in which a tooth was removed on the right upper side and one on the left upper side to make room for a partial.  She then had the partial put in and she thinks it may been rubbing in the area where the tooth was removed.  Initially she was off of her Eliquis for 4 days but she has been back on it since then, since the 23rd.  9 PM last night she started having bleeding from the gum in the right upper side.  She has not been able to get it to stop.  She is put gauze and Kleenex on it over and over again.  She does not have any weakness, lightheadedness, chest pain or other new symptoms.  She is on Eliquis for history of atrial fibrillation and a CVA.  States that her pulse ranges from the 80s to the 140s    Allergies:  Allergies   Allergen Reactions     Sulfa Drugs Rash       Problem List:    Patient Active Problem List    Diagnosis Date Noted     Cerebral infarction, unspecified mechanism (H) 03/25/2019     Priority: Medium     Lumbar radiculopathy 05/08/2018     Priority: Medium     Acute gout involving toe of right foot 11/27/2017     Priority: Medium     Cellulitis of toe of right foot 11/26/2017     Priority: Medium     Chronic atrial fibrillation (H) 11/26/2017     Priority: Medium     Thrombocytopenia (H) 11/26/2017     Priority: Medium     Type 2 diabetes mellitus with hyperglycemia, without long-term current use of insulin (H) 05/17/2017     Priority: Medium     Essential hypertension with goal blood pressure less than 150/90 05/27/2016     Priority: Medium     ACP (advance care planning) 05/17/2016     Priority: Medium     Advance Care Planning 5/17/2016: Receipt of ACP document:  Received: Health Care Directive which was witnessed or notarized on 11/22/2013.   Document not previously scanned.  Validation form completed and sent with document to be scanned.  Code Status needs to be updated to reflect choices in most recent ACP document. Notification sent to Dr. Lin for followup.  Confirmed/documented designated decision maker(s).  Added by Kassidy Sousa.             HTN, goal below 150/90 11/06/2013     Priority: Medium     Hyperlipidemia with target LDL less than 100 10/16/2013     Priority: Medium     Diagnosis updated by automated process. Provider to review and confirm.       Type 2 diabetes, HbA1C goal < 8% (H) 03/15/2013     Priority: Medium     Health Care Home 11/15/2012     Priority: Medium     Nahed Medeiros RN-PHN  FPA / [facility]   [phone]    DX V65.8 REPLACED WITH 05906 HEALTH CARE HOME (04/08/2013)       Vulval lesion 06/06/2010     Priority: Medium     Noted 6 months after initial excision of vulvar mass, which was MICKIE III with clear margins  This lesion was excised today 7/7/10       MICKIE III (vulvar intraepithelial neoplasia III) 09/01/2009     Priority: Medium     S/p wide excision. Final path MICKIE III with free surgical margins       Urethral stricture 01/03/2008     Priority: Medium     Problem list name updated by automated process. Provider to review       Edema 12/29/2006     Priority: Medium     Obesity 12/29/2006     Priority: Medium     Problem list name updated by automated process. Provider to review       Chronic kidney disease, stage II (mild) 11/27/2006     Priority: Medium     Symptomatic menopausal or female climacteric states 01/13/2005     Priority: Medium     Allergic rhinitis 10/21/2004     Priority: Medium     Problem list name updated by automated process. Provider to review       Hypothyroidism 06/25/2002     Priority: Medium     Problem list name updated by automated process. Provider to review          Past Medical History:    Past Medical History:   Diagnosis Date     Allergic rhinitis, cause  unspecified      Essential hypertension, benign      Infection of kidney, unspecified 1999     Other specified acquired hypothyroidism      Other specified types of cystitis(595.89)        Past Surgical History:    Past Surgical History:   Procedure Laterality Date     BIOPSY VULVA/PERINEUM,ADDNL LESN  9/18/09    Wide -excision of MICKIE III- right labia      C LAPAROSCOPY, SURGICAL; W/ VAGINAL HYSTERECTOMY W/WO REMOVAL OVARY(S)/TUBES  1984    for bleeding     C LIGATE FALLOPIAN TUBE,POSTPARTUM  1978    Tubal Ligation     COLONOSCOPY  9/13/2013    Procedure: COLONOSCOPY;  Colonoscopy;  Surgeon: Yanick Ramsey MD;  [facility]: [facility]     HC ANGIOGRAPHY ARCH  4-3-98     HC BIOPSY OF BREAST, OPEN INCISIONAL  1960    Benign     HC COLONOSCOPY THRU STOMA, DIAGNOSTIC  4-24-98    Diverticuli - due in 2008     HC ERCP W SPHINCTEROTOMY  1996 6/2/96 and 8/30/96     HC REDUCTION OF LARGE BREAST  1988     HC REMOVAL GALLBLADDER  1995     HC UGI ENDOSCOPY DIAG W OR W/O BRUSH/WASH  7-     INJECT EPIDURAL LUMBAR N/A 8/10/2017    Procedure: INJECT EPIDURAL LUMBAR;  Lumbar 2-3 Epidural Steroid Injection;  Surgeon: Kimani Garcia MD;  Location: [facility]     INJECT EPIDURAL LUMBAR Right 5/10/2018    Procedure: INJECT EPIDURAL LUMBAR;  right lumbar 2 - lumbar 3 epidural steroid injection;  Surgeon: Kimani Garcia MD;  Location: [facility]       Family History:    Family History   Problem Relation Age of Onset     Cardiovascular Father         Aortic aneurysm     Hypertension Father      Hypertension Mother      Gastrointestinal Disease Mother         GI Bleed     Lipids Sister      Hypertension Sister      Genitourinary Problems Sister      Allergies Sister      Cancer Brother         Lung     Alcohol/Drug Brother      Connective Tissue Disorder Son         Down Syndrome     Respiratory Son         Pneumonia     Cancer Maternal Grandmother         Stomach     Hypertension Maternal Grandfather      Allergies Daughter        Social  History:  Marital Status:   [2]  Social History     Tobacco Use     Smoking status: Never Smoker     Smokeless tobacco: Never Used   Substance Use Topics     Alcohol use: No     Drug use: No        Medications:      acetaminophen (TYLENOL) 650 MG CR tablet   alcohol swab prep pads   ALLEGRA 180 MG PO TABS   amLODIPine (NORVASC) 5 MG tablet   apixaban ANTICOAGULANT (ELIQUIS) 5 MG tablet   atorvastatin (LIPITOR) 40 MG tablet   blood glucose calibration (NO BRAND SPECIFIED) solution   blood glucose monitoring (NO BRAND SPECIFIED) meter device kit   blood glucose monitoring (RELION PRIME TEST) test strip   Blood Glucose Monitoring Suppl W/DEVICE KIT   co-enzyme Q-10 100 MG CAPS capsule   fish oil-omega-3 fatty acids (FISH OIL) 1000 MG capsule   Lancets (E-Z JECT LANCET MICRO-THIN 33G) MISC   levothyroxine (SYNTHROID/LEVOTHROID) 88 MCG tablet   lisinopril (PRINIVIL/ZESTRIL) 40 MG tablet   metFORMIN (GLUCOPHAGE) 500 MG tablet   metFORMIN (GLUCOPHAGE-XR) 500 MG 24 hr tablet   metoprolol tartrate (LOPRESSOR) 50 MG tablet   NUTRITIONAL SUPPLEMENT OR   Nutritional Supplements (NUTRITIONAL SUPPLEMENT PO)   ONETOUCH ULTRA test strip   thin (NO BRAND SPECIFIED) lancets         Review of Systems   All other systems reviewed and are negative.      Physical Exam   BP: (!) 144/100(left arm regular cuff)  Pulse: 124  Heart Rate: 135  Temp: 96.6  F (35.9  C)  Resp: 16  Weight: 80.3 kg (177 lb)  SpO2: 99 %      Physical Exam   Constitutional: She is oriented to person, place, and time. She appears well-developed and well-nourished. No distress.   HENT:   Head: Normocephalic and atraumatic.   Right Ear: External ear normal.   Left Ear: External ear normal.   Nose: Nose normal.   Mouth/Throat: No oropharyngeal exudate.   Clotted blood without active dripping of blood over the right upper gumline where the tooth was removed approximately the gum where tooth number 5 removed.   Eyes: Pupils are equal, round, and reactive to light.  EOM are normal. No scleral icterus.   Neck: Normal range of motion. Neck supple.   Cardiovascular:   Tachycardic, irregularly irregular   Pulmonary/Chest: Effort normal.   Musculoskeletal: Normal range of motion.   Neurological: She is alert and oriented to person, place, and time.   Skin: Skin is warm and dry. No rash noted. She is not diaphoretic. No erythema. No pallor.   Psychiatric: She has a normal mood and affect.       ED Course        Procedures            Periapical nerve block performed above the area that was bleeding.  I was able to remove all the clot and tissue surrounding the bleeding spot.  She had a very slight ooze of blood around the clot that was in the tooth bed.  I injected that area with lidocaine with epinephrine and the bleeding stopped.  The oozing from the gum resumed and was slight at this point I tried putting gauze soaked in TXA on the wound and held pressure.  This seemed to work at first but then losing surgery again.  After that I tried using silver nitrate sticks to stop the bleeding.  I was able to get almost all of it stopped except one small area.  I then used a quick clot gauze on the wound and held pressure which caused the bleeding to stop.  She was free of bleeding at the time of discharge.  When she walked out she felt a small amount of blood but wanted to go home and tried at home so was given a quick clot dressing to hold pressure on it at home.  We discussed putting a stitch in at multiple stages but we try to avoid that given her bleeding problem putting a stitch there may cause new bleeding that may be difficult to stop.  If she returns I think that would be the next step.    No results found for this or any previous visit (from the past 24 hour(s)).    Medications   tranexamic acid (CYKLOKAPRON) 1 g in sodium chloride 0.9 % 50 mL bolus (has no administration in time range)       Assessments & Plan (with Medical Decision Making)  Bleeding from gums, on Eliquis.  See  the treatment path that was done here today above.  If the patient is to return may consider placing a stitch.  Return to ER precautions were discussed.  She has dental follow-up on Tuesday.     I have reviewed the nursing notes.    I have reviewed the findings, diagnosis, plan and need for follow up with the patient.      Discharge Medication List as of 8/31/2019  2:32 PM          Final diagnoses:   Bleeding gums       8/31/2019   [facility] EMERGENCY DEPARTMENT     Kurt Andrews MD  08/31/19 1523

## 2019-08-31 NOTE — DISCHARGE INSTRUCTIONS
Return to the ER if you develop new or worsening symptoms.  We may need to put a stitch in there if it starts bleeding again.  Follow-up with your dentist on Tuesday.  I hope this stops for good now.  If it restarts use direct pressure for 20 minutes without holding up.

## 2019-08-31 NOTE — ED TRIAGE NOTES
Had tooth pulled one week ago and last night started bleeding from site.  Not wearing her partial at this time.

## 2019-08-31 NOTE — ED AVS SNAPSHOT
[facility] Emergency Department  [address] [place] [address]  Phone:  [phone]  Fax:  [phone]                                    Ni Maya   MRN: [id number]    Department:  [facility] Emergency Department   Date of Visit:  8/31/2019           After Visit Summary Signature Page    I have received my discharge instructions, and my questions have been answered. I have discussed any challenges I see with this plan with the nurse or doctor.    ..........................................................................................................................................  Patient/Patient Representative Signature      ..........................................................................................................................................  Patient Representative Print Name and Relationship to Patient    ..................................................               ................................................  Date                                   Time    ..........................................................................................................................................  Reviewed by Signature/Title    ...................................................              ..............................................  Date                                               Time          22EPIC Rev 08/18

## 2019-09-01 VITALS
OXYGEN SATURATION: 98 % | SYSTOLIC BLOOD PRESSURE: 152 MMHG | TEMPERATURE: 97.1 F | RESPIRATION RATE: 20 BRPM | HEART RATE: 85 BPM | DIASTOLIC BLOOD PRESSURE: 103 MMHG

## 2019-09-01 PROBLEM — Z79.01 LONG TERM CURRENT USE OF ANTICOAGULANT THERAPY: Status: ACTIVE | Noted: 2019-09-01

## 2019-09-01 NOTE — DISCHARGE INSTRUCTIONS
If the bleeding recurs, saturate a piece of the Quik-clot gauze with the Mateo-Synephrine spray and apply firm constant pressure for 15 minutes without peaking.  Since you are on blood thinners, it takes a lot longer for your blood clot.  We note that it will eventually what because he did have some blood clot formed when you came in.  It just takes a lot longer.  Avoid hot foods or beverages for the next couple of days.  Soft diet so as not to irritate or scratch that area.  Avoid alcohol as this will tend to dilate blood vessels and increase your risk of bleeding.  Besides, you do not want to get pregnant again!  Please return to the ED if you worsen or have any concerns.  Keep your dentist appointment on Tuesday as scheduled.  It was a pleasure visiting with both of you this evening.  I am glad we were able to get the bleeding to stop and hope it stays that way.    Thank you for choosing [facility]. We appreciate the opportunity to meet your urgent medical needs. Please let us know if we could have done anything to make your stay more satisfying.    After discharge, please closely monitor for any new or worsening symptoms. Return to the Emergency Department if you develop any acute worsening signs or symptoms.    If you had lab work, cultures or imaging studies done during your stay, the final results may still be pending. We will call you if your plan of care needs to change. However, if you are not improving as expected, please follow up with your primary care provider or clinic.     Start any prescription medications that were prescribed to you and take them as directed.     Please see additional handouts that may be pertinent to your condition.

## 2019-09-01 NOTE — ED PROVIDER NOTES
History     Chief Complaint   Patient presents with     bleeding gums     HPI  Ni Maya is a 79 year old female who presents to the ED tonight with recurrent bleeding from her right upper gum.  She had a tooth extracted on 823 and had stopped her Eliquis for 4 days prior.  She started it again on 824 and then went back to the dentist on 827 and things were looking good.  She had a partial bridge placed and she thinks that rubbed in that area and started her bleeding.  She was in earlier this morning and saw Dr. Andrews who tried multiple different things to stop the bleeding including pressure, local anesthetic with epinephrine, silver nitrate, topical TXA and finally a quick clot.  He was able to get it to stop for the most part but then she started with a small amount of bleeding just that she was about to leave but wanted to go home with additional quick clot and see if she could manage at home.  Bleeding has persisted.  She does not really hold pressure long enough according to her .  He states that she keeps taking it off and looking.  She does have a clot that has formed around that area but she continues to lose slowly.  Denies any dizziness or lightheadedness.  No chest pain or shortness of breath.     Dr. Andrews had considered placing a suture but had concerns that things could worsen if were poking lots of holes in that area and I share those concerns.    Allergies:  Allergies   Allergen Reactions     Sulfa Drugs Rash       Problem List:    Patient Active Problem List    Diagnosis Date Noted     Long term current use of anticoagulant therapy 09/01/2019     Priority: Medium     Cerebral infarction, unspecified mechanism (H) 03/25/2019     Priority: Medium     Lumbar radiculopathy 05/08/2018     Priority: Medium     Acute gout involving toe of right foot 11/27/2017     Priority: Medium     Cellulitis of toe of right foot 11/26/2017     Priority: Medium     Chronic atrial fibrillation (H)  11/26/2017     Priority: Medium     Thrombocytopenia (H) 11/26/2017     Priority: Medium     Type 2 diabetes mellitus with hyperglycemia, without long-term current use of insulin (H) 05/17/2017     Priority: Medium     Essential hypertension with goal blood pressure less than 150/90 05/27/2016     Priority: Medium     ACP (advance care planning) 05/17/2016     Priority: Medium     Advance Care Planning 5/17/2016: Receipt of ACP document:  Received: Health Care Directive which was witnessed or notarized on 11/22/2013.  Document not previously scanned.  Validation form completed and sent with document to be scanned.  Code Status needs to be updated to reflect choices in most recent ACP document. Notification sent to Dr. Lin for followup.  Confirmed/documented designated decision maker(s).  Added by Kassidy Sousa.             HTN, goal below 150/90 11/06/2013     Priority: Medium     Hyperlipidemia with target LDL less than 100 10/16/2013     Priority: Medium     Diagnosis updated by automated process. Provider to review and confirm.       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 Final path MICKIE III with free surgical margins       Urethral stricture 01/03/2008     Priority: Medium     Problem list name updated by automated process. Provider to review       Edema 12/29/2006     Priority: Medium     Obesity 12/29/2006     Priority: Medium     Problem list name updated by automated process. Provider to review        Chronic kidney disease, stage II (mild) 11/27/2006     Priority: Medium     Symptomatic menopausal or female climacteric states 01/13/2005     Priority: Medium     Allergic rhinitis 10/21/2004     Priority: Medium     Problem list name updated by automated process. Provider to review       Hypothyroidism 06/25/2002     Priority: Medium     Problem list name updated by automated process. Provider to review          Past Medical History:    Past Medical History:   Diagnosis Date     Allergic rhinitis, cause unspecified      Essential hypertension, benign      Infection of kidney, unspecified 1999     Other specified acquired hypothyroidism      Other specified types of cystitis(595.89)        Past Surgical History:    Past Surgical History:   Procedure Laterality Date     BIOPSY VULVA/PERINEUM,ADDNL LESN  9/18/09    Wide -excision of MICKIE III- right labia      C LAPAROSCOPY, SURGICAL; W/ VAGINAL HYSTERECTOMY W/WO REMOVAL OVARY(S)/TUBES  1984    for bleeding     C LIGATE FALLOPIAN TUBE,POSTPARTUM  1978    Tubal Ligation     COLONOSCOPY  9/13/2013    Procedure: COLONOSCOPY;  Colonoscopy;  Surgeon: Yanick Ramsey MD;  Location: [facility]     HC ANGIOGRAPHY ARCH  4-3-98     HC BIOPSY OF BREAST, OPEN INCISIONAL  1960    Benign     HC COLONOSCOPY THRU STOMA, DIAGNOSTIC  4-24-98    Diverticuli - due in 2008     HC ERCP W SPHINCTEROTOMY  1996    6/2/96 and 8/30/96     HC REDUCTION OF LARGE BREAST  1988     HC REMOVAL GALLBLADDER  1995     HC UGI ENDOSCOPY DIAG W OR W/O BRUSH/WASH  7-     INJECT EPIDURAL LUMBAR N/A 8/10/2017    Procedure: INJECT EPIDURAL LUMBAR;  Lumbar 2-3 Epidural Steroid Injection;  Surgeon: Kimani Garcia MD;  Location: [facility]     INJECT EPIDURAL LUMBAR Right 5/10/2018    Procedure: INJECT EPIDURAL LUMBAR;  right lumbar 2 - lumbar 3 epidural steroid injection;  Surgeon: Kimnai Garcia MD;  Location: [facility] OR       Family History:    Family History   Problem Relation Age of Onset     Cardiovascular  Father         Aortic aneurysm     Hypertension Father      Hypertension Mother      Gastrointestinal Disease Mother         GI Bleed     Lipids Sister      Hypertension Sister      Genitourinary Problems Sister      Allergies Sister      Cancer Brother         Lung     Alcohol/Drug Brother      Connective Tissue Disorder Son         Down Syndrome     Respiratory Son         Pneumonia     Cancer Maternal Grandmother         Stomach     Hypertension Maternal Grandfather      Allergies Daughter        Social History:  Marital Status:   [2]  Social History     Tobacco Use     Smoking status: Never Smoker     Smokeless tobacco: Never Used   Substance Use Topics     Alcohol use: No     Drug use: No        Medications:      acetaminophen (TYLENOL) 650 MG CR tablet   alcohol swab prep pads   ALLEGRA 180 MG PO TABS   amLODIPine (NORVASC) 5 MG tablet   apixaban ANTICOAGULANT (ELIQUIS) 5 MG tablet   atorvastatin (LIPITOR) 40 MG tablet   blood glucose calibration (NO BRAND SPECIFIED) solution   blood glucose monitoring (NO BRAND SPECIFIED) meter device kit   blood glucose monitoring (RELION PRIME TEST) test strip   Blood Glucose Monitoring Suppl W/DEVICE KIT   co-enzyme Q-10 100 MG CAPS capsule   fish oil-omega-3 fatty acids (FISH OIL) 1000 MG capsule   Lancets (E-Z JECT LANCET MICRO-THIN 33G) MISC   levothyroxine (SYNTHROID/LEVOTHROID) 88 MCG tablet   lisinopril (PRINIVIL/ZESTRIL) 40 MG tablet   metFORMIN (GLUCOPHAGE) 500 MG tablet   metFORMIN (GLUCOPHAGE-XR) 500 MG 24 hr tablet   metoprolol tartrate (LOPRESSOR) 50 MG tablet   NUTRITIONAL SUPPLEMENT OR   Nutritional Supplements (NUTRITIONAL SUPPLEMENT PO)   ONETOUCH ULTRA test strip   thin (NO BRAND SPECIFIED) lancets         Review of Systems   All other systems reviewed and are negative.      Physical Exam   BP: (!) 152/103  Pulse: 85  Temp: 97.1  F (36.2  C)  Resp: 20  SpO2: 98 %      Physical Exam   Constitutional: She appears well-developed and well-nourished. No  distress.   HENT:   Mouth/Throat:       She does have clot around the area that is bleeding and extending backwards over a couple of teeth.  I suctioned this away and she has a slow ooze from the gum where approximately #6 was extracted.     Pulmonary/Chest: Effort normal. No respiratory distress.   Psychiatric: She has a normal mood and affect.       ED Course  (with Medical Decision Making)    79-year-old female on Eliquis for chronic A. fib restarted this a day after having her tooth extracted back on 823.  She had a recheck on 827 and things were looking good and then was fitted with a partial which rubbed and is now caused bleeding.  She was in this morning and had bleeding almost controlled with various modalities but it is persisted tonight.  Her  states she applies pressure but does not keep it in place long enough, takes the gauze off and started bleeding again.  I am encouraged that she is actually forming clot it just takes a while.    I did place a dental block with Marcaine 0.5% with epinephrine and then also injected some in the gum around the area of bleeding.  This did slow it somewhat.  I then used a piece of Quik-Clot gauze and applied constant pressure for about 10 minutes.  There was just a slow ooze remaining.  We reapplied pressure with the Quik-clot dressing and I sent an order for Mateo-Synephrine to the pharmacy.  We will soak some gauze with that to see if we can get more vasoconstriction locally.  There is just not a lot of tissue there to put a suture in and I am afraid I might make things worse if I start poking more holes in her gum.    I saturated a piece of Quik-clot gauze with Mateo-Synephrine and held this in place for about 10 minutes.  The bleeding stopped.  It remained dry upon reinspection.  We saturated another piece and placed it in there and I had her bite down on the Quik-clot gauze along with several 2 x 2's to apply pressure while she goes home.  I sent some of the  Quik-clot gauze along with the bottle of Tia-Synephrine home with them in case the bleeding recurs they can try this at home before coming back if unsuccessful.  She will keep her dentist appointment on Tuesday.  So far, we were asked to successfully get the bleeding to stop without having to poke more holes in her tissues.              Procedures               Critical Care time:  none               No results found for this or any previous visit (from the past 24 hour(s)).    Medications   phenylephrine (TIA-SYNEPHRINE) 0.5 % spray 2 drop (has no administration in time range)       Assessments & Plan     I have reviewed the nursing notes.    I have reviewed the findings, diagnosis, plan and need for follow up with the patient.       New Prescriptions    No medications on file       Final diagnoses:   Bleeding gums   Long term current use of anticoagulant therapy       8/31/2019   [facility] EMERGENCY DEPARTMENT     Suresh Pablo MD  09/01/19 0046

## 2019-09-03 ENCOUNTER — TELEPHONE (OUTPATIENT)
Dept: FAMILY MEDICINE | Facility: OTHER | Age: 79
End: 2019-09-03

## 2019-09-03 NOTE — TELEPHONE ENCOUNTER
ER visits printed per signed SAEID. Needs to be signed by provider as well. Placed in out of office basket for covering providers to review   Brandy Bernal CMA

## 2019-09-03 NOTE — TELEPHONE ENCOUNTER
Reason for Call:  Form, our goal is to have forms completed with 72 hours, however, some forms may require a visit or additional information.    Type of letter, form or note:  medical    Who is the form from?: [facility] (if other please explain)    Where did the form come from: form was faxed in    What clinic location was the form placed at?: [facility] - [phone]    Where the form was placed:  Box/Folder    What number is listed as a contact on the form?: [phone]       Additional comments: sign fax back    Call taken on 9/3/2019 at 2:20 PM by Carmen Edmonds

## 2019-09-05 NOTE — PROGRESS NOTES
Subjective     Ni Maya is a 79 year old female who presents to clinic today for the following health issues:      Diabetes:   She presents for follow up of diabetes.  She is checking home blood glucose two times daily. She checks blood glucose before meals.  Blood glucose is never over 200 and never under 70. She is aware of hypoglycemia symptoms including blurred vision. She has no concerns regarding her diabetes at this time.  The patient has had a diabetic eye exam in the last 12 months. Eye exam performed on 3-.    Diabetes Management Resources     Hypothyroidism Follow-up      Since last visit, patient describes the following symptoms: Weight stable, no hair loss, no skin changes, no constipation, no loose stools    PHQ-2 Score:     PHQ-2 ( 1999 [employer]) 3/25/2019 1/2/2019   Q1: Little interest or pleasure in doing things 0 -   Q2: Feeling down, depressed or hopeless 0 -   PHQ-2 Score 0 -   PHQ-2 Score - Incomplete         -------------------------------------    Patient Active Problem List   Diagnosis     Hypothyroidism     Allergic rhinitis     Symptomatic menopausal or female climacteric states     Chronic kidney disease, stage II (mild)     Edema     Obesity     Urethral stricture     MICKIE III (vulvar intraepithelial neoplasia III)     Vulval lesion     Health Care Home     Type 2 diabetes, HbA1C goal < 8% (H)     Hyperlipidemia with target LDL less than 100     HTN, goal below 150/90     ACP (advance care planning)     Essential hypertension with goal blood pressure less than 150/90     Type 2 diabetes mellitus with hyperglycemia, without long-term current use of insulin (H)     Cellulitis of toe of right foot     Chronic atrial fibrillation (H)     Thrombocytopenia (H)     Acute gout involving toe of right foot     Lumbar radiculopathy     Cerebral infarction, unspecified mechanism (H)     Long term current use of anticoagulant therapy     Past Surgical History:   Procedure Laterality Date      BIOPSY VULVA/PERINEUM,ADDNL LESN  9/18/09    Wide -excision of MICKIE III- right labia      C LAPAROSCOPY, SURGICAL; W/ VAGINAL HYSTERECTOMY W/WO REMOVAL OVARY(S)/TUBES  1984    for bleeding     C LIGATE FALLOPIAN TUBE,POSTPARTUM  1978    Tubal Ligation     COLONOSCOPY  9/13/2013    Procedure: COLONOSCOPY;  Colonoscopy;  Surgeon: Yanick Ramsey MD;  [facility]: [facility]     HC ANGIOGRAPHY ARCH  4-3-98     HC BIOPSY OF BREAST, OPEN INCISIONAL  1960    Benign     HC COLONOSCOPY THRU STOMA, DIAGNOSTIC  4-24-98    Diverticuli - due in 2008     HC ERCP W SPHINCTEROTOMY  1996 6/2/96 and 8/30/96     HC REDUCTION OF LARGE BREAST  1988     HC REMOVAL GALLBLADDER  1995     HC UGI ENDOSCOPY DIAG W OR W/O BRUSH/WASH  7-     INJECT EPIDURAL LUMBAR N/A 8/10/2017    Procedure: INJECT EPIDURAL LUMBAR;  Lumbar 2-3 Epidural Steroid Injection;  Surgeon: Kimani Garcia MD;  [facility]     INJECT EPIDURAL LUMBAR Right 5/10/2018    Procedure: INJECT EPIDURAL LUMBAR;  right lumbar 2 - lumbar 3 epidural steroid injection;  Surgeon: Kimani Garcia MD;  Location: [facility]       Social History     Tobacco Use     Smoking status: Never Smoker     Smokeless tobacco: Never Used   Substance Use Topics     Alcohol use: No     Family History   Problem Relation Age of Onset     Cardiovascular Father         Aortic aneurysm     Hypertension Father      Hypertension Mother      Gastrointestinal Disease Mother         GI Bleed     Lipids Sister      Hypertension Sister      Genitourinary Problems Sister      Allergies Sister      Cancer Brother         Lung     Alcohol/Drug Brother      Connective Tissue Disorder Son         Down Syndrome     Respiratory Son         Pneumonia     Cancer Maternal Grandmother         Stomach     Hypertension Maternal Grandfather      Allergies Daughter            Reviewed and updated as needed this visit by Provider  Tobacco  Allergies  Meds  Problems  Med Hx  Surg Hx  Fam Hx         Review of Systems  "  Constitutional, HEENT, cardiovascular, pulmonary, GI, , musculoskeletal, neuro, skin, endocrine and psych systems are negative, except as in HPI or otherwise noted         Objective    BP (!) 183/132   Pulse 78   Temp 97.5  F (36.4  C) (Temporal)   Resp 20   Ht 1.651 m (5' 5\")   Wt 79.8 kg (176 lb)   SpO2 98%   Breastfeeding? No   BMI 29.29 kg/m    Body mass index is 29.29 kg/m .  Physical Exam   GENERAL: healthy, alert and no distress  RESP: lungs clear to auscultation - no rales, rhonchi or wheezes  CV: regular rate and rhythm, normal S1 S2, no S3 or S4, no murmur, click or rub, no peripheral edema and peripheral pulses strong  ABDOMEN: soft, nontender, no hepatosplenomegaly, no masses and bowel sounds normal  MS: no gross musculoskeletal defects noted, no edema  SKIN: Pinky finger L hand with noted synovial cyst  NEURO: Normal strength and tone, mentation intact and speech normal  PSYCH: mentation appears normal, affect normal/bright            Assessment & Plan       ICD-10-CM    1. Synovial cyst M71.30    2. Type 2 diabetes mellitus with hyperglycemia, without long-term current use of insulin (H) E11.65 metFORMIN (GLUCOPHAGE-XR) 500 MG 24 hr tablet   3. Hypothyroidism, unspecified type E03.9 TSH with free T4 reflex   4. Hoarseness R49.0 OTOLARYNGOLOGY REFERRAL     Patient is doing well with diabetes based on last a1c and stable report with her blood sugars at home. Thyroid needs to be rechecked after change. Had allergies and hoarse voice, but allergies improved and still has hoarse voice. Would like to get this taken care of. Referral given for ENT, though advised it is not urgent as she is feeling overwhelmed at this time.  Synovial cyst - will wrap and try conservative therapy if bothering her, but feels grateful just to know what it is. Advised surgery to remove if bothering her enough and conservative therapy not doing well.       BMI:   Estimated body mass index is 29.29 kg/m  as calculated " "from the following:    Height as of this encounter: 1.651 m (5' 5\").    Weight as of this encounter: 79.8 kg (176 lb).   Weight management plan: Discussed healthy diet and exercise guidelines        Return in about 4 months (around 1/12/2020).    Apryl Olvera MD, MD  [facility]    "

## 2019-09-09 ENCOUNTER — OFFICE VISIT (OUTPATIENT)
Dept: DERMATOLOGY | Facility: CLINIC | Age: 79
End: 2019-09-09
Payer: COMMERCIAL

## 2019-09-09 VITALS — DIASTOLIC BLOOD PRESSURE: 71 MMHG | HEART RATE: 62 BPM | SYSTOLIC BLOOD PRESSURE: 129 MMHG

## 2019-09-09 DIAGNOSIS — C44.319 BASAL CELL CARCINOMA (BCC) OF RIGHT FOREHEAD: Primary | ICD-10-CM

## 2019-09-09 PROCEDURE — 17311 MOHS 1 STAGE H/N/HF/G: CPT | Performed by: DERMATOLOGY

## 2019-09-09 PROCEDURE — 17312 MOHS ADDL STAGE: CPT | Performed by: DERMATOLOGY

## 2019-09-09 PROCEDURE — 14301 TIS TRNFR ANY 30.1-60 SQ CM: CPT | Performed by: DERMATOLOGY

## 2019-09-09 RX ORDER — MUPIROCIN 20 MG/G
OINTMENT TOPICAL ONCE
Status: COMPLETED | OUTPATIENT
Start: 2019-09-09 | End: 2019-09-09

## 2019-09-09 RX ORDER — CIPROFLOXACIN 500 MG/1
500 TABLET, FILM COATED ORAL 2 TIMES DAILY
Qty: 14 TABLET | Refills: 0 | Status: SHIPPED | OUTPATIENT
Start: 2019-09-09 | End: 2019-09-26

## 2019-09-09 RX ORDER — MUPIROCIN 20 MG/G
OINTMENT TOPICAL
Qty: 30 G | Refills: 0 | Status: SHIPPED | OUTPATIENT
Start: 2019-09-09 | End: 2020-07-07

## 2019-09-09 RX ADMIN — MUPIROCIN: 20 OINTMENT TOPICAL at 11:00

## 2019-09-09 ASSESSMENT — PAIN SCALES - GENERAL: PAINLEVEL: NO PAIN (1)

## 2019-09-09 NOTE — PROGRESS NOTES
[facility] Mohs Dermatologic Surgery Procedure Note    [facility]   [address], [place] [address]     Date of Service:  Sep 9, 2019  Surgery: Mohs micrographic surgery    Case 1  Repair Type: rhombic transposition flap  Repair Size: 5.0 x 5.5 cm  Suture Material: Fast Absorbing Gut 5-0, Monocryl 5-0, Monocryl 4-0   Tumor Type: BCC - Basal cell carcinoma  Location: right superior lateral forehead  Derm-Path Accession #: [id number]  PreOp Size: 1.3 x 1.2 cm  PostOp Size: 2.5 x 2.0 cm  Mohs Accession #: [id number]  Level of Defect: fat      Procedure:  We discussed the principles of treatment and most likely complications including scarring, bleeding, infection, swelling, pain, crusting, nerve damage, large wound,  incomplete excision, wound dehiscence,  nerve damage, recurrence, and a second procedure may be recommended to obtain the best cosmetic or functional result.    Informed consent was obtained and the patient underwent the procedure as follows:  The patient was placed supine on the operating table.  The cancer was identified, outlined with a marker, and verified by the patient.  The entire surgical field was prepped with Hibiclens.  The surgical site was anesthetized using Lidocaine 1% with epi 1:100,000.    The area of clinically apparent tumor was debulked. The layer of tissue was then surgically excised using a #15 blade and was then transferred onto a specimen sheet maintaining the orientation of the specimen. Hemostasis was obtained using bipolar electrocoagulation. The wound site was then covered with a dressing while the tissue samples were processed for examination.    The excised tissue was transported to the Mohs histology laboratory maintaining the tissue orientation.  The tissue specimen was relaxed so that the entire surgical margin was in a a single horizontal plane for sectioning and inked for precise mapping.  A precise  reference map was drawn to reflect the sectioning of the specimen, colored inking of the margins, and orientation on the patient.  The tissue was processed using horizontal sectioning of the base and continuous peripheral margins.  The histopathologic sections were reviewed in conjunction with the reference map.    Total blocks: 1    Total slides:  3    Residual tumor was identified and indicated in red on the reference map, identifying the location where further tissue excision was necessary. Therefore, an additional stage of Mohs Micrographic surgery was deemed necessary.     Stage II   The patient was returned to the operating room, and the area prepped in the usual manner. The residual tumor was excised using the reference map as a guide. The specimen was transfered to a labeled specimen sheet maintaining the orientation of the specimen. Hemostasis was obtained and the wound site was covered with a dressing while the tissue was processed for examination.     The excised tissue was transported to the Mohs histology laboratory maintaining orientation. The specimen margins were inked for precise mapping and a reference map was prepared for the is additional stage to maintain precise orientation as described above. The tissue was processed using horizontal sectioning of the base and continuous peripheral margins. The histopathologic sections were reviewed in conjunction with the reference map.     Total blocks: 1    Total slides:  1    There were no cancer cells visualized on examination, therefore Mohs surgery was complete.     RECONSTRUCTION: Rhombic Transposition Flap  Because of the full-thickness nature of the defect, a rhombic transposition flap was planned.  After Betasept prep and local anesthesia the patient was draped in a sterile fashion.  A rhombic flap was incised adjacent to the defect. The flap was carefully raised on its well-vascularized base and the surrounding wound edges were widely undermined.  After hemostasis, the flap was transposed into the defect and all were closed in a layered fashion. Post-operative flap size was 5.0 x 5.5 cm, wound area was 2.5x2.0cm, total 32.5cm2.  Estimated blood loss, minimal; complications, none; wound care, routine. Total anesthesia 6.0 ml. Patient was discharged in good condition and will return for wound evaluation in 2 weeks.     Staff involved:    Scribe Disclosure  I, Enmanuel Luis, am serving as a scribe to document services personally performed by Dr. Zach Hardin DO, based on data collection and the provider's statements to me.     Provider Disclosure:   The documentation recorded by the scribe accurately reflects the services I personally performed and the decisions made by me.  I personally performed the procedures today.    Zach Hardin DO    Department of Dermatology  [facility]: Phone: [phone], Fax:[phone]  [facility]: Phone: [phone], Fax: [phone]

## 2019-09-09 NOTE — PATIENT INSTRUCTIONS
Mohs Wound Care Instructions  I will experience scar, altered skin color, bleeding, swelling, pain, crusting and redness. I may experience altered sensation. Risks are excessive bleeding, infection, muscle weakness, thick (hypertrophic or keloidal) scar, and recurrence,. A second procedure may be recommended to obtain the best cosmetic or functional result.  Possible complications of any surgical procedure are bleeding, infection, scarring, alteration in skin color and sensation, muscle weakness in the area, wound dehiscence or seperation, or recurrence of the lesion or disease. On occasion, after healing, a secondary procedure or revision may be recommended in order to obtain the best cosmetic or functional result.   After your surgery, a pressure bandage will be placed over the area that has sutures. This will help prevent bleeding. Please follow these instructions, as they will help you to prevent complications as your wound heals.  For the First 48 hours After Surgery:  1. Leave the pressure bandage on and keep it dry. If it should come loose, you may retape it, but do not take it off.  2. Relax and take it easy. Do not do any vigorous exercise, heavy lifting, or bending forward. This could cause the wound to bleed.  3. Post-operative pain is usually mild. You may take plain or extra strength Tylenol every 4 hours as needed (do not take more than 4,000mg in one day). Do not take any medicine that contains aspirin, ibuprofen or motrin unless you have been recommended these by a doctor.  Avoid alcohol and vitamin E as these may increase your tendency to bleed.  4. You may put an ice pack around the bandaged area for 20 minutes every 2-3 hours. This may help reduce swelling, bruising, and pain. Make sure the ice pack is waterproof so that the pressure bandage does not get wet.   5. You may see a small amount of drainage or blood on your pressure bandage. This is normal. However, if drainage or bleeding continues or  saturates the bandage, you will need to apply firm pressure over the bandage with a washcloth for 15 minutes. If bleeding continues after applying pressure for 15 minutes then go to the nearest emergency room.  48 Hours After Surgery  Carefully remove the bandage and start daily wound care and dressing changes. You may also now shower and get the wound wet. Wash wound with a mild soap and water.  Use caution when washing the wound. Be gentle and do not let the forceful shower stream hit the wound directly.  PAT dry.  Daily Wound Care:  1. Wash wound with a mild soap and water.  Use caution when washing the wound, be gentle and do not let the forceful shower stream hit the wound directly.  2. PAT DRY.  3. Apply Vaseline (from a new container or tube) over the suture line with a Q-tip. It is very important to keep the wound continuously moist, as wounds heal best in a moist environment.  4.  Keep the site covered until sutures are removed, you can cover it with a Telfa (non-stick) dressing and tape or a band-aid.    5. If you are unable to keep wound covered, you must apply Vaseline every 2 - 3 hours (while awake) to ensure it is being kept moist for optimal healing. A dressing overnight is recommended to keep the area moist.   Call Us If:  1. You have pain that is not controlled with Tylenol.  2. You have signs or symptoms of an infection, such as: fever over 100 degrees F, redness, warmth, or foul-smelling or yellow/creamy drainage from the wound.  Who should I call with questions?    [facility]: [phone]     [facility]: [phone]    For urgent needs outside of business hours call the [facility] at [phone] and ask for the dermatology resident on call

## 2019-09-09 NOTE — NURSING NOTE
Ni Maya's goals for this visit include:   Chief Complaint   Patient presents with     Procedure     mohs right superior lateral forehead        She requests these members of her care team be copied on today's visit information: yes    PCP: Apryl Olvera    Referring Provider:  No referring provider defined for this encounter.    /71   Pulse 62     Do you need any medication refills at today's visit? No     Amorrashleigh Hernandez CMA

## 2019-09-09 NOTE — LETTER
9/9/2019         RE: Ni Maya  [address]  [facility] [address]        Dear Colleague,    Thank you for referring your patient, Ni Maya, to the [facility]. Please see a copy of my visit note below.    [facility] Mohs Dermatologic Surgery Procedure Note    [facility]   [address], [place] [address]     Date of Service:  Sep 9, 2019  Surgery: Mohs micrographic surgery    Case 1  Repair Type: rhombic transposition flap  Repair Size: 5.0 x 5.5 cm  Suture Material: Fast Absorbing Gut 5-0, Monocryl 5-0, Monocryl 4-0   Tumor Type: BCC - Basal cell carcinoma  Location: right superior lateral forehead  Derm-Path Accession #: [id number]  PreOp Size: 1.3 x 1.2 cm  PostOp Size: 2.5 x 2.0 cm  Mohs Accession #: [id number]  Level of Defect: fat      Procedure:  We discussed the principles of treatment and most likely complications including scarring, bleeding, infection, swelling, pain, crusting, nerve damage, large wound,  incomplete excision, wound dehiscence,  nerve damage, recurrence, and a second procedure may be recommended to obtain the best cosmetic or functional result.    Informed consent was obtained and the patient underwent the procedure as follows:  The patient was placed supine on the operating table.  The cancer was identified, outlined with a marker, and verified by the patient.  The entire surgical field was prepped with Hibiclens.  The surgical site was anesthetized using Lidocaine 1% with epi 1:100,000.    The area of clinically apparent tumor was debulked. The layer of tissue was then surgically excised using a #15 blade and was then transferred onto a specimen sheet maintaining the orientation of the specimen. Hemostasis was obtained using bipolar electrocoagulation. The wound site was then covered with a dressing while the tissue samples were processed for examination.    The excised tissue was  transported to the Mohs histology laboratory maintaining the tissue orientation.  The tissue specimen was relaxed so that the entire surgical margin was in a a single horizontal plane for sectioning and inked for precise mapping.  A precise reference map was drawn to reflect the sectioning of the specimen, colored inking of the margins, and orientation on the patient.  The tissue was processed using horizontal sectioning of the base and continuous peripheral margins.  The histopathologic sections were reviewed in conjunction with the reference map.    Total blocks: 1    Total slides:  3    Residual tumor was identified and indicated in red on the reference map, identifying the location where further tissue excision was necessary. Therefore, an additional stage of Mohs Micrographic surgery was deemed necessary.     Stage II   The patient was returned to the operating room, and the area prepped in the usual manner. The residual tumor was excised using the reference map as a guide. The specimen was transfered to a labeled specimen sheet maintaining the orientation of the specimen. Hemostasis was obtained and the wound site was covered with a dressing while the tissue was processed for examination.     The excised tissue was transported to the Mohs histology laboratory maintaining orientation. The specimen margins were inked for precise mapping and a reference map was prepared for the is additional stage to maintain precise orientation as described above. The tissue was processed using horizontal sectioning of the base and continuous peripheral margins. The histopathologic sections were reviewed in conjunction with the reference map.     Total blocks: 1    Total slides:  1    There were no cancer cells visualized on examination, therefore Mohs surgery was complete.     RECONSTRUCTION: Rhombic Transposition Flap  Because of the full-thickness nature of the defect, a rhombic transposition flap was planned.  After Betasept  prep and local anesthesia the patient was draped in a sterile fashion.  A rhombic flap was incised adjacent to the defect. The flap was carefully raised on its well-vascularized base and the surrounding wound edges were widely undermined. After hemostasis, the flap was transposed into the defect and all were closed in a layered fashion. Post-operative flap size was 5.0 x 5.5 cm, wound area was 2.5x2.0cm, total 32.5cm2.  Estimated blood loss, minimal; complications, none; wound care, routine. Total anesthesia 6.0 ml. Patient was discharged in good condition and will return for wound evaluation in 2 weeks.     Staff involved:    Scribe Disclosure  I, Enmanuel Luis, carter serving as a scribe to document services personally performed by Dr. Zach Hardin DO, based on data collection and the provider's statements to me.     Provider Disclosure:   The documentation recorded by the scribe accurately reflects the services I personally performed and the decisions made by me.  I personally performed the procedures today.    Zach Hardin DO    Department of Dermatology  [facility]: Phone: [phone], Fax:[phone]  [facility]: Phone: [phone], Fax: [phone]                      Again, thank you for allowing me to participate in the care of your patient.        Sincerely,        Zach Hardin MD

## 2019-09-09 NOTE — NURSING NOTE
Mupirocin, Vaseline and pressure dressing applied to Mohs site on Right superior lateral forehead.  Wound care instructions reviewed with patient and AVS provided.  Patient verbalized understanding.  Patient will follow up for 2 week mohs wound check.  No further questions or concerns at this time.    Jerica Thayer LPN on 9/9/2019 at 11:03 AM    The following medication was given:     MEDICATION:  Lidocaine with epinephrine 1% 1:051134  ROUTE: SQ  SITE: see procedure note  DOSE: 7 ml   LOT #: -[id number] and -[id number]   : Hospira  EXPIRATION DATE: 7/1/2020 and 12/1/2019  NDC#: 3337-9117-41   Was there drug waste? 2 ml   Multi-dose vial: Yes    Jerica Thayer LPN  September 9, 2019

## 2019-09-12 ENCOUNTER — OFFICE VISIT (OUTPATIENT)
Dept: FAMILY MEDICINE | Facility: OTHER | Age: 79
End: 2019-09-12
Payer: COMMERCIAL

## 2019-09-12 VITALS
HEART RATE: 78 BPM | WEIGHT: 176 LBS | RESPIRATION RATE: 20 BRPM | TEMPERATURE: 97.5 F | HEIGHT: 65 IN | DIASTOLIC BLOOD PRESSURE: 132 MMHG | BODY MASS INDEX: 29.32 KG/M2 | OXYGEN SATURATION: 98 % | SYSTOLIC BLOOD PRESSURE: 183 MMHG

## 2019-09-12 DIAGNOSIS — E03.9 HYPOTHYROIDISM, UNSPECIFIED TYPE: ICD-10-CM

## 2019-09-12 DIAGNOSIS — M71.30 SYNOVIAL CYST: Primary | ICD-10-CM

## 2019-09-12 DIAGNOSIS — E11.65 TYPE 2 DIABETES MELLITUS WITH HYPERGLYCEMIA, WITHOUT LONG-TERM CURRENT USE OF INSULIN (H): ICD-10-CM

## 2019-09-12 DIAGNOSIS — R49.0 HOARSENESS: ICD-10-CM

## 2019-09-12 LAB — TSH SERPL DL<=0.005 MIU/L-ACNC: 1.83 MU/L (ref 0.4–4)

## 2019-09-12 PROCEDURE — 99214 OFFICE O/P EST MOD 30 MIN: CPT | Performed by: FAMILY MEDICINE

## 2019-09-12 PROCEDURE — 84443 ASSAY THYROID STIM HORMONE: CPT | Performed by: FAMILY MEDICINE

## 2019-09-12 PROCEDURE — 99207 C PAF COMPLETED  NO CHARGE: CPT | Performed by: FAMILY MEDICINE

## 2019-09-12 PROCEDURE — 36415 COLL VENOUS BLD VENIPUNCTURE: CPT | Performed by: FAMILY MEDICINE

## 2019-09-12 RX ORDER — METFORMIN HCL 500 MG
500 TABLET, EXTENDED RELEASE 24 HR ORAL
Qty: 90 TABLET | Refills: 1 | Status: SHIPPED | OUTPATIENT
Start: 2019-09-12 | End: 2020-03-11

## 2019-09-12 ASSESSMENT — MIFFLIN-ST. JEOR: SCORE: 1264.21

## 2019-09-12 NOTE — RESULT ENCOUNTER NOTE
Ni, your results were all normal.    Please let me know if you have any questions.    Apryl Olvera MD

## 2019-09-20 ENCOUNTER — APPOINTMENT (OUTPATIENT)
Dept: ULTRASOUND IMAGING | Facility: CLINIC | Age: 79
End: 2019-09-20
Attending: NURSE PRACTITIONER
Payer: COMMERCIAL

## 2019-09-20 ENCOUNTER — HOSPITAL ENCOUNTER (EMERGENCY)
Facility: CLINIC | Age: 79
Discharge: HOME | End: 2019-09-20
Attending: NURSE PRACTITIONER | Admitting: NURSE PRACTITIONER
Payer: COMMERCIAL

## 2019-09-20 VITALS
HEART RATE: 85 BPM | BODY MASS INDEX: 29.92 KG/M2 | SYSTOLIC BLOOD PRESSURE: 144 MMHG | OXYGEN SATURATION: 100 % | RESPIRATION RATE: 14 BRPM | TEMPERATURE: 98.4 F | WEIGHT: 179.8 LBS | DIASTOLIC BLOOD PRESSURE: 89 MMHG

## 2019-09-20 DIAGNOSIS — M71.20 BAKER'S CYST: ICD-10-CM

## 2019-09-20 DIAGNOSIS — R60.9 DEPENDENT EDEMA: ICD-10-CM

## 2019-09-20 LAB
ANION GAP SERPL CALCULATED.3IONS-SCNC: 6 MMOL/L (ref 3–14)
APTT PPP: 41 SEC (ref 22–37)
BASOPHILS # BLD AUTO: 0 10E9/L (ref 0–0.2)
BASOPHILS NFR BLD AUTO: 0.6 %
BUN SERPL-MCNC: 13 MG/DL (ref 7–30)
CALCIUM SERPL-MCNC: 8.6 MG/DL (ref 8.5–10.1)
CHLORIDE SERPL-SCNC: 108 MMOL/L (ref 94–109)
CO2 SERPL-SCNC: 29 MMOL/L (ref 20–32)
CREAT SERPL-MCNC: 0.79 MG/DL (ref 0.52–1.04)
DIFFERENTIAL METHOD BLD: ABNORMAL
EOSINOPHIL NFR BLD AUTO: 4.2 %
ERYTHROCYTE [DISTWIDTH] IN BLOOD BY AUTOMATED COUNT: 12.9 % (ref 10–15)
GFR SERPL CREATININE-BSD FRML MDRD: 70 ML/MIN/{1.73_M2}
GLUCOSE SERPL-MCNC: 123 MG/DL (ref 70–99)
HCT VFR BLD AUTO: 37.1 % (ref 35–47)
HGB BLD-MCNC: 12.5 G/DL (ref 11.7–15.7)
IMM GRANULOCYTES # BLD: 0 10E9/L (ref 0–0.4)
IMM GRANULOCYTES NFR BLD: 0.3 %
INR PPP: 1.26 (ref 0.86–1.14)
LYMPHOCYTES # BLD AUTO: 1.9 10E9/L (ref 0.8–5.3)
LYMPHOCYTES NFR BLD AUTO: 26.9 %
MCH RBC QN AUTO: 31.4 PG (ref 26.5–33)
MCHC RBC AUTO-ENTMCNC: 33.7 G/DL (ref 31.5–36.5)
MCV RBC AUTO: 93 FL (ref 78–100)
MONOCYTES # BLD AUTO: 0.8 10E9/L (ref 0–1.3)
MONOCYTES NFR BLD AUTO: 11.2 %
NEUTROPHILS # BLD AUTO: 4.1 10E9/L (ref 1.6–8.3)
NEUTROPHILS NFR BLD AUTO: 56.8 %
NRBC # BLD AUTO: 0 10*3/UL
NRBC BLD AUTO-RTO: 0 /100
NT-PROBNP SERPL-MCNC: 905 PG/ML (ref 0–1800)
PLATELET # BLD AUTO: 117 10E9/L (ref 150–450)
POTASSIUM SERPL-SCNC: 3.6 MMOL/L (ref 3.4–5.3)
RBC # BLD AUTO: 3.98 10E12/L (ref 3.8–5.2)
SODIUM SERPL-SCNC: 143 MMOL/L (ref 133–144)
WBC # BLD AUTO: 7.2 10E9/L (ref 4–11)

## 2019-09-20 PROCEDURE — 80048 BASIC METABOLIC PNL TOTAL CA: CPT | Performed by: NURSE PRACTITIONER

## 2019-09-20 PROCEDURE — 85610 PROTHROMBIN TIME: CPT | Performed by: NURSE PRACTITIONER

## 2019-09-20 PROCEDURE — 83880 ASSAY OF NATRIURETIC PEPTIDE: CPT | Performed by: NURSE PRACTITIONER

## 2019-09-20 PROCEDURE — 99284 EMERGENCY DEPT VISIT MOD MDM: CPT | Mod: 25 | Performed by: NURSE PRACTITIONER

## 2019-09-20 PROCEDURE — 36415 COLL VENOUS BLD VENIPUNCTURE: CPT | Performed by: NURSE PRACTITIONER

## 2019-09-20 PROCEDURE — 85730 THROMBOPLASTIN TIME PARTIAL: CPT | Performed by: NURSE PRACTITIONER

## 2019-09-20 PROCEDURE — 93970 EXTREMITY STUDY: CPT

## 2019-09-20 PROCEDURE — 99284 EMERGENCY DEPT VISIT MOD MDM: CPT | Mod: Z6 | Performed by: NURSE PRACTITIONER

## 2019-09-20 PROCEDURE — 85025 COMPLETE CBC W/AUTO DIFF WBC: CPT | Performed by: NURSE PRACTITIONER

## 2019-09-20 ASSESSMENT — ENCOUNTER SYMPTOMS
APPETITE CHANGE: 0
FEVER: 0
ARTHRALGIAS: 1
BRUISES/BLEEDS EASILY: 1
RESPIRATORY NEGATIVE: 1
GASTROINTESTINAL NEGATIVE: 1
CHILLS: 0

## 2019-09-20 NOTE — ED TRIAGE NOTES
"Here with bilateral lower leg edema. Left leg more swollen than right. Spouse states her \"legs were swollen in Feb and she had a stroke\". Legs have progressed in swelling over the week.  "

## 2019-09-20 NOTE — DISCHARGE INSTRUCTIONS
Wear support hose; elevate legs, watch sodium, warm compresses.    Follow up with Apryl Olvera in 1 week.

## 2019-09-20 NOTE — ED AVS SNAPSHOT
[facility] Emergency Department  [address] [place] [address]  Phone:  [phone]  Fax:  [phone]                                    Ni Maya   MRN: [id number]    Department:  [facility] Emergency Department   Date of Visit:  9/20/2019           After Visit Summary Signature Page    I have received my discharge instructions, and my questions have been answered. I have discussed any challenges I see with this plan with the nurse or doctor.    ..........................................................................................................................................  Patient/Patient Representative Signature      ..........................................................................................................................................  Patient Representative Print Name and Relationship to Patient    ..................................................               ................................................  Date                                   Time    ..........................................................................................................................................  Reviewed by Signature/Title    ...................................................              ..............................................  Date                                               Time          22EPIC Rev 08/18

## 2019-09-20 NOTE — ED PROVIDER NOTES
History     Chief Complaint   Patient presents with     Leg Swelling     HPI  Ni Maya is a 79 year old female who has hx of atrial fib, stroke in February this past year on Eliquis presenting with bilateral LE swelling over the past week.  Patient reports no significant pain to right leg, abdomen, pelvis, chest. She reports she felt a painful pop in left calf last Sunday and since has noted increased swelling which is more concerning that to the right leg.  She denies HX of heart failure and her last ECHO b2/9/2019 was unremarkable for significant valvular disease and EF was 69%.  CT abdomen and plevis with contrast in 2015 did not show significant aortic stenosis nor claudication.   She denies associated cough, dyspnea, orthopnea, cyanosis, postpradial fullness.     PCP: Apryl Olvera     Allergies:  Allergies   Allergen Reactions     Sulfa Drugs Rash       Problem List:    Patient Active Problem List    Diagnosis Date Noted     Long term current use of anticoagulant therapy 09/01/2019     Priority: Medium     Cerebral infarction, unspecified mechanism (H) 03/25/2019     Priority: Medium     Lumbar radiculopathy 05/08/2018     Priority: Medium     Acute gout involving toe of right foot 11/27/2017     Priority: Medium     Cellulitis of toe of right foot 11/26/2017     Priority: Medium     Chronic atrial fibrillation (H) 11/26/2017     Priority: Medium     Thrombocytopenia (H) 11/26/2017     Priority: Medium     Type 2 diabetes mellitus with hyperglycemia, without long-term current use of insulin (H) 05/17/2017     Priority: Medium     Essential hypertension with goal blood pressure less than 150/90 05/27/2016     Priority: Medium     ACP (advance care planning) 05/17/2016     Priority: Medium     Advance Care Planning 5/17/2016: Receipt of ACP document:  Received: Health Care Directive which was witnessed or notarized on 11/22/2013.  Document not previously scanned.  Validation form completed and sent  with document to be scanned.  Code Status needs to be updated to reflect choices in most recent ACP document. Notification sent to Dr. Lin for followup.  Confirmed/documented designated decision maker(s).  Added by Kassidy Sousa.             HTN, goal below 150/90 11/06/2013     Priority: Medium     Hyperlipidemia with target LDL less than 100 10/16/2013     Priority: Medium     Diagnosis updated by automated process. Provider to review and confirm.       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 Final path MICKIE III with free surgical margins       Urethral stricture 01/03/2008     Priority: Medium     Problem list name updated by automated process. Provider to review       Edema 12/29/2006     Priority: Medium     Obesity 12/29/2006     Priority: Medium     Problem list name updated by automated process. Provider to review       Chronic kidney disease, stage II (mild) 11/27/2006     Priority: Medium     Symptomatic menopausal or female climacteric states 01/13/2005     Priority: Medium     Allergic rhinitis 10/21/2004     Priority: Medium     Problem list name updated by automated process. Provider to review       Hypothyroidism 06/25/2002     Priority: Medium     Problem list name updated by automated process. Provider to review          Past Medical History:    Past Medical History:   Diagnosis Date     Allergic rhinitis, cause unspecified      Essential hypertension, benign      Infection of kidney,  unspecified 1999     Other specified acquired hypothyroidism      Other specified types of cystitis(595.89)        Past Surgical History:    Past Surgical History:   Procedure Laterality Date     BIOPSY VULVA/PERINEUM,ADDNL LESN  9/18/09    Wide -excision of MICKIE III- right labia      C LAPAROSCOPY, SURGICAL; W/ VAGINAL HYSTERECTOMY W/WO REMOVAL OVARY(S)/TUBES  1984    for bleeding     C LIGATE FALLOPIAN TUBE,POSTPARTUM  1978    Tubal Ligation     COLONOSCOPY  9/13/2013    Procedure: COLONOSCOPY;  Colonoscopy;  Surgeon: Yanick Ramsey MD;  Location: [facility]     HC ANGIOGRAPHY ARCH  4-3-98     HC BIOPSY OF BREAST, OPEN INCISIONAL  1960    Benign     HC COLONOSCOPY THRU STOMA, DIAGNOSTIC  4-24-98    Diverticuli - due in 2008     HC ERCP W SPHINCTEROTOMY  1996 6/2/96 and 8/30/96     HC REDUCTION OF LARGE BREAST  1988     HC REMOVAL GALLBLADDER  1995     HC UGI ENDOSCOPY DIAG W OR W/O BRUSH/WASH  7-     INJECT EPIDURAL LUMBAR N/A 8/10/2017    Procedure: INJECT EPIDURAL LUMBAR;  Lumbar 2-3 Epidural Steroid Injection;  Surgeon: Kimani Garcia MD;  Location[facility]     INJECT EPIDURAL LUMBAR Right 5/10/2018    Procedure: INJECT EPIDURAL LUMBAR;  right lumbar 2 - lumbar 3 epidural steroid injection;  Surgeon: Kimani Garcia MD;  Location: [facility]       Family History:    Family History   Problem Relation Age of Onset     Cardiovascular Father         Aortic aneurysm     Hypertension Father      Hypertension Mother      Gastrointestinal Disease Mother         GI Bleed     Lipids Sister      Hypertension Sister      Genitourinary Problems Sister      Allergies Sister      Cancer Brother         Lung     Alcohol/Drug Brother      Connective Tissue Disorder Son         Down Syndrome     Respiratory Son         Pneumonia     Cancer Maternal Grandmother         Stomach     Hypertension Maternal Grandfather      Allergies Daughter        Social History:  Marital Status:   [2]  Social History     Tobacco Use      Smoking status: Never Smoker     Smokeless tobacco: Never Used   Substance Use Topics     Alcohol use: No     Drug use: No        Medications:      acetaminophen (TYLENOL) 650 MG CR tablet   alcohol swab prep pads   ALLEGRA 180 MG PO TABS   amLODIPine (NORVASC) 5 MG tablet   apixaban ANTICOAGULANT (ELIQUIS) 5 MG tablet   atorvastatin (LIPITOR) 40 MG tablet   blood glucose calibration (NO BRAND SPECIFIED) solution   blood glucose monitoring (NO BRAND SPECIFIED) meter device kit   blood glucose monitoring (RELION PRIME TEST) test strip   Blood Glucose Monitoring Suppl W/DEVICE KIT   co-enzyme Q-10 100 MG CAPS capsule   fish oil-omega-3 fatty acids (FISH OIL) 1000 MG capsule   Lancets (E-Z JECT LANCET MICRO-THIN 33G) MISC   levothyroxine (SYNTHROID/LEVOTHROID) 88 MCG tablet   lisinopril (PRINIVIL/ZESTRIL) 40 MG tablet   metFORMIN (GLUCOPHAGE-XR) 500 MG 24 hr tablet   metoprolol tartrate (LOPRESSOR) 50 MG tablet   mupirocin (BACTROBAN) 2 % external ointment   NUTRITIONAL SUPPLEMENT OR   Nutritional Supplements (NUTRITIONAL SUPPLEMENT PO)   ONETOUCH ULTRA test strip   thin (NO BRAND SPECIFIED) lancets         Review of Systems   Constitutional: Negative for appetite change, chills and fever.   Respiratory: Negative.    Cardiovascular: Positive for leg swelling. Negative for chest pain.   Gastrointestinal: Negative.    Genitourinary: Negative.    Musculoskeletal: Positive for arthralgias.   Skin: Negative.    Hematological: Bruises/bleeds easily.   All other systems reviewed and are negative.      Physical Exam   BP: (!) 151/107  Heart Rate: 77  Temp: 98.4  F (36.9  C)  Resp: 14  Weight: 81.6 kg (179 lb 12.8 oz)  SpO2: 100 %      Physical Exam   Constitutional: She is oriented to person, place, and time. She appears well-developed and well-nourished. No distress.   Patient lying in bed in no distress   HENT:   Head: Normocephalic and atraumatic.   Neck: Neck supple. No JVD present.   Cardiovascular: Normal rate, S1  normal, S2 normal and normal heart sounds. An irregularly irregular rhythm present. Exam reveals no friction rub.   No murmur heard.  Pulses:       Femoral pulses are 1+ on the right side, and 1+ on the left side.       Dorsalis pedis pulses are 1+ on the right side, and 1+ on the left side.        Posterior tibial pulses are 1+ on the right side, and 1+ on the left side.   Pulmonary/Chest: Effort normal and breath sounds normal.   Abdominal: Soft. Bowel sounds are normal.   Musculoskeletal: She exhibits edema.   There is 1+ edema to right LE and 2+ to left with tenderness to left mid calf muscle and slight warmth and redness to sock pattern of left leg.    Neurological: She is alert and oriented to person, place, and time.   Skin: Skin is warm and dry. Capillary refill takes less than 2 seconds. She is not diaphoretic. There is erythema.   Psychiatric: She has a normal mood and affect.   Nursing note and vitals reviewed.      ED Course  Discussed with patient would like to evaluate for DVT.  She has 1+ pulses to bilateral LE and not showing over signs of claudication.  Will check base line labs, BNP, and eval for DVT.        Procedures               Critical Care time:  none               Results for orders placed or performed during the hospital encounter of 09/20/19 (from the past 24 hour(s))   CBC with platelets differential   Result Value Ref Range    WBC 7.2 4.0 - 11.0 10e9/L    RBC Count 3.98 3.8 - 5.2 10e12/L    Hemoglobin 12.5 11.7 - 15.7 g/dL    Hematocrit 37.1 35.0 - 47.0 %    MCV 93 78 - 100 fl    MCH 31.4 26.5 - 33.0 pg    MCHC 33.7 31.5 - 36.5 g/dL    RDW 12.9 10.0 - 15.0 %    Platelet Count 117 (L) 150 - 450 10e9/L    Diff Method Automated Method     % Neutrophils 56.8 %    % Lymphocytes 26.9 %    % Monocytes 11.2 %    % Eosinophils 4.2 %    % Basophils 0.6 %    % Immature Granulocytes 0.3 %    Nucleated RBCs 0 0 /100    Absolute Neutrophil 4.1 1.6 - 8.3 10e9/L    Absolute Lymphocytes 1.9 0.8 - 5.3  "10e9/L    Absolute Monocytes 0.8 0.0 - 1.3 10e9/L    Absolute Basophils 0.0 0.0 - 0.2 10e9/L    Abs Immature Granulocytes 0.0 0 - 0.4 10e9/L    Absolute Nucleated RBC 0.0    Basic metabolic panel   Result Value Ref Range    Sodium 143 133 - 144 mmol/L    Potassium 3.6 3.4 - 5.3 mmol/L    Chloride 108 94 - 109 mmol/L    Carbon Dioxide 29 20 - 32 mmol/L    Anion Gap 6 3 - 14 mmol/L    Glucose 123 (H) 70 - 99 mg/dL    Urea Nitrogen 13 7 - 30 mg/dL    Creatinine 0.79 0.52 - 1.04 mg/dL    GFR Estimate 70 >60 mL/min/[1.73_m2]    GFR Estimate If Black 81 >60 mL/min/[1.73_m2]    Calcium 8.6 8.5 - 10.1 mg/dL   INR   Result Value Ref Range    INR 1.26 (H) 0.86 - 1.14   Partial thromboplastin time   Result Value Ref Range    PTT 41 (H) 22 - 37 sec   NT pro BNP   Result Value Ref Range    N-Terminal Pro BNP Inpatient 905 0 - 1,800 pg/mL   US Lower Extremity Venous Duplex Bilateral    Narrative    ULTRASOUND LOWER EXTREMITY VENOUS DUPLEX BILATERAL   9/20/2019 2:18 PM      HISTORY: Swelling of the bilateral lower extremities, left worse than  right. Patient is reportedly on Eliquis currently.    COMPARISON: None.    FINDINGS: Gray-scale, color and Doppler spectral analysis ultrasound  was performed of the bilateral lower extremities. Compression and  augmentation imaging was performed.    The deep venous systems of the bilateral lower extremities are fully  compressible.  Spectral Doppler demonstrates normal phasicity and  excellent augmentation with calf compression.  Visualized greater  saphenous and deep calf veins are patent.    Fluid collection in the posteromedial right popliteal fossa measures  1.7 x 0.6 x 1.0 cm and could represent small Baker cyst. Edema is seen  in the subcutaneous adipose tissues of the lower leg at the area of  \"swelling.\"      Impression    IMPRESSION:   1. No evidence for DVT within either lower extremity.  2. Small fluid collection in the medial popliteal fossa could  represent a Baker's cyst. "       Medications - No data to display    Assessments & Plan (with Medical Decision Making)  1: Pedal edema: Suspect dependent edema. Recommend elevation of legs at night, compression stockings, warm compress to left leg.  US negative for DVT.  2. Baker's cyst: She is to follow up with PCP for follow up. May need orthopedic referral if becoming more painful/.      I have reviewed the nursing notes.    I have reviewed the findings, diagnosis, plan and need for follow up with the patient.       New Prescriptions    No medications on file       Final diagnoses:   Dependent edema   Coleman's cyst       9/20/2019   [facility] EMERGENCY DEPARTMENT     Nilda Leblanc, NIMA CNP  09/20/19 [id number]

## 2019-09-23 NOTE — PROGRESS NOTES
Subjective     Ni Maya is a 79 year old female who presents to clinic today for the following health issues:    HPI   ED/UC Followup:    Facility:  [facility]   Date of visit: 9/20/19  Reason for visit: Lower left leg pain   Current Status: pain still there and there is still swelling- this has not gotten any better.   *baker cyst* pain 6/10       -------------------------------------    Patient Active Problem List   Diagnosis     Hypothyroidism     Allergic rhinitis     Symptomatic menopausal or female climacteric states     Chronic kidney disease, stage II (mild)     Edema     Obesity     Urethral stricture     MICKIE III (vulvar intraepithelial neoplasia III)     Vulval lesion     Health Care Home     Type 2 diabetes, HbA1C goal < 8% (H)     Hyperlipidemia with target LDL less than 100     HTN, goal below 150/90     ACP (advance care planning)     Essential hypertension with goal blood pressure less than 150/90     Type 2 diabetes mellitus with hyperglycemia, without long-term current use of insulin (H)     Cellulitis of toe of right foot     Chronic atrial fibrillation (H)     Thrombocytopenia (H)     Acute gout involving toe of right foot     Lumbar radiculopathy     Cerebral infarction, unspecified mechanism (H)     Long term current use of anticoagulant therapy     Past Surgical History:   Procedure Laterality Date     BIOPSY VULVA/PERINEUM,ADDNL LESN  9/18/09    Wide -excision of MICKIE III- right labia      C LAPAROSCOPY, SURGICAL; W/ VAGINAL HYSTERECTOMY W/WO REMOVAL OVARY(S)/TUBES  1984    for bleeding     C LIGATE FALLOPIAN TUBE,POSTPARTUM  1978    Tubal Ligation     COLONOSCOPY  9/13/2013    Procedure: COLONOSCOPY;  Colonoscopy;  Surgeon: Yanick Ramsey MD;  [facility]     HC ANGIOGRAPHY ARCH  4-3-98     HC BIOPSY OF BREAST, OPEN INCISIONAL  1960    Benign     HC COLONOSCOPY THRU STOMA, DIAGNOSTIC  4-24-98    Diverticuli - due in 2008     HC ERCP W SPHINCTEROTOMY  1996 6/2/96 and 8/30/96      "HC REDUCTION OF LARGE BREAST  1988     HC REMOVAL GALLBLADDER  1995     HC UGI ENDOSCOPY DIAG W OR W/O BRUSH/WASH  7-     INJECT EPIDURAL LUMBAR N/A 8/10/2017    Procedure: INJECT EPIDURAL LUMBAR;  Lumbar 2-3 Epidural Steroid Injection;  Surgeon: Kimani Garcia MD;  [facility]     INJECT EPIDURAL LUMBAR Right 5/10/2018    Procedure: INJECT EPIDURAL LUMBAR;  right lumbar 2 - lumbar 3 epidural steroid injection;  Surgeon: Kimani Garcia MD;  Location: [facility]       Social History     Tobacco Use     Smoking status: Never Smoker     Smokeless tobacco: Never Used   Substance Use Topics     Alcohol use: No     Family History   Problem Relation Age of Onset     Cardiovascular Father         Aortic aneurysm     Hypertension Father      Hypertension Mother      Gastrointestinal Disease Mother         GI Bleed     Lipids Sister      Hypertension Sister      Genitourinary Problems Sister      Allergies Sister      Cancer Brother         Lung     Alcohol/Drug Brother      Connective Tissue Disorder Son         Down Syndrome     Respiratory Son         Pneumonia     Cancer Maternal Grandmother         Stomach     Hypertension Maternal Grandfather      Allergies Daughter            Reviewed and updated as needed this visit by Provider  Tobacco  Allergies  Meds  Problems  Med Hx  Surg Hx  Fam Hx         Review of Systems   Constitutional, HEENT, cardiovascular, pulmonary, GI, , musculoskeletal, neuro, skin, endocrine and psych systems are negative, except as in HPI or otherwise noted         Objective    /86   Pulse 88   Temp 97  F (36.1  C) (Temporal)   Resp 16   Ht 1.651 m (5' 5\")   Wt 80.5 kg (177 lb 8 oz)   SpO2 98%   BMI 29.54 kg/m    Body mass index is 29.54 kg/m .  Physical Exam   GENERAL: healthy, alert and no distress  RESP: lungs clear to auscultation - no rales, rhonchi or wheezes  CV: regular rate and rhythm, normal S1 S2, no S3 or S4, no murmur, click or rub, no peripheral edema and " "peripheral pulses strong  ABDOMEN: soft, nontender, no hepatosplenomegaly, no masses and bowel sounds normal  MS: L knee swelling is minimal and baker's cyst gone. 1+ edema to LLE   SKIN: no suspicious lesions or rashes  NEURO: Normal strength and tone, mentation intact and speech normal  PSYCH: mentation appears normal, affect normal/bright            Assessment & Plan       ICD-10-CM    1. Synovial cyst of popliteal space, unspecified laterality M71.20 Compression Sleeve/Stocking Order   2. Left leg swelling M79.89 Compression Sleeve/Stocking Order     CANCELED: Compression Sleeve/Stocking Order   3. Need for prophylactic vaccination and inoculation against influenza Z23 INFLUENZA (HIGH DOSE) 3 VALENT VACCINE [77914]     ADMIN INFLUENZA (For MEDICARE Patients ONLY) []     Started with a pop and suddenly had pain and swelling to the L lower extremity. She had evaluation at the ED and normal venous US and small baker's cyst present where she felt the pain and had the pop. Discussed that this history suggests the swelling may be the remnants of the baker's cyst and knee pain and stability are things we can work on. Though she is having no issues there today and declines evaluation or imaging.  So we plan compression stockings for her to help with the swelling as well as advise putting her feet above the level of her heart as often as possible and should improve in the next 1-2 weeks.     BMI:   Estimated body mass index is 29.54 kg/m  as calculated from the following:    Height as of this encounter: 1.651 m (5' 5\").    Weight as of this encounter: 80.5 kg (177 lb 8 oz).   Weight management plan: Discussed healthy diet and exercise guidelines      No follow-ups on file.    Apryl Olvera MD, MD  [facility]"

## 2019-09-25 ENCOUNTER — TELEPHONE (OUTPATIENT)
Dept: FAMILY MEDICINE | Facility: OTHER | Age: 79
End: 2019-09-25

## 2019-09-25 ENCOUNTER — OFFICE VISIT (OUTPATIENT)
Dept: DERMATOLOGY | Facility: CLINIC | Age: 79
End: 2019-09-25
Payer: COMMERCIAL

## 2019-09-25 DIAGNOSIS — C44.319 BASAL CELL CARCINOMA (BCC) OF RIGHT FOREHEAD: Primary | ICD-10-CM

## 2019-09-25 PROCEDURE — 99024 POSTOP FOLLOW-UP VISIT: CPT | Performed by: DERMATOLOGY

## 2019-09-25 ASSESSMENT — PAIN SCALES - GENERAL: PAINLEVEL: NO PAIN (0)

## 2019-09-25 NOTE — NURSING NOTE
Ni Maya's goals for this visit include:   Chief Complaint   Patient presents with     Wound Check     right superior lateral forehead       She requests these members of her care team be copied on today's visit information:     PCP: Apryl Olvera    Referring Provider:  No referring provider defined for this encounter.    There were no vitals taken for this visit.    Do you need any medication refills at today's visit? Eliana Thayer LPN

## 2019-09-25 NOTE — LETTER
9/25/2019         RE: Ni Maya  [id number] [facility] [address]        Dear Colleague,    Thank you for referring your patient, Ni Maya, to the [facility]. Please see a copy of my visit note below.    [facility] Dermatology Post-operative Visit note:  Subjective: The patient follows up for a wound check after undergoing Mohs surgery to remove a basal cell carcinoma from the right superior lateral forehaed on 9/9/19. The resulting defect was repaired with a rhombic transposition flap. Today she reports that the site is doing well. She reports numbness and some tenderness at the site. Denies bleeding, purulence, or excess pain. She asks if she can perm her hair again. The patient is otherwise feeling well. There are no other skin concerns at this time.  Objective: Focused examination of the prior surgical site was performed.  - Appropriately healing surgical site on the right superior lateral forehaed with no purulence, tenderness or surrounding erythema.  Assessment and Plan: Appropriately healing surgical site without any signs of infection    Removed visible sutures from the wound.     Continue application of petroleum jelly on superior C-shape portion of the scar. Okay to resume regular skin and hair care routine otherwise.     Recommended sunscreens SPF #50 or greater and protective clothing. Risk of scar dyspigmentation discussed.     Continue periodic self skin exams and report of any new or changing lesions.     Please refer to previous clinic note for details regarding treatment.  Follow up in March for skin exam.     Staff:    Scribe Disclosure  I, Enmanuel Luis, am serving as a scribe to document services personally performed by Dr. Zach Hardin DO, based on data collection and the provider's statements to me.     Provider Disclosure:   The documentation recorded by the scribe accurately reflects the services I personally performed  and the decisions made by me.    Zach Hardin DO    Department of Dermatology  [facility]: Phone: [phone], Fax:[phone]  [facility]: Phone: [phone], Fax: [phone]          Again, thank you for allowing me to participate in the care of your patient.        Sincerely,        Zach Hardin MD

## 2019-09-25 NOTE — PROGRESS NOTES
[facility] Dermatology Post-operative Visit note:  Subjective: The patient follows up for a wound check after undergoing Mohs surgery to remove a basal cell carcinoma from the right superior lateral forehaed on 9/9/19. The resulting defect was repaired with a rhombic transposition flap. Today she reports that the site is doing well. She reports numbness and some tenderness at the site. Denies bleeding, purulence, or excess pain. She asks if she can perm her hair again. The patient is otherwise feeling well. There are no other skin concerns at this time.  Objective: Focused examination of the prior surgical site was performed.  - Appropriately healing surgical site on the right superior lateral forehaed with no purulence, tenderness or surrounding erythema.  Assessment and Plan: Appropriately healing surgical site without any signs of infection    Removed visible sutures from the wound.     Continue application of petroleum jelly on superior C-shape portion of the scar. Okay to resume regular skin and hair care routine otherwise.     Recommended sunscreens SPF #50 or greater and protective clothing. Risk of scar dyspigmentation discussed.     Continue periodic self skin exams and report of any new or changing lesions.     Please refer to previous clinic note for details regarding treatment.  Follow up in March for skin exam.     Staff:    Scribe Disclosure  I, Enmanuel Luis, am serving as a scribe to document services personally performed by Dr. Zach Hardin DO, based on data collection and the provider's statements to me.     Provider Disclosure:   The documentation recorded by the scribe accurately reflects the services I personally performed and the decisions made by me.    Zach Hardin DO    Department of Dermatology  [facility]: Phone: [phone], Fax:[phone]  [facility]: Phone: [phone], Fax: [phone]

## 2019-09-25 NOTE — TELEPHONE ENCOUNTER
You placed a referral for patient to ENT on 9/12/19.  Patient has not scheduled as of yet.      Please review and forward to team if follow up with the patient is needed.     Thank you!  Kaci/Clinic Referrals Dyad II

## 2019-09-26 ENCOUNTER — OFFICE VISIT (OUTPATIENT)
Dept: FAMILY MEDICINE | Facility: OTHER | Age: 79
End: 2019-09-26
Payer: COMMERCIAL

## 2019-09-26 VITALS
RESPIRATION RATE: 16 BRPM | BODY MASS INDEX: 29.57 KG/M2 | DIASTOLIC BLOOD PRESSURE: 86 MMHG | WEIGHT: 177.5 LBS | HEART RATE: 88 BPM | SYSTOLIC BLOOD PRESSURE: 138 MMHG | TEMPERATURE: 97 F | OXYGEN SATURATION: 98 % | HEIGHT: 65 IN

## 2019-09-26 DIAGNOSIS — M71.20 SYNOVIAL CYST OF POPLITEAL SPACE, UNSPECIFIED LATERALITY: Primary | ICD-10-CM

## 2019-09-26 DIAGNOSIS — Z23 NEED FOR PROPHYLACTIC VACCINATION AND INOCULATION AGAINST INFLUENZA: ICD-10-CM

## 2019-09-26 DIAGNOSIS — M79.89 LEFT LEG SWELLING: ICD-10-CM

## 2019-09-26 PROCEDURE — G0008 ADMIN INFLUENZA VIRUS VAC: HCPCS | Performed by: FAMILY MEDICINE

## 2019-09-26 PROCEDURE — 99214 OFFICE O/P EST MOD 30 MIN: CPT | Mod: 25 | Performed by: FAMILY MEDICINE

## 2019-09-26 PROCEDURE — 90662 IIV NO PRSV INCREASED AG IM: CPT | Performed by: FAMILY MEDICINE

## 2019-09-26 ASSESSMENT — PAIN SCALES - GENERAL: PAINLEVEL: SEVERE PAIN (6)

## 2019-09-26 ASSESSMENT — MIFFLIN-ST. JEOR: SCORE: 1271.01

## 2019-10-07 DIAGNOSIS — I10 BENIGN ESSENTIAL HYPERTENSION: ICD-10-CM

## 2019-10-07 RX ORDER — AMLODIPINE BESYLATE 5 MG/1
TABLET ORAL
Qty: 90 TABLET | Refills: 3 | Status: SHIPPED | OUTPATIENT
Start: 2019-10-07 | End: 2020-07-07

## 2019-10-07 NOTE — TELEPHONE ENCOUNTER
Norvasc  Prescription [place] per [facility] [place].    Aliyah Pichardo, RN, BSN

## 2019-11-14 ENCOUNTER — OFFICE VISIT (OUTPATIENT)
Dept: CARDIOLOGY | Facility: CLINIC | Age: 79
End: 2019-11-14
Payer: COMMERCIAL

## 2019-11-14 VITALS
OXYGEN SATURATION: 97 % | HEART RATE: 76 BPM | DIASTOLIC BLOOD PRESSURE: 70 MMHG | HEIGHT: 65 IN | BODY MASS INDEX: 29.66 KG/M2 | SYSTOLIC BLOOD PRESSURE: 130 MMHG | RESPIRATION RATE: 12 BRPM | WEIGHT: 178 LBS

## 2019-11-14 DIAGNOSIS — I48.20 CHRONIC ATRIAL FIBRILLATION (H): Primary | ICD-10-CM

## 2019-11-14 PROCEDURE — 99213 OFFICE O/P EST LOW 20 MIN: CPT | Performed by: INTERNAL MEDICINE

## 2019-11-14 ASSESSMENT — PAIN SCALES - GENERAL: PAINLEVEL: NO PAIN (0)

## 2019-11-14 ASSESSMENT — MIFFLIN-ST. JEOR: SCORE: 1273.28

## 2019-11-14 NOTE — PROGRESS NOTES
Service Date: 11/14/2019      REASON FOR CARDIOLOGY VISIT:  Followup atrial fibrillation.      HISTORY OF PRESENT ILLNESS:  Ms. Maya is a very pleasant 79-year-old female with history of chronic atrial fibrillation, essentially asymptomatic from it, on rate control strategy on Eliquis for stroke prophylaxis with CHADS2-VASc score of 7.  She has history of stroke.  Today she is coming for routine followup.  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  The patient has no specific cardiac complaints.  Recently she had a swelling in the leg and was diagnosed with a Baker cyst.  No particular bleeding issues with Eliquis.  No dyspnea, no chest pain, no dizziness, no presyncope.  She does go to the gym where she does do some cardiovascular exercise on a machine.  Again, no symptoms doing that.  She had an echocardiogram done in the past in [place] that showed normal LV function, mild tricuspid regurgitation, physiological mitral regurgitation.  She also has history of hypertension.  In addition to Eliquis, she is on metoprolol, she is on lisinopril, she is on amlodipine.      PHYSICAL EXAMINATION:   VITAL SIGNS:  Blood pressure 130/70, heart rate 76 irregular, weight 178 pounds, BMI 29.62.   GENERAL:  The patient appears pleasant, comfortable.   NECK:  Normal JVP, no bruit.   CARDIOVASCULAR SYSTEM:  S1, S2 normal, irregular, no murmur, rub or gallop.   RESPIRATORY SYSTEM:  Clear to auscultation bilaterally.   GASTROINTESTINAL SYSTEM:  Abdomen soft, nontender.   EXTREMITIES:  No pitting pedal edema.   NEUROLOGICAL:  Alert, oriented x3.   PSYCH:  Normal affect.   SKIN:  No obvious rash.   HEENT:  No pallor or icterus.      IMPRESSION AND PLAN:  A pleasant 79-year-old female with history of chronic atrial fibrillation, on rate control strategy, elevated CHADS2-VASc score of 7, on Eliquis for stroke prophylaxis, other comorbidities of hypertension which  appears well controlled.  Overall, cardiac status-wise I think she is doing reasonably well.  If she continues to feel stable cardiac status-wise, we can see her back in 1 year, sooner if she notes any change in clinical status, especially if any exertion-related symptoms.         MARY MIRELES MD             D: 2019   T: 2019   MT: JAYA      Name:     YOBANY ENNIS   MRN:      [id number]        Account:      [id number]   :      1940           Service Date: 2019      Document: [id number]

## 2019-11-14 NOTE — LETTER
11/14/2019    Apryl Olvera MD, MD  [address] [facility] [address]    RE: Ni Maya       Dear Colleague,    I had the pleasure of seeing Ni Maya in the [facility].    HPI and Plan:   See dictation(#[id number])    Orders Placed This Encounter   Procedures     Follow-Up with Cardiologist     Echocardiogram Complete       No orders of the defined types were placed in this encounter.      There are no discontinued medications.      Encounter Diagnosis   Name Primary?     Chronic atrial fibrillation Yes       CURRENT MEDICATIONS:  Current Outpatient Medications   Medication Sig Dispense Refill     acetaminophen (TYLENOL) 650 MG CR tablet Take 1 tablet (650 mg) by mouth every 8 hours as needed for mild pain or fever 180 tablet 3     alcohol swab prep pads Use to swab area of injection/geoffrey as directed. 100 each 3     amLODIPine (NORVASC) 5 MG tablet TAKE 1 TABLET BY MOUTH ONCE DAILY 90 tablet 3     apixaban ANTICOAGULANT (ELIQUIS) 5 MG tablet Take 5 mg by mouth 2 times daily       atorvastatin (LIPITOR) 40 MG tablet TAKE ONE TABLET BY MOUTH ONCE DAILY 90 tablet 2     blood glucose calibration (NO BRAND SPECIFIED) solution To accompany: Blood Glucose Monitor Brands: per insurance. 1 Bottle 3     blood glucose monitoring (NO BRAND SPECIFIED) meter device kit Use to test blood sugar 3 times daily or as directed. Preferred blood glucose meter OR supplies to accompany: Blood Glucose Monitor Brands: per insurance. 1 kit 0     blood glucose monitoring (RELION PRIME TEST) test strip Use to test blood sugar 3 times daily or as directed.       Blood Glucose Monitoring Suppl W/DEVICE KIT 1 kit 3 times daily. 1 kit 0     co-enzyme Q-10 100 MG CAPS capsule Take 100 mg by mouth daily       fish oil-omega-3 fatty acids (FISH OIL) 1000 MG capsule Take 2 g by mouth daily. 2 caps per day       Lancets (E-Z JECT LANCET MICRO-THIN 33G) MISC        levothyroxine (SYNTHROID/LEVOTHROID) 88 MCG  tablet Take 1 tablet (88 mcg) by mouth daily 90 tablet 1     lisinopril (PRINIVIL/ZESTRIL) 40 MG tablet TAKE ONE TABLET BY MOUTH ONCE DAILY 90 tablet 1     metFORMIN (GLUCOPHAGE-XR) 500 MG 24 hr tablet Take 1 tablet (500 mg) by mouth daily (with dinner) 90 tablet 1     metoprolol tartrate (LOPRESSOR) 50 MG tablet Take 1 tablet (50 mg) by mouth 2 times daily       NUTRITIONAL SUPPLEMENT OR VIBE       Nutritional Supplements (NUTRITIONAL SUPPLEMENT PO) Take 1 oz by mouth daily Flex       ONETOUCH ULTRA test strip USE TO TEST BLOOD SUGARS THREE TIMES A  each 3     thin (NO BRAND SPECIFIED) lancets Use with lanceting device. To accompany: Blood Glucose Monitor Brands: per insurance. 100 each 6     ALLEGRA 180 MG PO TABS 1 TABLET DAILY (Patient not taking: No sig reported) 90 Tab 3     mupirocin (BACTROBAN) 2 % external ointment Use 2 times a day to affected area. (Patient not taking: Reported on 9/26/2019) 30 g 0       ALLERGIES     Allergies   Allergen Reactions     Sulfa Drugs Rash       PAST MEDICAL HISTORY:  Past Medical History:   Diagnosis Date     Allergic rhinitis, cause unspecified      Essential hypertension, benign      Infection of kidney, unspecified 1999     Other specified acquired hypothyroidism      Other specified types of cystitis(595.89)     1999,6-2-01, 10-10-01       PAST SURGICAL HISTORY:  Past Surgical History:   Procedure Laterality Date     BIOPSY VULVA/PERINEUM,ADDNL LESN  9/18/09    Wide -excision of MICKIE III- right labia      C LAPAROSCOPY, SURGICAL; W/ VAGINAL HYSTERECTOMY W/WO REMOVAL OVARY(S)/TUBES  1984    for bleeding     C LIGATE FALLOPIAN TUBE,POSTPARTUM  1978    Tubal Ligation     COLONOSCOPY  9/13/2013    Procedure: COLONOSCOPY;  Colonoscopy;  Surgeon: Yanick Ramsey MD;  Location: [facility]     HC ANGIOGRAPHY ARCH  4-3-98     HC BIOPSY OF BREAST, OPEN INCISIONAL  1960    Benign     HC COLONOSCOPY THRU STOMA, DIAGNOSTIC  4-24-98    Diverticuli - due in 2008     HC ERCP W  SPHINCTEROTOMY  1996 6/2/96 and 8/30/96     HC REDUCTION OF LARGE BREAST  1988     HC REMOVAL GALLBLADDER  1995     HC UGI ENDOSCOPY DIAG W OR W/O BRUSH/WASH  7-     INJECT EPIDURAL LUMBAR N/A 8/10/2017    Procedure: INJECT EPIDURAL LUMBAR;  Lumbar 2-3 Epidural Steroid Injection;  Surgeon: Kimani Garcia MD;  [facility]     INJECT EPIDURAL LUMBAR Right 5/10/2018    Procedure: INJECT EPIDURAL LUMBAR;  right lumbar 2 - lumbar 3 epidural steroid injection;  Surgeon: Kimani Garcia MD;  [facility]       FAMILY HISTORY:  Family History   Problem Relation Age of Onset     Cardiovascular Father         Aortic aneurysm     Hypertension Father      Hypertension Mother      Gastrointestinal Disease Mother         GI Bleed     Lipids Sister      Hypertension Sister      Genitourinary Problems Sister      Allergies Sister      Cancer Brother         Lung     Alcohol/Drug Brother      Connective Tissue Disorder Son         Down Syndrome     Respiratory Son         Pneumonia     Cancer Maternal Grandmother         Stomach     Hypertension Maternal Grandfather      Allergies Daughter        SOCIAL HISTORY:  Social History     Socioeconomic History     Marital status:      Spouse name: Marco Antonio     Number of children: 2     Years of education: 17     Highest education level: Not on file   Occupational History     Occupation: Retired in 1994     Employer: RETIRED   Social Needs     Financial resource strain: Not on file     Food insecurity:     Worry: Not on file     Inability: Not on file     Transportation needs:     Medical: Not on file     Non-medical: Not on file   Tobacco Use     Smoking status: Never Smoker     Smokeless tobacco: Never Used   Substance and Sexual Activity     Alcohol use: No     Drug use: No     Sexual activity: Never     Partners: Male   Lifestyle     Physical activity:     Days per week: Not on file     Minutes per session: Not on file     Stress: Not on file   Relationships      "Social connections:     Talks on phone: Not on file     Gets together: Not on file     Attends [employer] service: Not on file     Active member of club or organization: Not on file     Attends meetings of clubs or organizations: Not on file     Relationship status: Not on file     Intimate partner violence:     Fear of current or ex partner: Not on file     Emotionally abused: Not on file     Physically abused: Not on file     Forced sexual activity: Not on file   Other Topics Concern     Parent/sibling w/ CABG, MI or angioplasty before 65F 55M? No   Social History Narrative     Not on file       Review of Systems:  Skin:  Negative bruising on eliquis   Eyes:  Positive for glasses;cataracts    ENT:  Negative      Respiratory:  Negative       Cardiovascular:  Negative for;palpitations;chest pain;lightheadedness;dizziness Positive for;edema compression socks  Gastroenterology: Negative      Genitourinary:  Negative      Musculoskeletal:  Positive for back pain has some balance issues   Neurologic:    stroke    Psychiatric:  Negative      Heme/Lymph/Imm:  Positive for allergies    Endocrine:  Positive for thyroid disorder;diabetes      Physical Exam:  Vitals: /70 (BP Location: Left arm, Cuff Size: Adult Regular)   Pulse 76   Resp 12   Ht 1.651 m (5' 5\")   Wt 80.7 kg (178 lb)   SpO2 97%   BMI 29.62 kg/m       Constitutional:           Skin:             Head:           Eyes:           Lymph:      ENT:           Neck:           Respiratory:            Cardiac:                                                           GI:           Extremities and Muscular Skeletal:                 Neurological:           Psych:             CC  No referring provider defined for this encounter.                    Thank you for allowing me to participate in the care of your patient.      Sincerely,     Sohan Deleon MD     [facility]    cc:   No referring provider defined for this " encounter.

## 2019-11-14 NOTE — PROGRESS NOTES
HPI and Plan:   See dictation(#[id number])    Orders Placed This Encounter   Procedures     Follow-Up with Cardiologist     Echocardiogram Complete       No orders of the defined types were placed in this encounter.      There are no discontinued medications.      Encounter Diagnosis   Name Primary?     Chronic atrial fibrillation Yes       CURRENT MEDICATIONS:  Current Outpatient Medications   Medication Sig Dispense Refill     acetaminophen (TYLENOL) 650 MG CR tablet Take 1 tablet (650 mg) by mouth every 8 hours as needed for mild pain or fever 180 tablet 3     alcohol swab prep pads Use to swab area of injection/geoffrey as directed. 100 each 3     amLODIPine (NORVASC) 5 MG tablet TAKE 1 TABLET BY MOUTH ONCE DAILY 90 tablet 3     apixaban ANTICOAGULANT (ELIQUIS) 5 MG tablet Take 5 mg by mouth 2 times daily       atorvastatin (LIPITOR) 40 MG tablet TAKE ONE TABLET BY MOUTH ONCE DAILY 90 tablet 2     blood glucose calibration (NO BRAND SPECIFIED) solution To accompany: Blood Glucose Monitor Brands: per insurance. 1 Bottle 3     blood glucose monitoring (NO BRAND SPECIFIED) meter device kit Use to test blood sugar 3 times daily or as directed. Preferred blood glucose meter OR supplies to accompany: Blood Glucose Monitor Brands: per insurance. 1 kit 0     blood glucose monitoring (RELION PRIME TEST) test strip Use to test blood sugar 3 times daily or as directed.       Blood Glucose Monitoring Suppl W/DEVICE KIT 1 kit 3 times daily. 1 kit 0     co-enzyme Q-10 100 MG CAPS capsule Take 100 mg by mouth daily       fish oil-omega-3 fatty acids (FISH OIL) 1000 MG capsule Take 2 g by mouth daily. 2 caps per day       Lancets (E-Z JECT LANCET MICRO-THIN 33G) MISC        levothyroxine (SYNTHROID/LEVOTHROID) 88 MCG tablet Take 1 tablet (88 mcg) by mouth daily 90 tablet 1     lisinopril (PRINIVIL/ZESTRIL) 40 MG tablet TAKE ONE TABLET BY MOUTH ONCE DAILY 90 tablet 1     metFORMIN (GLUCOPHAGE-XR) 500 MG 24 hr tablet Take 1 tablet (500  mg) by mouth daily (with dinner) 90 tablet 1     metoprolol tartrate (LOPRESSOR) 50 MG tablet Take 1 tablet (50 mg) by mouth 2 times daily       NUTRITIONAL SUPPLEMENT OR VIBE       Nutritional Supplements (NUTRITIONAL SUPPLEMENT PO) Take 1 oz by mouth daily Flex       ONETOUCH ULTRA test strip USE TO TEST BLOOD SUGARS THREE TIMES A  each 3     thin (NO BRAND SPECIFIED) lancets Use with lanceting device. To accompany: Blood Glucose Monitor Brands: per insurance. 100 each 6     ALLEGRA 180 MG PO TABS 1 TABLET DAILY (Patient not taking: No sig reported) 90 Tab 3     mupirocin (BACTROBAN) 2 % external ointment Use 2 times a day to affected area. (Patient not taking: Reported on 9/26/2019) 30 g 0       ALLERGIES     Allergies   Allergen Reactions     Sulfa Drugs Rash       PAST MEDICAL HISTORY:  Past Medical History:   Diagnosis Date     Allergic rhinitis, cause unspecified      Essential hypertension, benign      Infection of kidney, unspecified 1999     Other specified acquired hypothyroidism      Other specified types of cystitis(595.89)     1999,6-2-01, 10-10-01       PAST SURGICAL HISTORY:  Past Surgical History:   Procedure Laterality Date     BIOPSY VULVA/PERINEUM,ADDNL LESN  9/18/09    Wide -excision of MICKIE III- right labia      C LAPAROSCOPY, SURGICAL; W/ VAGINAL HYSTERECTOMY W/WO REMOVAL OVARY(S)/TUBES  1984    for bleeding     C LIGATE FALLOPIAN TUBE,POSTPARTUM  1978    Tubal Ligation     COLONOSCOPY  9/13/2013    Procedure: COLONOSCOPY;  Colonoscopy;  Surgeon: aYnick Ramsey MD;  [facility]: [facility]     HC ANGIOGRAPHY ARCH  4-3-98     HC BIOPSY OF BREAST, OPEN INCISIONAL  1960    Benign     HC COLONOSCOPY THRU STOMA, DIAGNOSTIC  4-24-98    Diverticuli - due in 2008     HC ERCP W SPHINCTEROTOMY  1996    6/2/96 and 8/30/96     HC REDUCTION OF LARGE BREAST  1988     HC REMOVAL GALLBLADDER  1995     HC UGI ENDOSCOPY DIAG W OR W/O BRUSH/WASH  7-     INJECT EPIDURAL LUMBAR N/A 8/10/2017    Procedure:  INJECT EPIDURAL LUMBAR;  Lumbar 2-3 Epidural Steroid Injection;  Surgeon: Kimani Garcia MD;  [facility]     INJECT EPIDURAL LUMBAR Right 5/10/2018    Procedure: INJECT EPIDURAL LUMBAR;  right lumbar 2 - lumbar 3 epidural steroid injection;  Surgeon: Kimani Garcia MD;  [facility]       FAMILY HISTORY:  Family History   Problem Relation Age of Onset     Cardiovascular Father         Aortic aneurysm     Hypertension Father      Hypertension Mother      Gastrointestinal Disease Mother         GI Bleed     Lipids Sister      Hypertension Sister      Genitourinary Problems Sister      Allergies Sister      Cancer Brother         Lung     Alcohol/Drug Brother      Connective Tissue Disorder Son         Down Syndrome     Respiratory Son         Pneumonia     Cancer Maternal Grandmother         Stomach     Hypertension Maternal Grandfather      Allergies Daughter        SOCIAL HISTORY:  Social History     Socioeconomic History     Marital status:      Spouse name: Marco Antonio     Number of children: 2     Years of education: 17     Highest education level: Not on file   Occupational History     Occupation: Retired in 1994     Employer: RETIRED   Social Needs     Financial resource strain: Not on file     Food insecurity:     Worry: Not on file     Inability: Not on file     Transportation needs:     Medical: Not on file     Non-medical: Not on file   Tobacco Use     Smoking status: Never Smoker     Smokeless tobacco: Never Used   Substance and Sexual Activity     Alcohol use: No     Drug use: No     Sexual activity: Never     Partners: Male   Lifestyle     Physical activity:     Days per week: Not on file     Minutes per session: Not on file     Stress: Not on file   Relationships     Social connections:     Talks on phone: Not on file     Gets together: Not on file     Attends [employer] service: Not on file     Active member of club or organization: Not on file     Attends meetings of clubs or organizations:  "Not on file     Relationship status: Not on file     Intimate partner violence:     Fear of current or ex partner: Not on file     Emotionally abused: Not on file     Physically abused: Not on file     Forced sexual activity: Not on file   Other Topics Concern     Parent/sibling w/ CABG, MI or angioplasty before 65F 55M? No   Social History Narrative     Not on file       Review of Systems:  Skin:  Negative bruising on eliquis   Eyes:  Positive for glasses;cataracts    ENT:  Negative      Respiratory:  Negative       Cardiovascular:  Negative for;palpitations;chest pain;lightheadedness;dizziness Positive for;edema compression socks  Gastroenterology: Negative      Genitourinary:  Negative      Musculoskeletal:  Positive for back pain has some balance issues   Neurologic:    stroke    Psychiatric:  Negative      Heme/Lymph/Imm:  Positive for allergies    Endocrine:  Positive for thyroid disorder;diabetes      Physical Exam:  Vitals: /70 (BP Location: Left arm, Cuff Size: Adult Regular)   Pulse 76   Resp 12   Ht 1.651 m (5' 5\")   Wt 80.7 kg (178 lb)   SpO2 97%   BMI 29.62 kg/m      Constitutional:           Skin:             Head:           Eyes:           Lymph:      ENT:           Neck:           Respiratory:            Cardiac:                                                           GI:           Extremities and Muscular Skeletal:                 Neurological:           Psych:             CC  No referring provider defined for this encounter.                  "

## 2019-11-14 NOTE — LETTER
11/14/2019      Apryl Olvera MD, MD  [address] [place] [address]      RE: Ni Gardineron       Dear Colleague,    I had the pleasure of seeing Ni Maya in the [facility].    Service Date: 11/14/2019      REASON FOR CARDIOLOGY VISIT:  Followup atrial fibrillation.      HISTORY OF PRESENT ILLNESS:  Ms. Maya is a very pleasant 79-year-old female with history of chronic atrial fibrillation, essentially asymptomatic from it, on rate control strategy on Eliquis for stroke prophylaxis with CHADS2-VASc score of 7.  She has history of stroke.  Today she is coming for routine followup.  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  The patient has no specific cardiac complaints.  Recently she had a swelling in the leg and was diagnosed with a Baker cyst.  No particular bleeding issues with Eliquis.  No dyspnea, no chest pain, no dizziness, no presyncope.  She does go to the gym where she does do some cardiovascular exercise on a machine.  Again, no symptoms doing that.  She had an echocardiogram done in the past in [place] that showed normal LV function, mild tricuspid regurgitation, physiological mitral regurgitation.  She also has history of hypertension.  In addition to Eliquis, she is on metoprolol, she is on lisinopril, she is on amlodipine.      PHYSICAL EXAMINATION:   VITAL SIGNS:  Blood pressure 130/70, heart rate 76 irregular, weight 178 pounds, BMI 29.62.   GENERAL:  The patient appears pleasant, comfortable.   NECK:  Normal JVP, no bruit.   CARDIOVASCULAR SYSTEM:  S1, S2 normal, irregular, no murmur, rub or gallop.   RESPIRATORY SYSTEM:  Clear to auscultation bilaterally.   GASTROINTESTINAL SYSTEM:  Abdomen soft, nontender.   EXTREMITIES:  No pitting pedal edema.   NEUROLOGICAL:  Alert, oriented x3.   PSYCH:  Normal affect.   SKIN:  No obvious rash.   HEENT:  No pallor or icterus.      IMPRESSION AND  PLAN:  A pleasant 79-year-old female with history of chronic atrial fibrillation, on rate control strategy, elevated CHADS2-VASc score of 7, on Eliquis for stroke prophylaxis, other comorbidities of hypertension which appears well controlled.  Overall, cardiac status-wise I think she is doing reasonably well.  If she continues to feel stable cardiac status-wise, we can see her back in 1 year, sooner if she notes any change in clinical status, especially if any exertion-related symptoms.         MARY MIRELES MD             D: 2019   T: 2019   MT: JAYA      Name:     YOBANY ENNIS   MRN:      [id number]        Account:      [id number]   :      1940           Service Date: 2019      Document: [id number]         Outpatient Encounter Medications as of 2019   Medication Sig Dispense Refill     acetaminophen (TYLENOL) 650 MG CR tablet Take 1 tablet (650 mg) by mouth every 8 hours as needed for mild pain or fever 180 tablet 3     alcohol swab prep pads Use to swab area of injection/geoffrey as directed. 100 each 3     amLODIPine (NORVASC) 5 MG tablet TAKE 1 TABLET BY MOUTH ONCE DAILY 90 tablet 3     apixaban ANTICOAGULANT (ELIQUIS) 5 MG tablet Take 5 mg by mouth 2 times daily       atorvastatin (LIPITOR) 40 MG tablet TAKE ONE TABLET BY MOUTH ONCE DAILY 90 tablet 2     blood glucose calibration (NO BRAND SPECIFIED) solution To accompany: Blood Glucose Monitor Brands: per insurance. 1 Bottle 3     blood glucose monitoring (NO BRAND SPECIFIED) meter device kit Use to test blood sugar 3 times daily or as directed. Preferred blood glucose meter OR supplies to accompany: Blood Glucose Monitor Brands: per insurance. 1 kit 0     blood glucose monitoring (RELION PRIME TEST) test strip Use to test blood sugar 3 times daily or as directed.       Blood Glucose Monitoring Suppl W/DEVICE KIT 1 kit 3 times daily. 1 kit 0     co-enzyme Q-10 100 MG CAPS capsule Take 100 mg by mouth daily       fish  oil-omega-3 fatty acids (FISH OIL) 1000 MG capsule Take 2 g by mouth daily. 2 caps per day       Lancets (E-Z JECT LANCET MICRO-THIN 33G) MISC        levothyroxine (SYNTHROID/LEVOTHROID) 88 MCG tablet Take 1 tablet (88 mcg) by mouth daily 90 tablet 1     metFORMIN (GLUCOPHAGE-XR) 500 MG 24 hr tablet Take 1 tablet (500 mg) by mouth daily (with dinner) 90 tablet 1     metoprolol tartrate (LOPRESSOR) 50 MG tablet Take 1 tablet (50 mg) by mouth 2 times daily       NUTRITIONAL SUPPLEMENT OR VIBE       Nutritional Supplements (NUTRITIONAL SUPPLEMENT PO) Take 1 oz by mouth daily Flex       ONETOUCH ULTRA test strip USE TO TEST BLOOD SUGARS THREE TIMES A  each 3     thin (NO BRAND SPECIFIED) lancets Use with lanceting device. To accompany: Blood Glucose Monitor Brands: per insurance. 100 each 6     [DISCONTINUED] lisinopril (PRINIVIL/ZESTRIL) 40 MG tablet TAKE ONE TABLET BY MOUTH ONCE DAILY 90 tablet 1     ALLEGRA 180 MG PO TABS 1 TABLET DAILY (Patient not taking: No sig reported) 90 Tab 3     mupirocin (BACTROBAN) 2 % external ointment Use 2 times a day to affected area. (Patient not taking: Reported on 9/26/2019) 30 g 0     No facility-administered encounter medications on file as of 11/14/2019.        Again, thank you for allowing me to participate in the care of your patient.      Sincerely,    Sohan Deleon MD     [facility]

## 2019-11-18 DIAGNOSIS — N18.2 CHRONIC KIDNEY DISEASE, STAGE II (MILD): ICD-10-CM

## 2019-11-18 DIAGNOSIS — I10 ESSENTIAL HYPERTENSION WITH GOAL BLOOD PRESSURE LESS THAN 140/90: ICD-10-CM

## 2019-11-18 DIAGNOSIS — E11.65 TYPE 2 DIABETES MELLITUS WITH HYPERGLYCEMIA, WITHOUT LONG-TERM CURRENT USE OF INSULIN (H): ICD-10-CM

## 2019-11-19 RX ORDER — LISINOPRIL 40 MG/1
TABLET ORAL
Qty: 90 TABLET | Refills: 2 | Status: SHIPPED | OUTPATIENT
Start: 2019-11-19 | End: 2020-07-07

## 2019-11-19 NOTE — PROGRESS NOTES
[address]  [facility] [address]  [phone]  Dept: [phone]    PRE-OP EVALUATION:  Today's date: 2019    Ni Maya (: 1940) presents for pre-operative evaluation assessment as requested by Dr. Blanco.  She requires evaluation and anesthesia risk assessment prior to undergoing surgery/procedure for treatment of left eye 1st and then right eye .    Fax number for surgical facility: [phone]  Primary Physician: Apryl Olvera  Type of Anesthesia Anticipated: to be determined    Patient has a Health Care Directive or Living Will:  YES    Preop Questions 2019   Who is doing your surgery? Dr. Pablo Blanco   What are you having done? Cataracts   Date of Surgery/Procedure: 12/3/19 and 19   Facility or Hospital where procedure/surgery will be performed: [facility]   1.  Do you have a history of Heart attack, stroke, stent, coronary bypass surgery, or other heart surgery? YES  - Stroke: 2019   2.  Do you ever have any pain or discomfort in your chest? No   3.  Do you have a history of  Heart Failure? No   4.   Are you troubled by shortness of breath when:  walking on a level surface, or up a slight hill, or at night? No   5.  Do you currently have a cold, bronchitis or other respiratory infection? No   6.  Do you have a cough, shortness of breath, or wheezing? No   7.  Do you sometimes get pains in the calves of your legs when you walk? No   8. Do you or anyone in your family have previous history of blood clots? No   9.  Do you or does anyone in your family have a serious bleeding problem such as prolonged bleeding following surgeries or cuts? No   10. Have you ever had problems with anemia or been told to take iron pills? YES - Years ago   11. Have you had any abnormal blood loss such as black, tarry or bloody stools, or abnormal vaginal bleeding? No   12. Have you ever had a blood transfusion? No   13. Have  you or any of your relatives ever had problems with anesthesia? No   14. Do you have sleep apnea, excessive snoring or daytime drowsiness? No   15. Do you have any prosthetic heart valves? No   16. Do you have prosthetic joints? No   17. Is there any chance that you may be pregnant? No         HPI:     HPI related to upcoming procedure: Patient with bilateral cataract is scheduled for surgery and is here for pre-op eval      A-FIB - Patient has a longstanding history of chronic A-fib currently on rate control. Current treatment regimen includes Apixaban for stroke prevention and denies significant symptoms of lightheadedness, palpitations or dyspnea.    CVA- in 2/19. No recurrent symptoms. Currently on eliquis     DIABETES - Patient has a longstanding history of DiabetesType Type II . Patient is being treated with oral agents and denies significant side effects. Control has been good. Complicating factors include but are not limited to: hypertension, hyperlipidemia and CVA.     HYPERLIPIDEMIA - Patient has a long history of significant Hyperlipidemia requiring medication for treatment with recent good control. Patient reports no problems or side effects with the medication.     HYPERTENSION - Patient has longstanding history of HTN , currently denies any symptoms referable to elevated blood pressure. Specifically denies chest pain, palpitations, dyspnea, orthopnea, PND or peripheral edema. Blood pressure readings have been in normal range. Current medication regimen is as listed below. Patient denies any side effects of medication.     HYPOTHYROIDISM-well controlled on the current dose of levothyroxine-88mcg. Last TSH-1.83 (9/12/19)    MEDICAL HISTORY:     Patient Active Problem List    Diagnosis Date Noted     Long term current use of anticoagulant therapy 09/01/2019     Priority: Medium     Cerebral infarction, unspecified mechanism (H) 03/25/2019     Priority: Medium     Lumbar radiculopathy 05/08/2018      Priority: Medium     Acute gout involving toe of right foot 11/27/2017     Priority: Medium     Cellulitis of toe of right foot 11/26/2017     Priority: Medium     Chronic atrial fibrillation 11/26/2017     Priority: Medium     Thrombocytopenia (H) 11/26/2017     Priority: Medium     Type 2 diabetes mellitus with hyperglycemia, without long-term current use of insulin (H) 05/17/2017     Priority: Medium     Essential hypertension with goal blood pressure less than 150/90 05/27/2016     Priority: Medium     ACP (advance care planning) 05/17/2016     Priority: Medium     Advance Care Planning 5/17/2016: Receipt of ACP document:  Received: Health Care Directive which was witnessed or notarized on 11/22/2013.  Document not previously scanned.  Validation form completed and sent with document to be scanned.  Code Status needs to be updated to reflect choices in most recent ACP document. Notification sent to Dr. Lin for followup.  Confirmed/documented designated decision maker(s).  Added by Kassidy Sousa.             HTN, goal below 150/90 11/06/2013     Priority: Medium     Hyperlipidemia with target LDL less than 100 10/16/2013     Priority: Medium     Diagnosis updated by automated process. Provider to review and confirm.       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 Final path MICKIE III with free surgical margins       Urethral stricture 01/03/2008     Priority: Medium     Problem list name updated by  automated process. Provider to review       Edema 12/29/2006     Priority: Medium     Obesity 12/29/2006     Priority: Medium     Problem list name updated by automated process. Provider to review       Chronic kidney disease, stage II (mild) 11/27/2006     Priority: Medium     Symptomatic menopausal or female climacteric states 01/13/2005     Priority: Medium     Allergic rhinitis 10/21/2004     Priority: Medium     Problem list name updated by automated process. Provider to review       Hypothyroidism 06/25/2002     Priority: Medium     Problem list name updated by automated process. Provider to review        Past Medical History:   Diagnosis Date     Allergic rhinitis, cause unspecified      Essential hypertension, benign      Infection of kidney, unspecified 1999     Other specified acquired hypothyroidism      Other specified types of cystitis(595.89)     1999,6-2-01, 10-10-01     Past Surgical History:   Procedure Laterality Date     BIOPSY VULVA/PERINEUM,ADDNL LESN  9/18/09    Wide -excision of MICKIE III- right labia      C LAPAROSCOPY, SURGICAL; W/ VAGINAL HYSTERECTOMY W/WO REMOVAL OVARY(S)/TUBES  1984    for bleeding     C LIGATE FALLOPIAN TUBE,POSTPARTUM  1978    Tubal Ligation     COLONOSCOPY  9/13/2013    Procedure: COLONOSCOPY;  Colonoscopy;  Surgeon: Yanick Ramsey MD;  Location: [facility]     HC ANGIOGRAPHY ARCH  4-3-98     HC BIOPSY OF BREAST, OPEN INCISIONAL  1960    Benign     HC COLONOSCOPY THRU STOMA, DIAGNOSTIC  4-24-98    Diverticuli - due in 2008     HC ERCP W SPHINCTEROTOMY  1996 6/2/96 and 8/30/96     HC REDUCTION OF LARGE BREAST  1988      REMOVAL GALLBLADDER  1995      UGI ENDOSCOPY DIAG W OR W/O BRUSH/WASH  7-     INJECT EPIDURAL LUMBAR N/A 8/10/2017    Procedure: INJECT EPIDURAL LUMBAR;  Lumbar 2-3 Epidural Steroid Injection;  Surgeon: Kimani Garcia MD;  Location: [facility] OR     INJECT EPIDURAL LUMBAR Right 5/10/2018    Procedure: INJECT EPIDURAL LUMBAR;  right lumbar 2 - lumbar 3  epidural steroid injection;  Surgeon: Kimani Garcia MD;  Location: [facility]     Current Outpatient Medications   Medication Sig Dispense Refill     acetaminophen (TYLENOL) 650 MG CR tablet Take 1 tablet (650 mg) by mouth every 8 hours as needed for mild pain or fever 180 tablet 3     alcohol swab prep pads Use to swab area of injection/geoffrey as directed. 100 each 3     ALLEGRA 180 MG PO TABS 1 TABLET DAILY 90 Tab 3     amLODIPine (NORVASC) 5 MG tablet TAKE 1 TABLET BY MOUTH ONCE DAILY 90 tablet 3     apixaban ANTICOAGULANT (ELIQUIS) 5 MG tablet Take 1 tablet (5 mg) by mouth 2 times daily 180 tablet 3     atorvastatin (LIPITOR) 40 MG tablet TAKE ONE TABLET BY MOUTH ONCE DAILY 90 tablet 2     blood glucose calibration (NO BRAND SPECIFIED) solution To accompany: Blood Glucose Monitor Brands: per insurance. 1 Bottle 3     blood glucose monitoring (NO BRAND SPECIFIED) meter device kit Use to test blood sugar 3 times daily or as directed. Preferred blood glucose meter OR supplies to accompany: Blood Glucose Monitor Brands: per insurance. 1 kit 0     blood glucose monitoring (RELION PRIME TEST) test strip Use to test blood sugar 3 times daily or as directed.       Blood Glucose Monitoring Suppl W/DEVICE KIT 1 kit 3 times daily. 1 kit 0     co-enzyme Q-10 100 MG CAPS capsule Take 100 mg by mouth daily       fish oil-omega-3 fatty acids (FISH OIL) 1000 MG capsule Take 2 g by mouth daily. 2 caps per day       Lancets (E-Z JECT LANCET MICRO-THIN 33G) MISC        levothyroxine (SYNTHROID/LEVOTHROID) 88 MCG tablet Take 1 tablet (88 mcg) by mouth daily 90 tablet 1     lisinopril (PRINIVIL/ZESTRIL) 40 MG tablet TAKE ONE TABLET BY MOUTH ONCE DAILY 90 tablet 2     metFORMIN (GLUCOPHAGE-XR) 500 MG 24 hr tablet Take 1 tablet (500 mg) by mouth daily (with dinner) 90 tablet 1     metoprolol tartrate (LOPRESSOR) 50 MG tablet Take 1 tablet (50 mg) by mouth 2 times daily       mupirocin (BACTROBAN) 2 % external ointment Use 2 times a day to  "affected area. 30 g 0     NUTRITIONAL SUPPLEMENT OR VIBE       Nutritional Supplements (NUTRITIONAL SUPPLEMENT PO) Take 1 oz by mouth daily Flex       ONETOUCH ULTRA test strip USE TO TEST BLOOD SUGARS THREE TIMES A  each 3     thin (NO BRAND SPECIFIED) lancets Use with lanceting device. To accompany: Blood Glucose Monitor Brands: per insurance. 100 each 6     OTC products: None, except as noted above    Allergies   Allergen Reactions     Sulfa Drugs Rash      Latex Allergy: NO    Social History     Tobacco Use     Smoking status: Never Smoker     Smokeless tobacco: Never Used   Substance Use Topics     Alcohol use: No     History   Drug Use No       REVIEW OF SYSTEMS:   Constitutional, neuro, ENT, endocrine, pulmonary, cardiac, gastrointestinal, genitourinary, musculoskeletal, integument and psychiatric systems are negative, except as otherwise noted.    EXAM:   /76 (BP Location: Left arm, Patient Position: Chair, Cuff Size: Adult Regular)   Pulse 88   Temp 97.8  F (36.6  C) (Temporal)   Resp 16   Ht 1.651 m (5' 5\")   Wt 79.8 kg (176 lb)   SpO2 97%   BMI 29.29 kg/m      GENERAL APPEARANCE: healthy, alert and no distress     EYES: EOMI, PERRL     HENT: ear canals and TM's normal and nose and mouth without ulcers or lesions     NECK: no adenopathy, no asymmetry, masses, or scars and thyroid normal to palpation     RESP: lungs clear to auscultation - no rales, rhonchi or wheezes     CV: regular rates and rhythm, normal S1 S2, no S3 or S4 and no murmur, click or rub     ABDOMEN:  soft, nontender, no HSM or masses and bowel sounds normal     MS: extremities normal- no gross deformities noted, no evidence of inflammation in joints, FROM in all extremities.     SKIN: no suspicious lesions or rashes     NEURO: Normal strength and tone, sensory exam grossly normal, mentation intact and speech normal     PSYCH: mentation appears normal. and affect normal/bright     LYMPHATICS: No cervical " adenopathy    DIAGNOSTICS:   No labs or EKG required for low risk surgery (cataract, skin procedure, breast biopsy, etc)    Recent Labs   Lab Test 09/20/19  1323 07/25/19  0857 04/30/19  0852 01/04/19  0914  08/07/17  0921   HGB 12.5  --   --  14.3   < > 14.0   *  --   --  116*   < > 123*   INR 1.26*  --   --   --   --  0.96     --  141  --    < > 140   POTASSIUM 3.6  --  3.9  --    < > 3.6   CR 0.79  --  0.84  --    < > 0.87   A1C  --  6.1* 6.1*  --    < >  --     < > = values in this interval not displayed.        IMPRESSION:   Reason for surgery/procedure: bilateral cataract  Diagnosis/reason for consult:Cardiovascular risk assessment    The proposed surgical procedure is considered LOW risk.    REVISED CARDIAC RISK INDEX  The patient has the following serious cardiovascular risks for perioperative complications such as (MI, PE, VFib and 3  AV Block):  Cerebrovascular Disease (TIA or CVA)  INTERPRETATION: 1 risks: Class II (low risk - 0.9% complication rate)    The patient has the following additional risks for perioperative complications:  No identified additional risks        ICD-10-CM    1. Preop general physical exam Z01.818    2. Type 2 diabetes, HbA1C goal < 8% (H) E11.9    3. Hyperlipidemia with target LDL less than 100 E78.5    4. HTN, goal below 150/90 I10    5. Thrombocytopenia (H) D69.6    6. Cerebral infarction, unspecified mechanism (H) I63.9    7. Long term current use of anticoagulant therapy Z79.01    8. Hypothyroidism, unspecified type E03.9        RECOMMENDATIONS:       Cardiovascular Risk  Performs 4 METs exercise without symptoms (Light housework (dusting, washing dishes), Climb a flight of stairs, Walk on level ground at 15 minutes per mile (4 miles/hour) and Slow step dance) .       --Patient is to take all scheduled medications on the day of surgery EXCEPT for modifications listed below.    Diabetes Medication Use  -----Hold usual oral and non-insulin diabetic meds (e.g.  Metformin, Actos, Glipizide) while NPO.       Anticoagulant or Antiplatelet Medication Use  ELIQUIS: There is no clinically significant risk of bleeding with this procedure and apixaban (Eliquis) may be continued in the perioperative period.        ACE Inhibitor or Angiotensin Receptor Blocker (ARB) Use  Ace inhibitor or Angiotensin Receptor Blocker (ARB) and should HOLD this medication for the 24 hours prior to surgery.      APPROVAL GIVEN to proceed with proposed procedure, without further diagnostic evaluation       Signed Electronically by: Apryl Olvera MD, MD    Copy of this evaluation report is provided to requesting physician.    [facility] Preop Guidelines    Revised Cardiac Risk Index

## 2019-11-19 NOTE — PATIENT INSTRUCTIONS
Do not take any other medications until after surgery except for metoprolol and amlodipine on the morning of the surgery    Can resume all medication with out any changes after the procedure  Before Your Surgery      Call your surgeon if there is any change in your health. This includes signs of a cold or flu (such as a sore throat, runny nose, cough, rash or fever).    Do not smoke, drink alcohol or take over the counter medicine (unless your surgeon or primary care doctor tells you to) for the 24 hours before and after surgery.    If you take prescribed drugs: Follow your doctor s orders about which medicines to take and which to stop until after surgery.    Eating and drinking prior to surgery: follow the instructions from your surgeon    Take a shower or bath the night before surgery. Use the soap your surgeon gave you to gently clean your skin. If you do not have soap from your surgeon, use your regular soap. Do not shave or scrub the surgery site.  Wear clean pajamas and have clean sheets on your bed.

## 2019-11-20 DIAGNOSIS — I48.20 CHRONIC ATRIAL FIBRILLATION (H): Primary | ICD-10-CM

## 2019-11-21 ENCOUNTER — OFFICE VISIT (OUTPATIENT)
Dept: FAMILY MEDICINE | Facility: OTHER | Age: 79
End: 2019-11-21
Payer: COMMERCIAL

## 2019-11-21 VITALS
TEMPERATURE: 97.8 F | OXYGEN SATURATION: 97 % | BODY MASS INDEX: 29.32 KG/M2 | DIASTOLIC BLOOD PRESSURE: 76 MMHG | HEART RATE: 88 BPM | WEIGHT: 176 LBS | HEIGHT: 65 IN | RESPIRATION RATE: 16 BRPM | SYSTOLIC BLOOD PRESSURE: 130 MMHG

## 2019-11-21 DIAGNOSIS — Z01.818 PREOP GENERAL PHYSICAL EXAM: Primary | ICD-10-CM

## 2019-11-21 DIAGNOSIS — E78.5 HYPERLIPIDEMIA WITH TARGET LDL LESS THAN 100: ICD-10-CM

## 2019-11-21 DIAGNOSIS — I10 HTN, GOAL BELOW 150/90: ICD-10-CM

## 2019-11-21 DIAGNOSIS — I63.9 CEREBRAL INFARCTION, UNSPECIFIED MECHANISM (H): ICD-10-CM

## 2019-11-21 DIAGNOSIS — D69.6 THROMBOCYTOPENIA (H): ICD-10-CM

## 2019-11-21 DIAGNOSIS — E03.9 HYPOTHYROIDISM, UNSPECIFIED TYPE: ICD-10-CM

## 2019-11-21 DIAGNOSIS — Z79.01 LONG TERM CURRENT USE OF ANTICOAGULANT THERAPY: ICD-10-CM

## 2019-11-21 DIAGNOSIS — E11.9 TYPE 2 DIABETES, HBA1C GOAL < 8% (H): ICD-10-CM

## 2019-11-21 LAB
ANION GAP SERPL CALCULATED.3IONS-SCNC: 4 MMOL/L (ref 3–14)
BUN SERPL-MCNC: 15 MG/DL (ref 7–30)
CALCIUM SERPL-MCNC: 8.8 MG/DL (ref 8.5–10.1)
CHLORIDE SERPL-SCNC: 106 MMOL/L (ref 94–109)
CO2 SERPL-SCNC: 32 MMOL/L (ref 20–32)
CREAT SERPL-MCNC: 0.84 MG/DL (ref 0.52–1.04)
ERYTHROCYTE [DISTWIDTH] IN BLOOD BY AUTOMATED COUNT: 13.3 % (ref 10–15)
GFR SERPL CREATININE-BSD FRML MDRD: 65 ML/MIN/{1.73_M2}
GLUCOSE SERPL-MCNC: 102 MG/DL (ref 70–99)
HBA1C MFR BLD: 6.4 % (ref 0–5.6)
HCT VFR BLD AUTO: 39.1 % (ref 35–47)
HGB BLD-MCNC: 12.8 G/DL (ref 11.7–15.7)
MCH RBC QN AUTO: 30.5 PG (ref 26.5–33)
MCHC RBC AUTO-ENTMCNC: 32.7 G/DL (ref 31.5–36.5)
MCV RBC AUTO: 93 FL (ref 78–100)
PLATELET # BLD AUTO: 121 10E9/L (ref 150–450)
POTASSIUM SERPL-SCNC: 3.8 MMOL/L (ref 3.4–5.3)
RBC # BLD AUTO: 4.2 10E12/L (ref 3.8–5.2)
SODIUM SERPL-SCNC: 142 MMOL/L (ref 133–144)
WBC # BLD AUTO: 7.3 10E9/L (ref 4–11)

## 2019-11-21 PROCEDURE — 83036 HEMOGLOBIN GLYCOSYLATED A1C: CPT | Performed by: FAMILY MEDICINE

## 2019-11-21 PROCEDURE — 85027 COMPLETE CBC AUTOMATED: CPT | Performed by: FAMILY MEDICINE

## 2019-11-21 PROCEDURE — 36415 COLL VENOUS BLD VENIPUNCTURE: CPT | Performed by: FAMILY MEDICINE

## 2019-11-21 PROCEDURE — 80048 BASIC METABOLIC PNL TOTAL CA: CPT | Performed by: FAMILY MEDICINE

## 2019-11-21 PROCEDURE — 99214 OFFICE O/P EST MOD 30 MIN: CPT | Performed by: FAMILY MEDICINE

## 2019-11-21 ASSESSMENT — MIFFLIN-ST. JEOR: SCORE: 1264.21

## 2019-11-21 ASSESSMENT — PAIN SCALES - GENERAL: PAINLEVEL: NO PAIN (0)

## 2019-12-03 DIAGNOSIS — E03.9 HYPOTHYROIDISM, UNSPECIFIED TYPE: ICD-10-CM

## 2019-12-03 RX ORDER — METOPROLOL TARTRATE 50 MG
50 TABLET ORAL 2 TIMES DAILY
Status: CANCELLED | OUTPATIENT
Start: 2019-12-03

## 2019-12-03 NOTE — TELEPHONE ENCOUNTER
Reason for call:  Medication  Reason for Call:  Medication or medication refill:    Do you use a [facility]?  Name of the pharmacy and phone number for the current request:  [facility] - [phone]    Name of the medication requested: Metoprolol 50mg    Other request: Patient calling for refill of 50mg vs the 100mg was on previously.     Can we leave a detailed message on this number? YES    Phone number patient can be reached at: Cell number on file:    Telephone Information:   Mobile [phone]       Best Time: Any    Call taken on 12/3/2019 at 1:25 PM by Teresita Peralta

## 2019-12-04 RX ORDER — METOPROLOL TARTRATE 50 MG
50 TABLET ORAL 2 TIMES DAILY
Qty: 180 TABLET | Refills: 3 | Status: SHIPPED | OUTPATIENT
Start: 2019-12-04 | End: 2020-12-01

## 2019-12-04 NOTE — TELEPHONE ENCOUNTER
Prescription approved per [facility] Refill Protocol.    Aliyah Pichardo, RN, BSN

## 2019-12-30 DIAGNOSIS — E03.9 HYPOTHYROIDISM, UNSPECIFIED TYPE: ICD-10-CM

## 2019-12-30 DIAGNOSIS — E78.5 HYPERLIPIDEMIA WITH TARGET LDL LESS THAN 100: ICD-10-CM

## 2019-12-31 RX ORDER — ATORVASTATIN CALCIUM 40 MG/1
TABLET, FILM COATED ORAL
Qty: 90 TABLET | Refills: 1 | Status: SHIPPED | OUTPATIENT
Start: 2019-12-31 | End: 2020-07-07

## 2019-12-31 RX ORDER — LEVOTHYROXINE SODIUM 88 UG/1
TABLET ORAL
Qty: 90 TABLET | Refills: 1 | Status: SHIPPED | OUTPATIENT
Start: 2019-12-31 | End: 2020-07-07

## 2019-12-31 NOTE — TELEPHONE ENCOUNTER
Pending Prescriptions:                       Disp   Refills    atorvastatin (LIPITOR) 40 MG tablet [Phar*90 tab*1            Sig: TAKE ONE TABLET BY MOUTH ONCE DAILY    levothyroxine (SYNTHROID/LEVOTHROID) 88 M*90 tab*1            Sig: TAKE 1 TABLET BY MOUTH DAILY    Prescription approved per [facility] Refill Protocol.    Joan Hernandez, RN, BSN

## 2020-03-10 DIAGNOSIS — E11.65 TYPE 2 DIABETES MELLITUS WITH HYPERGLYCEMIA, WITHOUT LONG-TERM CURRENT USE OF INSULIN (H): ICD-10-CM

## 2020-03-11 RX ORDER — METFORMIN HCL 500 MG
TABLET, EXTENDED RELEASE 24 HR ORAL
Qty: 90 TABLET | Refills: 0 | Status: SHIPPED | OUTPATIENT
Start: 2020-03-11 | End: 2020-06-10

## 2020-03-11 NOTE — TELEPHONE ENCOUNTER
Pending Prescriptions:                       Disp   Refills    metFORMIN (GLUCOPHAGE-XR) 500 MG 24 hr ta*90 tab*1            Sig: TAKE ONE TABLET BY MOUTH ONCE DAILY WITH DINNER    BP Readings from Last 3 Encounters:   11/21/19 130/76   11/14/19 130/70   09/26/19 138/86     Prescription approved per [facility] Refill Protocol.      Next 5 appointments (look out 90 days)    Mar 18, 2020  9:15 AM CDT  [facility] [address] [place] [id number] [phone]        Joan Hernandez, MSN, RN

## 2020-03-17 NOTE — LETTER
[address] [address]  Phone: [phone]  September 11, 2017      Ni Maya  [id number] [address] [address]      Dear Ni,    We care about your health and have reviewed your health plan including your medical conditions, medications, and lab results.  Based on this review, it is recommended that you follow up regarding the following health topic(s):  -High Blood Pressure    We recommend you take the following action(s):  -schedule a FREE FLOAT MA-ONLY BLOOD PRESSURE APPOINTMENT within the next 1-4 weeks.     Please call us at the [facility] - [phone] (or use [employer]) to address the above recommendations.     Thank you for trusting [facility] and we appreciate the opportunity to serve you.  We look forward to supporting your healthcare needs in the future.    Healthy Regards,    Your Health Care Team  [facility]  
18-Mar-2020 13:30

## 2020-06-10 DIAGNOSIS — E11.65 TYPE 2 DIABETES MELLITUS WITH HYPERGLYCEMIA, WITHOUT LONG-TERM CURRENT USE OF INSULIN (H): ICD-10-CM

## 2020-06-10 RX ORDER — METFORMIN HCL 500 MG
TABLET, EXTENDED RELEASE 24 HR ORAL
Qty: 90 TABLET | Refills: 0 | Status: SHIPPED | OUTPATIENT
Start: 2020-06-10 | End: 2020-07-07

## 2020-06-10 NOTE — TELEPHONE ENCOUNTER
Pending Prescriptions:                       Disp   Refills    metFORMIN (GLUCOPHAGE-XR) 500 MG 24 hr tab*90 tab*0        Sig: TAKE ONE TABLET BY MOUTH ONCE DAILY WITH DINNER    Routing refill request to provider for review/approval because:  Labs out of range:    Lab Results   Component Value Date    A1C 6.4 11/21/2019    A1C 6.1 07/25/2019    A1C 6.1 04/30/2019    A1C 7.1 11/08/2018    A1C 6.9 04/25/2018

## 2020-07-06 DIAGNOSIS — E03.9 HYPOTHYROIDISM, UNSPECIFIED TYPE: ICD-10-CM

## 2020-07-06 DIAGNOSIS — E78.5 HYPERLIPIDEMIA WITH TARGET LDL LESS THAN 100: ICD-10-CM

## 2020-07-06 NOTE — TELEPHONE ENCOUNTER
Pending Prescriptions:                       Disp   Refills    levothyroxine (SYNTHROID/LEVOTHROID) 88 MC*90 tab*1        Sig: TAKE ONE TABLET BY MOUTH DAILY.    atorvastatin (LIPITOR) 40 MG tablet [Pharm*90 tab*1        Sig: TAKE ONE TABLET BY MOUTH ONCE DAILY    Patient is due for med check and labs. Please call and schedule    Joan Hernandez, MSN, RN

## 2020-07-07 ENCOUNTER — VIRTUAL VISIT (OUTPATIENT)
Dept: FAMILY MEDICINE | Facility: OTHER | Age: 80
End: 2020-07-07
Payer: COMMERCIAL

## 2020-07-07 DIAGNOSIS — I10 ESSENTIAL HYPERTENSION WITH GOAL BLOOD PRESSURE LESS THAN 140/90: ICD-10-CM

## 2020-07-07 DIAGNOSIS — E11.65 TYPE 2 DIABETES MELLITUS WITH HYPERGLYCEMIA, WITHOUT LONG-TERM CURRENT USE OF INSULIN (H): Primary | ICD-10-CM

## 2020-07-07 DIAGNOSIS — E03.9 HYPOTHYROIDISM, UNSPECIFIED TYPE: ICD-10-CM

## 2020-07-07 DIAGNOSIS — E78.5 HYPERLIPIDEMIA WITH TARGET LDL LESS THAN 100: ICD-10-CM

## 2020-07-07 DIAGNOSIS — N18.2 CHRONIC KIDNEY DISEASE, STAGE II (MILD): ICD-10-CM

## 2020-07-07 DIAGNOSIS — D69.6 THROMBOCYTOPENIA (H): ICD-10-CM

## 2020-07-07 DIAGNOSIS — I10 HTN, GOAL BELOW 150/90: ICD-10-CM

## 2020-07-07 DIAGNOSIS — I10 BENIGN ESSENTIAL HYPERTENSION: ICD-10-CM

## 2020-07-07 PROCEDURE — 99214 OFFICE O/P EST MOD 30 MIN: CPT | Mod: 95 | Performed by: PHYSICIAN ASSISTANT

## 2020-07-07 RX ORDER — LEVOTHYROXINE SODIUM 88 UG/1
88 TABLET ORAL DAILY
Qty: 90 TABLET | Refills: 1 | Status: SHIPPED | OUTPATIENT
Start: 2020-07-07 | End: 2021-01-05

## 2020-07-07 RX ORDER — LEVOTHYROXINE SODIUM 88 UG/1
TABLET ORAL
Qty: 90 TABLET | Refills: 1 | OUTPATIENT
Start: 2020-07-07

## 2020-07-07 RX ORDER — LISINOPRIL 40 MG/1
40 TABLET ORAL DAILY
Qty: 90 TABLET | Refills: 2 | Status: SHIPPED | OUTPATIENT
Start: 2020-07-07 | End: 2021-05-11

## 2020-07-07 RX ORDER — AMLODIPINE BESYLATE 5 MG/1
5 TABLET ORAL DAILY
Qty: 90 TABLET | Refills: 3 | Status: SHIPPED | OUTPATIENT
Start: 2020-07-07 | End: 2021-04-12

## 2020-07-07 RX ORDER — ATORVASTATIN CALCIUM 40 MG/1
TABLET, FILM COATED ORAL
Qty: 90 TABLET | Refills: 1 | OUTPATIENT
Start: 2020-07-07

## 2020-07-07 RX ORDER — METFORMIN HCL 500 MG
TABLET, EXTENDED RELEASE 24 HR ORAL
Qty: 90 TABLET | Refills: 0 | Status: SHIPPED | OUTPATIENT
Start: 2020-07-07 | End: 2020-12-16

## 2020-07-07 RX ORDER — ATORVASTATIN CALCIUM 40 MG/1
40 TABLET, FILM COATED ORAL DAILY
Qty: 90 TABLET | Refills: 1 | Status: SHIPPED | OUTPATIENT
Start: 2020-07-07 | End: 2020-12-24

## 2020-07-07 NOTE — PROGRESS NOTES
"Ni Maya is a 80 year old female who is being evaluated via a billable telephone visit.      The patient has been notified of following:     \"This telephone visit will be conducted via a call between you and your physician/provider. We have found that certain health care needs can be provided without the need for a physical exam.  This service lets us provide the care you need with a short phone conversation.  If a prescription is necessary we can send it directly to your pharmacy.  If lab work is needed we can place an order for that and you can then stop by our lab to have the test done at a later time.    Telephone visits are billed at different rates depending on your insurance coverage. During this emergency period, for some insurers they may be billed the same as an in-person visit.  Please reach out to your insurance provider with any questions.    If during the course of the call the physician/provider feels a telephone visit is not appropriate, you will not be charged for this service.\"    Patient has given verbal consent for Telephone visit?  Yes    What phone number would you like to be contacted at? [phone]    How would you like to obtain your AVS? Mail a copy    Subjective     Ni Maya is a 80 year old female who presents via phone visit today for the following health issues:    HPI  Hyperlipidemia Follow-Up      Are you regularly taking any medication or supplement to lower your cholesterol? Yes    Are you having muscle aches or other side effects that you think could be caused by your cholesterol lowering medication?  No    Hypertension Follow-up      Do you check your blood pressure regularly outside of the clinic? No     Are you following a low salt diet? No    Are your blood pressures ever more than 140 on the top number (systolic) OR more   than 90 on the bottom number (diastolic), for example 140/90? No     Tolerating medications well without side effects. Monitoring salt in " diet. Patient denies headaches, vision changes, chest pain, shortness of breath or paresthesias.    Hypothyroidism Follow-up      Since last visit, patient describes the following symptoms: loose stools and hair loss      How many servings of fruits and vegetables do you eat daily?  2-3    On average, how many sweetened beverages do you drink each day (Examples: soda, juice, sweet tea, etc.  Do NOT count diet or artificially sweetened beverages)?   0    How many days per week do you exercise enough to make your heart beat faster? 7    How many minutes a day do you exercise enough to make your heart beat faster? 20 - 29    How many days per week do you miss taking your medication? 0    Patient Active Problem List   Diagnosis     Hypothyroidism     Allergic rhinitis     Symptomatic menopausal or female climacteric states     Chronic kidney disease, stage II (mild)     Edema     Obesity     Urethral stricture     MICKIE III (vulvar intraepithelial neoplasia III)     Vulval lesion     Health Care Home     Type 2 diabetes, HbA1C goal < 8% (H)     Hyperlipidemia with target LDL less than 100     HTN, goal below 150/90     ACP (advance care planning)     Essential hypertension with goal blood pressure less than 150/90     Type 2 diabetes mellitus with hyperglycemia, without long-term current use of insulin (H)     Cellulitis of toe of right foot     Chronic atrial fibrillation (H)     Thrombocytopenia (H)     Acute gout involving toe of right foot     Lumbar radiculopathy     Cerebral infarction, unspecified mechanism (H)     Long term current use of anticoagulant therapy     Past Surgical History:   Procedure Laterality Date     BIOPSY VULVA/PERINEUM,ADDNL LESN  9/18/09    Wide -excision of MICKIE III- right labia      C LAPAROSCOPY, SURGICAL; W/ VAGINAL HYSTERECTOMY W/WO REMOVAL OVARY(S)/TUBES  1984    for bleeding     C LIGATE FALLOPIAN TUBE,POSTPARTUM  1978    Tubal Ligation     COLONOSCOPY  9/13/2013    Procedure:  COLONOSCOPY;  Colonoscopy;  Surgeon: Yanick Ramsey MD;  Location: [facility]     HC ANGIOGRAPHY ARCH  4-3-98     HC BIOPSY OF BREAST, OPEN INCISIONAL  1960    Benign     HC COLONOSCOPY THRU STOMA, DIAGNOSTIC  4-24-98    Diverticuli - due in 2008     HC ERCP W SPHINCTEROTOMY  1996 6/2/96 and 8/30/96     HC REDUCTION OF LARGE BREAST  1988     HC REMOVAL GALLBLADDER  1995     HC UGI ENDOSCOPY DIAG W OR W/O BRUSH/WASH  7-     INJECT EPIDURAL LUMBAR N/A 8/10/2017    Procedure: INJECT EPIDURAL LUMBAR;  Lumbar 2-3 Epidural Steroid Injection;  Surgeon: Kimani Garcia MD;  Location: [facility]     INJECT EPIDURAL LUMBAR Right 5/10/2018    Procedure: INJECT EPIDURAL LUMBAR;  right lumbar 2 - lumbar 3 epidural steroid injection;  Surgeon: Kimani Garcia MD;  Location: [facility]       Social History     Tobacco Use     Smoking status: Never Smoker     Smokeless tobacco: Never Used   Substance Use Topics     Alcohol use: No     Family History   Problem Relation Age of Onset     Cardiovascular Father         Aortic aneurysm     Hypertension Father      Hypertension Mother      Gastrointestinal Disease Mother         GI Bleed     Lipids Sister      Hypertension Sister      Genitourinary Problems Sister      Allergies Sister      Cancer Brother         Lung     Alcohol/Drug Brother      Connective Tissue Disorder Son         Down Syndrome     Respiratory Son         Pneumonia     Cancer Maternal Grandmother         Stomach     Hypertension Maternal Grandfather      Allergies Daughter          Current Outpatient Medications   Medication Sig Dispense Refill     acetaminophen (TYLENOL) 650 MG CR tablet Take 1 tablet (650 mg) by mouth every 8 hours as needed for mild pain or fever 180 tablet 3     alcohol swab prep pads Use to swab area of injection/geoffrey as directed. 100 each 3     ALLEGRA 180 MG PO TABS 1 TABLET DAILY 90 Tab 3     amLODIPine (NORVASC) 5 MG tablet Take 1 tablet (5 mg) by mouth daily 90 tablet 3     apixaban  ANTICOAGULANT (ELIQUIS) 5 MG tablet Take 1 tablet (5 mg) by mouth 2 times daily 180 tablet 3     atorvastatin (LIPITOR) 40 MG tablet Take 1 tablet (40 mg) by mouth daily 90 tablet 1     blood glucose calibration (NO BRAND SPECIFIED) solution To accompany: Blood Glucose Monitor Brands: per insurance. 1 Bottle 3     blood glucose monitoring (NO BRAND SPECIFIED) meter device kit Use to test blood sugar 3 times daily or as directed. Preferred blood glucose meter OR supplies to accompany: Blood Glucose Monitor Brands: per insurance. 1 kit 0     blood glucose monitoring (RELION PRIME TEST) test strip Use to test blood sugar 3 times daily or as directed.       Blood Glucose Monitoring Suppl W/DEVICE KIT 1 kit 3 times daily. 1 kit 0     co-enzyme Q-10 100 MG CAPS capsule Take 100 mg by mouth daily       fish oil-omega-3 fatty acids (FISH OIL) 1000 MG capsule Take 2 g by mouth daily. 2 caps per day       Lancets (E-Z JECT LANCET MICRO-THIN 33G) MISC        levothyroxine (SYNTHROID/LEVOTHROID) 88 MCG tablet Take 1 tablet (88 mcg) by mouth daily 90 tablet 1     lisinopril (ZESTRIL) 40 MG tablet Take 1 tablet (40 mg) by mouth daily 90 tablet 2     metFORMIN (GLUCOPHAGE-XR) 500 MG 24 hr tablet TAKE ONE TABLET BY MOUTH ONCE DAILY WITH DINNER 90 tablet 0     metoprolol tartrate (LOPRESSOR) 50 MG tablet Take 1 tablet (50 mg) by mouth 2 times daily 180 tablet 3     NUTRITIONAL SUPPLEMENT OR VIBE       Nutritional Supplements (NUTRITIONAL SUPPLEMENT PO) Take 1 oz by mouth daily Flex       ONETOUCH ULTRA test strip USE TO TEST BLOOD SUGARS THREE TIMES A  each 3     thin (NO BRAND SPECIFIED) lancets Use with lanceting device. To accompany: Blood Glucose Monitor Brands: per insurance. 100 each 6     Allergies   Allergen Reactions     Sulfa Drugs Rash       Reviewed and updated as needed this visit by Provider         Review of Systems   Constitutional, HEENT, cardiovascular, pulmonary, GI, , musculoskeletal, neuro, skin,  endocrine and psych systems are negative, except as otherwise noted.       Objective   Reported vitals:  There were no vitals taken for this visit.   PSYCH: Alert and oriented times 3; coherent speech, normal   rate and volume, able to articulate logical thoughts, able   to abstract reason, no tangential thoughts, no hallucinations   or delusions  Her affect is normal and pleasant  RESP: No cough, no audible wheezing, able to talk in full sentences  Remainder of exam unable to be completed due to telephone visits    Diagnostic Test Results:  Labs reviewed in Epic        Assessment/Plan:  1. Type 2 diabetes mellitus with hyperglycemia, without long-term current use of insulin (H)  Future labs placed as below. At this time patient will continue Metformin once daily. Encouraged ongoing diet and exercise modifications.   - **A1C FUTURE anytime; Future  - Lipid panel reflex to direct LDL Fasting; Future  - Albumin Random Urine Quantitative with Creat Ratio; Future  - lisinopril (ZESTRIL) 40 MG tablet; Take 1 tablet (40 mg) by mouth daily  Dispense: 90 tablet; Refill: 2  - metFORMIN (GLUCOPHAGE-XR) 500 MG 24 hr tablet; TAKE ONE TABLET BY MOUTH ONCE DAILY WITH DINNER  Dispense: 90 tablet; Refill: 0    2. HTN, goal below 150/90  - **Comprehensive metabolic panel FUTURE anytime; Future    3. Thrombocytopenia (H)  - **CBC with platelets FUTURE anytime; Future    4. Benign essential hypertension  - amLODIPine (NORVASC) 5 MG tablet; Take 1 tablet (5 mg) by mouth daily  Dispense: 90 tablet; Refill: 3    5. Hyperlipidemia with target LDL less than 100  - atorvastatin (LIPITOR) 40 MG tablet; Take 1 tablet (40 mg) by mouth daily  Dispense: 90 tablet; Refill: 1    6. Hypothyroidism, unspecified type  - levothyroxine (SYNTHROID/LEVOTHROID) 88 MCG tablet; Take 1 tablet (88 mcg) by mouth daily  Dispense: 90 tablet; Refill: 1  - **TSH with free T4 reflex FUTURE anytime; Future    7. Chronic kidney disease, stage II (mild)  - lisinopril  (ZESTRIL) 40 MG tablet; Take 1 tablet (40 mg) by mouth daily  Dispense: 90 tablet; Refill: 2    8. Essential hypertension with goal blood pressure less than 140/90  - lisinopril (ZESTRIL) 40 MG tablet; Take 1 tablet (40 mg) by mouth daily  Dispense: 90 tablet; Refill: 2    The patient indicates understanding of these issues and agrees with the plan.    Phone call duration:  7 minutes    Alessandra Strickland PA-C

## 2020-07-22 DIAGNOSIS — E11.65 TYPE 2 DIABETES MELLITUS WITH HYPERGLYCEMIA, WITHOUT LONG-TERM CURRENT USE OF INSULIN (H): ICD-10-CM

## 2020-07-22 DIAGNOSIS — D69.6 THROMBOCYTOPENIA (H): ICD-10-CM

## 2020-07-22 DIAGNOSIS — E03.9 HYPOTHYROIDISM, UNSPECIFIED TYPE: ICD-10-CM

## 2020-07-22 DIAGNOSIS — I10 HTN, GOAL BELOW 150/90: ICD-10-CM

## 2020-07-22 LAB
ALBUMIN SERPL-MCNC: 4.1 G/DL (ref 3.4–5)
ALP SERPL-CCNC: 95 U/L (ref 40–150)
ALT SERPL W P-5'-P-CCNC: 34 U/L (ref 0–50)
ANION GAP SERPL CALCULATED.3IONS-SCNC: 6 MMOL/L (ref 3–14)
AST SERPL W P-5'-P-CCNC: 25 U/L (ref 0–45)
BILIRUB SERPL-MCNC: 0.5 MG/DL (ref 0.2–1.3)
BUN SERPL-MCNC: 12 MG/DL (ref 7–30)
CALCIUM SERPL-MCNC: 9.1 MG/DL (ref 8.5–10.1)
CHLORIDE SERPL-SCNC: 104 MMOL/L (ref 94–109)
CHOLEST SERPL-MCNC: 117 MG/DL
CO2 SERPL-SCNC: 31 MMOL/L (ref 20–32)
CREAT SERPL-MCNC: 0.87 MG/DL (ref 0.52–1.04)
CREAT UR-MCNC: 76 MG/DL
ERYTHROCYTE [DISTWIDTH] IN BLOOD BY AUTOMATED COUNT: 13.2 % (ref 10–15)
GFR SERPL CREATININE-BSD FRML MDRD: 62 ML/MIN/{1.73_M2}
GLUCOSE SERPL-MCNC: 131 MG/DL (ref 70–99)
HBA1C MFR BLD: 7 % (ref 0–5.6)
HCT VFR BLD AUTO: 42.8 % (ref 35–47)
HDLC SERPL-MCNC: 45 MG/DL
HGB BLD-MCNC: 13.9 G/DL (ref 11.7–15.7)
LDLC SERPL CALC-MCNC: 41 MG/DL
MCH RBC QN AUTO: 29.8 PG (ref 26.5–33)
MCHC RBC AUTO-ENTMCNC: 32.5 G/DL (ref 31.5–36.5)
MCV RBC AUTO: 92 FL (ref 78–100)
MICROALBUMIN UR-MCNC: 27 MG/L
MICROALBUMIN/CREAT UR: 34.91 MG/G CR (ref 0–25)
NONHDLC SERPL-MCNC: 72 MG/DL
PLATELET # BLD AUTO: 119 10E9/L (ref 150–450)
POTASSIUM SERPL-SCNC: 3.9 MMOL/L (ref 3.4–5.3)
PROT SERPL-MCNC: 7.6 G/DL (ref 6.8–8.8)
RBC # BLD AUTO: 4.66 10E12/L (ref 3.8–5.2)
SODIUM SERPL-SCNC: 141 MMOL/L (ref 133–144)
TRIGL SERPL-MCNC: 155 MG/DL
TSH SERPL DL<=0.005 MIU/L-ACNC: 2.75 MU/L (ref 0.4–4)
WBC # BLD AUTO: 6.7 10E9/L (ref 4–11)

## 2020-07-22 PROCEDURE — 80053 COMPREHEN METABOLIC PANEL: CPT | Performed by: PHYSICIAN ASSISTANT

## 2020-07-22 PROCEDURE — 36415 COLL VENOUS BLD VENIPUNCTURE: CPT | Performed by: PHYSICIAN ASSISTANT

## 2020-07-22 PROCEDURE — 84443 ASSAY THYROID STIM HORMONE: CPT | Performed by: PHYSICIAN ASSISTANT

## 2020-07-22 PROCEDURE — 80061 LIPID PANEL: CPT | Performed by: PHYSICIAN ASSISTANT

## 2020-07-22 PROCEDURE — 83036 HEMOGLOBIN GLYCOSYLATED A1C: CPT | Performed by: PHYSICIAN ASSISTANT

## 2020-07-22 PROCEDURE — 82043 UR ALBUMIN QUANTITATIVE: CPT | Performed by: PHYSICIAN ASSISTANT

## 2020-07-22 PROCEDURE — 85027 COMPLETE CBC AUTOMATED: CPT | Performed by: PHYSICIAN ASSISTANT

## 2020-08-04 ENCOUNTER — OFFICE VISIT (OUTPATIENT)
Dept: DERMATOLOGY | Facility: CLINIC | Age: 80
End: 2020-08-04
Payer: COMMERCIAL

## 2020-08-04 DIAGNOSIS — Z85.828 HISTORY OF NONMELANOMA SKIN CANCER: Primary | ICD-10-CM

## 2020-08-04 DIAGNOSIS — D22.9 MULTIPLE BENIGN NEVI: ICD-10-CM

## 2020-08-04 DIAGNOSIS — L81.4 SOLAR LENTIGO: ICD-10-CM

## 2020-08-04 DIAGNOSIS — D18.01 CHERRY ANGIOMA: ICD-10-CM

## 2020-08-04 DIAGNOSIS — L57.8 SUN-DAMAGED SKIN: ICD-10-CM

## 2020-08-04 DIAGNOSIS — L82.1 SEBORRHEIC KERATOSIS: ICD-10-CM

## 2020-08-04 PROCEDURE — 99214 OFFICE O/P EST MOD 30 MIN: CPT | Performed by: DERMATOLOGY

## 2020-08-04 ASSESSMENT — PAIN SCALES - GENERAL: PAINLEVEL: NO PAIN (0)

## 2020-08-04 NOTE — PROGRESS NOTES
"[facility] Dermatology Note      Dermatology Problem List:  1. \"Precancerous\" lesion removed from the vagina  -s/p removal, patient thought done in [place] but no surg path reports available   2. History of NMSC  -BCC, right superior lateral forehead, s/p biopsy 8/13/19, s/p MMS with rhombic  3. 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 There was some brown pigment at edges of biopsy noted 8/4/2020.     Encounter Date: Aug 4, 2020    CC:  Chief Complaint   Patient presents with     Skin Check     Hx NMSC, no family hx SC. Not immunosuppressed. Areas of concern: spot left spot on chest, spot on upper back, spot on left side of neck       History of Present Illness:  Ms. Ni Maya is a 80 year old female with history of NMSC who presents for a skin check.     She was last seen for skin check 8/13/19 when BCC on the right forehead was biopsied. This was treated with MMS since. The area is number still, but she is happy with the scar.     Today, she notes concerns of bumps that are newer on the left neck, left chest, and left upper back. These can be itchy at times. Denies any tender, nonhealing, bleeding skin lesions.     No other concerns addressed today.    Past Medical History:   Patient Active Problem List   Diagnosis     Hypothyroidism     Allergic rhinitis     Symptomatic menopausal or female climacteric states     Chronic kidney disease, stage II (mild)     Edema     Obesity     Urethral stricture     MICKIE III (vulvar intraepithelial neoplasia III)     Vulval lesion     Health Care Home     Type 2 diabetes, HbA1C goal < 8% (H)     Hyperlipidemia with target LDL less than 100     HTN, goal below 150/90     ACP (advance care planning)     Essential hypertension with goal blood pressure less than 150/90     Type 2 diabetes mellitus with hyperglycemia, without long-term current use of insulin (H)     Cellulitis of toe of right foot "     Chronic atrial fibrillation (H)     Thrombocytopenia (H)     Acute gout involving toe of right foot     Lumbar radiculopathy     Cerebral infarction, unspecified mechanism (H)     Long term current use of anticoagulant therapy     Past Medical History:   Diagnosis Date     Allergic rhinitis, cause unspecified      Essential hypertension, benign      Infection of kidney, unspecified 1999     Other specified acquired hypothyroidism      Other specified types of cystitis(595.89)     1999,6-2-01, 10-10-01     Past Surgical History:   Procedure Laterality Date     BIOPSY VULVA/PERINEUM,ADDNL LESN  9/18/09    Wide -excision of MICKIE III- right labia      C LAPAROSCOPY, SURGICAL; W/ VAGINAL HYSTERECTOMY W/WO REMOVAL OVARY(S)/TUBES  1984    for bleeding     C LIGATE FALLOPIAN TUBE,POSTPARTUM  1978    Tubal Ligation     COLONOSCOPY  9/13/2013    Procedure: COLONOSCOPY;  Colonoscopy;  Surgeon: Yanick Ramsey MD;  Location:  GI     HC ANGIOGRAPHY ARCH  4-3-98     HC BIOPSY OF BREAST, OPEN INCISIONAL  1960    Benign     HC COLONOSCOPY THRU STOMA, DIAGNOSTIC  4-24-98    Diverticuli - due in 2008     HC ERCP W SPHINCTEROTOMY  1996 6/2/96 and 8/30/96     HC REDUCTION OF LARGE BREAST  1988     HC REMOVAL GALLBLADDER  1995      UGI ENDOSCOPY DIAG W OR W/O BRUSH/WASH  7-     INJECT EPIDURAL LUMBAR N/A 8/10/2017    Procedure: INJECT EPIDURAL LUMBAR;  Lumbar 2-3 Epidural Steroid Injection;  Surgeon: Kimani Garcia MD;  Location: [facility]     INJECT EPIDURAL LUMBAR Right 5/10/2018    Procedure: INJECT EPIDURAL LUMBAR;  right lumbar 2 - lumbar 3 epidural steroid injection;  Surgeon: Kimani Garcia MD;  Location:  OR     Social History:  Patient is a retired teacher. . Lives with .   Reviewed and left in chart for clinician convenience.       Family History:  No family history of skin cancer.  Reviewed and left in chart for clinician convenience.       Medications:  Current Outpatient Medications    Medication Sig Dispense Refill     acetaminophen (TYLENOL) 650 MG CR tablet Take 1 tablet (650 mg) by mouth every 8 hours as needed for mild pain or fever 180 tablet 3     alcohol swab prep pads Use to swab area of injection/geoffrey as directed. 100 each 3     amLODIPine (NORVASC) 5 MG tablet Take 1 tablet (5 mg) by mouth daily 90 tablet 3     apixaban ANTICOAGULANT (ELIQUIS) 5 MG tablet Take 1 tablet (5 mg) by mouth 2 times daily 180 tablet 3     atorvastatin (LIPITOR) 40 MG tablet Take 1 tablet (40 mg) by mouth daily 90 tablet 1     blood glucose calibration (NO BRAND SPECIFIED) solution To accompany: Blood Glucose Monitor Brands: per insurance. 1 Bottle 3     blood glucose monitoring (NO BRAND SPECIFIED) meter device kit Use to test blood sugar 3 times daily or as directed. Preferred blood glucose meter OR supplies to accompany: Blood Glucose Monitor Brands: per insurance. 1 kit 0     blood glucose monitoring (RELION PRIME TEST) test strip Use to test blood sugar 3 times daily or as directed.       Blood Glucose Monitoring Suppl W/DEVICE KIT 1 kit 3 times daily. 1 kit 0     co-enzyme Q-10 100 MG CAPS capsule Take 100 mg by mouth daily       Lancets (E-Z JECT LANCET MICRO-THIN 33G) MISC        levothyroxine (SYNTHROID/LEVOTHROID) 88 MCG tablet Take 1 tablet (88 mcg) by mouth daily 90 tablet 1     lisinopril (ZESTRIL) 40 MG tablet Take 1 tablet (40 mg) by mouth daily 90 tablet 2     metFORMIN (GLUCOPHAGE-XR) 500 MG 24 hr tablet TAKE ONE TABLET BY MOUTH ONCE DAILY WITH DINNER 90 tablet 0     metoprolol tartrate (LOPRESSOR) 50 MG tablet Take 1 tablet (50 mg) by mouth 2 times daily 180 tablet 3     NUTRITIONAL SUPPLEMENT OR VIBE       Nutritional Supplements (NUTRITIONAL SUPPLEMENT PO) Take 1 oz by mouth daily Flex       ONETOUCH ULTRA test strip USE TO TEST BLOOD SUGARS THREE TIMES A  each 3     thin (NO BRAND SPECIFIED) lancets Use with lanceting device. To accompany: Blood Glucose Monitor Brands: per  insurance. 100 each 6     ALLEGRA 180 MG PO TABS 1 TABLET DAILY (Patient not taking: No sig reported) 90 Tab 3     fish oil-omega-3 fatty acids (FISH OIL) 1000 MG capsule Take 2 g by mouth daily. 2 caps per day         Allergies   Allergen Reactions     Sulfa Drugs Rash       Review of Systems:  -Constitutional: Patient is otherwise feeling well, in usual state of health.   -Skin: As above in HPI. No additional skin concerns.    Physical exam:  Vitals: There were no vitals taken for this visit.  GEN: This is a well developed, well-nourished female in no acute distress, in a pleasant mood.    SKIN: Total skin excluding the undergarment areas was performed. The exam included the head/face, neck, both arms, chest, back, abdomen, both legs, digits and/or nails and buttocks. Declined genital exam.  - Franks Type II  - Scattered brown macules on sun exposed areas.  - Well healed rhomboidal scar on right forehead, no telangiectasias or pearly appearance.   - There are dome shaped bright red papules on the trunk.   - Multiple regular brown pigmented macules and papules are identified on the body.   - There are waxy stuck on tan to brown papules on the trunk, left neck, chest (areas of patient concerns).  - Well healed circular biopsy scar on the right medial proximal thigh in area of biopsied SK. Pigment at edge.   - No other lesions of concern on areas examined.       Impression/Plan:    1. History of NMSC: No evidence of recurrence. No new concerning lesions.    Recommended continued self skin checks    Next skin check in 6 months. If weather too severe, ok to push out to one year as patient is fairly low risk.    2. Sun damaged skin with solar lentigines    Recommend sunscreens SPF #30 or greater, protective clothing and avoidance of tanning beds.    Discussed that the best thing she can do to fade these is be very diligent with sun protection.    Patient not interested in out of pocket cosmetic procedures.      3. Benign findings including: seborrheic keratoses, cherry angioma    No further intervention required. Patient to report changes.     Patient reassured of the benign nature of these lesions.    4. Multiple clinically benign nevi    No further intervention required. Patient to report changes.     Patient reassured of the benign nature of these lesions.    Follow-up in 6 months, sooner for concerns.      Staff Involved:  Staff only    Bre Grover MD    Department of Dermatology  [facility]: Phone: [phone], Fax:[phone]  [facility]: Phone: [phone], Fax: [phone]

## 2020-08-04 NOTE — PROGRESS NOTES
Ni Maya's goals for this visit include:   Chief Complaint   Patient presents with     Skin Check     Hx NMSC, no family hx SC. Not immunosuppressed. Areas of concern: spot left spot on chest, spot on upper back, spot on left side of neck       She requests these members of her care team be copied on today's visit information: no    PCP: Apryl Olvera    Referring Provider:  No referring provider defined for this encounter.    There were no vitals taken for this visit.    Do you need any medication refills at today's visit? No  China Levy LPN

## 2020-08-04 NOTE — LETTER
"    8/4/2020         RE: Ni Maya  [id number] [facility] [address]        Dear Colleague,    Thank you for referring your patient, Ni Maya, to the [facility]. Please see a copy of my visit note below.    [facility] Dermatology Note      Dermatology Problem List:  1. \"Precancerous\" lesion removed from the vagina  -s/p removal, patient thought done in [place] but no surg path reports available   2. History of NMSC  -BCC, right superior lateral forehead, s/p biopsy 8/13/19, s/p MMS with rhombic  3. 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 There was some brown pigment at edges of biopsy noted 8/4/2020.     Encounter Date: Aug 4, 2020    CC:  Chief Complaint   Patient presents with     Skin Check     Hx NMSC, no family hx SC. Not immunosuppressed. Areas of concern: spot left spot on chest, spot on upper back, spot on left side of neck       History of Present Illness:  Ms. Ni Maya is a 80 year old female with history of NMSC who presents for a skin check.     She was last seen for skin check 8/13/19 when BCC on the right forehead was biopsied. This was treated with MMS since. The area is number still, but she is happy with the scar.     Today, she notes concerns of bumps that are newer on the left neck, left chest, and left upper back. These can be itchy at times. Denies any tender, nonhealing, bleeding skin lesions.     No other concerns addressed today.    Past Medical History:   Patient Active Problem List   Diagnosis     Hypothyroidism     Allergic rhinitis     Symptomatic menopausal or female climacteric states     Chronic kidney disease, stage II (mild)     Edema     Obesity     Urethral stricture     MICKIE III (vulvar intraepithelial neoplasia III)     Vulval lesion     Health Care Home     Type 2 diabetes, HbA1C goal < 8% (H)     Hyperlipidemia with target LDL less than 100     HTN, " goal below 150/90     ACP (advance care planning)     Essential hypertension with goal blood pressure less than 150/90     Type 2 diabetes mellitus with hyperglycemia, without long-term current use of insulin (H)     Cellulitis of toe of right foot     Chronic atrial fibrillation (H)     Thrombocytopenia (H)     Acute gout involving toe of right foot     Lumbar radiculopathy     Cerebral infarction, unspecified mechanism (H)     Long term current use of anticoagulant therapy     Past Medical History:   Diagnosis Date     Allergic rhinitis, cause unspecified      Essential hypertension, benign      Infection of kidney, unspecified 1999     Other specified acquired hypothyroidism      Other specified types of cystitis(595.89)     1999,6-2-01, 10-10-01     Past Surgical History:   Procedure Laterality Date     BIOPSY VULVA/PERINEUM,ADDNL LESN  9/18/09    Wide -excision of MICKIE III- right labia      C LAPAROSCOPY, SURGICAL; W/ VAGINAL HYSTERECTOMY W/WO REMOVAL OVARY(S)/TUBES  1984    for bleeding     C LIGATE FALLOPIAN TUBE,POSTPARTUM  1978    Tubal Ligation     COLONOSCOPY  9/13/2013    Procedure: COLONOSCOPY;  Colonoscopy;  Surgeon: Yanick Ramsey MD;  Location:  GI     HC ANGIOGRAPHY ARCH  4-3-98     HC BIOPSY OF BREAST, OPEN INCISIONAL  1960    Benign     HC COLONOSCOPY THRU STOMA, DIAGNOSTIC  4-24-98    Diverticuli - due in 2008     HC ERCP W SPHINCTEROTOMY  1996    6/2/96 and 8/30/96     HC REDUCTION OF LARGE BREAST  1988      REMOVAL GALLBLADDER  1995      UGI ENDOSCOPY DIAG W OR W/O BRUSH/WASH  7-     INJECT EPIDURAL LUMBAR N/A 8/10/2017    Procedure: INJECT EPIDURAL LUMBAR;  Lumbar 2-3 Epidural Steroid Injection;  Surgeon: Kimani Garcia MD;  Location:  OR     INJECT EPIDURAL LUMBAR Right 5/10/2018    Procedure: INJECT EPIDURAL LUMBAR;  right lumbar 2 - lumbar 3 epidural steroid injection;  Surgeon: Kimani Garcia MD;  Location: [facility] OR     Social History:  Patient is a retired teacher. .  Lives with .   Reviewed and left in chart for clinician convenience.       Family History:  No family history of skin cancer.  Reviewed and left in chart for clinician convenience.       Medications:  Current Outpatient Medications   Medication Sig Dispense Refill     acetaminophen (TYLENOL) 650 MG CR tablet Take 1 tablet (650 mg) by mouth every 8 hours as needed for mild pain or fever 180 tablet 3     alcohol swab prep pads Use to swab area of injection/geoffrey as directed. 100 each 3     amLODIPine (NORVASC) 5 MG tablet Take 1 tablet (5 mg) by mouth daily 90 tablet 3     apixaban ANTICOAGULANT (ELIQUIS) 5 MG tablet Take 1 tablet (5 mg) by mouth 2 times daily 180 tablet 3     atorvastatin (LIPITOR) 40 MG tablet Take 1 tablet (40 mg) by mouth daily 90 tablet 1     blood glucose calibration (NO BRAND SPECIFIED) solution To accompany: Blood Glucose Monitor Brands: per insurance. 1 Bottle 3     blood glucose monitoring (NO BRAND SPECIFIED) meter device kit Use to test blood sugar 3 times daily or as directed. Preferred blood glucose meter OR supplies to accompany: Blood Glucose Monitor Brands: per insurance. 1 kit 0     blood glucose monitoring (RELION PRIME TEST) test strip Use to test blood sugar 3 times daily or as directed.       Blood Glucose Monitoring Suppl W/DEVICE KIT 1 kit 3 times daily. 1 kit 0     co-enzyme Q-10 100 MG CAPS capsule Take 100 mg by mouth daily       Lancets (E-Z JECT LANCET MICRO-THIN 33G) MISC        levothyroxine (SYNTHROID/LEVOTHROID) 88 MCG tablet Take 1 tablet (88 mcg) by mouth daily 90 tablet 1     lisinopril (ZESTRIL) 40 MG tablet Take 1 tablet (40 mg) by mouth daily 90 tablet 2     metFORMIN (GLUCOPHAGE-XR) 500 MG 24 hr tablet TAKE ONE TABLET BY MOUTH ONCE DAILY WITH DINNER 90 tablet 0     metoprolol tartrate (LOPRESSOR) 50 MG tablet Take 1 tablet (50 mg) by mouth 2 times daily 180 tablet 3     NUTRITIONAL SUPPLEMENT OR VIBE       Nutritional Supplements (NUTRITIONAL SUPPLEMENT  PO) Take 1 oz by mouth daily Flex       ONETOUCH ULTRA test strip USE TO TEST BLOOD SUGARS THREE TIMES A  each 3     thin (NO BRAND SPECIFIED) lancets Use with lanceting device. To accompany: Blood Glucose Monitor Brands: per insurance. 100 each 6     ALLEGRA 180 MG PO TABS 1 TABLET DAILY (Patient not taking: No sig reported) 90 Tab 3     fish oil-omega-3 fatty acids (FISH OIL) 1000 MG capsule Take 2 g by mouth daily. 2 caps per day         Allergies   Allergen Reactions     Sulfa Drugs Rash       Review of Systems:  -Constitutional: Patient is otherwise feeling well, in usual state of health.   -Skin: As above in HPI. No additional skin concerns.    Physical exam:  Vitals: There were no vitals taken for this visit.  GEN: This is a well developed, well-nourished female in no acute distress, in a pleasant mood.    SKIN: Total skin excluding the undergarment areas was performed. The exam included the head/face, neck, both arms, chest, back, abdomen, both legs, digits and/or nails and buttocks. Declined genital exam.  - Franks Type II  - Scattered brown macules on sun exposed areas.  - Well healed rhomboidal scar on right forehead, no telangiectasias or pearly appearance.   - There are dome shaped bright red papules on the trunk.   - Multiple regular brown pigmented macules and papules are identified on the body.   - There are waxy stuck on tan to brown papules on the trunk, left neck, chest (areas of patient concerns).  - Well healed circular biopsy scar on the right medial proximal thigh in area of biopsied SK. Pigment at edge.   - No other lesions of concern on areas examined.       Impression/Plan:    1. History of NMSC: No evidence of recurrence. No new concerning lesions.    Recommended continued self skin checks    Next skin check in 6 months. If weather too severe, ok to push out to one year as patient is fairly low risk.    2. Sun damaged skin with solar lentigines    Recommend sunscreens SPF #30  or greater, protective clothing and avoidance of tanning beds.    Discussed that the best thing she can do to fade these is be very diligent with sun protection.    Patient not interested in out of pocket cosmetic procedures.     3. Benign findings including: seborrheic keratoses, cherry angioma    No further intervention required. Patient to report changes.     Patient reassured of the benign nature of these lesions.    4. Multiple clinically benign nevi    No further intervention required. Patient to report changes.     Patient reassured of the benign nature of these lesions.    Follow-up in 6 months, sooner for concerns.      Staff Involved:  Staff only    Bre Grover MD    Department of Dermatology  [facility] [place][note 2]: Phone: [phone], Fax:[phone]  [facility] [place]: Phone: [phone], Fax: [phone]                      Ni Maya's goals for this visit include:   Chief Complaint   Patient presents with     Skin Check     Hx NMSC, no family hx SC. Not immunosuppressed. Areas of concern: spot left spot on chest, spot on upper back, spot on left side of neck       She requests these members of her care team be copied on today's visit information: no    PCP: Apryl Olvera    Referring Provider:  No referring provider defined for this encounter.    There were no vitals taken for this visit.    Do you need any medication refills at today's visit? No  China Levy LPN        Again, thank you for allowing me to participate in the care of your patient.        Sincerely,        Bre Grover MD

## 2020-09-07 NOTE — LETTER
[facility]  [address]  [place] [address]  Phone: [phone]    04/30/19    Ni Maya  [id number] [facility] [address]      Please schedule a lab only to recheck your thyroid in 2-3 months.    Results for orders placed or performed in visit on 04/30/19   Lipid panel reflex to direct LDL Fasting   Result Value Ref Range    Cholesterol 99 <200 mg/dL    Triglycerides 107 <150 mg/dL    HDL Cholesterol 45 (L) >49 mg/dL    LDL Cholesterol Calculated 33 <100 mg/dL    Non HDL Cholesterol 54 <130 mg/dL   Comprehensive metabolic panel   Result Value Ref Range    Sodium 141 133 - 144 mmol/L    Potassium 3.9 3.4 - 5.3 mmol/L    Chloride 107 94 - 109 mmol/L    Carbon Dioxide 30 20 - 32 mmol/L    Anion Gap 4 3 - 14 mmol/L    Glucose 108 (H) 70 - 99 mg/dL    Urea Nitrogen 13 7 - 30 mg/dL    Creatinine 0.84 0.52 - 1.04 mg/dL    GFR Estimate 65 >60 mL/min/[1.73_m2]    GFR Estimate If Black 75 >60 mL/min/[1.73_m2]    Calcium 8.7 8.5 - 10.1 mg/dL    Bilirubin Total 0.8 0.2 - 1.3 mg/dL    Albumin 3.8 3.4 - 5.0 g/dL    Protein Total 7.1 6.8 - 8.8 g/dL    Alkaline Phosphatase 83 40 - 150 U/L    ALT 27 0 - 50 U/L    AST 21 0 - 45 U/L   TSH with free T4 reflex   Result Value Ref Range    TSH 0.32 (L) 0.40 - 4.00 mU/L   Hemoglobin A1c   Result Value Ref Range    Hemoglobin A1C 6.1 (H) 0 - 5.6 %   T4 free   Result Value Ref Range    T4 Free 1.35 0.76 - 1.46 ng/dL                 
abdominal pain

## 2020-09-29 ENCOUNTER — IMMUNIZATION (OUTPATIENT)
Dept: FAMILY MEDICINE | Facility: OTHER | Age: 80
End: 2020-09-29
Payer: COMMERCIAL

## 2020-09-29 DIAGNOSIS — Z23 NEED FOR PROPHYLACTIC VACCINATION AND INOCULATION AGAINST INFLUENZA: Primary | ICD-10-CM

## 2020-09-29 PROCEDURE — G0008 ADMIN INFLUENZA VIRUS VAC: HCPCS

## 2020-09-29 PROCEDURE — 99207 ZZC NO CHARGE NURSE ONLY: CPT

## 2020-09-29 PROCEDURE — 90662 IIV NO PRSV INCREASED AG IM: CPT

## 2020-09-29 NOTE — PROGRESS NOTES
Patient consents to receive outdoor care: Yes    Upon arrival, patient instructed to proceed to designated location, place vehicle in park, turn off, and remove keys  and Patient receiving an immunization or injection. Instructed patient to notify healthcare personnel if they are having an adverse reaction.    If we are unable to safely and ergonomically able to provide care- is the patient able to safely able to get out of car and transfer to a chair? Yes    Patient would like to receive their AVS declined.    Patient received influenza vaccination today. See immunization tab for complete administration details.     Criselda Powers MA

## 2020-10-05 DIAGNOSIS — I10 BENIGN ESSENTIAL HYPERTENSION: ICD-10-CM

## 2020-10-06 RX ORDER — AMLODIPINE BESYLATE 5 MG/1
TABLET ORAL
Qty: 90 TABLET | Refills: 3 | OUTPATIENT
Start: 2020-10-06

## 2020-10-06 NOTE — TELEPHONE ENCOUNTER
Prescription was sent 7/7/2020 for #90 with 3 refills.  Pharmacy notified via E-Prescribe refusal.     LACIE AvitiaN, RN  [facility]

## 2020-11-21 ENCOUNTER — VIRTUAL VISIT (OUTPATIENT)
Dept: URGENT CARE | Facility: CLINIC | Age: 80
End: 2020-11-21
Payer: COMMERCIAL

## 2020-11-21 ENCOUNTER — NURSE TRIAGE (OUTPATIENT)
Dept: NURSING | Facility: CLINIC | Age: 80
End: 2020-11-21

## 2020-11-21 DIAGNOSIS — R05.9 COUGH: Primary | ICD-10-CM

## 2020-11-21 PROCEDURE — 99213 OFFICE O/P EST LOW 20 MIN: CPT | Mod: 95 | Performed by: NURSE PRACTITIONER

## 2020-11-21 NOTE — TELEPHONE ENCOUNTER
"Patient calling reporting she has symptoms of COVID 19. Reporting symptoms starting yesterday 11/20/20 with \"a cold.\" Reporting body aches. Afebrile. Intermittent Symptoms starting yesterday with a \"cold.\" Body aches. Nasal discharge.   Reporting intermittent \"chest pain\" with deep breath and movement. Denies shortness of breath.  Per guidelines to call provider now.     Placed call to on [facility] Dr Albert [facility] at 1115 a.m. to call Rupali at  [phone].  Dr Albert returned page. Advised to have patient monitor breathing/chest pain. To go into ER for any increased shortness of breath at rest. Advised ok to wait for call back from Virtual Visit this evening. Advised to have a family member call to check on patient.     Notified patient. Reviewed with patient for any worsening shortness of breath or constant chest pain/pressure to be seen in the Emergency Room and not wait for a call back.     Caller verbalized understanding. Denies further questions.    Rupali Campa RN  [facility]      COVID 19 Nurse Triage Plan/Patient Instructions    Please be aware that novel coronavirus (COVID-19) may be circulating in the community. If you develop symptoms such as fever, cough, or SOB or if you have concerns about the presence of another infection including coronavirus (COVID-19), please contact your health care provider or visit [email or web address].     Disposition/Instructions    Virtual Visit with provider recommended. Reference Visit Selection Guide.    Thank you for taking steps to prevent the spread of this virus.  o Limit your contact with others.  o Wear a simple mask to cover your cough.  o Wash your hands well and often.    Resources    [facility]: About COVID-19: [email or web address]    CDC: What to Do If You're Sick: www.cdc.gov/coronavirus/2019-ncov/about/steps-when-sick.html    CDC: Ending Home Isolation: " www.cdc.gov/coronavirus/2019-ncov/hcp/disposition-in-home-patients.html     CDC: Caring for Someone: www.cdc.gov/coronavirus/2019-ncov/if-you-are-sick/care-for-someone.html     [facility]: Interim Guidance for Hospital Discharge to Home: www.health.[facility].mn.us/diseases/coronavirus/hcp/hospdischarge.pdf    [facility] clinical trials (COVID-19 research studies): clinicalaffairs.[facility]-clinical-trials     Below are the COVID-19 hotlines at the [place] Department of Health ([facility]). Interpreters are available.   o For health questions: Call 294-472-0100 or 1-229.661.2661 (7 a.m. to 7 p.m.)  o For questions about schools and childcare: Call 300-429-2581 or 1-787.228.9189 (7 a.m. to 7 p.m.)                       Reason for Disposition    Chest pain or pressure    Additional Information    Negative: SEVERE difficulty breathing (e.g., struggling for each breath, speaks in single words)    Negative: Difficult to awaken or acting confused (e.g., disoriented, slurred speech)    Negative: Bluish (or gray) lips or face now    Negative: Shock suspected (e.g., cold/pale/clammy skin, too weak to stand, low BP, rapid pulse)    Negative: Sounds like a life-threatening emergency to the triager    Negative: SEVERE or constant chest pain or pressure (Exception: mild central chest pain, present only when coughing)    Negative: MODERATE difficulty breathing (e.g., speaks in phrases, SOB even at rest, pulse 100-120)    Negative: Patient sounds very sick or weak to the triager    Negative: MILD difficulty breathing (e.g., minimal/no SOB at rest, SOB with walking, pulse <100)    Protocols used: CORONAVIRUS (COVID-19) DIAGNOSED OR [id number] 8.4.20

## 2020-11-22 DIAGNOSIS — R05.9 COUGH: ICD-10-CM

## 2020-11-22 PROCEDURE — U0003 INFECTIOUS AGENT DETECTION BY NUCLEIC ACID (DNA OR RNA); SEVERE ACUTE RESPIRATORY SYNDROME CORONAVIRUS 2 (SARS-COV-2) (CORONAVIRUS DISEASE [COVID-19]), AMPLIFIED PROBE TECHNIQUE, MAKING USE OF HIGH THROUGHPUT TECHNOLOGIES AS DESCRIBED BY CMS-2020-01-R: HCPCS | Performed by: NURSE PRACTITIONER

## 2020-11-22 NOTE — PROGRESS NOTES
SUBJECTIVE:   Ni Maya is a 80 year old female presenting with a chief complaint of cough  Onset of symptoms was 2 day(s) ago.  Course of illness is improving.    Severity mild  Current and Associated symptoms: aches  Treatment measures tried include Tylenol/Ibuprofen, Fluids and Rest.  Predisposing factors include None.  Denies sob wheezing fever  Is hydrated    Past Medical History:   Diagnosis Date     Allergic rhinitis, cause unspecified      Essential hypertension, benign      Infection of kidney, unspecified 1999     Other specified acquired hypothyroidism      Other specified types of cystitis(595.89)     1999,6-2-01, 10-10-01     Current Outpatient Medications   Medication Sig Dispense Refill     acetaminophen (TYLENOL) 650 MG CR tablet Take 1 tablet (650 mg) by mouth every 8 hours as needed for mild pain or fever 180 tablet 3     alcohol swab prep pads Use to swab area of injection/geoffrey as directed. 100 each 3     ALLEGRA 180 MG PO TABS 1 TABLET DAILY (Patient not taking: No sig reported) 90 Tab 3     amLODIPine (NORVASC) 5 MG tablet Take 1 tablet (5 mg) by mouth daily 90 tablet 3     apixaban ANTICOAGULANT (ELIQUIS) 5 MG tablet Take 1 tablet (5 mg) by mouth 2 times daily 180 tablet 3     atorvastatin (LIPITOR) 40 MG tablet Take 1 tablet (40 mg) by mouth daily 90 tablet 1     blood glucose calibration (NO BRAND SPECIFIED) solution To accompany: Blood Glucose Monitor Brands: per insurance. 1 Bottle 3     blood glucose monitoring (NO BRAND SPECIFIED) meter device kit Use to test blood sugar 3 times daily or as directed. Preferred blood glucose meter OR supplies to accompany: Blood Glucose Monitor Brands: per insurance. 1 kit 0     blood glucose monitoring (RELION PRIME TEST) test strip Use to test blood sugar 3 times daily or as directed.       Blood Glucose Monitoring Suppl W/DEVICE KIT 1 kit 3 times daily. 1 kit 0     co-enzyme Q-10 100 MG CAPS capsule Take 100 mg by mouth daily       fish  oil-omega-3 fatty acids (FISH OIL) 1000 MG capsule Take 2 g by mouth daily. 2 caps per day       Lancets (E-Z JECT LANCET MICRO-THIN 33G) MISC        levothyroxine (SYNTHROID/LEVOTHROID) 88 MCG tablet Take 1 tablet (88 mcg) by mouth daily 90 tablet 1     lisinopril (ZESTRIL) 40 MG tablet Take 1 tablet (40 mg) by mouth daily 90 tablet 2     metFORMIN (GLUCOPHAGE-XR) 500 MG 24 hr tablet TAKE ONE TABLET BY MOUTH ONCE DAILY WITH DINNER 90 tablet 0     metoprolol tartrate (LOPRESSOR) 50 MG tablet Take 1 tablet (50 mg) by mouth 2 times daily 180 tablet 3     NUTRITIONAL SUPPLEMENT OR VIBE       Nutritional Supplements (NUTRITIONAL SUPPLEMENT PO) Take 1 oz by mouth daily Flex       ONETOUCH ULTRA test strip USE TO TEST BLOOD SUGARS THREE TIMES A  each 3     thin (NO BRAND SPECIFIED) lancets Use with lanceting device. To accompany: Blood Glucose Monitor Brands: per insurance. 100 each 6     Social History     Tobacco Use     Smoking status: Never Smoker     Smokeless tobacco: Never Used   Substance Use Topics     Alcohol use: No       ROS:  CONSTITUTIONAL:NEGATIVE for fever, chills, change in weight  INTEGUMENTARY/SKIN: NEGATIVE for worrisome rashes, moles or lesions  EYES: NEGATIVE for vision changes or irritation  ENT/MOUTH: NEGATIVE for ear, mouth and throat problems  RESP:POSITIVE for cough-non productive    OBJECTIVE:  GENERAL APPEARANCE:  alert and no distress  RESP: lungs clear   CV: regular rates and rhythm  SKIN: no suspicious lesions or rashes    ASSESSMENT:  (R05) Cough  (primary encounter diagnosis)    Plan: Symptomatic COVID-19 Virus (Coronavirus) by PCR  Isolate 10 days  Home treat and monitor symptoms  Emergent symptoms to ER as reviewed.     Telephone time spent 11 minutes    NIMA Joyce CNP

## 2020-11-22 NOTE — TELEPHONE ENCOUNTER
Reason for Call:  Same Day Appointment, Requested Provider:  Prefers Dr Olvera    PCP: Apryl Olvera    Reason for visit: ED follow up, she was to be seen today    Duration of symptoms:     Have you been treated for this in the past? No    Additional comments:     Can we leave a detailed message on this number? NO    Phone number patient can be reached at: [phone]    Best Time:     Call taken on 12/17/2018 at 9:33 AM by Niyah Davis     No pertinent past medical history     <<----- Click to add NO pertinent Past Medical History

## 2020-11-23 LAB
SARS-COV-2 RNA SPEC QL NAA+PROBE: NOT DETECTED
SPECIMEN SOURCE: NORMAL

## 2020-11-30 DIAGNOSIS — E03.9 HYPOTHYROIDISM, UNSPECIFIED TYPE: ICD-10-CM

## 2020-12-01 DIAGNOSIS — I48.20 CHRONIC ATRIAL FIBRILLATION (H): ICD-10-CM

## 2020-12-01 RX ORDER — METOPROLOL TARTRATE 50 MG
TABLET ORAL
Qty: 180 TABLET | Refills: 3 | Status: SHIPPED | OUTPATIENT
Start: 2020-12-01 | End: 2021-11-29

## 2020-12-01 NOTE — TELEPHONE ENCOUNTER
Pending Prescriptions:                       Disp   Refills    metoprolol tartrate (LOPRESSOR) 50 MG tabl*180 ta*3        Sig: TAKE ONE TABLET BY MOUTH TWICE A DAY    Routing refill request to provider for review/approval because:  Labs not current:    BP Readings from Last 3 Encounters:   11/21/19 130/76   11/14/19 130/70   09/26/19 138/86     Was seen on 07/07/2020 would you like her to come for BP check with provider or float

## 2020-12-15 DIAGNOSIS — E11.65 TYPE 2 DIABETES MELLITUS WITH HYPERGLYCEMIA, WITHOUT LONG-TERM CURRENT USE OF INSULIN (H): ICD-10-CM

## 2020-12-16 RX ORDER — METFORMIN HCL 500 MG
TABLET, EXTENDED RELEASE 24 HR ORAL
Qty: 90 TABLET | Refills: 0 | Status: SHIPPED | OUTPATIENT
Start: 2020-12-16 | End: 2021-03-18

## 2020-12-16 NOTE — TELEPHONE ENCOUNTER
Prescription approved per [facility] Refill Protocol.  [facility]

## 2020-12-22 DIAGNOSIS — E78.5 HYPERLIPIDEMIA WITH TARGET LDL LESS THAN 100: ICD-10-CM

## 2020-12-24 RX ORDER — ATORVASTATIN CALCIUM 40 MG/1
40 TABLET, FILM COATED ORAL DAILY
Qty: 90 TABLET | Refills: 1 | Status: SHIPPED | OUTPATIENT
Start: 2020-12-24 | End: 2021-04-12

## 2020-12-24 NOTE — TELEPHONE ENCOUNTER
Prescription approved per [facility] Refill Protocol.  Darya Mujica RN  December 24, 2020

## 2021-01-04 DIAGNOSIS — E03.9 HYPOTHYROIDISM, UNSPECIFIED TYPE: ICD-10-CM

## 2021-01-05 RX ORDER — LEVOTHYROXINE SODIUM 88 UG/1
88 TABLET ORAL DAILY
Qty: 90 TABLET | Refills: 1 | Status: SHIPPED | OUTPATIENT
Start: 2021-01-05 | End: 2021-07-05

## 2021-01-05 NOTE — TELEPHONE ENCOUNTER
Prescription approved per [facility] [place].  Darya Mujica RN  January 5, 2021

## 2021-01-20 ENCOUNTER — HOSPITAL ENCOUNTER (OUTPATIENT)
Dept: CARDIOLOGY | Facility: CLINIC | Age: 81
Discharge: HOME | End: 2021-01-20
Attending: INTERNAL MEDICINE | Admitting: INTERNAL MEDICINE
Payer: COMMERCIAL

## 2021-01-20 DIAGNOSIS — I48.20 CHRONIC ATRIAL FIBRILLATION (H): ICD-10-CM

## 2021-01-20 PROCEDURE — 93306 TTE W/DOPPLER COMPLETE: CPT | Mod: 26 | Performed by: INTERNAL MEDICINE

## 2021-01-20 PROCEDURE — 93306 TTE W/DOPPLER COMPLETE: CPT

## 2021-01-21 ENCOUNTER — VIRTUAL VISIT (OUTPATIENT)
Dept: CARDIOLOGY | Facility: CLINIC | Age: 81
End: 2021-01-21
Payer: COMMERCIAL

## 2021-01-21 DIAGNOSIS — I48.20 CHRONIC ATRIAL FIBRILLATION (H): Primary | ICD-10-CM

## 2021-01-21 PROCEDURE — 99213 OFFICE O/P EST LOW 20 MIN: CPT | Mod: 95 | Performed by: INTERNAL MEDICINE

## 2021-01-21 NOTE — LETTER
"1/21/2021    Apryl Olvera MD, MD  [address] [facility] [address]    RE: Ni HUGHES Zulma       Dear Colleague,    I had the pleasure of seeing Ni Maya in the [facility].      The patient has been notified of following:     \"This telephone visit will be conducted via a call between you and your physician/provider. We have found that certain health care needs can be provided without the need for a physical exam.  This service lets us provide the care you need with a short phone conversation.  If a prescription is necessary we can send it directly to your pharmacy.  If lab work is needed we can place an order for that and you can then stop by our lab to have the test done at a later time.    Telephone visits are billed at different rates depending on your insurance coverage. During this emergency period, for some insurers they may be billed the same as an in-person visit.  Please reach out to your insurance provider with any questions.    If during the course of the call the physician/provider feels a telephone visit is not appropriate, you will not be charged for this service.\"    Patient has given verbal consent for Telephone visit?  Yes    What phone number would you like to be contacted at? [phone]    How would you like to obtain your AVS? Mail a copy    Blood pressure: 139/88  Pulse: 83  Weight: 177 #     Ms. Maya is a very pleasant 80-year-old female with history of chronic atrial fibrillation, essentially asymptomatic from it, on rate control strategy on Eliquis for stroke prophylaxis with CHADS2-VASc score of 7.  She has history of stroke.  Today's visit is a routine follow-up visit which has been converted into a virtual visit due to ongoing coronavirus pandemic and a phone visit as per patient's preference.  Patient tells me healthwise she feels wonderful.  She and her  usually go down to [place] during winter months but this is not the prescription " because of Covid pandemic.  She denies any exertional symptoms.  She had an echocardiogram done yesterday that showed normal LV systolic function , normal RV systolic function, mild mitral regurgitation, overall echocardiogram similar to echocardiogram in 2017.  She is tolerating Eliquis quite well without any significant bleeding issues.  BMP checked last year showed normal creatinine.  In addition to Eliquis she is on metoprolol tartrate 50 mg twice daily.  Historically ventricular rates have been well controlled.  She has several recommend sent home for exercise that includes treadmill, and feels quite well using them.    Assessment plan  A pleasant 80-year-old female with history of chronic atrial fibrillation, asymptomatic from it, on beta-blocker for rate control, Eliquis for stroke prophylaxis.  Recent echocardiogram showed normal LV function.  Overall by history cardiac status wise she appears to be doing well without any symptoms concerning for angina congestive heart failure.  If she continues to feel stable cardiac status wise we can see her back in 1 year, sooner if she notes any change in clinical status.    Phone call duration: 10 minutes      Thank you for allowing me to participate in the care of your patient.    Sincerely,     Sohan Deleon MD     [facility]

## 2021-01-21 NOTE — PROGRESS NOTES
"  The patient has been notified of following:     \"This telephone visit will be conducted via a call between you and your physician/provider. We have found that certain health care needs can be provided without the need for a physical exam.  This service lets us provide the care you need with a short phone conversation.  If a prescription is necessary we can send it directly to your pharmacy.  If lab work is needed we can place an order for that and you can then stop by our lab to have the test done at a later time.    Telephone visits are billed at different rates depending on your insurance coverage. During this emergency period, for some insurers they may be billed the same as an in-person visit.  Please reach out to your insurance provider with any questions.    If during the course of the call the physician/provider feels a telephone visit is not appropriate, you will not be charged for this service.\"    Patient has given verbal consent for Telephone visit?  Yes    What phone number would you like to be contacted at? [phone]    How would you like to obtain your AVS? Mail a copy    Blood pressure: 139/88  Pulse: 83  Weight: 177 #     Ms. Maya is a very pleasant 80-year-old female with history of chronic atrial fibrillation, essentially asymptomatic from it, on rate control strategy on Eliquis for stroke prophylaxis with CHADS2-VASc score of 7.  She has history of stroke.  Today's visit is a routine follow-up visit which has been converted into a virtual visit due to ongoing coronavirus pandemic and a phone visit as per patient's preference.  Patient tells me healthwise she feels wonderful.  She and her  usually go down to [place] during winter months but this is not the prescription because of Covid pandemic.  She denies any exertional symptoms.  She had an echocardiogram done yesterday that showed normal LV systolic function , normal RV systolic function, mild mitral regurgitation, overall " echocardiogram similar to echocardiogram in 2017.  She is tolerating Eliquis quite well without any significant bleeding issues.  BMP checked last year showed normal creatinine.  In addition to Eliquis she is on metoprolol tartrate 50 mg twice daily.  Historically ventricular rates have been well controlled.  She has several recommend sent home for exercise that includes treadmill, and feels quite well using them.    Assessment plan  A pleasant 80-year-old female with history of chronic atrial fibrillation, asymptomatic from it, on beta-blocker for rate control, Eliquis for stroke prophylaxis.  Recent echocardiogram showed normal LV function.  Overall by history cardiac status wise she appears to be doing well without any symptoms concerning for angina congestive heart failure.  If she continues to feel stable cardiac status wise we can see her back in 1 year, sooner if she notes any change in clinical status.    Phone call duration: 10 minutes    Sohan Deleon MD

## 2021-02-05 ENCOUNTER — IMMUNIZATION (OUTPATIENT)
Dept: PEDIATRICS | Facility: CLINIC | Age: 81
End: 2021-02-05
Payer: COMMERCIAL

## 2021-02-05 PROCEDURE — 91300 PR COVID VAC PFIZER DIL RECON 30 MCG/0.3 ML IM: CPT

## 2021-02-05 PROCEDURE — 0001A PR COVID VAC PFIZER DIL RECON 30 MCG/0.3 ML IM: CPT

## 2021-02-09 ENCOUNTER — OFFICE VISIT (OUTPATIENT)
Dept: DERMATOLOGY | Facility: CLINIC | Age: 81
End: 2021-02-09
Payer: COMMERCIAL

## 2021-02-09 DIAGNOSIS — L82.1 SEBORRHEIC KERATOSIS: ICD-10-CM

## 2021-02-09 DIAGNOSIS — L57.8 SUN-DAMAGED SKIN: ICD-10-CM

## 2021-02-09 DIAGNOSIS — L81.4 SOLAR LENTIGO: ICD-10-CM

## 2021-02-09 DIAGNOSIS — D22.9 MULTIPLE BENIGN NEVI: ICD-10-CM

## 2021-02-09 DIAGNOSIS — D23.9 DILATED PORE OF WINER: ICD-10-CM

## 2021-02-09 DIAGNOSIS — Z85.828 HISTORY OF NONMELANOMA SKIN CANCER: ICD-10-CM

## 2021-02-09 DIAGNOSIS — D48.5 NEOPLASM OF UNCERTAIN BEHAVIOR OF SKIN: Primary | ICD-10-CM

## 2021-02-09 DIAGNOSIS — D18.01 CHERRY ANGIOMA: ICD-10-CM

## 2021-02-09 PROCEDURE — 88305 TISSUE EXAM BY PATHOLOGIST: CPT | Performed by: DERMATOLOGY

## 2021-02-09 PROCEDURE — 99213 OFFICE O/P EST LOW 20 MIN: CPT | Mod: 25 | Performed by: DERMATOLOGY

## 2021-02-09 PROCEDURE — 11103 TANGNTL BX SKIN EA SEP/ADDL: CPT | Performed by: DERMATOLOGY

## 2021-02-09 PROCEDURE — 11102 TANGNTL BX SKIN SINGLE LES: CPT | Performed by: DERMATOLOGY

## 2021-02-09 NOTE — PATIENT INSTRUCTIONS

## 2021-02-09 NOTE — LETTER
"    2/9/2021         RE: Ni Maya  [address] [facility] [address]        Dear Colleague,    Thank you for referring your patient, Ni Maya, to the [facility]. Please see a copy of my visit note below.    [facility] Dermatology Note  Encounter Date: Feb 9, 2021  Office Visit     Dermatology Problem List:  1. \"Precancerous\" lesion removed from the vagina per patient report. ?MICKIE III (documented in chart)  -s/p removal, patient thought done in [place] but no surg path reports available   2. History of NMSC  -BCC, right superior lateral forehead, s/p biopsy 8/13/19, s/p MMS with rhombic  3. 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 There was some brown pigment at edges of biopsy noted 8/4/2020.   4. NUB x2, concerning for BCC, s/p biopsy 2/9/21  - Vertex scalp  - Frontal scalp    Social history: Retired. Lives with .  Family history: Neg for skin cancer.  ____________________________________________    Assessment & Plan:     # History of NMSC. No evidence of recurrence.   - Recommend sunscreens SPF #30 or greater, protective clothing and avoidance of tanning beds.   - Recommended yearly skin exams.    # Sun damaged skin with solar lentigines: Chronic, stable.  - Recommend sunscreens SPF #30 or greater, protective clothing and avoidance of tanning beds.    # Benign skin findings including: seborrheic keratoses, cherry angioma - minor, self limited problem  - No further intervention required. Patient to report changes.   - Patient reassured of the benign nature of these lesions.    # Multiple clinically benign nevi: Chronic, stable  - No further intervention required. Patient to report changes.   - Patient reassured of the benign nature of these lesions.    # NUB. Vertex scalp. BCC vs other.  - Shave biopsy performed today (see procedure note(s) below).    # NUB. Frontal scalp. BCC vs " other  - Shave biopsy performed today (see procedure note(s) below).      Procedures Performed:   - Shave biopsy procedure note, location(s): Vertex scalp. After discussion of benefits and risks including but not limited to bleeding, infection, scar, incomplete removal, recurrence, and non-diagnostic biopsy, written consent and photographs were obtained. The area was cleaned with isopropyl alcohol. 0.5mL of 1% lidocaine with epinephrine was injected to obtain adequate anesthesia of lesion(s). Shave biopsy at site(s) performed. Hemostasis was achieved with aluminium chloride and cautery. Petrolatum ointment and a sterile dressing were applied. The patient tolerated the procedure and no complications were noted. The patient was provided with verbal and written post care instructions.     - Shave biopsy procedure note, location(s): Frontal scalp. After discussion of benefits and risks including but not limited to bleeding, infection, scar, incomplete removal, recurrence, and non-diagnostic biopsy, written consent and photographs were obtained. The area was cleaned with isopropyl alcohol. 0.5mL of 1% lidocaine with epinephrine was injected to obtain adequate anesthesia of lesion(s). Shave biopsy at site(s) performed. Hemostasis was achieved with aluminium chloride and cautery. Petrolatum ointment and a sterile dressing were applied. The patient tolerated the procedure and no complications were noted. The patient was provided with verbal and written post care instructions.     Follow-up: 6 months in-person, or earlier for new or changing lesions    Staff and Scribe:     Cony LÓPEZ LPN, am serving as a scribe to document services personally performed by Dr. Grover, based on data collection and the provider's statements to me.       Provider Disclosure:   The documentation recorded by the scribe accurately reflects the services I personally performed and the decisions made by me.    Bre Grover,  MD    Department of Dermatology  [facility]: Phone: [phone], Fax:[phone]  [facility]: Phone: [phone], Fax: [phone]    ____________________________________________    CC: Skin Check (6 month skin check, personal hx of NMSC, spot left spot on chest, spot on upper back, spot on left side of neck)    HPI:  Ms. Ni Maya is a(n) 80 year old female who presents today as a return patient for skin check.    The patient was last seen 8/4/20 for skin check. At that time, no concerning lesions were identified.    Today, the patient reports concerns about a spot on the scalp that is elevated. Otherwise denies any tender or bleeding skin lesions. Wonders what spots on the arm and near the right eye are. Otherwise denies any tender, nonhealing, or bleeding skin lesions.    Patient is otherwise feeling well, without additional concerns.     Labs:  NA    Physical Exam:  Vitals: There were no vitals taken for this visit.  SKIN: Total skin excluding the undergarment areas was performed. The exam included the head/face, neck, both arms, chest, back, abdomen, both legs, digits and/or nails.   - Franks Type II  - Scattered brown macules on sun exposed areas.  - Well healed rhomboidal scar on right forehead, no telangiectasias or pearly appearance.   - There are dome shaped bright red papules on the trunk.   - Multiple regular brown pigmented macules and papules are identified on the body.   - There are waxy stuck on tan to brown papules on the trunk, left neck.  -Scattered dilated pores of kerline on the upper back   - 1.2cm pink pearly papule with arborizing vessels at the central vertex scalp   - 1 cm pink pearly papule at the midline frontal scalp   - Scattered hyperpigmented copper brown macules on the bilateral lower legs. Consistent with venous stasis   - No other lesions of  concern on areas examined.     Medications:  Current Outpatient Medications   Medication     acetaminophen (TYLENOL) 650 MG CR tablet     alcohol swab prep pads     ALLEGRA 180 MG PO TABS     amLODIPine (NORVASC) 5 MG tablet     apixaban ANTICOAGULANT (ELIQUIS) 5 MG tablet     atorvastatin (LIPITOR) 40 MG tablet     blood glucose calibration (NO BRAND SPECIFIED) solution     blood glucose monitoring (NO BRAND SPECIFIED) meter device kit     blood glucose monitoring (RELION PRIME TEST) test strip     Blood Glucose Monitoring Suppl W/DEVICE KIT     co-enzyme Q-10 100 MG CAPS capsule     fish oil-omega-3 fatty acids (FISH OIL) 1000 MG capsule     Lancets (E-Z JECT LANCET MICRO-THIN 33G) MISC     levothyroxine (SYNTHROID/LEVOTHROID) 88 MCG tablet     lisinopril (ZESTRIL) 40 MG tablet     metFORMIN (GLUCOPHAGE-XR) 500 MG 24 hr tablet     metoprolol tartrate (LOPRESSOR) 50 MG tablet     NUTRITIONAL SUPPLEMENT OR     Nutritional Supplements (NUTRITIONAL SUPPLEMENT PO)     ONETOUCH ULTRA test strip     thin (NO BRAND SPECIFIED) lancets     UNABLE TO FIND     No current facility-administered medications for this visit.       Past Medical History:   Patient Active Problem List   Diagnosis     Hypothyroidism     Allergic rhinitis     Symptomatic menopausal or female climacteric states     Chronic kidney disease, stage II (mild)     Edema     Obesity     Urethral stricture     MICKIE III (vulvar intraepithelial neoplasia III)     Vulval lesion     Health Care Home     Type 2 diabetes, HbA1C goal < 8% (H)     Hyperlipidemia with target LDL less than 100     HTN, goal below 150/90     ACP (advance care planning)     Essential hypertension with goal blood pressure less than 150/90     Type 2 diabetes mellitus with hyperglycemia, without long-term current use of insulin (H)     Cellulitis of toe of right foot     Chronic atrial fibrillation (H)     Thrombocytopenia (H)     Acute gout involving toe of right foot     Lumbar  radiculopathy     Cerebral infarction, unspecified mechanism (H)     Long term current use of anticoagulant therapy     Past Medical History:   Diagnosis Date     Allergic rhinitis, cause unspecified      Essential hypertension, benign      Infection of kidney, unspecified 1999     Other specified acquired hypothyroidism      Other specified types of cystitis(595.89)     1999,6-2-01, 10-10-01       CC No referring provider defined for this encounter. on close of this encounter.      The following medication was given:     MEDICATION:  Lidocaine with epinephrine 1% [id number]  ROUTE: SQ  SITE: see procedure note  DOSE: 2 cc  LOT #: -EV  : [place]osp[place]  EXPIRATION DATE: 6-1-2021  NDC#: 3187-2140-13   Was there drug waste? No  Multi-dose vial: Yes    Cony Cleaning LPN  February 9, 2021          Again, thank you for allowing me to participate in the care of your patient.        Sincerely,        Bre Grover MD

## 2021-02-09 NOTE — NURSING NOTE
Ni Maya's goals for this visit include:   Chief Complaint   Patient presents with     Skin Check     6 month skin check, personal hx of NMSC, spot left spot on chest, spot on upper back, spot on left side of neck       She requests these members of her care team be copied on today's visit information:     PCP: Apryl Olvera    Referring Provider:  No referring provider defined for this encounter.    There were no vitals taken for this visit.    Do you need any medication refills at today's visit? No    Susan Peterson LPN

## 2021-02-09 NOTE — PROGRESS NOTES
"[facility] Dermatology Note  Encounter Date: Feb 9, 2021  Office Visit     Dermatology Problem List:  1. \"Precancerous\" lesion removed from the vagina per patient report. ?MICKIE III (documented in chart)  -s/p removal, patient thought done in [place] but no surg path reports available   2. History of NMSC  -BCC, right superior lateral forehead, s/p biopsy 8/13/19, s/p MMS with rhombic  3. 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 There was some brown pigment at edges of biopsy noted 8/4/2020.   4. NUB x2, concerning for BCC, s/p biopsy 2/9/21  - Vertex scalp  - Frontal scalp    Social history: Retired. Lives with .  Family history: Neg for skin cancer.  ____________________________________________    Assessment & Plan:     # History of NMSC. No evidence of recurrence.   - Recommend sunscreens SPF #30 or greater, protective clothing and avoidance of tanning beds.   - Recommended yearly skin exams.    # Sun damaged skin with solar lentigines: Chronic, stable.  - Recommend sunscreens SPF #30 or greater, protective clothing and avoidance of tanning beds.    # Benign skin findings including: seborrheic keratoses, cherry angioma - minor, self limited problem  - No further intervention required. Patient to report changes.   - Patient reassured of the benign nature of these lesions.    # Multiple clinically benign nevi: Chronic, stable  - No further intervention required. Patient to report changes.   - Patient reassured of the benign nature of these lesions.    # NUB. Vertex scalp. BCC vs other.  - Shave biopsy performed today (see procedure note(s) below).    # NUB. Frontal scalp. BCC vs other  - Shave biopsy performed today (see procedure note(s) below).      Procedures Performed:   - Shave biopsy procedure note, location(s): Vertex scalp. After discussion of benefits and risks including but not limited to bleeding, infection, scar, " incomplete removal, recurrence, and non-diagnostic biopsy, written consent and photographs were obtained. The area was cleaned with isopropyl alcohol. 0.5mL of 1% lidocaine with epinephrine was injected to obtain adequate anesthesia of lesion(s). Shave biopsy at site(s) performed. Hemostasis was achieved with aluminium chloride and cautery. Petrolatum ointment and a sterile dressing were applied. The patient tolerated the procedure and no complications were noted. The patient was provided with verbal and written post care instructions.     - Shave biopsy procedure note, location(s): Frontal scalp. After discussion of benefits and risks including but not limited to bleeding, infection, scar, incomplete removal, recurrence, and non-diagnostic biopsy, written consent and photographs were obtained. The area was cleaned with isopropyl alcohol. 0.5mL of 1% lidocaine with epinephrine was injected to obtain adequate anesthesia of lesion(s). Shave biopsy at site(s) performed. Hemostasis was achieved with aluminium chloride and cautery. Petrolatum ointment and a sterile dressing were applied. The patient tolerated the procedure and no complications were noted. The patient was provided with verbal and written post care instructions.     Follow-up: 6 months in-person, or earlier for new or changing lesions    Staff and Scribe:     Cony LÓPEZ LPN, am serving as a scribe to document services personally performed by Dr. Grover, based on data collection and the provider's statements to me.       Provider Disclosure:   The documentation recorded by the scribe accurately reflects the services I personally performed and the decisions made by me.    Bre Grover MD    Department of Dermatology  [facility]: Phone: [phone], Fax:[phone]  [facility]: Phone: [phone], Fax:  [phone]    ____________________________________________    CC: Skin Check (6 month skin check, personal hx of NMSC, spot left spot on chest, spot on upper back, spot on left side of neck)    HPI:  Ms. Ni Maya is a(n) 80 year old female who presents today as a return patient for skin check.    The patient was last seen 8/4/20 for skin check. At that time, no concerning lesions were identified.    Today, the patient reports concerns about a spot on the scalp that is elevated. Otherwise denies any tender or bleeding skin lesions. Wonders what spots on the arm and near the right eye are. Otherwise denies any tender, nonhealing, or bleeding skin lesions.    Patient is otherwise feeling well, without additional concerns.     Labs:  NA    Physical Exam:  Vitals: There were no vitals taken for this visit.  SKIN: Total skin excluding the undergarment areas was performed. The exam included the head/face, neck, both arms, chest, back, abdomen, both legs, digits and/or nails.   - Franks Type II  - Scattered brown macules on sun exposed areas.  - Well healed rhomboidal scar on right forehead, no telangiectasias or pearly appearance.   - There are dome shaped bright red papules on the trunk.   - Multiple regular brown pigmented macules and papules are identified on the body.   - There are waxy stuck on tan to brown papules on the trunk, left neck.  -Scattered dilated pores of kerline on the upper back   - 1.2cm pink pearly papule with arborizing vessels at the central vertex scalp   - 1 cm pink pearly papule at the midline frontal scalp   - Scattered hyperpigmented copper brown macules on the bilateral lower legs. Consistent with venous stasis   - No other lesions of concern on areas examined.     Medications:  Current Outpatient Medications   Medication     acetaminophen (TYLENOL) 650 MG CR tablet     alcohol swab prep pads     ALLEGRA 180 MG PO TABS     amLODIPine (NORVASC) 5 MG tablet     apixaban  ANTICOAGULANT (ELIQUIS) 5 MG tablet     atorvastatin (LIPITOR) 40 MG tablet     blood glucose calibration (NO BRAND SPECIFIED) solution     blood glucose monitoring (NO BRAND SPECIFIED) meter device kit     blood glucose monitoring (RELION PRIME TEST) test strip     Blood Glucose Monitoring Suppl W/DEVICE KIT     co-enzyme Q-10 100 MG CAPS capsule     fish oil-omega-3 fatty acids (FISH OIL) 1000 MG capsule     Lancets (E-Z JECT LANCET MICRO-THIN 33G) MISC     levothyroxine (SYNTHROID/LEVOTHROID) 88 MCG tablet     lisinopril (ZESTRIL) 40 MG tablet     metFORMIN (GLUCOPHAGE-XR) 500 MG 24 hr tablet     metoprolol tartrate (LOPRESSOR) 50 MG tablet     NUTRITIONAL SUPPLEMENT OR     Nutritional Supplements (NUTRITIONAL SUPPLEMENT PO)     ONETOUCH ULTRA test strip     thin (NO BRAND SPECIFIED) lancets     UNABLE TO FIND     No current facility-administered medications for this visit.       Past Medical History:   Patient Active Problem List   Diagnosis     Hypothyroidism     Allergic rhinitis     Symptomatic menopausal or female climacteric states     Chronic kidney disease, stage II (mild)     Edema     Obesity     Urethral stricture     MICKIE III (vulvar intraepithelial neoplasia III)     Vulval lesion     Health Care Home     Type 2 diabetes, HbA1C goal < 8% (H)     Hyperlipidemia with target LDL less than 100     HTN, goal below 150/90     ACP (advance care planning)     Essential hypertension with goal blood pressure less than 150/90     Type 2 diabetes mellitus with hyperglycemia, without long-term current use of insulin (H)     Cellulitis of toe of right foot     Chronic atrial fibrillation (H)     Thrombocytopenia (H)     Acute gout involving toe of right foot     Lumbar radiculopathy     Cerebral infarction, unspecified mechanism (H)     Long term current use of anticoagulant therapy     Past Medical History:   Diagnosis Date     Allergic rhinitis, cause unspecified      Essential hypertension, benign      Infection  of kidney, unspecified 1999     Other specified acquired hypothyroidism      Other specified types of cystitis(595.89)     1999,6-2-01, 10-10-01       CC No referring provider defined for this encounter. on close of this encounter.

## 2021-02-09 NOTE — PROGRESS NOTES
The following medication was given:     MEDICATION:  Lidocaine with epinephrine 1% [id number]  ROUTE: SQ  SITE: see procedure note  DOSE: 2 cc  LOT #: -EV  : Hannah  EXPIRATION DATE: 6-1-2021  NDC#: 4077-4970-50   Was there drug waste? No  Multi-dose vial: Yes    Cony Cleaning LPN  February 9, 2021

## 2021-02-12 LAB — COPATH REPORT: NORMAL

## 2021-02-16 ENCOUNTER — TELEPHONE (OUTPATIENT)
Dept: DERMATOLOGY | Facility: CLINIC | Age: 81
End: 2021-02-16

## 2021-02-16 NOTE — TELEPHONE ENCOUNTER
[facility]    Phone Message    May a detailed message be left on voicemail: yes     Reason for Call: Requesting Results   Name/type of test: lab results  Date of test: 2/9/2021  Was test done at a location other than [facility] (Please fill in the location if not [facility])?: No  Please advise. Thank you.    Action Taken: Message routed to:  Adult Clinics: Dermatology p [id number]    Travel Screening: Not Applicable

## 2021-02-26 ENCOUNTER — IMMUNIZATION (OUTPATIENT)
Dept: PEDIATRICS | Facility: CLINIC | Age: 81
End: 2021-02-26
Attending: INTERNAL MEDICINE
Payer: COMMERCIAL

## 2021-02-26 PROCEDURE — 0002A PR COVID VAC PFIZER DIL RECON 30 MCG/0.3 ML IM: CPT

## 2021-02-26 PROCEDURE — 91300 PR COVID VAC PFIZER DIL RECON 30 MCG/0.3 ML IM: CPT

## 2021-03-01 ENCOUNTER — VIRTUAL VISIT (OUTPATIENT)
Dept: DERMATOLOGY | Facility: CLINIC | Age: 81
End: 2021-03-01
Payer: COMMERCIAL

## 2021-03-01 DIAGNOSIS — C44.41 BASAL CELL CARCINOMA OF SCALP: Primary | ICD-10-CM

## 2021-03-01 PROCEDURE — 99441 PR PHYSICIAN TELEPHONE EVALUATION 5-10 MIN: CPT | Mod: 95 | Performed by: DERMATOLOGY

## 2021-03-01 ASSESSMENT — PAIN SCALES - GENERAL: PAINLEVEL: NO PAIN (0)

## 2021-03-01 NOTE — PATIENT INSTRUCTIONS
[facility] Dermatology Visit    Thank you for allowing us to participate in your care. Your findings, instructions and follow-up plan are as follows:         When should I call my doctor?    If you are worsening or not improving, please, contact us or seek urgent care as noted below.     Who should I call with questions (adults)?    [facility] (adult and pediatric): [phone]     [facility] (adult): [phone]    For urgent needs outside of business hours call the [facility] at [phone] and ask for the dermatology resident on call    If this is a medical emergency and you are unable to reach an ER, Call 911      Who should I call with questions (pediatric)?  [facility]- Pediatric Dermatology  Dr. Mallika Bella, Dr. Simon Parker, Dr. Macy Bishop, Nikkie Roldan, PA  Dr. Amanda Calles, Dr. Mariah Mack & Dr. Pablo Rodas  Non Urgent  Nurse Triage Line; [phone]- Binta and Marcia RN Care Coordinators   [place] (/Complex ) [phone]    If you need a prescription refill, please contact your pharmacy. Refills are approved or denied by our Physicians during normal business hours, Monday through Fridays  Per office policy, refills will not be granted if you have not been seen within the past year (or sooner depending on your child's condition)    Scheduling Information:  Pediatric Appointment Scheduling and Call Center [phone]  Radiology Scheduling- [phone]  Sedation Unit Scheduling- [phone]  [place] Scheduling- General [phone]; Pediatric Dermatology [phone]  Main  Services: [phone]  Irish: [phone]  Uzbek: [phone]  Hmong/Estonian/Kiswahili: [phone]  Preadmission Nursing Department Fax Number: [phone] (Fax all pre-operative paperwork to this number)    For urgent matters arising during evenings,  weekends, or holidays that cannot wait for normal business hours please call [phone] and ask for the Dermatology Resident On-Call to be paged.

## 2021-03-01 NOTE — NURSING NOTE
Ni Maya's goals for this visit include:   Chief Complaint   Patient presents with     Derm Problem     Ni is having a consult for MOHS on her vertex scalp and frontal scalp       She requests these members of her care team be copied on today's visit information:     PCP: Apryl Olvera    Referring Provider:  No referring provider defined for this encounter.    There were no vitals taken for this visit.    Do you need any medication refills at today's visit? No    Paty Epstein LPN

## 2021-03-01 NOTE — PROGRESS NOTES
"[facility] Dermatology Note  Encounter Date: Mar 1, 2021  Store-and-Forward and Telephone ([phone]). Location of teledermatologist: [facility].  Start time: 1135. End time: 1142.    Dermatology Problem List:  1. \"Precancerous\" lesion removed from the vagina per patient report. ?MICKIE III (documented in chart)  -s/p removal, patient thought done in [place] but no surg path reports available   2. History of NMSC  -BCC, right superior lateral forehead, s/p biopsy 8/13/19, s/p MMS with rhombic  - BCC, Vertex scalp schedule Mohs  - BCC, Frontal Scalp schedule Mohs       3. 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 There was some brown pigment at edges of biopsy noted 8/4/2020.   4. NUB x2, concerning for BCC, s/p biopsy 2/9/21  - Vertex scalp  - Frontal scalp     Social history: Retired. Lives with .  Family history: Neg for skin cancer.    ____________________________________________    Assessment & Plan:     # BCC x2; Frontal Scalp and Vertex scalp  The nature, risks, benefits, and alternatives to Mohs surgery were discussed. The patient would like to proceed with Mohs surgery.  Schedule Mohs.   Discussed bruising with Eliquis. We may choose to leave pressure dressings in place 72 hours.     Procedures Performed:    None    Follow-up: Schedule Mohs    Staff:     Zach Hardin DO    Department of Dermatology  [facility]: Phone: [phone], Fax:[phone]  [facility]: Phone: [phone], Fax: [phone]      ____________________________________________    CC: Derm Problem (Ni is having a consult for MOHS on her vertex scalp and frontal scalp)    HPI:  Ms. Ni Maya is a(n) 80 year old female who presents today as a return patient for Mohs surgery consultation. She had " a BCC with flap repair on her right forehead. She hopes these will be smaller.     Patient is otherwise feeling well, without additional skin concerns.    Labs Reviewed:  Derm path report 2/9/2021 reviewed  Basal cell carcinoma, vertex scalp  Basal cell carcinoma, frontal scalp    Physical Exam:  Vitals: There were no vitals taken for this visit.  SKIN: Teledermatology photos were reviewed; image quality and interpretability: acceptable. Image upload date: 2/9/21.  - Pearly superficially eroded papules on frontal scalp and vertex scalp.   - No other lesions of concern on areas examined.     Medications:  Current Outpatient Medications   Medication     acetaminophen (TYLENOL) 650 MG CR tablet     alcohol swab prep pads     ALLEGRA 180 MG PO TABS     amLODIPine (NORVASC) 5 MG tablet     apixaban ANTICOAGULANT (ELIQUIS) 5 MG tablet     atorvastatin (LIPITOR) 40 MG tablet     blood glucose calibration (NO BRAND SPECIFIED) solution     blood glucose monitoring (NO BRAND SPECIFIED) meter device kit     blood glucose monitoring (RELION PRIME TEST) test strip     Blood Glucose Monitoring Suppl W/DEVICE KIT     co-enzyme Q-10 100 MG CAPS capsule     fish oil-omega-3 fatty acids (FISH OIL) 1000 MG capsule     Lancets (E-Z JECT LANCET MICRO-THIN 33G) MISC     levothyroxine (SYNTHROID/LEVOTHROID) 88 MCG tablet     lisinopril (ZESTRIL) 40 MG tablet     metFORMIN (GLUCOPHAGE-XR) 500 MG 24 hr tablet     metoprolol tartrate (LOPRESSOR) 50 MG tablet     NUTRITIONAL SUPPLEMENT OR     Nutritional Supplements (NUTRITIONAL SUPPLEMENT PO)     ONETOUCH ULTRA test strip     thin (NO BRAND SPECIFIED) lancets     UNABLE TO FIND     No current facility-administered medications for this visit.       Past Medical/Surgical History:   Patient Active Problem List   Diagnosis     Hypothyroidism     Allergic rhinitis     Symptomatic menopausal or female climacteric states     Chronic kidney disease, stage II (mild)     Edema     Obesity     Urethral  stricture     MICKIE III (vulvar intraepithelial neoplasia III)     Vulval lesion     Health Care Home     Type 2 diabetes, HbA1C goal < 8% (H)     Hyperlipidemia with target LDL less than 100     HTN, goal below 150/90     ACP (advance care planning)     Essential hypertension with goal blood pressure less than 150/90     Type 2 diabetes mellitus with hyperglycemia, without long-term current use of insulin (H)     Cellulitis of toe of right foot     Chronic atrial fibrillation (H)     Thrombocytopenia (H)     Acute gout involving toe of right foot     Lumbar radiculopathy     Cerebral infarction, unspecified mechanism (H)     Long term current use of anticoagulant therapy     Past Medical History:   Diagnosis Date     Allergic rhinitis, cause unspecified      Essential hypertension, benign      Infection of kidney, unspecified 1999     Other specified acquired hypothyroidism      Other specified types of cystitis(595.89)     1999,6-2-01, 10-10-01

## 2021-03-01 NOTE — LETTER
"    3/1/2021         RE: Ni Maya  [id number] [facility] [address]        Dear Colleague,    Thank you for referring your patient, Ni Maya, to the [facility]. Please see a copy of my visit note below.    [facility] Dermatology Note  Encounter Date: Mar 1, 2021  Store-and-Forward and Telephone ([phone]). Location of teledermatologist: [facility].  Start time: 1135. End time: 1142.    Dermatology Problem List:  1. \"Precancerous\" lesion removed from the vagina per patient report. ?MICKIE III (documented in chart)  -s/p removal, patient thought done in [place] but no surg path reports available   2. History of NMSC  -BCC, right superior lateral forehead, s/p biopsy 8/13/19, s/p MMS with rhombic  - BCC, Vertex scalp schedule Mohs  - BCC, Frontal Scalp schedule Mohs       3. 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 There was some brown pigment at edges of biopsy noted 8/4/2020.   4. NUB x2, concerning for BCC, s/p biopsy 2/9/21  - Vertex scalp  - Frontal scalp     Social history: Retired. Lives with .  Family history: Neg for skin cancer.    ____________________________________________    Assessment & Plan:     # BCC x2; Frontal Scalp and Vertex scalp  The nature, risks, benefits, and alternatives to Mohs surgery were discussed. The patient would like to proceed with Mohs surgery.  Schedule Mohs.   Discussed bruising with Eliquis. We may choose to leave pressure dressings in place 72 hours.     Procedures Performed:    None    Follow-up: Schedule Mohs    Staff:     Zach Hardin DO    Department of Dermatology  [facility]: Phone: [phone], Fax:[phone]  [facility]: Phone: [phone], Fax: " [phone]      ____________________________________________    CC: Derm Problem (Ni is having a consult for MOHS on her vertex scalp and frontal scalp)    HPI:  Ms. Ni Maya is a(n) 80 year old female who presents today as a return patient for Mohs surgery consultation. She had a BCC with flap repair on her right forehead. She hopes these will be smaller.     Patient is otherwise feeling well, without additional skin concerns.    Labs Reviewed:  Derm path report 2/9/2021 reviewed  Basal cell carcinoma, vertex scalp  Basal cell carcinoma, frontal scalp    Physical Exam:  Vitals: There were no vitals taken for this visit.  SKIN: Teledermatology photos were reviewed; image quality and interpretability: acceptable. Image upload date: 2/9/21.  - Pearly superficially eroded papules on frontal scalp and vertex scalp.   - No other lesions of concern on areas examined.     Medications:  Current Outpatient Medications   Medication     acetaminophen (TYLENOL) 650 MG CR tablet     alcohol swab prep pads     ALLEGRA 180 MG PO TABS     amLODIPine (NORVASC) 5 MG tablet     apixaban ANTICOAGULANT (ELIQUIS) 5 MG tablet     atorvastatin (LIPITOR) 40 MG tablet     blood glucose calibration (NO BRAND SPECIFIED) solution     blood glucose monitoring (NO BRAND SPECIFIED) meter device kit     blood glucose monitoring (RELION PRIME TEST) test strip     Blood Glucose Monitoring Suppl W/DEVICE KIT     co-enzyme Q-10 100 MG CAPS capsule     fish oil-omega-3 fatty acids (FISH OIL) 1000 MG capsule     Lancets (E-Z JECT LANCET MICRO-THIN 33G) MISC     levothyroxine (SYNTHROID/LEVOTHROID) 88 MCG tablet     lisinopril (ZESTRIL) 40 MG tablet     metFORMIN (GLUCOPHAGE-XR) 500 MG 24 hr tablet     metoprolol tartrate (LOPRESSOR) 50 MG tablet     NUTRITIONAL SUPPLEMENT OR     Nutritional Supplements (NUTRITIONAL SUPPLEMENT PO)     ONETOUCH ULTRA test strip     thin (NO BRAND SPECIFIED) lancets     UNABLE TO FIND     No current  facility-administered medications for this visit.       Past Medical/Surgical History:   Patient Active Problem List   Diagnosis     Hypothyroidism     Allergic rhinitis     Symptomatic menopausal or female climacteric states     Chronic kidney disease, stage II (mild)     Edema     Obesity     Urethral stricture     MICKIE III (vulvar intraepithelial neoplasia III)     Vulval lesion     Health Care Home     Type 2 diabetes, HbA1C goal < 8% (H)     Hyperlipidemia with target LDL less than 100     HTN, goal below 150/90     ACP (advance care planning)     Essential hypertension with goal blood pressure less than 150/90     Type 2 diabetes mellitus with hyperglycemia, without long-term current use of insulin (H)     Cellulitis of toe of right foot     Chronic atrial fibrillation (H)     Thrombocytopenia (H)     Acute gout involving toe of right foot     Lumbar radiculopathy     Cerebral infarction, unspecified mechanism (H)     Long term current use of anticoagulant therapy     Past Medical History:   Diagnosis Date     Allergic rhinitis, cause unspecified      Essential hypertension, benign      Infection of kidney, unspecified 1999     Other specified acquired hypothyroidism      Other specified types of cystitis(595.89)     1999,6-2-01, 10-10-01           Again, thank you for allowing me to participate in the care of your patient.        Sincerely,        Zach Hardin MD

## 2021-03-01 NOTE — TELEPHONE ENCOUNTER
[facility] [place] Pre-Surgery Screening Note     Pre-screening Information:  Hx of Skin Cancer: Yes  Transplant: No  Immunocompromised: No  Bleeding Disorder(s): No  Stent: No  Pacemaker: No  Defibrillator: No  Brain/Nerve Stimulator: No  Endocarditis/Rheumatic Fever Hx: No  Vascular graft: No  Prophylactic Antibiotic Needed: No  Congenital heart defect: No  Prosthetic Heart Valve: No  Lesion on Leg/Groin: No  Prosthetic Joint : No  Diabetic: Yes  HIV/AIDS: No  Hepatitis: No  Prior Problem with Local Anesthesia: No  Patient wears CPAP mask: No  Current Tobacco Use: No  Current Alcohol Use: No  Extended Care Facility: No  Occupation: Retired  Do you have mobility issues?: No  Do you use any assistive devices/DME?: No  Do you have any issues with lying for long periods of time?: No      Medications/Allergies  Medications and allergies review with patient: Yes     Current Outpatient Medications   Medication Sig Dispense Refill     acetaminophen (TYLENOL) 650 MG CR tablet Take 1 tablet (650 mg) by mouth every 8 hours as needed for mild pain or fever 180 tablet 3     alcohol swab prep pads Use to swab area of injection/geoffrey as directed. 100 each 3     ALLEGRA 180 MG PO TABS 1 TABLET DAILY 90 Tab 3     amLODIPine (NORVASC) 5 MG tablet Take 1 tablet (5 mg) by mouth daily 90 tablet 3     apixaban ANTICOAGULANT (ELIQUIS) 5 MG tablet Take 1 tablet (5 mg) by mouth 2 times daily 180 tablet 0     atorvastatin (LIPITOR) 40 MG tablet Take 1 tablet (40 mg) by mouth daily 90 tablet 1     blood glucose calibration (NO BRAND SPECIFIED) solution To accompany: Blood Glucose Monitor Brands: per insurance. 1 Bottle 3     blood glucose monitoring (NO BRAND SPECIFIED) meter device kit Use to test blood sugar 3 times daily or as directed. Preferred blood glucose meter OR supplies to accompany: Blood Glucose Monitor Brands: per insurance. 1 kit 0     blood glucose monitoring (RELION PRIME TEST) test  strip Use to test blood sugar 3 times daily or as directed.       Blood Glucose Monitoring Suppl W/DEVICE KIT 1 kit 3 times daily. 1 kit 0     co-enzyme Q-10 100 MG CAPS capsule Take 100 mg by mouth daily       fish oil-omega-3 fatty acids (FISH OIL) 1000 MG capsule Take 2 g by mouth daily. 2 caps per day       Lancets (E-Z JECT LANCET MICRO-THIN 33G) MISC        levothyroxine (SYNTHROID/LEVOTHROID) 88 MCG tablet Take 1 tablet (88 mcg) by mouth daily 90 tablet 1     lisinopril (ZESTRIL) 40 MG tablet Take 1 tablet (40 mg) by mouth daily 90 tablet 2     metFORMIN (GLUCOPHAGE-XR) 500 MG 24 hr tablet TAKE ONE TABLET BY MOUTH ONCE DAILY WITH DINNER 90 tablet 0     metoprolol tartrate (LOPRESSOR) 50 MG tablet TAKE ONE TABLET BY MOUTH TWICE A  tablet 3     NUTRITIONAL SUPPLEMENT OR VIBE       Nutritional Supplements (NUTRITIONAL SUPPLEMENT PO) Take 1 oz by mouth daily Flex       ONETOUCH ULTRA test strip USE TO TEST BLOOD SUGARS THREE TIMES A  each 3     thin (NO BRAND SPECIFIED) lancets Use with lanceting device. To accompany: Blood Glucose Monitor Brands: per insurance. 100 each 6     UNABLE TO FIND MEDICATION NAME: resvante supplement       Allergies   Allergen Reactions     Sulfa Drugs Rash       Pertinent Labs:  Last Creatine:   Creatinine   Date Value Ref Range Status   07/22/2020 0.87 0.52 - 1.04 mg/dL Final     Last INR:   INR   Date Value Ref Range Status   09/20/2019 1.26 (H) 0.86 - 1.14 Final       Other Questions:    Reminded patient to take any order prophylactic antibiotics 1 hour prior to appointment: Yes    If on a prescribed anticoagulant, advised patient to continue or check with prescribing provider: Yes    If on an OTC anticoagulant/supplement, advised patient to hold these medications 10-14 days prior to surgery and resume 48 hrs after surgery: Yes     Please be aware that this can be an all day procedure. Please bring your daily medications and food/cash. Encourage patient to eat prior  to procedure(s). After care instructions were reviewed with patient.    If any positives, send to RN to initiate antibiotic prophylaxis protocol and/or anticoagulation management protocol    Reviewed by:    Paty Epstein LPN

## 2021-03-02 DIAGNOSIS — I48.20 CHRONIC ATRIAL FIBRILLATION (H): ICD-10-CM

## 2021-03-17 ENCOUNTER — OFFICE VISIT (OUTPATIENT)
Dept: DERMATOLOGY | Facility: CLINIC | Age: 81
End: 2021-03-17
Payer: COMMERCIAL

## 2021-03-17 VITALS — DIASTOLIC BLOOD PRESSURE: 85 MMHG | HEART RATE: 84 BPM | SYSTOLIC BLOOD PRESSURE: 180 MMHG

## 2021-03-17 DIAGNOSIS — Z13.220 SCREENING FOR HYPERLIPIDEMIA: Primary | ICD-10-CM

## 2021-03-17 DIAGNOSIS — C44.41 BASAL CELL CARCINOMA (BCC) OF SCALP: Primary | ICD-10-CM

## 2021-03-17 DIAGNOSIS — E11.65 TYPE 2 DIABETES MELLITUS WITH HYPERGLYCEMIA, WITHOUT LONG-TERM CURRENT USE OF INSULIN (H): ICD-10-CM

## 2021-03-17 PROCEDURE — 12042 INTMD RPR N-HF/GENIT2.6-7.5: CPT | Mod: XS | Performed by: DERMATOLOGY

## 2021-03-17 PROCEDURE — 17312 MOHS ADDL STAGE: CPT | Performed by: DERMATOLOGY

## 2021-03-17 PROCEDURE — 88331 PATH CONSLTJ SURG 1 BLK 1SPC: CPT | Mod: 59 | Performed by: DERMATOLOGY

## 2021-03-17 PROCEDURE — 11102 TANGNTL BX SKIN SINGLE LES: CPT | Mod: XS | Performed by: DERMATOLOGY

## 2021-03-17 PROCEDURE — 17311 MOHS 1 STAGE H/N/HF/G: CPT | Performed by: DERMATOLOGY

## 2021-03-17 PROCEDURE — 14021 TIS TRNFR S/A/L 10.1-30 SQCM: CPT | Performed by: DERMATOLOGY

## 2021-03-17 RX ORDER — TRAMADOL HYDROCHLORIDE 50 MG/1
50 TABLET ORAL EVERY 6 HOURS PRN
Qty: 6 TABLET | Refills: 0 | Status: SHIPPED | OUTPATIENT
Start: 2021-03-17 | End: 2021-03-20

## 2021-03-17 RX ORDER — MUPIROCIN 20 MG/G
OINTMENT TOPICAL ONCE
Status: COMPLETED | OUTPATIENT
Start: 2021-03-17 | End: 2021-03-17

## 2021-03-17 RX ADMIN — MUPIROCIN: 20 OINTMENT TOPICAL at 14:00

## 2021-03-17 ASSESSMENT — PAIN SCALES - GENERAL: PAINLEVEL: NO PAIN (0)

## 2021-03-17 NOTE — NURSING NOTE
Ni Maya's goals for this visit include:   Chief Complaint   Patient presents with     Derm Problem     Ni is here today for MOHS x2 vertex and frontal scalp due to BCC       She requests these members of her care team be copied on today's visit information:     PCP: Apryl Olvera    Referring Provider:  No referring provider defined for this encounter.    BP (!) 180/85 (BP Location: Right arm, Patient Position: Sitting, Cuff Size: Adult Regular)   Pulse 84     Do you need any medication refills at today's visit? No    Paty Epstein LPN

## 2021-03-17 NOTE — LETTER
3/17/2021         RE: Ni Maya  [address] [address] [facility] [address]        Dear Colleague,    Thank you for referring your patient, Ni Maya, to the [facility]. Please see a copy of my visit note below.    [facility] Dermatologic Surgery [facility] [place] Procedure Note    Date of Service:  Mar 17, 2021  Surgery: Mohs micrographic surgery    Case 1  Repair Type: local flap(advancemenet)  Repair Size: 6.0X4.0 cm  Suture Material: prolene 4-0  Tumor Type: BCC - Basal cell carcinoma  Location: Vertex scalp/vertex scalp anterior  Derm-Path Accession #: [id number]  PreOp Size: 1.2x1.1 cm  PostOp Size: 5.0x2.5 cm  Mohs Accession #: [id number]  Level of Defect: fat      Procedure:  We discussed the principles of treatment and most likely complications including scarring, bleeding, infection, swelling, pain, crusting, nerve damage, large wound,  incomplete excision, wound dehiscence,  nerve damage, recurrence, and a second procedure may be recommended to obtain the best cosmetic or functional result.    Informed consent was obtained and the patient underwent the procedure as follows:  The patient was placed supine on the operating table.  The cancer was identified, outlined with a marker, and verified by the patient.  The entire surgical field was prepped with Hibiclens.  The surgical site was anesthetized using Lidocaine 1% with epi 1:100,000.      The area of clinically apparent tumor was debulked. The layer of tissue was then surgically excised using a #15 blade and was then transferred onto a specimen sheet maintaining the orientation of the specimen. Hemostasis was obtained using bipolar electrocoagulation. The wound site was then covered with a dressing while the tissue samples were processed for examination.    The excised [facility] maintaining the tissue orientation.  The tissue specimen was relaxed so that the  entire surgical margin was in a a single horizontal plane for sectioning and inked for precise mapping.  A precise reference map was drawn to reflect the sectioning of the specimen, colored inking of the margins, and orientation on the patient.  The tissue was processed using horizontal sectioning of the base and continuous peripheral margins.  The histopathologic sections were reviewed in conjunction with the reference map.    Total blocks: 1    Total slides:  2    Residual tumor was identified and indicated in red on the reference map, identifying the location where further tissue excision was necessary. Therefore, an additional stage of Mohs Micrographic surgery was deemed necessary.     Stage II   The patient was returned to the operating room, and the area prepped in the usual manner. The residual tumor was excised using the reference map as a guide. The specimen was transfered to a labeled specimen sheet maintaining the orientation of the specimen. Hemostasis was obtained and the wound site was covered with a dressing while the tissue was processed for examination.     The excised tissue was transported to the Mohs histology laboratory maintaining orientation. The specimen margins were inked for precise mapping and a reference map was prepared for the is additional stage to maintain precise orientation as described above. The tissue was processed using horizontal sectioning of the base and continuous peripheral margins. The histopathologic sections were reviewed in conjunction with the reference map.     Total blocks: 3    Total slides:  5    Residual tumor was identified and indicated in red on the reference map, identifying the location where further tissue excision was necessary. Therefore, an additional stage of Mohs Micrographic surgery was deemed necessary.     Stage III   The patient was returned to the operating room, and the area prepped in the usual manner. The residual tumor was excised using the  reference map as a guide. The specimen was transfered to a labeled specimen sheet maintaining the orientation of the specimen. Hemostasis was obtained and the wound site was covered with a dressing while the tissue was processed for examination.     The excised tissue was transported to the [facility] laboratory maintaining orientation. The specimen margins were inked for precise mapping and a reference map was prepared for the is additional stage to maintain precise orientation as described above. The tissue was processed using horizontal sectioning of the base and continuous peripheral margins. The histopathologic sections were reviewed in conjunction with the reference map.     Total blocks: 1    Total slides:  1    There were no cancer cells visualized on examination, therefore Mohs surgery was complete.    Advancement Flap:  After a tumor free plane was reached, the patient was positioned seated for reconstruction of the oblique peanut shaped vertex scalp defect.  After hibiclens prep and local anesthesia, a Burow s triangle was excised anterior to the anterior lobe of the defect and posterior to the posterior lobe of the defect. Two flaps were elevated by undermining the skin in the subcutaneous plane around each of the defects with the flap size measuring 6.0x4.0cm.  After hemostasis was obtained, these flaps were advanced and closed in a layered fashion (3-0 vicryl; 4-0 Vicryl, and 4-0 prolene) and a dressing was applied.  The patient left the operating room in good condition with minimal blood loss and no complications. RTC ~2 weeks for suture removal.           [facility] [place] Procedure Note    Date of Service:  Mar 17, 2021  Surgery: Mohs micrographic surgery    Case 2  Repair Type: intermediate  Repair Size: 5.0 cm  Suture Material: Fast Absorbing Gut 5-0  Tumor Type: BCC - Basal cell carcinoma  Location: Frontal scalp  Derm-Path Accession #: [id number]  PreOp  Size: 0.7x0.7 cm  PostOp Size: 2.5x2.0 cm  Mohs Accession #: [id number]  Level of Defect: fat      Procedure:  We discussed the principles of treatment and most likely complications including scarring, bleeding, infection, swelling, pain, crusting, nerve damage, large wound,  incomplete excision, wound dehiscence,  nerve damage, recurrence, and a second procedure may be recommended to obtain the best cosmetic or functional result.    Informed consent was obtained and the patient underwent the procedure as follows:  The patient was placed supine on the operating table.  The cancer was identified, outlined with a marker, and verified by the patient.  The entire surgical field was prepped with Hibiclens.  The surgical site was anesthetized using Lidocaine 1% with epi 1:100,000.      The area of clinically apparent tumor was debulked. The layer of tissue was then surgically excised using a #15 blade and was then transferred onto a specimen sheet maintaining the orientation of the specimen. Hemostasis was obtained using bipolar electrocoagulation. The wound site was then covered with a dressing while the tissue samples were processed for examination.    The excised tissue was transported to the [facility] [place] laboratory maintaining the tissue orientation.  The tissue specimen was relaxed so that the entire surgical margin was in a a single horizontal plane for sectioning and inked for precise mapping.  A precise reference map was drawn to reflect the sectioning of the specimen, colored inking of the margins, and orientation on the patient.  The tissue was processed using horizontal sectioning of the base and continuous peripheral margins.  The histopathologic sections were reviewed in conjunction with the reference map.    Total blocks: 1    Total slides:  2    Residual tumor was identified and indicated in red on the reference map, identifying the location where further tissue excision was necessary. Therefore, an  additional stage of Mohs Micrographic surgery was deemed necessary.     Stage II   The patient was returned to the operating room, and the area prepped in the usual manner. The residual tumor was excised using the reference map as a guide. The specimen was transfered to a labeled specimen sheet maintaining the orientation of the specimen. Hemostasis was obtained and the wound site was covered with a dressing while the tissue was processed for examination.     The excised tissue was transported to the [facility]s histology laboratory maintaining orientation. The specimen margins were inked for precise mapping and a reference map was prepared for the is additional stage to maintain precise orientation as described above. The tissue was processed using horizontal sectioning of the base and continuous peripheral margins. The histopathologic sections were reviewed in conjunction with the reference map.     Total blocks: 1    Total slides:  2    There were no cancer cells visualized on examination, therefore Mohs surgery was complete.     REPAIR: An intermediate layered linear closure was selected as the procedure which would maximally preserve both function and cosmesis.    After the excision of the tumor, the area was undermined. Hemostasis was obtained with monopolar electrocoagulation.  Closure was oriented so that the wound was in the patient's natural skin tension lines. The subcutaneous and dermal layers were then closed with 4-0 vicryl and 4-0 monocryl buried vertical mattress sutures. The epidermis was then carefully approximated along the length of the wound using 5-0 Fast Absorbing Gut simple running sutures.     Estimated blood loss was less than 10 ml for all surgical sites. A sterile pressure dressing was applied and wound care instructions, with a written handout, were given. The patient was discharged from the [facility]atologic Surgery Center alert and ambulatory.      FROZEN SECTION PATHOLOGY:  Clinical history: 80  year old female presented for Mohs surgery. Adjacent but not contiguous to Mohs surgical site was a lesion suspicious for an additional nonmelanoma skin cancer.   Procedure:  Shave biopsy:  After discussion of risks, benefits and alternatives, informed consent was obtained for biopsy. The area was cleaned with isopropyl alcohol and anesthetized. Shave biopsy was performed on the vertex scalp, left. Hemostasis was achieved with electrocautery. Vaseline and a sterile dressing were applied. The patient tolerated the procedure and no complications were noted.     Gross: 0.4cm lesion submitted for processing by frozen section pathology  Microscopic:   Clustering of atypical basaloid cells with scant cytoplasm and peripheral palisading with extension into the dermis as nodular aggregates with multiple foci  Diagnosis: Skin, shave biopsy, Vertex scalp, Left: Basal Cell Carcinoma, nodular type.     The patient then underwent Mohs surgery for treatment of the lesion.      [facility] [place] Procedure Note      Date of Service:  Mar 17, 2021  Surgery: Mohs micrographic surgery    Case 3  Repair Type: secondary intention  Repair Size: 1.3X1.3 cm  Suture Material: N/A  Tumor Type: BCC - Basal cell carcinoma  Location: Vertex scalp left  Derm-Path Accession #: [id number]  PreOp Size: 1.0x1.0 cm  PostOp Size: 1.3x1.3 cm  Mohs Accession #: [id number]  Level of Defect: fat      Procedure:  We discussed the principles of treatment and most likely complications including scarring, bleeding, infection, swelling, pain, crusting, nerve damage, large wound,  incomplete excision, wound dehiscence,  nerve damage, recurrence, and a second procedure may be recommended to obtain the best cosmetic or functional result.    Informed consent was obtained and the patient underwent the procedure as follows:  The patient was placed supine on the operating table.  The cancer was identified, outlined with a marker,  and verified by the patient.  The entire surgical field was prepped with Hibiclens.  The surgical site was anesthetized using 1% Lidocaine with Epinephrine.      The area of clinically apparent tumor was debulked. The layer of tissue was then surgically excised using a #15 blade and was then transferred onto a specimen sheet maintaining the orientation of the specimen. Hemostasis was obtained using bipolar electrocoagulation. The wound site was then covered with a dressing while the tissue samples were processed for examination.    The excised tissue was transported to the [facility] [place] laboratory maintaining the tissue orientation.  The tissue specimen was relaxed so that the entire surgical margin was in a a single horizontal plane for sectioning and inked for precise mapping.  A precise reference map was drawn to reflect the sectioning of the specimen, colored inking of the margins, and orientation on the patient.  The tissue was processed using horizontal sectioning of the base and continuous peripheral margins.  The histopathologic sections were reviewed in conjunction with the reference map.    Total blocks: 1    Total slides:  2    There were no cancer cells visualized on examination, therefore Mohs surgery was complete.    The patient is status post Mohs' micrographic surgery.  The surgical site was examined with attention to normal anatomic and functional relationships.  After consideration and discussion of multiple options with the patient, it was determined that healing by second intention was preferred by the patient as he hoped to avoid additional cutting and suturing. The patient verbalized understanding and agrees with this plan. It is also understood that should second intention healing be sub-optimal, additional procedures such as scar revision, steroid injection or dermabrasion may be recommended.     The open wound was cleaned with hibiclens, and a thick layer of ointment was applied.  A  pressure dressing consisting of non-adherent gauze, gauze and hypafix was applied. Wound care was discussed with patient both orally and in writing. The patient stated understanding and agreement of the course of care. She was discharged from the surgical suite in good condition.      She will return for evaluation of her wound in 2 weeks.      I personally performed the procedures today.    Zach Hardin DO    Department of Dermatology  [facility]: Phone: [phone], Fax:[phone]  [facility]: Phone: [phone], Fax: [phone]    Care and Laboratory Testing Performed at:  [facility]   Dermatology Clinic  [address] [place] [address]        Again, thank you for allowing me to participate in the care of your patient.        Sincerely,        Zach Hardin MD

## 2021-03-17 NOTE — NURSING NOTE
The following medication was given:     MEDICATION:  Lidocaine with epinephrine 1% 1:[id number]  ROUTE: SQ  SITE: see procedure note  DOSE: 29cc  LOT #: [id number]  : Hospira  EXPIRATION DATE: 7/2021  NDC#: 4768-2287-42   Was there drug waste? 1cc  Multi-dose vial: Yes    Jerica Thayer LPN  March 17, 2021    Mupirocin, Vaseline and pressure dressing applied to Mohs site on scalp.  Wound care instructions reviewed with patient and AVS provided.  Patient verbalized understanding.   No further questions or concerns at this time.    Jerica Thayer LPN

## 2021-03-17 NOTE — PATIENT INSTRUCTIONS
Please email photos for your telephone visit on March 24th at 11:45 am to: [email or web address]    MOHS Wound Care Instructions  I will experience scar, altered skin color, bleeding, swelling, pain, crusting and redness. I may experience altered sensation. Risks are excessive bleeding, infection, muscle weakness, thick (hypertrophic or keloidal) scar, and recurrence,. A second procedure may be recommended to obtain the best cosmetic or functional result.  Possible complications of any surgical procedure are bleeding, infection, scarring, alteration in skin color and sensation, muscle weakness in the area, wound dehiscence or seperation, or recurrence of the lesion or disease. On occasion, after healing, a secondary procedure or revision may be recommended in order to obtain the best cosmetic or functional result.   After your surgery, a pressure bandage will be placed over the area that has sutures. This will help prevent bleeding. Please follow these instructions as they will help you to prevent complications as your wound heals.  For the First 48 hours After Surgery:  1. Leave the pressure bandage on and keep it dry. If it should come loose, you may retape it, but do not take it off.  2. Relax and take it easy. Do not do any vigorous exercise, heavy lifting, or bending forward. This could cause the wound to bleed.  3. Post-operative pain is usually mild. You may take plain or extra strength Tylenol every 4 hours as needed (do not take more than 4,000mg in one day). Do not take any medicine that contains aspirin, ibuprofen or motrin unless you have been recommended these by a doctor.  Avoid alcohol and vitamin E as these may increase your tendency to bleed.  4. You may put an ice pack around the bandaged area for 20 minutes every 2-3 hours. This may help reduce swelling, bruising, and pain. Make sure the ice pack is waterproof so that the pressure bandage does not get wet.   5. You may see a small amount of  drainage or blood on your pressure bandage. This is normal. However, if drainage or bleeding continues or saturates the bandage, you will need to apply firm pressure over the bandage with a washcloth for 15 minutes. If bleeding continues after applying pressure for 15 minutes then go to the nearest emergency room.  48 Hours After Surgery  Carefully remove the bandage and start daily wound care and dressing changes. You may also now shower and get the wound wet. Wash wound with a mild soap and water.  Use caution when washing the wound. Be gentle and do not let the forceful shower stream hit the wound directly.  PAT dry.  Daily Wound Care:  1. Wash wound with a mild soap and water.  Use caution when washing the wound, be gentle and do not let the forceful shower stream hit the wound directly.  2. PAT DRY.  3. Apply Vaseline (from a new container or tube) over the suture line with a Q-tip. It is very important to keep the wound continuously moist, as wounds heal best in a moist environment.  4.  Keep the site covered until sutures are dissolved, you can cover it with a Telfa (non-stick) dressing and tape or a band-aid.    5. If you are unable to keep wound covered, you must apply Vaseline every 2 - 3 hours (while awake) to ensure it is being kept moist for optimal healing. A dressing overnight is recommended to keep the area moist.   Call Us If:  1. You have pain that is not controlled with Tylenol.  2. You have signs or symptoms of an infection, such as: fever over 100 degrees F, redness, warmth, or foul-smelling or yellow/creamy drainage from the wound.  Who should I call with questions?    [facility]: [phone]     [facility]: [phone]    For urgent needs outside of business hours call the [facility] at [phone] and ask for the dermatology resident on call

## 2021-03-17 NOTE — PROGRESS NOTES
[facility] Dermatologic Surgery Clinic [place] Procedure Note    Date of Service:  Mar 17, 2021  Surgery: Mohs micrographic surgery    Case 1  Repair Type: local flap(advancemenet)  Repair Size: 6.0X4.0 cm  Suture Material: prolene 4-0  Tumor Type: BCC - Basal cell carcinoma  Location: Vertex scalp/vertex scalp anterior  Derm-Path Accession #: [id number]  PreOp Size: 1.2x1.1 cm  PostOp Size: 5.0x2.5 cm  Mohs Accession #: [id number]  Level of Defect: fat      Procedure:  We discussed the principles of treatment and most likely complications including scarring, bleeding, infection, swelling, pain, crusting, nerve damage, large wound,  incomplete excision, wound dehiscence,  nerve damage, recurrence, and a second procedure may be recommended to obtain the best cosmetic or functional result.    Informed consent was obtained and the patient underwent the procedure as follows:  The patient was placed supine on the operating table.  The cancer was identified, outlined with a marker, and verified by the patient.  The entire surgical field was prepped with Hibiclens.  The surgical site was anesthetized using Lidocaine 1% with epi 1:100,000.      The area of clinically apparent tumor was debulked. The layer of tissue was then surgically excised using a #15 blade and was then transferred onto a specimen sheet maintaining the orientation of the specimen. Hemostasis was obtained using bipolar electrocoagulation. The wound site was then covered with a dressing while the tissue samples were processed for examination.    The excised tissue was transported to the Mohs histology laboratory maintaining the tissue orientation.  The tissue specimen was relaxed so that the entire surgical margin was in a a single horizontal plane for sectioning and inked for precise mapping.  A precise reference map was drawn to reflect the sectioning of the specimen, colored inking of the margins, and orientation on the patient.  The tissue was  processed using horizontal sectioning of the base and continuous peripheral margins.  The histopathologic sections were reviewed in conjunction with the reference map.    Total blocks: 1    Total slides:  2    Residual tumor was identified and indicated in red on the reference map, identifying the location where further tissue excision was necessary. Therefore, an additional stage of Mohs Micrographic surgery was deemed necessary.     Stage II   The patient was returned to the operating room, and the area prepped in the usual manner. The residual tumor was excised using the reference map as a guide. The specimen was transfered to a labeled specimen sheet maintaining the orientation of the specimen. Hemostasis was obtained and the wound site was covered with a dressing while the tissue was processed for examination.     The excised tissue was transported to the Mohs histology laboratory maintaining orientation. The specimen margins were inked for precise mapping and a reference map was prepared for the is additional stage to maintain precise orientation as described above. The tissue was processed using horizontal sectioning of the base and continuous peripheral margins. The histopathologic sections were reviewed in conjunction with the reference map.     Total blocks: 3    Total slides:  5    Residual tumor was identified and indicated in red on the reference map, identifying the location where further tissue excision was necessary. Therefore, an additional stage of Mohs Micrographic surgery was deemed necessary.     Stage III   The patient was returned to the operating room, and the area prepped in the usual manner. The residual tumor was excised using the reference map as a guide. The specimen was transfered to a labeled specimen sheet maintaining the orientation of the specimen. Hemostasis was obtained and the wound site was covered with a dressing while the tissue was processed for examination.     The excised  tissue was transported to the Mohs histology laboratory maintaining orientation. The specimen margins were inked for precise mapping and a reference map was prepared for the is additional stage to maintain precise orientation as described above. The tissue was processed using horizontal sectioning of the base and continuous peripheral margins. The histopathologic sections were reviewed in conjunction with the reference map.     Total blocks: 1    Total slides:  1    There were no cancer cells visualized on examination, therefore Mohs surgery was complete.    Advancement Flap:  After a tumor free plane was reached, the patient was positioned seated for reconstruction of the oblique peanut shaped vertex scalp defect.  After hibiclens prep and local anesthesia, a Burow s triangle was excised anterior to the anterior lobe of the defect and posterior to the posterior lobe of the defect. Two flaps were elevated by undermining the skin in the subcutaneous plane around each of the defects with the flap size measuring 6.0x4.0cm.  After hemostasis was obtained, these flaps were advanced and closed in a layered fashion (3-0 vicryl; 4-0 Vicryl, and 4-0 prolene) and a dressing was applied.  The patient left the operating room in good condition with minimal blood loss and no complications. RTC ~2 weeks for suture removal.           [facility] Surgery [facility] [place] Procedure Note    Date of Service:  Mar 17, 2021  Surgery: Mohs micrographic surgery    Case 2  Repair Type: intermediate  Repair Size: 5.0 cm  Suture Material: Fast Absorbing Gut 5-0  Tumor Type: BCC - Basal cell carcinoma  Location: Frontal scalp  Derm-Path Accession #: [id number]  PreOp Size: 0.7x0.7 cm  PostOp Size: 2.5x2.0 cm  Mohs Accession #: [id number]  Level of Defect: fat      Procedure:  We discussed the principles of treatment and most likely complications including scarring, bleeding, infection, swelling, pain, crusting, nerve damage,  large wound,  incomplete excision, wound dehiscence,  nerve damage, recurrence, and a second procedure may be recommended to obtain the best cosmetic or functional result.    Informed consent was obtained and the patient underwent the procedure as follows:  The patient was placed supine on the operating table.  The cancer was identified, outlined with a marker, and verified by the patient.  The entire surgical field was prepped with Hibiclens.  The surgical site was anesthetized using Lidocaine 1% with epi 1:100,000.      The area of clinically apparent tumor was debulked. The layer of tissue was then surgically excised using a #15 blade and was then transferred onto a specimen sheet maintaining the orientation of the specimen. Hemostasis was obtained using bipolar electrocoagulation. The wound site was then covered with a dressing while the tissue samples were processed for examination.    The excised tissue was transported to the Mohs histology laboratory maintaining the tissue orientation.  The tissue specimen was relaxed so that the entire surgical margin was in a a single horizontal plane for sectioning and inked for precise mapping.  A precise reference map was drawn to reflect the sectioning of the specimen, colored inking of the margins, and orientation on the patient.  The tissue was processed using horizontal sectioning of the base and continuous peripheral margins.  The histopathologic sections were reviewed in conjunction with the reference map.    Total blocks: 1    Total slides:  2    Residual tumor was identified and indicated in red on the reference map, identifying the location where further tissue excision was necessary. Therefore, an additional stage of Mohs Micrographic surgery was deemed necessary.     Stage II   The patient was returned to the operating room, and the area prepped in the usual manner. The residual tumor was excised using the reference map as a guide. The specimen was transfered  to a labeled specimen sheet maintaining the orientation of the specimen. Hemostasis was obtained and the wound site was covered with a dressing while the tissue was processed for examination.     The excised tissue was transported to the Mohs histology laboratory maintaining orientation. The specimen margins were inked for precise mapping and a reference map was prepared for the is additional stage to maintain precise orientation as described above. The tissue was processed using horizontal sectioning of the base and continuous peripheral margins. The histopathologic sections were reviewed in conjunction with the reference map.     Total blocks: 1    Total slides:  2    There were no cancer cells visualized on examination, therefore Mohs surgery was complete.     REPAIR: An intermediate layered linear closure was selected as the procedure which would maximally preserve both function and cosmesis.    After the excision of the tumor, the area was undermined. Hemostasis was obtained with monopolar electrocoagulation.  Closure was oriented so that the wound was in the patient's natural skin tension lines. The subcutaneous and dermal layers were then closed with 4-0 vicryl and 4-0 monocryl buried vertical mattress sutures. The epidermis was then carefully approximated along the length of the wound using 5-0 Fast Absorbing Gut simple running sutures.     Estimated blood loss was less than 10 ml for all surgical sites. A sterile pressure dressing was applied and wound care instructions, with a written handout, were given. The patient was discharged from the Dermatologic Surgery Center alert and ambulatory.      FROZEN SECTION PATHOLOGY:  Clinical history: 80 year old female presented for Mohs surgery. Adjacent but not contiguous to Mohs surgical site was a lesion suspicious for an additional nonmelanoma skin cancer.   Procedure:  Shave biopsy:  After discussion of risks, benefits and alternatives, informed consent was  obtained for biopsy. The area was cleaned with isopropyl alcohol and anesthetized. Shave biopsy was performed on the vertex scalp, left. Hemostasis was achieved with electrocautery. Vaseline and a sterile dressing were applied. The patient tolerated the procedure and no complications were noted.     Gross: 0.4cm lesion submitted for processing by frozen section pathology  Microscopic:   Clustering of atypical basaloid cells with scant cytoplasm and peripheral palisading with extension into the dermis as nodular aggregates with multiple foci  Diagnosis: Skin, shave biopsy, Vertex scalp, Left: Basal Cell Carcinoma, nodular type.     The patient then underwent Mohs surgery for treatment of the lesion.      [facility] Dermatologic Surgery Clinic [place] Procedure Note      Date of Service:  Mar 17, 2021  Surgery: Mohs micrographic surgery    Case 3  Repair Type: secondary intention  Repair Size: 1.3X1.3 cm  Suture Material: N/A  Tumor Type: BCC - Basal cell carcinoma  Location: Vertex scalp left  Derm-Path Accession #: [id number]  PreOp Size: 1.0x1.0 cm  PostOp Size: 1.3x1.3 cm  Mohs Accession #: [id number]  Level of Defect: fat      Procedure:  We discussed the principles of treatment and most likely complications including scarring, bleeding, infection, swelling, pain, crusting, nerve damage, large wound,  incomplete excision, wound dehiscence,  nerve damage, recurrence, and a second procedure may be recommended to obtain the best cosmetic or functional result.    Informed consent was obtained and the patient underwent the procedure as follows:  The patient was placed supine on the operating table.  The cancer was identified, outlined with a marker, and verified by the patient.  The entire surgical field was prepped with Hibiclens.  The surgical site was anesthetized using 1% Lidocaine with Epinephrine.      The area of clinically apparent tumor was debulked. The layer of tissue was then surgically excised  using a #15 blade and was then transferred onto a specimen sheet maintaining the orientation of the specimen. Hemostasis was obtained using bipolar electrocoagulation. The wound site was then covered with a dressing while the tissue samples were processed for examination.    The excised tissue was transported to the [facility] histology laboratory maintaining the tissue orientation.  The tissue specimen was relaxed so that the entire surgical margin was in a a single horizontal plane for sectioning and inked for precise mapping.  A precise reference map was drawn to reflect the sectioning of the specimen, colored inking of the margins, and orientation on the patient.  The tissue was processed using horizontal sectioning of the base and continuous peripheral margins.  The histopathologic sections were reviewed in conjunction with the reference map.    Total blocks: 1    Total slides:  2    There were no cancer cells visualized on examination, therefore Mohs surgery was complete.    The patient is status post Mohs' micrographic surgery.  The surgical site was examined with attention to normal anatomic and functional relationships.  After consideration and discussion of multiple options with the patient, it was determined that healing by second intention was preferred by the patient as he hoped to avoid additional cutting and suturing. The patient verbalized understanding and agrees with this plan. It is also understood that should second intention healing be sub-optimal, additional procedures such as scar revision, steroid injection or dermabrasion may be recommended.     The open wound was cleaned with hibiclens, and a thick layer of ointment was applied.  A pressure dressing consisting of non-adherent gauze, gauze and hypafix was applied. Wound care was discussed with patient both orally and in writing. The patient stated understanding and agreement of the course of care. She was discharged from the surgical suite in good  condition.      She will return for evaluation of her wound in 2 weeks.      I personally performed the procedures today.    Zach Hardin DO    Department of Dermatology  [facility]: Phone: [phone], Fax:[phone]  [facility] [place]: Phone: [phone], Fax: [phone]    Care and Laboratory Testing Performed at:  [facility]   Dermatology Clinic  [address] [place] [address]

## 2021-03-18 RX ORDER — METFORMIN HCL 500 MG
TABLET, EXTENDED RELEASE 24 HR ORAL
Qty: 90 TABLET | Refills: 0 | Status: SHIPPED | OUTPATIENT
Start: 2021-03-18 | End: 2021-04-12

## 2021-03-18 NOTE — TELEPHONE ENCOUNTER
Pending Prescriptions:                       Disp   Refills    metFORMIN (GLUCOPHAGE-XR) 500 MG 24 hr ta*90 tab*0            Sig: TAKE ONE TABLET BY MOUTH ONCE DAILY WITH DINNER    Medication is being filled for 1 time dewey refill only due to:  Patient is due for diabetes follow up    Please call and help schedule.  Thank you!

## 2021-03-24 ENCOUNTER — VIRTUAL VISIT (OUTPATIENT)
Dept: DERMATOLOGY | Facility: CLINIC | Age: 81
End: 2021-03-24
Payer: COMMERCIAL

## 2021-03-24 DIAGNOSIS — Z51.89 VISIT FOR WOUND CHECK: Primary | ICD-10-CM

## 2021-03-24 PROCEDURE — 99024 POSTOP FOLLOW-UP VISIT: CPT | Mod: 95 | Performed by: DERMATOLOGY

## 2021-03-24 ASSESSMENT — PAIN SCALES - GENERAL: PAINLEVEL: MODERATE PAIN (4)

## 2021-03-24 NOTE — LETTER
"    3/24/2021         RE: Ni Maya  [address] [facility] [address]        Dear Colleague,    Thank you for referring your patient, Ni Maya, to the [facility]. Please see a copy of my visit note below.    [facility] Dermatology Note  Encounter Date: Mar 24, 2021  Store-and-Forward and Telephone ([phone]). Location of teledermatologist: [facility].  Start time: 1140. End time: 1145.    Dermatology Problem List:  1. \"Precancerous\" lesion removed from the vagina per patient report. ?MICKIE III (documented in chart)  -s/p removal, patient thought done in [place] but no surg path reports available   2. History of NMSC  -BCC, right superior lateral forehead, s/p biopsy 8/13/19, s/p MMS with rhombic  -BCC, vertex scalp, s/p MMS 3/17/2021  -BCC, vertex scalp anterior, s/p MMS 3/17/2021  -BCC, frontal scalp, s/p MMS 3/17/2021  -BCC, vertex scalp left, s/p MMS 3/17/2021  3. 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 There was some brown pigment at edges of biopsy noted 8/4/2020.       ____________________________________________    Assessment & Plan:     (1) BCC, vertex scalp, s/p MMS 3/17/2021  (2) BCC, vertex scalp anterior, s/p MMS 3/17/2021  (3) BCC, frontal scalp, s/p MMS 3/17/2021  (4) BCC, vertex scalp left, s/p MMS 3/17/2021  - Healing as expected, flap and areas of granulating tissue appear healthy   - Continue wound care, gently rinsing and wiping away crusting and abdirashid healthy amounts of Vaseline   - Follow up in 1 week for suture removal.     Procedures Performed:    None    Kurt Jiménez MD  PGY-6    Micrographic Surgery and Dermatologic Oncology Fellow  March 24, 2021    Staff Physician Comments:   I saw the photos and evaluated the patient with the fellow (Dr. Kurt Jiménez) and I agree with the assessment and plan. I was present for the key portions " of the telephone call.    Zach Hardin DO    Department of Dermatology  [facility]: Phone: [phone], Fax:[phone]  [facility]: Phone: [phone], Fax: [phone]    ____________________________________________    CC: Wound Check (scalp post MOHS 3/17/2021)    HPI:  Ms. Ni Maya is a(n) 80 year old female who presents today for follow-up for healing wound of the scalp after Mohs surgery on 3/17/2021. She was referred for two basal cell carcinomas of the scalp and was found to have two additional BCCs adjacent to sites already biopsied. All four areas were treated with the three BCCs of the scalp closed with an advancement flap with two small areas left to granulate. The frontal scalp BCC was closed primarily.     Today, the patient reports some pain initially following the surgery. Since the days following the procedure the area has become less painful with minimal drainage. She continues wound care and has no questions or concerns today.     Patient is otherwise feeling well, without additional skin concerns.    Labs Reviewed:  N/A    Physical Exam:  Vitals: There were no vitals taken for this visit.  SKIN: Teledermatology photos were reviewed; image quality and interpretability: acceptable. Image date: 3/24/2021.  - Over the vertex of the scalp is a well healing advancement flap with two small areas that are healing by second intention. No evidence of infection or wound break down. Hemorrhagic crust noted over the scalp  - Over the anterior scalp is a well healing linear incision with overlying hemorrhagic crust.   - No other lesions of concern on areas examined.     Medications:  Current Outpatient Medications   Medication     acetaminophen (TYLENOL) 650 MG CR tablet     alcohol swab prep pads     ALLEGRA 180 MG PO TABS     amLODIPine (NORVASC) 5 MG tablet     apixaban  ANTICOAGULANT (ELIQUIS) 5 MG tablet     atorvastatin (LIPITOR) 40 MG tablet     blood glucose calibration (NO BRAND SPECIFIED) solution     blood glucose monitoring (NO BRAND SPECIFIED) meter device kit     blood glucose monitoring (RELION PRIME TEST) test strip     Blood Glucose Monitoring Suppl W/DEVICE KIT     co-enzyme Q-10 100 MG CAPS capsule     fish oil-omega-3 fatty acids (FISH OIL) 1000 MG capsule     Lancets (E-Z JECT LANCET MICRO-THIN 33G) MISC     levothyroxine (SYNTHROID/LEVOTHROID) 88 MCG tablet     lisinopril (ZESTRIL) 40 MG tablet     metFORMIN (GLUCOPHAGE-XR) 500 MG 24 hr tablet     metoprolol tartrate (LOPRESSOR) 50 MG tablet     NUTRITIONAL SUPPLEMENT OR     Nutritional Supplements (NUTRITIONAL SUPPLEMENT PO)     ONETOUCH ULTRA test strip     thin (NO BRAND SPECIFIED) lancets     UNABLE TO FIND     No current facility-administered medications for this visit.       Past Medical/Surgical History:   Patient Active Problem List   Diagnosis     Hypothyroidism     Allergic rhinitis     Symptomatic menopausal or female climacteric states     Chronic kidney disease, stage II (mild)     Edema     Obesity     Urethral stricture     MICKIE III (vulvar intraepithelial neoplasia III)     Vulval lesion     Health Care Home     Type 2 diabetes, HbA1C goal < 8% (H)     Hyperlipidemia with target LDL less than 100     HTN, goal below 150/90     ACP (advance care planning)     Essential hypertension with goal blood pressure less than 150/90     Type 2 diabetes mellitus with hyperglycemia, without long-term current use of insulin (H)     Cellulitis of toe of right foot     Chronic atrial fibrillation (H)     Thrombocytopenia (H)     Acute gout involving toe of right foot     Lumbar radiculopathy     Cerebral infarction, unspecified mechanism (H)     Long term current use of anticoagulant therapy     Past Medical History:   Diagnosis Date     Allergic rhinitis, cause unspecified      Essential hypertension, benign       Infection of kidney, unspecified 1999     Other specified acquired hypothyroidism      Other specified types of cystitis(595.89)     1999,6-2-01, 10-10-01                         Again, thank you for allowing me to participate in the care of your patient.        Sincerely,        Zach Hardin MD

## 2021-03-24 NOTE — NURSING NOTE
Teledermatology Nurse Call Patients:     Are you  in the [facility] at the time of the encounter? yes    Today's visit will be billed to you and your insurance.    A teledermatology visit is not as thorough as an in-person visit and the quality of the photograph sent may not be of the same quality as that taken by the dermatology clinic.         Jerica Thayer LPN

## 2021-03-24 NOTE — PROGRESS NOTES
"[facility] Dermatology Note  Encounter Date: Mar 24, 2021  [employer]-and-[employer] and Telephone ([phone]). Location of teledermatologist: [facility].  Start time: 1140. End time: 1145.    Dermatology Problem List:  1. \"Precancerous\" lesion removed from the vagina per patient report. ?MICKIE III (documented in chart)  -s/p removal, patient thought done in [place] but no surg path reports available   2. History of NMSC  -BCC, right superior lateral forehead, s/p biopsy 8/13/19, s/p MMS with rhombic  -BCC, vertex scalp, s/p MMS 3/17/2021  -BCC, vertex scalp anterior, s/p MMS 3/17/2021  -BCC, frontal scalp, s/p MMS 3/17/2021  -BCC, vertex scalp left, s/p MMS 3/17/2021  3. 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 There was some brown pigment at edges of biopsy noted 8/4/2020.       ____________________________________________    Assessment & Plan:     (1) BCC, vertex scalp, s/p MMS 3/17/2021  (2) BCC, vertex scalp anterior, s/p MMS 3/17/2021  (3) BCC, frontal scalp, s/p MMS 3/17/2021  (4) BCC, vertex scalp left, s/p MMS 3/17/2021  - Healing as expected, flap and areas of granulating tissue appear healthy   - Continue wound care, gently rinsing and wiping away crusting and abdirashid healthy amounts of Vaseline   - Follow up in 1 week for suture removal.     Procedures Performed:    None    Kurt Jiménez MD  PGY-6    Micrographic Surgery and Dermatologic Oncology Fellow  March 24, 2021    Staff Physician Comments:   I saw the photos and evaluated the patient with the fellow (Dr. Kurt Jiménez) and I agree with the assessment and plan. I was present for the key portions of the telephone call.    Zach Hardin DO    Department of Dermatology  [facility]: Phone: [phone], Fax:[phone]  [facility] " Center: Phone: [phone], Fax: [phone]    ____________________________________________    CC: Wound Check (scalp post MOHS 3/17/2021)    HPI:  Ms. Ni Maya is a(n) 80 year old female who presents today for follow-up for healing wound of the scalp after Mohs surgery on 3/17/2021. She was referred for two basal cell carcinomas of the scalp and was found to have two additional BCCs adjacent to sites already biopsied. All four areas were treated with the three BCCs of the scalp closed with an advancement flap with two small areas left to granulate. The frontal scalp BCC was closed primarily.     Today, the patient reports some pain initially following the surgery. Since the days following the procedure the area has become less painful with minimal drainage. She continues wound care and has no questions or concerns today.     Patient is otherwise feeling well, without additional skin concerns.    Labs Reviewed:  N/A    Physical Exam:  Vitals: There were no vitals taken for this visit.  SKIN: Teledermatology photos were reviewed; image quality and interpretability: acceptable. Image date: 3/24/2021.  - Over the vertex of the scalp is a well healing advancement flap with two small areas that are healing by second intention. No evidence of infection or wound break down. Hemorrhagic crust noted over the scalp  - Over the anterior scalp is a well healing linear incision with overlying hemorrhagic crust.   - No other lesions of concern on areas examined.     Medications:  Current Outpatient Medications   Medication     acetaminophen (TYLENOL) 650 MG CR tablet     alcohol swab prep pads     ALLEGRA 180 MG PO TABS     amLODIPine (NORVASC) 5 MG tablet     apixaban ANTICOAGULANT (ELIQUIS) 5 MG tablet     atorvastatin (LIPITOR) 40 MG tablet     blood glucose calibration (NO BRAND SPECIFIED) solution     blood glucose monitoring (NO BRAND SPECIFIED) meter device kit     blood glucose monitoring (RELION PRIME TEST)  test strip     Blood Glucose Monitoring Suppl W/DEVICE KIT     co-enzyme Q-10 100 MG CAPS capsule     fish oil-omega-3 fatty acids (FISH OIL) 1000 MG capsule     Lancets (E-Z JECT LANCET MICRO-THIN 33G) MISC     levothyroxine (SYNTHROID/LEVOTHROID) 88 MCG tablet     lisinopril (ZESTRIL) 40 MG tablet     metFORMIN (GLUCOPHAGE-XR) 500 MG 24 hr tablet     metoprolol tartrate (LOPRESSOR) 50 MG tablet     NUTRITIONAL SUPPLEMENT OR     Nutritional Supplements (NUTRITIONAL SUPPLEMENT PO)     ONETOUCH ULTRA test strip     thin (NO BRAND SPECIFIED) lancets     UNABLE TO FIND     No current facility-administered medications for this visit.       Past Medical/Surgical History:   Patient Active Problem List   Diagnosis     Hypothyroidism     Allergic rhinitis     Symptomatic menopausal or female climacteric states     Chronic kidney disease, stage II (mild)     Edema     Obesity     Urethral stricture     MICKIE III (vulvar intraepithelial neoplasia III)     Vulval lesion     Health Care Home     Type 2 diabetes, HbA1C goal < 8% (H)     Hyperlipidemia with target LDL less than 100     HTN, goal below 150/90     ACP (advance care planning)     Essential hypertension with goal blood pressure less than 150/90     Type 2 diabetes mellitus with hyperglycemia, without long-term current use of insulin (H)     Cellulitis of toe of right foot     Chronic atrial fibrillation (H)     Thrombocytopenia (H)     Acute gout involving toe of right foot     Lumbar radiculopathy     Cerebral infarction, unspecified mechanism (H)     Long term current use of anticoagulant therapy     Past Medical History:   Diagnosis Date     Allergic rhinitis, cause unspecified      Essential hypertension, benign      Infection of kidney, unspecified 1999     Other specified acquired hypothyroidism      Other specified types of cystitis(595.89)     1999,6-2-01, 10-10-01

## 2021-03-31 ENCOUNTER — OFFICE VISIT (OUTPATIENT)
Dept: DERMATOLOGY | Facility: CLINIC | Age: 81
End: 2021-03-31
Payer: COMMERCIAL

## 2021-03-31 DIAGNOSIS — Z51.89 VISIT FOR WOUND CHECK: Primary | ICD-10-CM

## 2021-03-31 PROCEDURE — 99024 POSTOP FOLLOW-UP VISIT: CPT | Mod: GC | Performed by: DERMATOLOGY

## 2021-03-31 ASSESSMENT — PAIN SCALES - GENERAL: PAINLEVEL: MILD PAIN (3)

## 2021-03-31 NOTE — NURSING NOTE
Ni Maya's goals for this visit include:   Chief Complaint   Patient presents with     Derm Problem     Ni is having a 2 week follow up, suture removal on scalp       She requests these members of her care team be copied on today's visit information:     PCP: Apryl Olvera    Referring Provider:  No referring provider defined for this encounter.    There were no vitals taken for this visit.    Do you need any medication refills at today's visit? No

## 2021-03-31 NOTE — LETTER
"    3/31/2021         RE: Ni Maya  [address] [facility] [id number]        Dear Colleague,    Thank you for referring your patient, Ni Maya, to the [facility]. Please see a copy of my visit note below.    Dermatologic Surgery Note    Dermatology Surgery Clinic  [facility]  [address] [place] [address]    Dermatology Problem List:  1. \"Precancerous\" lesion removed from the vagina per patient report. ?MICKIE III (documented in chart)  -s/p removal, patient thought done in [place] but no surg path reports available   2. History of NMSC  -BCC, right superior lateral forehead, s/p biopsy 8/13/19, s/p MMS with rhombic  -BCC, vertex scalp, s/p MMS 3/17/2021  -BCC, vertex scalp anterior, s/p MMS 3/17/2021  -BCC, frontal scalp, s/p MMS 3/17/2021  -BCC, vertex scalp left, s/p MMS 3/17/2021  3. 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 There was some brown pigment at edges of biopsy noted 8/4/2020.     Subjective: Ms. Maya is a very pleasant 80 year old woman with history of basal cell carcinoma of the scalp x 4, treated with Mohs surgery on  who presents today for follow up and suture removal    Overall, she is doing well and denies pain or drainage.     Objective:   Gen: This is a well appearing woman in no acute distress. Patient is alert and oriented x 3.  An exam of the scalp, face and neck was performed today   - Over the vertex scalp is a well healing advancement flap with two small areas that are healing by second intention. No evidence of infection or wound break down. Hemorrhagic crust noted over the scalp  - Over the anterior scalp is a well healing linear incision without evidence of infection    Assessment and Plan:   (1) BCC, vertex scalp, s/p MMS 3/17/2021  (2) BCC, vertex scalp anterior, s/p MMS 3/17/2021  (3) BCC, frontal scalp, s/p MMS " 3/17/2021  (4) BCC, vertex scalp left, s/p MMS 3/17/2021  - Discussed the nature of the diagnosis/condition  - Healing well  - Recommend continued application of Vaseline over the open areas and to continue gentle wound care    Follow up as needed for wounds    Follow up in September for skin exam    Patient was discussed with and evaluated by attending physician Dr. Wilfred Jiménez MD  PGY-6    Micrographic Surgery and Dermatologic Oncology Fellow  March 31, 2021    Staff Physician Comments:   I saw and evaluated the patient with the Mohs Surgery Fellow (Dr. Kurt Jiménez) and I agree with the assessment and plan. I was present for the entire exam.    Zach Hardin DO    Department of Dermatology  [facility]: Phone: [phone], Fax:[phone]  [facility]: Phone: [phone], Fax: [phone]                Again, thank you for allowing me to participate in the care of your patient.        Sincerely,        Zach Hardin MD   positional changes with minimal complaints of lightheadedness. Upon sitting EOB, pt requested to use bathroom. Assisted pt onto toilet. While ambulating, pt was mildly unsteady and reached for environment. Once finished, pt deferred further ambulation and was returned to bed per request.  All needs met and call light in reach. Pt may benefit from an intensive rehabilitation program at discharge, pending progress. Treatment:  Patient practiced and was instructed in the following treatment:     Bed mobility training - pt given verbal and tactile cues to facilitate proper sequencing and safety during supine>sit as well as provided with physical assistance to complete task    Sitting EOB for >5 minutes for upright tolerance, postural awareness and BLE ROM   Transfer training - pt was given verbal and tactile cues to facilitate proper hand placement, technique and safety during sit to stand and stand to sit as well as provided with physical assistance to complete task.  Gait training- pt was given verbal and tactile cues to facilitate safety and balance during ambulation as well as provided with physical assistance to complete task. Pt's/ family goals   1. Return home    Patient and or family understand(s) diagnosis, prognosis, and plan of care. Yes    PLAN OF CARE:    Current Treatment Recommendations     [x] Strengthening     [] ROM   [x] Balance Training   [x] Endurance Training   [x] Transfer Training   [x] Gait Training   [x] Stair Training   [] Positioning   [x] Safety and Education Training   [x] Patient/Caregiver Education   [] HEP  [] Other     Frequency of treatments: 2-5x/week x 1-2 weeks.     Time in  0726  Time out  0745    Total Treatment Time  14 minutes     Evaluation Time includes thorough review of current medical information, gathering information on past medical history/social history and prior level of function, completion of standardized testing/informal observation of tasks, assessment of data and education on plan of care and goals.     CPT codes:  [x] Low Complexity PT evaluation 60199  [] Moderate Complexity PT evaluation 57272  [] High Complexity PT evaluation 70619  [] PT Re-evaluation 69720  [] Gait training 81552 - minutes  [] Manual therapy 08589 - minutes  [x] Therapeutic activities 23129 14 minutes  [] Therapeutic exercises 69568 - minutes  [] Neuromuscular reeducation 69770 - minutes     Jamaal Montemayor, PT, DPT  [id number]

## 2021-04-09 NOTE — PROGRESS NOTES
"    Assessment & Plan       ICD-10-CM    1. Type 2 diabetes mellitus with hyperglycemia, without long-term current use of insulin (H)  E11.65 Lipid panel reflex to direct LDL Fasting     HEMOGLOBIN A1C     FOOT EXAM     metFORMIN (GLUCOPHAGE-XR) 500 MG 24 hr tablet   2. Benign essential hypertension  I10 amLODIPine (NORVASC) 5 MG tablet   3. Callus of foot  L84    4. Hammertoes of both feet  M20.41     M20.42       Has calluses from hammertoes and finds unable to feel ends well, but no pain and is doing well in general. Diabetes is stable, will renew meds. BP doing well, renewed as well.     Review of the result(s) of each unique test - a1c  15 minutes spent on the date of the encounter doing chart review, history and exam, documentation and further activities per the note       BMI:   Estimated body mass index is 29.62 kg/m  as calculated from the following:    Height as of this encounter: 1.651 m (5' 5\").    Weight as of this encounter: 80.7 kg (178 lb).   Weight management plan: Discussed healthy diet and exercise guidelines      Return in about 3 months (around 7/12/2021) for Routine Visit.    Apryl Olvera MD, MD  [facility] MONO Dan is a 80 year old who presents for the following health issues     HPI     Diabetes Follow-up    How often are you checking your blood sugar? One time daily  What time of day are you checking your blood sugars (select all that apply)?  Before meals  Have you had any blood sugars above 200?  No  Have you had any blood sugars below 70?  No    What symptoms do you notice when your blood sugar is low?  None    What concerns do you have today about your diabetes? None     Do you have any of these symptoms? (Select all that apply)  Redness, sores, or blisters on feet    Have you had a diabetic eye exam in the last 12 months? Yes              Hyperlipidemia Follow-Up      Are you regularly taking any medication or supplement to lower your cholesterol?   " "Yes- Lipitor    Are you having muscle aches or other side effects that you think could be caused by your cholesterol lowering medication?  No    Hypertension Follow-up      Do you check your blood pressure regularly outside of the clinic? No     Are you following a low salt diet? Yes    Are your blood pressures ever more than 140 on the top number (systolic) OR more   than 90 on the bottom number (diastolic), for example 140/90? No    BP Readings from Last 2 Encounters:   04/12/21 130/80   03/17/21 (!) 180/85     Hemoglobin A1C (%)   Date Value   04/12/2021 7.0 (H)   07/22/2020 7.0 (H)     LDL Cholesterol Calculated (mg/dL)   Date Value   07/22/2020 41   07/25/2019 38       Hypothyroidism Follow-up      Since last visit, patient describes the following symptoms: Weight stable, no hair loss, no skin changes, no constipation, no loose stools and hair loss        Review of Systems   Constitutional, HEENT, cardiovascular, pulmonary, GI, , musculoskeletal, neuro, skin, endocrine and psych systems are negative, except as otherwise noted.      Objective    /80   Pulse 86   Temp 97.3  F (36.3  C) (Temporal)   Ht 1.651 m (5' 5\")   Wt 80.7 kg (178 lb)   SpO2 97%   BMI 29.62 kg/m    Body mass index is 29.62 kg/m .  Physical Exam   GENERAL: healthy, alert and no distress  RESP: lungs clear to auscultation - no rales, rhonchi or wheezes  CV: regular rate and rhythm, normal S1 S2, no S3 or S4, no murmur, click or rub, no peripheral edema and peripheral pulses strong  ABDOMEN: soft, nontender, no hepatosplenomegaly, no masses and bowel sounds normal  MS: no gross musculoskeletal defects noted, no edema  SKIN: no suspicious lesions or rashes  NEURO: Normal strength and tone, mentation intact and speech normal  PSYCH: mentation appears normal, affect normal/bright    A1c =7.0            "

## 2021-04-12 ENCOUNTER — OFFICE VISIT (OUTPATIENT)
Dept: FAMILY MEDICINE | Facility: OTHER | Age: 81
End: 2021-04-12
Payer: COMMERCIAL

## 2021-04-12 VITALS
DIASTOLIC BLOOD PRESSURE: 80 MMHG | TEMPERATURE: 97.3 F | WEIGHT: 178 LBS | HEART RATE: 86 BPM | SYSTOLIC BLOOD PRESSURE: 130 MMHG | BODY MASS INDEX: 29.66 KG/M2 | HEIGHT: 65 IN | OXYGEN SATURATION: 97 %

## 2021-04-12 DIAGNOSIS — E78.5 HYPERLIPIDEMIA WITH TARGET LDL LESS THAN 100: ICD-10-CM

## 2021-04-12 DIAGNOSIS — L84 CALLUS OF FOOT: ICD-10-CM

## 2021-04-12 DIAGNOSIS — M20.42 HAMMERTOES OF BOTH FEET: ICD-10-CM

## 2021-04-12 DIAGNOSIS — D69.6 THROMBOCYTOPENIA (H): ICD-10-CM

## 2021-04-12 DIAGNOSIS — I10 BENIGN ESSENTIAL HYPERTENSION: ICD-10-CM

## 2021-04-12 DIAGNOSIS — E11.65 TYPE 2 DIABETES MELLITUS WITH HYPERGLYCEMIA, WITHOUT LONG-TERM CURRENT USE OF INSULIN (H): Primary | ICD-10-CM

## 2021-04-12 DIAGNOSIS — M20.41 HAMMERTOES OF BOTH FEET: ICD-10-CM

## 2021-04-12 DIAGNOSIS — N18.2 CHRONIC KIDNEY DISEASE, STAGE II (MILD): ICD-10-CM

## 2021-04-12 DIAGNOSIS — I10 ESSENTIAL HYPERTENSION WITH GOAL BLOOD PRESSURE LESS THAN 140/90: ICD-10-CM

## 2021-04-12 LAB
CHOLEST SERPL-MCNC: 122 MG/DL
HBA1C MFR BLD: 7 % (ref 0–5.6)
HDLC SERPL-MCNC: 48 MG/DL
LDLC SERPL CALC-MCNC: 51 MG/DL
NONHDLC SERPL-MCNC: 74 MG/DL
TRIGL SERPL-MCNC: 117 MG/DL

## 2021-04-12 PROCEDURE — 80061 LIPID PANEL: CPT | Performed by: FAMILY MEDICINE

## 2021-04-12 PROCEDURE — 99214 OFFICE O/P EST MOD 30 MIN: CPT | Performed by: FAMILY MEDICINE

## 2021-04-12 PROCEDURE — 99207 PR FOOT EXAM NO CHARGE: CPT | Mod: 25 | Performed by: FAMILY MEDICINE

## 2021-04-12 PROCEDURE — 83036 HEMOGLOBIN GLYCOSYLATED A1C: CPT | Performed by: FAMILY MEDICINE

## 2021-04-12 PROCEDURE — 36415 COLL VENOUS BLD VENIPUNCTURE: CPT | Performed by: FAMILY MEDICINE

## 2021-04-12 RX ORDER — ATORVASTATIN CALCIUM 40 MG/1
40 TABLET, FILM COATED ORAL DAILY
Qty: 90 TABLET | Refills: 3 | Status: SHIPPED | OUTPATIENT
Start: 2021-04-12 | End: 2022-06-27

## 2021-04-12 RX ORDER — AMLODIPINE BESYLATE 5 MG/1
5 TABLET ORAL DAILY
Qty: 90 TABLET | Refills: 3 | Status: SHIPPED | OUTPATIENT
Start: 2021-04-12 | End: 2022-03-29

## 2021-04-12 RX ORDER — METFORMIN HCL 500 MG
TABLET, EXTENDED RELEASE 24 HR ORAL
Qty: 90 TABLET | Refills: 1 | Status: SHIPPED | OUTPATIENT
Start: 2021-04-12 | End: 2021-08-11

## 2021-04-12 ASSESSMENT — MIFFLIN-ST. JEOR: SCORE: 1268.28

## 2021-04-12 NOTE — LETTER
April 13, 2021      Ni Maya  [address] [facility] [address]        Dear ,    We are writing to inform you of your test results.    Cholesterol stable, renewed meds at previous dosing    Resulted Orders   Lipid panel reflex to direct LDL Fasting   Result Value Ref Range    Cholesterol 122 <200 mg/dL    Triglycerides 117 <150 mg/dL    HDL Cholesterol 48 (L) >49 mg/dL    LDL Cholesterol Calculated 51 <100 mg/dL      Comment:      Desirable:       <100 mg/dl    Non HDL Cholesterol 74 <130 mg/dL   HEMOGLOBIN A1C   Result Value Ref Range    Hemoglobin A1C 7.0 (H) 0 - 5.6 %      Comment:      Normal <5.7% Prediabetes 5.7-6.4%  Diabetes 6.5% or higher - adopted from ADA   consensus guidelines.         If you have any questions or concerns, please call the clinic at the number listed above.       Sincerely,      Apryl Olvera MD

## 2021-04-15 NOTE — PROGRESS NOTES
"Dermatologic Surgery Note    Dermatology Surgery Clinic  [facility]  [address] [place] [address]    Dermatology Problem List:  1. \"Precancerous\" lesion removed from the vagina per patient report. ?MICKIE III (documented in chart)  -s/p removal, patient thought done in [place] but no surg path reports available   2. History of NMSC  -BCC, right superior lateral forehead, s/p biopsy 8/13/19, s/p MMS with rhombic  -BCC, vertex scalp, s/p MMS 3/17/2021  -BCC, vertex scalp anterior, s/p MMS 3/17/2021  -BCC, frontal scalp, s/p MMS 3/17/2021  -BCC, vertex scalp left, s/p MMS 3/17/2021  3. 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 There was some brown pigment at edges of biopsy noted 8/4/2020.     Subjective: Ms. Maya is a very pleasant 80 year old woman with history of basal cell carcinoma of the scalp x 4, treated with Mohs surgery on  who presents today for follow up and suture removal    Overall, she is doing well and denies pain or drainage.     Objective:   Gen: This is a well appearing woman in no acute distress. Patient is alert and oriented x 3.  An exam of the scalp, face and neck was performed today   - Over the vertex scalp is a well healing advancement flap with two small areas that are healing by second intention. No evidence of infection or wound break down. Hemorrhagic crust noted over the scalp  - Over the anterior scalp is a well healing linear incision without evidence of infection    Assessment and Plan:   (1) BCC, vertex scalp, s/p MMS 3/17/2021  (2) BCC, vertex scalp anterior, s/p MMS 3/17/2021  (3) BCC, frontal scalp, s/p MMS 3/17/2021  (4) BCC, vertex scalp left, s/p MMS 3/17/2021  - Discussed the nature of the diagnosis/condition  - Healing well  - Recommend continued application of Vaseline over the open areas and to continue gentle wound care    Follow up as needed for " wounds    Follow up in September for skin exam    Patient was discussed with and evaluated by attending physician Dr. Wilfred Jiménez MD  PGY-6    Micrographic Surgery and Dermatologic Oncology Fellow  March 31, 2021    Staff Physician Comments:   I saw and evaluated the patient with the Mohs Surgery Fellow (Dr. Kurt Jiménez) and I agree with the assessment and plan. I was present for the entire exam.    Zach Hardin DO    Department of Dermatology  [facility]: Phone: [phone], Fax:[phone]  [facility]: Phone: [phone], Fax: [phone]

## 2021-05-11 DIAGNOSIS — I10 ESSENTIAL HYPERTENSION WITH GOAL BLOOD PRESSURE LESS THAN 140/90: ICD-10-CM

## 2021-05-11 DIAGNOSIS — N18.2 CHRONIC KIDNEY DISEASE, STAGE II (MILD): ICD-10-CM

## 2021-05-11 DIAGNOSIS — E11.65 TYPE 2 DIABETES MELLITUS WITH HYPERGLYCEMIA, WITHOUT LONG-TERM CURRENT USE OF INSULIN (H): ICD-10-CM

## 2021-05-11 RX ORDER — LISINOPRIL 40 MG/1
40 TABLET ORAL DAILY
Qty: 90 TABLET | Refills: 0 | Status: SHIPPED | OUTPATIENT
Start: 2021-05-11 | End: 2021-08-11

## 2021-05-11 NOTE — TELEPHONE ENCOUNTER
Prescription approved per [facility] Refill Protocol.        Vanessa Garcias RN  [facility]

## 2021-07-05 DIAGNOSIS — E03.9 HYPOTHYROIDISM, UNSPECIFIED TYPE: ICD-10-CM

## 2021-07-05 DIAGNOSIS — E11.65 TYPE 2 DIABETES MELLITUS WITH HYPERGLYCEMIA, WITHOUT LONG-TERM CURRENT USE OF INSULIN (H): Primary | ICD-10-CM

## 2021-07-05 RX ORDER — LEVOTHYROXINE SODIUM 88 UG/1
88 TABLET ORAL DAILY
Qty: 90 TABLET | Refills: 0 | Status: SHIPPED | OUTPATIENT
Start: 2021-07-05 | End: 2021-09-29

## 2021-07-05 NOTE — TELEPHONE ENCOUNTER
Pending Prescriptions:                       Disp   Refills    levothyroxine (SYNTHROID/LEVOTHROID) 88 M*90 tab*1            Sig: Take 1 tablet (88 mcg) by mouth daily    Medication is being filled for 1 time dewey refill only due to:  Patient is due for DM check.     Please call and help schedule.  Thank you!    Darya Mujica RN, BSN  [facility]  July 5, 2021

## 2021-08-10 NOTE — PROGRESS NOTES
Assessment & Plan       ICD-10-CM    1. Lumbar radiculopathy  M54.16 [facility] Referral   2. Type 2 diabetes mellitus with hyperglycemia, without long-term current use of insulin (H)  E11.65 Hemoglobin A1c     Albumin Random Urine Quantitative with Creat Ratio     COMPREHENSIVE METABOLIC PANEL     lisinopril (ZESTRIL) 40 MG tablet     metFORMIN (GLUCOPHAGE-XR) 500 MG 24 hr tablet     Hemoglobin A1c - FUTURE S+90   3. Hypothyroidism, unspecified type  E03.9 TSH WITH FREE T4 REFLEX     T4 free   4. Chronic kidney disease, stage II (mild)  N18.2 lisinopril (ZESTRIL) 40 MG tablet   5. Essential hypertension with goal blood pressure less than 140/90  I10 lisinopril (ZESTRIL) 40 MG tablet   6. Hyperlipidemia with target LDL less than 100  E78.5    7. Cerebral infarction, unspecified mechanism (H)  I63.9       Blood sugars have mildly worsened though she is generally doing well.  She had some increase in diarrhea after starting apixaban with cardiology.  This has improved in the last few days.  We discussed her increase in A1c could be addressed by increase Metformin this but this will only likely return of the diarrhea.  So we reviewed diet and discovered an increase carbohydrate meal in the breakfast time which is when her diarrhea is the worst.  And she is still drinking coffee at this time.  Discussed lifestyle changes that could assist with the reduction of coffee reducing her diarrhea.  As well as reduction of carbs could improve her blood sugars.  We discussed options of potentially adding a hard-boiled egg or something that would help solidify her stools and improve her blood sugars for her birth breakfast planning.  She will view some of her nutritional choices and is planning to make changes in her lifestyle to make these adjustments plan follow-up in 3 months with her A1c and visit  She also has had chronic back pain for which injection was not helpful review of the MRI and her previous notes do show  that she had seen neurosurgery and was not interested in surgery yet.  Offered physical therapy to see if we can improve the discomforts without having to return to surgery.  Though if she is not improved with this we may consider referral to discuss again.    Review of the result(s) of each unique test - tsh, a1c, cmp  24 minutes spent on the date of the encounter doing chart review, history and exam, documentation and further activities per the note      Return in about 3 months (around 11/11/2021) for diabetes.    Apryl Olvrea MD, MD  [facility]    Claude Dan is a 81 year old who presents for the following health issues     HPI     Diabetes Follow-up    How often are you checking your blood sugar? One time daily  What time of day are you checking your blood sugars (select all that apply)?  Before meals  Have you had any blood sugars above 200?  No  Have you had any blood sugars below 70?  No    What symptoms do you notice when your blood sugar is low?  None    What concerns do you have today about your diabetes? None     Do you have any of these symptoms? (Select all that apply)  Numbness in feet and Weight gain              Hyperlipidemia Follow-Up      Are you regularly taking any medication or supplement to lower your cholesterol?   Yes- atorvastatin    Are you having muscle aches or other side effects that you think could be caused by your cholesterol lowering medication?  No    Hypertension Follow-up      Do you check your blood pressure regularly outside of the clinic? No - occasionally     Are you following a low salt diet? Yes    Are your blood pressures ever more than 140 on the top number (systolic) OR more   than 90 on the bottom number (diastolic), for example 140/90? No    BP Readings from Last 2 Encounters:   08/11/21 118/80   04/12/21 130/80     Hemoglobin A1C (%)   Date Value   08/11/2021 7.2 (H)   04/12/2021 7.0 (H)   07/22/2020 7.0 (H)     LDL Cholesterol  "Calculated (mg/dL)   Date Value   04/12/2021 51   07/22/2020 41     Hypothyroidism Follow-up      Since last visit, patient describes the following symptoms: Weight stable, no hair loss, no skin changes, no constipation, no loose stools      How many servings of fruits and vegetables do you eat daily?  2-3    On average, how many sweetened beverages do you drink each day (Examples: soda, juice, sweet tea, etc.  Do NOT count diet or artificially sweetened beverages)?   0    How many days per week do you exercise enough to make your heart beat faster? 5    How many minutes a day do you exercise enough to make your heart beat faster? 20 - 29    How many days per week do you miss taking your medication? 0        Review of Systems   Constitutional, HEENT, cardiovascular, pulmonary, GI, , musculoskeletal, neuro, skin, endocrine and psych systems are negative, except as otherwise noted.      Objective    /80   Pulse 90   Temp 97.1  F (36.2  C) (Temporal)   Resp 20   Ht 1.651 m (5' 5\")   Wt 82.6 kg (182 lb)   SpO2 99%   BMI 30.29 kg/m    Body mass index is 30.29 kg/m .  Physical Exam   GENERAL: healthy, alert and no distress  RESP: lungs clear to auscultation - no rales, rhonchi or wheezes  CV: regular rate and rhythm, normal S1 S2, no S3 or S4, no murmur, click or rub, no peripheral edema and peripheral pulses strong  ABDOMEN: soft, nontender, no hepatosplenomegaly, no masses and bowel sounds normal  MS: no gross musculoskeletal defects noted, no edema  SKIN: no suspicious lesions or rashes  NEURO: Normal strength and tone, mentation intact and speech normal  PSYCH: mentation appears normal, affect normal/bright            "

## 2021-08-11 ENCOUNTER — OFFICE VISIT (OUTPATIENT)
Dept: FAMILY MEDICINE | Facility: OTHER | Age: 81
End: 2021-08-11
Payer: COMMERCIAL

## 2021-08-11 VITALS
RESPIRATION RATE: 20 BRPM | HEART RATE: 90 BPM | HEIGHT: 65 IN | TEMPERATURE: 97.1 F | SYSTOLIC BLOOD PRESSURE: 118 MMHG | DIASTOLIC BLOOD PRESSURE: 80 MMHG | BODY MASS INDEX: 30.32 KG/M2 | WEIGHT: 182 LBS | OXYGEN SATURATION: 99 %

## 2021-08-11 DIAGNOSIS — N18.2 CHRONIC KIDNEY DISEASE, STAGE II (MILD): ICD-10-CM

## 2021-08-11 DIAGNOSIS — M54.16 LUMBAR RADICULOPATHY: Primary | ICD-10-CM

## 2021-08-11 DIAGNOSIS — E11.65 TYPE 2 DIABETES MELLITUS WITH HYPERGLYCEMIA, WITHOUT LONG-TERM CURRENT USE OF INSULIN (H): ICD-10-CM

## 2021-08-11 DIAGNOSIS — I63.9 CEREBRAL INFARCTION, UNSPECIFIED MECHANISM (H): ICD-10-CM

## 2021-08-11 DIAGNOSIS — E03.9 HYPOTHYROIDISM, UNSPECIFIED TYPE: ICD-10-CM

## 2021-08-11 DIAGNOSIS — I10 ESSENTIAL HYPERTENSION WITH GOAL BLOOD PRESSURE LESS THAN 140/90: ICD-10-CM

## 2021-08-11 DIAGNOSIS — E78.5 HYPERLIPIDEMIA WITH TARGET LDL LESS THAN 100: ICD-10-CM

## 2021-08-11 LAB
ALBUMIN SERPL-MCNC: 4.1 G/DL (ref 3.4–5)
ALP SERPL-CCNC: 92 U/L (ref 40–150)
ALT SERPL W P-5'-P-CCNC: 34 U/L (ref 0–50)
ANION GAP SERPL CALCULATED.3IONS-SCNC: 3 MMOL/L (ref 3–14)
AST SERPL W P-5'-P-CCNC: 23 U/L (ref 0–45)
BILIRUB SERPL-MCNC: 0.6 MG/DL (ref 0.2–1.3)
BUN SERPL-MCNC: 14 MG/DL (ref 7–30)
CALCIUM SERPL-MCNC: 9.1 MG/DL (ref 8.5–10.1)
CHLORIDE BLD-SCNC: 103 MMOL/L (ref 94–109)
CO2 SERPL-SCNC: 32 MMOL/L (ref 20–32)
CREAT SERPL-MCNC: 0.92 MG/DL (ref 0.52–1.04)
CREAT UR-MCNC: 65 MG/DL
GFR SERPL CREATININE-BSD FRML MDRD: 58 ML/MIN/1.73M2
GLUCOSE BLD-MCNC: 139 MG/DL (ref 70–99)
HBA1C MFR BLD: 7.2 % (ref 0–5.6)
MICROALBUMIN UR-MCNC: 21 MG/L
MICROALBUMIN/CREAT UR: 32.31 MG/G CR (ref 0–25)
POTASSIUM BLD-SCNC: 3.4 MMOL/L (ref 3.4–5.3)
PROT SERPL-MCNC: 7.4 G/DL (ref 6.8–8.8)
SODIUM SERPL-SCNC: 138 MMOL/L (ref 133–144)
T4 FREE SERPL-MCNC: 1 NG/DL (ref 0.76–1.46)
TSH SERPL DL<=0.005 MIU/L-ACNC: 4.07 MU/L (ref 0.4–4)

## 2021-08-11 PROCEDURE — 84439 ASSAY OF FREE THYROXINE: CPT | Performed by: FAMILY MEDICINE

## 2021-08-11 PROCEDURE — 80053 COMPREHEN METABOLIC PANEL: CPT | Performed by: FAMILY MEDICINE

## 2021-08-11 PROCEDURE — 36415 COLL VENOUS BLD VENIPUNCTURE: CPT | Performed by: FAMILY MEDICINE

## 2021-08-11 PROCEDURE — 99214 OFFICE O/P EST MOD 30 MIN: CPT | Performed by: FAMILY MEDICINE

## 2021-08-11 PROCEDURE — 84443 ASSAY THYROID STIM HORMONE: CPT | Performed by: FAMILY MEDICINE

## 2021-08-11 PROCEDURE — 83036 HEMOGLOBIN GLYCOSYLATED A1C: CPT | Performed by: FAMILY MEDICINE

## 2021-08-11 PROCEDURE — 82043 UR ALBUMIN QUANTITATIVE: CPT | Performed by: FAMILY MEDICINE

## 2021-08-11 RX ORDER — LISINOPRIL 40 MG/1
40 TABLET ORAL DAILY
Qty: 90 TABLET | Refills: 0 | Status: SHIPPED | OUTPATIENT
Start: 2021-08-11 | End: 2021-11-10

## 2021-08-11 RX ORDER — METFORMIN HCL 500 MG
TABLET, EXTENDED RELEASE 24 HR ORAL
Qty: 90 TABLET | Refills: 1 | Status: SHIPPED | OUTPATIENT
Start: 2021-08-11 | End: 2022-03-09

## 2021-08-11 RX ORDER — LANCETS
EACH MISCELLANEOUS
Qty: 100 EACH | Refills: 6 | Status: CANCELLED | OUTPATIENT
Start: 2021-08-11

## 2021-08-11 RX ORDER — BLOOD SUGAR DIAGNOSTIC
1 STRIP MISCELLANEOUS 2 TIMES DAILY
Qty: 100 STRIP | Refills: 1 | COMMUNITY
Start: 2021-08-11

## 2021-08-11 RX ORDER — GLUCOSAMINE HCL/CHONDROITIN SU 500-400 MG
CAPSULE ORAL
Qty: 100 EACH | Refills: 3 | Status: CANCELLED | OUTPATIENT
Start: 2021-08-11

## 2021-08-11 ASSESSMENT — MIFFLIN-ST. JEOR: SCORE: 1281.43

## 2021-08-11 ASSESSMENT — PAIN SCALES - GENERAL: PAINLEVEL: NO PAIN (0)

## 2021-08-11 NOTE — RESULT ENCOUNTER NOTE
Tsh is 4.07 and 4 and below is normal. Could increase dose slightly, but is awfully close, so ok to just monitor for now as well. Please get her preference for change. Rehceck 3 months either way.  Apryl Olvera MD

## 2021-09-27 ENCOUNTER — ALLIED HEALTH/NURSE VISIT (OUTPATIENT)
Dept: FAMILY MEDICINE | Facility: OTHER | Age: 81
End: 2021-09-27
Payer: COMMERCIAL

## 2021-09-27 ENCOUNTER — TRANSFERRED RECORDS (OUTPATIENT)
Dept: HEALTH INFORMATION MANAGEMENT | Facility: CLINIC | Age: 81
End: 2021-09-27

## 2021-09-27 DIAGNOSIS — Z53.9 DIAGNOSIS FOR ++++ WALK IN CLINIC ++++: Primary | ICD-10-CM

## 2021-09-27 LAB — RETINOPATHY: NEGATIVE

## 2021-09-27 NOTE — PROGRESS NOTES
Patient walked into the clinic today.     Patient states that she fell about two and half weeks ago. She was holding onto a cart, caught her right shoe, than fell down. She fell on her left knee, then her left shoulder. She was able to get up. At first she didn't have any pain. Her left knee had some scrapes present. Then two days later she felt her left hip was painful. The patient describes the pain as being located near her back. She rates the pain 8/10. At times when she moves in a specific way she feels like there is sharp shooting pain. She declines numbness or tingling. She can only sleep on her right side. She states that she is able to to perform daily activities. She has done nothing for her symptoms.     We discussed the followin. Go to urgent care to be examined. If additional care is needed, than we can schedule a visit.   2. Homecare (ice/heat, pillow between legs, rest, tylenol, IBU)    JIMENEZ Bueno, RN, PHN  [place]  2021

## 2021-09-28 ENCOUNTER — APPOINTMENT (OUTPATIENT)
Dept: GENERAL RADIOLOGY | Facility: CLINIC | Age: 81
End: 2021-09-28
Attending: EMERGENCY MEDICINE
Payer: COMMERCIAL

## 2021-09-28 ENCOUNTER — HOSPITAL ENCOUNTER (EMERGENCY)
Facility: CLINIC | Age: 81
Discharge: HOME | End: 2021-09-28
Attending: EMERGENCY MEDICINE | Admitting: EMERGENCY MEDICINE
Payer: COMMERCIAL

## 2021-09-28 VITALS
OXYGEN SATURATION: 99 % | TEMPERATURE: 98.2 F | BODY MASS INDEX: 30.67 KG/M2 | SYSTOLIC BLOOD PRESSURE: 157 MMHG | WEIGHT: 184.3 LBS | DIASTOLIC BLOOD PRESSURE: 86 MMHG | HEART RATE: 81 BPM | RESPIRATION RATE: 18 BRPM

## 2021-09-28 DIAGNOSIS — M25.551 HIP PAIN, RIGHT: ICD-10-CM

## 2021-09-28 DIAGNOSIS — E03.9 HYPOTHYROIDISM, UNSPECIFIED TYPE: ICD-10-CM

## 2021-09-28 PROCEDURE — 99282 EMERGENCY DEPT VISIT SF MDM: CPT | Performed by: EMERGENCY MEDICINE

## 2021-09-28 PROCEDURE — 73502 X-RAY EXAM HIP UNI 2-3 VIEWS: CPT

## 2021-09-28 PROCEDURE — 99283 EMERGENCY DEPT VISIT LOW MDM: CPT | Performed by: EMERGENCY MEDICINE

## 2021-09-28 NOTE — ED TRIAGE NOTES
Pt reports she was pushing a cart and tripped over her foot falling to her knees and then to the side and the cart came with her, she reports she started having some right hip pain after and just wanted to get it checked out

## 2021-09-28 NOTE — DISCHARGE INSTRUCTIONS
Your hip x-ray looked normal.  Most likely your pain is secondary to trochanteric bursitis as discussed.  I put in a referral to orthopedics.  They may want to inject this or they want to do further studies.  Use caution when getting up to walk around.  Return to the emergency department if you develop new or worsening symptoms.  It was a pleasure to see you.

## 2021-09-28 NOTE — ED PROVIDER NOTES
History     Chief Complaint   Patient presents with     Hip Pain     HPI  Ni Maya is a 81 year old female who presents to the ER secondary to right hip pain.  This started prior to falling three weeks ago and is located over the right lateral hip.  She fell a few weeks ago but did not directly strike her right hip and it has been worse since then.  She is able to walk unassisted.  She has some right lower back/iliac crest discomfort. No hematoma or midline back pain. No radicular symptoms. No numbness down the leg.  No numbness or new loss of bowel or bladder control.     Allergies:  Allergies   Allergen Reactions     Sulfa Drugs Rash       Problem List:    Patient Active Problem List    Diagnosis Date Noted     Long term current use of anticoagulant therapy 09/01/2019     Priority: Medium     Cerebral infarction, unspecified mechanism (H) 03/25/2019     Priority: Medium     Lumbar radiculopathy 05/08/2018     Priority: Medium     Acute gout involving toe of right foot 11/27/2017     Priority: Medium     Cellulitis of toe of right foot 11/26/2017     Priority: Medium     Chronic atrial fibrillation (H) 11/26/2017     Priority: Medium     Thrombocytopenia (H) 11/26/2017     Priority: Medium     Type 2 diabetes mellitus with hyperglycemia, without long-term current use of insulin (H) 05/17/2017     Priority: Medium     Essential hypertension with goal blood pressure less than 150/90 05/27/2016     Priority: Medium     ACP (advance care planning) 05/17/2016     Priority: Medium     Advance Care Planning 5/17/2016: Receipt of ACP document:  Received: Health Care Directive which was witnessed or notarized on 11/22/2013.  Document not previously scanned.  Validation form completed and sent with document to be scanned.  Code Status needs to be updated to reflect choices in most recent ACP document. Notification sent to Dr. Lin for followup.  Confirmed/documented designated decision maker(s).  Added by Kassidy  Merry.             HTN, goal below 150/90 11/06/2013     Priority: Medium     Hyperlipidemia with target LDL less than 100 10/16/2013     Priority: Medium     Diagnosis updated by automated process. Provider to review and confirm.       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 Final path MICKIE III with free surgical margins       Urethral stricture 01/03/2008     Priority: Medium     Problem list name updated by automated process. Provider to review       Edema 12/29/2006     Priority: Medium     Obesity 12/29/2006     Priority: Medium     Problem list name updated by automated process. Provider to review       Chronic kidney disease, stage II (mild) 11/27/2006     Priority: Medium     Symptomatic menopausal or female climacteric states 01/13/2005     Priority: Medium     Allergic rhinitis 10/21/2004     Priority: Medium     Problem list name updated by automated process. Provider to review       Hypothyroidism 06/25/2002     Priority: Medium     Problem list name updated by automated process. Provider to review          Past Medical History:    Past Medical History:   Diagnosis Date     Allergic rhinitis, cause unspecified      Essential hypertension, benign      Infection of kidney, unspecified 1999     Other specified acquired hypothyroidism      Other specified types of cystitis(595.89)        Past Surgical History:    Past Surgical History:   Procedure Laterality Date     BIOPSY VULVA/PERINEUM,ADDNL  BLANCHE  9/18/09    Wide -excision of MICKIE III- right labia      C LAPAROSCOPY, SURGICAL; W/ VAGINAL HYSTERECTOMY W/WO REMOVAL OVARY(S)/TUBES  1984    for bleeding     C LIGATE FALLOPIAN TUBE,POSTPARTUM  1978    Tubal Ligation     COLONOSCOPY  9/13/2013    Procedure: COLONOSCOPY;  Colonoscopy;  Surgeon: Yanick Ramsey MD;  Location: [facility] GI     HC BIOPSY OF BREAST, OPEN INCISIONAL  1960    Benign     HC ERCP W SPHINCTEROTOMY  1996 6/2/96 and 8/30/96     HC REDUCTION OF LARGE BREAST  1988     HC REMOVAL GALLBLADDER  1995     HC UGI ENDOSCOPY DIAG W OR W/O BRUSH/WASH  7-     INJECT EPIDURAL LUMBAR N/A 8/10/2017    Procedure: INJECT EPIDURAL LUMBAR;  Lumbar 2-3 Epidural Steroid Injection;  Surgeon: Kimani Garcia MD;  Location: [facility] OR     INJECT EPIDURAL LUMBAR Right 5/10/2018    Procedure: INJECT EPIDURAL LUMBAR;  right lumbar 2 - lumbar 3 epidural steroid injection;  Surgeon: Kimani Garcia MD;  Location: [facility] OR     ZZHC ANGIOGRAPHY ARCH  4-3-98     ZZHC COLONOSCOPY THRU STOMA, DIAGNOSTIC  4-24-98    Diverticuli - due in 2008       Family History:    Family History   Problem Relation Age of Onset     Cardiovascular Father         Aortic aneurysm     Hypertension Father      Hypertension Mother      Gastrointestinal Disease Mother         GI Bleed     Lipids Sister      Hypertension Sister      Genitourinary Problems Sister      Allergies Sister      Cancer Brother         Lung     Alcohol/Drug Brother      Connective Tissue Disorder Son         Down Syndrome     Respiratory Son         Pneumonia     Cancer Maternal Grandmother         Stomach     Hypertension Maternal Grandfather      Allergies Daughter        Social History:  Marital Status:   [2]  Social History     Tobacco Use     Smoking status: Never Smoker     Smokeless tobacco: Never Used   Vaping Use     Vaping Use: Never used   Substance Use Topics     Alcohol use: No     Drug use: No        Medications:    acetaminophen (TYLENOL) 650 MG CR  tablet  alcohol swab prep pads  ALLEGRA 180 MG PO TABS  amLODIPine (NORVASC) 5 MG tablet  apixaban ANTICOAGULANT (ELIQUIS) 5 MG tablet  atorvastatin (LIPITOR) 40 MG tablet  blood glucose (RELION PRIME TEST) test strip  blood glucose calibration (NO BRAND SPECIFIED) solution  blood glucose monitoring (NO BRAND SPECIFIED) meter device kit  Blood Glucose Monitoring Suppl W/DEVICE KIT  co-enzyme Q-10 100 MG CAPS capsule  fish oil-omega-3 fatty acids (FISH OIL) 1000 MG capsule  Lancets (E-Z JECT LANCET MICRO-THIN 33G) MISC  levothyroxine (SYNTHROID/LEVOTHROID) 88 MCG tablet  lisinopril (ZESTRIL) 40 MG tablet  metFORMIN (GLUCOPHAGE-XR) 500 MG 24 hr tablet  metoprolol tartrate (LOPRESSOR) 50 MG tablet  NUTRITIONAL SUPPLEMENT OR  Nutritional Supplements (NUTRITIONAL SUPPLEMENT PO)  thin (NO BRAND SPECIFIED) lancets  UNABLE TO FIND          Review of Systems   All other systems reviewed and are negative.      Physical Exam   BP: (!) 181/91  Pulse: 75  Temp: 98.2  F (36.8  C)  Resp: 18  Weight: 83.6 kg (184 lb 4.8 oz)  SpO2: 99 %      Physical Exam  Vitals and nursing note reviewed.   Constitutional:       General: She is not in acute distress.     Appearance: Normal appearance. She is well-developed. She is not diaphoretic.   HENT:      Head: Normocephalic and atraumatic.      Right Ear: External ear normal.      Left Ear: External ear normal.      Nose: Nose normal. No congestion or rhinorrhea.   Eyes:      General: No scleral icterus.  Cardiovascular:      Rate and Rhythm: Normal rate and regular rhythm.   Musculoskeletal:      Cervical back: Normal range of motion and neck supple.      Comments: ttp over the right greater trochanter without swelling or bruising. No pain with log roll or with hip flexion or downward pressure on the hip joint.  Small lipoma or lymph node palpated along the right iliac crest.  No midline back pain.    Skin:     General: Skin is warm and dry.      Coloration: Skin is not pale.      Findings:  No erythema or rash.   Neurological:      Mental Status: She is alert and oriented to person, place, and time.         ED Course        Procedures                Results for orders placed or performed during the hospital encounter of 09/28/21 (from the past 24 hour(s))   XR Pelvis w Hip Right 1 View    Narrative    XR PELVIS AND HIP RIGHT 1 VIEW 9/28/2021 11:22 AM     HISTORY: right hip pain      Impression    IMPRESSION: Negative exam.    VALARIE NAPIER MD         SYSTEM ID:  [id number]       Medications - No data to display    Assessments & Plan (with Medical Decision Making)  Hip pain, likely trochanteric bursitis.  Normal xray. Referral to ortho.  The diagnosis, tx options, risks and follow up discussed.      I have reviewed the nursing notes.    I have reviewed the findings, diagnosis, plan and need for follow up with the patient.      New Prescriptions    No medications on file       Final diagnoses:   Hip pain, right       9/28/2021   [facility] EMERGENCY DEPT     Kurt Andrews MD  09/28/21 [id number]

## 2021-09-29 ENCOUNTER — PATIENT OUTREACH (OUTPATIENT)
Dept: FAMILY MEDICINE | Facility: OTHER | Age: 81
End: 2021-09-29

## 2021-09-29 RX ORDER — LEVOTHYROXINE SODIUM 88 UG/1
88 TABLET ORAL DAILY
Qty: 90 TABLET | Refills: 0 | Status: SHIPPED | OUTPATIENT
Start: 2021-09-29 | End: 2021-12-27

## 2021-09-29 NOTE — TELEPHONE ENCOUNTER
Patient notified, agrees with plan.    She also had checked her BP at home : 134/77    She is going to have it checked again at the pharmacy tomorrow and get her flu shot as well.    Nataly SMITHO/

## 2021-09-29 NOTE — TELEPHONE ENCOUNTER
Reason for follow up call: Ni HUGHES Zulma appeared on our list for being seen in and recenlty discharge from the Emergency Room/In Patient Hospital Admission.    Chief Complaint   Patient presents with     Hospital F/U     10% green   Right Hip Pain    TCM Episode NA    Encounter routed for Clinic Triage RN to call for follow up    LACIE BuenoN, RN, PHN  [place]  September 29, 2021

## 2021-09-29 NOTE — TELEPHONE ENCOUNTER
Pending Prescriptions:                       Disp   Refills    levothyroxine (SYNTHROID/LEVOTHROID) 88 M*90 tab*0            Sig: TAKE 1 TABLET (88 MCG) BY MOUTH DAILY.    Medication is being filled for 1 time dewey refill only due to:  Patient is due for diabetic follow-up by 11/11/21    Please call and help schedule.  Thank you!

## 2021-09-29 NOTE — TELEPHONE ENCOUNTER
Left message for patient to call clinic, when patient returns call please assist in scheduling appointment

## 2021-09-29 NOTE — TELEPHONE ENCOUNTER
No note on chart about follow-up with us. Though if just high BP for pain, usually they advise pharmacy recheck or MA recheck to confim it is ok  Aprly Olvera MD

## 2021-10-06 ENCOUNTER — ANCILLARY PROCEDURE (OUTPATIENT)
Dept: GENERAL RADIOLOGY | Facility: CLINIC | Age: 81
End: 2021-10-06
Attending: ORTHOPAEDIC SURGERY
Payer: COMMERCIAL

## 2021-10-06 ENCOUNTER — OFFICE VISIT (OUTPATIENT)
Dept: ORTHOPEDICS | Facility: CLINIC | Age: 81
End: 2021-10-06
Attending: EMERGENCY MEDICINE
Payer: COMMERCIAL

## 2021-10-06 VITALS
WEIGHT: 181.7 LBS | BODY MASS INDEX: 30.27 KG/M2 | HEIGHT: 65 IN | DIASTOLIC BLOOD PRESSURE: 80 MMHG | SYSTOLIC BLOOD PRESSURE: 110 MMHG

## 2021-10-06 DIAGNOSIS — M25.551 RIGHT HIP PAIN: ICD-10-CM

## 2021-10-06 DIAGNOSIS — M25.562 LEFT KNEE PAIN: ICD-10-CM

## 2021-10-06 DIAGNOSIS — M25.551 HIP PAIN, RIGHT: Primary | ICD-10-CM

## 2021-10-06 DIAGNOSIS — M17.12 PRIMARY OSTEOARTHRITIS OF LEFT KNEE: ICD-10-CM

## 2021-10-06 PROCEDURE — 73564 X-RAY EXAM KNEE 4 OR MORE: CPT | Mod: TC | Performed by: RADIOLOGY

## 2021-10-06 PROCEDURE — 99204 OFFICE O/P NEW MOD 45 MIN: CPT | Mod: 25 | Performed by: ORTHOPAEDIC SURGERY

## 2021-10-06 PROCEDURE — 73502 X-RAY EXAM HIP UNI 2-3 VIEWS: CPT | Mod: TC | Performed by: RADIOLOGY

## 2021-10-06 PROCEDURE — 20610 DRAIN/INJ JOINT/BURSA W/O US: CPT | Mod: LT | Performed by: ORTHOPAEDIC SURGERY

## 2021-10-06 RX ORDER — TRIAMCINOLONE ACETONIDE 40 MG/ML
40 INJECTION, SUSPENSION INTRA-ARTICULAR; INTRAMUSCULAR ONCE
Status: COMPLETED | OUTPATIENT
Start: 2021-10-06 | End: 2021-10-06

## 2021-10-06 RX ORDER — BUPIVACAINE HYDROCHLORIDE 5 MG/ML
3 INJECTION, SOLUTION PERINEURAL ONCE
Status: COMPLETED | OUTPATIENT
Start: 2021-10-06 | End: 2021-10-06

## 2021-10-06 RX ADMIN — BUPIVACAINE HYDROCHLORIDE 15 MG: 5 INJECTION, SOLUTION PERINEURAL at 09:49

## 2021-10-06 RX ADMIN — TRIAMCINOLONE ACETONIDE 40 MG: 40 INJECTION, SUSPENSION INTRA-ARTICULAR; INTRAMUSCULAR at 09:49

## 2021-10-06 ASSESSMENT — PAIN SCALES - GENERAL: PAINLEVEL: MILD PAIN (2)

## 2021-10-06 ASSESSMENT — MIFFLIN-ST. JEOR: SCORE: 1280.07

## 2021-10-06 NOTE — PROGRESS NOTES
ORTHOPEDIC CONSULT      Chief Complaint: Ni Maya is a 81 year old female who is being seen for   Chief Complaint   Patient presents with     Musculoskeletal Problem     right hip pain     Consult     ref: ED       History of Present Illness:   Ni Maya is a 81 year old female who is seen in consultation at the request of Dr. Andrews for evaluation of right hip pain.    Reports a fall about 4 weeks. Has had increasing lateral hip pain since then. Presented to the ED recently. Epic notes reviewed. No fracture was identified and recommended to follow-up with orthopedics.   She reports chronic low back pain for many years.  Since the fall it feels like it has been worse.  Nonradiating.  Better with rest, has pain with weightbearing.  But more so bothering her now is her left knee.  She reports with the fall she had an abrasion.  However no significant pain.  Yesterday started having more and pain in the knee causing her to limp.        Patient's past medical, surgical, social and family histories reviewed.     Past Medical History:   Diagnosis Date     Allergic rhinitis, cause unspecified      Essential hypertension, benign      Infection of kidney, unspecified 1999     Other specified acquired hypothyroidism      Other specified types of cystitis(595.89)     1999,6-2-01, 10-10-01       Past Surgical History:   Procedure Laterality Date     BIOPSY VULVA/PERINEUM,ADDNL LESN  9/18/09    Wide -excision of MICKIE III- right labia      C LAPAROSCOPY, SURGICAL; W/ VAGINAL HYSTERECTOMY W/WO REMOVAL OVARY(S)/TUBES  1984    for bleeding     C LIGATE FALLOPIAN TUBE,POSTPARTUM  1978    Tubal Ligation     COLONOSCOPY  9/13/2013    Procedure: COLONOSCOPY;  Colonoscopy;  Surgeon: Yanick Ramsey MD;  Location: [facility]     HC BIOPSY OF BREAST, OPEN INCISIONAL  1960    Benign     HC ERCP W SPHINCTEROTOMY  1996 6/2/96 and 8/30/96     HC REDUCTION OF LARGE BREAST  1988     HC REMOVAL GALLBLADDER  1995     HC UGI ENDOSCOPY  DIAG W OR W/O BRUSH/WASH  7-     INJECT EPIDURAL LUMBAR N/A 8/10/2017    Procedure: INJECT EPIDURAL LUMBAR;  Lumbar 2-3 Epidural Steroid Injection;  Surgeon: Kimani Garcia MD;  Location: [facility]     INJECT EPIDURAL LUMBAR Right 5/10/2018    Procedure: INJECT EPIDURAL LUMBAR;  right lumbar 2 - lumbar 3 epidural steroid injection;  Surgeon: Kimani aGrcia MD;  Location: [facility]     ZZHC ANGIOGRAPHY ARCH  4-3-98     ZZHC COLONOSCOPY THRU STOMA, DIAGNOSTIC  4-24-98    Diverticuli - due in 2008       Medications:  acetaminophen (TYLENOL) 650 MG CR tablet, Take 1 tablet (650 mg) by mouth every 8 hours as needed for mild pain or fever  alcohol swab prep pads, Use to swab area of injection/geoffrey as directed.  ALLEGRA 180 MG PO TABS, 1 TABLET DAILY  amLODIPine (NORVASC) 5 MG tablet, Take 1 tablet (5 mg) by mouth daily  apixaban ANTICOAGULANT (ELIQUIS) 5 MG tablet, Take 1 tablet (5 mg) by mouth 2 times daily  atorvastatin (LIPITOR) 40 MG tablet, Take 1 tablet (40 mg) by mouth daily  blood glucose (RELION PRIME TEST) test strip, 1 strip by In Vitro route 2 times daily Use to test blood sugar 3 times daily or as directed.  blood glucose calibration (NO BRAND SPECIFIED) solution, To accompany: Blood Glucose Monitor Brands: per insurance.  blood glucose monitoring (NO BRAND SPECIFIED) meter device kit, Use to test blood sugar 3 times daily or as directed. Preferred blood glucose meter OR supplies to accompany: Blood Glucose Monitor Brands: per insurance.  Blood Glucose Monitoring Suppl W/DEVICE KIT, 1 kit 3 times daily.  co-enzyme Q-10 100 MG CAPS capsule, Take 100 mg by mouth daily  fish oil-omega-3 fatty acids (FISH OIL) 1000 MG capsule, Take 2 g by mouth daily. 2 caps per day  Lancets (E-Z JECT LANCET MICRO-THIN 33G) MISC,   levothyroxine (SYNTHROID/LEVOTHROID) 88 MCG tablet, TAKE 1 TABLET (88 MCG) BY MOUTH DAILY.  lisinopril (ZESTRIL) 40 MG tablet, Take 1 tablet (40 mg) by mouth daily  metFORMIN (GLUCOPHAGE-XR) 500 MG  "24 hr tablet, TAKE ONE TABLET BY MOUTH ONCE DAILY WITH DINNER  metoprolol tartrate (LOPRESSOR) 50 MG tablet, TAKE ONE TABLET BY MOUTH TWICE A DAY  NUTRITIONAL SUPPLEMENT OR, VIBE  Nutritional Supplements (NUTRITIONAL SUPPLEMENT PO), Take 1 oz by mouth daily Flex  thin (NO BRAND SPECIFIED) lancets, Use with lanceting device. To accompany: Blood Glucose Monitor Brands: per insurance.  UNABLE TO FIND, MEDICATION NAME: resvante supplement    No current facility-administered medications on file prior to visit.      Allergies   Allergen Reactions     Sulfa Drugs Rash       Social History     Occupational History     Occupation: Retired in 1994     Employer: RETIRED   Tobacco Use     Smoking status: Never Smoker     Smokeless tobacco: Never Used   Vaping Use     Vaping Use: Never used   Substance and Sexual Activity     Alcohol use: No     Drug use: No     Sexual activity: Not Currently     Partners: Male     Birth control/protection: Female Surgical       Family History   Problem Relation Age of Onset     Cardiovascular Father         Aortic aneurysm     Hypertension Father      Hypertension Mother      Gastrointestinal Disease Mother         GI Bleed     Lipids Sister      Hypertension Sister      Genitourinary Problems Sister      Allergies Sister      Cancer Brother         Lung     Alcohol/Drug Brother      Connective Tissue Disorder Son         Down Syndrome     Respiratory Son         Pneumonia     Cancer Maternal Grandmother         Stomach     Hypertension Maternal Grandfather      Allergies Daughter        REVIEW OF SYSTEMS  10 point review systems performed otherwise negative as noted as per history of present illness.    Physical Exam:  Vitals: /80   Ht 1.651 m (5' 5\")   Wt 82.4 kg (181 lb 11.2 oz)   BMI 30.24 kg/m    BMI= Body mass index is 30.24 kg/m .  Constitutional: healthy, alert and no acute distress   Psychiatric: mentation appears normal and affect normal/bright  NEURO: no focal " deficits  RESP: Normal with easy respirations and no use of accessory muscles to breathe, no audible wheezing or retractions  CV: bilateral lower extremity:   no edema           SKIN: No erythema, rashes, excoriation, or breakdown. No evidence of infection.   JOINT/EXTREMITIES:right hip/low back: Tenderness along the SI region.  No palpable masses.  Negative straight leg although tightness with the hamstrings.  She has no pain with active and passive range of motion of the hip including flexion internal and external rotation.  Left knee shows some well-healed superficial abrasion.  Small effusion.  Active motion is approximately 7-110 degrees.  Passively does not tolerate much more.  Moderate effusion.  No erythema.  No instability with varus and valgus testing     GAIT: not tested     Diagnostic Modalities:  right hip X-ray: No fracture, dislocation and or lesion. Normal alignment.  Joint space maintained no significant arthritis. No appreciable soft tissue abnormality.     Left knee x-rays: Show some degenerative changes with narrowing.  No occult fractures  Independent visualization of the images was performed.      Impression: right sacroiliitis?  Status post fall    Left knee osteoarthritis    Plan:  All of the above pertinent physical exam and imaging modalities findings was reviewed with Ni and her .    Given the recent fall and significant exacerbation of her pain I recommend MRI of her pelvis to rule out occult fracture.  Recommend protected weightbearing with a walker.  -if negative for fracture could consider SI joint steroid injection    Left knee-plan to proceed with injection.  She had no immediate pain.  Pain just recently started.  She is had abnormal gait mechanics related to her low back pain.  Certainly think this could have exacerbated it.    The patient was counseled about an  injection, including discussion of risks (including infection), contents of the injection, rationale for  performing the injection, and expected benefits of the injection. The skin was prepped with alcohol and betadine and then utilizing sterile technique an injection of the left knee joint from the anterolateral approach in the seated position was performed. The injection consisted 1ml of Kenalog (40mg per 1 ml) mixed with 3ml of 0.5% Marcaine. The patient tolerated the injection well, and there were no complications. The injection site was covered with a Band-Aid. The injection was performed by Ming Voss, APRN, CNP, DNP    If any concerns for infection seek immediate medical evaluation either in the clinic or the ED.         Return to clinic to discuss test results, or sooner as needed for changes.  Re-x-ray on return: No    David Benitez D.O.

## 2021-10-06 NOTE — PROGRESS NOTES
Appropriate assistive devices provided during their visit. no (Yes, No, N/A) w/c (list device)    Exam table and/or cart  placed in the lowest position. na (Yes, No, N/A)    Brakes on tables/carts/wheelchairs used at all times. yes (Yes, No, N/A)    Non slip footwear applied. na (Yes, No, NA)    Patient was accompanied by staff throughout visit. yes (Yes, No, N/A)    Equipment safety straps used. na (Yes, No, N/A)    Assist with toileting. na (Yes, No, N/A)

## 2021-10-06 NOTE — LETTER
10/6/2021         RE: Ni Maya  [id number] [facility] [id number]        Dear Colleague,    Thank you for referring your patient, Ni Maya, to the [facility]. Please see a copy of my visit note below.    ORTHOPEDIC CONSULT      Chief Complaint: Ni Maya is a 81 year old female who is being seen for   Chief Complaint   Patient presents with     Musculoskeletal Problem     right hip pain     Consult     ref: ED       History of Present Illness:   Ni Maya is a 81 year old female who is seen in consultation at the request of Dr. Andrews for evaluation of right hip pain.    Reports a fall about 4 weeks. Has had increasing lateral hip pain since then. Presented to the ED recently. Epic notes reviewed. No fracture was identified and recommended to follow-up with orthopedics.   She reports chronic low back pain for many years.  Since the fall it feels like it has been worse.  Nonradiating.  Better with rest, has pain with weightbearing.  But more so bothering her now is her left knee.  She reports with the fall she had an abrasion.  However no significant pain.  Yesterday started having more and pain in the knee causing her to limp.        Patient's past medical, surgical, social and family histories reviewed.     Past Medical History:   Diagnosis Date     Allergic rhinitis, cause unspecified      Essential hypertension, benign      Infection of kidney, unspecified 1999     Other specified acquired hypothyroidism      Other specified types of cystitis(595.89)     1999,6-2-01, 10-10-01       Past Surgical History:   Procedure Laterality Date     BIOPSY VULVA/PERINEUM,ADDNL LESN  9/18/09    Wide -excision of MICKIE III- right labia      C LAPAROSCOPY, SURGICAL; W/ VAGINAL HYSTERECTOMY W/WO REMOVAL OVARY(S)/TUBES  1984    for bleeding     C LIGATE FALLOPIAN TUBE,POSTPARTUM  1978    Tubal Ligation     COLONOSCOPY  9/13/2013    Procedure: COLONOSCOPY;  Colonoscopy;   Surgeon: Yanick Ramsey MD;  Location: [facility]     HC BIOPSY OF BREAST, OPEN INCISIONAL  1960    Benign     HC ERCP W SPHINCTEROTOMY  1996 6/2/96 and 8/30/96     HC REDUCTION OF LARGE BREAST  1988     HC REMOVAL GALLBLADDER  1995     HC UGI ENDOSCOPY DIAG W OR W/O BRUSH/WASH  7-     INJECT EPIDURAL LUMBAR N/A 8/10/2017    Procedure: INJECT EPIDURAL LUMBAR;  Lumbar 2-3 Epidural Steroid Injection;  Surgeon: Kimani Garcia MD;  [facility]     INJECT EPIDURAL LUMBAR Right 5/10/2018    Procedure: INJECT EPIDURAL LUMBAR;  right lumbar 2 - lumbar 3 epidural steroid injection;  Surgeon: Kimani Garcia MD;  Location: [facility]     ZZHC ANGIOGRAPHY ARCH  4-3-98     ZZHC COLONOSCOPY THRU STOMA, DIAGNOSTIC  4-24-98    Diverticuli - due in 2008       Medications:  acetaminophen (TYLENOL) 650 MG CR tablet, Take 1 tablet (650 mg) by mouth every 8 hours as needed for mild pain or fever  alcohol swab prep pads, Use to swab area of injection/geoffrey as directed.  ALLEGRA 180 MG PO TABS, 1 TABLET DAILY  amLODIPine (NORVASC) 5 MG tablet, Take 1 tablet (5 mg) by mouth daily  apixaban ANTICOAGULANT (ELIQUIS) 5 MG tablet, Take 1 tablet (5 mg) by mouth 2 times daily  atorvastatin (LIPITOR) 40 MG tablet, Take 1 tablet (40 mg) by mouth daily  blood glucose (RELION PRIME TEST) test strip, 1 strip by In Vitro route 2 times daily Use to test blood sugar 3 times daily or as directed.  blood glucose calibration (NO BRAND SPECIFIED) solution, To accompany: Blood Glucose Monitor Brands: per insurance.  blood glucose monitoring (NO BRAND SPECIFIED) meter device kit, Use to test blood sugar 3 times daily or as directed. Preferred blood glucose meter OR supplies to accompany: Blood Glucose Monitor Brands: per insurance.  Blood Glucose Monitoring Suppl W/DEVICE KIT, 1 kit 3 times daily.  co-enzyme Q-10 100 MG CAPS capsule, Take 100 mg by mouth daily  fish oil-omega-3 fatty acids (FISH OIL) 1000 MG capsule, Take 2 g by mouth daily. 2 caps per  day  Lancets (E-Z JECT LANCET MICRO-THIN 33G) MISC,   levothyroxine (SYNTHROID/LEVOTHROID) 88 MCG tablet, TAKE 1 TABLET (88 MCG) BY MOUTH DAILY.  lisinopril (ZESTRIL) 40 MG tablet, Take 1 tablet (40 mg) by mouth daily  metFORMIN (GLUCOPHAGE-XR) 500 MG 24 hr tablet, TAKE ONE TABLET BY MOUTH ONCE DAILY WITH DINNER  metoprolol tartrate (LOPRESSOR) 50 MG tablet, TAKE ONE TABLET BY MOUTH TWICE A DAY  NUTRITIONAL SUPPLEMENT OR, VIBE  Nutritional Supplements (NUTRITIONAL SUPPLEMENT PO), Take 1 oz by mouth daily Flex  thin (NO BRAND SPECIFIED) lancets, Use with lanceting device. To accompany: Blood Glucose Monitor Brands: per insurance.  UNABLE TO FIND, MEDICATION NAME: resvante supplement    No current facility-administered medications on file prior to visit.      Allergies   Allergen Reactions     Sulfa Drugs Rash       Social History     Occupational History     Occupation: Retired in 1994     Employer: RETIRED   Tobacco Use     Smoking status: Never Smoker     Smokeless tobacco: Never Used   Vaping Use     Vaping Use: Never used   Substance and Sexual Activity     Alcohol use: No     Drug use: No     Sexual activity: Not Currently     Partners: Male     Birth control/protection: Female Surgical       Family History   Problem Relation Age of Onset     Cardiovascular Father         Aortic aneurysm     Hypertension Father      Hypertension Mother      Gastrointestinal Disease Mother         GI Bleed     Lipids Sister      Hypertension Sister      Genitourinary Problems Sister      Allergies Sister      Cancer Brother         Lung     Alcohol/Drug Brother      Connective Tissue Disorder Son         Down Syndrome     Respiratory Son         Pneumonia     Cancer Maternal Grandmother         Stomach     Hypertension Maternal Grandfather      Allergies Daughter        REVIEW OF SYSTEMS  10 point review systems performed otherwise negative as noted as per history of present illness.    Physical Exam:  Vitals: /80   Ht  "1.651 m (5' 5\")   Wt 82.4 kg (181 lb 11.2 oz)   BMI 30.24 kg/m    BMI= Body mass index is 30.24 kg/m .  Constitutional: healthy, alert and no acute distress   Psychiatric: mentation appears normal and affect normal/bright  NEURO: no focal deficits  RESP: Normal with easy respirations and no use of accessory muscles to breathe, no audible wheezing or retractions  CV: bilateral lower extremity:   no edema           SKIN: No erythema, rashes, excoriation, or breakdown. No evidence of infection.   JOINT/EXTREMITIES:right hip/low back: Tenderness along the SI region.  No palpable masses.  Negative straight leg although tightness with the hamstrings.  She has no pain with active and passive range of motion of the hip including flexion internal and external rotation.  Left knee shows some well-healed superficial abrasion.  Small effusion.  Active motion is approximately 7-110 degrees.  Passively does not tolerate much more.  Moderate effusion.  No erythema.  No instability with varus and valgus testing     GAIT: not tested     Diagnostic Modalities:  right hip X-ray: No fracture, dislocation and or lesion. Normal alignment.  Joint space maintained no significant arthritis. No appreciable soft tissue abnormality.     Left knee x-rays: Show some degenerative changes with narrowing.  No occult fractures  Independent visualization of the images was performed.      Impression: right sacroiliitis?  Status post fall    Left knee osteoarthritis    Plan:  All of the above pertinent physical exam and imaging modalities findings was reviewed with Ni and her .    Given the recent fall and significant exacerbation of her pain I recommend MRI of her pelvis to rule out occult fracture.  Recommend protected weightbearing with a walker.  -if negative for fracture could consider SI joint steroid injection    Left knee-plan to proceed with injection.  She had no immediate pain.  Pain just recently started.  She is had abnormal " gait mechanics related to her low back pain.  Certainly think this could have exacerbated it.    The patient was counseled about an  injection, including discussion of risks (including infection), contents of the injection, rationale for performing the injection, and expected benefits of the injection. The skin was prepped with alcohol and betadine and then utilizing sterile technique an injection of the left knee joint from the anterolateral approach in the seated position was performed. The injection consisted 1ml of Kenalog (40mg per 1 ml) mixed with 3ml of 0.5% Marcaine. The patient tolerated the injection well, and there were no complications. The injection site was covered with a Band-Aid. The injection was performed by Ming Voss, APRN, CNP, DNP    If any concerns for infection seek immediate medical evaluation either in the clinic or the ED.         Return to clinic to discuss test results, or sooner as needed for changes.  Re-x-ray on return: No    David Benitez D.O.      Again, thank you for allowing me to participate in the care of your patient.        Sincerely,        Clinton Benitez, DO

## 2021-10-07 ENCOUNTER — HOSPITAL ENCOUNTER (OUTPATIENT)
Dept: MRI IMAGING | Facility: CLINIC | Age: 81
Discharge: HOME | End: 2021-10-07
Attending: ORTHOPAEDIC SURGERY | Admitting: ORTHOPAEDIC SURGERY
Payer: COMMERCIAL

## 2021-10-07 DIAGNOSIS — M25.551 HIP PAIN, RIGHT: ICD-10-CM

## 2021-10-07 PROCEDURE — 72195 MRI PELVIS W/O DYE: CPT

## 2021-10-07 PROCEDURE — 72195 MRI PELVIS W/O DYE: CPT | Mod: 26 | Performed by: RADIOLOGY

## 2021-10-13 ENCOUNTER — OFFICE VISIT (OUTPATIENT)
Dept: ORTHOPEDICS | Facility: CLINIC | Age: 81
End: 2021-10-13
Payer: COMMERCIAL

## 2021-10-13 VITALS
WEIGHT: 181.7 LBS | BODY MASS INDEX: 30.27 KG/M2 | SYSTOLIC BLOOD PRESSURE: 130 MMHG | HEIGHT: 65 IN | DIASTOLIC BLOOD PRESSURE: 86 MMHG

## 2021-10-13 DIAGNOSIS — M46.1 SACROILIITIS (H): Primary | ICD-10-CM

## 2021-10-13 PROCEDURE — 99213 OFFICE O/P EST LOW 20 MIN: CPT | Performed by: ORTHOPAEDIC SURGERY

## 2021-10-13 ASSESSMENT — MIFFLIN-ST. JEOR: SCORE: 1280.07

## 2021-10-13 ASSESSMENT — PAIN SCALES - GENERAL: PAINLEVEL: MODERATE PAIN (5)

## 2021-10-13 NOTE — PROGRESS NOTES
"Sacroiliitis office Visit-Follow up    Chief Complaint: Ni Maya is a 81 year old female who is being seen for   Chief Complaint   Patient presents with     RECHECK     mri results of right hip       History of Present Illness:   Today's visit:  Returns for the MRI of her right hip.      October 6, 2021 visit:  Ni Maya is a 81 year old female who is seen in consultation at the request of Dr. Andrews for evaluation of right hip pain.     Reports a fall about 4 weeks. Has had increasing lateral hip pain since then. Presented to the ED recently. Epic notes reviewed. No fracture was identified and recommended to follow-up with orthopedics.   She reports chronic low back pain for many years.  Since the fall it feels like it has been worse.  Nonradiating.  Better with rest, has pain with weightbearing.  But more so bothering her now is her left knee.  She reports with the fall she had an abrasion.  However no significant pain.  Yesterday started having more and pain in the knee causing her to limp.       Social History     Occupational History     Occupation: Retired in 1994     Employer: RETIRED   Tobacco Use     Smoking status: Never Smoker     Smokeless tobacco: Never Used   Vaping Use     Vaping Use: Never used   Substance and Sexual Activity     Alcohol use: No     Drug use: No     Sexual activity: Not Currently     Partners: Male     Birth control/protection: Female Surgical         Physical Exam:  Vitals: /86 (BP Location: Right arm, Patient Position: Sitting, Cuff Size: Adult Regular)   Ht 1.651 m (5' 5\")   Wt 82.4 kg (181 lb 11.2 oz)   BMI 30.24 kg/m    BMI= Body mass index is 30.24 kg/m .  Constitutional: healthy, alert and no acute distress   Psychiatric: mentation appears normal and affect normal/bright  NEURO: no focal deficits  RESP: Normal with easy respirations and no use of accessory muscles to breathe, no audible wheezing or retractions  CV: RLE: no edema           SKIN: No " erythema, rashes, excoriation, or breakdown. No evidence of infection.   JOINT/EXTREMITIES:right hip: Point tenderness over the SI area  GAIT: not tested             Diagnostic Modalities:  pelvis MRI:    1. No occult fracture.  2. Right greater than left bilateral hip degenerative changes with  area of subchondral osseous of multi suggesting full-thickness  chondral loss presence, greater on the right.  3. Partially visualized lumbar spine with advanced degenerative  changes.    Independent visualization of the images was performed.      Impression: right sacroiliitis    Plan:  All of the above pertinent physical exam and imaging modalities findings was reviewed with Ni and her .    We discussed her findings.  No occult fractures.  Recommend SI joint steroid injection by radiology under fluoroscopy.      Return to clinic 6, week(s), PRN, or sooner as needed for changes.  Re-x-ray on return: No    David Benitez D.O.

## 2021-10-13 NOTE — LETTER
"    10/13/2021         RE: Ni Maya  [address] [facility] [address]        Dear Colleague,    Thank you for referring your patient, Ni Maya, to the [facility]. Please see a copy of my visit note below.    Sacroiliitis office Visit-Follow up    Chief Complaint: Ni Maya is a 81 year old female who is being seen for   Chief Complaint   Patient presents with     RECHECK     mri results of right hip       History of Present Illness:   Today's visit:  Returns for the MRI of her right hip.      October 6, 2021 visit:  Ni Maya is a 81 year old female who is seen in consultation at the request of Dr. Andrews for evaluation of right hip pain.     Reports a fall about 4 weeks. Has had increasing lateral hip pain since then. Presented to the ED recently. Epic notes reviewed. No fracture was identified and recommended to follow-up with orthopedics.   She reports chronic low back pain for many years.  Since the fall it feels like it has been worse.  Nonradiating.  Better with rest, has pain with weightbearing.  But more so bothering her now is her left knee.  She reports with the fall she had an abrasion.  However no significant pain.  Yesterday started having more and pain in the knee causing her to limp.       Social History     Occupational History     Occupation: Retired in 1994     Employer: RETIRED   Tobacco Use     Smoking status: Never Smoker     Smokeless tobacco: Never Used   Vaping Use     Vaping Use: Never used   Substance and Sexual Activity     Alcohol use: No     Drug use: No     Sexual activity: Not Currently     Partners: Male     Birth control/protection: Female Surgical         Physical Exam:  Vitals: /86 (BP Location: Right arm, Patient Position: Sitting, Cuff Size: Adult Regular)   Ht 1.651 m (5' 5\")   Wt 82.4 kg (181 lb 11.2 oz)   BMI 30.24 kg/m    BMI= Body mass index is 30.24 kg/m .  Constitutional: healthy, alert and no acute " distress   Psychiatric: mentation appears normal and affect normal/bright  NEURO: no focal deficits  RESP: Normal with easy respirations and no use of accessory muscles to breathe, no audible wheezing or retractions  CV: RLE: no edema           SKIN: No erythema, rashes, excoriation, or breakdown. No evidence of infection.   JOINT/EXTREMITIES:right hip: Point tenderness over the SI area  GAIT: not tested             Diagnostic Modalities:  pelvis MRI:    1. No occult fracture.  2. Right greater than left bilateral hip degenerative changes with  area of subchondral osseous of multi suggesting full-thickness  chondral loss presence, greater on the right.  3. Partially visualized lumbar spine with advanced degenerative  changes.    Independent visualization of the images was performed.      Impression: right sacroiliitis    Plan:  All of the above pertinent physical exam and imaging modalities findings was reviewed with Ni and her .    We discussed her findings.  No occult fractures.  Recommend SI joint steroid injection by radiology under fluoroscopy.      Return to clinic 6, week(s), PRN, or sooner as needed for changes.  Re-x-ray on return: No    David Benitez D.O.            Again, thank you for allowing me to participate in the care of your patient.        Sincerely,        Clinton Benitez, DO

## 2021-10-14 DIAGNOSIS — Z11.59 ENCOUNTER FOR SCREENING FOR OTHER VIRAL DISEASES: ICD-10-CM

## 2021-10-16 ENCOUNTER — LAB (OUTPATIENT)
Dept: LAB | Facility: CLINIC | Age: 81
End: 2021-10-16
Payer: COMMERCIAL

## 2021-10-16 DIAGNOSIS — Z11.59 ENCOUNTER FOR SCREENING FOR OTHER VIRAL DISEASES: ICD-10-CM

## 2021-10-16 PROCEDURE — U0005 INFEC AGEN DETEC AMPLI PROBE: HCPCS

## 2021-10-16 PROCEDURE — U0003 INFECTIOUS AGENT DETECTION BY NUCLEIC ACID (DNA OR RNA); SEVERE ACUTE RESPIRATORY SYNDROME CORONAVIRUS 2 (SARS-COV-2) (CORONAVIRUS DISEASE [COVID-19]), AMPLIFIED PROBE TECHNIQUE, MAKING USE OF HIGH THROUGHPUT TECHNOLOGIES AS DESCRIBED BY CMS-2020-01-R: HCPCS

## 2021-10-17 LAB — SARS-COV-2 RNA RESP QL NAA+PROBE: NEGATIVE

## 2021-10-20 ENCOUNTER — HOSPITAL ENCOUNTER (OUTPATIENT)
Dept: GENERAL RADIOLOGY | Facility: CLINIC | Age: 81
Discharge: HOME | End: 2021-10-20
Attending: ORTHOPAEDIC SURGERY | Admitting: ORTHOPAEDIC SURGERY
Payer: COMMERCIAL

## 2021-10-20 DIAGNOSIS — M46.1 SACROILIITIS (H): ICD-10-CM

## 2021-10-20 PROCEDURE — 250N000009 HC RX 250: Performed by: RADIOLOGY

## 2021-10-20 PROCEDURE — 255N000002 HC RX 255 OP 636: Performed by: RADIOLOGY

## 2021-10-20 PROCEDURE — 27096 INJECT SACROILIAC JOINT: CPT | Mod: RT

## 2021-10-20 PROCEDURE — 250N000011 HC RX IP 250 OP 636: Performed by: RADIOLOGY

## 2021-10-20 RX ORDER — BUPIVACAINE HYDROCHLORIDE 2.5 MG/ML
10 INJECTION, SOLUTION EPIDURAL; INFILTRATION; INTRACAUDAL ONCE
Status: COMPLETED | OUTPATIENT
Start: 2021-10-20 | End: 2021-10-20

## 2021-10-20 RX ORDER — IOPAMIDOL 510 MG/ML
100 INJECTION, SOLUTION INTRAVASCULAR ONCE
Status: COMPLETED | OUTPATIENT
Start: 2021-10-20 | End: 2021-10-20

## 2021-10-20 RX ORDER — TRIAMCINOLONE ACETONIDE 40 MG/ML
40 INJECTION, SUSPENSION INTRA-ARTICULAR; INTRAMUSCULAR ONCE
Status: COMPLETED | OUTPATIENT
Start: 2021-10-20 | End: 2021-10-20

## 2021-10-20 RX ORDER — LIDOCAINE HYDROCHLORIDE 10 MG/ML
10 INJECTION, SOLUTION EPIDURAL; INFILTRATION; INTRACAUDAL; PERINEURAL ONCE
Status: COMPLETED | OUTPATIENT
Start: 2021-10-20 | End: 2021-10-20

## 2021-10-20 RX ADMIN — TRIAMCINOLONE ACETONIDE 40 MG: 40 INJECTION, SUSPENSION INTRA-ARTICULAR; INTRAMUSCULAR at 13:18

## 2021-10-20 RX ADMIN — IOPAMIDOL 0.2 ML: 510 INJECTION, SOLUTION INTRAVASCULAR at 13:18

## 2021-10-20 RX ADMIN — LIDOCAINE HYDROCHLORIDE 0.5 ML: 10 INJECTION, SOLUTION INFILTRATION; PERINEURAL at 13:16

## 2021-10-20 RX ADMIN — BUPIVACAINE HYDROCHLORIDE 1 ML: 2.5 INJECTION, SOLUTION EPIDURAL; INFILTRATION; INTRACAUDAL; PERINEURAL at 13:18

## 2021-10-20 NOTE — PROGRESS NOTES
Appropriate assistive devices provided during their visit. yes (Yes, No, N/A) none  (list device)    Exam table and/or cart  placed in the lowest position. yes (Yes, No, N/A)    Brakes on tables/carts/wheelchairs used at all times. yes (Yes, No, N/A)    Non slip footwear applied. na (Yes, No, NA)    Patient was accompanied by staff throughout visit. na (Yes, No, N/A)    Equipment safety straps used. na (Yes, No, N/A)    Assist with toileting. na (Yes, No, N/A)

## 2021-11-08 DIAGNOSIS — E11.65 TYPE 2 DIABETES MELLITUS WITH HYPERGLYCEMIA, WITHOUT LONG-TERM CURRENT USE OF INSULIN (H): ICD-10-CM

## 2021-11-08 DIAGNOSIS — N18.2 CHRONIC KIDNEY DISEASE, STAGE II (MILD): ICD-10-CM

## 2021-11-08 DIAGNOSIS — I10 ESSENTIAL HYPERTENSION WITH GOAL BLOOD PRESSURE LESS THAN 140/90: ICD-10-CM

## 2021-11-10 RX ORDER — LISINOPRIL 40 MG/1
TABLET ORAL
Qty: 90 TABLET | Refills: 2 | Status: SHIPPED | OUTPATIENT
Start: 2021-11-10 | End: 2022-06-27

## 2021-11-10 NOTE — TELEPHONE ENCOUNTER
Prescription approved per [facility] Refill Protocol.  Jesenia Bateman RN on 11/10/2021 at 3:43 PM

## 2021-11-17 ENCOUNTER — OFFICE VISIT (OUTPATIENT)
Dept: DERMATOLOGY | Facility: CLINIC | Age: 81
End: 2021-11-17
Payer: COMMERCIAL

## 2021-11-17 DIAGNOSIS — Z85.828 HISTORY OF NONMELANOMA SKIN CANCER: Primary | ICD-10-CM

## 2021-11-17 DIAGNOSIS — L82.1 SEBORRHEIC KERATOSIS: ICD-10-CM

## 2021-11-17 DIAGNOSIS — I87.8 VENOUS STASIS OF BOTH LOWER EXTREMITIES: ICD-10-CM

## 2021-11-17 DIAGNOSIS — D22.9 MULTIPLE BENIGN NEVI: ICD-10-CM

## 2021-11-17 DIAGNOSIS — L57.8 SUN-DAMAGED SKIN: ICD-10-CM

## 2021-11-17 DIAGNOSIS — L81.4 SOLAR LENTIGO: ICD-10-CM

## 2021-11-17 DIAGNOSIS — D18.01 CHERRY ANGIOMA: ICD-10-CM

## 2021-11-17 PROCEDURE — 99213 OFFICE O/P EST LOW 20 MIN: CPT | Performed by: DERMATOLOGY

## 2021-11-17 NOTE — NURSING NOTE
Ni Maya's goals for this visit include:   Chief Complaint   Patient presents with     Derm Problem     skin check, hx of skin cancer, no areas of concern       She requests these members of her care team be copied on today's visit information: no    PCP: Apryl Olvera    Referring Provider:  Referred Self, MD  No address on file    There were no vitals taken for this visit.    Do you need any medication refills at today's visit? No    Bernarda Crabtree LPN

## 2021-11-17 NOTE — PROGRESS NOTES
"[facility] Dermatology Note  Encounter Date: Nov 17, 2021  Office Visit     Dermatology Problem List:  Last skin check 11/17/21, recommended next in 6 months  1. \"Precancerous\" lesion removed from the vagina per patient report. ?MICKIE III (documented in chart)  -s/p removal, patient thought done in [place] but no surg path reports available   2. 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 There was some brown pigment at edges of biopsy noted 8/4/2020.     Social History: Retired. Lives with . Has son with down syndrome that lives in longterm. Daughter lives in [place].  Family History: Negative for skin cancer.  ____________________________________________    Assessment & Plan:    # History of NMSC. No evidence of recurrence.   - Recommend sunscreens SPF #30 or greater, protective clothing and avoidance of tanning beds.   - Recommended next skin exam in 6 months.  - Wants to avoid further skin cancer surgeries on the scalp.    # Sun damaged skin with solar lentigines: Chronic, stable.  - Recommend sunscreens SPF #30 or greater, protective clothing and avoidance of tanning beds.    # Benign skin findings including: seborrheic keratoses, cherry angioma - minor, self limited problem  - No further intervention required. Patient to report changes.   - Patient reassured of the benign nature of these lesions.    # Multiple clinically benign nevi: Chronic, stable  - No further intervention required. Patient to report changes.   - Patient reassured of the benign nature of these lesions.    # Venous stasis, bilateral lower legs.  - Patient reassured of the benign nature of " these lesions.    Procedures Performed:   None.    Follow-up: 6 months in-person for skin check, or earlier for new or changing lesions.    Staff and Scribe:     Scribe Disclosure:   I, Misti Adams, am serving as a scribe to document services personally performed by this physician, Dr. Bre Grover, based on data collection and the provider's statements to me.     Provider Disclosure:   The documentation recorded by the scribe accurately reflects the services I personally performed and the decisions made by me.    Bre Grover MD    Department of Dermatology  [facility]: Phone: [phone], Fax:[phone]  [facility]: Phone: [phone], Fax: [phone]    ____________________________________________    CC: Derm Problem (skin check, hx of skin cancer, no areas of concern)    HPI:  Ms. Ni Maya is a(n) 81 year old female who presents today as a return patient for skin check.    Last skin check was 2/9/21. Biopsies determined BCCs on the vertex scalp and frontal scalp.    These have since undergone MMS by Dr. Hardin on 3/17/21.    Today, patient notes no areas of concern.    Patient is otherwise feeling well, without additional skin concerns.    Labs Reviewed:  N/A    Physical Exam:  Vitals: There were no vitals taken for this visit.  SKIN: Total skin, which includes the head/face, both arms, chest, back, abdomen, both legs, buttocks, digits and/or nails, was examined.  - Franks Type II  - Scattered brown macules on sun exposed areas.  - There are dome shaped bright red papules on the trunk.   - Multiple regular brown pigmented macules and papules are identified on the trunk and extremities.   - There are waxy stuck on tan to brown papules on the trunk.  - Well healed scars in areas of past NMSC.  - Scattered hyperpigmented copper brown macules on the bilateral  lower legs. Consistent with venous stasis.  - No other lesions of concern on areas examined.     Medications:  Current Outpatient Medications   Medication     alcohol swab prep pads     ALLEGRA 180 MG PO TABS     amLODIPine (NORVASC) 5 MG tablet     apixaban ANTICOAGULANT (ELIQUIS) 5 MG tablet     atorvastatin (LIPITOR) 40 MG tablet     blood glucose (RELION PRIME TEST) test strip     blood glucose calibration (NO BRAND SPECIFIED) solution     blood glucose monitoring (NO BRAND SPECIFIED) meter device kit     Blood Glucose Monitoring Suppl W/DEVICE KIT     co-enzyme Q-10 100 MG CAPS capsule     fish oil-omega-3 fatty acids (FISH OIL) 1000 MG capsule     Lancets (E-Z JECT LANCET MICRO-THIN 33G) MISC     levothyroxine (SYNTHROID/LEVOTHROID) 88 MCG tablet     lisinopril (ZESTRIL) 40 MG tablet     metFORMIN (GLUCOPHAGE-XR) 500 MG 24 hr tablet     thin (NO BRAND SPECIFIED) lancets     acetaminophen (TYLENOL) 650 MG CR tablet     metoprolol tartrate (LOPRESSOR) 50 MG tablet     NUTRITIONAL SUPPLEMENT OR     Nutritional Supplements (NUTRITIONAL SUPPLEMENT PO)     UNABLE TO FIND     No current facility-administered medications for this visit.      Past Medical History:   Patient Active Problem List   Diagnosis     Hypothyroidism     Allergic rhinitis     Symptomatic menopausal or female climacteric states     Chronic kidney disease, stage II (mild)     Edema     Obesity     Urethral stricture     MICKIE III (vulvar intraepithelial neoplasia III)     Vulval lesion     Health Care Home     Type 2 diabetes, HbA1C goal < 8% (H)     Hyperlipidemia with target LDL less than 100     HTN, goal below 150/90     ACP (advance care planning)     Essential hypertension with goal blood pressure less than 150/90     Type 2 diabetes mellitus with hyperglycemia, without long-term current use of insulin (H)     Cellulitis of toe of right foot     Chronic atrial fibrillation (H)     Thrombocytopenia (H)     Acute gout involving toe of right foot      Lumbar radiculopathy     Cerebral infarction, unspecified mechanism (H)     Long term current use of anticoagulant therapy     Past Medical History:   Diagnosis Date     Allergic rhinitis, cause unspecified      Essential hypertension, benign      Infection of kidney, unspecified 1999     Other specified acquired hypothyroidism      Other specified types of cystitis(595.89)     1999,6-2-01, 10-10-01        CC Referred Self, MD  No address on file on close of this encounter.

## 2021-11-17 NOTE — LETTER
"    11/17/2021         RE: Ni Maya  [address] [facility] [address]        Dear Colleague,    Thank you for referring your patient, Ni Maya, to the [facility]. Please see a copy of my visit note below.    [facility] Dermatology Note  Encounter Date: Nov 17, 2021  Office Visit     Dermatology Problem List:  Last skin check 11/17/21, recommended next in 6 months  1. \"Precancerous\" lesion removed from the vagina per patient report. ?MICKIE III (documented in chart)  -s/p removal, patient thought done in [place] but no surg path reports available   2. 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 There was some brown pigment at edges of biopsy noted 8/4/2020.     Social History: Retired. Lives with . Has son with down syndrome that lives in [place]. Daughter lives in [place].  Family History: Negative for skin cancer.  ____________________________________________    Assessment & Plan:    # History of NMSC. No evidence of recurrence.   - Recommend sunscreens SPF #30 or greater, protective clothing and avoidance of tanning beds.   - Recommended next skin exam in 6 months.  - Wants to avoid further skin cancer surgeries on the scalp.    # Sun damaged skin with solar lentigines: Chronic, stable.  - Recommend sunscreens SPF #30 or greater, protective clothing and avoidance of tanning beds.    # Benign skin findings including: seborrheic keratoses, cherry angioma - minor, self limited problem  - No further intervention required. Patient to report changes.   - Patient reassured of the benign nature of these lesions.    # " Multiple clinically benign nevi: Chronic, stable  - No further intervention required. Patient to report changes.   - Patient reassured of the benign nature of these lesions.    # Venous stasis, bilateral lower legs.  - Patient reassured of the benign nature of these lesions.    Procedures Performed:   None.    Follow-up: 6 months in-person for skin check, or earlier for new or changing lesions.    Staff and Scribe:     Scribe Disclosure:   I, Misti Adams, am serving as a scribe to document services personally performed by this physician, Dr. Bre Grover, based on data collection and the provider's statements to me.     Provider Disclosure:   The documentation recorded by the scribe accurately reflects the services I personally performed and the decisions made by me.    Bre Grover MD    Department of Dermatology  [facility]: Phone: [phone], Fax:[phone]  [facility]: Phone: [phone], Fax: [phone]    ____________________________________________    CC: Derm Problem (skin check, hx of skin cancer, no areas of concern)    HPI:  Ms. Ni Maya is a(n) 81 year old female who presents today as a return patient for skin check.    Last skin check was 2/9/21. Biopsies determined BCCs on the vertex scalp and frontal scalp.    These have since undergone MMS by Dr. Hardin on 3/17/21.    Today, patient notes no areas of concern.    Patient is otherwise feeling well, without additional skin concerns.    Labs Reviewed:  N/A    Physical Exam:  Vitals: There were no vitals taken for this visit.  SKIN: Total skin, which includes the head/face, both arms, chest, back, abdomen, both legs, buttocks, digits and/or nails, was examined.  - Franks Type II  - Scattered brown macules on sun exposed areas.  - There are dome shaped bright red papules on the trunk.   - Multiple  regular brown pigmented macules and papules are identified on the trunk and extremities.   - There are waxy stuck on tan to brown papules on the trunk.  - Well healed scars in areas of past NMSC.  - Scattered hyperpigmented copper brown macules on the bilateral lower legs. Consistent with venous stasis.  - No other lesions of concern on areas examined.     Medications:  Current Outpatient Medications   Medication     alcohol swab prep pads     ALLEGRA 180 MG PO TABS     amLODIPine (NORVASC) 5 MG tablet     apixaban ANTICOAGULANT (ELIQUIS) 5 MG tablet     atorvastatin (LIPITOR) 40 MG tablet     blood glucose (RELION PRIME TEST) test strip     blood glucose calibration (NO BRAND SPECIFIED) solution     blood glucose monitoring (NO BRAND SPECIFIED) meter device kit     Blood Glucose Monitoring Suppl W/DEVICE KIT     co-enzyme Q-10 100 MG CAPS capsule     fish oil-omega-3 fatty acids (FISH OIL) 1000 MG capsule     Lancets (E-Z JECT LANCET MICRO-THIN 33G) MISC     levothyroxine (SYNTHROID/LEVOTHROID) 88 MCG tablet     lisinopril (ZESTRIL) 40 MG tablet     metFORMIN (GLUCOPHAGE-XR) 500 MG 24 hr tablet     thin (NO BRAND SPECIFIED) lancets     acetaminophen (TYLENOL) 650 MG CR tablet     metoprolol tartrate (LOPRESSOR) 50 MG tablet     NUTRITIONAL SUPPLEMENT OR     Nutritional Supplements (NUTRITIONAL SUPPLEMENT PO)     UNABLE TO FIND     No current facility-administered medications for this visit.      Past Medical History:   Patient Active Problem List   Diagnosis     Hypothyroidism     Allergic rhinitis     Symptomatic menopausal or female climacteric states     Chronic kidney disease, stage II (mild)     Edema     Obesity     Urethral stricture     MICKIE III (vulvar intraepithelial neoplasia III)     Vulval lesion     Health Care Home     Type 2 diabetes, HbA1C goal < 8% (H)     Hyperlipidemia with target LDL less than 100     HTN, goal below 150/90     ACP (advance care planning)     Essential hypertension with goal  blood pressure less than 150/90     Type 2 diabetes mellitus with hyperglycemia, without long-term current use of insulin (H)     Cellulitis of toe of right foot     Chronic atrial fibrillation (H)     Thrombocytopenia (H)     Acute gout involving toe of right foot     Lumbar radiculopathy     Cerebral infarction, unspecified mechanism (H)     Long term current use of anticoagulant therapy     Past Medical History:   Diagnosis Date     Allergic rhinitis, cause unspecified      Essential hypertension, benign      Infection of kidney, unspecified 1999     Other specified acquired hypothyroidism      Other specified types of cystitis(595.89)     1999,6-2-01, 10-10-01        DERIAN Syed MD  No address on file on close of this encounter.       Again, thank you for allowing me to participate in the care of your patient.        Sincerely,        Bre Grover MD

## 2021-11-29 DIAGNOSIS — E03.9 HYPOTHYROIDISM, UNSPECIFIED TYPE: ICD-10-CM

## 2021-11-30 RX ORDER — METOPROLOL TARTRATE 50 MG
50 TABLET ORAL 2 TIMES DAILY
Qty: 180 TABLET | Refills: 1 | Status: SHIPPED | OUTPATIENT
Start: 2021-11-30 | End: 2022-05-25

## 2021-12-23 ENCOUNTER — OFFICE VISIT (OUTPATIENT)
Dept: FAMILY MEDICINE | Facility: OTHER | Age: 81
End: 2021-12-23
Payer: COMMERCIAL

## 2021-12-23 VITALS
BODY MASS INDEX: 29.82 KG/M2 | DIASTOLIC BLOOD PRESSURE: 82 MMHG | SYSTOLIC BLOOD PRESSURE: 138 MMHG | OXYGEN SATURATION: 98 % | HEART RATE: 92 BPM | RESPIRATION RATE: 18 BRPM | HEIGHT: 65 IN | TEMPERATURE: 97 F | WEIGHT: 179 LBS

## 2021-12-23 DIAGNOSIS — Z00.00 ENCOUNTER FOR MEDICARE ANNUAL WELLNESS EXAM: Primary | ICD-10-CM

## 2021-12-23 DIAGNOSIS — E78.5 HYPERLIPIDEMIA WITH TARGET LDL LESS THAN 100: ICD-10-CM

## 2021-12-23 DIAGNOSIS — D07.1 VIN III (VULVAR INTRAEPITHELIAL NEOPLASIA III): ICD-10-CM

## 2021-12-23 DIAGNOSIS — E11.65 TYPE 2 DIABETES MELLITUS WITH HYPERGLYCEMIA, WITHOUT LONG-TERM CURRENT USE OF INSULIN (H): ICD-10-CM

## 2021-12-23 DIAGNOSIS — N18.2 CHRONIC KIDNEY DISEASE, STAGE II (MILD): ICD-10-CM

## 2021-12-23 DIAGNOSIS — E03.9 HYPOTHYROIDISM, UNSPECIFIED TYPE: ICD-10-CM

## 2021-12-23 PROCEDURE — 90471 IMMUNIZATION ADMIN: CPT | Performed by: FAMILY MEDICINE

## 2021-12-23 PROCEDURE — 99397 PER PM REEVAL EST PAT 65+ YR: CPT | Mod: 25 | Performed by: FAMILY MEDICINE

## 2021-12-23 PROCEDURE — 90714 TD VACC NO PRESV 7 YRS+ IM: CPT | Performed by: FAMILY MEDICINE

## 2021-12-23 PROCEDURE — 99214 OFFICE O/P EST MOD 30 MIN: CPT | Mod: 25 | Performed by: FAMILY MEDICINE

## 2021-12-23 ASSESSMENT — ENCOUNTER SYMPTOMS
FREQUENCY: 0
CHILLS: 0
SORE THROAT: 0
SHORTNESS OF BREATH: 0
MYALGIAS: 0
ARTHRALGIAS: 0
HEMATURIA: 0
HEMATOCHEZIA: 0
BREAST MASS: 0
PALPITATIONS: 0
ABDOMINAL PAIN: 0
WEAKNESS: 0
DIARRHEA: 1
NERVOUS/ANXIOUS: 1
COUGH: 0
HEADACHES: 0
JOINT SWELLING: 0
DYSURIA: 0
FEVER: 0
NAUSEA: 0
HEARTBURN: 0
CONSTIPATION: 0
EYE PAIN: 0
DIZZINESS: 0
PARESTHESIAS: 0

## 2021-12-23 ASSESSMENT — PAIN SCALES - GENERAL: PAINLEVEL: NO PAIN (0)

## 2021-12-23 ASSESSMENT — MIFFLIN-ST. JEOR: SCORE: 1267.82

## 2021-12-23 ASSESSMENT — ACTIVITIES OF DAILY LIVING (ADL): CURRENT_FUNCTION: NO ASSISTANCE NEEDED

## 2021-12-23 NOTE — PATIENT INSTRUCTIONS
0410-2902 mg of calcium with vitamin D 400-600 IU daily. They should also be getting daily weight bearing exercise (walking works)    Patient Education   Personalized Prevention Plan  You are due for the preventive services outlined below.  Your care team is available to assist you in scheduling these services.  If you have already completed any of these items, please share that information with your care team to update in your medical record.  Health Maintenance Due   Topic Date Due     Zoster (Shingles) Vaccine (2 of 3) 06/11/2013       Exercise for a Healthier Heart  You may wonder how you can improve the health of your heart. If you re thinking about exercise, you re on the right track. You don t need to become an athlete. But you do need a certain amount of brisk exercise to help strengthen your heart. If you have been diagnosed with a heart condition, your healthcare provider may advise exercise to help stabilize your condition. To help make exercise a habit, choose safe, fun activities.      Exercise with a friend. When activity is fun, you're more likely to stick with it.   Before you start  Check with your healthcare provider before starting an exercise program. This is especially important if you have not been active for a while. It's also important if you have a long-term (chronic) health problem such as heart disease, diabetes, or obesity. Or if you are at high risk for having these problems.   Why exercise?  Exercising regularly offers many healthy rewards. It can help you do all of the following:     Improve your blood cholesterol level to help prevent further heart trouble    Lower your blood pressure to help prevent a stroke or heart attack    Control diabetes, or reduce your risk of getting this disease    Improve your heart and lung function    Reach and stay at a healthy weight    Make your muscles stronger so you can stay active    Prevent falls and fractures by slowing the loss of bone mass  (osteoporosis)    Manage stress better    Reduce your blood pressure    Improve your sense of self and your body image  Exercise tips      Ease into your routine. Set small goals. Then build on them. If you are not sure what your activity level should be, talk with your healthcare provider first before starting an exercise routine.    Exercise on most days. Aim for a total of 150 minutes (2 hours and 30 minutes) or more of moderate-intensity aerobic activity each week. Or 75 minutes (1 hour and 15 minutes) or more of vigorous-intensity aerobic activity each week. Or try for a combination of both. Moderate activity means that you breathe heavier and your heart rate increases but you can still talk. Think about doing 40 minutes of moderate exercise, 3 to 4 times a week. For best results, activity should last for about 40 minutes to lower blood pressure and cholesterol. It's OK to work up to the 40-minute period over time. Examples of moderate-intensity activity are walking 1 mile in 15 minutes. Or doing 30 to 45 minutes of yard work.    Step up your daily activity level.  Along with your exercise program, try being more active the whole day. Walk instead of drive. Or park further away so that you take more steps each day. Do more household tasks or yard work. You may not be able to meet the advised mount of physical activity. But doing some moderate- or vigorous-intensity aerobic activity can help reduce your risk for heart disease. Your healthcare provider can help you figure out what is best for you.    Choose 1 or more activities you enjoy.  Walking is one of the easiest things you can do. You can also try swimming, riding a bike, dancing, or taking an exercise class.    When to call your healthcare provider  Call your healthcare provider if you have any of these:     Chest pain or feel dizzy or lightheaded    Burning, tightness, pressure, or heaviness in your chest, neck, shoulders, back, or arms    Abnormal  shortness of breath    More joint or muscle pain    A very fast or irregular heartbeat (palpitations)  "[employer] last reviewed this educational content on 7/1/2019 2000-2021 [employer]. All rights reserved. This information is not intended as a substitute for professional medical care. Always follow your healthcare professional's instructions.          Urinary Incontinence, Female (Adult)   Urinary incontinence means loss of bladder control. This problem affects many women, especially as they get older. If you have incontinence, you may be embarrassed to ask for help. But know that this problem can be treated.   Types of Incontinence  There are different types of incontinence. Two of the main types are described here. You can have more than one type.     Stress incontinence. With this type, urine leaks when pressure (stress) is put on the bladder. This may happen when you cough, sneeze, or laugh. Stress incontinence most often occurs because the pelvic floor muscles that support the bladder and urethra are weak. This can happen after pregnancy and vaginal childbirth or a hysterectomy. It can also be due to excess body weight or hormone changes.    Urge incontinence (also called overactive bladder). With this type, a sudden urge to urinate is felt often. This may happen even though there may not be much urine in the bladder. The need to urinate often during the night is common. Urge incontinence most often occurs because of bladder spasms. This may be due to bladder irritation or infection. Damage to bladder nerves or pelvic muscles, constipation, and certain medicines can also lead to urge incontinence.  Treatment depends on the cause. Further evaluation is needed to find the type you have. This will likely include an exam and certain tests. Based on the results, you and your healthcare provider can then plan treatment. Until a diagnosis is made, the home care tips below can help ease symptoms.    Home care    Do pelvic floor muscle exercises, if they are prescribed. The pelvic floor muscles help support the bladder and urethra. Many women find that their symptoms improve when doing special exercises that strengthen these muscles. To do the exercises, contract the muscles you would use to stop your stream of urine. But do this when you re not urinating. Hold for 10 seconds, then relax. Repeat 10 to 20 times in a row, at least 3 times a day. Your healthcare provider may give you other instructions for how to do the exercises and how often.    Keep a bladder diary. This helps track how often and how much you urinate over a set period of time. Bring this diary with you to your next visit with the provider. The information can help your provider learn more about your bladder problem.    Lose weight, if advised to by your provider. Extra weight puts pressure on the bladder. Your provider can help you create a weight-loss plan that s right for you. This may include exercising more and making certain diet changes.    Don't have foods and drinks that may irritate the bladder. These can include alcohol and caffeinated drinks.    Quit smoking. Smoking and other tobacco use can lead to a long-term (chronic) cough that strains the pelvic floor muscles. Smoking may also damage the bladder and urethra. Talk with your provider about treatments or methods you can use to quit smoking.    If drinking large amounts of fluid makes you have symptoms, you may be advised to limit your fluid intake. You may also be advised to drink most of your fluids during the day and to limit fluids at night.    If you re worried about urine leakage or accidents, you may wear absorbent pads to catch urine. Change the pads often. This helps reduce discomfort. It may also reduce the risk of skin or bladder infections.    Follow-up care  Follow up with your healthcare provider, or as directed. It may take some to find the right treatment for your  problem. But healthy lifestyle changes can be made right away. These include such things as exercising on a regular basis, eating a healthy diet, losing weight (if needed), and quitting smoking. Your treatment plan may include special therapies or medicines. Certain procedures or surgery may also be options. Talk about any questions you have with your provider.   When to seek medical advice  Call the healthcare provider right away if any of these occur:    Fever of 100.4 F (38 C) or higher, or as directed by your provider    Bladder pain or fullness    Belly swelling    Nausea or vomiting    Back pain    Weakness, dizziness, or fainting  Christy last reviewed this educational content on 1/1/2020 2000-2021 [employer]. All rights reserved. This information is not intended as a substitute for professional medical care. Always follow your healthcare professional's instructions.          Preventing Falls at Home  A person can fall for many reasons. Older adults may fall because reaction time slows down as we age. Your muscles and joints may get stiff, weak, or less flexible because of illness, medicines, or a physical condition.   Other health problems that make falls more likely include:     Arthritis    Dizziness or lightheadedness when you stand up (orthostatic hypotension)    History of a stroke    Dizziness    Anemia    Certain medicines taken for mental illness or to control blood pressure.    Problems with balance or gait    Bladder or urinary problems    History of falling    Changes in vision (vision impairment)    Changes in thinking skills and memory (cognitive impairment)  Injuries from a fall can include serious injuries such as broken bones, dislocated joints, internal bleeding and cuts. Injuries like these can limit your independence.   Prevention tips  To help prevent falls and fall-related injuries, follow the tips below.    Floors  To make floors safer:     Put nonskid pads under area  rugs.    Remove small rugs.    Replace worn floor coverings.    Tack carpets firmly to each step on carpeted stairs. Put nonskid strips on the edges of uncarpeted stairs.    Keep floors and stairs free of clutter and cords.    Arrange furniture so there are clear pathways.    Clean up any spills right away.  Bathrooms    To make bathrooms safer:     Install grab bars in the tub or shower.    Apply nonskid strips or put a nonskid rubber mat in the tub or shower.    Sit on a bath chair to bathe.    Use bathmats with nonskid backing.  Lighting  To improve visibility in your home:      Keep a flashlight in each room. Or put a lamp next to the bed within easy reach.    Put nightlights in the bedrooms, hallways, kitchen, and bathrooms.    Make sure all stairways have good lighting.    Take your time when going up and down stairs.    Put handrails on both sides of stairs and in walkways for more support. To prevent injury to your wrist or arm, don t use handrails to pull yourself up.    Install grab bars to pull yourself up.    Move or rearrange items that you use often. This will make them easier to find or reach.    Look at your home to find any safety hazards. Especially look at doorways, walkways, and the driveway. Remove or repair any safety problems that you find.  Other changes to make    Look around to find any safety hazards. Look closely at doorways, walkways, and the driveway. Remove or repair any safety problems that you find.    Wear shoes that fit well.    Take your time when going up and down stairs.    Put handrails on both sides of stairs and in walkways for more support. To prevent injury to your wrist or arm, don t use handrails to pull yourself up.    Install grab bars wherever needed to pull yourself up.    Arrange items that you use often. This will make them easier to find or reach.    Christy last reviewed this educational content on 3/1/2020    6223-7594 [employer] All rights  reserved. This information is not intended as a substitute for professional medical care. Always follow your healthcare professional's instructions.

## 2021-12-23 NOTE — PROGRESS NOTES
Prior to immunization administration, verified patients identity using patient s name and date of birth. Please see Immunization Activity for additional information.     Screening Questionnaire for Adult Immunization    Are you sick today?   No   Do you have allergies to medications, food, a vaccine component or latex?   No   Have you ever had a serious reaction after receiving a vaccination?   No   Do you have a long-term health problem with heart, lung, kidney, or metabolic disease (e.g., diabetes), asthma, a blood disorder, no spleen, complement component deficiency, a cochlear implant, or a spinal fluid leak?  Are you on long-term aspirin therapy?   No   Do you have cancer, leukemia, HIV/AIDS, or any other immune system problem?   No   Do you have a parent, brother, or sister with an immune system problem?   No   In the past 3 months, have you taken medications that affect  your immune system, such as prednisone, other steroids, or anticancer drugs; drugs for the treatment of rheumatoid arthritis, Crohn s disease, or psoriasis; or have you had radiation treatments?   No   Have you had a seizure, or a brain or other nervous system problem?   No   During the past year, have you received a transfusion of blood or blood    products, or been given immune (gamma) globulin or antiviral drug?   No   For women: Are you pregnant or is there a chance you could become       pregnant during the next month?   No   Have you received any vaccinations in the past 4 weeks?   No     Immunization questionnaire answers were all negative.        Per orders of Dr. Olvera , injection of TD given by Sandy Pavon MA. Patient instructed to remain in clinic for 15 minutes afterwards, and to report any adverse reaction to me immediately.       Screening performed by Sandy Pavon MA on 12/23/2021 at 10:58 AM.

## 2021-12-23 NOTE — PROGRESS NOTES
"SUBJECTIVE:   Ni Maya is a 81 year old female who presents for Preventive Visit.      Patient has been advised of split billing requirements and indicates understanding: YES   Are you in the first 12 months of your Medicare coverage?  No    Healthy Habits:     In general, how would you rate your overall health?  Good    Frequency of exercise:  None    Do you usually eat at least 4 servings of fruit and vegetables a day, include whole grains    & fiber and avoid regularly eating high fat or \"junk\" foods?  Yes    Taking medications regularly:  Yes    Medication side effects:  None    Ability to successfully perform activities of daily living:  No assistance needed    Home Safety:  No safety concerns identified    Hearing Impairment:  No hearing concerns    In the past 6 months, have you been bothered by leaking of urine? Yes    In general, how would you rate your overall mental or emotional health?  Good      PHQ-2 Total Score: 0    Additional concerns today:  No    Do you feel safe in your environment? Yes    Have you ever done Advance Care Planning? (For example, a Health Directive, POLST, or a discussion with a medical provider or your loved ones about your wishes): Yes, advance care planning is on file.       Fall risk  Fallen 2 or more times in the past year?: No  Any fall with injury in the past year?: Yes  Timed Up and Go Test (>13.5 is fall risk; contact physician) : 12    Cognitive Screening   1) Repeat 3 items (Leader, Season, Table)   2) Clock draw: NORMAL  3) 3 item recall: Recalls 3 objects  Results: 3 items recalled: COGNITIVE IMPAIRMENT LESS LIKELY    Mini-CogTM Copyright MELISSA Yarbrough. Licensed by the author for use in [facility]; reprinted with permission (chriss@.[facility]). All rights reserved.          Reviewed and updated as needed this visit by clinical staff  Tobacco  Allergies  Meds  Problems  Med Hx  Surg Hx  Fam Hx  Soc Hx         Reviewed and updated as needed this visit " "by Provider  Tobacco  Allergies  Meds  Problems  Med Hx  Surg Hx  Fam Hx        Social History     Tobacco Use     Smoking status: Never Smoker     Smokeless tobacco: Never Used   Substance Use Topics     Alcohol use: No         Alcohol Use 12/23/2021   Prescreen: >3 drinks/day or >7 drinks/week? Not Applicable   Prescreen: >3 drinks/day or >7 drinks/week? -               Current providers sharing in care for this patient include:   Patient Care Team:  Apryl Olvera MD as PCP - General (Family Practice)  Sohan Deleon MD as Assigned Heart and Vascular Provider  Zach Hardin MD as Assigned Surgical Provider  Apryl Olvera MD as Assigned PCP  Clinton Benitez DO as Assigned Musculoskeletal Provider    The following health maintenance items are reviewed in Epic and correct as of today:  Health Maintenance Due   Topic Date Due     ZOSTER IMMUNIZATION (2 of 3) 06/11/2013       Pertinent mammograms are reviewed under the imaging tab.    Review of Systems  Constitutional, HEENT, cardiovascular, pulmonary, GI, , musculoskeletal, neuro, skin, endocrine and psych systems are negative, except as otherwise noted.    OBJECTIVE:   /82   Pulse 92   Temp 97  F (36.1  C) (Temporal)   Resp 18   Ht 1.651 m (5' 5\")   Wt 81.2 kg (179 lb)   SpO2 98%   BMI 29.79 kg/m   Estimated body mass index is 29.79 kg/m  as calculated from the following:    Height as of this encounter: 1.651 m (5' 5\").    Weight as of this encounter: 81.2 kg (179 lb).  Physical Exam  GENERAL: healthy, alert and no distress  RESP: lungs clear to auscultation - no rales, rhonchi or wheezes  CV: regular rate and rhythm, normal S1 S2, no S3 or S4, no murmur, click or rub, no peripheral edema and peripheral pulses strong  ABDOMEN: soft, nontender, no hepatosplenomegaly, no masses and bowel sounds normal  MS: no gross musculoskeletal defects noted, no edema  SKIN: no suspicious lesions or rashes  NEURO: Normal strength and tone, " "mentation intact and speech normal  PSYCH: mentation appears normal, affect normal/bright  Declined gyn exam      ASSESSMENT / PLAN:       ICD-10-CM    1. 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 Chronic kidney disease, stage II (mild)  N18.2    3. Hyperlipidemia with target LDL less than 100  E78.5    4. Type 2 diabetes mellitus with hyperglycemia, without long-term current use of insulin (H)  E11.65    5. Encounter for Medicare annual wellness exam  Z00.00      Patient with history of MICKIE which has been stable and she declines exam today.  Her type 2 diabetes and her chronic kidney disease are both stable and medications are refilled.  She does mention some loose stools since starting Eliquis though this is typically manageable and is aware that the alternative forms of medications that can be used have other pros and cons and is continuing to use the Eliquis at this time.    Patient has been advised of split billing requirements and indicates understanding: Yes  COUNSELING:  Reviewed preventive health counseling, as reflected in patient instructions       Regular exercise       Healthy diet/nutrition    Estimated body mass index is 29.79 kg/m  as calculated from the following:    Height as of this encounter: 1.651 m (5' 5\").    Weight as of this encounter: 81.2 kg (179 lb).    Weight management plan: Discussed healthy diet and exercise guidelines    She reports that she has never smoked. She has never used smokeless tobacco.      Appropriate preventive services were discussed with this patient, including applicable screening as appropriate for cardiovascular disease, diabetes, osteopenia/osteoporosis, and glaucoma.  As appropriate for age/gender, discussed screening for colorectal cancer, prostate cancer, breast cancer, and cervical cancer. Checklist reviewing preventive services available has been given to the patient.    Reviewed patients plan of care and provided an AVS. The Basic Care Plan " (routine screening as documented in Health Maintenance) for Ni meets the Care Plan requirement. This Care Plan has been established and reviewed with the Patient.    Counseling Resources:  ATP IV Guidelines  Pooled Cohorts Equation Calculator  Breast Cancer Risk Calculator  Breast Cancer: Medication to Reduce Risk  FRAX Risk Assessment  ICSI Preventive Guidelines  Dietary Guidelines for Americans, 2010  USDA's MyPlate  ASA Prophylaxis  Lung CA Screening    Apryl Olvera MD, MD  [facility]    Identified Health Risks:

## 2021-12-24 NOTE — TELEPHONE ENCOUNTER
Pending Prescriptions:                       Disp   Refills    levothyroxine (SYNTHROID/LEVOTHROID) 88 MC*90 tab*0        Sig: TAKE 1 TABLET (88 MCG) BY MOUTH DAILY.    Routing refill request to provider for review/approval because:  Labs out of range:    TSH   Date Value Ref Range Status   08/11/2021 4.07 (H) 0.40 - 4.00 mU/L Final   07/22/2020 2.75 0.40 - 4.00 mU/L Final

## 2021-12-27 RX ORDER — LEVOTHYROXINE SODIUM 88 UG/1
88 TABLET ORAL DAILY
Qty: 90 TABLET | Refills: 0 | Status: SHIPPED | OUTPATIENT
Start: 2021-12-27 | End: 2022-03-28

## 2022-02-03 ENCOUNTER — OFFICE VISIT (OUTPATIENT)
Dept: CARDIOLOGY | Facility: CLINIC | Age: 82
End: 2022-02-03
Payer: COMMERCIAL

## 2022-02-03 VITALS
DIASTOLIC BLOOD PRESSURE: 82 MMHG | HEART RATE: 88 BPM | BODY MASS INDEX: 30.21 KG/M2 | HEIGHT: 65 IN | OXYGEN SATURATION: 97 % | WEIGHT: 181.3 LBS | SYSTOLIC BLOOD PRESSURE: 126 MMHG

## 2022-02-03 DIAGNOSIS — I48.20 CHRONIC ATRIAL FIBRILLATION (H): Primary | ICD-10-CM

## 2022-02-03 PROCEDURE — 99213 OFFICE O/P EST LOW 20 MIN: CPT | Performed by: INTERNAL MEDICINE

## 2022-02-03 ASSESSMENT — MIFFLIN-ST. JEOR: SCORE: 1278.25

## 2022-02-03 NOTE — PROGRESS NOTES
HPI and Plan:   Ms. Maya is a very pleasant 81-year-old female with history of chronic atrial fibrillation, essentially asymptomatic from it, on rate control strategy on Eliquis for stroke prophylaxis with CHADS2-VASc score of 7.  She has history of stroke (cerebellar stroke back in MRI in 2019).  Today she is coming for routine follow-up.  She had an echocardiogram done last year that showed normal LV function, mild mitral regurgitation.  Overall healthwise patient tells me she feels well.  As a aftereffect of that stroke she still finds sometimes word finding difficulty and balance issue.  Fortunately no falls.  She is tolerating apixaban quite well.  She is on metoprolol.  Ventricular rates are quite well controlled.  She denies any palpitation dyspnea on exertion or chest discomfort dizziness presyncope syncope.    Assessment and plan  A very pleasant 81-year-old female with chronic atrial fibrillation, elevated chads 2 Vascor on apixaban for stroke prophylaxis on rate control strategy on metoprolol, essentially asymptomatic from atrial fibrillation with normal LV function.  Overall cardiac status wise she is doing well.  Cardiac auscultation was benign today, irregular heartbeat consistent with known chronic atrial fibrillation and ventricular rates appear well controlled at rest.  If she continues to feel stable cardiocentesis we can see her back in 1 year, sooner if she notes any change in clinical status    Orders Placed This Encounter   Procedures     Follow-Up with Cardiology       No orders of the defined types were placed in this encounter.      There are no discontinued medications.      Encounter Diagnosis   Name Primary?     Chronic atrial fibrillation (H) Yes       CURRENT MEDICATIONS:  Current Outpatient Medications   Medication Sig Dispense Refill     alcohol swab prep pads Use to swab area of injection/geoffrey as directed. 100 each 3     ALLEGRA 180 MG PO TABS 1 TABLET DAILY 90 Tab 3      amLODIPine (NORVASC) 5 MG tablet Take 1 tablet (5 mg) by mouth daily 90 tablet 3     apixaban ANTICOAGULANT (ELIQUIS) 5 MG tablet Take 1 tablet (5 mg) by mouth 2 times daily 180 tablet 3     atorvastatin (LIPITOR) 40 MG tablet Take 1 tablet (40 mg) by mouth daily 90 tablet 3     blood glucose (RELION PRIME TEST) test strip 1 strip by In Vitro route 2 times daily Use to test blood sugar 3 times daily or as directed. 100 strip 1     blood glucose calibration (NO BRAND SPECIFIED) solution To accompany: Blood Glucose Monitor Brands: per insurance. 1 Bottle 3     blood glucose monitoring (NO BRAND SPECIFIED) meter device kit Use to test blood sugar 3 times daily or as directed. Preferred blood glucose meter OR supplies to accompany: Blood Glucose Monitor Brands: per insurance. 1 kit 0     Blood Glucose Monitoring Suppl W/DEVICE KIT 1 kit 3 times daily. 1 kit 0     co-enzyme Q-10 100 MG CAPS capsule Take 100 mg by mouth daily       fish oil-omega-3 fatty acids (FISH OIL) 1000 MG capsule Take 2 g by mouth daily. 2 caps per day       Lancets (E-Z JECT LANCET MICRO-THIN 33G) MISC        levothyroxine (SYNTHROID/LEVOTHROID) 88 MCG tablet TAKE 1 TABLET (88 MCG) BY MOUTH DAILY. 90 tablet 0     lisinopril (ZESTRIL) 40 MG tablet TAKE ONE TABLET BY MOUTH ONCE DAILY 90 tablet 2     metFORMIN (GLUCOPHAGE-XR) 500 MG 24 hr tablet TAKE ONE TABLET BY MOUTH ONCE DAILY WITH DINNER 90 tablet 1     metoprolol tartrate (LOPRESSOR) 50 MG tablet Take 1 tablet (50 mg) by mouth 2 times daily 180 tablet 1     thin (NO BRAND SPECIFIED) lancets Use with lanceting device. To accompany: Blood Glucose Monitor Brands: per insurance. 100 each 6     UNABLE TO FIND MEDICATION NAME: resvante supplement         ALLERGIES     Allergies   Allergen Reactions     Sulfa Drugs Rash       PAST MEDICAL HISTORY:  Past Medical History:   Diagnosis Date     Allergic rhinitis, cause unspecified      Essential hypertension, benign      Infection of kidney, unspecified  1999     Other specified acquired hypothyroidism      Other specified types of cystitis(595.89)     1999,6-2-01, 10-10-01       PAST SURGICAL HISTORY:  Past Surgical History:   Procedure Laterality Date     BIOPSY VULVA/PERINEUM,ADDNL LESN  9/18/09    Wide -excision of MICKIE III- right labia      COLONOSCOPY  9/13/2013    Procedure: COLONOSCOPY;  Colonoscopy;  Surgeon: Yanick Ramsey MD;  Location: [facility]     HC BIOPSY OF BREAST, OPEN INCISIONAL  1960    Benign     HC ERCP W SPHINCTEROTOMY  1996 6/2/96 and 8/30/96     HC REDUCTION OF LARGE BREAST  1988     HC REMOVAL GALLBLADDER  1995     HC UGI ENDOSCOPY DIAG W OR W/O BRUSH/WASH  7-     INJECT EPIDURAL LUMBAR N/A 8/10/2017    Procedure: INJECT EPIDURAL LUMBAR;  Lumbar 2-3 Epidural Steroid Injection;  Surgeon: Kimani Garcia MD;  Location: [facility]     INJECT EPIDURAL LUMBAR Right 5/10/2018    Procedure: INJECT EPIDURAL LUMBAR;  right lumbar 2 - lumbar 3 epidural steroid injection;  Surgeon: Kimani Garcia MD;  Location: [facility]     ZZC LAPAROSCOPY, SURGICAL; W/ VAGINAL HYSTERECTOMY W/WO REMOVAL OVARY(S)/TUBES  1984    for bleeding     ZZ LIGATE FALLOPIAN TUBE,POSTPARTUM  1978    Tubal Ligation     ZZ ANGIOGRAPHY ARCH  4-3-98     ZZ COLONOSCOPY THRU STOMA, DIAGNOSTIC  4-24-98    Diverticuli - due in 2008       FAMILY HISTORY:  Family History   Problem Relation Age of Onset     Cardiovascular Father         Aortic aneurysm     Hypertension Father      Hypertension Mother      Gastrointestinal Disease Mother         GI Bleed     Lipids Sister      Hypertension Sister      Genitourinary Problems Sister      Allergies Sister      Cancer Brother         Lung     Alcohol/Drug Brother      Connective Tissue Disorder Son         Down Syndrome     Respiratory Son         Pneumonia     Cancer Maternal Grandmother         Stomach     Hypertension Maternal Grandfather      Allergies Daughter        SOCIAL HISTORY:  Social History     Socioeconomic History     Marital  "status:      Spouse name: Marco Antonio     Number of children: 2     Years of education: 17     Highest education level: None   Occupational History     Occupation: Retired in 1994     Employer: RETIRED   Tobacco Use     Smoking status: Never Smoker     Smokeless tobacco: Never Used   Vaping Use     Vaping Use: Never used   Substance and Sexual Activity     Alcohol use: No     Drug use: No     Sexual activity: Not Currently     Partners: Male     Birth control/protection: Female Surgical   Other Topics Concern     Parent/sibling w/ CABG, MI or angioplasty before 65F 55M? No   Social History Narrative     None     Social Determinants of Health     Financial Resource Strain: Not on file   Food Insecurity: Not on file   Transportation Needs: Not on file   Physical Activity: Not on file   Stress: Not on file   Social Connections: Not on file   Intimate Partner Violence: Not on file   Housing Stability: Not on file       Review of Systems:  Skin:          Eyes:  Positive for glasses    ENT:         Respiratory:  Positive for shortness of breath wearing mask   Cardiovascular:  Negative;palpitations;chest pain;edema;lightheadedness;dizziness;syncope or near-syncope      Gastroenterology:        Genitourinary:         Musculoskeletal:         Neurologic:  Negative numbness or tingling of hands;numbness or tingling of feet    Psychiatric:         Heme/Lymph/Imm:  Positive for allergies    Endocrine:           Physical Exam:  Vitals: /82 (BP Location: Right arm, Patient Position: Sitting, Cuff Size: Adult Regular)   Pulse 88   Ht 1.651 m (5' 5\")   Wt 82.2 kg (181 lb 4.8 oz)   SpO2 97%   BMI 30.17 kg/m      General patient appears comfortable  Neck normal JVP next cardiovascular system irregular, normal heart rate, no murmur rub or gallop  Respiratory system clear to auscultation  Extremities no edema  Neurological alert, oriented      CC  No referring provider defined for this encounter.                  "

## 2022-02-03 NOTE — LETTER
2/3/2022    Apryl Olvera MD, MD  [address] [facility] [address]    RE: Ni Gardineron       Dear Colleague,     I had the pleasure of seeing Ni Maya in the [facility].  HPI and Plan:   Ms. Maya is a very pleasant 81-year-old female with history of chronic atrial fibrillation, essentially asymptomatic from it, on rate control strategy on Eliquis for stroke prophylaxis with CHADS2-VASc score of 7.  She has history of stroke (cerebellar stroke back in MRI in 2019).  Today she is coming for routine follow-up.  She had an echocardiogram done last year that showed normal LV function, mild mitral regurgitation.  Overall healthwise patient tells me she feels well.  As a aftereffect of that stroke she still finds sometimes word finding difficulty and balance issue.  Fortunately no falls.  She is tolerating apixaban quite well.  She is on metoprolol.  Ventricular rates are quite well controlled.  She denies any palpitation dyspnea on exertion or chest discomfort dizziness presyncope syncope.    Assessment and plan  A very pleasant 81-year-old female with chronic atrial fibrillation, elevated chads 2 Vascor on apixaban for stroke prophylaxis on rate control strategy on metoprolol, essentially asymptomatic from atrial fibrillation with normal LV function.  Overall cardiac status wise she is doing well.  Cardiac auscultation was benign today, irregular heartbeat consistent with known chronic atrial fibrillation and ventricular rates appear well controlled at rest.  If she continues to feel stable cardiocentesis we can see her back in 1 year, sooner if she notes any change in clinical status    Orders Placed This Encounter   Procedures     Follow-Up with Cardiology       No orders of the defined types were placed in this encounter.      There are no discontinued medications.      Encounter Diagnosis   Name Primary?     Chronic atrial fibrillation (H) Yes       CURRENT MEDICATIONS:  Current  Outpatient Medications   Medication Sig Dispense Refill     alcohol swab prep pads Use to swab area of injection/geoffrey as directed. 100 each 3     ALLEGRA 180 MG PO TABS 1 TABLET DAILY 90 Tab 3     amLODIPine (NORVASC) 5 MG tablet Take 1 tablet (5 mg) by mouth daily 90 tablet 3     apixaban ANTICOAGULANT (ELIQUIS) 5 MG tablet Take 1 tablet (5 mg) by mouth 2 times daily 180 tablet 3     atorvastatin (LIPITOR) 40 MG tablet Take 1 tablet (40 mg) by mouth daily 90 tablet 3     blood glucose (RELION PRIME TEST) test strip 1 strip by In Vitro route 2 times daily Use to test blood sugar 3 times daily or as directed. 100 strip 1     blood glucose calibration (NO BRAND SPECIFIED) solution To accompany: Blood Glucose Monitor Brands: per insurance. 1 Bottle 3     blood glucose monitoring (NO BRAND SPECIFIED) meter device kit Use to test blood sugar 3 times daily or as directed. Preferred blood glucose meter OR supplies to accompany: Blood Glucose Monitor Brands: per insurance. 1 kit 0     Blood Glucose Monitoring Suppl W/DEVICE KIT 1 kit 3 times daily. 1 kit 0     co-enzyme Q-10 100 MG CAPS capsule Take 100 mg by mouth daily       fish oil-omega-3 fatty acids (FISH OIL) 1000 MG capsule Take 2 g by mouth daily. 2 caps per day       Lancets (E-Z JECT LANCET MICRO-THIN 33G) MISC        levothyroxine (SYNTHROID/LEVOTHROID) 88 MCG tablet TAKE 1 TABLET (88 MCG) BY MOUTH DAILY. 90 tablet 0     lisinopril (ZESTRIL) 40 MG tablet TAKE ONE TABLET BY MOUTH ONCE DAILY 90 tablet 2     metFORMIN (GLUCOPHAGE-XR) 500 MG 24 hr tablet TAKE ONE TABLET BY MOUTH ONCE DAILY WITH DINNER 90 tablet 1     metoprolol tartrate (LOPRESSOR) 50 MG tablet Take 1 tablet (50 mg) by mouth 2 times daily 180 tablet 1     thin (NO BRAND SPECIFIED) lancets Use with lanceting device. To accompany: Blood Glucose Monitor Brands: per insurance. 100 each 6     UNABLE TO FIND MEDICATION NAME: resvante supplement         ALLERGIES     Allergies   Allergen Reactions      Sulfa Drugs Rash       PAST MEDICAL HISTORY:  Past Medical History:   Diagnosis Date     Allergic rhinitis, cause unspecified      Essential hypertension, benign      Infection of kidney, unspecified 1999     Other specified acquired hypothyroidism      Other specified types of cystitis(595.89)     1999,6-2-01, 10-10-01       PAST SURGICAL HISTORY:  Past Surgical History:   Procedure Laterality Date     BIOPSY VULVA/PERINEUM,ADDNL LESN  9/18/09    Wide -excision of MICKIE III- right labia      COLONOSCOPY  9/13/2013    Procedure: COLONOSCOPY;  Colonoscopy;  Surgeon: Yanick Ramsey MD;  Location: [facility]     HC BIOPSY OF BREAST, OPEN INCISIONAL  1960    Benign     HC ERCP W SPHINCTEROTOMY  1996 6/2/96 and 8/30/96     HC REDUCTION OF LARGE BREAST  1988     HC REMOVAL GALLBLADDER  1995     HC UGI ENDOSCOPY DIAG W OR W/O BRUSH/WASH  7-     INJECT EPIDURAL LUMBAR N/A 8/10/2017    Procedure: INJECT EPIDURAL LUMBAR;  Lumbar 2-3 Epidural Steroid Injection;  Surgeon: Kimani Garcia MD;  [facility]     INJECT EPIDURAL LUMBAR Right 5/10/2018    Procedure: INJECT EPIDURAL LUMBAR;  right lumbar 2 - lumbar 3 epidural steroid injection;  Surgeon: Kimani Garcia MD;  Location[facility]     ZZC LAPAROSCOPY, SURGICAL; W/ VAGINAL HYSTERECTOMY W/WO REMOVAL OVARY(S)/TUBES  1984    for bleeding     Z LIGATE FALLOPIAN TUBE,POSTPARTUM  1978    Tubal Ligation     ZZ ANGIOGRAPHY ARCH  4-3-98     ZZHC COLONOSCOPY THRU STOMA, DIAGNOSTIC  4-24-98    Diverticuli - due in 2008       FAMILY HISTORY:  Family History   Problem Relation Age of Onset     Cardiovascular Father         Aortic aneurysm     Hypertension Father      Hypertension Mother      Gastrointestinal Disease Mother         GI Bleed     Lipids Sister      Hypertension Sister      Genitourinary Problems Sister      Allergies Sister      Cancer Brother         Lung     Alcohol/Drug Brother      Connective Tissue Disorder Son         Down Syndrome     Respiratory Son       "   Pneumonia     Cancer Maternal Grandmother         Stomach     Hypertension Maternal Grandfather      Allergies Daughter        SOCIAL HISTORY:  Social History     Socioeconomic History     Marital status:      Spouse name: Marco Antonio     Number of children: 2     Years of education: 17     Highest education level: None   Occupational History     Occupation: Retired in 1994     Employer: RETIRED   Tobacco Use     Smoking status: Never Smoker     Smokeless tobacco: Never Used   Vaping Use     Vaping Use: Never used   Substance and Sexual Activity     Alcohol use: No     Drug use: No     Sexual activity: Not Currently     Partners: Male     Birth control/protection: Female Surgical   Other Topics Concern     Parent/sibling w/ CABG, MI or angioplasty before 65F 55M? No   Social History Narrative     None     Social Determinants of Health     Financial Resource Strain: Not on file   Food Insecurity: Not on file   Transportation Needs: Not on file   Physical Activity: Not on file   Stress: Not on file   Social Connections: Not on file   Intimate Partner Violence: Not on file   Housing Stability: Not on file       Review of Systems:  Skin:          Eyes:  Positive for glasses    ENT:         Respiratory:  Positive for shortness of breath wearing mask   Cardiovascular:  Negative;palpitations;chest pain;edema;lightheadedness;dizziness;syncope or near-syncope      Gastroenterology:        Genitourinary:         Musculoskeletal:         Neurologic:  Negative numbness or tingling of hands;numbness or tingling of feet    Psychiatric:         Heme/Lymph/Imm:  Positive for allergies    Endocrine:           Physical Exam:  Vitals: /82 (BP Location: Right arm, Patient Position: Sitting, Cuff Size: Adult Regular)   Pulse 88   Ht 1.651 m (5' 5\")   Wt 82.2 kg (181 lb 4.8 oz)   SpO2 97%   BMI 30.17 kg/m      General patient appears comfortable  Neck normal JVP next cardiovascular system irregular, normal heart rate, no " murmur rub or gallop  Respiratory system clear to auscultation  Extremities no edema  Neurological alert, oriented      CC  No referring provider defined for this encounter.    Thank you for allowing me to participate in the care of your patient.      Sincerely,   Sohan Deleon MD   [facility]  cc:   No referring provider defined for this encounter.

## 2022-02-22 DIAGNOSIS — I48.20 CHRONIC ATRIAL FIBRILLATION (H): ICD-10-CM

## 2022-03-08 DIAGNOSIS — E11.65 TYPE 2 DIABETES MELLITUS WITH HYPERGLYCEMIA, WITHOUT LONG-TERM CURRENT USE OF INSULIN (H): ICD-10-CM

## 2022-03-08 NOTE — TELEPHONE ENCOUNTER
"Pending Prescriptions:                       Disp   Refills    metFORMIN (GLUCOPHAGE-XR) 500 MG 24 hr tab*90 tab*1        Sig: TAKE ONE TABLET BY MOUTH ONCE DAILY WITH DINNER    Routing refill request to provider for review/approval because:  Labs out of range:      Requested Prescriptions   Pending Prescriptions Disp Refills    metFORMIN (GLUCOPHAGE-XR) 500 MG 24 hr tablet [Pharmacy Med Name: METFORMIN HCL ER 500MG TB24] 90 tablet 1     Sig: TAKE ONE TABLET BY MOUTH ONCE DAILY WITH DINNER        Biguanide Agents Failed - 3/8/2022  1:07 PM        Failed - Patient has documented A1c within the specified period of time.     If HgbA1C is 8 or greater, it needs to be on file within the past 3 months.  If less than 8, must be on file within the past 6 months.     Recent Labs   Lab Test 08/11/21  0853   A1C 7.2*             Passed - Patient is age 10 or older        Passed - Patient's CR is NOT>1.4 OR Patient's EGFR is NOT<45 within past 12 mos.       Recent Labs   Lab Test 08/11/21  0853 07/22/20  0911   GFRESTIMATED 58* 62   GFRESTBLACK  --  72       Recent Labs   Lab Test 08/11/21  0853   CR 0.92             Passed - Patient does NOT have a diagnosis of CHF.        Passed - Medication is active on med list        Passed - Patient is not pregnant        Passed - Patient has not had a positive pregnancy test within the past 12 mos.         Passed - Recent (6 mo) or future (30 days) visit within the authorizing provider's specialty     Patient had office visit in the last 6 months or has a visit in the next 30 days with authorizing provider or within the authorizing provider's specialty.  See \"Patient Info\" tab in inbasket, or \"Choose Columns\" in Meds & Orders section of the refill encounter.                        "

## 2022-03-09 RX ORDER — METFORMIN HCL 500 MG
TABLET, EXTENDED RELEASE 24 HR ORAL
Qty: 90 TABLET | Refills: 1 | Status: SHIPPED | OUTPATIENT
Start: 2022-03-09 | End: 2022-06-27

## 2022-03-17 ENCOUNTER — LAB (OUTPATIENT)
Dept: LAB | Facility: OTHER | Age: 82
End: 2022-03-17
Payer: COMMERCIAL

## 2022-03-17 DIAGNOSIS — Z13.220 SCREENING FOR HYPERLIPIDEMIA: ICD-10-CM

## 2022-03-17 DIAGNOSIS — E11.65 TYPE 2 DIABETES MELLITUS WITH HYPERGLYCEMIA, WITHOUT LONG-TERM CURRENT USE OF INSULIN (H): ICD-10-CM

## 2022-03-17 LAB
CHOLEST SERPL-MCNC: 102 MG/DL
FASTING STATUS PATIENT QL REPORTED: YES
HBA1C MFR BLD: 7.1 % (ref 0–5.6)
HDLC SERPL-MCNC: 49 MG/DL
LDLC SERPL CALC-MCNC: 28 MG/DL
NONHDLC SERPL-MCNC: 53 MG/DL
TRIGL SERPL-MCNC: 127 MG/DL

## 2022-03-17 PROCEDURE — 80061 LIPID PANEL: CPT

## 2022-03-17 PROCEDURE — 83036 HEMOGLOBIN GLYCOSYLATED A1C: CPT

## 2022-03-17 PROCEDURE — 36415 COLL VENOUS BLD VENIPUNCTURE: CPT

## 2022-03-17 NOTE — LETTER
March 17, 2022      Ni Maya  [id number] [address] [id number]        Dear ,    We are writing to inform you of your test results.    Your test results fall within the expected range(s) and well controlled.  Please continue with current treatment plan.    Resulted Orders   HEMOGLOBIN A1C   Result Value Ref Range    Hemoglobin A1C 7.1 (H) 0.0 - 5.6 %      Comment:      Normal <5.7%   Prediabetes 5.7-6.4%    Diabetes 6.5% or higher     Note: Adopted from ADA consensus guidelines.       If you have any questions or concerns, please call the clinic at the number listed above.       Sincerely,      [facility]

## 2022-03-28 DIAGNOSIS — I10 BENIGN ESSENTIAL HYPERTENSION: ICD-10-CM

## 2022-03-29 RX ORDER — AMLODIPINE BESYLATE 5 MG/1
TABLET ORAL
Qty: 90 TABLET | Refills: 0 | Status: SHIPPED | OUTPATIENT
Start: 2022-03-29 | End: 2022-06-27

## 2022-04-11 ENCOUNTER — OFFICE VISIT (OUTPATIENT)
Dept: PODIATRY | Facility: CLINIC | Age: 82
End: 2022-04-11
Payer: COMMERCIAL

## 2022-04-11 VITALS
DIASTOLIC BLOOD PRESSURE: 78 MMHG | WEIGHT: 181 LBS | SYSTOLIC BLOOD PRESSURE: 148 MMHG | TEMPERATURE: 96.8 F | HEIGHT: 65 IN | BODY MASS INDEX: 30.16 KG/M2

## 2022-04-11 DIAGNOSIS — L85.9 HYPERKERATOSIS: ICD-10-CM

## 2022-04-11 DIAGNOSIS — L60.3 ONYCHODYSTROPHY: ICD-10-CM

## 2022-04-11 DIAGNOSIS — E11.51 DIABETES MELLITUS WITH PERIPHERAL VASCULAR DISEASE (H): Primary | ICD-10-CM

## 2022-04-11 PROCEDURE — 99203 OFFICE O/P NEW LOW 30 MIN: CPT | Mod: 25 | Performed by: PODIATRIST

## 2022-04-11 PROCEDURE — 11721 DEBRIDE NAIL 6 OR MORE: CPT | Mod: 51 | Performed by: PODIATRIST

## 2022-04-11 PROCEDURE — 11056 PARNG/CUTG B9 HYPRKR LES 2-4: CPT | Mod: Q8 | Performed by: PODIATRIST

## 2022-04-11 ASSESSMENT — PAIN SCALES - GENERAL: PAINLEVEL: EXTREME PAIN (9)

## 2022-04-11 NOTE — LETTER
4/11/2022         RE: Ni Maya  [id number] [facility] [id number]        Dear Colleague,    Thank you for referring your patient, Ni Maya, to the [facility]. Please see a copy of my visit note below.    HPI:  Ni Maya is a 81 year old female who is seen in consultation at the request of self.    Pt presents for eval of:   (Onset, Location, L/R, Character, Treatments, Injury if yes)    XR left and foot 4/11/2022    DM type 2     1. Ongoing callus plantar left and right foot.   2. Onset Feb 2022, lateral Right bunion pain 9/10.  Presents with her  and flimsy slip on shoes.    Retired.    Review of Systems:  Patient denies fever, chills, rash, wound, stiffness, limping, numbness, weakness, heart burn, blood in stool, chest pain with activity, calf pain when walking, shortness of breath with activity, chronic cough, easy bleeding/bruising, swelling of ankles, excessive thirst, fatigue, depression, anxiety.  Patient admits only to symptoms noted in history.     Patient Active Problem List   Diagnosis     Hypothyroidism     Allergic rhinitis     Symptomatic menopausal or female climacteric states     Chronic kidney disease, stage II (mild)     Edema     Obesity     Urethral stricture     MICKIE III (vulvar intraepithelial neoplasia III)     Vulval lesion     Health Care Home     Type 2 diabetes, HbA1C goal < 8% (H)     Hyperlipidemia with target LDL less than 100     HTN, goal below 150/90     ACP (advance care planning)     Essential hypertension with goal blood pressure less than 150/90     Type 2 diabetes mellitus with hyperglycemia, without long-term current use of insulin (H)     Cellulitis of toe of right foot     Chronic atrial fibrillation (H)     Thrombocytopenia (H)     Acute gout involving toe of right foot     Lumbar radiculopathy     Cerebral infarction, unspecified mechanism (H)     Long term current use of anticoagulant therapy     PAST MEDICAL  HISTORY:   Past Medical History:   Diagnosis Date     Allergic rhinitis, cause unspecified      Essential hypertension, benign      Infection of kidney, unspecified 1999     Other specified acquired hypothyroidism      Other specified types of cystitis(595.89)     1999,6-2-01, 10-10-01     PAST SURGICAL HISTORY:   Past Surgical History:   Procedure Laterality Date     BIOPSY VULVA/PERINEUM,ADDNL LESN  9/18/09    Wide -excision of MICKIE III- right labia      COLONOSCOPY  9/13/2013    Procedure: COLONOSCOPY;  Colonoscopy;  Surgeon: Yanick Ramsey MD;  Location: [facility]     HC BIOPSY OF BREAST, OPEN INCISIONAL  1960    Benign     HC ERCP W SPHINCTEROTOMY  1996 6/2/96 and 8/30/96     HC REDUCTION OF LARGE BREAST  1988     HC REMOVAL GALLBLADDER  1995     HC UGI ENDOSCOPY DIAG W OR W/O BRUSH/WASH  7-     INJECT EPIDURAL LUMBAR N/A 8/10/2017    Procedure: INJECT EPIDURAL LUMBAR;  Lumbar 2-3 Epidural Steroid Injection;  Surgeon: Kimani Garcia MD;  Location: [facility]     INJECT EPIDURAL LUMBAR Right 5/10/2018    Procedure: INJECT EPIDURAL LUMBAR;  right lumbar 2 - lumbar 3 epidural steroid injection;  Surgeon: Kimani Garcia MD;  Location: [facility]     ZZC LAPAROSCOPY, SURGICAL; W/ VAGINAL HYSTERECTOMY W/WO REMOVAL OVARY(S)/TUBES  1984    for bleeding     Z LIGATE FALLOPIAN TUBE,POSTPARTUM  1978    Tubal Ligation     [facility] ANGIOGRAPHY [facility]CH  4-3-98     [facility]  [facility]24-98    Diverticuli - due in 2008     MEDICATIONS:   Current Outpatient Medications:      alcohol swab prep pads, Use to swab area of injection/geoffrey as directed., Disp: 100 each, Rfl: 3     ALLEGRA 180 MG PO TABS, 1 TABLET DAILY, Disp: 90 Tab, Rfl: 3     amLODIPine (NORVASC) 5 MG tablet, TAKE ONE TABLET BY MOUTH ONCE DAILY, Disp: 90 tablet, Rfl: 0     apixaban ANTICOAGULANT (ELIQUIS) 5 MG tablet, Take 1 tablet (5 mg) by mouth 2 times daily, Disp: 180 tablet, Rfl: 3     atorvastatin (LIPITOR) 40 MG tablet, Take 1 tablet (40 mg)  by mouth daily, Disp: 90 tablet, Rfl: 3     blood glucose (RELION PRIME TEST) test strip, 1 strip by In Vitro route 2 times daily Use to test blood sugar 3 times daily or as directed., Disp: 100 strip, Rfl: 1     blood glucose calibration (NO BRAND SPECIFIED) solution, To accompany: Blood Glucose Monitor Brands: per insurance., Disp: 1 Bottle, Rfl: 3     blood glucose monitoring (NO BRAND SPECIFIED) meter device kit, Use to test blood sugar 3 times daily or as directed. Preferred blood glucose meter OR supplies to accompany: Blood Glucose Monitor Brands: per insurance., Disp: 1 kit, Rfl: 0     Blood Glucose Monitoring Suppl W/DEVICE KIT, 1 kit 3 times daily., Disp: 1 kit, Rfl: 0     co-enzyme Q-10 100 MG CAPS capsule, Take 100 mg by mouth daily, Disp: , Rfl:      fish oil-omega-3 fatty acids (FISH OIL) 1000 MG capsule, Take 2 g by mouth daily. 2 caps per day, Disp: , Rfl:      Lancets (E-Z JECT LANCET MICRO-THIN 33G) MISC, , Disp: , Rfl:      levothyroxine (SYNTHROID/LEVOTHROID) 88 MCG tablet, TAKE ONE TABLET BY MOUTH ONCE DAILY, Disp: 90 tablet, Rfl: 1     lisinopril (ZESTRIL) 40 MG tablet, TAKE ONE TABLET BY MOUTH ONCE DAILY, Disp: 90 tablet, Rfl: 2     metFORMIN (GLUCOPHAGE-XR) 500 MG 24 hr tablet, TAKE ONE TABLET BY MOUTH ONCE DAILY WITH DINNER, Disp: 90 tablet, Rfl: 1     metoprolol tartrate (LOPRESSOR) 50 MG tablet, Take 1 tablet (50 mg) by mouth 2 times daily, Disp: 180 tablet, Rfl: 1     thin (NO BRAND SPECIFIED) lancets, Use with lanceting device. To accompany: Blood Glucose Monitor Brands: per insurance., Disp: 100 each, Rfl: 6     UNABLE TO FIND, MEDICATION NAME: resvante supplement, Disp: , Rfl:   ALLERGIES:    Allergies   Allergen Reactions     Sulfa Drugs Rash     SOCIAL HISTORY:   Social History     Socioeconomic History     Marital status:      Spouse name: Marco Antonio     Number of children: 2     Years of education: 17     Highest education level: Not on file   Occupational History      "Occupation: Retired in 1994     Employer: RETIRED   Tobacco Use     Smoking status: Never Smoker     Smokeless tobacco: Never Used   Vaping Use     Vaping Use: Never used   Substance and Sexual Activity     Alcohol use: No     Drug use: No     Sexual activity: Not Currently     Partners: Male     Birth control/protection: Female Surgical   Other Topics Concern     Parent/sibling w/ CABG, MI or angioplasty before 65F 55M? No   Social History Narrative     Not on file     Social Determinants of Health     Financial Resource Strain: Not on file   Food Insecurity: Not on file   Transportation Needs: Not on file   Physical Activity: Not on file   Stress: Not on file   Social Connections: Not on file   Intimate Partner Violence: Not on file   Housing Stability: Not on file     FAMILY HISTORY:   Family History   Problem Relation Age of Onset     Cardiovascular Father         Aortic aneurysm     Hypertension Father      Hypertension Mother      Gastrointestinal Disease Mother         GI Bleed     Lipids Sister      Hypertension Sister      Genitourinary Problems Sister      Allergies Sister      Cancer Brother         Lung     Alcohol/Drug Brother      Connective Tissue Disorder Son         Down Syndrome     Respiratory Son         Pneumonia     Cancer Maternal Grandmother         Stomach     Hypertension Maternal Grandfather      Allergies Daughter      EXAM:Vitals: BP (!) 148/78 (BP Location: Left arm, Patient Position: Sitting, Cuff Size: Adult Large)   Temp 96.8  F (36  C) (Temporal)   Ht 1.651 m (5' 5\")   Wt 82.1 kg (181 lb)   BMI 30.12 kg/m    BMI= Body mass index is 30.12 kg/m .    General appearance: Patient is alert and fully cooperative with history & exam.  No sign of distress is noted during the visit.     Psychiatric: Affect is pleasant & appropriate.  Patient appears motivated to improve health.     Respiratory: Breathing is regular & unlabored while sitting.     HEENT: Hearing is intact to spoken word.  " Speech is clear.  No gross evidence of visual impairment that would impact ambulation.     Vascular: DP 2/4 & PT 1/4 left & right.  CFT delayed with dependent rubor noted about the digits.  Diminished hair growth distal to mid tibia and no hair about the foot and toes.  Temperature changes noted, warm to cool proximal to distal.  Hemosiderin pigmentation noted with multiple varicosities legs and feet bilateral. Generalized edema bilateral legs and feet.  Pt denies claudication history.     Neurologic: Normal plantar response bilateral.  Intact  protective threshold plus 10/10 applications of a 5.07 monofilament.  Pt admits no burning and paraesthesias about the feet and toes with palpation.     Dermatologic: All 10 nails are thickened, elongated, discolored and painful with palpation and debridement.  Diminished texture turgor and tone about the integument.  Skin is thin & shiny.   Pre ulcerative hyperkeratosis noted about the plantar second and fifth metatarsal heads bilateral.  No abscess or full thickness ulcerations noted.     Musculoskeletal: Patient is ambulatory without assistive device or brace.  There is semi reducible contracture of the lesser digits.    Radiographs: 3 views bilateral demonstrate no fractures or severe deformity.  Mild to moderate pes valgus.    Hemoglobin A1C POCT (%)   Date Value   04/12/2021 7.0 (H)   07/22/2020 7.0 (H)   11/21/2019 6.4 (H)   07/25/2019 6.1 (H)   04/30/2019 6.1 (H)   11/08/2018 7.1 (H)   04/25/2018 6.9 (H)   11/01/2017 6.6 (H)     Hemoglobin A1C (%)   Date Value   03/17/2022 7.1 (H)   08/11/2021 7.2 (H)     Creatinine (mg/dL)   Date Value   08/11/2021 0.92   07/22/2020 0.87   11/21/2019 0.84   09/20/2019 0.79   04/30/2019 0.84   12/03/2018 1.03   07/25/2018 1.00       ASSESSMENT:       ICD-10-CM    1. Diabetes mellitus with peripheral vascular disease (H)  E11.51 TRIM HYPERKERATOTIC SKIN LESION,2-4     DEBRIDEMENT OF NAILS, 6 OR MORE   2. Onychodystrophy  L60.3 TRIM  HYPERKERATOTIC SKIN LESION,2-4     DEBRIDEMENT OF NAILS, 6 OR MORE   3. Hyperkeratosis  L85.9 TRIM HYPERKERATOTIC SKIN LESION,2-4     DEBRIDEMENT OF NAILS, 6 OR MORE        PLAN:    4/11/2022  Obtained and interpreted radiographs  All 10 nails were debrided with a nail nipper.   I sharply debrided 4 pre ulcerative hyperkeratotic lesions to the level of the dermis as noted above with a 15 blade.    We discussed risk factors and preventive measures.    We discussed appropriate hygiene, shoe gear, daily foot exam, and reinforced management of weight, diet, activity goals and HA1C goal for diabetic patients.    Dispensed written foot care instructions.    All questions were answered to their satisfaction.    RTC once yearly and as needed with questions or concerns.    Tho Louie DPM            Again, thank you for allowing me to participate in the care of your patient.        Sincerely,        Tho Louie DPM

## 2022-04-11 NOTE — PROGRESS NOTES
HPI:  Ni Maya is a 81 year old female who is seen in consultation at the request of self.    Pt presents for eval of:   (Onset, Location, L/R, Character, Treatments, Injury if yes)    XR left and foot 4/11/2022    DM type 2     1. Ongoing callus plantar left and right foot.   2. Onset Feb 2022, lateral Right bunion pain 9/10.  Presents with her  and flimsy slip on shoes.    Retired.    Review of Systems:  Patient denies fever, chills, rash, wound, stiffness, limping, numbness, weakness, heart burn, blood in stool, chest pain with activity, calf pain when walking, shortness of breath with activity, chronic cough, easy bleeding/bruising, swelling of ankles, excessive thirst, fatigue, depression, anxiety.  Patient admits only to symptoms noted in history.     Patient Active Problem List   Diagnosis     Hypothyroidism     Allergic rhinitis     Symptomatic menopausal or female climacteric states     Chronic kidney disease, stage II (mild)     Edema     Obesity     Urethral stricture     MICKIE III (vulvar intraepithelial neoplasia III)     Vulval lesion     Health Care Home     Type 2 diabetes, HbA1C goal < 8% (H)     Hyperlipidemia with target LDL less than 100     HTN, goal below 150/90     ACP (advance care planning)     Essential hypertension with goal blood pressure less than 150/90     Type 2 diabetes mellitus with hyperglycemia, without long-term current use of insulin (H)     Cellulitis of toe of right foot     Chronic atrial fibrillation (H)     Thrombocytopenia (H)     Acute gout involving toe of right foot     Lumbar radiculopathy     Cerebral infarction, unspecified mechanism (H)     Long term current use of anticoagulant therapy     PAST MEDICAL HISTORY:   Past Medical History:   Diagnosis Date     Allergic rhinitis, cause unspecified      Essential hypertension, benign      Infection of kidney, unspecified 1999     Other specified acquired hypothyroidism      Other specified types of  cystitis(595.89)     1999,6-2-01, 10-10-01     PAST SURGICAL HISTORY:   Past Surgical History:   Procedure Laterality Date     BIOPSY VULVA/PERINEUM,ADDNL LESN  9/18/09    Wide -excision of MICKIE III- right labia      COLONOSCOPY  9/13/2013    Procedure: COLONOSCOPY;  Colonoscopy;  Surgeon: Yanick Ramsey MD;  Location: [facility]     HC BIOPSY OF BREAST, OPEN INCISIONAL  1960    Benign     HC ERCP W SPHINCTEROTOMY  1996 6/2/96 and 8/30/96     HC REDUCTION OF LARGE BREAST  1988     HC REMOVAL GALLBLADDER  1995     HC UGI ENDOSCOPY DIAG W OR W/O BRUSH/WASH  7-     INJECT EPIDURAL LUMBAR N/A 8/10/2017    Procedure: INJECT EPIDURAL LUMBAR;  Lumbar 2-3 Epidural Steroid Injection;  Surgeon: Kimani Garcia MD;  [facility]: [facility]     INJECT EPIDURAL LUMBAR Right 5/10/2018    Procedure: INJECT EPIDURAL LUMBAR;  right lumbar 2 - lumbar 3 epidural steroid injection;  Surgeon: Kimani Garcia MD;  Location: [facility]     ZZ[facility] LAPAROSCOPY, SURGICAL; W/ VAGINAL HYSTERECTOMY W/WO REMOVAL OVARY(S)/TUBES  1984    for bleeding     ZZC LIGATE FALLOPIAN TUBE,POSTPARTUM  1978    Tubal Ligation     [facility] ANGIOGRAPHY ARCH  4-3-98     [facility] COLONOSCOPY THRU STOMA, DIAGNOSTIC  4-24-98    Diverticuli - due in 2008     MEDICATIONS:   Current Outpatient Medications:      alcohol swab prep pads, Use to swab area of injection/geoffrey as directed., Disp: 100 each, Rfl: 3     ALLEGRA 180 MG PO TABS, 1 TABLET DAILY, Disp: 90 Tab, Rfl: 3     amLODIPine (NORVASC) 5 MG tablet, TAKE ONE TABLET BY MOUTH ONCE DAILY, Disp: 90 tablet, Rfl: 0     apixaban ANTICOAGULANT (ELIQUIS) 5 MG tablet, Take 1 tablet (5 mg) by mouth 2 times daily, Disp: 180 tablet, Rfl: 3     atorvastatin (LIPITOR) 40 MG tablet, Take 1 tablet (40 mg) by mouth daily, Disp: 90 tablet, Rfl: 3     blood glucose (RELION PRIME TEST) test strip, 1 strip by In Vitro route 2 times daily Use to test blood sugar 3 times daily or as directed., Disp: 100 strip, Rfl: 1     blood glucose calibration (NO  BRAND SPECIFIED) solution, To accompany: Blood Glucose Monitor Brands: per insurance., Disp: 1 Bottle, Rfl: 3     blood glucose monitoring (NO BRAND SPECIFIED) meter device kit, Use to test blood sugar 3 times daily or as directed. Preferred blood glucose meter OR supplies to accompany: Blood Glucose Monitor Brands: per insurance., Disp: 1 kit, Rfl: 0     Blood Glucose Monitoring Suppl W/DEVICE KIT, 1 kit 3 times daily., Disp: 1 kit, Rfl: 0     co-enzyme Q-10 100 MG CAPS capsule, Take 100 mg by mouth daily, Disp: , Rfl:      fish oil-omega-3 fatty acids (FISH OIL) 1000 MG capsule, Take 2 g by mouth daily. 2 caps per day, Disp: , Rfl:      Lancets (E-Z JECT LANCET MICRO-THIN 33G) MISC, , Disp: , Rfl:      levothyroxine (SYNTHROID/LEVOTHROID) 88 MCG tablet, TAKE ONE TABLET BY MOUTH ONCE DAILY, Disp: 90 tablet, Rfl: 1     lisinopril (ZESTRIL) 40 MG tablet, TAKE ONE TABLET BY MOUTH ONCE DAILY, Disp: 90 tablet, Rfl: 2     metFORMIN (GLUCOPHAGE-XR) 500 MG 24 hr tablet, TAKE ONE TABLET BY MOUTH ONCE DAILY WITH DINNER, Disp: 90 tablet, Rfl: 1     metoprolol tartrate (LOPRESSOR) 50 MG tablet, Take 1 tablet (50 mg) by mouth 2 times daily, Disp: 180 tablet, Rfl: 1     thin (NO BRAND SPECIFIED) lancets, Use with lanceting device. To accompany: Blood Glucose Monitor Brands: per insurance., Disp: 100 each, Rfl: 6     UNABLE TO FIND, MEDICATION NAME: resvante supplement, Disp: , Rfl:   ALLERGIES:    Allergies   Allergen Reactions     Sulfa Drugs Rash     SOCIAL HISTORY:   Social History     Socioeconomic History     Marital status:      Spouse name: Marco Antonio     Number of children: 2     Years of education: 17     Highest education level: Not on file   Occupational History     Occupation: Retired in 1994     Employer: RETIRED   Tobacco Use     Smoking status: Never Smoker     Smokeless tobacco: Never Used   Vaping Use     Vaping Use: Never used   Substance and Sexual Activity     Alcohol use: No     Drug use: No     Sexual  "activity: Not Currently     Partners: Male     Birth control/protection: Female Surgical   Other Topics Concern     Parent/sibling w/ CABG, MI or angioplasty before 65F 55M? No   Social History Narrative     Not on file     Social Determinants of Health     Financial Resource Strain: Not on file   Food Insecurity: Not on file   Transportation Needs: Not on file   Physical Activity: Not on file   Stress: Not on file   Social Connections: Not on file   Intimate Partner Violence: Not on file   Housing Stability: Not on file     FAMILY HISTORY:   Family History   Problem Relation Age of Onset     Cardiovascular Father         Aortic aneurysm     Hypertension Father      Hypertension Mother      Gastrointestinal Disease Mother         GI Bleed     Lipids Sister      Hypertension Sister      Genitourinary Problems Sister      Allergies Sister      Cancer Brother         Lung     Alcohol/Drug Brother      Connective Tissue Disorder Son         Down Syndrome     Respiratory Son         Pneumonia     Cancer Maternal Grandmother         Stomach     Hypertension Maternal Grandfather      Allergies Daughter      EXAM:Vitals: BP (!) 148/78 (BP Location: Left arm, Patient Position: Sitting, Cuff Size: Adult Large)   Temp 96.8  F (36  C) (Temporal)   Ht 1.651 m (5' 5\")   Wt 82.1 kg (181 lb)   BMI 30.12 kg/m    BMI= Body mass index is 30.12 kg/m .    General appearance: Patient is alert and fully cooperative with history & exam.  No sign of distress is noted during the visit.     Psychiatric: Affect is pleasant & appropriate.  Patient appears motivated to improve health.     Respiratory: Breathing is regular & unlabored while sitting.     HEENT: Hearing is intact to spoken word.  Speech is clear.  No gross evidence of visual impairment that would impact ambulation.     Vascular: DP 2/4 & PT 1/4 left & right.  CFT delayed with dependent rubor noted about the digits.  Diminished hair growth distal to mid tibia and no hair about " the foot and toes.  Temperature changes noted, warm to cool proximal to distal.  Hemosiderin pigmentation noted with multiple varicosities legs and feet bilateral. Generalized edema bilateral legs and feet.  Pt denies claudication history.     Neurologic: Normal plantar response bilateral.  Intact  protective threshold plus 10/10 applications of a 5.07 monofilament.  Pt admits no burning and paraesthesias about the feet and toes with palpation.     Dermatologic: All 10 nails are thickened, elongated, discolored and painful with palpation and debridement.  Diminished texture turgor and tone about the integument.  Skin is thin & shiny.   Pre ulcerative hyperkeratosis noted about the plantar second and fifth metatarsal heads bilateral.  No abscess or full thickness ulcerations noted.     Musculoskeletal: Patient is ambulatory without assistive device or brace.  There is semi reducible contracture of the lesser digits.    Radiographs: 3 views bilateral demonstrate no fractures or severe deformity.  Mild to moderate pes valgus.    Hemoglobin A1C POCT (%)   Date Value   04/12/2021 7.0 (H)   07/22/2020 7.0 (H)   11/21/2019 6.4 (H)   07/25/2019 6.1 (H)   04/30/2019 6.1 (H)   11/08/2018 7.1 (H)   04/25/2018 6.9 (H)   11/01/2017 6.6 (H)     Hemoglobin A1C (%)   Date Value   03/17/2022 7.1 (H)   08/11/2021 7.2 (H)     Creatinine (mg/dL)   Date Value   08/11/2021 0.92   07/22/2020 0.87   11/21/2019 0.84   09/20/2019 0.79   04/30/2019 0.84   12/03/2018 1.03   07/25/2018 1.00       ASSESSMENT:       ICD-10-CM    1. Diabetes mellitus with peripheral vascular disease (H)  E11.51 TRIM HYPERKERATOTIC SKIN LESION,2-4     DEBRIDEMENT OF NAILS, 6 OR MORE   2. Onychodystrophy  L60.3 TRIM HYPERKERATOTIC SKIN LESION,2-4     DEBRIDEMENT OF NAILS, 6 OR MORE   3. Hyperkeratosis  L85.9 TRIM HYPERKERATOTIC SKIN LESION,2-4     DEBRIDEMENT OF NAILS, 6 OR MORE        PLAN:    4/11/2022  Obtained and interpreted radiographs  All 10 nails were  debrided with a nail nipper.   I sharply debrided 4 pre ulcerative hyperkeratotic lesions to the level of the dermis as noted above with a 15 blade.    We discussed risk factors and preventive measures.    We discussed appropriate hygiene, shoe gear, daily foot exam, and reinforced management of weight, diet, activity goals and HA1C goal for diabetic patients.    Dispensed written foot care instructions.    All questions were answered to their satisfaction.    RTC once yearly and as needed with questions or concerns.    Tho Louie DPM

## 2022-04-11 NOTE — PATIENT INSTRUCTIONS
"DIABETES AND YOUR FEET    What effect does diabetes have on the feet?  Diabetes can result in several problems in the feet including contractures of the tendons leading to deformities and reduced function of the bones, skin ulcers or open sores on pressure points or prominent deformities, reduced sensation, reduced blood flow and thus reduced oxygen and immune cells to the tiny vessels in our feet. This all leads to higher risk of hospitalization, infections, and amputations.     What is neuropathy?  Neuropathy is a term used to describe a loss of nerve function.  Patients with diabetes are at risk of developing neuropathy if their sugars continue to run high and are above the normal value of 140.  The elevated blood sugar in the body enters the nerves causing it to swell and impair nerve function.  The higher the blood sugar and the longer it is elevated, the more damage is done to nerves.  This damage is permanent and irreversible.  These damaged sensory nerves can then cause reduced feeling or cause pain.  Damaged motor nerves can reduce blood flow and white blood cells into into your foot, skin and bones reducing your ability to heal a small problem. And neuropathy can cause tendons to become unbalanced and contribute to the formation of deformity and contractures in our feet. Often times, neuropathy can be prevented by controlling your blood sugar.  Your risk of developing neuropathy goes up dramatically as your hemoglobin A1C raises above 7.5.      How do I know if I have neuropathy?  When a person develops neuropathy, they usually begin to feel numbness or tingling in their feet and sometimes in their legs.  Other symptoms may include painful burning or hot feet, tingling, electrical sensations or feeling like insects or ants are crawling on your feet or legs.  If blood sugar remains above 140  for long periods of time, neuropathy can also occur in the hands.  When a person loses their \"protective threshold\" " or ability to detect a 5.07 [place] monofilament is when they have elevated risk for developing foot deformity, contractures, foot infections, amputations, Charcot arthropathy, or other complications. Keep your hemoglobin A1C below 7.5 to reduce this risk.    What is vascular disease?  Peripheral vascular disease is a term used to describe a loss or decrease in circulation (blood flow).  There is a problem in getting blood, immune cells, and oxygen to areas that need it.  Similar to neuropathy, sugars can build up in the walls of the arteries (blood vessels) and cause them to become swollen, thickened and hardened.  This decreases the amount of blood that can go to an area that needs it.  Though this is common in the legs of diabetic patients, it can also affect other arteries (blood vessels) in the body such as in the heart, kidney, eyes, and the blood flow into bones.  It is often seen first in the small vessels of her body notably our feet and toes.    How do I know if I have vascular disease?  In the legs, vascular disease usually results in cramping.  Patients who develop leg cramps after walking the same distance every time (i.e. One block, half a mile, ect.) need to let their doctors know so that their circulation may be checked.  Cramps causing severe pain in the feet and/or legs while sleeping and the cramps go away when you stand or hang your legs off the side of the bed, may also be a sign of poor blood circulation.  Occasional cramping in cold weather or on rare occasions with activity may not be due to poor circulation, but you should inform your doctor.    How can these problems be prevented?  The key to prevention is good blood sugar control all day every day.  Inadequate blood sugar control is the most common way patients experience these problems. Reducing, controlling and measuring your daily consumption of sugar or carbohydrates is essential to understanding and managing diabetes.   Physical activity (exercise) is a very good way to help decrease your blood sugars.  Exercise can lower your blood sugar, blood pressure, and cholesterol.  It also reduces your risk for heart disease and stroke, relieves stress, and strengthens your heart, muscles and bones. Physical activity also increases your balance and reduces development of contractures and foot deformities over time. In addition, regular activity helps insulin work better, improves your blood circulation, and keeps your joints flexible.  If you're trying to lose weight, a combination of exercise and wise food choices can help you reach your target weight and maintain it.  Activity and exercise alone can not make up for poor diet choices, eating too much, or eating too many sugars or carbohydrates.  Ask your doctor for help when you are not meeting your blood sugar goals. Changes or increases in medication are powerful tools in reducing your blood sugar.    Know your blood sugar and hemoglobin A1C trend.  Upon first diagnosis or during acute illness, checking your blood sugar 4 times a day can help you understand how your diet, activity, and lifestyle affect your blood sugar.  Monitoring your hemoglobin A1C can help you understand how well you are managing blood sugar over the long run.  Your hemoglobin A1C tells you what your blood sugar averages all day, every day, over the past 90 days.       To experience the lowest risk of complications associated with diabetes such as neuropathy, loss of blood flow, bone or joint infection, charcot arthropathy, or amputation, the American Diabetes Association recommends a target hemoglobin A1C of less than 7.0%, while the American Association of Clinical Endocrinologists' recommendation is 6.5% or less.  Both organizations advise that the goals be individualized based on patient factors such as other health conditions, history of hypoglycemia, education, and life expectancy.  A patients risk of  experiencing complications associated with diabetes is only slightly elevated with a hemoglobin A1C above 6.0.  However, this risk goes up exponentially when the hemoglobin A1C is above 7.5.  The longer the hemoglobin A1C is elevated, the more risk that patient will experience in their lifetime. The damage that occurs to nerves, blood vessels, tendons, bones and body organs, while their hemoglobin A1C is elevated is mostly irreversible and worsens with each additional time period of elevated hemoglobin A1C.     You must understand and manage your disease.  Your health insurance or medical team cannot manage this disease for you.  When you take responsibility for understanding and managing your disease, you can expect to experience fewer problems associated with diabetes in your lifetime.  You will  Also experience a higher quality of life and health and reduced cost of health care.    Diabetic Foot Care Recommendations  The following are recommendations for avoiding serious foot problems or injury    DO'S  1. Be aware of your hemoglobin A1C and continue to follow up with your medical team for adjustments in your lifestyle and medication until your reach your A1C goal.  Keep this below 7.5 to reduce your risk of developing complications associated with diabetes.    2.  Wash your feet with lukewarm water and a mild soap and then dry them thoroughly, especially between the toes.  Gently floss your towel or washcloth between each toe at every bath.  Soaking your feet in water cannot clean dead skin, debris, and bacteria from your feet and is not necessary.   3. Examine your feet daily looking for cuts, corns, blisters, cracks, ect..., especially after wearing new shoes or increased or changed activities.  Make sure to look between your toes.  If you cannot see the bottom of your feet, set a mirror on the floor and hold your foot over it, or ask a family member to examine your feet for you daily.  Contact your doctor  immediately if new problems are noted or if sores are not healing.  4.  Immediately apply moisturizer cream such as Cetaphil to the tops and bottoms of your feet, avoiding areas between the toes.  Apply sunscreen or cover your feet if they will be exposed to extended sunlight.  5.Use clean comfortable shoes.  Socks should not have thick seams or cut off the circulation around the leg.  Break in new shoes slowly and rotate with older shoes until broken in.  Check the inside of your shoes with your hand to look for areas of irritation or objects that may have fallen into your shoes.    6. Keep slippers by the side of your bed for use during the night.  7. Shoes should be fitted by a professional and should not cause areas of irritation.  Check your feet regularly when wearing a new pair of shoes and replace them as needed.  8.  Talk to your doctor about proper exercise.  Exercise and stretching stimulate blood flow to your feet and maintain proper glucose levels.  Use it or lose it!  9.  Monitor your blood glucose level and your hemoglobin A1C.  Notify your doctor immediately if your blood sugar is abnormally high or low.  10.  Cut your nails straight across, but then gently round any sharp edges with a nail file.  If you have neuropathy, peripheral vascular disease or cannot see that well to trim your own toenails, see a medical professional for care.    DONT'S  1.  Do not soak your feet if you have an open sore or your provider has informed you that you have neuropathy or loss of protective threshold.  Use only lukewarm water and always check the temperature with your hand as hot water can easily burn your feet.    2.  Never use a hot water bottle or heating pad on your feet.  Also do not apply hot or cold compresses to your feet.  With decreased sensation, you could burn or freeze your feet.  Do not rest your feet near a heat source such as a heater or heat register.    3.  Do not apply any of these to your  "feet:    - over the counter medicine for corns or warts    -  Harsh chemicals like boric acid    -  Do not self-treat corns, cuts, blisters or infections.  Always consult your doctor.   4.  Do not wear sandals, slippers or walk barefoot, especially on harsh surfaces.  5.  If you smoke, stop!!!      Nail Debridement    A high quality instrument makes trimming toenails MUCH easier.  Search ebSkytree for any 5\" nail nipper manufactured by reliable brands such as Miltex, Integra or Jarit as these quality instruments will help manage difficult nails more effectively and comfortably. We use Miltex -SS.  A physician is not necessary to trim nails even if you are taking blood thinners or are diabetic.  Your family or care givers may help manage your toenails.      Trim or sand the nails once weekly.  Do not wait until they are long and painful or trimming will become too difficult and painful and will increase your risk of complications or infection.  A course file or 120 grit sandpaper on a sanding block can be helpful.  For very thick nails many people prefer battery operated barnes such as an Amope', Personal Pedi and Emjoi for regular use or heavy painful callouses or thick toenails.    Trim or skive any portion of nail that is thick, loose, crumbling, or not well attached. Do not tear the nail away, but rather cut them with a nail nipperor sand or sand them down.  You may follow up with your Podiatric Physician if you have pain, bleeding, infection, questions or other concerns.      You may also contact the following Registered Nurses for further help with nail debridement and minor hygiene concnerns.  They may come to your home or meet them at their clinic to trim your toenails and soak your feet, as well as monitor for any complications that would require evaluation by a Physician.      [facility]  Uzma Serra RN  Phone & text [phone]    [facility]  Avril Greene RN  Office " [phone]    [facility]  [phone]  [email or web address] - [facility]    For up to date list and to find foot care nurses in other communities visit American [employer] Nurses Association website:  afcna.org.     Calluses, Corns, IPKs, Porokeratosis    When there is excessive friction or pressure on the skin, the body responds by making the skin thicker.  While this may protect the deeper structures, the thickened skin can take up more space and thus increase pressure over a bony prominence or become an open sore or skin ulcer as this skin becomes less flexible.    Flat, diffuse thickening are simple calluses and they are usually caused by friction.  Often these are the result of rubbing on a shoe or going barefoot.    Calluses with a central core between the toes are called corns.  These often result from prominent joints on adjacent toes rubbing together.  Theses are often a symptom of bone malalignment and will usually recur unless the underlying bones are addressed.    Many of these lesions can be kept comfortable with routine maintenance. This consists of filing them with a Ped Egg, callus file, or 120 grit sandpaper on a block, every day during your bath or shower.  Most people prefer battery operated barnes such as an Amope', Personal Pedi and Emjoi for regular use or heavy painful callouses.  Heavy creams or ointments can be applied 1-2 times every day to keep them soft. Toe spacers can be used for corns, gel pads can be used for other lesions on the bottom of the foot. If there is a deformity noted, such as a prominent bone, often this can be addressed to minimize recurrence. However, sometimes the pressure and lesion simply migrates to another spot after surgery, so it is not a guaranteed cure.     If you have severe callouses and cracking, you may apply heavy ointments that you scoop up such as Cetaphil cream, Eucerin, Aquaphor or Vaseline.  Be sure to obtain cream or  ointment in these brands and not lotion (lotion is water based and not durable enough for feet). For more aggressive help apply heavy creams or ointment under occlusive dressings such as Saran Wrap or Jelly Feet while sleeping.   Jelly Feet can be obtained at [email or web address].     To be successful with managing hyperkeratotic skin, you must manage hygiene daily.  Apply the cream once or twice EVERY day.  At your bath or shower time is the easiest time to work on this when skin is most soft.  There is no medical or surgical treatment that will absolutely eliminate many of these symptoms.      Ped[employer]ix is a reliable source for all sorts of foot pads, cushions, or interdigital spacers and foot appliances. Go to www.food.de.VISup or request a catalog at 0-849-[employer].        Please call with any additional questions.

## 2022-05-24 DIAGNOSIS — E03.9 HYPOTHYROIDISM, UNSPECIFIED TYPE: ICD-10-CM

## 2022-05-24 NOTE — TELEPHONE ENCOUNTER
"Pending Prescriptions:                       Disp   Refills    metoprolol tartrate (LOPRESSOR) 50 MG tabl*180 ta*1        Sig: TAKE ONE TABLET BY MOUTH TWICE A DAY    Routing refill request to provider for review/approval because:      Requested Prescriptions   Pending Prescriptions Disp Refills    metoprolol tartrate (LOPRESSOR) 50 MG tablet [Pharmacy Med Name: METOPROLOL TARTRATE 50MG TABS] 180 tablet 1     Sig: TAKE ONE TABLET BY MOUTH TWICE A DAY        Beta-Blockers Protocol Failed - 5/24/2022  2:03 PM        Failed - Blood pressure under 140/90 in past 12 months       BP Readings from Last 3 Encounters:   04/11/22 (!) 148/78   02/03/22 126/82   12/23/21 138/82                 Passed - Patient is age 6 or older        Passed - Recent (12 mo) or future (30 days) visit within the authorizing provider's specialty     Patient has had an office visit with the authorizing provider or a provider within the authorizing providers department within the previous 12 mos or has a future within next 30 days. See \"Patient Info\" tab in inbasket, or \"Choose Columns\" in Meds & Orders section of the refill encounter.              Passed - Medication is active on med list                    "

## 2022-05-25 RX ORDER — METOPROLOL TARTRATE 50 MG
TABLET ORAL
Qty: 180 TABLET | Refills: 1 | Status: SHIPPED | OUTPATIENT
Start: 2022-05-25 | End: 2022-12-26

## 2022-06-15 ENCOUNTER — NURSE TRIAGE (OUTPATIENT)
Dept: FAMILY MEDICINE | Facility: OTHER | Age: 82
End: 2022-06-15
Payer: COMMERCIAL

## 2022-06-15 PROCEDURE — 99207 REFERRAL TO ACUTE AND DIAGNOSTIC SERVICES: CPT | Performed by: FAMILY MEDICINE

## 2022-06-15 NOTE — TELEPHONE ENCOUNTER
Patient with new onset swelling right lower extremity; ankle to just below knee.  Onset 3.5 weeks ago.  Wears compression stocking during day. Swelling goes away during night but back as she gets up and moves about.  States doesn't have time to sit or put feet up during day; does elevate in pm if watching TV.  States does watch sodium intake; does her own cooking/meal prep; occasionally [employer] food item but not often.  No shortness of breath; no open sores.  She is wanting appointment with provider;  3 years ago had swelling and ended up having a stroke.  To MD to advise on request for appointment.  Cassia Shah RN      Reason for Disposition    MILD swelling of both ankles (i.e., pedal edema) AND new onset or worsening    Additional Information    Negative: Sounds like a life-threatening emergency to the triager    Negative: Chest pain    Negative: Small area of swelling and followed an insect bite to the area    Negative: Followed a knee injury    Negative: Ankle or foot injury    Negative: Pregnant with leg swelling or edema    Negative: Difficulty breathing at rest    Negative: Entire foot is cool or blue in comparison to other side    Negative: SEVERE swelling (e.g., swelling extends above knee, entire leg is swollen, weeping fluid)    Negative: Thigh or calf pain and only 1 side and present > 1 hour    Negative: Thigh, calf, or ankle swelling in only one leg    Negative: Thigh, calf, or ankle swelling in both legs, but one side is definitely more swollen (Exception: longstanding difference between legs)    Negative: Cast on leg or ankle and has increasing pain    Negative: Can't walk or can barely stand (new onset)    Negative: Fever and red area (or area very tender to touch)    Negative: Patient sounds very sick or weak to the triager    Negative: Swelling of face, arm or hands (Exception: slight puffiness of fingers during hot weather)    Negative: Pregnant > 20 weeks and sudden weight gain (i.e., >  "2 lbs, 1 kg in one week)    Negative: MODERATE swelling of both ankles (e.g., swelling extends up to the knees) AND new onset or worsening    Negative: Difficulty breathing with exertion AND worsening or new onset    Negative: Looks like a boil, infected sore, deep ulcer, or other infected rash (spreading redness, pus)    Negative: Patient wants to be seen    Answer Assessment - Initial Assessment Questions  1. ONSET: \"When did the swelling start?\" (e.g., minutes, hours, days)      approx 3.5 weeks ago  2. LOCATION: \"What part of the leg is swollen?\"  \"Are both legs swollen or just one leg?\"      Right leg.  She states from ankle up to almost knee.  3. SEVERITY: \"How bad is the swelling?\" (e.g., localized; mild, moderate, severe)   - Localized - small area of swelling localized to one leg   - MILD pedal edema - swelling limited to foot and ankle, pitting edema < 1/4 inch (6 mm) deep, rest and elevation eliminate most or all swelling   - MODERATE edema - swelling of lower leg to knee, pitting edema > 1/4 inch (6 mm) deep, rest and elevation only partially reduce swelling   - SEVERE edema - swelling extends above knee, facial or hand swelling present       Mild edema.  States does where her compression stockings every day; removes at bedtime.  Has no swelling when she wakes up in morning but increases as she is up during day.  4. REDNESS: \"Does the swelling look red or infected?\"      no  5. PAIN: \"Is the swelling painful to touch?\" If so, ask: \"How painful is it?\"   (Scale 1-10; mild, moderate or severe)      No.  States is \"tight\"  6. FEVER: \"Do you have a fever?\" If so, ask: \"What is it, how was it measured, and when did it start?\"       no  7. CAUSE: \"What do you think is causing the leg swelling?\"      Unsure.  She states 3 years ago had leg swelling and had first stroke  8. MEDICAL HISTORY: \"Do you have a history of heart failure, kidney disease, liver failure, or cancer?\"  Chronic Afib, hx cerebral infarct, " "diabetes, chronic kidney disease        9. RECURRENT SYMPTOM: \"Have you had leg swelling before?\" If so, ask: \"When was the last time?\" \"What happened that time?\"      States 3 years ago  10. OTHER SYMPTOMS: \"Do you have any other symptoms?\" (e.g., chest pain, difficulty breathing)        Denies any other symptoms  11. PREGNANCY: \"Is there any chance you are pregnant?\" \"When was your last menstrual period?\"        no    Protocols used: LEG SWELLING AND EDEMA-A-OH      "

## 2022-06-16 ENCOUNTER — OFFICE VISIT (OUTPATIENT)
Dept: PEDIATRICS | Facility: CLINIC | Age: 82
End: 2022-06-16
Attending: FAMILY MEDICINE
Payer: COMMERCIAL

## 2022-06-16 VITALS
RESPIRATION RATE: 18 BRPM | HEART RATE: 103 BPM | DIASTOLIC BLOOD PRESSURE: 86 MMHG | TEMPERATURE: 97.9 F | OXYGEN SATURATION: 100 % | SYSTOLIC BLOOD PRESSURE: 155 MMHG

## 2022-06-16 DIAGNOSIS — E11.65 TYPE 2 DIABETES MELLITUS WITH HYPERGLYCEMIA, WITHOUT LONG-TERM CURRENT USE OF INSULIN (H): ICD-10-CM

## 2022-06-16 DIAGNOSIS — R06.09 DOE (DYSPNEA ON EXERTION): ICD-10-CM

## 2022-06-16 DIAGNOSIS — M79.89 RIGHT LEG SWELLING: Primary | ICD-10-CM

## 2022-06-16 DIAGNOSIS — I10 ESSENTIAL HYPERTENSION WITH GOAL BLOOD PRESSURE LESS THAN 150/90: ICD-10-CM

## 2022-06-16 DIAGNOSIS — I48.20 CHRONIC ATRIAL FIBRILLATION (H): ICD-10-CM

## 2022-06-16 LAB
ANION GAP SERPL CALCULATED.3IONS-SCNC: 4 MMOL/L (ref 3–14)
BUN SERPL-MCNC: 14 MG/DL (ref 7–30)
CALCIUM SERPL-MCNC: 9.1 MG/DL (ref 8.5–10.1)
CHLORIDE BLD-SCNC: 107 MMOL/L (ref 94–109)
CO2 SERPL-SCNC: 32 MMOL/L (ref 20–32)
CREAT SERPL-MCNC: 0.88 MG/DL (ref 0.52–1.04)
ERYTHROCYTE [DISTWIDTH] IN BLOOD BY AUTOMATED COUNT: 13.2 % (ref 10–15)
GFR SERPL CREATININE-BSD FRML MDRD: 65 ML/MIN/1.73M2
GLUCOSE BLD-MCNC: 124 MG/DL (ref 70–99)
HCT VFR BLD AUTO: 40.4 % (ref 35–47)
HGB BLD-MCNC: 13.5 G/DL (ref 11.7–15.7)
MCH RBC QN AUTO: 30.1 PG (ref 26.5–33)
MCHC RBC AUTO-ENTMCNC: 33.4 G/DL (ref 31.5–36.5)
MCV RBC AUTO: 90 FL (ref 78–100)
NT-PROBNP SERPL-MCNC: 688 PG/ML (ref 0–1800)
PLATELET # BLD AUTO: 119 10E3/UL (ref 150–450)
POTASSIUM BLD-SCNC: 3.5 MMOL/L (ref 3.4–5.3)
RBC # BLD AUTO: 4.49 10E6/UL (ref 3.8–5.2)
SODIUM SERPL-SCNC: 143 MMOL/L (ref 133–144)
WBC # BLD AUTO: 6.6 10E3/UL (ref 4–11)

## 2022-06-16 PROCEDURE — 83880 ASSAY OF NATRIURETIC PEPTIDE: CPT

## 2022-06-16 PROCEDURE — 99214 OFFICE O/P EST MOD 30 MIN: CPT | Performed by: FAMILY MEDICINE

## 2022-06-16 PROCEDURE — 80048 BASIC METABOLIC PNL TOTAL CA: CPT

## 2022-06-16 PROCEDURE — 36415 COLL VENOUS BLD VENIPUNCTURE: CPT

## 2022-06-16 PROCEDURE — 85027 COMPLETE CBC AUTOMATED: CPT

## 2022-06-16 ASSESSMENT — PAIN SCALES - GENERAL: PAINLEVEL: NO PAIN (0)

## 2022-06-16 NOTE — TELEPHONE ENCOUNTER
Stroke was due to a fib with RVR. The swelling can occur as heart is less efficient with this rhythm. But the fact that the swelling improves as she elevates her legs makes this the type of swelling that does not indicate an increased risk of stroke.   Though any patient wishing appt can schedule, I do not find that she triages to urgent need and do not currently have openings for tomorrow and will not be back until Monday, so can use acute then if she desires, but may need to offer alternatives if she still desires.  Apryl Olvera MD

## 2022-06-16 NOTE — TELEPHONE ENCOUNTER
RN spoke to patient. Yes she can get to [place] today. She has not ate anything yet this morning, just took her pills. RN asked her not to eat or drink and we would follow up with plan.     Ruth Hardy RN on 6/16/2022 at 7:51 AM

## 2022-06-16 NOTE — TELEPHONE ENCOUNTER
Called ads right at 9 as it opened and the provider was not there yet. He was going to call with questions if needed when he arrived and I left my cell number.  No call yet. Or note about them calling us yet. Called back and they do not yet have a provider but are working on getting someone in.  Apryl Olvera MD

## 2022-06-16 NOTE — PROGRESS NOTES
"  Assessment & Plan      Right leg swelling  No evidence DVT. Unclear why she has asymmetric swelling. It does not appear to be cardiac in origen. May be med related and may be due to venous insufficiency. Recommended we try to lower the dose of amlodipne to 2.5mg daily as this of her meds is the most likely to cause worsening edema. She will need to follow her bp and record and follow up with her doctor.   - US Lower Extremity Venous Duplex Right; Future  - Basic metabolic panel; Future  - CBC with platelets; Future  - BNP-N terminal pro; Future  - BNP-N terminal pro  - CBC with platelets  - Basic metabolic panel  - US Lower Extremity Venous Duplex Right    COON (dyspnea on exertion)  This is without pain and noted more by her . Discussed stress testing. Will let her cardiologist know.   - Basic metabolic panel; Future  - CBC with platelets; Future  - BNP-N terminal pro; Future  - BNP-N terminal pro  - CBC with platelets  - Basic metabolic panel    Chronic atrial fibrillation (H)  Rate controlled on eliquis.     Essential hypertension with goal blood pressure less than 150/90  At goal on home measurements. Typically higher for her when in clinic.     Type 2 diabetes mellitus with hyperglycemia, without long-term current use of insulin (H)  A1C at goal.     Provider  Link to [facility] Help Grid :[id number]}     BMI:   Estimated body mass index is 30.12 kg/m  as calculated from the following:    Height as of 4/11/22: 1.651 m (5' 5\").    Weight as of 4/11/22: 82.1 kg (181 lb).   Weight management plan: not addressed    See Patient Instructions    Return in about 2 weeks (around 6/30/2022) for follow up appointment.    Lawson Camejo MD  [facility] is [facility] 81 year [facility] presenting for the following health issues:  Musculoskeletal Problem      HPI Patient states that she has had swelling in her right leg for the past 3 weeks. Swelling from the calf to the right foot. She " recently traveled to [place] this past weekend and noticed the swelling after. The swelling resolves with elevation of the foot. Usually normal in the AM.  No pain with the swelling. No hx of dvt but had stroke and afib. Patient is requesting diuretics. Currently on Eliquis. Denies fevers and further complaints    .   reviewing her chart, It looks like she had been on hydrochlorothiazide but this was stopped about 2017 possibly due to acute gout at that time.     Other symptoms include some dyspnea on exertion when she walks faster. No chest pain. She has toe pain thought to be due a hammer toe.     She has diabetes controlled with diet/metformin.,    She is on lisinopril and amlodipine for htn. Home bp usually 120-130 but higher always in the office.     No new meds and no med changes recently.     The patient has no history of surgery on her left leg.     Echo a year ago showed normal EF.     Pain History:  When did you first notice your pain? - swelling  Have you seen anyone else for your pain? Yes - on compression stockings.   Where in your body do you have pain? Musculoskeletal problem/pain  Onset/Duration: about 3 weeks.  Description  Location: leg - right  Joint Swelling: YES (ankle)   Redness: no  Pain: YES  Warmth: no  Intensity: 0-10  Progression of Symptoms:  intermittent  Accompanying signs and symptoms:   Fevers: no  Numbness/tingling/weakness: no  History  Trauma to the area: no  Recent illness:  No   Previous similar problem: Yes (prior to the CVA but then both legs.)  Previous evaluation:  no  Precipitating or alleviating factors:  Aggravating factors include: sitting  Therapies tried and outcome:Compression socks       Review of Systems   Some loose nonbloody stools thought to be due to eliquis. Constitutional, HEENT, cardiovascular, pulmonary, gi and gu systems are negative, except as otherwise noted.      Objective    BP (!) 155/86 (BP Location: Right arm, Patient Position: Sitting, Cuff  Size: Adult Regular)   Pulse 103   Temp 97.9  F (36.6  C) (Oral)   Resp 18   SpO2 100%   There is no height or weight on file to calculate BMI.  Physical Exam   GENERAL: healthy, alert and no distress  NECK: no adenopathy, no asymmetry, masses, or scars and thyroid normal to palpation  RESP: lungs clear to auscultation - no rales, rhonchi or wheezes  CV: irregularly irregular rhythm  ABDOMEN: soft, nontender, no hepatosplenomegaly, no masses and bowel sounds normal  MS: no gross musculoskeletal defects noted, 1+ edema to 1/3 up. (right now she says the swelling is minimal  SKIN: schamburg's skin changes to her lower legs.   PSYCH: mentation appears normal, affect normal/bright    Results for orders placed or performed in visit on 06/16/22   US Lower Extremity Venous Duplex Right     Status: None    Narrative    EXAMINATION: US LOWER EXTREMITY VENOUS DUPLEX RIGHT  6/16/2022 12:46  PM      CLINICAL HISTORY: Right leg swelling    COMPARISON: 9/20/2019        PROCEDURE COMMENTS: Ultrasound was performed of the deep venous system  of the right lower extremity using grayscale, color, and spectral  Doppler.    FINDINGS:  The common femoral, greater saphenous origin, femoral, popliteal, and  deep calf veins are visualized and are patent. Venous waveforms are  normal. There is normal response to compression. Left common femoral  vein, included for comparison, is normal.      Impression    IMPRESSION:.  No deep vein thrombosis in the right lower extremity.    I have personally reviewed the examination and initial interpretation  and I agree with the findings.    DAVID CARRERA MD         SYSTEM ID:  [id number]   BNP-N terminal pro     Status: Normal   Result Value Ref Range    N Terminal Pro BNP Outpatient 688 0 - 1,800 pg/mL   CBC with platelets     Status: Abnormal   Result Value Ref Range    WBC Count 6.6 4.0 - 11.0 10e3/uL    RBC Count 4.49 3.80 - 5.20 10e6/uL    Hemoglobin 13.5 11.7 - 15.7 g/dL    Hematocrit 40.4  35.0 - 47.0 %    MCV 90 78 - 100 fL    MCH 30.1 26.5 - 33.0 pg    MCHC 33.4 31.5 - 36.5 g/dL    RDW 13.2 10.0 - 15.0 %    Platelet Count 119 (L) 150 - 450 10e3/uL   Basic metabolic panel     Status: Abnormal   Result Value Ref Range    Sodium 143 133 - 144 mmol/L    Potassium 3.5 3.4 - 5.3 mmol/L    Chloride 107 94 - 109 mmol/L    Carbon Dioxide (CO2) 32 20 - 32 mmol/L    Anion Gap 4 3 - 14 mmol/L    Urea Nitrogen 14 7 - 30 mg/dL    Creatinine 0.88 0.52 - 1.04 mg/dL    Calcium 9.1 8.5 - 10.1 mg/dL    Glucose 124 (H) 70 - 99 mg/dL    GFR Estimate 65 >60 mL/min/1.73m2          .  ..

## 2022-06-16 NOTE — TELEPHONE ENCOUNTER
RN Called [facility] to get an update. They still do not have a provider on site. They advised patient Can eat and drink normally.     RN contacted patient and left a message notifying her she can eat and we are waiting to hear back from [facility].     Ruth Hardy RN on 6/16/2022 at 10:22 AM

## 2022-06-16 NOTE — PATIENT INSTRUCTIONS
Decrease the amlodipine to 2.5mg daily (1/2 pill).     Check your blood pressure twice daily and record the results.

## 2022-06-16 NOTE — TELEPHONE ENCOUNTER
[facility]  I was thinking on her last night and feel she would be a good candidate for [facility] today, will try to connect for this with them.  Order placed will call after clinic starts, need to know patient has transportation and will not eat or drink.   Please connect with patient.  Apryl Olvera MD      Referral to Acute and Diagnostic Services    The [facility] has been contacted at [phone] ([place]) to confirm patient acceptance. The transition to Acute & Diagnostic Services Clinic has been discussed with patient, and she agrees with next level of care.  Patient understands that evaluation/treatment at [facility] typically takes significantly longer than in clinic/urgent care (>2 hours).        Special issues:  None        Referral placed: Yes  Patient has transportation arranged and will travel to the [facility] without delay: Yes  Patient aware not to eat or drink. Yes    The following provider has assessed this patient for intervention at [facility], and directed the patient for referral: Apryl Olvera MD, MD    Apryl Olvear MD, MD

## 2022-06-27 ENCOUNTER — OFFICE VISIT (OUTPATIENT)
Dept: FAMILY MEDICINE | Facility: OTHER | Age: 82
End: 2022-06-27
Payer: COMMERCIAL

## 2022-06-27 VITALS
OXYGEN SATURATION: 98 % | RESPIRATION RATE: 18 BRPM | TEMPERATURE: 97.8 F | WEIGHT: 181 LBS | BODY MASS INDEX: 29.09 KG/M2 | DIASTOLIC BLOOD PRESSURE: 76 MMHG | HEIGHT: 66 IN | HEART RATE: 78 BPM | SYSTOLIC BLOOD PRESSURE: 140 MMHG

## 2022-06-27 DIAGNOSIS — E11.65 TYPE 2 DIABETES MELLITUS WITH HYPERGLYCEMIA, WITHOUT LONG-TERM CURRENT USE OF INSULIN (H): ICD-10-CM

## 2022-06-27 DIAGNOSIS — E78.5 HYPERLIPIDEMIA WITH TARGET LDL LESS THAN 100: ICD-10-CM

## 2022-06-27 DIAGNOSIS — N18.2 CHRONIC KIDNEY DISEASE, STAGE II (MILD): Primary | ICD-10-CM

## 2022-06-27 DIAGNOSIS — I10 ESSENTIAL HYPERTENSION WITH GOAL BLOOD PRESSURE LESS THAN 140/90: ICD-10-CM

## 2022-06-27 DIAGNOSIS — I10 BENIGN ESSENTIAL HYPERTENSION: ICD-10-CM

## 2022-06-27 DIAGNOSIS — I10 ESSENTIAL HYPERTENSION WITH GOAL BLOOD PRESSURE LESS THAN 150/90: ICD-10-CM

## 2022-06-27 DIAGNOSIS — E03.9 HYPOTHYROIDISM, UNSPECIFIED TYPE: ICD-10-CM

## 2022-06-27 LAB
HBA1C MFR BLD: 7.3 % (ref 0–5.6)
TSH SERPL DL<=0.005 MIU/L-ACNC: 2.16 MU/L (ref 0.4–4)

## 2022-06-27 PROCEDURE — 99214 OFFICE O/P EST MOD 30 MIN: CPT | Performed by: FAMILY MEDICINE

## 2022-06-27 PROCEDURE — 36415 COLL VENOUS BLD VENIPUNCTURE: CPT | Performed by: FAMILY MEDICINE

## 2022-06-27 PROCEDURE — 83036 HEMOGLOBIN GLYCOSYLATED A1C: CPT | Performed by: FAMILY MEDICINE

## 2022-06-27 PROCEDURE — 84443 ASSAY THYROID STIM HORMONE: CPT | Performed by: FAMILY MEDICINE

## 2022-06-27 RX ORDER — AMLODIPINE BESYLATE 5 MG/1
5 TABLET ORAL DAILY
Qty: 90 TABLET | Refills: 0 | Status: SHIPPED | OUTPATIENT
Start: 2022-06-27 | End: 2022-09-26

## 2022-06-27 RX ORDER — LISINOPRIL 40 MG/1
40 TABLET ORAL DAILY
Qty: 90 TABLET | Refills: 0 | Status: SHIPPED | OUTPATIENT
Start: 2022-06-27 | End: 2022-11-02

## 2022-06-27 RX ORDER — METFORMIN HCL 500 MG
TABLET, EXTENDED RELEASE 24 HR ORAL
Qty: 90 TABLET | Refills: 1 | Status: SHIPPED | OUTPATIENT
Start: 2022-06-27 | End: 2022-12-26

## 2022-06-27 RX ORDER — LEVOTHYROXINE SODIUM 88 UG/1
88 TABLET ORAL DAILY
Qty: 90 TABLET | Refills: 3 | Status: SHIPPED | OUTPATIENT
Start: 2022-06-27 | End: 2023-06-26

## 2022-06-27 RX ORDER — ATORVASTATIN CALCIUM 40 MG/1
40 TABLET, FILM COATED ORAL DAILY
Qty: 90 TABLET | Refills: 3 | Status: SHIPPED | OUTPATIENT
Start: 2022-06-27 | End: 2023-06-13

## 2022-06-27 ASSESSMENT — PAIN SCALES - GENERAL: PAINLEVEL: NO PAIN (0)

## 2022-06-27 NOTE — PROGRESS NOTES
Assessment & Plan       ICD-10-CM    1. Chronic kidney disease, stage II (mild)  N18.2 lisinopril (ZESTRIL) 40 MG tablet   2. Essential hypertension with goal blood pressure less than 150/90  I10    3. Type 2 diabetes mellitus with hyperglycemia, without long-term current use of insulin (H)  E11.65 Hemoglobin A1c     lisinopril (ZESTRIL) 40 MG tablet     metFORMIN (GLUCOPHAGE XR) 500 MG 24 hr tablet   4. Hypothyroidism, unspecified type  E03.9 TSH WITH FREE T4 REFLEX   5. Benign essential hypertension  I10 amLODIPine (NORVASC) 5 MG tablet   6. Essential hypertension with goal blood pressure less than 140/90  I10 lisinopril (ZESTRIL) 40 MG tablet   7. Hyperlipidemia with target LDL less than 100  E78.5 atorvastatin (LIPITOR) 40 MG tablet     Patient was seen recently for a right ankle swelling that she was concerned was a DVT and was ruled out.  We did reassure her on this and I am unsure what had happened to cause the ankle joint swelling though she states it is going down and is not as bad today as it was previously encouraged her to continue to wrap this as needed.  She also was noted to have high blood pressure at her follow-up visit and is coming down today as she started taking a full dose of the Norvasc as she had just recently decreased the dose to half a tablet.  We agreed that this is likely the best remedy and will continue to monitor her blood pressure after this full dose and will continue this.  Lastly she was in for her diabetes follow-up and had a slight increase for her A1c today though when we discussed increasing her metformin she states that she does have some diarrhea with this and so she was interested in potentially joining her  with [facility] though we talked about that there is some cost associated with this and she does not have blood sugars that are out of range enough that I would advise that she needs a secondary medication given her age and A1c of 7.3 but that we could continue  "discussed this if she is struggling to bring this back down.    Review of the result(s) of each unique test - a1c, tsh  23 minutes spent on the date of the encounter doing chart review, history and exam, documentation and further activities per the note        Return in about 6 months (around 12/27/2022) for diabetes.    Apryl Olvera MD, MD  [facility] MONO Dan is a 81 year old, presenting for the following health issues:  Hypertension      History of Present Illness       Hypertension: She presents for follow up of hypertension.  She does check blood pressure  regularly outside of the clinic. Outside blood pressures have been over 140/90. She follows a low salt diet.         Hypertension Follow-up      Do you check your blood pressure regularly outside of the clinic? Yes     Are you following a low salt diet? Yes    Are your blood pressures ever more than 140 on the top number (systolic) OR more   than 90 on the bottom number (diastolic), for example 140/90? Yes      How many servings of fruits and vegetables do you eat daily?  2-3    On average, how many sweetened beverages do you drink each day (Examples: soda, juice, sweet tea, etc.  Do NOT count diet or artificially sweetened beverages)?   0    How many days per week do you exercise enough to make your heart beat faster? 5    How many minutes a day do you exercise enough to make your heart beat faster? 30 - 60    How many days per week do you miss taking your medication? 0        Review of Systems   Constitutional, HEENT, cardiovascular, pulmonary, GI, , musculoskeletal, neuro, skin, endocrine and psych systems are negative, except as otherwise noted.      Objective    BP (!) 140/76   Pulse 78   Temp 97.8  F (36.6  C) (Temporal)   Resp 18   Ht 1.664 m (5' 5.5\")   Wt 82.1 kg (181 lb)   SpO2 98%   Breastfeeding No   BMI 29.66 kg/m    Body mass index is 29.66 kg/m .  Physical Exam   GENERAL: healthy, alert and no " distress  RESP: lungs clear to auscultation - no rales, rhonchi or wheezes  CV: regular rate and rhythm, normal S1 S2, no S3 or S4, no murmur, click or rub, no peripheral edema and peripheral pulses strong  MS: Right-sided ankle swelling without any pitting edema  SKIN: no suspicious lesions or rashes  NEURO: Normal strength and tone, mentation intact and speech normal  PSYCH: mentation appears normal, affect normal/bright  Diabetic foot exam: normal DP and PT pulses, no trophic changes or ulcerative lesions, normal sensory exam and normal monofilament exam                    .  ..

## 2022-08-05 ENCOUNTER — OFFICE VISIT (OUTPATIENT)
Dept: DERMATOLOGY | Facility: CLINIC | Age: 82
End: 2022-08-05
Payer: COMMERCIAL

## 2022-08-05 DIAGNOSIS — D48.9 NEOPLASM OF UNCERTAIN BEHAVIOR: ICD-10-CM

## 2022-08-05 DIAGNOSIS — D49.2 NEOPLASM OF UNSPECIFIED BEHAVIOR OF BONE, SOFT TISSUE, AND SKIN: ICD-10-CM

## 2022-08-05 DIAGNOSIS — Z85.828 HISTORY OF NONMELANOMA SKIN CANCER: Primary | ICD-10-CM

## 2022-08-05 DIAGNOSIS — L81.7 PIGMENTED PURPURIC DERMATOSIS: ICD-10-CM

## 2022-08-05 PROCEDURE — 11102 TANGNTL BX SKIN SINGLE LES: CPT | Performed by: DERMATOLOGY

## 2022-08-05 PROCEDURE — 88305 TISSUE EXAM BY PATHOLOGIST: CPT | Performed by: PATHOLOGY

## 2022-08-05 PROCEDURE — 99213 OFFICE O/P EST LOW 20 MIN: CPT | Mod: 25 | Performed by: DERMATOLOGY

## 2022-08-05 RX ORDER — TRIAMCINOLONE ACETONIDE 1 MG/G
CREAM TOPICAL
Qty: 60 G | Refills: 0 | Status: SHIPPED | OUTPATIENT
Start: 2022-08-05 | End: 2023-05-16

## 2022-08-05 NOTE — NURSING NOTE
The following medication was given:     MEDICATION:  Lidocaine with epinephrine 1% [id number]  ROUTE: SQ  SITE: see procedure note  DOSE: 0.5ml  LOT #: [id number]  : [employer]  EXPIRATION DATE:12.1.22  NDC#: 0383-8021-87  Was there drug waste? 1.5ml  Multi-dose vial: Yes    Daniela Nuñez, JENNIFER on 8/5/2022 at 3:44 PM

## 2022-08-05 NOTE — LETTER
"    8/5/2022         RE: iN Maya  [id number] [facility] [id number]        Dear Colleague,    Thank you for referring your patient, Ni Maya, to the [facility]. Please see a copy of my visit note below.    [facility] Dermatology Note  Encounter Date: Aug 5, 2022  Office Visit     Dermatology Problem List:  Last skin check 8/5/22, recommended next in 6 months  0. NUB - right calf, bx 8/5/22  1. \"Precancerous\" lesion removed from the vagina per patient report. ?MICKIE III (documented in chart)  -s/p removal, patient thought done in [place] but no surg path reports available   2. 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 There was some brown pigment at edges of biopsy noted 8/4/2020.      Social History: Retired. Lives with . Has son with down syndrome that lives in [place]. Daughter lives in [place].  Family History: Negative for skin cancer.    ____________________________________________    Assessment & Plan:    # History of nonmelanoma skin cancer, no clincial evidence of recurrence.   - ABCDEs: Counseled ABCDEs of melanoma: Asymmetry, Border (irregularity), Color (not uniform, changes in color), Diameter (greater than 6 mm which is about the size of a pencil eraser), and Evolving (any changes in preexisting moles).  - Sun protection: Counseled SPF30+ sunscreen, UPF clothing, sun avoidance, tanning bed avoidance.     # Neoplasm of unspecified behavior of the skin (D49.2) on the right nose. The differential diagnosis includes milia vs other. Patient declines biopsy at this time  - Will continue to monitor for any " changes.     # Neoplasm of unspecified behavior of the skin (D49.2) on the right calf. The differential diagnosis rule out MM.   - Photographs obtained today.   - See procedure note.    # Pigmented purpuric dermatoses - Slightly increased on the right side.Normal CBC, is on amlodipine with no dropped dose, has negative ultrasound for DVT right leg. Rash on both legs and patient reports old. No on NSAIDS   - Apply triamcinolone 0.1% BID x 2 weeks to lower legs.  -elevated legs    Procedures Performed:   - Shave biopsy procedure note, location(s): right calf. After discussion of benefits and risks including but not limited to bleeding, infection, scar, incomplete removal, recurrence, and non-diagnostic biopsy, written consent and photographs were obtained. The area was cleaned with isopropyl alcohol. 0.5mL of 1% lidocaine with epinephrine was injected to obtain adequate anesthesia of lesion(s). Shave biopsy at site(s) performed. Hemostasis was achieved with aluminium chloride. Petrolatum ointment and a sterile dressing were applied. The patient tolerated the procedure and no complications were noted. The patient was provided with verbal and written post care instructions.       Follow-up: pending path results, and 4 weeks in-person for rash follow up, call clinic if rash worsening earlier for new or changing lesions    Staff and Scribe:     Scribe Disclosure:   I, Shar Capellan, am serving as a scribe to document services personally performed by this physician, Dr. Margareth Talley, based on data collection and the provider's statements to me.       Provider Disclosure:   The documentation recorded by the scribe accurately reflects the services I personally performed and the decisions made by me.    Margareth Talley MD    Department of Dermatology  [facility]: Phone: [phone], Fax:[phone]  [facility]: Phone: [phone], Fax: [phone]      ____________________________________________    CC: Skin Check (Hx of nmsc/ area of concern none)    HPI:  Ms. Ni Maya is a(n) 82 year old female who presents today as a return patient for a skin check.    Last seen 11/17/21 for a skin check with Dr. Grover. No concerning lesions noted.    Today, she has no areas of concern. She has been wearing compression stockings and has noted swelling of both the legs. She notes that a spot on the calf has been there for a long time.    Patient is otherwise feeling well, without additional skin concerns.    Labs Reviewed:  CBC from 6/16/22, note low platelet count.  Ultrasound from 6/16/22 was negative for DVT    Physical Exam:  Vitals: There were no vitals taken for this visit.  SKIN: Total skin excluding the undergarment areas was performed. The exam included the head/face, neck, both arms, chest, back, abdomen, both legs, digits and/or nails. Declines genital exam.  - 4 mm pigmented macule on the right calf.  - Brawny discoloration of the lower legs with pinpoints of red    - No other lesions of concern on areas examined.     Medications:  Current Outpatient Medications   Medication     alcohol swab prep pads     ALLEGRA 180 MG PO TABS     amLODIPine (NORVASC) 5 MG tablet     apixaban ANTICOAGULANT (ELIQUIS) 5 MG tablet     atorvastatin (LIPITOR) 40 MG tablet     blood glucose (RELION PRIME TEST) test strip     blood glucose calibration (NO BRAND SPECIFIED) solution     blood glucose monitoring (NO BRAND SPECIFIED) meter device kit     Blood Glucose Monitoring Suppl W/DEVICE KIT     co-enzyme Q-10 100 MG CAPS capsule     fish oil-omega-3 fatty acids 1000 MG capsule     Lancets (E-Z JECT LANCET MICRO-THIN 33G) MISC     levothyroxine (SYNTHROID/LEVOTHROID) 88 MCG tablet     lisinopril (ZESTRIL) 40 MG tablet     metFORMIN (GLUCOPHAGE XR) 500 MG 24 hr tablet     metoprolol tartrate (LOPRESSOR) 50 MG tablet      thin (NO BRAND SPECIFIED) lancets     UNABLE TO FIND     No current facility-administered medications for this visit.      Past Medical History:   Patient Active Problem List   Diagnosis     Hypothyroidism     Allergic rhinitis     Symptomatic menopausal or female climacteric states     Chronic kidney disease, stage II (mild)     Edema     Obesity     Urethral stricture     MICKIE III (vulvar intraepithelial neoplasia III)     Vulval lesion     Health Care Home     Type 2 diabetes, HbA1C goal < 8% (H)     Hyperlipidemia with target LDL less than 100     HTN, goal below 150/90     ACP (advance care planning)     Essential hypertension with goal blood pressure less than 150/90     Type 2 diabetes mellitus with hyperglycemia, without long-term current use of insulin (H)     Cellulitis of toe of right foot     Chronic atrial fibrillation (H)     Thrombocytopenia (H)     Acute gout involving toe of right foot     Lumbar radiculopathy     Cerebral infarction, unspecified mechanism (H)     Long term current use of anticoagulant therapy     Past Medical History:   Diagnosis Date     Allergic rhinitis, cause unspecified      Essential hypertension, benign      Infection of kidney, unspecified 1999     Other specified acquired hypothyroidism      Other specified types of cystitis(595.89)     1999,6-2-01, 10-10-01         Bre Grover MD  No address on file on close of this encounter.      Again, thank you for allowing me to participate in the care of your patient.        Sincerely,        Margareth Talley MD

## 2022-08-05 NOTE — NURSING NOTE
Ni Maya's goals for this visit include:   Chief Complaint   Patient presents with     Skin Check     Hx of nmsc/ area of concern none     She requests these members of her care team be copied on today's visit information:     PCP: Apryl Olvera    Referring Provider:  Bre Grover MD  No address on file    There were no vitals taken for this visit.    Do you need any medication refills at today's visit? No  Daniela Nuñez, CMA on 8/5/2022 at 2:08 PM

## 2022-08-05 NOTE — PROGRESS NOTES
"[facility] Dermatology Note  Encounter Date: Aug 5, 2022  Office Visit     Dermatology Problem List:  Last skin check 8/5/22, recommended next in 6 months  0. NUB - right calf, bx 8/5/22  1. \"Precancerous\" lesion removed from the vagina per patient report. ?MICKIE III (documented in chart)  -s/p removal, patient thought done in [place] but no surg path reports available   2. 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 There was some brown pigment at edges of biopsy noted 8/4/2020.      Social History: Retired. Lives with . Has son with down syndrome that lives in [facility]. Daughter lives in [place].  Family History: Negative for skin cancer.    ____________________________________________    Assessment & Plan:    # History of nonmelanoma skin cancer, no clincial evidence of recurrence.   - ABCDEs: Counseled ABCDEs of melanoma: Asymmetry, Border (irregularity), Color (not uniform, changes in color), Diameter (greater than 6 mm which is about the size of a pencil eraser), and Evolving (any changes in preexisting moles).  - Sun protection: Counseled SPF30+ sunscreen, UPF clothing, sun avoidance, tanning bed avoidance.     # Neoplasm of unspecified behavior of the skin (D49.2) on the right nose. The differential diagnosis includes milia vs other. Patient declines biopsy at this time  - Will continue to monitor for any changes.     # Neoplasm of unspecified behavior of the skin (D49.2) on the right calf. The differential diagnosis rule out MM.   - Photographs obtained today.   - See procedure note.    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 Rash on both legs and patient reports old. No on NSAIDS   - Apply triamcinolone 0.1% BID x 2 weeks to lower legs.  -elevated legs    Procedures Performed:   - Shave biopsy procedure note, location(s): right calf. After discussion of benefits and risks including but not limited to bleeding, infection, scar, incomplete removal, recurrence, and non-diagnostic biopsy, written consent and photographs were obtained. The area was cleaned with isopropyl alcohol. 0.5mL of 1% lidocaine with epinephrine was injected to obtain adequate anesthesia of lesion(s). Shave biopsy at site(s) performed. Hemostasis was achieved with aluminium chloride. Petrolatum ointment and a sterile dressing were applied. The patient tolerated the procedure and no complications were noted. The patient was provided with verbal and written post care instructions.       Follow-up: pending path results, and 4 weeks in-person for rash follow up, call clinic if rash worsening earlier for new or changing lesions    Staff and Scribe:     Scribe Disclosure:   I, Shar Capellan, am serving as a scribe to document services personally performed by this physician, Dr. Margareth Talley, based on data collection and the provider's statements to me.       Provider Disclosure:   The documentation recorded by the scribe accurately reflects the services I personally performed and the decisions made by me.    Margareth Talley MD    Department of Dermatology  [facility]: Phone: [phone], Fax:[phone]  [facility]: Phone: [phone], Fax: [phone]      ____________________________________________    CC: Skin Check (Hx of nmsc/ area of concern none)    HPI:  Ms. Ni Maya is a(n) 82 year old female who presents today as a return patient  for a skin check.    Last seen 11/17/21 for a skin check with Dr. Grover. No concerning lesions noted.    Today, she has no areas of concern. She has been wearing compression stockings and has noted swelling of both the legs. She notes that a spot on the calf has been there for a long time.    Patient is otherwise feeling well, without additional skin concerns.    Labs Reviewed:  CBC from 6/16/22, note low platelet count.  Ultrasound from 6/16/22 was negative for DVT    Physical Exam:  Vitals: There were no vitals taken for this visit.  SKIN: Total skin excluding the undergarment areas was performed. The exam included the head/face, neck, both arms, chest, back, abdomen, both legs, digits and/or nails. Declines genital exam.  - 4 mm pigmented macule on the right calf.  - Brawny discoloration of the lower legs with pinpoints of red    - No other lesions of concern on areas examined.     Medications:  Current Outpatient Medications   Medication     alcohol swab prep pads     ALLEGRA 180 MG PO TABS     amLODIPine (NORVASC) 5 MG tablet     apixaban ANTICOAGULANT (ELIQUIS) 5 MG tablet     atorvastatin (LIPITOR) 40 MG tablet     blood glucose (RELION PRIME TEST) test strip     blood glucose calibration (NO BRAND SPECIFIED) solution     blood glucose monitoring (NO BRAND SPECIFIED) meter device kit     Blood Glucose Monitoring Suppl W/DEVICE KIT     co-enzyme Q-10 100 MG CAPS capsule     fish oil-omega-3 fatty acids 1000 MG capsule     Lancets (E-Z JECT LANCET MICRO-THIN 33G) MISC     levothyroxine (SYNTHROID/LEVOTHROID) 88 MCG tablet     lisinopril (ZESTRIL) 40 MG tablet     metFORMIN (GLUCOPHAGE XR) 500 MG 24 hr tablet     metoprolol tartrate (LOPRESSOR) 50 MG tablet     thin (NO BRAND SPECIFIED) lancets     UNABLE TO FIND     No current facility-administered medications for this visit.      Past Medical History:   Patient Active Problem List   Diagnosis     Hypothyroidism     Allergic rhinitis     Symptomatic  menopausal or female climacteric states     Chronic kidney disease, stage II (mild)     Edema     Obesity     Urethral stricture     MICKIE III (vulvar intraepithelial neoplasia III)     Vulval lesion     Health Care Home     Type 2 diabetes, HbA1C goal < 8% (H)     Hyperlipidemia with target LDL less than 100     HTN, goal below 150/90     ACP (advance care planning)     Essential hypertension with goal blood pressure less than 150/90     Type 2 diabetes mellitus with hyperglycemia, without long-term current use of insulin (H)     Cellulitis of toe of right foot     Chronic atrial fibrillation (H)     Thrombocytopenia (H)     Acute gout involving toe of right foot     Lumbar radiculopathy     Cerebral infarction, unspecified mechanism (H)     Long term current use of anticoagulant therapy     Past Medical History:   Diagnosis Date     Allergic rhinitis, cause unspecified      Essential hypertension, benign      Infection of kidney, unspecified 1999     Other specified acquired hypothyroidism      Other specified types of cystitis(595.89)     1999,6-2-01, 10-10-01        CC Bre Grover MD  No address on file on close of this encounter.

## 2022-08-05 NOTE — PATIENT INSTRUCTIONS
Wound Care After a Biopsy    What is a skin biopsy?  A skin biopsy allows the doctor to examine a very small piece of tissue under the microscope to determine the diagnosis and the best treatment for the skin condition. A local anesthetic (numbing medicine)  is injected with a very small needle into the skin area to be tested. A small piece of skin is taken from the area. Sometimes a suture (stitch) is used.     What are the risks of a skin biopsy?  I will experience scar, bleeding, swelling, pain, crusting and redness. I may experience incomplete removal or recurrence. Risks of this procedure are excessive bleeding, bruising, infection, nerve damage, numbness, thick (hypertrophic or keloidal) scar and non-diagnostic biopsy.    How should I care for my wound for the first 24 hours?  Keep the wound dry and covered for 24 hours  If it bleeds, hold direct pressure on the area for 15 minutes. If bleeding does not stop then go to the emergency room  Avoid strenuous exercise the first 1-2 days or as your doctor instructs you    How should I care for the wound after 24 hours?  After 24 hours, remove the bandage  You may bathe or shower as normal  If you had a scalp biopsy, you can shampoo as usual and can use shower water to clean the biopsy site daily  Clean the wound twice a day with gentle soap and water  Do not scrub, be gentle  Apply white petroleum/Vaseline after cleaning the wound with a cotton swab or a clean finger, and keep the site covered with a Bandaid /bandage. Bandages are not necessary with a scalp biopsy  If you are unable to cover the site with a Bandaid /bandage, re-apply ointment 2-3 times a day to keep the site moist. Moisture will help with healing  Avoid strenuous activity for first 1-2 days  Avoid lakes, rivers, pools, and oceans until the stitches are removed or the site is healed    How do I clean my wound?  Wash hands thoroughly with soap or use hand  before all wound care  Clean the  wound with gentle soap and water  Apply white petroleum/Vaseline  to wound after it is clean  Replace the Bandaid /bandage to keep the wound covered for the first few days or as instructed by your doctor  If you had a scalp biopsy, warm shower water to the area on a daily basis should suffice    What should I use to clean my wound?   Cotton-tipped applicators (Qtips )  White petroleum jelly (Vaseline ). Use a clean new container and use Q-tips to apply.  Bandaids   as needed  Gentle soap     How should I care for my wound long term?  Do not get your wound dirty  Keep up with wound care for one week or until the area is healed.  A small scab will form and fall off by itself when the area is completely healed. The area will be red and will become pink in color as it heals. Sun protection is very important for how your scar will turn out. Sunscreen with an SPF 30 or greater is recommended once the area is healed.  You should have some soreness but it should be mild and slowly go away over several days. Talk to your doctor about using tylenol for pain,    When should I call my doctor?  If you have increased:   Pain or swelling  Pus or drainage (clear or slightly yellow drainage is ok)  Temperature over 100F  Spreading redness or warmth around wound    When will I hear about my results?  The biopsy results can take 2 weeks to come back.  Your results will automatically release to [facility] before your provider has even reviewed them.  The clinic will call you with the results, send you a Your.MD message, or have you schedule a follow-up clinic or phone time to discuss the results.  Contact our clinics if you do not hear from us in 2 weeks.    Who should I call with questions?  [facility]: [phone]  [facility]: [phone]  For urgent needs outside of business hours call the [facility] at [phone] and ask for the dermatology resident on  call    Patient Education     Checking for Skin Cancer  You can find cancer early by checking your skin each month. There are 3 kinds of skin cancer. They are melanoma, basal cell carcinoma, and squamous cell carcinoma. Doing monthly skin checks is the best way to find new marks or skin changes. Follow the instructions below for checking your skin.   The ABCDEs of checking moles for melanoma   Check your moles or growths for signs of melanoma using ABCDE:   Asymmetry: the sides of the mole or growth don t match  Border: the edges are ragged, notched, or blurred  Color: the color within the mole or growth varies  Diameter: the mole or growth is larger than 6 mm (size of a pencil eraser)  Evolving: the size, shape, or color of the mole or growth is changing (evolving is not shown in the images below)    Checking for other types of skin cancer  Basal cell carcinoma or squamous cell carcinoma have symptoms such as:     A spot or mole that looks different from all other marks on your skin  Changes in how an area feels, such as itching, tenderness, or pain  Changes in the skin's surface, such as oozing, bleeding, or scaliness  A sore that does not heal  New swelling or redness beyond the border of a mole    Who s at risk?  Anyone can get skin cancer. But you are at greater risk if you have:   Fair skin, light-colored hair, or light-colored eyes  Many moles or abnormal moles on your skin  A history of sunburns from sunlight or tanning beds  A family history of skin cancer  A history of exposure to radiation or chemicals  A weakened immune system  If you have had skin cancer in the past, you are at risk for recurring skin cancer.   How to check your skin  Do your monthly skin checkups in front of a full-length mirror. Check all parts of your body, including your:   Head (ears, face, neck, and scalp)  Torso (front, back, and sides)  Arms (tops, undersides, upper, and lower armpits)  Hands (palms, backs, and fingers,  including under the nails)  Buttocks and genitals  Legs (front, back, and sides)  Feet (tops, soles, toes, including under the nails, and between toes)  If you have a lot of moles, take digital photos of them each month. Make sure to take photos both up close and from a distance. These can help you see if any moles change over time.   Most skin changes are not cancer. But if you see any changes in your skin, call your doctor right away. Only he or she can diagnose a problem. If you have skin cancer, seeing your doctor can be the first step toward getting the treatment that could save your life.   [employer] last reviewed this educational content on 4/1/2019 2000-2020 [employer]. [address] [place] [address], [place] [address]. All rights reserved. This information is not intended as a substitute for professional medical care. Always follow your healthcare professional's instructions.       When should I call my doctor?  If you are worsening or not improving, please, contact us or seek urgent care as noted below.     Who should I call with questions (adults)?  [facility] (adult and pediatric): [phone]  [facility] (adult): [phone]  For urgent needs outside of business hours call the [facility] at [phone] and ask for the dermatology resident on call to be paged  If this is a medical emergency and you are unable to reach an ER, Call [phone]    Who should I call with questions (pediatric)?  [facility]- Pediatric Dermatology  Dr. Mallika Bella, Dr. Simon Parker, Dr. Macy Bishop, HANANE eKvin, Dr. Amanda Calles, Dr. Mariah Mack & Dr. Pablo Rodas  Non-urgent nurse triage line; [phone]- Binta and Marcia SILVA Care Coordinatorvaldemar Sandhu (/Complex ) [phone]    If you need a prescription refill, please contact your pharmacy. Refills are approved or denied by  our Physicians during normal business hours, Monday through Fridays  Per office policy, refills will not be granted if you have not been seen within the past year (or sooner depending on your child's condition)    Scheduling Information:  Pediatric Appointment Scheduling and Call Center [phone]  Radiology Scheduling- [phone]  Sedation Unit Scheduling- [phone]  [place] Scheduling- General [phone]; Pediatric Dermatology [phone]  Main  Services: [phone]  Czech: [phone]  Paraguayan: [phone]  Hmong/Persian/Macedonian: [phone]  Preadmission Nursing Department Fax Number: [phone] (Fax all pre-operative paperwork to this number)    For urgent matters arising during evenings, weekends, or holidays that cannot wait for normal business hours please call [phone] and ask for the dermatology resident on call to be paged.

## 2022-08-09 LAB
PATH REPORT.COMMENTS IMP SPEC: NORMAL
PATH REPORT.COMMENTS IMP SPEC: NORMAL
PATH REPORT.FINAL DX SPEC: NORMAL
PATH REPORT.GROSS SPEC: NORMAL
PATH REPORT.MICROSCOPIC SPEC OTHER STN: NORMAL
PATH REPORT.RELEVANT HX SPEC: NORMAL

## 2022-08-11 ENCOUNTER — TELEPHONE (OUTPATIENT)
Dept: DERMATOLOGY | Facility: CLINIC | Age: 82
End: 2022-08-11

## 2022-08-11 NOTE — TELEPHONE ENCOUNTER
----- Message from Margareth Talley MD sent at 8/11/2022  3:02 PM CDT -----  Dear Ni Maya,    We are writing to inform you of your test results that show a harmless keratosis.     Thank you for taking the time to be seen in our dermatology clinic. If you have further questions or concerns, please contact the clinic(see phone number listed below).      Sincerely,    Margareth Talley MD    Department of Dermatology  [facility]: Phone: [phone], Fax:[phone]  [facility]: Phone: [phone], Fax: [phone]

## 2022-08-11 NOTE — TELEPHONE ENCOUNTER
"Case Report   Surgical Pathology Report                         Case: [id number]                                   Authorizing Provider:  Margareth Talley MD           Collected:           08/05/2022 02:22 PM           Ordering Location:     [facility]   Received:            08/05/2022 04:05 PM                                  [place]                                                                   Pathologist:           Tk Barrios MD                                                       Specimen:    Skin, Right calf                                                                           Final Diagnosis   A(1). Skin, Right calf, shave:  - Macular seborrheic keratosis - (see description)       Electronically signed by Tk Barrios MD on 8/9/2022 at 10:38 AM   Clinical Information  [facility]   The patient is a 82 year old female   Gross Description  [facility]   A(1). Skin, Right calf:  The specimen is received in formalin with proper patient identification, labeled \"right calf\".  The specimen consists of a 0.6 to 0.4 cm white-tan skin shave remarkable for a 0.4 x 0.3 cm brown-tan lesion.  The subcutaneous surface is inked blue, and the specimen is serially sectioned and entirely submitted in cassette A1.                 Microscopic Description  [facility]   The specimen exhibits compact orthokeratosis, mildly papillomatous epidermal hyperplasia, elongate rete ridges and increased keratinocyte pigmentation.      Notified.  Daniela Nuñez CMA on 8/11/2022 at 3:13 PM  "

## 2022-09-09 ENCOUNTER — TELEPHONE (OUTPATIENT)
Dept: DERMATOLOGY | Facility: CLINIC | Age: 82
End: 2022-09-09

## 2022-09-09 NOTE — TELEPHONE ENCOUNTER
Spoke with patient regarding Dermatology visit on 9/12 with Dr. Talley, patient stated this appointment is not needed and requested the appt be cancelled.   OLIVIA Lockett

## 2022-09-26 DIAGNOSIS — I10 BENIGN ESSENTIAL HYPERTENSION: ICD-10-CM

## 2022-09-26 RX ORDER — AMLODIPINE BESYLATE 5 MG/1
TABLET ORAL
Qty: 90 TABLET | Refills: 0 | Status: SHIPPED | OUTPATIENT
Start: 2022-09-26 | End: 2022-12-26

## 2022-10-03 ENCOUNTER — TRANSFERRED RECORDS (OUTPATIENT)
Dept: HEALTH INFORMATION MANAGEMENT | Facility: CLINIC | Age: 82
End: 2022-10-03

## 2022-10-03 LAB — RETINOPATHY: NEGATIVE

## 2022-10-20 ENCOUNTER — IMMUNIZATION (OUTPATIENT)
Dept: FAMILY MEDICINE | Facility: OTHER | Age: 82
End: 2022-10-20
Payer: COMMERCIAL

## 2022-10-20 PROCEDURE — G0008 ADMIN INFLUENZA VIRUS VAC: HCPCS

## 2022-10-20 PROCEDURE — 90662 IIV NO PRSV INCREASED AG IM: CPT

## 2022-10-31 DIAGNOSIS — I10 ESSENTIAL HYPERTENSION WITH GOAL BLOOD PRESSURE LESS THAN 140/90: ICD-10-CM

## 2022-10-31 DIAGNOSIS — E11.65 TYPE 2 DIABETES MELLITUS WITH HYPERGLYCEMIA, WITHOUT LONG-TERM CURRENT USE OF INSULIN (H): ICD-10-CM

## 2022-10-31 DIAGNOSIS — N18.2 CHRONIC KIDNEY DISEASE, STAGE II (MILD): ICD-10-CM

## 2022-11-02 RX ORDER — LISINOPRIL 40 MG/1
TABLET ORAL
Qty: 90 TABLET | Refills: 0 | Status: SHIPPED | OUTPATIENT
Start: 2022-11-02 | End: 2022-12-26

## 2022-11-02 NOTE — TELEPHONE ENCOUNTER
Due for appt. Itzel given, please schedule. (Phone, ov, or evisit as appropriate).  Apryl Olvera MD

## 2022-11-02 NOTE — TELEPHONE ENCOUNTER
"Pending Prescriptions:                       Disp   Refills    lisinopril (ZESTRIL) 40 MG tablet [Pharmac*90 tab*0        Sig: TAKE ONE TABLET BY MOUTH ONCE DAILY    Routing refill request to provider for review/approval because:  Labs out of range:  BP  A break in medication  Requested Prescriptions   Pending Prescriptions Disp Refills    lisinopril (ZESTRIL) 40 MG tablet [Pharmacy Med Name: LISINOPRIL 40MG TABS] 90 tablet 0     Sig: TAKE ONE TABLET BY MOUTH ONCE DAILY       ACE Inhibitors (Including Combos) Protocol Failed - 10/31/2022 11:03 AM        Failed - Blood pressure under 140/90 in past 12 months     BP Readings from Last 3 Encounters:   06/27/22 (!) 140/76   06/16/22 (!) 155/86   04/11/22 (!) 148/78                 Passed - Recent (12 mo) or future (30 days) visit within the authorizing provider's specialty     Patient has had an office visit with the authorizing provider or a provider within the authorizing providers department within the previous 12 mos or has a future within next 30 days. See \"Patient Info\" tab in inbasket, or \"Choose Columns\" in Meds & Orders section of the refill encounter.              Passed - Medication is active on med list        Passed - Patient is age 18 or older        Passed - No active pregnancy on record        Passed - Normal serum creatinine on file in past 12 months     Recent Labs   Lab Test 06/16/22  1223   CR 0.88       Ok to refill medication if creatinine is low          Passed - Normal serum potassium on file in past 12 months     Recent Labs   Lab Test 06/16/22  1223   POTASSIUM 3.5             Passed - No positive pregnancy test within past 12 months                   "

## 2022-12-26 ENCOUNTER — OFFICE VISIT (OUTPATIENT)
Dept: FAMILY MEDICINE | Facility: OTHER | Age: 82
End: 2022-12-26
Payer: COMMERCIAL

## 2022-12-26 VITALS
DIASTOLIC BLOOD PRESSURE: 60 MMHG | HEIGHT: 65 IN | TEMPERATURE: 97.8 F | RESPIRATION RATE: 14 BRPM | BODY MASS INDEX: 29.16 KG/M2 | SYSTOLIC BLOOD PRESSURE: 98 MMHG | WEIGHT: 175 LBS | OXYGEN SATURATION: 99 % | HEART RATE: 85 BPM

## 2022-12-26 DIAGNOSIS — E11.65 TYPE 2 DIABETES MELLITUS WITH HYPERGLYCEMIA, WITHOUT LONG-TERM CURRENT USE OF INSULIN (H): Primary | ICD-10-CM

## 2022-12-26 DIAGNOSIS — N18.2 CHRONIC KIDNEY DISEASE, STAGE II (MILD): ICD-10-CM

## 2022-12-26 DIAGNOSIS — E03.9 HYPOTHYROIDISM, UNSPECIFIED TYPE: ICD-10-CM

## 2022-12-26 DIAGNOSIS — E78.5 HYPERLIPIDEMIA WITH TARGET LDL LESS THAN 100: ICD-10-CM

## 2022-12-26 DIAGNOSIS — D69.6 THROMBOCYTOPENIA (H): ICD-10-CM

## 2022-12-26 DIAGNOSIS — Z23 HIGH PRIORITY FOR 2019-NCOV VACCINE: ICD-10-CM

## 2022-12-26 DIAGNOSIS — I10 ESSENTIAL HYPERTENSION WITH GOAL BLOOD PRESSURE LESS THAN 140/90: ICD-10-CM

## 2022-12-26 DIAGNOSIS — M46.1 SACROILIITIS (H): ICD-10-CM

## 2022-12-26 DIAGNOSIS — I10 BENIGN ESSENTIAL HYPERTENSION: ICD-10-CM

## 2022-12-26 LAB
ALBUMIN SERPL-MCNC: 3.8 G/DL (ref 3.4–5)
ALP SERPL-CCNC: 91 U/L (ref 40–150)
ALT SERPL W P-5'-P-CCNC: 27 U/L (ref 0–50)
ANION GAP SERPL CALCULATED.3IONS-SCNC: 5 MMOL/L (ref 3–14)
AST SERPL W P-5'-P-CCNC: 21 U/L (ref 0–45)
BILIRUB SERPL-MCNC: 0.4 MG/DL (ref 0.2–1.3)
BUN SERPL-MCNC: 17 MG/DL (ref 7–30)
CALCIUM SERPL-MCNC: 9.1 MG/DL (ref 8.5–10.1)
CHLORIDE BLD-SCNC: 104 MMOL/L (ref 94–109)
CO2 SERPL-SCNC: 33 MMOL/L (ref 20–32)
CREAT SERPL-MCNC: 0.9 MG/DL (ref 0.52–1.04)
CREAT UR-MCNC: 155 MG/DL
GFR SERPL CREATININE-BSD FRML MDRD: 63 ML/MIN/1.73M2
GLUCOSE BLD-MCNC: 182 MG/DL (ref 70–99)
HBA1C MFR BLD: 7.2 % (ref 0–5.6)
MICROALBUMIN UR-MCNC: 42 MG/L
MICROALBUMIN/CREAT UR: 27.1 MG/G CR (ref 0–25)
POTASSIUM BLD-SCNC: 3.8 MMOL/L (ref 3.4–5.3)
PROT SERPL-MCNC: 6.7 G/DL (ref 6.8–8.8)
SODIUM SERPL-SCNC: 142 MMOL/L (ref 133–144)

## 2022-12-26 PROCEDURE — 82570 ASSAY OF URINE CREATININE: CPT | Performed by: FAMILY MEDICINE

## 2022-12-26 PROCEDURE — 80053 COMPREHEN METABOLIC PANEL: CPT | Performed by: FAMILY MEDICINE

## 2022-12-26 PROCEDURE — 36415 COLL VENOUS BLD VENIPUNCTURE: CPT | Performed by: FAMILY MEDICINE

## 2022-12-26 PROCEDURE — 91312 COVID-19 VACCINE BIVALENT BOOSTER 12+ (PFIZER): CPT | Performed by: FAMILY MEDICINE

## 2022-12-26 PROCEDURE — 83036 HEMOGLOBIN GLYCOSYLATED A1C: CPT | Performed by: FAMILY MEDICINE

## 2022-12-26 PROCEDURE — 82043 UR ALBUMIN QUANTITATIVE: CPT | Performed by: FAMILY MEDICINE

## 2022-12-26 PROCEDURE — 0124A COVID-19 VACCINE BIVALENT BOOSTER 12+ (PFIZER): CPT | Performed by: FAMILY MEDICINE

## 2022-12-26 PROCEDURE — G0439 PPPS, SUBSEQ VISIT: HCPCS | Performed by: FAMILY MEDICINE

## 2022-12-26 PROCEDURE — 99214 OFFICE O/P EST MOD 30 MIN: CPT | Mod: 25 | Performed by: FAMILY MEDICINE

## 2022-12-26 RX ORDER — LISINOPRIL 40 MG/1
40 TABLET ORAL DAILY
Qty: 90 TABLET | Refills: 1 | Status: SHIPPED | OUTPATIENT
Start: 2022-12-26 | End: 2023-06-13

## 2022-12-26 RX ORDER — METOPROLOL TARTRATE 50 MG
50 TABLET ORAL 2 TIMES DAILY
Qty: 180 TABLET | Refills: 1 | Status: CANCELLED | OUTPATIENT
Start: 2022-12-26

## 2022-12-26 RX ORDER — AMLODIPINE BESYLATE 5 MG/1
5 TABLET ORAL DAILY
Qty: 90 TABLET | Refills: 1 | Status: SHIPPED | OUTPATIENT
Start: 2022-12-26 | End: 2023-04-12

## 2022-12-26 RX ORDER — METFORMIN HCL 500 MG
TABLET, EXTENDED RELEASE 24 HR ORAL
Qty: 90 TABLET | Refills: 1 | Status: SHIPPED | OUTPATIENT
Start: 2022-12-26 | End: 2023-09-13

## 2022-12-26 ASSESSMENT — PAIN SCALES - GENERAL: PAINLEVEL: NO PAIN (0)

## 2022-12-26 NOTE — PROGRESS NOTES
Assessment & Plan       ICD-10-CM    1. Sacroiliitis (H)  M46.1       2. Benign essential hypertension  I10 amLODIPine (NORVASC) 5 MG tablet      3. Chronic kidney disease, stage II (mild)  N18.2 lisinopril (ZESTRIL) 40 MG tablet     COMPREHENSIVE METABOLIC PANEL      4. Type 2 diabetes mellitus with hyperglycemia, without long-term current use of insulin (H)  E11.65 lisinopril (ZESTRIL) 40 MG tablet     metFORMIN (GLUCOPHAGE XR) 500 MG 24 hr tablet     HEMOGLOBIN A1C     Albumin Random Urine Quantitative with Creat Ratio     COMPREHENSIVE METABOLIC PANEL      5. Essential hypertension with goal blood pressure less than 140/90  I10 lisinopril (ZESTRIL) 40 MG tablet     COMPREHENSIVE METABOLIC PANEL      6. Hypothyroidism, unspecified type  E03.9       7. Thrombocytopenia (H)  D69.6       8. Hyperlipidemia with target LDL less than 100  E78.5       9. High priority for 2019-nCoV vaccine  Z23 COVID-19,PF,PFIZER BOOSTER BIVALENT 12+Yrs        Patient is having trouble with her SI joint and neck again. Asked about this, but then declined evaluation today.     A1c has been fairly stable as well as her general health has been good.  She does have some concerns over her blood pressure being low and adjusting medications that she is traveling out of town for the next couple months.  We discussed given her blood pressure she is actually more at risk for leaving medications the same and she agreed to stopping the metoprolol and planning to only restart if her blood pressure has been elevated and she can check this at the pharmacy if she is gone.  Also she is concerned about making sure she gets her refills while she is out of town and that is something they directed her to talk with her pharmacy about what she can do it she can always reach fill medication at a outside pharmacy if refill was forwarded    Review of the result(s) of each unique test - a1c, cmp, microalb  32 minutes spent on the date of the encounter doing  "chart review, history and exam, documentation and further activities per the note       BMI:   Estimated body mass index is 28.81 kg/m  as calculated from the following:    Height as of this encounter: 1.66 m (5' 5.35\").    Weight as of this encounter: 79.4 kg (175 lb).   Weight management plan: Discussed healthy diet and exercise guidelines        No follow-ups on file.    Apryl Olvera MD, MD  [facility] is a 82 year old, presenting for the following health issues:  Recheck Medication and Imm/Inj (COVID-19 VACCINE)      HPI     Diabetes Follow-up    How often are you checking your blood sugar? Two times daily  Blood sugar testing frequency justification:  none, just daily monitoring  What time of day are you checking your blood sugars (select all that apply)?  Before meals  Have you had any blood sugars above 200?  No  Have you had any blood sugars below 70?  No    What symptoms do you notice when your blood sugar is low?  None    What concerns do you have today about your diabetes? None     Do you have any of these symptoms? (Select all that apply)  No numbness or tingling in feet.  No redness, sores or blisters on feet.  No complaints of excessive thirst.  No reports of blurry vision.  No significant changes to weight.    Neck pain - not evaluated, had most of life  Rash under belly - thinks it is moisture  Low back - did well with SI joint injection, trying to do exercises at home to help.            Hyperlipidemia Follow-Up      Are you regularly taking any medication or supplement to lower your cholesterol?   Yes- Atorvastatin    Are you having muscle aches or other side effects that you think could be caused by your cholesterol lowering medication?  No    Hypertension Follow-up      Do you check your blood pressure regularly outside of the clinic? No     Are you following a low salt diet? Yes    Are your blood pressures ever more than 140 on the top number " "(systolic) OR more   than 90 on the bottom number (diastolic), for example 140/90? NA    BP Readings from Last 2 Encounters:   12/26/22 98/60   06/27/22 (!) 140/76     Hemoglobin A1C (%)   Date Value   12/26/2022 7.2 (H)   06/27/2022 7.3 (H)   04/12/2021 7.0 (H)   07/22/2020 7.0 (H)     LDL Cholesterol Calculated (mg/dL)   Date Value   03/17/2022 28   04/12/2021 51   07/22/2020 41         How many servings of fruits and vegetables do you eat daily?  0-1    On average, how many sweetened beverages do you drink each day (Examples: soda, juice, sweet tea, etc.  Do NOT count diet or artificially sweetened beverages)?   0    How many days per week do you exercise enough to make your heart beat faster? 3 or less    How many minutes a day do you exercise enough to make your heart beat faster? 9 or less    How many days per week do you miss taking your medication? 0    Annual Wellness Visit    Patient has been advised of split billing requirements and indicates understanding: Yes     Are you in the first 12 months of your Medicare Part B coverage?  No    Physical Health:    In general, how would you rate your overall physical health? good    Outside of work, how many days during the week do you exercise?6-7 days/week    Outside of work, approximately how many minutes a day do you exercise?15-30 minutes    If you drink alcohol do you typically have >3 drinks per day or >7 drinks per week? Not Applicable    Do you usually eat at least 4 servings of fruit and vegetables a day, include whole grains & fiber and avoid regularly eating high fat or \"junk\" foods? NO    Do you have any problems taking medications regularly? No    Do you have any side effects from medications? none    Needs assistance for the following daily activities: no assistance needed    Which of the following safety concerns are present in your home?  none identified     Hearing impairment: No    In the past 6 months, have you been bothered by leaking of urine? " "no    Mental Health:    In general, how would you rate your overall mental or emotional health? good  PHQ-2 Score:      Do you feel safe in your environment? Yes    Have you ever done Advance Care Planning? (For example, a Health Directive, POLST, or a discussion with a medical provider or your loved ones about your wishes)? Yes, advance care planning is on file.    Fall risk:  Fallen 2 or more times in the past year?: No  Any fall with injury in the past year?: No    Cognitive Screenin) Repeat 3 items (Leader, Season, Table)    2) Clock draw: NORMAL  3) 3 item recall: Recalls 3 objects  Results: 3 items recalled: COGNITIVE IMPAIRMENT LESS LIKELY    Mini-CogTM Copyright S Linnea. Licensed by the author for use in [place]; reprinted with permission (chriss@[facility]). All rights reserved.          Current providers sharing in care for this patient include:   Patient Care Team:  Apryl Olvera MD as PCP - General (Family Practice)  Sohan Deleon MD as Assigned Heart and Vascular Provider  Apryl Olvera MD as Assigned PCP  Clinton Benitez DO as Assigned Musculoskeletal Provider  Margareth Talley MD as MD (Dermatology)  Margareth Talley MD as Assigned Surgical Provider    Patient has been advised of split billing requirements and indicates understanding: Yes    Review of Systems   Constitutional, HEENT, cardiovascular, pulmonary, GI, , musculoskeletal, neuro, skin, endocrine and psych systems are negative, except as otherwise noted.      Objective    BP 98/60   Pulse 85   Temp 97.8  F (36.6  C) (Temporal)   Resp 14   Ht 1.66 m (5' 5.35\")   Wt 79.4 kg (175 lb)   SpO2 99%   BMI 28.81 kg/m    Body mass index is 28.81 kg/m .  Physical Exam   GENERAL: healthy, alert and no distress  RESP: lungs clear to auscultation - no rales, rhonchi or wheezes  CV: regular rate and rhythm, normal S1 S2, no S3 or S4, no murmur, click or rub, no peripheral edema and peripheral pulses strong  MS: no " gross musculoskeletal defects noted, no edema  SKIN: no suspicious lesions or rashes  NEURO: Normal strength and tone, mentation intact and speech normal  PSYCH: mentation appears normal, affect normal/bright

## 2022-12-27 ENCOUNTER — TELEPHONE (OUTPATIENT)
Dept: FAMILY MEDICINE | Facility: OTHER | Age: 82
End: 2022-12-27

## 2022-12-27 DIAGNOSIS — B37.2 CANDIDAL INTERTRIGO: Primary | ICD-10-CM

## 2022-12-27 RX ORDER — NYSTATIN 100000 U/G
CREAM TOPICAL 2 TIMES DAILY
Qty: 30 G | Refills: 1 | Status: SHIPPED | OUTPATIENT
Start: 2022-12-27 | End: 2023-05-16

## 2022-12-27 NOTE — RESULT ENCOUNTER NOTE
Labs look pretty stable, though just a little low on protein. Make sure to get protein (nuts, or meats) in your daily diet.  Apryl Olvera MD

## 2022-12-27 NOTE — TELEPHONE ENCOUNTER
Called patient to relay recent lab results. After, patient stated she discussed an Rx for a cream yesterday at her appt. Asking if provider could still send that to the pharmacy for her.       [id number] - [place], [place] - [address] [facility]

## 2023-01-03 DIAGNOSIS — I48.20 CHRONIC ATRIAL FIBRILLATION (H): ICD-10-CM

## 2023-01-03 NOTE — TELEPHONE ENCOUNTER
Last Written Prescription Date:  2/22/2022  Last Fill Quantity: 180,  # refills: 3   Last office visit: 2/3/2022 with prescribing provider:     Future Office Visit:  Due to be seen back around 2/3/2023, appointment is scheduled for 3/16/2023.    Abby Duenas on 1/3/2023 at 9:08 AM

## 2023-01-03 NOTE — TELEPHONE ENCOUNTER
[facility] Call Center    Phone Message    May a detailed message be left on voicemail: yes     Reason for Call: Medication Refill Request    Has the patient contacted the pharmacy for the refill? Yes   Name of medication being requested: Tomi  Provider who prescribed the medication: Dr. Deleon  Pharmacy: [id number] - [place], [place] - [address] [address]   Date medication is needed: 1/5/23   Patient is leaving the state and will not be back until mid March 2023. She is wondering if she can get a 2 month supply sent to pharmacy to get her through until they come back.       Action Taken: Other: cardiology    Travel Screening: Not Applicable   Thank you!  [facility]

## 2023-04-12 ENCOUNTER — OFFICE VISIT (OUTPATIENT)
Dept: CARDIOLOGY | Facility: CLINIC | Age: 83
End: 2023-04-12
Payer: COMMERCIAL

## 2023-04-12 VITALS
DIASTOLIC BLOOD PRESSURE: 84 MMHG | HEART RATE: 98 BPM | OXYGEN SATURATION: 98 % | HEIGHT: 65 IN | SYSTOLIC BLOOD PRESSURE: 132 MMHG | BODY MASS INDEX: 29.32 KG/M2 | WEIGHT: 176 LBS

## 2023-04-12 DIAGNOSIS — I10 BENIGN ESSENTIAL HYPERTENSION: ICD-10-CM

## 2023-04-12 DIAGNOSIS — I48.20 CHRONIC ATRIAL FIBRILLATION (H): Primary | ICD-10-CM

## 2023-04-12 PROCEDURE — 99214 OFFICE O/P EST MOD 30 MIN: CPT | Mod: 25 | Performed by: INTERNAL MEDICINE

## 2023-04-12 PROCEDURE — 93000 ELECTROCARDIOGRAM COMPLETE: CPT | Performed by: INTERNAL MEDICINE

## 2023-04-12 RX ORDER — METOPROLOL TARTRATE 50 MG
50 TABLET ORAL 2 TIMES DAILY
Qty: 180 TABLET | Refills: 3 | Status: SHIPPED | OUTPATIENT
Start: 2023-04-12 | End: 2024-03-20

## 2023-04-12 RX ORDER — METOPROLOL TARTRATE 50 MG
25 TABLET ORAL 2 TIMES DAILY
Qty: 180 TABLET | Refills: 3 | Status: SHIPPED | OUTPATIENT
Start: 2023-04-12 | End: 2023-04-12

## 2023-04-12 NOTE — LETTER
4/12/2023    Apryl Olvera MD, MD  [address] [address]    RE: Ni Gadrineron       Dear Colleague,     I had the pleasure of seeing Ni Maya in the [facility].  HPI and Plan:   Ms. Maya is a very pleasant 82-year-old female with history of chronic atrial fibrillation, essentially asymptomatic from it, on rate control strategy on Eliquis for stroke prophylaxis with CHADS2-VASc score of 7.  She has history of stroke (cerebellar stroke back in MRI in 2019).  Today she is coming for routine follow-up.  She had an echocardiogram done 2021 that showed normal LV function, mild mitral regurgitation.  Today she is coming for routine follow-up accompanied by her .  Remarkably in December last year metoprolol was discontinued because patient was having some low blood pressure.  She is additionally on amlodipine 5 mg daily and lisinopril 40 mg daily.  Not unexpectedly today in the clinic her heart rates were high in 1 teens.  EKG confirms atrial fibrillation with elevated ventricular rates.  Remarkably she does not feel any palpitation or any particular symptoms like shortness of breath dizziness presyncope syncope or chest pain.    Assessment plan  1.  Chronic atrial fibrillation historically has done well on rate control strategy with metoprolol tartrate 50 mg twice daily till recently when this was discontinued because of low blood pressure.  Ventricular rates are elevated now.  I recommend restarting metoprolol tartrate 50 mg twice daily.  In the past she had some low blood pressure values so I recommend discontinuing amlodipine at this time.  I recommend follow-up in a month to see if ventricular rates are adequately controlled by restarting metoprolol and also to make sure the blood pressure remains stable.  CHADS2 Vascor is elevated and appropriately she is on apixaban.  Tolerating it quite well without any bleeding issues  2.  Hypertension has low blood pressure  recordings in the past and metoprolol was discontinued.  Blood pressure appears well controlled currently.  As we are restarting beta-blocker I recommend discontinuing amlodipine.    Recommendations  Metoprolol tartrate 50 mg twice daily  Discontinue amlodipine  Continue apixaban  Follow-up in a month      Today's clinic visit entailed:  Review of the result(s) of each unique test - EKG      The level of medical decision making during this visit was of moderate complexity.      Orders Placed This Encounter   Procedures    Follow-Up with Cardiology    EKG 12-lead complete w/read - Clinics       Orders Placed This Encounter   Medications    DISCONTD: metoprolol tartrate (LOPRESSOR) 50 MG tablet     Sig: Take 0.5 tablets (25 mg) by mouth 2 times daily     Dispense:  180 tablet     Refill:  3    metoprolol tartrate (LOPRESSOR) 50 MG tablet     Sig: Take 1 tablet (50 mg) by mouth 2 times daily     Dispense:  180 tablet     Refill:  3       Medications Discontinued During This Encounter   Medication Reason    amLODIPine (NORVASC) 5 MG tablet     metoprolol tartrate (LOPRESSOR) 50 MG tablet          Encounter Diagnoses   Name Primary?    Chronic atrial fibrillation (H) Yes    Benign essential hypertension        CURRENT MEDICATIONS:  Current Outpatient Medications   Medication Sig Dispense Refill    alcohol swab prep pads Use to swab area of injection/geoffrey as directed. 100 each 3    ALLEGRA 180 MG PO TABS 1 TABLET DAILY 90 Tab 3    apixaban ANTICOAGULANT (ELIQUIS) 5 MG tablet Take 1 tablet (5 mg) by mouth 2 times daily 180 tablet 3    atorvastatin (LIPITOR) 40 MG tablet Take 1 tablet (40 mg) by mouth daily 90 tablet 3    blood glucose (RELION PRIME TEST) test strip 1 strip by In Vitro route 2 times daily Use to test blood sugar 3 times daily or as directed. 100 strip 1    blood glucose calibration (NO BRAND SPECIFIED) solution To accompany: Blood Glucose Monitor Brands: per insurance. 1 Bottle 3    blood glucose  monitoring (NO BRAND SPECIFIED) meter device kit Use to test blood sugar 3 times daily or as directed. Preferred blood glucose meter OR supplies to accompany: Blood Glucose Monitor Brands: per insurance. 1 kit 0    Blood Glucose Monitoring Suppl W/DEVICE KIT 1 kit 3 times daily. 1 kit 0    co-enzyme Q-10 100 MG CAPS capsule Take 100 mg by mouth daily      fish oil-omega-3 fatty acids 1000 MG capsule Take 2 g by mouth daily. 2 caps per day      Lancets (E-Z JECT LANCET MICRO-THIN 33G) MISC       levothyroxine (SYNTHROID/LEVOTHROID) 88 MCG tablet Take 1 tablet (88 mcg) by mouth daily 90 tablet 3    lisinopril (ZESTRIL) 40 MG tablet Take 1 tablet (40 mg) by mouth daily 90 tablet 1    metFORMIN (GLUCOPHAGE XR) 500 MG 24 hr tablet TAKE one TABLET ONCE DAILY WITH DINNER 90 tablet 1    metoprolol tartrate (LOPRESSOR) 50 MG tablet Take 1 tablet (50 mg) by mouth 2 times daily 180 tablet 3    nystatin (MYCOSTATIN) 224900 UNIT/GM external cream Apply topically 2 times daily 30 g 1    thin (NO BRAND SPECIFIED) lancets Use with lanceting device. To accompany: Blood Glucose Monitor Brands: per insurance. 100 each 6    UNABLE TO FIND MEDICATION NAME: resvante supplement      triamcinolone (KENALOG) 0.1 % external cream Use twice daily for two weeks for rash on lower legs (Patient not taking: Reported on 12/26/2022) 60 g 0       ALLERGIES     Allergies   Allergen Reactions    Sulfa Drugs Rash       PAST MEDICAL HISTORY:  Past Medical History:   Diagnosis Date    Allergic rhinitis, cause unspecified     Essential hypertension, benign     Infection of kidney, unspecified 1999    Other specified acquired hypothyroidism     Other specified types of cystitis(595.89)     1999,6-2-01, 10-10-01       PAST SURGICAL HISTORY:  Past Surgical History:   Procedure Laterality Date    BIOPSY VULVA/PERINEUM,ADDNL LESN  9/18/09    Wide -excision of MICKIE III- right labia     COLONOSCOPY  9/13/2013    Procedure: COLONOSCOPY;  Colonoscopy;  Surgeon:  Yanick Ramsey MD;  Location: [facility]    HC BIOPSY OF BREAST, OPEN INCISIONAL  1960    Benign    HC ERCP W SPHINCTEROTOMY  1996 6/2/96 and 8/30/96    HC REDUCTION OF LARGE BREAST  1988    HC REMOVAL GALLBLADDER  1995    HC UGI ENDOSCOPY DIAG W OR W/O BRUSH/WASH  7-    INJECT EPIDURAL LUMBAR N/A 8/10/2017    Procedure: INJECT EPIDURAL LUMBAR;  Lumbar 2-3 Epidural Steroid Injection;  Surgeon: Kimani Garcia MD;  Location: [facility]    INJECT EPIDURAL LUMBAR Right 5/10/2018    Procedure: INJECT EPIDURAL LUMBAR;  right lumbar 2 - lumbar 3 epidural steroid injection;  Surgeon: Kimani Garcia MD;  Location: [facility]    [facility] LAPAROSCOPY, SURGICAL; W/ VAGINAL HYSTERECTOMY W/WO REMOVAL OVARY(S)/TUBES  1984    for bleeding    ZZC LIGATE FALLOPIAN TUBE,POSTPARTUM  1978    Tubal Ligation    [facility] ANGIOGRAPHY ARCH  4-3-98    [facility] COLONOSCOPY THRU STOMA, DIAGNOSTIC  4-24-98    Diverticuli - due in 2008       FAMILY HISTORY:  Family History   Problem Relation Age of Onset    Cardiovascular Father         Aortic aneurysm    Hypertension Father     Hypertension Mother     Gastrointestinal Disease Mother         GI Bleed    Lipids Sister     Hypertension Sister     Genitourinary Problems Sister     Allergies Sister     Cancer Brother         Lung    Alcohol/Drug Brother     Connective Tissue Disorder Son         Down Syndrome    Respiratory Son         Pneumonia    Cancer Maternal Grandmother         Stomach    Hypertension Maternal Grandfather     Allergies Daughter        SOCIAL HISTORY:  Social History     Socioeconomic History    Marital status:      Spouse name: Marco Antonio    Number of children: 2    Years of education: 17    Highest education level: None   Occupational History    Occupation: Retired in 1994     Employer: RETIRED   Tobacco Use    Smoking status: Never    Smokeless tobacco: Never   Vaping Use    Vaping status: Never Used   Substance and Sexual Activity    Alcohol use: No    Drug use: No    Sexual activity:  "Not Currently     Partners: Male     Birth control/protection: Female Surgical   Other Topics Concern    Parent/sibling w/ CABG, MI or angioplasty before 65F 55M? No       Review of Systems:  Skin:          Eyes:  Positive for glasses    ENT:         Respiratory:    No particular shortness of breath  Cardiovascular:  Negative;palpitations;chest pain;edema;lightheadedness;dizziness;syncope or near-syncope      Gastroenterology:        Genitourinary:         Musculoskeletal:         Neurologic:  Negative numbness or tingling of hands;numbness or tingling of feet    Psychiatric:         Heme/Lymph/Imm:         Endocrine:           Physical Exam:  Vitals: /84 (BP Location: Right arm, Patient Position: Sitting, Cuff Size: Adult Regular)   Pulse 98   Ht 1.66 m (5' 5.35\")   Wt 79.8 kg (176 lb)   SpO2 98%   BMI 28.98 kg/m      General patient appears comfortable  Neck normal JVP  Cardiovascular system tachycardia, irregular, no murmur rub or gallop  Respite system clear to auscultation  Extremities no edema  Neurological alert, oriented    CC  No referring provider defined for this encounter.      Thank you for allowing me to participate in the care of your patient.      Sincerely,     Sohan Deleon MD     [facility]  "

## 2023-04-12 NOTE — PROGRESS NOTES
HPI and Plan:   Ms. Maya is a very pleasant 82-year-old female with history of chronic atrial fibrillation, essentially asymptomatic from it, on rate control strategy on Eliquis for stroke prophylaxis with CHADS2-VASc score of 7.  She has history of stroke (cerebellar stroke back in MRI in 2019).  Today she is coming for routine follow-up.  She had an echocardiogram done 2021 that showed normal LV function, mild mitral regurgitation.  Today she is coming for routine follow-up accompanied by her .  Remarkably in December last year metoprolol was discontinued because patient was having some low blood pressure.  She is additionally on amlodipine 5 mg daily and lisinopril 40 mg daily.  Not unexpectedly today in the clinic her heart rates were high in 1 teens.  EKG confirms atrial fibrillation with elevated ventricular rates.  Remarkably she does not feel any palpitation or any particular symptoms like shortness of breath dizziness presyncope syncope or chest pain.    Assessment plan  1.  Chronic atrial fibrillation historically has done well on rate control strategy with metoprolol tartrate 50 mg twice daily till recently when this was discontinued because of low blood pressure.  Ventricular rates are elevated now.  I recommend restarting metoprolol tartrate 50 mg twice daily.  In the past she had some low blood pressure values so I recommend discontinuing amlodipine at this time.  I recommend follow-up in a month to see if ventricular rates are adequately controlled by restarting metoprolol and also to make sure the blood pressure remains stable.  CHADS2 Vascor is elevated and appropriately she is on apixaban.  Tolerating it quite well without any bleeding issues  2.  Hypertension has low blood pressure recordings in the past and metoprolol was discontinued.  Blood pressure appears well controlled currently.  As we are restarting beta-blocker I recommend discontinuing  amlodipine.    Recommendations  Metoprolol tartrate 50 mg twice daily  Discontinue amlodipine  Continue apixaban  Follow-up in a month      Today's clinic visit entailed:  Review of the result(s) of each unique test - EKG      The level of medical decision making during this visit was of moderate complexity.      Orders Placed This Encounter   Procedures     Follow-Up with Cardiology     EKG 12-lead complete w/read - Clinics       Orders Placed This Encounter   Medications     DISCONTD: metoprolol tartrate (LOPRESSOR) 50 MG tablet     Sig: Take 0.5 tablets (25 mg) by mouth 2 times daily     Dispense:  180 tablet     Refill:  3     metoprolol tartrate (LOPRESSOR) 50 MG tablet     Sig: Take 1 tablet (50 mg) by mouth 2 times daily     Dispense:  180 tablet     Refill:  3       Medications Discontinued During This Encounter   Medication Reason     amLODIPine (NORVASC) 5 MG tablet      metoprolol tartrate (LOPRESSOR) 50 MG tablet          Encounter Diagnoses   Name Primary?     Chronic atrial fibrillation (H) Yes     Benign essential hypertension        CURRENT MEDICATIONS:  Current Outpatient Medications   Medication Sig Dispense Refill     alcohol swab prep pads Use to swab area of injection/geoffrey as directed. 100 each 3     ALLEGRA 180 MG PO TABS 1 TABLET DAILY 90 Tab 3     apixaban ANTICOAGULANT (ELIQUIS) 5 MG tablet Take 1 tablet (5 mg) by mouth 2 times daily 180 tablet 3     atorvastatin (LIPITOR) 40 MG tablet Take 1 tablet (40 mg) by mouth daily 90 tablet 3     blood glucose (RELION PRIME TEST) test strip 1 strip by In Vitro route 2 times daily Use to test blood sugar 3 times daily or as directed. 100 strip 1     blood glucose calibration (NO BRAND SPECIFIED) solution To accompany: Blood Glucose Monitor Brands: per insurance. 1 Bottle 3     blood glucose monitoring (NO BRAND SPECIFIED) meter device kit Use to test blood sugar 3 times daily or as directed. Preferred blood glucose meter OR supplies to accompany:  Blood Glucose Monitor Brands: per insurance. 1 kit 0     Blood Glucose Monitoring Suppl W/DEVICE KIT 1 kit 3 times daily. 1 kit 0     co-enzyme Q-10 100 MG CAPS capsule Take 100 mg by mouth daily       fish oil-omega-3 fatty acids 1000 MG capsule Take 2 g by mouth daily. 2 caps per day       Lancets (E-Z JECT LANCET MICRO-THIN 33G) MISC        levothyroxine (SYNTHROID/LEVOTHROID) 88 MCG tablet Take 1 tablet (88 mcg) by mouth daily 90 tablet 3     lisinopril (ZESTRIL) 40 MG tablet Take 1 tablet (40 mg) by mouth daily 90 tablet 1     metFORMIN (GLUCOPHAGE XR) 500 MG 24 hr tablet TAKE one TABLET ONCE DAILY WITH DINNER 90 tablet 1     metoprolol tartrate (LOPRESSOR) 50 MG tablet Take 1 tablet (50 mg) by mouth 2 times daily 180 tablet 3     nystatin (MYCOSTATIN) 661009 UNIT/GM external cream Apply topically 2 times daily 30 g 1     thin (NO BRAND SPECIFIED) lancets Use with lanceting device. To accompany: Blood Glucose Monitor Brands: per insurance. 100 each 6     UNABLE TO FIND MEDICATION NAME: resvante supplement       triamcinolone (KENALOG) 0.1 % external cream Use twice daily for two weeks for rash on lower legs (Patient not taking: Reported on 12/26/2022) 60 g 0       ALLERGIES     Allergies   Allergen Reactions     Sulfa Drugs Rash       PAST MEDICAL HISTORY:  Past Medical History:   Diagnosis Date     Allergic rhinitis, cause unspecified      Essential hypertension, benign      Infection of kidney, unspecified 1999     Other specified acquired hypothyroidism      Other specified types of cystitis(595.89)     1999,6-2-01, 10-10-01       PAST SURGICAL HISTORY:  Past Surgical History:   Procedure Laterality Date     BIOPSY VULVA/PERINEUM,ADDNL LESN  9/18/09    Wide -excision of MICKIE III- right labia      COLONOSCOPY  9/13/2013    Procedure: COLONOSCOPY;  Colonoscopy;  Surgeon: Yanick Ramsey MD;  Location: [facility]     HC BIOPSY OF BREAST, OPEN INCISIONAL  1960    Benign     HC ERCP W SPHINCTEROTOMY  1996 6/2/96  and 8/30/96     HC REDUCTION OF LARGE BREAST  1988     HC REMOVAL GALLBLADDER  1995     HC UGI ENDOSCOPY DIAG W OR W/O BRUSH/WASH  7-     INJECT EPIDURAL LUMBAR N/A 8/10/2017    Procedure: INJECT EPIDURAL LUMBAR;  Lumbar 2-3 Epidural Steroid Injection;  Surgeon: Kimani Garcia MD;  Location: [facility]     INJECT EPIDURAL LUMBAR Right 5/10/2018    Procedure: INJECT EPIDURAL LUMBAR;  right lumbar 2 - lumbar 3 epidural steroid injection;  Surgeon: Kimani Garcia MD;  Location: [facility]     ZZ[facility] LAPAROSCOPY, SURGICAL; W/ VAGINAL HYSTERECTOMY W/WO REMOVAL OVARY(S)/TUBES  1984    for bleeding     ZZ[facility] LIGATE FALLOPIAN TUBE,POSTPARTUM  1978    Tubal Ligation     [facility] ANGIOGRAPHY ARCH  4-3-98     [facility] COLONOSCOPY THRU STOMA, DIAGNOSTIC  4-24-98    Diverticuli - due in 2008       FAMILY HISTORY:  Family History   Problem Relation Age of Onset     Cardiovascular Father         Aortic aneurysm     Hypertension Father      Hypertension Mother      Gastrointestinal Disease Mother         GI Bleed     Lipids Sister      Hypertension Sister      Genitourinary Problems Sister      Allergies Sister      Cancer Brother         Lung     Alcohol/Drug Brother      Connective Tissue Disorder Son         Down Syndrome     Respiratory Son         Pneumonia     Cancer Maternal Grandmother         Stomach     Hypertension Maternal Grandfather      Allergies Daughter        SOCIAL HISTORY:  Social History     Socioeconomic History     Marital status:      Spouse name: Marco Antonio     Number of children: 2     Years of education: 17     Highest education level: None   Occupational History     Occupation: Retired in 1994     Employer: RETIRED   Tobacco Use     Smoking status: Never     Smokeless tobacco: Never   Vaping Use     Vaping status: Never Used   Substance and Sexual Activity     Alcohol use: No     Drug use: No     Sexual activity: Not Currently     Partners: Male     Birth control/protection: Female Surgical   Other Topics Concern  "    Parent/sibling w/ CABG, MI or angioplasty before 65F 55M? No       Review of Systems:  Skin:          Eyes:  Positive for glasses    ENT:         Respiratory:    No particular shortness of breath  Cardiovascular:  Negative;palpitations;chest pain;edema;lightheadedness;dizziness;syncope or near-syncope      Gastroenterology:        Genitourinary:         Musculoskeletal:         Neurologic:  Negative numbness or tingling of hands;numbness or tingling of feet    Psychiatric:         Heme/Lymph/Imm:         Endocrine:           Physical Exam:  Vitals: /84 (BP Location: Right arm, Patient Position: Sitting, Cuff Size: Adult Regular)   Pulse 98   Ht 1.66 m (5' 5.35\")   Wt 79.8 kg (176 lb)   SpO2 98%   BMI 28.98 kg/m      General patient appears comfortable  Neck normal JVP  Cardiovascular system tachycardia, irregular, no murmur rub or gallop  Respite system clear to auscultation  Extremities no edema  Neurological alert, oriented    CC  No referring provider defined for this encounter.              "

## 2023-05-16 ENCOUNTER — OFFICE VISIT (OUTPATIENT)
Dept: PODIATRY | Facility: CLINIC | Age: 83
End: 2023-05-16
Payer: COMMERCIAL

## 2023-05-16 ENCOUNTER — OFFICE VISIT (OUTPATIENT)
Dept: CARDIOLOGY | Facility: CLINIC | Age: 83
End: 2023-05-16
Attending: INTERNAL MEDICINE
Payer: COMMERCIAL

## 2023-05-16 VITALS
DIASTOLIC BLOOD PRESSURE: 92 MMHG | HEIGHT: 65 IN | BODY MASS INDEX: 29.29 KG/M2 | HEART RATE: 86 BPM | WEIGHT: 175.8 LBS | OXYGEN SATURATION: 98 % | SYSTOLIC BLOOD PRESSURE: 146 MMHG

## 2023-05-16 VITALS
SYSTOLIC BLOOD PRESSURE: 162 MMHG | BODY MASS INDEX: 29.29 KG/M2 | WEIGHT: 175.8 LBS | HEIGHT: 65 IN | DIASTOLIC BLOOD PRESSURE: 90 MMHG

## 2023-05-16 DIAGNOSIS — I48.20 CHRONIC ATRIAL FIBRILLATION (H): ICD-10-CM

## 2023-05-16 DIAGNOSIS — M10.00 IDIOPATHIC GOUT, UNSPECIFIED CHRONICITY, UNSPECIFIED SITE: Primary | ICD-10-CM

## 2023-05-16 DIAGNOSIS — I10 BENIGN ESSENTIAL HYPERTENSION: Primary | ICD-10-CM

## 2023-05-16 LAB — URATE SERPL-MCNC: 7.5 MG/DL (ref 2.4–5.7)

## 2023-05-16 PROCEDURE — 84550 ASSAY OF BLOOD/URIC ACID: CPT | Performed by: PODIATRIST

## 2023-05-16 PROCEDURE — 99214 OFFICE O/P EST MOD 30 MIN: CPT | Performed by: PHYSICIAN ASSISTANT

## 2023-05-16 PROCEDURE — 36415 COLL VENOUS BLD VENIPUNCTURE: CPT | Performed by: PODIATRIST

## 2023-05-16 PROCEDURE — 99214 OFFICE O/P EST MOD 30 MIN: CPT | Performed by: PODIATRIST

## 2023-05-16 RX ORDER — AMLODIPINE BESYLATE 5 MG/1
5 TABLET ORAL DAILY
Qty: 90 TABLET | Refills: 3 | Status: SHIPPED | OUTPATIENT
Start: 2023-05-16 | End: 2024-03-20

## 2023-05-16 ASSESSMENT — PAIN SCALES - GENERAL: PAINLEVEL: MILD PAIN (2)

## 2023-05-16 NOTE — PATIENT INSTRUCTIONS
Thanks for coming into [facility] today.    We discussed: we'll get your blood pressure under control.  Your blood pressure goal is less than 130/80.  Please check your blood pressure once a day at least an hour after taking medications.  Please bring those readings into your next clinic visit.      Medication changes:  start taking amlodipine 5 mg once a day.   Continue your other medications.     Follow up: we'll do a nurse visit in 2 weeks, bring your blood cuff in and we can adjust medicine as needed.        Please call the clinic at  [phone] with any questions or concerns and my our nurses will be happy to help.    Please call [phone] for scheduling.      Reminder: Please bring in all current medications, over the counter supplements and vitamin bottles to your next appointment.

## 2023-05-16 NOTE — PROGRESS NOTES
"  Cardiology Clinic Progress Note    Service Date: May 16, 2023    Primary Cardiologist: Dr. Deleon      Reason for Visit: afib, HTN     HPI:   Ni Maya is a delightful 82-year-old woman with past cardiac history seen for the followin.  Chronic A-fib with rate control and anticoagulation approach  2.  Hypertension  3.  History of CVA with some residual memory issues, this was noted in   4.  Mild mitral regurg and normal EF.    I am meeting Sandy for the first time today.  She was last seen by Dr. Deleon about a month ago and was found to be tachycardic.  Her metoprolol had been discontinued in December when she presented with low but asymptomatic blood pressure.  Blood pressures are reasonably controlled at Dr. Deleon clinic visit and her amlodipine was discontinued and metoprolol was restarted at 50 mg twice daily.    She comes in today saying that she does not feel any different from a cardiac standpoint.  She denies chest pain, shortness of breath, orthopnea, or PND.  She has not noted any palpitations.  She does complain of a slight amount of edema in her right leg.  She had redness in her great toe on her body and then up into her ankle for 4 days this is slowly resolved now and is in her toe and her bunion.  She has not had fevers or chills.    Social History:  She comes in today with her  Marco Antonio.  Lifelong non-smoker no alcohol use.    Physical Exam:  BP (!) 146/92 (BP Location: Right arm, Patient Position: Sitting, Cuff Size: Adult Regular)   Pulse 86   Ht 1.66 m (5' 5.35\")   Wt 79.7 kg (175 lb 12.8 oz)   SpO2 98%   BMI 28.94 kg/m     Wt Readings from Last 5 Encounters:   23 79.7 kg (175 lb 12.8 oz)   23 79.8 kg (176 lb)   22 79.4 kg (175 lb)   22 82.1 kg (181 lb)   22 82.1 kg (181 lb)      Well-developed well-nourished woman in no acute distress.  Normocephalic atraumatic.  Heart is irregularly irregular.  I do not appreciate murmur rub or gallop.  Lungs " are clear without wheezes rales or rhonchi.  Right foot with trace edema in the ankle and foot bunion is mildly erythematous as is her right great toe.  Foot has good cap refill and is warm.    Echocardiogram reviewed  Last creatinine in December 2022 was 0.9 sodium 142 BUN 17 potassium 3.8.    Assessment and Plan:  1.  Hypertension, uncontrolled with home blood pressures in the 160s and 170s.  Heart rates she reported have been in the 60s 70s and 80s.  She notes that this has been a longstanding issue with difficult to control back even into her 30s and 40s.  At this point were going to add back the amlodipine at 5 mg a day and keep her on metoprolol 50 mg twice daily and lisinopril 40 mg daily.  Her goal blood pressure is less than 130/80.  She will check home blood pressures and will do a nurse visit in 2 weeks.  If it remains elevated in the next step will go up to metoprolol 75 mg twice daily.  If need be we can always switch her to carvedilol which would likely provide reasonable rate control and blood pressure control.  She will also bring in her home cuff to make sure that this is accurate.    2.  Chronic A-fib rates controlled today appropriately on Eliquis for a [id number] score of 7 (age, female gender, hypertension, type 2 diabetes, CVA).    3.  Right foot pain with some erythema.  We greatly appreciate our podiatry colleagues for getting her in today.  I did offer her uric acid and CBC for possible treatment of gout but she prefers to be seen by podiatry.    Thank you for allowing me to participate in this delightful patient's care.  We will do a nurse visit in 2 weeks and I will plan on seeing her back in July if needed for her blood pressure, she will call sooner with concerns.    Carly Antonio PA-C  11:12 AM 5/16/2023   [facility] Cardiology       Orders this visit:  Orders Placed This Encounter   Procedures     Follow-Up with Cardiology [facility]     Orders Placed This Encounter   Medications      amLODIPine (NORVASC) 5 MG tablet     Sig: Take 1 tablet (5 mg) by mouth daily     Dispense:  90 tablet     Refill:  3     Dose adjustment only- DO NOT FILL!     Medications Discontinued During This Encounter   Medication Reason     nystatin (MYCOSTATIN) 561276 UNIT/GM external cream Therapy completed (No AVS)     triamcinolone (KENALOG) 0.1 % external cream Therapy completed (No AVS)     Encounter Diagnoses   Name Primary?     Chronic atrial fibrillation (H)      Benign essential hypertension Yes       Current Medications:  Current Outpatient Medications   Medication Sig Dispense Refill     alcohol swab prep pads Use to swab area of injection/geoffrey as directed. 100 each 3     amLODIPine (NORVASC) 5 MG tablet Take 1 tablet (5 mg) by mouth daily 90 tablet 3     apixaban ANTICOAGULANT (ELIQUIS) 5 MG tablet Take 1 tablet (5 mg) by mouth 2 times daily 180 tablet 3     atorvastatin (LIPITOR) 40 MG tablet Take 1 tablet (40 mg) by mouth daily 90 tablet 3     blood glucose (RELION PRIME TEST) test strip 1 strip by In Vitro route 2 times daily Use to test blood sugar 3 times daily or as directed. 100 strip 1     blood glucose calibration (NO BRAND SPECIFIED) solution To accompany: Blood Glucose Monitor Brands: per insurance. 1 Bottle 3     blood glucose monitoring (NO BRAND SPECIFIED) meter device kit Use to test blood sugar 3 times daily or as directed. Preferred blood glucose meter OR supplies to accompany: Blood Glucose Monitor Brands: per insurance. 1 kit 0     Blood Glucose Monitoring Suppl W/DEVICE KIT 1 kit 3 times daily. 1 kit 0     co-enzyme Q-10 100 MG CAPS capsule Take 100 mg by mouth daily       fish oil-omega-3 fatty acids 1000 MG capsule Take 2 g by mouth daily. 2 caps per day       Lancets (E-Z JECT LANCET MICRO-THIN 33G) MISC        levothyroxine (SYNTHROID/LEVOTHROID) 88 MCG tablet Take 1 tablet (88 mcg) by mouth daily 90 tablet 3     lisinopril (ZESTRIL) 40 MG tablet Take 1 tablet (40 mg) by mouth daily 90  tablet 1     metFORMIN (GLUCOPHAGE XR) 500 MG 24 hr tablet TAKE one TABLET ONCE DAILY WITH DINNER 90 tablet 1     metoprolol tartrate (LOPRESSOR) 50 MG tablet Take 1 tablet (50 mg) by mouth 2 times daily 180 tablet 3     thin (NO BRAND SPECIFIED) lancets Use with lanceting device. To accompany: Blood Glucose Monitor Brands: per insurance. 100 each 6     UNABLE TO FIND MEDICATION NAME: resvante supplement       ALLEGRA 180 MG PO TABS 1 TABLET DAILY (Patient not taking: Reported on 5/16/2023) 90 Tab 3       Allergies:  Allergies   Allergen Reactions     Sulfa Antibiotics Rash       Past Medical, Surgical and Family History:  Past Medical History:   Diagnosis Date     Allergic rhinitis, cause unspecified      Essential hypertension, benign      Infection of kidney, unspecified 1999     Other specified acquired hypothyroidism      Other specified types of cystitis(595.89)     1999,6-2-01, 10-10-01     Past Surgical History:   Procedure Laterality Date     BIOPSY VULVA/PERINEUM,ADDNL LESN  9/18/09    Wide -excision of MICKIE III- right labia      COLONOSCOPY  9/13/2013    Procedure: COLONOSCOPY;  Colonoscopy;  Surgeon: Yanick Ramsey MD;  Location: [facility]     HC BIOPSY OF BREAST, OPEN INCISIONAL  1960    Benign     HC ERCP W SPHINCTEROTOMY  1996 6/2/96 and 8/30/96     HC REDUCTION OF LARGE BREAST  1988     HC REMOVAL GALLBLADDER  1995     HC UGI ENDOSCOPY DIAG W OR W/O BRUSH/WASH  7-     INJECT EPIDURAL LUMBAR N/A 8/10/2017    Procedure: INJECT EPIDURAL LUMBAR;  Lumbar 2-3 Epidural Steroid Injection;  Surgeon: Kimani Garcia MD;  [facility]     INJECT EPIDURAL LUMBAR Right 5/10/2018    Procedure: INJECT EPIDURAL LUMBAR;  right lumbar 2 - lumbar 3 epidural steroid injection;  Surgeon: Kimani Garcia MD;  Location:  OR     Z LAPAROSCOPY, SURGICAL; W/ VAGINAL HYSTERECTOMY W/WO REMOVAL OVARY(S)/TUBES  1984    for bleeding     Z LIGATE FALLOPIAN TUBE,POSTPARTUM  1978    Tubal Ligation     [facility]     [facility] COLONOSCOPY THRU STOMA, DIAGNOSTIC  4-24-98    Diverticuli - due in 2008     Family History   Problem Relation Age of Onset     Cardiovascular Father         Aortic aneurysm     Hypertension Father      Hypertension Mother      Gastrointestinal Disease Mother         GI Bleed     Lipids Sister      Hypertension Sister      Genitourinary Problems Sister      Allergies Sister      Cancer Brother         Lung     Alcohol/Drug Brother      Connective Tissue Disorder Son         Down Syndrome     Respiratory Son         Pneumonia     Cancer Maternal Grandmother         Stomach     Hypertension Maternal Grandfather      Allergies Daughter         Review of Systems:  Skin:        Eyes:  Positive for glasses  ENT:       Respiratory:  Positive for dyspnea on exertion  Cardiovascular:  Negative;palpitations;chest pain;lightheadedness;dizziness;syncope or near-syncope Positive for;edema  Gastroenterology:      Genitourinary:       Musculoskeletal:       Neurologic:  Negative numbness or tingling of hands;numbness or tingling of feet  Psychiatric:       Heme/Lymph/Imm:  Positive for allergies  Endocrine:          Recent Lab Results:  LIPID RESULTS:  Lab Results   Component Value Date    CHOL 102 03/17/2022    CHOL 122 04/12/2021    HDL 49 (L) 03/17/2022    HDL 48 (L) 04/12/2021    LDL 28 03/17/2022    LDL 51 04/12/2021    TRIG 127 03/17/2022    TRIG 117 04/12/2021    CHOLHDLRATIO 3.6 10/19/2015     LIVER ENZYME RESULTS:  Lab Results   Component Value Date    AST 21 12/26/2022    AST 25 07/22/2020    ALT 27 12/26/2022    ALT 34 07/22/2020     CBC RESULTS:  Lab Results   Component Value Date    WBC 6.6 06/16/2022    WBC 6.7 07/22/2020    RBC 4.49 06/16/2022    RBC 4.66 07/22/2020    HGB 13.5 06/16/2022    HGB 13.9 07/22/2020    HCT 40.4 06/16/2022    HCT 42.8 07/22/2020    MCV 90 06/16/2022    MCV 92 07/22/2020    MCH 30.1 06/16/2022    MCH 29.8 07/22/2020    MCHC 33.4 06/16/2022    MCHC 32.5 07/22/2020     RDW 13.2 06/16/2022    RDW 13.2 07/22/2020     (L) 06/16/2022     (L) 07/22/2020     BMP RESULTS:  Lab Results   Component Value Date     12/26/2022     07/22/2020    POTASSIUM 3.8 12/26/2022    POTASSIUM 3.9 07/22/2020    CHLORIDE 104 12/26/2022    CHLORIDE 104 07/22/2020    CO2 33 (H) 12/26/2022    CO2 31 07/22/2020    ANIONGAP 5 12/26/2022    ANIONGAP 6 07/22/2020     (H) 12/26/2022     (H) 07/22/2020    BUN 17 12/26/2022    BUN 12 07/22/2020    CR 0.90 12/26/2022    CR 0.87 07/22/2020    GFRESTIMATED 63 12/26/2022    GFRESTIMATED 62 07/22/2020    GFRESTBLACK 72 07/22/2020    RODNEY 9.1 12/26/2022    RODNEY 9.1 07/22/2020      A1C RESULTS:  Lab Results   Component Value Date    A1C 7.2 (H) 12/26/2022    A1C 7.0 (H) 04/12/2021     INR RESULTS:  Lab Results   Component Value Date    INR 1.26 (H) 09/20/2019    INR 0.96 08/07/2017       CC  Sohan Deleon MD  [id number]  [place] [id number]

## 2023-05-16 NOTE — PROGRESS NOTES
HPI:  Ni Maya is a 82 year old female who is seen in consultation at the request of self.    Pt presents for eval of:   (Onset, Location, L/R, Character, Treatments, Injury if yes)    XR Right foot 4/11/2022    DM type 2     Onset 5/12/2023, medial Right great toe joint, redness, swelling. Medial lower leg swelling, redness has resolved. Patient was admitted 11/26/2017 for Right great toe gout and Right foot and second toe cellulitis. Presents today with her . Onset throbbing pain, redness, swelling that has improved.    Retired.    Review of Systems:  Patient denies fever, chills, rash, wound, stiffness, limping, numbness, weakness, heart burn, blood in stool, chest pain with activity, calf pain when walking, shortness of breath with activity, chronic cough, easy bleeding/bruising, swelling of ankles, excessive thirst, fatigue, depression, anxiety.       PAST MEDICAL HISTORY:   Past Medical History:   Diagnosis Date     Allergic rhinitis, cause unspecified      Essential hypertension, benign      Infection of kidney, unspecified 1999     Other specified acquired hypothyroidism      Other specified types of cystitis(595.89)     1999,6-2-01, 10-10-01        PAST SURGICAL HISTORY:   Past Surgical History:   Procedure Laterality Date     BIOPSY VULVA/PERINEUM,ADDNL LESN  9/18/09    Wide -excision of MICKIE III- right labia      COLONOSCOPY  9/13/2013    Procedure: COLONOSCOPY;  Colonoscopy;  Surgeon: Yanick Ramsey MD;  Location: [facility]     [facility] BIOPSY OF BREAST, OPEN INCISIONAL  1960    Benign     HC ERCP W SPHINCTEROTOMY  1996 6/2/96 and 8/30/96     HC REDUCTION OF LARGE BREAST  1988     HC REMOVAL GALLBLADDER  1995     [facility] UGI ENDOSCOPY DIAG W OR W/O BRUSH/WASH  7-     INJECT EPIDURAL LUMBAR N/A 8/10/2017    Procedure: INJECT EPIDURAL LUMBAR;  Lumbar 2-3 Epidural Steroid Injection;  Surgeon: Kimani Garcia MD;  Location: [facility]     INJECT EPIDURAL LUMBAR Right 5/10/2018    Procedure: INJECT  EPIDURAL LUMBAR;  right lumbar 2 - lumbar 3 epidural steroid injection;  Surgeon: Kimani Garcia MD;  Location:  OR     [facility] LAPAROSCOPY, SURGICAL; W/ VAGINAL HYSTERECTOMY W/WO REMOVAL OVARY(S)/TUBES  1984    for bleeding     [facility] LIGATE FALLOPIAN TUBE,POSTPARTUM  1978    Tubal Ligation     [facility]     [facility], DI[facility]NOSTIC  4-24-98    Diverticuli - due in 2008        MEDICATIONS:   Current Outpatient Medications:      alcohol swab prep pads, Use to swab area of injection/geoffrey as directed., Disp: 100 each, Rfl: 3     apixaban ANTICOAGULANT (ELIQUIS) 5 MG tablet, Take 1 tablet (5 mg) by mouth 2 times daily, Disp: 180 tablet, Rfl: 3     atorvastatin (LIPITOR) 40 MG tablet, Take 1 tablet (40 mg) by mouth daily, Disp: 90 tablet, Rfl: 3     blood glucose (RELION PRIME TEST) test strip, 1 strip by In Vitro route 2 times daily Use to test blood sugar 3 times daily or as directed., Disp: 100 strip, Rfl: 1     blood glucose calibration (NO BRAND SPECIFIED) solution, To accompany: Blood Glucose Monitor Brands: per insurance., Disp: 1 Bottle, Rfl: 3     blood glucose monitoring (NO BRAND SPECIFIED) meter device kit, Use to test blood sugar 3 times daily or as directed. Preferred blood glucose meter OR supplies to accompany: Blood Glucose Monitor Brands: per insurance., Disp: 1 kit, Rfl: 0     Blood Glucose Monitoring Suppl W/DEVICE KIT, 1 kit 3 times daily., Disp: 1 kit, Rfl: 0     co-enzyme Q-10 100 MG CAPS capsule, Take 100 mg by mouth daily, Disp: , Rfl:      fish oil-omega-3 fatty acids 1000 MG capsule, Take 2 g by mouth daily. 2 caps per day, Disp: , Rfl:      Lancets (E-Z JECT LANCET MICRO-THIN 33G) MISC, , Disp: , Rfl:      levothyroxine (SYNTHROID/LEVOTHROID) 88 MCG tablet, Take 1 tablet (88 mcg) by mouth daily, Disp: 90 tablet, Rfl: 3     lisinopril (ZESTRIL) 40 MG tablet, Take 1 tablet (40 mg) by mouth daily, Disp: 90 tablet, Rfl: 1     metFORMIN (GLUCOPHAGE XR) 500 MG 24 hr  tablet, TAKE one TABLET ONCE DAILY WITH DINNER, Disp: 90 tablet, Rfl: 1     metoprolol tartrate (LOPRESSOR) 50 MG tablet, Take 1 tablet (50 mg) by mouth 2 times daily, Disp: 180 tablet, Rfl: 3     thin (NO BRAND SPECIFIED) lancets, Use with lanceting device. To accompany: Blood Glucose Monitor Brands: per insurance., Disp: 100 each, Rfl: 6     ALLEGRA 180 MG PO TABS, 1 TABLET DAILY (Patient not taking: Reported on 5/16/2023), Disp: 90 Tab, Rfl: 3     amLODIPine (NORVASC) 5 MG tablet, Take 1 tablet (5 mg) by mouth daily (Patient not taking: Reported on 5/16/2023), Disp: 90 tablet, Rfl: 3     UNABLE TO FIND, MEDICATION NAME: resvante supplement, Disp: , Rfl:      ALLERGIES:    Allergies   Allergen Reactions     Sulfa Antibiotics Rash        SOCIAL HISTORY:   Social History     Socioeconomic History     Marital status:      Spouse name: Marco Antonio     Number of children: 2     Years of education: 17     Highest education level: Not on file   Occupational History     Occupation: Retired in 1994     Employer: RETIRED   Tobacco Use     Smoking status: Never     Smokeless tobacco: Never   Vaping Use     Vaping status: Never Used   Substance and Sexual Activity     Alcohol use: No     Drug use: No     Sexual activity: Not Currently     Partners: Male     Birth control/protection: Female Surgical   Other Topics Concern     Parent/sibling w/ CABG, MI or angioplasty before 65F 55M? No   Social History Narrative     Not on file     Social Determinants of Health     Financial Resource Strain: Not on file   Food Insecurity: Not on file   Transportation Needs: Not on file   Physical Activity: Not on file   Stress: Not on file   Social Connections: Not on file   Intimate Partner Violence: Not on file   Housing Stability: Not on file        FAMILY HISTORY:   Family History   Problem Relation Age of Onset     Cardiovascular Father         Aortic aneurysm     Hypertension Father      Hypertension Mother      Gastrointestinal  "Disease Mother         GI Bleed     Lipids Sister      Hypertension Sister      Genitourinary Problems Sister      Allergies Sister      Cancer Brother         Lung     Alcohol/Drug Brother      Connective Tissue Disorder Son         Down Syndrome     Respiratory Son         Pneumonia     Cancer Maternal Grandmother         Stomach     Hypertension Maternal Grandfather      Allergies Daughter         EXAM:Vitals: BP (!) 162/90 (BP Location: Left arm, Patient Position: Sitting, Cuff Size: Adult Regular)   Ht 1.66 m (5' 5.35\")   Wt 79.7 kg (175 lb 12.8 oz)   BMI 28.94 kg/m    BMI= Body mass index is 28.94 kg/m .    General appearance: Patient is alert and fully cooperative with history & exam.  No sign of distress is noted during the visit.     Psychiatric: Affect is pleasant & appropriate.  Patient appears motivated to improve health.     Respiratory: Breathing is regular & unlabored while sitting.     HEENT: Hearing is intact to spoken word.  Speech is clear.  No gross evidence of visual impairment that would impact ambulation.     Vascular: DP & PT pulses 1/4 bilateral, mild generalized edema bilateral lower extremities CFT less than 3 seconds.  There is localized erythema heat and edema about the right first metatarsal phalangeal joint.     Neurologic: Lower extremity sensation is intact to light touch.  No evidence of weakness or contracture in the lower extremities.  No evidence of neuropathy.    Dermatologic: Skin is intact to both lower extremities without significant laceration ulceration drainage or abrasion.      Musculoskeletal: No gross dislocation noted.  Localized erythema heat and edema about the right first metatarsal phalangeal joint.  Minimal discomfort with direct and firm palpation and throughout range of motion of the joint.  No crepitus throughout range of motion of the joint.    Radiographs:  Deferred     Uric acid is 7.5 on 5/16/2023    Hemoglobin A1C (%)   Date Value   12/26/2022 7.2 (H) "   06/27/2022 7.3 (H)   03/17/2022 7.1 (H)   08/11/2021 7.2 (H)   04/12/2021 7.0 (H)   07/22/2020 7.0 (H)   11/21/2019 6.4 (H)   07/25/2019 6.1 (H)   04/30/2019 6.1 (H)   11/08/2018 7.1 (H)   04/25/2018 6.9 (H)   11/01/2017 6.6 (H)     Creatinine (mg/dL)   Date Value   12/26/2022 0.90   06/16/2022 0.88   08/11/2021 0.92   07/22/2020 0.87   11/21/2019 0.84   09/20/2019 0.79   04/30/2019 0.84   12/03/2018 1.03   07/25/2018 1.00        ASSESSMENT:       ICD-10-CM    1. Idiopathic gout, unspecified chronicity, unspecified site  M10.00 Uric acid     Uric acid           PLAN:  5/16/2023  Obtained and interpreted radiographs  Recommend increasing fluid to 3 L of clear fluid per day avoid or minimize caffeine and alcohol.  Discussed low purine diet and written instructions dispensed  Recommend low purine diet  Obtained labs demonstrating high uric acid  Discussed injecting the joint or utilizing oral medication if this becomes a chronic issue.    All options were discussed.  If this recurs she may follow-up with PCP for chronic management and monitoring of renal function and considering allopurinol.      Tho Louie DPM

## 2023-05-16 NOTE — PATIENT INSTRUCTIONS
Gout  What Is Gout?  Gout is a disorder that results from the build-up of uric acid in the tissues or a joint. It most often affects the joint of the big toe.  Causes  Gout attacks are caused by deposits of crystallized uric acid in the joint. Uric acid is present in the blood and eliminated in the urine, but in people who have gout, uric acid accumulates and crystallizes in the joints. Uric acid is the result of the breakdown of purines, chemicals that are found naturally in our bodies and in food. Some people develop gout because their kidneys have difficulty eliminating normal amounts of uric acid, while others produce too much uric acid.  Gout occurs most commonly in the big toe because uric acid is sensitive to temperature changes. At cooler temperatures, uric acid turns into crystals. Since the toe is the part of the body that is farthest from the heart, it s also the coolest part of the body - and, thus, the most likely target of gout. However, gout can affect any joint in the body.  The tendency to accumulate uric acid is often inherited. Other factors that put a person at risk for developing gout include: high blood pressure, diabetes, obesity, surgery, chemotherapy, stress, and certain medications and vitamins. For example, the body s ability to remove uric acid can be negatively affected by taking aspirin, some diuretic medications ( water pills ), and the vitamin niacin (also called nicotinic acid). While gout is more common in men aged 40 to 60 years, it can occur in younger men as well as in women.  Consuming foods and beverages that contain high levels of purines can trigger an attack of gout. Some foods contain more purines than others and have been associated with an increase of uric acid, which leads to gout. You may be able to reduce your chances of getting a gout attack by limiting or avoiding shellfish, organ meats (kidney, liver, etc.), red wine, beer, and red meat.  Symptoms  An attack of gout  can be miserable, marked by the following symptoms:  Intense pain that comes on suddenly - often in the middle of the night or upon arising   Signs of inflammation such as redness, swelling, and warmth over the joint.   Diagnosis  To diagnose gout, the foot and ankle surgeon will ask questions about your personal and family medical history, followed by an examination of the affected joint. Laboratory tests and x-rays are sometimes ordered to determine if the inflammation is caused by something other than gout.  Treatment  Initial treatment of an attack of gout typically includes the following:  Medications. Prescription medications or injections are used to treat the pain, swelling, and inflammation.   Dietary restrictions. Foods and beverages that are high in purines should be avoided, since purines are converted in the body to uric acid.   Fluids. Drink plenty of water and other fluids each day, while also avoiding alcoholic beverages, which cause dehydration. Cherry Juice works well to help decrease pain.  Immobilize and elevate the foot. Avoid standing and walking to give your foot a rest. Also, elevate your foot (level with or slightly above the heart) to help reduce swelling.   The symptoms of gout and the inflammatory process usually resolve in three to ten days with treatment. If gout symptoms continue despite the initial treatment, or if repeated attacks occur, see your primary care physician for maintenance treatment that may involve daily medication. In cases of repeated episodes, the underlying problem must be addressed, as the build-up of uric acid over time can cause arthritic damage to the joint.  Diet details  A gout diet reduces your intake of foods that are high in purines, which helps control your body's production of uric acid. If you're overweight or obese, lose weight. However, avoid fasting and rapid weight loss because these can promote a gout attack. Drink plenty of fluids to help flush uric  acid from your body. Also avoid high-protein diets, which can cause you to produce too much uric acid (hyperuricemia).   To follow the diet:   Limit meat, poultry and fish. Animal proteins are high in purine. Avoid or severely limit high-purine foods, such as organ meats, herring, anchovies and mackerel. Red meat (beef, pork and lamb), fatty fish and seafood (tuna, shrimp, lobster and scallops) are associated with increased risk of gout. Because all meat, poultry and fish contain purines, limit your intake to 4 to 6 ounces (113 to 170 grams) daily.   Eat more plant-based proteins. You can increase your protein by including more plant-based sources, such as beans and legumes. This switch will also help you cut down on saturated fats, which may indirectly contribute to obesity and gout.   Limit or avoid alcohol. Alcohol interferes with the elimination of uric acid from your body. Drinking beer, in particular, has been linked to gout attacks. If you're having an attack, avoid alcohol. However, when you're not having an attack, drinking one or two 5-ounce (148 milliliter) servings a day of wine is not likely to increase your risk.   Drink plenty of fluids, particularly water. Fluids can help remove uric acid from your body. Aim for eight to 16 8-ounce (237 milliliter) glasses a day.   Choose low-fat or fat-free dairy products. Some studies have shown that drinking skim or low-fat milk and eating foods made with them, such as yogurt, help reduce the risk of gout. Aim for adequate dairy intake of 16 to 24 fluid ounces (473 to 710 milliliters) daily.   Choose complex carbohydrates. Eat more whole grains and fruits and vegetables and fewer refined carbohydrates, such as white bread, cakes and candy.   Limit or avoid sugar. Too many sweets can leave you with no room for plant-based proteins and low-fat or fat-free dairy products -- the foods you need to avoid gout. Sugary foods also tend to be high in calories, so they make  it easier to eat more than you're likely to burn off. Although there's debate about whether sugar has a direct effect on uric acid levels, sweets are definitely linked to overweight and obesity.   There's also some evidence that drinking four to six cups of coffee a day lowers gout risk in men.   Results  Following a gout diet can help you limit your body's uric acid production and increase its elimination. It's not likely to lower the uric acid concentration in your blood enough to treat your gout without medication, but it may help decrease the number of attacks and limit their severity. Following the gout diet and limiting your calories -- particularly if you also add in moderate daily exercise, such as brisk walking -- also can improve your overall health by helping you achieve and maintain a healthy weight.

## 2023-05-16 NOTE — LETTER
"2023    Apryl Olvera MD, MD  [address] [facility] [address]    RE: Ni Maya       Dear Colleague,     I had the pleasure of seeing Ni Maya in the [facility].    Cardiology Clinic Progress Note    Service Date: May 16, 2023    Primary Cardiologist: Dr. Deleon      Reason for Visit: afib, HTN     HPI:   Ni Maya is a delightful 82-year-old woman with past cardiac history seen for the followin.  Chronic A-fib with rate control and anticoagulation approach  2.  Hypertension  3.  History of CVA with some residual memory issues, this was noted in 2019  4.  Mild mitral regurg and normal EF.    I am meeting Sandy for the first time today.  She was last seen by Dr. Deleon about a month ago and was found to be tachycardic.  Her metoprolol had been discontinued in December when she presented with low but asymptomatic blood pressure.  Blood pressures are reasonably controlled at Dr. Deleon clinic visit and her amlodipine was discontinued and metoprolol was restarted at 50 mg twice daily.    She comes in today saying that she does not feel any different from a cardiac standpoint.  She denies chest pain, shortness of breath, orthopnea, or PND.  She has not noted any palpitations.  She does complain of a slight amount of edema in her right leg.  She had redness in her great toe on her body and then up into her ankle for 4 days this is slowly resolved now and is in her toe and her bunion.  She has not had fevers or chills.    Social History:  She comes in today with her  Marco Antonio.  Lifelong non-smoker no alcohol use.    Physical Exam:  BP (!) 146/92 (BP Location: Right arm, Patient Position: Sitting, Cuff Size: Adult Regular)   Pulse 86   Ht 1.66 m (5' 5.35\")   Wt 79.7 kg (175 lb 12.8 oz)   SpO2 98%   BMI 28.94 kg/m     Wt Readings from Last 5 Encounters:   23 79.7 kg (175 lb 12.8 oz)   23 79.8 kg (176 lb)   22 79.4 kg (175 lb)   22 82.1 kg (181 lb) "   04/11/22 82.1 kg (181 lb)      Well-developed well-nourished woman in no acute distress.  Normocephalic atraumatic.  Heart is irregularly irregular.  I do not appreciate murmur rub or gallop.  Lungs are clear without wheezes rales or rhonchi.  Right foot with trace edema in the ankle and foot bunion is mildly erythematous as is her right great toe.  Foot has good cap refill and is warm.    Echocardiogram reviewed  Last creatinine in December 2022 was 0.9 sodium 142 BUN 17 potassium 3.8.    Assessment and Plan:  1.  Hypertension, uncontrolled with home blood pressures in the 160s and 170s.  Heart rates she reported have been in the 60s 70s and 80s.  She notes that this has been a longstanding issue with difficult to control back even into her 30s and 40s.  At this point were going to add back the amlodipine at 5 mg a day and keep her on metoprolol 50 mg twice daily and lisinopril 40 mg daily.  Her goal blood pressure is less than 130/80.  She will check home blood pressures and will do a nurse visit in 2 weeks.  If it remains elevated in the next step will go up to metoprolol 75 mg twice daily.  If need be we can always switch her to carvedilol which would likely provide reasonable rate control and blood pressure control.  She will also bring in her home cuff to make sure that this is accurate.    2.  Chronic A-fib rates controlled today appropriately on Eliquis for a [id number] score of 7 (age, female gender, hypertension, type 2 diabetes, CVA).    3.  Right foot pain with some erythema.  We greatly appreciate our podiatry colleagues for getting her in today.  I did offer her uric acid and CBC for possible treatment of gout but she prefers to be seen by podiatry.    Thank you for allowing me to participate in this delightful patient's care.  We will do a nurse visit in 2 weeks and I will plan on seeing her back in July if needed for her blood pressure, she will call sooner with concerns.    Carly Antonio,  AVTAR  11:12 AM 5/16/2023   [facility]       Orders this visit:  Orders Placed This Encounter   Procedures    Follow-Up with Cardiology SARA     Orders Placed This Encounter   Medications    amLODIPine (NORVASC) 5 MG tablet     Sig: Take 1 tablet (5 mg) by mouth daily     Dispense:  90 tablet     Refill:  3     Dose adjustment only- DO NOT FILL!     Medications Discontinued During This Encounter   Medication Reason    nystatin (MYCOSTATIN) 310747 UNIT/GM external cream Therapy completed (No AVS)    triamcinolone (KENALOG) 0.1 % external cream Therapy completed (No AVS)     Encounter Diagnoses   Name Primary?    Chronic atrial fibrillation (H)     Benign essential hypertension Yes       Current Medications:  Current Outpatient Medications   Medication Sig Dispense Refill    alcohol swab prep pads Use to swab area of injection/geoffrey as directed. 100 each 3    amLODIPine (NORVASC) 5 MG tablet Take 1 tablet (5 mg) by mouth daily 90 tablet 3    apixaban ANTICOAGULANT (ELIQUIS) 5 MG tablet Take 1 tablet (5 mg) by mouth 2 times daily 180 tablet 3    atorvastatin (LIPITOR) 40 MG tablet Take 1 tablet (40 mg) by mouth daily 90 tablet 3    blood glucose (RELION PRIME TEST) test strip 1 strip by In Vitro route 2 times daily Use to test blood sugar 3 times daily or as directed. 100 strip 1    blood glucose calibration (NO BRAND SPECIFIED) solution To accompany: Blood Glucose Monitor Brands: per insurance. 1 Bottle 3    blood glucose monitoring (NO BRAND SPECIFIED) meter device kit Use to test blood sugar 3 times daily or as directed. Preferred blood glucose meter OR supplies to accompany: Blood Glucose Monitor Brands: per insurance. 1 kit 0    Blood Glucose Monitoring Suppl W/DEVICE KIT 1 kit 3 times daily. 1 kit 0    co-enzyme Q-10 100 MG CAPS capsule Take 100 mg by mouth daily      fish oil-omega-3 fatty acids 1000 MG capsule Take 2 g by mouth daily. 2 caps per day      Lancets (E-Z JECT LANCET MICRO-THIN 33G)  MISC       levothyroxine (SYNTHROID/LEVOTHROID) 88 MCG tablet Take 1 tablet (88 mcg) by mouth daily 90 tablet 3    lisinopril (ZESTRIL) 40 MG tablet Take 1 tablet (40 mg) by mouth daily 90 tablet 1    metFORMIN (GLUCOPHAGE XR) 500 MG 24 hr tablet TAKE one TABLET ONCE DAILY WITH DINNER 90 tablet 1    metoprolol tartrate (LOPRESSOR) 50 MG tablet Take 1 tablet (50 mg) by mouth 2 times daily 180 tablet 3    thin (NO BRAND SPECIFIED) lancets Use with lanceting device. To accompany: Blood Glucose Monitor Brands: per insurance. 100 each 6    UNABLE TO FIND MEDICATION NAME: resvante supplement      ALLEGRA 180 MG PO TABS 1 TABLET DAILY (Patient not taking: Reported on 5/16/2023) 90 Tab 3       Allergies:  Allergies   Allergen Reactions    Sulfa Antibiotics Rash       Past Medical, Surgical and Family History:  Past Medical History:   Diagnosis Date    Allergic rhinitis, cause unspecified     Essential hypertension, benign     Infection of kidney, unspecified 1999    Other specified acquired hypothyroidism     Other specified types of cystitis(595.89)     1999,6-2-01, 10-10-01     Past Surgical History:   Procedure Laterality Date    BIOPSY VULVA/PERINEUM,ADDNL LESN  9/18/09    Wide -excision of MICKIE III- right labia     COLONOSCOPY  9/13/2013    Procedure: COLONOSCOPY;  Colonoscopy;  Surgeon: Yanick Ramsey MD;  Location: [facility]    HC BIOPSY OF BREAST, OPEN INCISIONAL  1960    Benign    HC ERCP W SPHINCTEROTOMY  1996 6/2/96 and 8/30/96    HC REDUCTION OF LARGE BREAST  1988    HC REMOVAL GALLBLADDER  1995    HC UGI ENDOSCOPY DIAG W OR W/O BRUSH/WASH  7-    INJECT EPIDURAL LUMBAR N/A 8/10/2017    Procedure: INJECT EPIDURAL LUMBAR;  Lumbar 2-3 Epidural Steroid Injection;  Surgeon: Kimani Garcia MD;  Location: [facility]    INJECT EPIDURAL LUMBAR Right 5/10/2018    Procedure: INJECT EPIDURAL LUMBAR;  right lumbar 2 - lumbar 3 epidural steroid injection;  Surgeon: Kimani Garcia MD;  Location:  OR    ZZC LAPAROSCOPY,  SURGICAL; W/ VAGINAL HYSTERECTOMY W/WO REMOVAL OVARY(S)/TUBES  1984    for bleeding    ZC LIGATE FALLOPIAN TUBE,POSTPARTUM  1978    [facility]al Ligation    [facility]    [facility] COLONOSCOPY THRU STOMA, DIAGNOSTIC  4-24-98    Diverticuli - due in 2008     Family History   Problem Relation Age of Onset    Cardiovascular Father         Aortic aneurysm    Hypertension Father     Hypertension Mother     Gastrointestinal Disease Mother         GI Bleed    Lipids Sister     Hypertension Sister     Genitourinary Problems Sister     Allergies Sister     Cancer Brother         Lung    Alcohol/Drug Brother     Connective Tissue Disorder Son         Down Syndrome    Respiratory Son         Pneumonia    Cancer Maternal Grandmother         Stomach    Hypertension Maternal Grandfather     Allergies Daughter         Review of Systems:  Skin:        Eyes:  Positive for glasses  ENT:       Respiratory:  Positive for dyspnea on exertion  Cardiovascular:  Negative;palpitations;chest pain;lightheadedness;dizziness;syncope or near-syncope Positive for;edema  Gastroenterology:      Genitourinary:       Musculoskeletal:       Neurologic:  Negative numbness or tingling of hands;numbness or tingling of feet  Psychiatric:       Heme/Lymph/Imm:  Positive for allergies  Endocrine:          Recent Lab Results:  LIPID RESULTS:  Lab Results   Component Value Date    CHOL 102 03/17/2022    CHOL 122 04/12/2021    HDL 49 (L) 03/17/2022    HDL 48 (L) 04/12/2021    LDL 28 03/17/2022    LDL 51 04/12/2021    TRIG 127 03/17/2022    TRIG 117 04/12/2021    CHOLHDLRATIO 3.6 10/19/2015     LIVER ENZYME RESULTS:  Lab Results   Component Value Date    AST 21 12/26/2022    AST 25 07/22/2020    ALT 27 12/26/2022    ALT 34 07/22/2020     CBC RESULTS:  Lab Results   Component Value Date    WBC 6.6 06/16/2022    WBC 6.7 07/22/2020    RBC 4.49 06/16/2022    RBC 4.66 07/22/2020    HGB 13.5 06/16/2022    HGB 13.9 07/22/2020    HCT 40.4 06/16/2022    HCT 42.8  07/22/2020    MCV 90 06/16/2022    MCV 92 07/22/2020    MCH 30.1 06/16/2022    MCH 29.8 07/22/2020    MCHC 33.4 06/16/2022    MCHC 32.5 07/22/2020    RDW 13.2 06/16/2022    RDW 13.2 07/22/2020     (L) 06/16/2022     (L) 07/22/2020     BMP RESULTS:  Lab Results   Component Value Date     12/26/2022     07/22/2020    POTASSIUM 3.8 12/26/2022    POTASSIUM 3.9 07/22/2020    CHLORIDE 104 12/26/2022    CHLORIDE 104 07/22/2020    CO2 33 (H) 12/26/2022    CO2 31 07/22/2020    ANIONGAP 5 12/26/2022    ANIONGAP 6 07/22/2020     (H) 12/26/2022     (H) 07/22/2020    BUN 17 12/26/2022    BUN 12 07/22/2020    CR 0.90 12/26/2022    CR 0.87 07/22/2020    GFRESTIMATED 63 12/26/2022    GFRESTIMATED 62 07/22/2020    GFRESTBLACK 72 07/22/2020    RODNEY 9.1 12/26/2022    RODNEY 9.1 07/22/2020      A1C RESULTS:  Lab Results   Component Value Date    A1C 7.2 (H) 12/26/2022    A1C 7.0 (H) 04/12/2021     INR RESULTS:  Lab Results   Component Value Date    INR 1.26 (H) 09/20/2019    INR 0.96 08/07/2017       CC  Sohan Deleon MD  [address] [facility] [address]  [place] [address]        Thank you for allowing me to participate in the care of your patient.      Sincerely,     Carly Antonio PA-C     [facility]

## 2023-05-16 NOTE — LETTER
5/16/2023         RE: Ni Maya  [address] [facility] [address]        Dear Colleague,    Thank you for referring your patient, Ni Maya, to the [facility]. Please see a copy of my visit note below.    HPI:  Ni Maya is a 82 year old female who is seen in consultation at the request of self.    Pt presents for eval of:   (Onset, Location, L/R, Character, Treatments, Injury if yes)    XR Right foot 4/11/2022    DM type 2     Onset 5/12/2023, medial Right great toe joint, redness, swelling. Medial lower leg swelling, redness has resolved. Patient was admitted 11/26/2017 for Right great toe gout and Right foot and second toe cellulitis. Presents today with her . Onset throbbing pain, redness, swelling that has improved.    Retired.    Review of Systems:  Patient denies fever, chills, rash, wound, stiffness, limping, numbness, weakness, heart burn, blood in stool, chest pain with activity, calf pain when walking, shortness of breath with activity, chronic cough, easy bleeding/bruising, swelling of ankles, excessive thirst, fatigue, depression, anxiety.       PAST MEDICAL HISTORY:   Past Medical History:   Diagnosis Date     Allergic rhinitis, cause unspecified      Essential hypertension, benign      Infection of kidney, unspecified 1999     Other specified acquired hypothyroidism      Other specified types of cystitis(595.89)     1999,6-2-01, 10-10-01        PAST SURGICAL HISTORY:   Past Surgical History:   Procedure Laterality Date     BIOPSY VULVA/PERINEUM,ADDNL LESN  9/18/09    Wide -excision of MICKIE III- right labia      COLONOSCOPY  9/13/2013    Procedure: COLONOSCOPY;  Colonoscopy;  Surgeon: Yanick Ramsey MD;  Location: [facility]     HC BIOPSY OF BREAST, OPEN INCISIONAL  1960    Benign     HC ERCP W SPHINCTEROTOMY  1996 6/2/96 and 8/30/96     HC REDUCTION OF LARGE BREAST  1988     HC REMOVAL GALLBLADDER  1995     HC UGI ENDOSCOPY DIAG W OR W/O  BRUSH/WASH  7-     INJECT EPIDURAL LUMBAR N/A 8/10/2017    Procedure: INJECT EPIDURAL LUMBAR;  Lumbar 2-3 Epidural Steroid Injection;  Surgeon: Kimani Garcia MD;  [facility]     INJECT EPIDURAL LUMBAR Right 5/10/2018    Procedure: INJECT EPIDURAL LUMBAR;  right lumbar 2 - lumbar 3 epidural steroid injection;  Surgeon: Kimani Garcia MD;  Location: [facility]     ZZC LAPAROSCOPY, SURGICAL; W/ VAGINAL HYSTERECTOMY W/WO REMOVAL OVARY(S)/TUBES  1984    for bleeding     Z LIGATE FALLOPIAN TUBE,POSTPARTUM  1978    Tubal Ligation     [facility] ANGIOGRAPHY [facility]CH  4-3-98     [facility]ST[facility]  4-24-98    Diverticuli - due in 2008        MEDICATIONS:   Current Outpatient Medications:      alcohol swab prep pads, Use to swab area of injection/geoffrey as directed., Disp: 100 each, Rfl: 3     apixaban ANTICOAGULANT (ELIQUIS) 5 MG tablet, Take 1 tablet (5 mg) by mouth 2 times daily, Disp: 180 tablet, Rfl: 3     atorvastatin (LIPITOR) 40 MG tablet, Take 1 tablet (40 mg) by mouth daily, Disp: 90 tablet, Rfl: 3     blood glucose (RELION PRIME TEST) test strip, 1 strip by In Vitro route 2 times daily Use to test blood sugar 3 times daily or as directed., Disp: 100 strip, Rfl: 1     blood glucose calibration (NO BRAND SPECIFIED) solution, To accompany: Blood Glucose Monitor Brands: per insurance., Disp: 1 Bottle, Rfl: 3     blood glucose monitoring (NO BRAND SPECIFIED) meter device kit, Use to test blood sugar 3 times daily or as directed. Preferred blood glucose meter OR supplies to accompany: Blood Glucose Monitor Brands: per insurance., Disp: 1 kit, Rfl: 0     Blood Glucose Monitoring Suppl W/DEVICE KIT, 1 kit 3 times daily., Disp: 1 kit, Rfl: 0     co-enzyme Q-10 100 MG CAPS capsule, Take 100 mg by mouth daily, Disp: , Rfl:      fish oil-omega-3 fatty acids 1000 MG capsule, Take 2 g by mouth daily. 2 caps per day, Disp: , Rfl:      Lancets (E-Z JECT LANCET MICRO-THIN 33G) MISC, , Disp: , Rfl:       levothyroxine (SYNTHROID/LEVOTHROID) 88 MCG tablet, Take 1 tablet (88 mcg) by mouth daily, Disp: 90 tablet, Rfl: 3     lisinopril (ZESTRIL) 40 MG tablet, Take 1 tablet (40 mg) by mouth daily, Disp: 90 tablet, Rfl: 1     metFORMIN (GLUCOPHAGE XR) 500 MG 24 hr tablet, TAKE one TABLET ONCE DAILY WITH DINNER, Disp: 90 tablet, Rfl: 1     metoprolol tartrate (LOPRESSOR) 50 MG tablet, Take 1 tablet (50 mg) by mouth 2 times daily, Disp: 180 tablet, Rfl: 3     thin (NO BRAND SPECIFIED) lancets, Use with lanceting device. To accompany: Blood Glucose Monitor Brands: per insurance., Disp: 100 each, Rfl: 6     ALLEGRA 180 MG PO TABS, 1 TABLET DAILY (Patient not taking: Reported on 5/16/2023), Disp: 90 Tab, Rfl: 3     amLODIPine (NORVASC) 5 MG tablet, Take 1 tablet (5 mg) by mouth daily (Patient not taking: Reported on 5/16/2023), Disp: 90 tablet, Rfl: 3     UNABLE TO FIND, MEDICATION NAME: resvante supplement, Disp: , Rfl:      ALLERGIES:    Allergies   Allergen Reactions     Sulfa Antibiotics Rash        SOCIAL HISTORY:   Social History     Socioeconomic History     Marital status:      Spouse name: Marco Antonio     Number of children: 2     Years of education: 17     Highest education level: Not on file   Occupational History     Occupation: Retired in 1994     Employer: RETIRED   Tobacco Use     Smoking status: Never     Smokeless tobacco: Never   Vaping Use     Vaping status: Never Used   Substance and Sexual Activity     Alcohol use: No     Drug use: No     Sexual activity: Not Currently     Partners: Male     Birth control/protection: Female Surgical   Other Topics Concern     Parent/sibling w/ CABG, MI or angioplasty before 65F 55M? No   Social History Narrative     Not on file     Social Determinants of Health     Financial Resource Strain: Not on file   Food Insecurity: Not on file   Transportation Needs: Not on file   Physical Activity: Not on file   Stress: Not on file   Social Connections: Not on file   Intimate  "Partner Violence: Not on file   Housing Stability: Not on file        FAMILY HISTORY:   Family History   Problem Relation Age of Onset     Cardiovascular Father         Aortic aneurysm     Hypertension Father      Hypertension Mother      Gastrointestinal Disease Mother         GI Bleed     Lipids Sister      Hypertension Sister      Genitourinary Problems Sister      Allergies Sister      Cancer Brother         Lung     Alcohol/Drug Brother      Connective Tissue Disorder Son         Down Syndrome     Respiratory Son         Pneumonia     Cancer Maternal Grandmother         Stomach     Hypertension Maternal Grandfather      Allergies Daughter         EXAM:Vitals: BP (!) 162/90 (BP Location: Left arm, Patient Position: Sitting, Cuff Size: Adult Regular)   Ht 1.66 m (5' 5.35\")   Wt 79.7 kg (175 lb 12.8 oz)   BMI 28.94 kg/m    BMI= Body mass index is 28.94 kg/m .    General appearance: Patient is alert and fully cooperative with history & exam.  No sign of distress is noted during the visit.     Psychiatric: Affect is pleasant & appropriate.  Patient appears motivated to improve health.     Respiratory: Breathing is regular & unlabored while sitting.     HEENT: Hearing is intact to spoken word.  Speech is clear.  No gross evidence of visual impairment that would impact ambulation.     Vascular: DP & PT pulses 1/4 bilateral, mild generalized edema bilateral lower extremities CFT less than 3 seconds.  There is localized erythema heat and edema about the right first metatarsal phalangeal joint.     Neurologic: Lower extremity sensation is intact to light touch.  No evidence of weakness or contracture in the lower extremities.  No evidence of neuropathy.    Dermatologic: Skin is intact to both lower extremities without significant laceration ulceration drainage or abrasion.      Musculoskeletal: No gross dislocation noted.  Localized erythema heat and edema about the right first metatarsal phalangeal joint.  Minimal " discomfort with direct and firm palpation and throughout range of motion of the joint.  No crepitus throughout range of motion of the joint.    Radiographs:  Deferred     Uric acid is 7.5 on 5/16/2023    Hemoglobin A1C (%)   Date Value   12/26/2022 7.2 (H)   06/27/2022 7.3 (H)   03/17/2022 7.1 (H)   08/11/2021 7.2 (H)   04/12/2021 7.0 (H)   07/22/2020 7.0 (H)   11/21/2019 6.4 (H)   07/25/2019 6.1 (H)   04/30/2019 6.1 (H)   11/08/2018 7.1 (H)   04/25/2018 6.9 (H)   11/01/2017 6.6 (H)     Creatinine (mg/dL)   Date Value   12/26/2022 0.90   06/16/2022 0.88   08/11/2021 0.92   07/22/2020 0.87   11/21/2019 0.84   09/20/2019 0.79   04/30/2019 0.84   12/03/2018 1.03   07/25/2018 1.00        ASSESSMENT:       ICD-10-CM    1. Idiopathic gout, unspecified chronicity, unspecified site  M10.00 Uric acid     Uric acid           PLAN:  5/16/2023  Obtained and interpreted radiographs  Recommend increasing fluid to 3 L of clear fluid per day avoid or minimize caffeine and alcohol.  Discussed low purine diet and written instructions dispensed  Recommend low purine diet  Obtained labs demonstrating high uric acid  Discussed injecting the joint or utilizing oral medication if this becomes a chronic issue.    All options were discussed.  If this recurs she may follow-up with PCP for chronic management and monitoring of renal function and considering allopurinol.      Tho Louie DPM          Again, thank you for allowing me to participate in the care of your patient.        Sincerely,        Tho Louie DPM

## 2023-05-30 ENCOUNTER — ALLIED HEALTH/NURSE VISIT (OUTPATIENT)
Dept: FAMILY MEDICINE | Facility: CLINIC | Age: 83
End: 2023-05-30
Payer: COMMERCIAL

## 2023-05-30 DIAGNOSIS — I10 BENIGN ESSENTIAL HYPERTENSION: Primary | ICD-10-CM

## 2023-05-30 PROCEDURE — 99207 PR NO CHARGE NURSE ONLY: CPT

## 2023-05-30 NOTE — PROGRESS NOTES
Clinic bps are perfect!  Her home cuff is inaccurate.  Recommend discontinuing use.  Given how good things are at home, no need to check home bps further unless she'd like to buy a new cuff.    Continue current medications.    Carly Antonio PA-C 5/30/2023 3:52 PM

## 2023-05-30 NOTE — PROGRESS NOTES
Patient here for blood pressure check per HANANE St request. At last visit with HANANE St on 5/16/23 she added back in amlodipine 5mg daily along with her metoprolol 50mg twice daily and lisinopril 40mg daily. HANANE St next states in her note that if patient's blood pressure remains elevated in the next step will go up to metoprolol 75 mg twice daily.    Patient states that with the addition of amlodipine readings at home have been going down but she forgot the blood pressure log at home.     Manual blood pressure:  Right arm - 114/72  Left arm - 126/72    Patients automatic blood pressure cuff:  Right arm - 147/90  Left arm - 150/93  Patient says readings at home have been around 150-140's systolic.       Scheduled follow up with HANANE St on July 25th at 8:45am. Will route to HANANE St for further recommendation.       HANANE St OV note 5/16/23:  Assessment and Plan:  1.  Hypertension, uncontrolled with home blood pressures in the 160s and 170s.  Heart rates she reported have been in the 60s 70s and 80s.  She notes that this has been a longstanding issue with difficult to control back even into her 30s and 40s.  At this point were going to add back the amlodipine at 5 mg a day and keep her on metoprolol 50 mg twice daily and lisinopril 40 mg daily.  Her goal blood pressure is less than 130/80.  She will check home blood pressures and will do a nurse visit in 2 weeks.  If it remains elevated in the next step will go up to metoprolol 75 mg twice daily.  If need be we can always switch her to carvedilol which would likely provide reasonable rate control and blood pressure control.  She will also bring in her home cuff to make sure that this is accurate.     2.  Chronic A-fib rates controlled today appropriately on Eliquis for a [id number] score of 7 (age, female gender, hypertension, type 2 diabetes, CVA).     3.  Right foot pain with some erythema.  We greatly appreciate our podiatry  colleagues for getting her in today.  I did offer her uric acid and CBC for possible treatment of gout but she prefers to be seen by podiatry.     Thank you for allowing me to participate in this delightful patient's care.  We will do a nurse visit in 2 weeks and I will plan on seeing her back in July if needed for her blood pressure, she will call sooner with concerns.     Carly Antonio PA-C  11:12 AM 5/16/2023   [facility]

## 2023-06-13 ENCOUNTER — LAB (OUTPATIENT)
Dept: LAB | Facility: OTHER | Age: 83
End: 2023-06-13
Payer: COMMERCIAL

## 2023-06-13 ENCOUNTER — VIRTUAL VISIT (OUTPATIENT)
Dept: FAMILY MEDICINE | Facility: CLINIC | Age: 83
End: 2023-06-13
Payer: COMMERCIAL

## 2023-06-13 ENCOUNTER — OFFICE VISIT (OUTPATIENT)
Dept: CARDIOLOGY | Facility: CLINIC | Age: 83
End: 2023-06-13
Payer: COMMERCIAL

## 2023-06-13 VITALS
HEART RATE: 86 BPM | OXYGEN SATURATION: 99 % | HEIGHT: 65 IN | BODY MASS INDEX: 28.99 KG/M2 | SYSTOLIC BLOOD PRESSURE: 112 MMHG | WEIGHT: 174 LBS | DIASTOLIC BLOOD PRESSURE: 72 MMHG

## 2023-06-13 DIAGNOSIS — E78.5 HYPERLIPIDEMIA WITH TARGET LDL LESS THAN 100: ICD-10-CM

## 2023-06-13 DIAGNOSIS — R30.0 DYSURIA: ICD-10-CM

## 2023-06-13 DIAGNOSIS — I10 ESSENTIAL HYPERTENSION WITH GOAL BLOOD PRESSURE LESS THAN 140/90: ICD-10-CM

## 2023-06-13 DIAGNOSIS — N18.2 CHRONIC KIDNEY DISEASE, STAGE II (MILD): ICD-10-CM

## 2023-06-13 DIAGNOSIS — E11.65 TYPE 2 DIABETES MELLITUS WITH HYPERGLYCEMIA, WITHOUT LONG-TERM CURRENT USE OF INSULIN (H): ICD-10-CM

## 2023-06-13 DIAGNOSIS — E03.9 HYPOTHYROIDISM: ICD-10-CM

## 2023-06-13 DIAGNOSIS — I48.20 CHRONIC ATRIAL FIBRILLATION (H): ICD-10-CM

## 2023-06-13 DIAGNOSIS — R30.0 DYSURIA: Primary | ICD-10-CM

## 2023-06-13 DIAGNOSIS — I10 BENIGN ESSENTIAL HYPERTENSION: ICD-10-CM

## 2023-06-13 LAB
ALBUMIN UR-MCNC: ABNORMAL MG/DL
APPEARANCE UR: ABNORMAL
BACTERIA #/AREA URNS HPF: ABNORMAL /HPF
BILIRUB UR QL STRIP: NEGATIVE
COLOR UR AUTO: YELLOW
GLUCOSE UR STRIP-MCNC: NEGATIVE MG/DL
HGB UR QL STRIP: ABNORMAL
KETONES UR STRIP-MCNC: NEGATIVE MG/DL
LEUKOCYTE ESTERASE UR QL STRIP: ABNORMAL
NITRATE UR QL: NEGATIVE
PH UR STRIP: 6 [PH] (ref 5–7)
RBC #/AREA URNS AUTO: ABNORMAL /HPF
SP GR UR STRIP: 1.01 (ref 1–1.03)
SQUAMOUS #/AREA URNS AUTO: ABNORMAL /LPF
UROBILINOGEN UR STRIP-ACNC: 0.2 E.U./DL
WBC #/AREA URNS AUTO: >100 /HPF
WBC CLUMPS #/AREA URNS HPF: PRESENT /HPF

## 2023-06-13 PROCEDURE — 81001 URINALYSIS AUTO W/SCOPE: CPT

## 2023-06-13 PROCEDURE — 99213 OFFICE O/P EST LOW 20 MIN: CPT | Mod: 95 | Performed by: NURSE PRACTITIONER

## 2023-06-13 PROCEDURE — 87186 SC STD MICRODIL/AGAR DIL: CPT

## 2023-06-13 PROCEDURE — 99214 OFFICE O/P EST MOD 30 MIN: CPT | Performed by: PHYSICIAN ASSISTANT

## 2023-06-13 PROCEDURE — 87086 URINE CULTURE/COLONY COUNT: CPT

## 2023-06-13 RX ORDER — ATORVASTATIN CALCIUM 40 MG/1
40 TABLET, FILM COATED ORAL DAILY
Qty: 90 TABLET | Refills: 3 | Status: SHIPPED | OUTPATIENT
Start: 2023-06-13 | End: 2024-06-18

## 2023-06-13 RX ORDER — LISINOPRIL 40 MG/1
40 TABLET ORAL DAILY
Qty: 90 TABLET | Refills: 3 | Status: SHIPPED | OUTPATIENT
Start: 2023-06-13 | End: 2024-03-20

## 2023-06-13 NOTE — PROGRESS NOTES
Ni is a 82 year old who is being evaluated via a billable telephone visit.      What phone number would you like to be contacted at? [phone]  How would you like to obtain your AVS? Tonya    Distant Location (provider location):  On-site    Assessment & Plan     Dysuria  Patient will go to her primary clinic to leave a urine sample.  If positive, I will send in a prescription for antibiotics.    If negative, patient will make an appointment with her PCP for an exam and evaluation in clinic.  - UA Macroscopic with reflex to Microscopic and Culture; Future      The risks, benefits and treatment options of prescribed medications or other treatments have been discussed with the patient. The patient verbalized their understanding and should call or follow up if no improvement or if they develop further problems.    NIMA Prater CNP  [facility]   Ni is a 82 year old, presenting for the following health issues:  Urinary Problem        6/13/2023    11:20 AM   Additional Questions   Roomed by Cony CUEVAS   Accompanied by self         6/13/2023    11:20 AM   Patient Reported Additional Medications   Patient reports taking the following new medications no new meds     HPI     Genitourinary - Female  Onset/Duration: Been on going 3 weeks   Description:   Painful urination (Dysuria): YES            Frequency: YES- 3-5 times during the night   Blood in urine (Hematuria): No  Delay in urine (Hesitency): No  Intensity: mild- moderate   Progression of Symptoms:  worsening  Accompanying Signs & Symptoms:  Fever/chills: No  Flank pain: No  Nausea and vomiting: No  Vaginal symptoms: none  Abdominal/Pelvic Pain: No  History:   History of frequent UTI s: YES  History of kidney stones: No  Sexually Active: No  Possibility of pregnancy: No  Precipitating or alleviating factors: None  Therapies tried and outcome:  no          Review of Systems   Constitutional, HEENT,  cardiovascular, pulmonary, gi and gu systems are negative, except as otherwise noted.      Objective           Vitals:  No vitals were obtained today due to virtual visit.    Physical Exam   PSYCH: Alert and oriented times 3; coherent speech, normal   rate and volume, able to articulate logical thoughts, able   to abstract reason, no tangential thoughts, no hallucinations   or delusions   RESP: No cough, no audible wheezing, able to talk in full sentences  Remainder of exam unable to be completed due to telephone visits                Phone call duration: 10 minutes

## 2023-06-13 NOTE — PATIENT INSTRUCTIONS
Urinary Tract Infections in Women  Urinary tract infections (UTIs) are most often caused by bacteria. These bacteria enter the urinary tract. The bacteria may come from inside the body. Or they may travel from the skin outside the rectum or vagina into the urethra. Female anatomy makes it easy for bacteria from the bowel to enter a woman s urinary tract, which is the most common source of UTI. This means women develop UTIs more often than men. Pain in or around the urinary tract is a common UTI symptom. But the only way to know for sure if you have a UTI for the healthcare provider to test your urine. The two tests that may be done are the urinalysis and urine culture.    Types of UTIs    Cystitis. A bladder infection (cystitis) is the most common UTI in women. You may have urgent or frequent need to pee. You may also have pain, burning when you pee, and bloody urine.    Urethritis. This is an inflamed urethra, which is the tube that carries urine from the bladder to outside the body. You may have lower stomach or back pain. You may also have urgent or frequent need to pee.    Pyelonephritis. This is a kidney infection. If not treated, it can be serious and damage your kidneys. In severe cases, you may need to stay in the hospital. You may have a fever and lower back pain.    Medicines to treat a UTI  Most UTIs are treated with antibiotics. These kill the bacteria. The length of time you need to take them depends on the type of infection. It may be as short as 3 days. If you have repeated UTIs, you may need a low-dose antibiotic for several months. Take antibiotics exactly as directed. Don t stop taking them until all of the medicine is gone, even if you feel better. If you stop taking the antibiotic too soon, the infection may not go away. You may also develop a resistance to the antibiotic. This can make it much harder to treat.  Lifestyle changes to treat and prevent UTIs  The lifestyle changes below will  help get rid of your UTI. They may also help prevent future UTIs.    Drink plenty of fluids. This includes water, juice, or other caffeine-free drinks. Fluids help flush bacteria out of your body.    Empty your bladder. Always empty your bladder when you feel the urge to pee. And always pee before going to sleep. Urine that stays in your bladder can lead to infection. Try to pee before and after sex as well.    Practice good personal hygiene. Wipe yourself from front to back after using the toilet. This helps keep bacteria from getting into the urethra.    Wear cotton underwear. Don't wear synthetic or tight-fitting underwear that can trap moisture. Change out of wet bathing suits and workout clothing quickly.    Take showers. Showers are better than baths for preventing UTIs.    Use condoms during sex. These help prevent UTIs caused by sexually transmitted bacteria. Also don't use spermicides during sex. These can increase the risk for UTIs. Choose other forms of birth control instead. For women who tend to get UTIs after sex, a low-dose of a preventive antibiotic may be used. Be sure to discuss this option with your healthcare provider.    Follow up with your healthcare provider as directed. They may test to make sure the infection has cleared. If needed, more treatment may be started.  Christy last reviewed this educational content on 9/1/2021 2000-2022 [employer]. All rights reserved. This information is not intended as a substitute for professional medical care. Always follow your healthcare professional's instructions.

## 2023-06-13 NOTE — LETTER
2023    Apryl Olvera MD, MD  [address] [facility] [address]    RE: Ni Maya       Dear Colleague,     I had the pleasure of seeing Ni Maya in the [facility].    Cardiology Clinic Progress Note    Service Date: 2023     Primary Cardiologist: Dr. Deleon      Reason for Visit: afib, HTN     HPI:   Ni Maya is a delightful 82-year-old woman with past cardiac history seen for the followin.  Chronic A-fib with rate control and anticoagulation approach  2.  Hypertension  3.  History of CVA with some residual memory issues, this was noted in 2019  4.  Mild mitral regurg and normal EF.    I am meeting Sandy for the first time today.  She was last seen by Dr. Deleon about a month ago and was found to be tachycardic.  Her metoprolol had been discontinued in December when she presented with low but asymptomatic blood pressure.  Blood pressures are reasonably controlled at Dr. Deleon clinic visit and her amlodipine was discontinued and metoprolol was restarted at 50 mg twice daily.  When I last saw her we had worked on her hypertension, I added back amlodipine at 5 mg a day and kept her metoprolol at 50 mg twice daily.  We had her come in for nurse visit and at that time her blood pressure was excellent at 114/72.  This was compared to her home cuff that measured on the same arm 147/90 indicating that it was inaccurate.  She is here today for follow-up of those events.    She overall feels well.  She denies chest pain, shortness of breath, palpitations, syncope, presyncope or bleeding issues.  She is having frequent urination and was up to 6 times last night.  There is some burning with this but it does not feel like a UTI.  She notes that when she has had UTIs in the past she has had bleeding with solids.    Social History:  She comes in today with her  Marco Antonio.  Lifelong non-smoker no alcohol use.    Physical Exam:  /72 (BP Location: Right arm, Patient  "Position: Sitting, Cuff Size: Adult Regular)   Pulse 86   Ht 1.66 m (5' 5.35\")   Wt 78.9 kg (174 lb)   SpO2 99%   BMI 28.64 kg/m     Wt Readings from Last 5 Encounters:   06/13/23 78.9 kg (174 lb)   05/16/23 79.7 kg (175 lb 12.8 oz)   05/16/23 79.7 kg (175 lb 12.8 oz)   04/12/23 79.8 kg (176 lb)   12/26/22 79.4 kg (175 lb)      Well-developed well-nourished woman in no acute distress.  Normocephalic atraumatic.  Heart is irregularly irregular.  I do not appreciate murmur rub or gallop.  Lungs are clear without wheezes rales or rhonchi.     Echocardiogram reviewed  Last creatinine in December 2022 was 0.9 sodium 142 BUN 17 potassium 3.8.    Assessment and Plan:  1.  Hypertension,, well controlled on current medications these were refilled today and she will continue on them.    2.  Chronic A-fib rates controlled today appropriately on Eliquis for a [id number] score of 7 (age, female gender, hypertension, type 2 diabetes, CVA).    3.  Mild mitral regurg with normal EF, will follow with serial echocardiograms can be repeated in 2 years.    4.  Urinary frequency, patient will make an appointment with primary care.    This patient is overall doing well.  We will see her back in about 9 months, sooner with concerns.      AVTAR [facility] Cardiology       Orders this visit:  Orders Placed This Encounter   Procedures    Follow-Up with Cardiology SARA     Orders Placed This Encounter   Medications    atorvastatin (LIPITOR) 40 MG tablet     Sig: Take 1 tablet (40 mg) by mouth daily     Dispense:  90 tablet     Refill:  3    lisinopril (ZESTRIL) 40 MG tablet     Sig: Take 1 tablet (40 mg) by mouth daily     Dispense:  90 tablet     Refill:  3    apixaban ANTICOAGULANT (ELIQUIS) 5 MG tablet     Sig: Take 1 tablet (5 mg) by mouth 2 times daily     Dispense:  180 tablet     Refill:  3     Medications Discontinued During This Encounter   Medication Reason    ALLEGRA 180 MG PO TABS     atorvastatin (LIPITOR) " 40 MG tablet Reorder (No AVS / No eCancel)    lisinopril (ZESTRIL) 40 MG tablet     apixaban ANTICOAGULANT (ELIQUIS) 5 MG tablet Reorder (No AVS / No eCancel)     Encounter Diagnoses   Name Primary?    Chronic atrial fibrillation (H)     Benign essential hypertension     Hyperlipidemia with target LDL less than 100     Chronic kidney disease, stage II (mild)     Type 2 diabetes mellitus with hyperglycemia, without long-term current use of insulin (H)     Essential hypertension with goal blood pressure less than 140/90        Current Medications:  Current Outpatient Medications   Medication Sig Dispense Refill    alcohol swab prep pads Use to swab area of injection/geoffrey as directed. 100 each 3    amLODIPine (NORVASC) 5 MG tablet Take 1 tablet (5 mg) by mouth daily 90 tablet 3    apixaban ANTICOAGULANT (ELIQUIS) 5 MG tablet Take 1 tablet (5 mg) by mouth 2 times daily 180 tablet 3    atorvastatin (LIPITOR) 40 MG tablet Take 1 tablet (40 mg) by mouth daily 90 tablet 3    blood glucose (RELION PRIME TEST) test strip 1 strip by In Vitro route 2 times daily Use to test blood sugar 3 times daily or as directed. 100 strip 1    blood glucose calibration (NO BRAND SPECIFIED) solution To accompany: Blood Glucose Monitor Brands: per insurance. 1 Bottle 3    blood glucose monitoring (NO BRAND SPECIFIED) meter device kit Use to test blood sugar 3 times daily or as directed. Preferred blood glucose meter OR supplies to accompany: Blood Glucose Monitor Brands: per insurance. 1 kit 0    Blood Glucose Monitoring Suppl W/DEVICE KIT 1 kit 3 times daily. 1 kit 0    co-enzyme Q-10 100 MG CAPS capsule Take 100 mg by mouth daily      fish oil-omega-3 fatty acids 1000 MG capsule Take 2 g by mouth daily. 2 caps per day      Lancets (E-Z JECT LANCET MICRO-THIN 33G) MISC       levothyroxine (SYNTHROID/LEVOTHROID) 88 MCG tablet Take 1 tablet (88 mcg) by mouth daily 90 tablet 3    lisinopril (ZESTRIL) 40 MG tablet Take 1 tablet (40 mg) by mouth  daily 90 tablet 3    metFORMIN (GLUCOPHAGE XR) 500 MG 24 hr tablet TAKE one TABLET ONCE DAILY WITH DINNER 90 tablet 1    metoprolol tartrate (LOPRESSOR) 50 MG tablet Take 1 tablet (50 mg) by mouth 2 times daily 180 tablet 3    thin (NO BRAND SPECIFIED) lancets Use with lanceting device. To accompany: Blood Glucose Monitor Brands: per insurance. 100 each 6    UNABLE TO FIND MEDICATION NAME: resvante supplement         Allergies:  Allergies   Allergen Reactions    Sulfa Antibiotics Rash       Past Medical, Surgical and Family History:  Past Medical History:   Diagnosis Date    Allergic rhinitis, cause unspecified     Essential hypertension, benign     Infection of kidney, unspecified 1999    Other specified acquired hypothyroidism     Other specified types of cystitis(595.89)     1999,6-2-01, 10-10-01     Past Surgical History:   Procedure Laterality Date    BIOPSY VULVA/PERINEUM,ADDNL LESN  9/18/09    Wide -excision of MICKIE III- right labia     COLONOSCOPY  9/13/2013    Procedure: COLONOSCOPY;  Colonoscopy;  Surgeon: Yanick Ramsey MD;  Location: [facility]    HC BIOPSY OF BREAST, OPEN INCISIONAL  1960    Benign    HC ERCP W SPHINCTEROTOMY  1996 6/2/96 and 8/30/96    HC REDUCTION OF LARGE BREAST  1988    HC REMOVAL GALLBLADDER  1995    HC UGI ENDOSCOPY DIAG W OR W/O BRUSH/WASH  7-    INJECT EPIDURAL LUMBAR N/A 8/10/2017    Procedure: INJECT EPIDURAL LUMBAR;  Lumbar 2-3 Epidural Steroid Injection;  Surgeon: Kimani Garcia MD;  Location: [facility]    INJECT EPIDURAL LUMBAR Right 5/10/2018    Procedure: INJECT EPIDURAL LUMBAR;  right lumbar 2 - lumbar 3 epidural steroid injection;  Surgeon: Kimani Garcia MD;  Location:  OR    [facility] LAPAROSCOPY, SURGICAL; W/ VAGINAL HYSTERECTOMY W/WO REMOVAL OVARY(S)/TUBES  1984    for bleeding    Z LIGATE FALLOPIAN TUBE,POSTPARTUM  1978    Tubal Ligation    [facility] ANGIOGRAPHY ARCH  4-3-98    [facility] COLONOSCOPY THRU STOMA, DIAGNOSTIC  4-24-98    Diverticuli - due in 2008     Family  History   Problem Relation Age of Onset    Cardiovascular Father         Aortic aneurysm    Hypertension Father     Hypertension Mother     Gastrointestinal Disease Mother         GI Bleed    Lipids Sister     Hypertension Sister     Genitourinary Problems Sister     Allergies Sister     Cancer Brother         Lung    Alcohol/Drug Brother     Connective Tissue Disorder Son         Down Syndrome    Respiratory Son         Pneumonia    Cancer Maternal Grandmother         Stomach    Hypertension Maternal Grandfather     Allergies Daughter         Review of Systems:  Skin:  Negative     Eyes:  Positive for glasses  ENT:  Negative    Respiratory:  Negative    Cardiovascular:  Negative for;palpitations;chest pain;edema;lightheadedness;dizziness;fatigue    Gastroenterology: Negative    Genitourinary:  Positive for urinary frequency  Musculoskeletal:  Positive for arthritis;joint pain  Neurologic:  Negative    Psychiatric:  Positive for sleep disturbances  Heme/Lymph/Imm:  Positive for allergies  Endocrine:  Negative       Recent Lab Results:  LIPID RESULTS:  Lab Results   Component Value Date    CHOL 102 03/17/2022    CHOL 122 04/12/2021    HDL 49 (L) 03/17/2022    HDL 48 (L) 04/12/2021    LDL 28 03/17/2022    LDL 51 04/12/2021    TRIG 127 03/17/2022    TRIG 117 04/12/2021    CHOLHDLRATIO 3.6 10/19/2015     LIVER ENZYME RESULTS:  Lab Results   Component Value Date    AST 21 12/26/2022    AST 25 07/22/2020    ALT 27 12/26/2022    ALT 34 07/22/2020     CBC RESULTS:  Lab Results   Component Value Date    WBC 6.6 06/16/2022    WBC 6.7 07/22/2020    RBC 4.49 06/16/2022    RBC 4.66 07/22/2020    HGB 13.5 06/16/2022    HGB 13.9 07/22/2020    HCT 40.4 06/16/2022    HCT 42.8 07/22/2020    MCV 90 06/16/2022    MCV 92 07/22/2020    MCH 30.1 06/16/2022    MCH 29.8 07/22/2020    MCHC 33.4 06/16/2022    MCHC 32.5 07/22/2020    RDW 13.2 06/16/2022    RDW 13.2 07/22/2020     (L) 06/16/2022     (L) 07/22/2020     BMP  RESULTS:  Lab Results   Component Value Date     12/26/2022     07/22/2020    POTASSIUM 3.8 12/26/2022    POTASSIUM 3.9 07/22/2020    CHLORIDE 104 12/26/2022    CHLORIDE 104 07/22/2020    CO2 33 (H) 12/26/2022    CO2 31 07/22/2020    ANIONGAP 5 12/26/2022    ANIONGAP 6 07/22/2020     (H) 12/26/2022     (H) 07/22/2020    BUN 17 12/26/2022    BUN 12 07/22/2020    CR 0.90 12/26/2022    CR 0.87 07/22/2020    GFRESTIMATED 63 12/26/2022    GFRESTIMATED 62 07/22/2020    GFRESTBLACK 72 07/22/2020    RODNEY 9.1 12/26/2022    RODNEY 9.1 07/22/2020      A1C RESULTS:  Lab Results   Component Value Date    A1C 7.2 (H) 12/26/2022    A1C 7.0 (H) 04/12/2021     INR RESULTS:  Lab Results   Component Value Date    INR 1.26 (H) 09/20/2019    INR 0.96 08/07/2017       CC  Sohan Deleon MD  [address]  [place] [address]        Thank you for allowing me to participate in the care of your patient.      Sincerely,     Carly Antonio PA-C     [facility]  cc:   Carly Antonio PA-C  [address]  [place] [address]

## 2023-06-13 NOTE — PATIENT INSTRUCTIONS
Thank you for visiting with me today.    We discussed: I'm glad your blood pressure is better and your cuff is working well!    Medication changes:  continue current medications.      Follow up: with me in April, please call if you have any issues before then.        Please call my nurse, Cait, at [phone] with any questions or concerns.    Scheduling phone number: [phone]  Reminder: Please bring in all current medications, over the counter supplements and vitamin bottles to your next appointment.

## 2023-06-14 DIAGNOSIS — N30.00 ACUTE CYSTITIS WITHOUT HEMATURIA: Primary | ICD-10-CM

## 2023-06-14 LAB — BACTERIA UR CULT: ABNORMAL

## 2023-06-14 RX ORDER — CIPROFLOXACIN 250 MG/1
250 TABLET, FILM COATED ORAL 2 TIMES DAILY
Qty: 10 TABLET | Refills: 0 | Status: SHIPPED | OUTPATIENT
Start: 2023-06-14 | End: 2023-06-19

## 2023-06-22 ENCOUNTER — TELEPHONE (OUTPATIENT)
Dept: FAMILY MEDICINE | Facility: OTHER | Age: 83
End: 2023-06-22
Payer: COMMERCIAL

## 2023-06-22 NOTE — TELEPHONE ENCOUNTER
Symptoms still have a little of burning started right after finishing the antibiotics.    Denies fever, frequency, urgency, cloudy or dark urine.  Drinking 6-8 glasses of water a day.    Could we use a same day for this visit?    Cielo Rodas RN  [facility] ~ Registered Nurse  Clinic Triage ~ [place] [facility] [place]  June 22, 2023

## 2023-06-22 NOTE — TELEPHONE ENCOUNTER
Order/Referral Request    Who is requesting: Patient     Orders being requested: UTI LAB    Reason service is needed/diagnosis: pt is still experiencing a burning sensation after completed taking med so she wants another lab test    When are orders needed by: as soon as possible    Has this been discussed with Provider: No    Does patient have a preference on a Group/Provider/Facility? [place]    Does patient have an appointment scheduled?: No    Where to send orders: N/A    Okay to leave a detailed message?: Yes at Cell number on file:    Telephone Information:   [employer] [phone]

## 2023-06-22 NOTE — TELEPHONE ENCOUNTER
Did not improve after culture appropriate antibiotics.  Should be scheduled to be seen for an appointment.  Apryl Olvera MD

## 2023-06-23 NOTE — TELEPHONE ENCOUNTER
Called patient and offered same day slot.  Patient declined making an appointment stating burning and Symptoms are complete gone.  Informed patient to call back if needed to be seen.

## 2023-06-26 DIAGNOSIS — E03.9 HYPOTHYROIDISM, UNSPECIFIED TYPE: ICD-10-CM

## 2023-06-26 RX ORDER — LEVOTHYROXINE SODIUM 88 UG/1
TABLET ORAL
Qty: 90 TABLET | Refills: 3 | Status: SHIPPED | OUTPATIENT
Start: 2023-06-26 | End: 2024-04-03

## 2023-07-14 ENCOUNTER — LAB (OUTPATIENT)
Dept: LAB | Facility: OTHER | Age: 83
End: 2023-07-14
Payer: COMMERCIAL

## 2023-07-14 ENCOUNTER — VIRTUAL VISIT (OUTPATIENT)
Dept: FAMILY MEDICINE | Facility: OTHER | Age: 83
End: 2023-07-14
Payer: COMMERCIAL

## 2023-07-14 DIAGNOSIS — N30.00 ACUTE CYSTITIS WITHOUT HEMATURIA: ICD-10-CM

## 2023-07-14 DIAGNOSIS — N30.00 ACUTE CYSTITIS WITHOUT HEMATURIA: Primary | ICD-10-CM

## 2023-07-14 DIAGNOSIS — D69.6 THROMBOCYTOPENIA (H): ICD-10-CM

## 2023-07-14 DIAGNOSIS — E11.51 DIABETES MELLITUS WITH PERIPHERAL VASCULAR DISEASE (H): ICD-10-CM

## 2023-07-14 DIAGNOSIS — M46.1 SACROILIITIS (H): ICD-10-CM

## 2023-07-14 DIAGNOSIS — E11.65 TYPE 2 DIABETES MELLITUS WITH HYPERGLYCEMIA, WITHOUT LONG-TERM CURRENT USE OF INSULIN (H): ICD-10-CM

## 2023-07-14 LAB
ALBUMIN UR-MCNC: NEGATIVE MG/DL
APPEARANCE UR: CLEAR
BACTERIA #/AREA URNS HPF: ABNORMAL /HPF
BILIRUB UR QL STRIP: NEGATIVE
COLOR UR AUTO: YELLOW
GLUCOSE UR STRIP-MCNC: NEGATIVE MG/DL
HGB UR QL STRIP: ABNORMAL
KETONES UR STRIP-MCNC: NEGATIVE MG/DL
LEUKOCYTE ESTERASE UR QL STRIP: ABNORMAL
NITRATE UR QL: NEGATIVE
PH UR STRIP: 6.5 [PH] (ref 5–7)
RBC #/AREA URNS AUTO: ABNORMAL /HPF
SP GR UR STRIP: 1.01 (ref 1–1.03)
SQUAMOUS #/AREA URNS AUTO: ABNORMAL /LPF
UROBILINOGEN UR STRIP-ACNC: 0.2 E.U./DL
WBC #/AREA URNS AUTO: ABNORMAL /HPF

## 2023-07-14 PROCEDURE — 99213 OFFICE O/P EST LOW 20 MIN: CPT | Mod: 95 | Performed by: PHYSICIAN ASSISTANT

## 2023-07-14 PROCEDURE — 87086 URINE CULTURE/COLONY COUNT: CPT

## 2023-07-14 PROCEDURE — 81001 URINALYSIS AUTO W/SCOPE: CPT

## 2023-07-14 RX ORDER — CIPROFLOXACIN 500 MG/1
500 TABLET, FILM COATED ORAL 2 TIMES DAILY
Qty: 14 TABLET | Refills: 0 | Status: SHIPPED | OUTPATIENT
Start: 2023-07-14 | End: 2023-11-15

## 2023-07-14 NOTE — PROGRESS NOTES
Ni is a 83 year old who is being evaluated via a billable telephone visit.      What phone number would you like to be contacted at? [phone]  How would you like to obtain your AVS? Mail a copy  Distant Location (provider location):  Off-site    Assessment & Plan       ICD-10-CM    1. Acute cystitis without hematuria  N30.00 UA Macroscopic with reflex to Microscopic and Culture - Lab Collect      2. Sacroiliitis (H)  M46.1       3. Type 2 diabetes mellitus with hyperglycemia, without long-term current use of insulin (H)  E11.65       4. Thrombocytopenia (H)  D69.6       5. Diabetes mellitus with peripheral vascular disease (H)  E11.51           1. She came in to leave another urine today which shows concern for another UTI.  Will treat with a higher dose of ciprofloxacin, 500 mg BID x 7 days as her CrCL is 57 so she should tolerate this well. Will alert patient if antibiotic needs to be changed depending on culture.  Encouraged to stay well hydrated and follow-up if not improving.     2-5. Chronic issues managed by PCP.    Alli Gonzalez PA-C  [facility]   Ni is a 83 year old, presenting for the following health issues:  UTI        7/14/2023     9:17 AM   Additional Questions   Roomed by Ligia MONTANA   Accompanied by self     HPI     Genitourinary - Female  Onset/Duration: May (has come and gone since then)  Description:   Painful urination (Dysuria): YES           Frequency: YES  Blood in urine (Hematuria): No  Delay in urine (Hesitency): YES  Intensity: moderate  Progression of Symptoms:  improving  Accompanying Signs & Symptoms:  Fever/chills: YES- chills  Flank pain: No  Nausea and vomiting: No  Vaginal symptoms: none  Abdominal/Pelvic Pain: No  History:   History of frequent UTI s: YES  History of kidney stones: No  Sexually Active: No  Possibility of pregnancy: No  Precipitating or alleviating factors: medicated wipes  Therapies tried and outcome:  none     She was  treated for a UTI last month with 5 days of ciprofloxacin 250 mg twice daily. She had improvement but symptoms never seemed to go away completely and now she has worsening burning over the past few days. No fever or flank pain. She tried some medicated wipes last night with some improvement in the discomfort.     Review of Systems   Constitutional, HEENT, cardiovascular, pulmonary, gi and gu systems are negative, except as otherwise noted.      Objective    Vitals - Patient Reported  Pain Score: No Pain (0)        Physical Exam   healthy, alert and no distress  PSYCH: Alert and oriented times 3; coherent speech, normal   rate and volume, able to articulate logical thoughts, able   to abstract reason, no tangential thoughts, no hallucinations   or delusions  Her affect is normal  RESP: No cough, no audible wheezing, able to talk in full sentences  Remainder of exam unable to be completed due to telephone visits        Results for orders placed or performed in visit on 07/14/23   UA Macroscopic with reflex to Microscopic and Culture - Lab Collect     Status: Abnormal    Specimen: Urine, NOS   Result Value Ref Range    Color Urine Yellow Colorless, Straw, Light Yellow, Yellow    Appearance Urine Clear Clear    Glucose Urine Negative Negative mg/dL    Bilirubin Urine Negative Negative    Ketones Urine Negative Negative mg/dL    Specific Gravity Urine 1.010 1.003 - 1.035    Blood Urine Trace (A) Negative    pH Urine 6.5 5.0 - 7.0    Protein Albumin Urine Negative Negative mg/dL    Urobilinogen Urine 0.2 0.2, 1.0 E.U./dL    Nitrite Urine Negative Negative    Leukocyte Esterase Urine Moderate (A) Negative   Urine Microscopic Exam     Status: Abnormal   Result Value Ref Range    Bacteria Urine Few (A) None Seen /HPF    RBC Urine 0-2 0-2 /HPF /HPF    WBC Urine 25-50 (A) 0-5 /HPF /HPF    Squamous Epithelials Urine Few (A) None Seen /LPF         Phone call duration: 5 minutes

## 2023-07-16 LAB — BACTERIA UR CULT: NORMAL

## 2023-07-25 ENCOUNTER — TELEPHONE (OUTPATIENT)
Dept: CARDIOLOGY | Facility: CLINIC | Age: 83
End: 2023-07-25

## 2023-07-25 NOTE — TELEPHONE ENCOUNTER
Pt was seen with  who had clinic visit today.  She noted worsening leg edema bilaterally.  Leg edema ok in am worsens throughout the day.  Legs are dependent most of the day.  Pt has 1+ edema to upper shin.  Recommend elev of legs during the day.  No meds changes, doesn't require diuretic.  No other sx.    Carly Antonio PA-C 7/25/2023 10:47 AM

## 2023-08-18 ENCOUNTER — OFFICE VISIT (OUTPATIENT)
Dept: DERMATOLOGY | Facility: CLINIC | Age: 83
End: 2023-08-18
Payer: COMMERCIAL

## 2023-08-18 DIAGNOSIS — L81.0 POST-INFLAMMATORY HYPERPIGMENTATION: Primary | ICD-10-CM

## 2023-08-18 DIAGNOSIS — Z85.828 HISTORY OF NONMELANOMA SKIN CANCER: ICD-10-CM

## 2023-08-18 PROCEDURE — 99214 OFFICE O/P EST MOD 30 MIN: CPT | Performed by: DERMATOLOGY

## 2023-08-18 RX ORDER — AZELAIC ACID 0.15 G/G
GEL TOPICAL
Qty: 50 G | Refills: 3 | Status: SHIPPED | OUTPATIENT
Start: 2023-08-18 | End: 2024-04-03

## 2023-08-18 NOTE — LETTER
"    8/18/2023         RE: Ni Maya  [address] [address] [facility] [address]        Dear Colleague,    Thank you for referring your patient, Ni Maya, to the [facility]. Please see a copy of my visit note below.    [facility] Dermatology Note  Encounter Date: Aug 18, 2023  Office Visit     Dermatology Problem List:     Last skin check 8/2023      1. \"Precancerous\" lesion removed from the vagina per patient report. ?MICKIE III (documented in chart)  -s/p removal, patient thought done in [place] but no surg path reports available   2. 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 There was some brown pigment at edges of biopsy noted 8/4/2020.      Social History: Retired. Lives with . Has son with down syndrome that lives [place]. Daughter lives in [place].  Family History: Negative for skin cancer.  ____________________________________________    Assessment & Plan:    # Hx of NMSC  -Recommend sunscreens SPF #30 or greater, protective clothing and avoidance of tanning beds.   ABCD's of melanoma were reviewed with patient and handout provided.     #NUB, right nasal ala possibly skin cancer, reviewed this could grow or worsen and recommend a biopsy, she declines and understands risks   -recheck in 6 months    #possibly MICKIE history  -declines exam    #Pigmented purpuric dermatoses, lower legs, has had triamcinolone  -declines intervention      Procedures Performed:   None, declined     Follow-up: 6 month(s) in-person for spot check nose and recheck skin 10/2024, or earlier for new or changing lesions    Staff and Scribe:     I, " Brandy Ortega, am serving as a scribe to document services personally performed by  , based on data collection and the provider's statements to me.      Provider Disclosure:   The documentation recorded by the scribe accurately reflects the services I personally performed and the decisions made by me.    Margareth Talley MD    Department of Dermatology  [facility]: Phone: [phone], Fax:[phone]  [facility]: Phone: [phone], Fax: [phone]   ____________________________________________    CC: Skin Check (Patient here for a skin check, areas of concern none, HX of NMSC)    HPI:  Ms. Ni Maya is a(n) 83 year old female who presents today as a return patient for Skin check    Nothing bleeding, crusting, or changing.    Patient is otherwise feeling well, without additional skin concerns.    Labs Reviewed:  Last path reviewed     Physical Exam:  Vitals: LMP  (LMP Unknown)   SKIN: Total skin excluding the undergarment areas was performed. The exam included the head/face, neck, both arms, chest, back, abdomen, both legs, digits and/or nails.      Browny appears of lower legs   R nasal ala depressed macule 3mm most likely BCC patient declines treatment    - hyperpigmented patches on cheeks         - No other lesions of concern on areas examined.     Medications:  Current Outpatient Medications   Medication     alcohol swab prep pads     amLODIPine (NORVASC) 5 MG tablet     apixaban ANTICOAGULANT (ELIQUIS) 5 MG tablet     atorvastatin (LIPITOR) 40 MG tablet     blood glucose (RELION PRIME TEST) test strip     blood glucose calibration (NO BRAND SPECIFIED) solution     blood glucose monitoring (NO BRAND SPECIFIED) meter device kit     Blood Glucose Monitoring Suppl W/DEVICE KIT     ciprofloxacin (CIPRO) 500 MG tablet     co-enzyme Q-10 100 MG CAPS capsule     fish oil-omega-3  fatty acids 1000 MG capsule     Lancets (E-Z JECT LANCET MICRO-THIN 33G) MISC     levothyroxine (SYNTHROID/LEVOTHROID) 88 MCG tablet     lisinopril (ZESTRIL) 40 MG tablet     metFORMIN (GLUCOPHAGE XR) 500 MG 24 hr tablet     metoprolol tartrate (LOPRESSOR) 50 MG tablet     thin (NO BRAND SPECIFIED) lancets     UNABLE TO FIND     No current facility-administered medications for this visit.      Past Medical History:   Patient Active Problem List   Diagnosis     Hypothyroidism     Allergic rhinitis     Symptomatic menopausal or female climacteric states     Chronic kidney disease, stage II (mild)     Edema     Obesity     Urethral stricture     MICKIE III (vulvar intraepithelial neoplasia III)     Vulval lesion     Health Care Home     Type 2 diabetes, HbA1C goal < 8% (H)     Hyperlipidemia with target LDL less than 100     HTN, goal below 150/90     ACP (advance care planning)     Essential hypertension with goal blood pressure less than 150/90     Diabetes mellitus with peripheral vascular disease (H)     Cellulitis of toe of right foot     Chronic atrial fibrillation (H)     Thrombocytopenia (H)     Acute gout involving toe of right foot     Lumbar radiculopathy     Cerebral infarction, unspecified mechanism (H)     Long term current use of anticoagulant therapy     History of nonmelanoma skin cancer     Pigmented purpuric dermatosis     Sacroiliitis (H)     Past Medical History:   Diagnosis Date     Allergic rhinitis, cause unspecified      Essential hypertension, benign      Infection of kidney, unspecified 1999     Other specified acquired hypothyroidism      Other specified types of cystitis(595.89)     1999,6-2-01, 10-10-01        CC No referring provider defined for this encounter. on close of this encounter.       Again, thank you for allowing me to participate in the care of your patient.        Sincerely,        Margareth Talley MD

## 2023-08-18 NOTE — PATIENT INSTRUCTIONS

## 2023-08-18 NOTE — NURSING NOTE
Ni Maya's goals for this visit include:   Chief Complaint   Patient presents with    Skin Check     Patient here for a skin check, areas of concern none, HX of NMSC       She requests these members of her care team be copied on today's visit information:     PCP: Apryl Olvera    Referring Provider:  No referring provider defined for this encounter.    LMP  (LMP Unknown)     Do you need any medication refills at today's visit?       Brandy Ortega EMT

## 2023-08-18 NOTE — PROGRESS NOTES
"[facility] Dermatology Note  Encounter Date: Aug 18, 2023  Office Visit     Dermatology Problem List:     Last skin check 8/2023      1. \"Precancerous\" lesion removed from the vagina per patient report. ?MICKIE III (documented in chart)  -s/p removal, patient thought done in [place] but no surg path reports available   2. 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 There was some brown pigment at edges of biopsy noted 8/4/2020.      Social History: Retired. Lives with . Has son with down syndrome that lives in longterm. Daughter lives in [place].  Family History: Negative for skin cancer.  ____________________________________________    Assessment & Plan:    # Hx of NMSC  -Recommend sunscreens SPF #30 or greater, protective clothing and avoidance of tanning beds.   ABCD's of melanoma were reviewed with patient and handout provided.     #NUB, right nasal ala possibly skin cancer, reviewed this could grow or worsen and recommend a biopsy, she declines and understands risks   -recheck in 6 months    #possibly MICKIE history  -declines exam    #Pigmented purpuric dermatoses, lower legs, has had triamcinolone  -declines intervention      Procedures Performed:   None, declined     Follow-up: 6 month(s) in-person for spot check nose and recheck skin 10/2024, or earlier for new or changing lesions    Staff and Scribe:     I, Brandy Ortega, am serving as a scribe to document services personally performed by  , based on data collection and the provider's statements to me.      Provider Disclosure:   The documentation recorded by the scribe accurately reflects the services I " personally performed and the decisions made by me.    Margareth Talley MD    Department of Dermatology  [facility]: Phone: [phone], Fax:[phone]  [facility]: Phone: [phone], Fax: [phone]   ____________________________________________    CC: Skin Check (Patient here for a skin check, areas of concern none, HX of NMSC)    HPI:  Ms. Ni Maya is a(n) 83 year old female who presents today as a return patient for Skin check    Nothing bleeding, crusting, or changing.    Patient is otherwise feeling well, without additional skin concerns.    Labs Reviewed:  Last path reviewed     Physical Exam:  Vitals: LMP  (LMP Unknown)   SKIN: Total skin excluding the undergarment areas was performed. The exam included the head/face, neck, both arms, chest, back, abdomen, both legs, digits and/or nails.      Browny appears of lower legs   R nasal ala depressed macule 3mm most likely BCC patient declines treatment    - hyperpigmented patches on cheeks         - No other lesions of concern on areas examined.     Medications:  Current Outpatient Medications   Medication    alcohol swab prep pads    amLODIPine (NORVASC) 5 MG tablet    apixaban ANTICOAGULANT (ELIQUIS) 5 MG tablet    atorvastatin (LIPITOR) 40 MG tablet    blood glucose (RELION PRIME TEST) test strip    blood glucose calibration (NO BRAND SPECIFIED) solution    blood glucose monitoring (NO BRAND SPECIFIED) meter device kit    Blood Glucose Monitoring Suppl W/DEVICE KIT    ciprofloxacin (CIPRO) 500 MG tablet    co-enzyme Q-10 100 MG CAPS capsule    fish oil-omega-3 fatty acids 1000 MG capsule    Lancets (E-Z JECT LANCET MICRO-THIN 33G) MISC    levothyroxine (SYNTHROID/LEVOTHROID) 88 MCG tablet    lisinopril (ZESTRIL) 40 MG tablet    metFORMIN (GLUCOPHAGE XR) 500 MG 24 hr tablet    metoprolol tartrate (LOPRESSOR) 50 MG tablet     thin (NO BRAND SPECIFIED) lancets    UNABLE TO FIND     No current facility-administered medications for this visit.      Past Medical History:   Patient Active Problem List   Diagnosis    Hypothyroidism    Allergic rhinitis    Symptomatic menopausal or female climacteric states    Chronic kidney disease, stage II (mild)    Edema    Obesity    Urethral stricture    MICKIE III (vulvar intraepithelial neoplasia III)    Vulval lesion    Health Care Home    Type 2 diabetes, HbA1C goal < 8% (H)    Hyperlipidemia with target LDL less than 100    HTN, goal below 150/90    ACP (advance care planning)    Essential hypertension with goal blood pressure less than 150/90    Diabetes mellitus with peripheral vascular disease (H)    Cellulitis of toe of right foot    Chronic atrial fibrillation (H)    Thrombocytopenia (H)    Acute gout involving toe of right foot    Lumbar radiculopathy    Cerebral infarction, unspecified mechanism (H)    Long term current use of anticoagulant therapy    History of nonmelanoma skin cancer    Pigmented purpuric dermatosis    Sacroiliitis (H)     Past Medical History:   Diagnosis Date    Allergic rhinitis, cause unspecified     Essential hypertension, benign     Infection of kidney, unspecified 1999    Other specified acquired hypothyroidism     Other specified types of cystitis(595.89)     1999,6-2-01, 10-10-01        CC No referring provider defined for this encounter. on close of this encounter.

## 2023-09-13 DIAGNOSIS — E11.65 TYPE 2 DIABETES MELLITUS WITH HYPERGLYCEMIA, WITHOUT LONG-TERM CURRENT USE OF INSULIN (H): ICD-10-CM

## 2023-09-13 RX ORDER — METFORMIN HCL 500 MG
TABLET, EXTENDED RELEASE 24 HR ORAL
Qty: 90 TABLET | Refills: 0 | Status: SHIPPED | OUTPATIENT
Start: 2023-09-13 | End: 2023-11-15

## 2023-10-12 ENCOUNTER — TRANSFERRED RECORDS (OUTPATIENT)
Dept: FAMILY MEDICINE | Facility: OTHER | Age: 83
End: 2023-10-12
Payer: COMMERCIAL

## 2023-10-12 LAB — RETINOPATHY: NEGATIVE

## 2023-11-15 ENCOUNTER — OFFICE VISIT (OUTPATIENT)
Dept: FAMILY MEDICINE | Facility: OTHER | Age: 83
End: 2023-11-15
Payer: COMMERCIAL

## 2023-11-15 VITALS
OXYGEN SATURATION: 96 % | WEIGHT: 172 LBS | DIASTOLIC BLOOD PRESSURE: 72 MMHG | HEIGHT: 65 IN | TEMPERATURE: 98.5 F | HEART RATE: 77 BPM | SYSTOLIC BLOOD PRESSURE: 116 MMHG | RESPIRATION RATE: 20 BRPM | BODY MASS INDEX: 28.66 KG/M2

## 2023-11-15 DIAGNOSIS — E11.65 TYPE 2 DIABETES MELLITUS WITH HYPERGLYCEMIA, WITHOUT LONG-TERM CURRENT USE OF INSULIN (H): Primary | ICD-10-CM

## 2023-11-15 DIAGNOSIS — E03.9 HYPOTHYROIDISM, UNSPECIFIED TYPE: ICD-10-CM

## 2023-11-15 DIAGNOSIS — Z12.11 SCREEN FOR COLON CANCER: ICD-10-CM

## 2023-11-15 DIAGNOSIS — Z29.11 NEED FOR VACCINATION AGAINST RESPIRATORY SYNCYTIAL VIRUS: ICD-10-CM

## 2023-11-15 LAB
ALBUMIN SERPL BCG-MCNC: 4.7 G/DL (ref 3.5–5.2)
ALP SERPL-CCNC: 91 U/L (ref 40–150)
ALT SERPL W P-5'-P-CCNC: 21 U/L (ref 0–50)
ANION GAP SERPL CALCULATED.3IONS-SCNC: 11 MMOL/L (ref 7–15)
AST SERPL W P-5'-P-CCNC: 23 U/L (ref 0–45)
BILIRUB SERPL-MCNC: 0.4 MG/DL
BUN SERPL-MCNC: 12.2 MG/DL (ref 8–23)
CALCIUM SERPL-MCNC: 9.5 MG/DL (ref 8.8–10.2)
CHLORIDE SERPL-SCNC: 101 MMOL/L (ref 98–107)
CHOLEST SERPL-MCNC: 127 MG/DL
CREAT SERPL-MCNC: 0.93 MG/DL (ref 0.51–0.95)
DEPRECATED HCO3 PLAS-SCNC: 28 MMOL/L (ref 22–29)
EGFRCR SERPLBLD CKD-EPI 2021: 60 ML/MIN/1.73M2
GLUCOSE SERPL-MCNC: 117 MG/DL (ref 70–99)
HBA1C MFR BLD: 7.3 % (ref 0–5.6)
HDLC SERPL-MCNC: 46 MG/DL
LDLC SERPL CALC-MCNC: 47 MG/DL
NONHDLC SERPL-MCNC: 81 MG/DL
POTASSIUM SERPL-SCNC: 3.8 MMOL/L (ref 3.4–5.3)
PROT SERPL-MCNC: 7.1 G/DL (ref 6.4–8.3)
SODIUM SERPL-SCNC: 140 MMOL/L (ref 135–145)
TRIGL SERPL-MCNC: 171 MG/DL
TSH SERPL DL<=0.005 MIU/L-ACNC: 2.47 UIU/ML (ref 0.3–4.2)

## 2023-11-15 PROCEDURE — 99214 OFFICE O/P EST MOD 30 MIN: CPT | Performed by: FAMILY MEDICINE

## 2023-11-15 PROCEDURE — 36415 COLL VENOUS BLD VENIPUNCTURE: CPT | Performed by: FAMILY MEDICINE

## 2023-11-15 PROCEDURE — 83036 HEMOGLOBIN GLYCOSYLATED A1C: CPT | Performed by: FAMILY MEDICINE

## 2023-11-15 PROCEDURE — 80061 LIPID PANEL: CPT | Performed by: FAMILY MEDICINE

## 2023-11-15 PROCEDURE — 80053 COMPREHEN METABOLIC PANEL: CPT | Performed by: FAMILY MEDICINE

## 2023-11-15 PROCEDURE — 84443 ASSAY THYROID STIM HORMONE: CPT | Performed by: FAMILY MEDICINE

## 2023-11-15 RX ORDER — RESPIRATORY SYNCYTIAL VIRUS VACCINE 120MCG/0.5
0.5 KIT INTRAMUSCULAR ONCE
Qty: 1 EACH | Refills: 0 | Status: CANCELLED | OUTPATIENT
Start: 2023-11-15 | End: 2023-11-15

## 2023-11-15 RX ORDER — METFORMIN HCL 500 MG
TABLET, EXTENDED RELEASE 24 HR ORAL
Qty: 90 TABLET | Refills: 1 | Status: SHIPPED | OUTPATIENT
Start: 2023-11-15 | End: 2024-04-03

## 2023-11-15 ASSESSMENT — PAIN SCALES - GENERAL: PAINLEVEL: NO PAIN (0)

## 2023-11-15 NOTE — PROGRESS NOTES
"  Assessment & Plan       ICD-10-CM    1. Type 2 diabetes mellitus with hyperglycemia, without long-term current use of insulin (H)  E11.65 metFORMIN (GLUCOPHAGE XR) 500 MG 24 hr tablet     Comprehensive metabolic panel (BMP + Alb, Alk Phos, ALT, AST, Total. Bili, TP)     Lipid panel reflex to direct LDL Non-fasting     HEMOGLOBIN A1C     Comprehensive metabolic panel (BMP + Alb, Alk Phos, ALT, AST, Total. Bili, TP)      2. Hypothyroidism, unspecified type  E03.9 TSH WITH FREE T4 REFLEX      3. Need for vaccination against respiratory syncytial virus  Z29.11       4. Screen for colon cancer  Z12.11         1. Diabetes.  Her A1c is stable. She was told she was not a high candidate for secondary issues due to her weight and would not be recommended to take Mounjaro.    2. Hypertension.  Blood pressure is well controlled.  She already has stopped her Norvasc with cardiology and had to restart it due to symptoms.    3. Vulvar cancer.  She has had MICKIE but no new issues.    4. Hammertoes.  Her hammertoes are still present though less sensitive due to calluses. She was encouraged to watch for those areas due to the decrease sensation in diabetes    5. Health maintenance.  She was encouraged to get the RSV vaccination.    Follow-up  The patient will follow up after 12/26/2023 for her wellness visit.    Review of the result(s) of each unique test - a1cm cmp, lipid, tsh  No LOS data to display   Time spent by me doing chart review, history and exam, documentation and further activities per the note       BMI:   Estimated body mass index is 28.32 kg/m  as calculated from the following:    Height as of this encounter: 1.66 m (5' 5.35\").    Weight as of this encounter: 78 kg (172 lb).   Weight management plan: Discussed healthy diet and exercise guidelines        Apryl Olvera MD, MD  [facility]    Claude Dan is a 83 year old, presenting for the following health issues:  Recheck Medication      " 11/15/2023     1:48 PM   Additional Questions   Roomed by Susan       History of Present Illness       Diabetes:   She presents for follow up of diabetes.  She is checking home blood glucose one time daily.   She checks blood glucose before meals.  Blood glucose is never over 200 and never under 70. She is aware of hypoglycemia symptoms including none.    She has no concerns regarding her diabetes at this time.   She is not experiencing numbness or burning in feet, excessive thirst, blurry vision, weight changes or redness, sores or blisters on feet.           Hyperlipidemia:  She presents for follow up of hyperlipidemia.   She is taking medication to lower cholesterol. She is not having myalgia or other side effects to statin medications.    Hypertension: She presents for follow up of hypertension.  She does not check blood pressure  regularly outside of the clinic. Outpatient blood pressures have not been over 140/90. She follows a low salt diet.     Hypothyroidism:     Since last visit, patient describes the following symptoms::  None        1. Diabetes.  Her A1c is stable. She was told she was not a high candidate for secondary issues due to her weight and would not be recommended to take Mounjaro.    2. Hypertension.  Blood pressure is well controlled.  She already has stopped her Norvasc with cardiology and had to restart it due to symptoms.    3. Vulvar cancer.  She has had MICKIE but no new issues.    4. Hammertoes.  Her hammertoes are still present though less sensitive due to calluses. She was encouraged to watch for those areas due to the decrease sensation in diabetes    5. Health maintenance.  She was encouraged to get the RSV vaccination.    Follow-up  The patient will follow up after 12/26/2023 for her wellness visit.          Review of Systems   Constitutional, HEENT, cardiovascular, pulmonary, GI, , musculoskeletal, neuro, skin, endocrine and psych systems are negative, except as otherwise noted.     "  Objective    /72   Pulse 77   Temp 98.5  F (36.9  C) (Temporal)   Resp 20   Ht 1.66 m (5' 5.35\")   Wt 78 kg (172 lb)   LMP  (LMP Unknown)   SpO2 96%   BMI 28.32 kg/m    Body mass index is 28.32 kg/m .  Physical Exam   GENERAL: healthy, alert and no distress  EYES: Eyes grossly normal to inspection, PERRL and conjunctivae and sclerae normal  HENT: ear canals and TM's normal, nose and mouth without ulcers or lesions  NECK: no adenopathy, no asymmetry, masses, or scars and thyroid normal to palpation  RESP: lungs clear to auscultation - no rales, rhonchi or wheezes  CV: regular rate and rhythm, normal S1 S2, no S3 or S4, no murmur, click or rub, no peripheral edema and peripheral pulses strong  ABDOMEN: soft, nontender, no hepatosplenomegaly, no masses and bowel sounds normal   (female): normal female external genitalia, normal urethral meatus, vaginal mucosa pink, moist, well rugated, and normal cervix/adnexa/uterus without masses or discharge  MS: no gross musculoskeletal defects noted, no edema  SKIN: no suspicious lesions or rashes  NEURO: Normal strength and tone, mentation intact and speech normal  PSYCH: mentation appears normal, affect normal/bright  Diabetic foot exam: normal DP and PT pulses, no trophic changes or ulcerative lesions, normal sensory exam, and decrease sensation on the hammertoes of her right foot                      "

## 2023-11-16 NOTE — RESULT ENCOUNTER NOTE
Spoke to patient, informed her that her labs are stable per Dr. Olvera  Patient had no questions at time of call  Dianne Song MA on 11/16/2023 at 5:19 PM

## 2023-11-27 ENCOUNTER — PATIENT OUTREACH (OUTPATIENT)
Dept: CARE COORDINATION | Facility: CLINIC | Age: 83
End: 2023-11-27
Payer: COMMERCIAL

## 2024-03-11 NOTE — PROGRESS NOTES
Physical Therapy  Physical Therapy Orthopedic Evaluation    Patient Name: Yaquelin Subramanian  MRN: [id number]  Today's Date: 3/11/2024  Time Calculation  Start Time: 1435  Stop Time: 1515  Time Calculation (min): 40 min  PT Evaluation Time Entry  PT Evaluation (Low) Time Entry: 25 PT Therapeutic Procedures Time Entry  Therapeutic Exercise Time Entry: 15          Insurance:  Visit number: 1 of 60  Authorization info: no auth  Insurance Type: Payor: ANTHEM / Plan: ANTHEM HMP / Product Type: *No Product type* /      Referred by: Dr. Sean Harvey  Onset:  2/26/24      Current Problem  1. Right hip pain  Referral to Physical Therapy    Follow Up In Physical Therapy      2. Aftercare following surgery  Referral to Physical Therapy          General:       Precautions:       STEADI -   Medical History Form: Reviewed (scanned into chart)    Subjective   SIENA: R hip pain s/p scope last June.  Training for softball, she was fielding a ball and squatted low and felt a pop with pain and the pain has lingered since.  The pain lasted the rest of the week and has gotten better.  She does no feel like she has lost ROM or strength. She played softball for the next few days but was told by the doctor that she is shut down for a month.  She plays outfield R and L.    Chief Complaint: R hip pain  Onset: 2/26/24    Pain:     Location: deep in the groin  Description: sharp  Aggravating Factors:  fielding, playing softball,   Relieving Factors:  heat, time  Current: 0/10  Best: 0/10  Average: 3/10  Worst: 8/10 at the time of injury: worst in the last week: 4-5/10  Current Condition:   Better    Social Support: lives at home with mom, step dad and brother.    Patients Living Environment: Reviewed and no concern    Functional limitations:  prolonged walking  Exercise/Recreational Activities: softball for HS; 11th grade; [employer]: cheer (fall and winter)  Work/School: 11th grade [employer]  Patient's Goal(s) for Therapy: play HS  Dr. Deleon: 48 hr holter for your review-She is on metoprolol tartrate 100 MG BID-you see her in clinic on 7/24. Thanks, Bill                      IMPRESSION AND PLAN:  A very delightful 76-year-old female with recently diagnosed atrial fibrillation, although documentation of atrial fibrillation on EKG in 2015, other comorbidities of hypertension, diabetes, dyslipidemia.  I had a long discussion with the patient regarding the pathophysiology of atrial fibrillation, rate versus rhythm control strategy and stroke prophylaxis.  Her CHADS2-VASc score is 5 (age, gender, hypertension, diabetes) and oral anticoagulation is recommended for stroke prophylaxis.  We discussed options of Coumadin versus new oral anticoagulant agents.  We discussed risks and benefits of each of the groups.  After a long discussion, the patient wishes to start apixaban 5 mg b.i.d.  I also gave her information regarding other alternative new oral anticoagulant agents like Xarelto 20 mg daily or Pradaxa 150 mg b.i.d. and in case there is significant cost difference between apixaban and these new agents, we can switch to the one which is more affordable to the patient.  I recommended the patient can discontinue aspirin.  We also had a long discussion regarding rate versus rhythm control strategy.  We discussed about trials showing no advantage of one strategy over the other in terms of mortality benefit.  Remarkably, she appears to be essentially asymptomatic from atrial fibrillation and ventricular rate appears reasonably controlled at rest.  After a long discussion, the patient wishes to proceed with rate control strategy, which I think is reasonable given asymptomatic status, reasonably controlled ventricular rate and moderate atrial enlargement on echocardiogram.  I recommended doing a Holter for 48 hours to see the adequacy of rate control.  Exertion-related lower chest discomfort, especially after eating, is somewhat concerning for angina,  although remarkably she had these kind of symptoms for many, many years as noted above.  A long time ago, it looks like she had a normal coronary angiogram but this was at least 10, if not more years ago.  I recommended doing a Lexiscan stress test to make sure there is no significant inducible ischemia that is the cause of her symptoms.  I am setting up a followup in about a month and we will go over the results of the Holter as well as the stress test.       1.  Newly diagnosed atrial fibrillation, however, atrial fibrillation documented on EKG in 2015 as well.  Essentially asymptomatic from atrial fibrillation.  Normal LV function.  CHADS2-VASc score of 5.  On metoprolol tartrate 100 mg b.i.d.  Ventricular rate seems reasonably controlled at rest.   2.  Hypertension.   3.  Diabetes.   4.  Exertional lower chest discomfort after eating, somewhat concerning for angina.  It looks like the patient had a normal coronary angiogram many years ago.  It looks like the patient also had EGDs a couple of times without any pathology being told to the patient.   5.  Chronic back pain.  The patient was originally scheduled to get an epidural shot but the patient tells me that she has canceled it.       RECOMMENDATIONS:   1.  Start apixaban 5 mg b.i.d.   2.  Discontinue aspirin.   3.  A 48-hour Holter to see the adequacy of rate control.   4.  Lexiscan stress test to evaluate for any inducible ischemia.   5.  It looks like for now the patient has canceled her epidural shot but in case she proceeds with the epidural shot, I recommended withholding apixaban for 3 days before the epidural injection and to consider restarting apixaban after at least 24 hours of the epidural shot.   6.  Follow up in 1 month.    softball    Relevant Information (PMH & Previous Tests/Imaging): xray -   Previous Interventions/Treatments: PT for hip scope last year.       Red Flags: Do you have any of the following? No  Fever/chills, unexplained weight changes, dizziness/fainting, unexplained change in bowel or bladder functions, unexplained malaise or muscle weakness, night pain/sweats, numbness or tingling    Objective:  Objective     Palpation: TTP to R hip flexor, adductor, sometimes glut med area  Joint Mobility: excellent   Flexibility: hamstrings B ++, hip flexor R+/L-, rectus R ++/L+  Gait: nonantalgic      ROM     Hip PROM B full pain free ROM      Flexibility:  Hamstring R ++/ L ++  Hip flexor  R WNL/ L WNL  Rectus R ++/ L +  Gastroc R +/ L +     Strength Testing    Hip    (R)  (L)  Flexion: 4+  4     Ext/ KE : 4  4   Ext/ KF :           4-                    4-  Abduction: 4-  4    Adduction: 4-  4    ER:  4+  5    IR:  4+  5   Quad:               5                    5  Hamstring  4+                  4+      Functional Screening  Squat: good form  SL Balance: +R trendelenburg/L WNL  SL Quarter Squat: +R valgus and instability/L ankle instability       Special Tests    ARMAND Test: +R for pain, symmetrical mobility  Hip Scour: -R  Stinchfield: +R, slight pain and weak  ER DEROTATION: -R  ADD SQUEEZE: +R in flat supine     Outcome Measures:    LEFS:  62/80    Treatment Performed:    Exercises  - Standing Hamstring Stretch on Chair  - 1 x daily - 3 reps - 30 hold  - Hooklying Isometric Hip Flexion  - 2 x daily - 5 reps - 20-45 hold, rest 30-60 hold  - Sidelying Hip Extension in Abduction  - 1 x daily - 3 sets - 10-15 reps  - Quadruped Leg Extension with Resistance  - 1 x daily - 2 sets - 10 reps  - Quadruped Bent Leg Hip Extension  - 1 x daily - 2 sets - 10 reps  - Alternating Single Leg Bridge  - 3-5 x weekly - 2 sets - 10-15 reps  - Standing 3-Way Kick  - 1 x daily - 3 sets - 10 reps    EDUCATION:     Access Code: [email or web address]  home  exercise program, plan of care, activity modifications, pain management, and injury pathology  Outpatient Education  Individual(s) Educated: Patient, Parent  Education Provided: Anatomy, Home Exercise Program, POC  Patient/Caregiver Demonstrated Understanding: yes  Plan of Care Discussed and Agreed Upon: yes  Patient Response to Education: Patient/Caregiver Verbalized Understanding of Information      Assessment:     16yo female approx 9mos s/p R hip arthroscopy for GUMARO/acetabular labral tear presents today with R hip pain after deep squatting at softball to field a ball.  She demonstrates s/s of hip flexor/adductor tendinopathy with weakness, pain with special testing, and full ROM with mild pain with ARMAND and FADDIR.  She c/o pain specifically with softball and prolonged walking.  Pt would benefit from physical therapy to address the impairments found & listed previously in the objective section in order to return to safe and pain-free ADLs and prior level of function.    PT Assessment Results: Decreased strength, Impaired balance, Pain  Rehab Prognosis: Good  Evaluation/Treatment Tolerance: Patient tolerated treatment well    Plan:    Treatment/Interventions: Dry needling, Education/ Instruction, Manual therapy, Neuromuscular re-education, Therapeutic exercises  PT Plan: Skilled PT  PT Frequency:  (1-2x/wk for 6-8wks)  Rehab Potential: Good  Plan of Care Agreement: Patient, Parent    Goals: Set and discussed today    1. Pt will be independent with HEP.  2. Pt will demonstrated full pain free R hip ROM.  3.  Pt will demonstrated gross 4+-5/5 B LE strength.  4.  Pt will report at least 72/80 on LEFS  5.  Pt will demo DL and SL squats without deviations, demonstrating good body mechanics and alignment.  6.  Pt will report no pain with cooking, cleaning, sleep, ADL's, and driving.  7.  Pt will ambulate for at least 45' without c/o pain.  8. Pt will participate in a return to plyometrics/joint loading program to  allow safe return to run.  9.  Pt will pass the RTS test to allow safe return to sport.    Plan of care was developed with input and agreement by the patient      Karla Newsome, PT

## 2024-03-20 ENCOUNTER — OFFICE VISIT (OUTPATIENT)
Dept: CARDIOLOGY | Facility: CLINIC | Age: 84
End: 2024-03-20
Payer: COMMERCIAL

## 2024-03-20 VITALS
HEART RATE: 75 BPM | BODY MASS INDEX: 29.57 KG/M2 | HEIGHT: 65 IN | WEIGHT: 177.5 LBS | OXYGEN SATURATION: 97 % | SYSTOLIC BLOOD PRESSURE: 128 MMHG | DIASTOLIC BLOOD PRESSURE: 80 MMHG

## 2024-03-20 DIAGNOSIS — I36.1 NONRHEUMATIC TRICUSPID VALVE REGURGITATION: Primary | ICD-10-CM

## 2024-03-20 DIAGNOSIS — N18.2 CHRONIC KIDNEY DISEASE, STAGE II (MILD): ICD-10-CM

## 2024-03-20 DIAGNOSIS — I10 BENIGN ESSENTIAL HYPERTENSION: ICD-10-CM

## 2024-03-20 DIAGNOSIS — I48.20 CHRONIC ATRIAL FIBRILLATION (H): ICD-10-CM

## 2024-03-20 DIAGNOSIS — E11.65 TYPE 2 DIABETES MELLITUS WITH HYPERGLYCEMIA, WITHOUT LONG-TERM CURRENT USE OF INSULIN (H): ICD-10-CM

## 2024-03-20 DIAGNOSIS — I10 ESSENTIAL HYPERTENSION WITH GOAL BLOOD PRESSURE LESS THAN 140/90: ICD-10-CM

## 2024-03-20 PROCEDURE — 99214 OFFICE O/P EST MOD 30 MIN: CPT | Performed by: PHYSICIAN ASSISTANT

## 2024-03-20 RX ORDER — CETIRIZINE HYDROCHLORIDE 10 MG/1
10 TABLET ORAL DAILY
COMMUNITY

## 2024-03-20 RX ORDER — METOPROLOL TARTRATE 50 MG
50 TABLET ORAL 2 TIMES DAILY
Qty: 180 TABLET | Refills: 3 | Status: SHIPPED | OUTPATIENT
Start: 2024-03-20

## 2024-03-20 RX ORDER — AMLODIPINE BESYLATE 5 MG/1
5 TABLET ORAL DAILY
Qty: 90 TABLET | Refills: 3 | Status: SHIPPED | OUTPATIENT
Start: 2024-03-20

## 2024-03-20 RX ORDER — LISINOPRIL 40 MG/1
40 TABLET ORAL DAILY
Qty: 90 TABLET | Refills: 3 | Status: SHIPPED | OUTPATIENT
Start: 2024-03-20

## 2024-03-20 ASSESSMENT — PAIN SCALES - GENERAL: PAINLEVEL: NO PAIN (0)

## 2024-03-20 NOTE — PATIENT INSTRUCTIONS
"Please talk with your primary doctor about facial discoloration related to amlodipine (NORVASC) use.       A referral was placed for facial swelling on your right cheek with Dr. Bentley Pardo MD    Board Certifications  American Board of Ophthalmology  Fellowships  [facility], [facility], [place], [place]  Residency  [facility]  Medical School  [facility] [employer]  Recognition  [place] \"Top Doctor - Rising Star\" (2016, 2017, 2018)  [facility]  Top Doctors  ([id number])  Guide to [place] Top Ophthalmologists  [facility] Department of Ophthalmology Fellow Research Award  Kelvin Javier Award for Clinical Skills, Compassion, and Patient Care  Locations  [facility]   For Appointments [phone] at [facility]    Desk B ENT to schedule at [facility]       Checking for Skin Cancer  You can help find cancer early by checking your skin each month. There are 3 main kinds of skin cancer: melanoma, basal cell carcinoma, and squamous cell carcinoma. Doing monthly skin checks is the best way to find new marks, sores, or skin changes. Follow these instructions for checking your skin.   The ABCDEs of checking moles for melanoma   Check your moles or growths for signs of melanoma using ABCDE:   Asymmetry: The sides of the mole or growth don t match.  Border: The edges are ragged, notched, or blurred.  Color: The color within the mole or growth varies. It could be black, brown, tan, white, or shades of red, gray, or blue.  Diameter: The mole or growth is larger than   inch or 6 mm (size of a pencil eraser).  Evolving: The size, shape, texture, or color of the mole or growth is changing.     ABCDE's of moles on light skin.        ABCDE's of moles on dark skin may be harder to identify.     Checking for other types of skin cancer  Basal cell carcinoma or squamous cell carcinoma cause " symptoms like:     A spot or mole that looks different from all other marks on your skin  Changes in how an area feels, such as itching, tenderness, or pain  Changes in the skin's surface, such as oozing, bleeding, or scaliness  A sore that doesn't heal  New swelling, redness, or spread of color beyond the border of a mole    Who s at risk?  Anyone of any skin color can get skin cancer. But you're at greater risk if you have:   Fair skin that freckles easily and burns instead of tanning  Light-colored or red hair  Light-colored eyes  Many moles or abnormal moles on your skin  A long history of unprotected exposure to sunlight or tanning beds  A history of many blistering sunburns as a child or teen  A family history of skin cancer  Been exposed to radiation or chemicals  A weakened immune system  Been exposed to arsenic  If you've had skin cancer in the past, you're at high risk of having it again.   How to check your skin  Do your monthly skin checkups in front of a full-length mirror. Use a room with good lighting so it's easier to see. Use a hand mirror to look at hard-to-see places like your buttocks and back. You can also have a trusted friend or family member help you with these checks. Check every part of your body, including your:   Head (ears, face, neck, and scalp)  Torso (front, back, sides, and under breasts)  Arms (tops, undersides, and armpits)  Hands (palms, backs, and fingers, including under the nails)  Lower back, buttocks, and genitals  Legs (front, back, and sides)  Feet (tops, soles, toes, including under the nails, and between toes)  Watch for new spots on your skin or a spot that's changing in color, shape, size.   If you have a lot of moles, take digital photos of them each month. Make sure to take photos both up close and from a distance. These can help you see if any moles change over time.   Know your skin  Most skin changes aren't cancer. But if you see any changes in your skin, call your  healthcare provider right away. Only they can tell you if a change is a problem. If you have skin cancer, seeing your provider can be the first step to getting the treatment that could save your life.   Christy last reviewed this educational content on 10/1/2021    6030-6827 [employer]. All rights reserved. This information is not intended as a substitute for professional medical care. Always follow your healthcare professional's instructions.

## 2024-03-20 NOTE — LETTER
"3/20/2024    Apryl Olvera MD, MD  [address] [facility] [address]    RE: Ni Maya       Dear Colleague,     I had the pleasure of seeing Ni Maya in the [facility].    Cardiology Clinic Progress Note    Service Date: 2024    Primary Cardiologist: Dr. Deleon      Reason for Visit: A-fib, hypertension     HPI:   Ni Maya is a delightful 83-year-old woman with past medical history significant for the followin.  Chronic A-fib with rate control and anticoagulation approach  2.  Hypertension  3.  History of CVA with some residual memory issues, this was noted in 2019  4.  Mild mitral regurg and normal EF.    We've been managing her HR and HTN.  Today, she comes in for routine follow-up and feels well.  She denies chest pain, shortness of breath, orthopnea or PND.  She denies syncope or presyncope.  She has some bruising but no significant bleeding.    Social History:  She comes in today with her  Marco Antonio is also a patient of ours.  She is a lifelong non-smoker no alcohol use.  They have now moved to an apartment in [place].    Physical Exam:  Ht 1.66 m (5' 5.35\")   Wt 80.5 kg (177 lb 8 oz)   LMP  (LMP Unknown)   Breastfeeding No   BMI 29.22 kg/m     Well-developed well-nourished woman in no acute distress.  Normocephalic atraumatic.  Heart is irregularly irregular without murmur rub or gallop.  Lungs are clear without wheezes rales or rhonchi.  Extremities without peripheral edema.  Skin is warm and dry.    Data reviewed:  Echo  shows mild MR, mild LVH, normal EF at 55 to 60%.    Assessment and Plan:  1.  A-fib, permanent appropriately on Eliquis 5 mg twice daily which remains appropriate for age weight and renal function.  Rates appear well-controlled we will continue current medications.    2.  Hypertension, well-controlled, medications refilled today.    3.  Mild mitral regurgitation, last echo in , on exam this sounds similar but will repeat " echocardiogram next year which will be 4 years after her last.    It is my great pleasure to participate in this delightful patient's care.  I am glad to see her doing so well.  She will follow-up in 1 year with Dr. Deleon with an echocardiogram before that visit, sooner with concerns.    Carly Antonio PA-C  [facility]     This note was written using Dragon voice recognition system, please excuse any misspelled names, or nonsensical words and call with any questions.      Orders this visit:  No orders of the defined types were placed in this encounter.    No orders of the defined types were placed in this encounter.    There are no discontinued medications.  Encounter Diagnoses   Name Primary?    Chronic atrial fibrillation (H)     Benign essential hypertension        Current Medications:  Current Outpatient Medications   Medication Sig Dispense Refill    alcohol swab prep pads Use to swab area of injection/geoffrey as directed. 100 each 3    amLODIPine (NORVASC) 5 MG tablet Take 1 tablet (5 mg) by mouth daily 90 tablet 3    apixaban ANTICOAGULANT (ELIQUIS) 5 MG tablet Take 1 tablet (5 mg) by mouth 2 times daily 180 tablet 3    atorvastatin (LIPITOR) 40 MG tablet Take 1 tablet (40 mg) by mouth daily 90 tablet 3    blood glucose (RELION PRIME TEST) test strip 1 strip by In Vitro route 2 times daily Use to test blood sugar 3 times daily or as directed. 100 strip 1    blood glucose calibration (NO BRAND SPECIFIED) solution To accompany: Blood Glucose Monitor Brands: per insurance. 1 Bottle 3    blood glucose monitoring (NO BRAND SPECIFIED) meter device kit Use to test blood sugar 3 times daily or as directed. Preferred blood glucose meter OR supplies to accompany: Blood Glucose Monitor Brands: per insurance. 1 kit 0    Blood Glucose Monitoring Suppl W/DEVICE KIT 1 kit 3 times daily. 1 kit 0    co-enzyme Q-10 100 MG CAPS capsule Take 100 mg by mouth daily      fish oil-omega-3 fatty acids 1000 MG capsule Take  2 g by mouth daily. 2 caps per day      Lancets (E-Z JECT LANCET MICRO-THIN 33G) MISC       levothyroxine (SYNTHROID/LEVOTHROID) 88 MCG tablet TAKE ONE TABLET BY MOUTH ONCE DAILY 90 tablet 3    lisinopril (ZESTRIL) 40 MG tablet Take 1 tablet (40 mg) by mouth daily 90 tablet 3    metFORMIN (GLUCOPHAGE XR) 500 MG 24 hr tablet TAKE one TABLET ONCE DAILY WITH DINNER 90 tablet 1    metoprolol tartrate (LOPRESSOR) 50 MG tablet Take 1 tablet (50 mg) by mouth 2 times daily 180 tablet 3    thin (NO BRAND SPECIFIED) lancets Use with lanceting device. To accompany: Blood Glucose Monitor Brands: per insurance. 100 each 6    UNABLE TO FIND MEDICATION NAME: resvante supplement      azelaic acid (FINACIA) 15 % external gel Apply a thin layer to the face in the am (Patient not taking: Reported on 11/15/2023) 50 g 3       Allergies Reviewed and Updated:  Allergies   Allergen Reactions    Sulfa Antibiotics Rash       Review of Systems:  Skin:  Positive for bruising   Eyes:  Positive for glasses  ENT:  Negative nasal congestion;sinus trouble;postnasal drainage  Respiratory:  Positive for cough  Cardiovascular:  Negative    Gastroenterology: Negative    Genitourinary:  Negative    Musculoskeletal:  Positive for back pain;neck pain  Neurologic:  Negative    Psychiatric:  Negative    Heme/Lymph/Imm:  Positive for allergies;easy bruising  Endocrine:  Positive for thyroid disorder;diabetes     Pertinent Past Medical, Surgical and Family History Reviewed and noted above.     CC  Carly Antonio PA-C  [address]  [place] [address]      Thank you for allowing me to participate in the care of your patient.      Sincerely,     Carly Antonio PA-C     [facility]

## 2024-03-20 NOTE — PROGRESS NOTES
"  Cardiology Clinic Progress Note    Service Date: 2024    Primary Cardiologist: Dr. Deleon      Reason for Visit: A-fib, hypertension     HPI:   Ni Maya is a delightful 83-year-old woman with past medical history significant for the followin.  Chronic A-fib with rate control and anticoagulation approach  2.  Hypertension  3.  History of CVA with some residual memory issues, this was noted in 2019  4.  Mild mitral regurg and normal EF.    We've been managing her HR and HTN.  Today, she comes in for routine follow-up and feels well.  She denies chest pain, shortness of breath, orthopnea or PND.  She denies syncope or presyncope.  She has some bruising but no significant bleeding.    Social History:  She comes in today with her  Marco Antonio is also a patient of ours.  She is a lifelong non-smoker no alcohol use.  They have now moved to an apartment in [place].    Physical Exam:  Ht 1.66 m (5' 5.35\")   Wt 80.5 kg (177 lb 8 oz)   LMP  (LMP Unknown)   Breastfeeding No   BMI 29.22 kg/m     Well-developed well-nourished woman in no acute distress.  Normocephalic atraumatic.  Heart is irregularly irregular without murmur rub or gallop.  Lungs are clear without wheezes rales or rhonchi.  Extremities without peripheral edema.  Skin is warm and dry.    Data reviewed:  Echo  shows mild MR, mild LVH, normal EF at 55 to 60%.    Assessment and Plan:  1.  A-fib, permanent appropriately on Eliquis 5 mg twice daily which remains appropriate for age weight and renal function.  Rates appear well-controlled we will continue current medications.    2.  Hypertension, well-controlled, medications refilled today.    3.  Mild mitral regurgitation, last echo in , on exam this sounds similar but will repeat echocardiogram next year which will be 4 years after her last.    It is my great pleasure to participate in this delightful patient's care.  I am glad to see her doing so well.  She will follow-up in " year with Dr. Deleon with an echocardiogram before that visit, sooner with concerns.    Carly Antonio PA-C  [facility] Cardiology     This note was written using Dragon voice recognition system, please excuse any misspelled names, or nonsensical words and call with any questions.      Orders this visit:  No orders of the defined types were placed in this encounter.    No orders of the defined types were placed in this encounter.    There are no discontinued medications.  Encounter Diagnoses   Name Primary?    Chronic atrial fibrillation (H)     Benign essential hypertension        Current Medications:  Current Outpatient Medications   Medication Sig Dispense Refill    alcohol swab prep pads Use to swab area of injection/geoffrey as directed. 100 each 3    amLODIPine (NORVASC) 5 MG tablet Take 1 tablet (5 mg) by mouth daily 90 tablet 3    apixaban ANTICOAGULANT (ELIQUIS) 5 MG tablet Take 1 tablet (5 mg) by mouth 2 times daily 180 tablet 3    atorvastatin (LIPITOR) 40 MG tablet Take 1 tablet (40 mg) by mouth daily 90 tablet 3    blood glucose (RELION PRIME TEST) test strip 1 strip by In Vitro route 2 times daily Use to test blood sugar 3 times daily or as directed. 100 strip 1    blood glucose calibration (NO BRAND SPECIFIED) solution To accompany: Blood Glucose Monitor Brands: per insurance. 1 Bottle 3    blood glucose monitoring (NO BRAND SPECIFIED) meter device kit Use to test blood sugar 3 times daily or as directed. Preferred blood glucose meter OR supplies to accompany: Blood Glucose Monitor Brands: per insurance. 1 kit 0    Blood Glucose Monitoring Suppl W/DEVICE KIT 1 kit 3 times daily. 1 kit 0    co-enzyme Q-10 100 MG CAPS capsule Take 100 mg by mouth daily      fish oil-omega-3 fatty acids 1000 MG capsule Take 2 g by mouth daily. 2 caps per day      Lancets (E-Z JECT LANCET MICRO-THIN 33G) MISC       levothyroxine (SYNTHROID/LEVOTHROID) 88 MCG tablet TAKE ONE TABLET BY MOUTH ONCE DAILY 90 tablet 3     lisinopril (ZESTRIL) 40 MG tablet Take 1 tablet (40 mg) by mouth daily 90 tablet 3    metFORMIN (GLUCOPHAGE XR) 500 MG 24 hr tablet TAKE one TABLET ONCE DAILY WITH DINNER 90 tablet 1    metoprolol tartrate (LOPRESSOR) 50 MG tablet Take 1 tablet (50 mg) by mouth 2 times daily 180 tablet 3    thin (NO BRAND SPECIFIED) lancets Use with lanceting device. To accompany: Blood Glucose Monitor Brands: per insurance. 100 each 6    UNABLE TO FIND MEDICATION NAME: resvante supplement      azelaic acid (FINACIA) 15 % external gel Apply a thin layer to the face in the am (Patient not taking: Reported on 11/15/2023) 50 g 3       Allergies Reviewed and Updated:  Allergies   Allergen Reactions    Sulfa Antibiotics Rash       Review of Systems:  Skin:  Positive for bruising   Eyes:  Positive for glasses  ENT:  Negative nasal congestion;sinus trouble;postnasal drainage  Respiratory:  Positive for cough  Cardiovascular:  Negative    Gastroenterology: Negative    Genitourinary:  Negative    Musculoskeletal:  Positive for back pain;neck pain  Neurologic:  Negative    Psychiatric:  Negative    Heme/Lymph/Imm:  Positive for allergies;easy bruising  Endocrine:  Positive for thyroid disorder;diabetes     Pertinent Past Medical, Surgical and Family History Reviewed and noted above.     CC  Carly Antonio PAHarvinderC  [address]  [place] [address]

## 2024-03-20 NOTE — PATIENT INSTRUCTIONS
Thank you for visiting with me today.    We discussed:   I'm glad you're doing well.      Medication changes:   Continue your current medications.      Next Steps:   We'll see you back in 1 year with an echocardiogram before that visit.  Please see Dr. Deleon at that visit.      Please call my nurses, Nikky and Thi, directly with any questions at  [phone].      *If you have concerns after hours, please call [phone], option 2 to speak with on call Cardiologist.

## 2024-03-20 NOTE — PROGRESS NOTES
"  [facility] Dermatology Note  Encounter Date: Mar 22, 2024  Office Visit     Dermatology Problem List:  1. \"Precancerous\" lesion removed from the vagina per patient report. ?MICKIE III (documented in chart)  -s/p removal, patient thought done in [place] but no surg path reports available   2. 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 There was some brown pigment at edges of biopsy noted 8/4/2020.      Social History: Retired. Lives with . Has son with down syndrome that lives in [facility]. Daughter lives in [place].  Family History: Negative for skin cancer.    ____________________________________________    Assessment & Plan:    # History of nonmelanoma skin cancer, no clincial evidence of recurrence.   - ABCDEs: Counseled ABCDEs of melanoma: Asymmetry, Border (irregularity), Color (not uniform, changes in color), Diameter (greater than 6 mm which is about the size of a pencil eraser), and Evolving (any changes in preexisting moles).  - Sun protection: Counseled SPF30+ sunscreen, UPF clothing, sun avoidance, tanning bed avoidance.  - Recommended regular skin exams.    #NUB, right nasal ala. Appears normal today.       # Swelling/edema under R eye.  - Referral to Dr. Schafer    #possibly MICKIE history   -declines exam      # PIH, Lower legs, cheeks. Drug induced, discoloration, possibly from amlodipine/Norvasc  - Review with cardiology        Procedures Performed:   none    Follow-up: 1 year dermatology total skin check due  1 year     Staff and Scribe:     Scribe Disclosure:   IJulee, am serving as a scribe to document services personally performed by " Margareth Talley MD based on data collection and the provider's statements to me.     Provider Disclosure:   The documentation recorded by the scribe accurately reflects the services I personally performed and the decisions made by me.    Margareth Talley MD    Department of Dermatology  [facility]: Phone: [phone], Fax:[phone]  [facility]: Phone: [phone], Fax: [phone]   ____________________________________________    CC: Skin Check (FBSC- small abrasion on right cheek- patient noted facial edema but unsure if it is due to the cut. Recheck nose- patient does not want biopsy today but No symptoms per patient. )    HPI:  Ms. Ni Maya is a(n) 83 year old female who presents today as a return patient for spot check.    Lesion on nose at last visit. Patient declined biopsy. Recommended 6 month recheck.    Today, patient reports she recently had a cut on her R cheek, present for years but now her cheek is swelling in the area. She noticed the swelling one day while brushing her teeth. She has not had any surgeries. She did have all her teeth pulled but that was some time ago.       Patient is otherwise feeling well, without additional skin concerns.    Labs Reviewed:  N/A    Physical Exam:  Vitals: LMP  (LMP Unknown)   SKIN: Total skin excluding the undergarment areas was performed. The exam included the head/face, neck, both arms, chest, back, abdomen, both legs, digits and/or nails.   - There is a blue/gray discoloration to the cheeks, lower legs  - Edema around R eye/cheek area.  - There is no erythema, telangectasias, nodularity, or pigmentation on the sites of prior NMSCs.   - No other lesions of concern on areas examined.     Medications:  Current Outpatient Medications   Medication    alcohol swab prep pads    amLODIPine (NORVASC) 5 MG tablet    apixaban ANTICOAGULANT  (ELIQUIS) 5 MG tablet    atorvastatin (LIPITOR) 40 MG tablet    azelaic acid (FINACIA) 15 % external gel    blood glucose (RELION PRIME TEST) test strip    blood glucose calibration (NO BRAND SPECIFIED) solution    blood glucose monitoring (NO BRAND SPECIFIED) meter device kit    Blood Glucose Monitoring Suppl W/DEVICE KIT    cetirizine (ZYRTEC) 10 MG tablet    co-enzyme Q-10 100 MG CAPS capsule    fish oil-omega-3 fatty acids 1000 MG capsule    Lancets (E-Z JECT LANCET MICRO-THIN 33G) MISC    levothyroxine (SYNTHROID/LEVOTHROID) 88 MCG tablet    lisinopril (ZESTRIL) 40 MG tablet    metFORMIN (GLUCOPHAGE XR) 500 MG 24 hr tablet    metoprolol tartrate (LOPRESSOR) 50 MG tablet    thin (NO BRAND SPECIFIED) lancets    UNABLE TO FIND     No current facility-administered medications for this visit.      Past Medical History:   Patient Active Problem List   Diagnosis    Hypothyroidism    Allergic rhinitis    Symptomatic menopausal or female climacteric states    Chronic kidney disease, stage II (mild)    Edema    Obesity    Urethral stricture    MICKIE III (vulvar intraepithelial neoplasia III)    Vulval lesion    Health Care Home    Type 2 diabetes, HbA1C goal < 8% (H)    Hyperlipidemia with target LDL less than 100    HTN, goal below 150/90    ACP (advance care planning)    Essential hypertension with goal blood pressure less than 150/90    Diabetes mellitus with peripheral vascular disease (H)    Cellulitis of toe of right foot    Chronic atrial fibrillation (H)    Thrombocytopenia (H24)    Acute gout involving toe of right foot    Lumbar radiculopathy    Cerebral infarction, unspecified mechanism (H)    Long term current use of anticoagulant therapy    History of nonmelanoma skin cancer    Pigmented purpuric dermatosis    Sacroiliitis (H24)     Past Medical History:   Diagnosis Date    Allergic rhinitis, cause unspecified     Essential hypertension, benign     Infection of kidney, unspecified 1999    Other specified  acquired hypothyroidism     Other specified types of cystitis(595.89)     1999,6-2-01, 10-10-01        CC No referring provider defined for this encounter. on close of this encounter.

## 2024-03-22 ENCOUNTER — OFFICE VISIT (OUTPATIENT)
Dept: DERMATOLOGY | Facility: CLINIC | Age: 84
End: 2024-03-22
Payer: COMMERCIAL

## 2024-03-22 DIAGNOSIS — H02.33: Primary | ICD-10-CM

## 2024-03-22 PROCEDURE — 99213 OFFICE O/P EST LOW 20 MIN: CPT | Performed by: DERMATOLOGY

## 2024-03-22 ASSESSMENT — PAIN SCALES - GENERAL: PAINLEVEL: NO PAIN (0)

## 2024-03-22 NOTE — NURSING NOTE
Ni Maya's chief complaint for this visit includes:  Chief Complaint   Patient presents with    Skin Check     FBSC- small abrasion on right cheek- patient noted facial edema but unsure if it is due to the cut. Recheck nose- patient does not want biopsy today but No symptoms per patient.      PCP: Apryl Olvera    Referring Provider:  Apryl Olvera MD  [address] [place] [address]    LMP  (LMP Unknown)   No Pain (0)        Allergies   Allergen Reactions    Sulfa Antibiotics Rash         Do you need any medication refills at today's visit?    No.       Ita Morejon LPN on 3/22/2024 at 11:25 AM

## 2024-03-22 NOTE — LETTER
"    3/22/2024         RE: Ni Maya  [address] [address]        Dear Colleague,    Thank you for referring your patient, Ni Maya, to the [facility]. Please see a copy of my visit note below.      [facility] Dermatology Note  Encounter Date: Mar 22, 2024  Office Visit     Dermatology Problem List:  1. \"Precancerous\" lesion removed from the vagina per patient report. ?MICKIE III (documented in chart)  -s/p removal, patient thought done in [place] but no surg path reports available   2. 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 There was some brown pigment at edges of biopsy noted 8/4/2020.      Social History: Retired. Lives with . Has son with down syndrome that lives [place]. Daughter lives in [place].  Family History: Negative for skin cancer.    ____________________________________________    Assessment & Plan:    # History of nonmelanoma skin cancer, no clincial evidence of recurrence.   - ABCDEs: Counseled ABCDEs of melanoma: Asymmetry, Border (irregularity), Color (not uniform, changes in color), Diameter (greater than 6 mm which is about the size of a pencil eraser), and Evolving (any changes in preexisting moles).  - Sun protection: Counseled SPF30+ sunscreen, UPF clothing, sun avoidance, tanning bed avoidance.  - Recommended regular skin exams.    #NUB, right nasal ala. Appears normal today.       # Swelling/edema under R eye.  - Referral to Dr. Schafer    #possibly MICKIE history   -declines exam      # PIH, Lower legs, cheeks. Drug induced, discoloration, possibly from " amlodipine/Norvasc  - Review with cardiology        Procedures Performed:   none    Follow-up: 1 year dermatology total skin check due  1 year     Staff and Scribe:     Scribe Disclosure:   I, Julee Melgoza, am serving as a scribe to document services personally performed by Margareth Talley MD based on data collection and the provider's statements to me.     Provider Disclosure:   The documentation recorded by the scribe accurately reflects the services I personally performed and the decisions made by me.    Margareth Talley MD    Department of Dermatology  [facility]: Phone: [phone], Fax:[phone]  [facility]: Phone: [phone], Fax: [phone]   ____________________________________________    CC: Skin Check (FBSC- small abrasion on right cheek- patient noted facial edema but unsure if it is due to the cut. Recheck nose- patient does not want biopsy today but No symptoms per patient. )    HPI:  Ms. Ni Maya is a(n) 83 year old female who presents today as a return patient for spot check.    Lesion on nose at last visit. Patient declined biopsy. Recommended 6 month recheck.    Today, patient reports she recently had a cut on her R cheek, present for years but now her cheek is swelling in the area. She noticed the swelling one day while brushing her teeth. She has not had any surgeries. She did have all her teeth pulled but that was some time ago.       Patient is otherwise feeling well, without additional skin concerns.    Labs Reviewed:  N/A    Physical Exam:  Vitals: LMP  (LMP Unknown)   SKIN: Total skin excluding the undergarment areas was performed. The exam included the head/face, neck, both arms, chest, back, abdomen, both legs, digits and/or nails.   - There is a blue/gray discoloration to the cheeks, lower legs  - Edema around R eye/cheek area.  - There is no erythema,  telangectasias, nodularity, or pigmentation on the sites of prior NMSCs.   - No other lesions of concern on areas examined.     Medications:  Current Outpatient Medications   Medication     alcohol swab prep pads     amLODIPine (NORVASC) 5 MG tablet     apixaban ANTICOAGULANT (ELIQUIS) 5 MG tablet     atorvastatin (LIPITOR) 40 MG tablet     azelaic acid (FINACIA) 15 % external gel     blood glucose (RELION PRIME TEST) test strip     blood glucose calibration (NO BRAND SPECIFIED) solution     blood glucose monitoring (NO BRAND SPECIFIED) meter device kit     Blood Glucose Monitoring Suppl W/DEVICE KIT     cetirizine (ZYRTEC) 10 MG tablet     co-enzyme Q-10 100 MG CAPS capsule     fish oil-omega-3 fatty acids 1000 MG capsule     Lancets (E-Z JECT LANCET MICRO-THIN 33G) MISC     levothyroxine (SYNTHROID/LEVOTHROID) 88 MCG tablet     lisinopril (ZESTRIL) 40 MG tablet     metFORMIN (GLUCOPHAGE XR) 500 MG 24 hr tablet     metoprolol tartrate (LOPRESSOR) 50 MG tablet     thin (NO BRAND SPECIFIED) lancets     UNABLE TO FIND     No current facility-administered medications for this visit.      Past Medical History:   Patient Active Problem List   Diagnosis     Hypothyroidism     Allergic rhinitis     Symptomatic menopausal or female climacteric states     Chronic kidney disease, stage II (mild)     Edema     Obesity     Urethral stricture     MICKIE III (vulvar intraepithelial neoplasia III)     Vulval lesion     Health Care Home     Type 2 diabetes, HbA1C goal < 8% (H)     Hyperlipidemia with target LDL less than 100     HTN, goal below 150/90     ACP (advance care planning)     Essential hypertension with goal blood pressure less than 150/90     Diabetes mellitus with peripheral vascular disease (H)     Cellulitis of toe of right foot     Chronic atrial fibrillation (H)     Thrombocytopenia (H24)     Acute gout involving toe of right foot     Lumbar radiculopathy     Cerebral infarction, unspecified mechanism (H)     Long  term current use of anticoagulant therapy     History of nonmelanoma skin cancer     Pigmented purpuric dermatosis     Sacroiliitis (H24)     Past Medical History:   Diagnosis Date     Allergic rhinitis, cause unspecified      Essential hypertension, benign      Infection of kidney, unspecified 1999     Other specified acquired hypothyroidism      Other specified types of cystitis(595.89)     1999,6-2-01, 10-10-01        CC No referring provider defined for this encounter. on close of this encounter.      Again, thank you for allowing me to participate in the care of your patient.        Sincerely,        Margareth Talley MD

## 2024-04-03 ENCOUNTER — OFFICE VISIT (OUTPATIENT)
Dept: FAMILY MEDICINE | Facility: CLINIC | Age: 84
End: 2024-04-03
Payer: COMMERCIAL

## 2024-04-03 VITALS
SYSTOLIC BLOOD PRESSURE: 128 MMHG | HEIGHT: 65 IN | BODY MASS INDEX: 29.2 KG/M2 | RESPIRATION RATE: 20 BRPM | HEART RATE: 93 BPM | DIASTOLIC BLOOD PRESSURE: 82 MMHG | OXYGEN SATURATION: 99 % | WEIGHT: 175.3 LBS | TEMPERATURE: 97 F

## 2024-04-03 DIAGNOSIS — D69.6 THROMBOCYTOPENIA (H): ICD-10-CM

## 2024-04-03 DIAGNOSIS — R49.9 CHANGE IN VOICE: ICD-10-CM

## 2024-04-03 DIAGNOSIS — M46.1 SACROILIITIS (H): ICD-10-CM

## 2024-04-03 DIAGNOSIS — Z00.00 ENCOUNTER FOR MEDICARE ANNUAL WELLNESS EXAM: Primary | ICD-10-CM

## 2024-04-03 DIAGNOSIS — E03.9 HYPOTHYROIDISM, UNSPECIFIED TYPE: ICD-10-CM

## 2024-04-03 DIAGNOSIS — E11.65 TYPE 2 DIABETES MELLITUS WITH HYPERGLYCEMIA, WITHOUT LONG-TERM CURRENT USE OF INSULIN (H): ICD-10-CM

## 2024-04-03 DIAGNOSIS — E11.51 DIABETES MELLITUS WITH PERIPHERAL VASCULAR DISEASE (H): ICD-10-CM

## 2024-04-03 PROBLEM — I69.30 LATE EFFECTS OF CEREBRAL ISCHEMIC STROKE: Status: ACTIVE | Noted: 2019-04-24

## 2024-04-03 PROBLEM — G89.29 CHRONIC BACK PAIN: Status: ACTIVE | Noted: 2019-04-25

## 2024-04-03 PROBLEM — M54.9 CHRONIC BACK PAIN: Status: ACTIVE | Noted: 2019-04-25

## 2024-04-03 PROBLEM — M10.9 GOUT: Status: ACTIVE | Noted: 2024-04-03

## 2024-04-03 LAB
CREAT UR-MCNC: 59 MG/DL
HBA1C MFR BLD: 7.4 % (ref 0–5.6)
MICROALBUMIN UR-MCNC: 19.5 MG/L
MICROALBUMIN/CREAT UR: 33.05 MG/G CR (ref 0–25)

## 2024-04-03 PROCEDURE — 83036 HEMOGLOBIN GLYCOSYLATED A1C: CPT | Performed by: FAMILY MEDICINE

## 2024-04-03 PROCEDURE — 82043 UR ALBUMIN QUANTITATIVE: CPT | Performed by: FAMILY MEDICINE

## 2024-04-03 PROCEDURE — 99214 OFFICE O/P EST MOD 30 MIN: CPT | Mod: 25 | Performed by: FAMILY MEDICINE

## 2024-04-03 PROCEDURE — 36415 COLL VENOUS BLD VENIPUNCTURE: CPT | Performed by: FAMILY MEDICINE

## 2024-04-03 PROCEDURE — 82570 ASSAY OF URINE CREATININE: CPT | Performed by: FAMILY MEDICINE

## 2024-04-03 PROCEDURE — G0439 PPPS, SUBSEQ VISIT: HCPCS | Performed by: FAMILY MEDICINE

## 2024-04-03 RX ORDER — LORATADINE 10 MG/1
10 TABLET ORAL DAILY
Qty: 90 TABLET | Refills: 1 | Status: SHIPPED | OUTPATIENT
Start: 2024-04-03

## 2024-04-03 RX ORDER — METFORMIN HCL 500 MG
TABLET, EXTENDED RELEASE 24 HR ORAL
Qty: 90 TABLET | Refills: 1 | Status: SHIPPED | OUTPATIENT
Start: 2024-04-03

## 2024-04-03 RX ORDER — LEVOTHYROXINE SODIUM 88 UG/1
88 TABLET ORAL DAILY
Qty: 90 TABLET | Refills: 3 | Status: SHIPPED | OUTPATIENT
Start: 2024-04-03

## 2024-04-03 SDOH — HEALTH STABILITY: PHYSICAL HEALTH: ON AVERAGE, HOW MANY MINUTES DO YOU ENGAGE IN EXERCISE AT THIS LEVEL?: 0 MIN

## 2024-04-03 SDOH — HEALTH STABILITY: PHYSICAL HEALTH: ON AVERAGE, HOW MANY DAYS PER WEEK DO YOU ENGAGE IN MODERATE TO STRENUOUS EXERCISE (LIKE A BRISK WALK)?: 0 DAYS

## 2024-04-03 ASSESSMENT — SOCIAL DETERMINANTS OF HEALTH (SDOH): HOW OFTEN DO YOU GET TOGETHER WITH FRIENDS OR RELATIVES?: ONCE A WEEK

## 2024-04-03 ASSESSMENT — PAIN SCALES - GENERAL: PAINLEVEL: MILD PAIN (2)

## 2024-04-03 NOTE — COMMUNITY RESOURCES LIST (ENGLISH)
April 3, 2024           YOUR PERSONALIZED LIST OF SERVICES & PROGRAMS           & RECREATION    [employer] - Sports clubs and recreational activities - [address] [place] [address] (Distance: 3.6 miles)  Language: English  Fee: Self pay, Sliding scale      [facility] Adult [facility]richment  Phone: ([phone]) [phone]  Website: [email or web address]-seniors/adult-[email or web address]  Language: English  Hours: Mon 7:30 AM - 4:00 PM Tue 7:30 AM - 4:00 PM Wed 7:30 AM - 4:00 PM Thu 7:30 AM - 4:00 PM Fri 7:30 AM - 4:00 PM      [employer]ASE[employer] AND SOFTBALL  Website: http://www.[employer].org    Classes/Groups      [facility] - Group fitness classes - [address] [place] [address] (Distance: 3.6 miles)  Language: English  Fee: Free, Self pay, Sliding scale      [employer]  [address] [place] [address] [place] [address] (Distance: 2.0 miles)  Website: https://www.Enverv.org/  Language: English  Fee: Free, Self pay      [facility] Adult [facility]richment  Phone: [phone]) [phone]  Website: [email or web address]/adult-[email or web address]  Language: English  Hours: Mon 7:30 AM - 4:00 PM Tue 7:30 AM - 4:00 PM Wed 7:30 AM - 4:00 PM Thu 7:30 AM - 4:00 PM Fri 7:30 AM - 4:00 PM               IMPORTANT NUMBERS & WEBSITES        Emergency Services  911  .   United Way  211 http://211unitedway.org  .   Poison Control  (726) 933-4546 http://[place]poison.org http://wisconsinpoison.org  .     Suicide and Crisis Lifeline  988 http://988lifeline.org  .   Childhelp National Child Abuse Hotline  157.302.7196 http://Childhelphotline.org   .   National Sexual Assault Hotline  (799) 749-1553 (HOPE) http://Rainn.org   .     National Runaway Safeline  (691) 560-9876 (RUNAWAY) http://MemSQLrunaVendigi.org  .   Pregnancy & Postpartum Support  Call/text  716-984-3852  [place]: http://ppsupportmn.org  [place]: http://Digilab.com/wi  .   Substance Abuse National Helpline ([facility])  800622-HELP (3543) http://Findtreatment.gov   .                DISCLAIMER: These resources have been generated via the [employer] Platform. [employer] does not endorse any service providers mentioned in this resource list. [employer] does not guarantee that the services mentioned in this resource list will be available to you or will improve your health or wellness.    [facility]

## 2024-04-03 NOTE — PATIENT INSTRUCTIONS
Preventive Care Advice   This is general advice given by our system to help you stay healthy. However, your care team may have specific advice just for you. Please talk to your care team about your preventive care needs.  Nutrition  Eat 5 or more servings of fruits and vegetables each day.  Try wheat bread, brown rice and whole grain pasta (instead of white bread, rice, and pasta).  Get enough calcium and vitamin D. Check the label on foods and aim for 100% of the RDA (recommended daily allowance).  Lifestyle  Exercise at least 150 minutes each week   (30 minutes a day, 5 days a week).  Do muscle strengthening activities 2 days a week. These help control your weight and prevent disease.  No smoking.  Wear sunscreen to prevent skin cancer.  Have a dental exam and cleaning every 6 months.  Yearly exams  See your health care team every year to talk about:  Any changes in your health.  Any medicines your care team has prescribed.  Preventive care, family planning, and ways to prevent chronic diseases.  Shots (vaccines)   HPV shots (up to age 26), if you've never had them before.  Hepatitis B shots (up to age 59), if you've never had them before.  COVID-19 shot: Get this shot when it's due.  Flu shot: Get a flu shot every year.  Tetanus shot: Get a tetanus shot every 10 years.  Pneumococcal, hepatitis A, and RSV shots: Ask your care team if you need these based on your risk.  Shingles shot (for age 50 and up).  General health tests  Diabetes screening:  Starting at age 35, Get screened for diabetes at least every 3 years.  If you are younger than age 35, ask your care team if you should be screened for diabetes.  Cholesterol test: At age 39, start having a cholesterol test every 5 years, or more often if advised.  Bone density scan (DEXA): At age 50, ask your care team if you should have this scan for osteoporosis (brittle bones).  Hepatitis C: Get tested at least once in your life.  STIs (sexually transmitted  infections)  Before age 24: Ask your care team if you should be screened for STIs.  After age 24: Get screened for STIs if you're at risk. You are at risk for STIs (including HIV) if:  You are sexually active with more than one person.  You don't use condoms every time.  You or a partner was diagnosed with a sexually transmitted infection.  If you are at risk for HIV, ask about PrEP medicine to prevent HIV.  Get tested for HIV at least once in your life, whether you are at risk for HIV or not.  Cancer screening tests  Cervical cancer screening: If you have a cervix, begin getting regular cervical cancer screening tests at age 21. Most people who have regular screenings with normal results can stop after age 65. Talk about this with your provider.  Breast cancer scan (mammogram): If you've ever had breasts, begin having regular mammograms starting at age 40. This is a scan to check for breast cancer.  Colon cancer screening: It is important to start screening for colon cancer at age 45.  Have a colonoscopy test every 10 years (or more often if you're at risk) Or, ask your provider about stool tests like a FIT test every year or Cologuard test every 3 years.  To learn more about your testing options, visit: https://www.[employer]/716698.pdf.  For help making a decision, visit: https://bit.ly/zw02104.  Prostate cancer screening test: If you have a prostate and are age 55 to 69, ask your provider if you would benefit from a yearly prostate cancer screening test.  Lung cancer screening: If you are a current or former smoker age 50 to 80, ask your care team if ongoing lung cancer screenings are right for you.  For informational purposes only. Not to replace the advice of your health care provider. Copyright   2023 [employer]. All rights reserved. Clinically reviewed by the [facility]. Eye-Q [id number] - REV 01/24.

## 2024-04-03 NOTE — RESULT ENCOUNTER NOTE
Labs look pretty good today. Still borderline with the urine protein as well as the blood sugars, but given age, this is adequate.  Apryl Olvera MD

## 2024-04-03 NOTE — PROGRESS NOTES
Preventive Care Visit  [facility] CAITLIN Olvera MD, MD, Family Medicine  Apr 3, 2024      Assessment & Plan         ICD-10-CM    1. Encounter for Medicare annual wellness exam  Z00.00       2. Type 2 diabetes mellitus with hyperglycemia, without long-term current use of insulin (H)  E11.65 Albumin Random Urine Quantitative with Creat Ratio     HEMOGLOBIN A1C     Albumin Random Urine Quantitative with Creat Ratio     HEMOGLOBIN A1C     metFORMIN (GLUCOPHAGE XR) 500 MG 24 hr tablet      3. Sacroiliitis (H24)  M46.1       4. Thrombocytopenia (H24)  D69.6       5. Diabetes mellitus with peripheral vascular disease (H)  E11.51       6. Hypothyroidism, unspecified type  E03.9 levothyroxine (SYNTHROID/LEVOTHROID) 88 MCG tablet      7. Change in voice  R49.9 Adult ENT  Referral     loratadine (CLARITIN) 10 MG tablet          Patient has a slowly worsening type 2 diabetes though given her overall health and age it is unlikely this is good to make significant differences given its point in 1 monitor A1c every 6 months.  We did talk about change in voice and likely this is related to congestion that looks a bit like allergies. Though she has taken allergy meds without improvement, but may have been too brief. So we will ask her to take again and if it still maintains, follow-up with ENT for further evaluation    Also has a fib and has been doing well. Follows with cardiology. Though she has issues with her heart not slowing down and worried about stress and its impact when she had an episode when her  was having a heart attack. Discussed normal fight or flight response and this may have nothing to do with a fib. If otherwise well, unlikely any change needed. Though she does have bluing of the skin which occurs with amlodipine. Could consider this change if this is bothersome to her. Would prefer to d/w cardiology.    No LOS data to display   Time spent by me doing chart review, history and  exam, documentation and further activities per the note    Apryl Olvera MD     Patient has been advised of split billing requirements and indicates understanding: Yes          Counseling  Appropriate preventive services were discussed with this patient, including applicable screening as appropriate for fall prevention, nutrition, physical activity, Tobacco-use cessation, weight loss and cognition.  Checklist reviewing preventive services available has been given to the patient.  Reviewed patient's diet, addressing concerns and/or questions.           Claude Dan is a 83 year old, presenting for the following:  Wellness Visit        4/3/2024    10:02 AM   Additional Questions   Roomed by elsy   Accompanied by alone         4/3/2024    10:02 AM   Patient Reported Additional Medications   Patient reports taking the following new medications none         Health Care Directive  Patient has a Health Care Directive on file  Advance care planning document is on file and is current.    HPI        4/3/2024   General Health   How would you rate your overall physical health? Good   Feel stress (tense, anxious, or unable to sleep) Not at all         4/3/2024   Nutrition   Diet: Regular (no restrictions)         4/3/2024   Exercise   Days per week of moderate/strenous exercise 0 days   Average minutes spent exercising at this level 0 min   (!) EXERCISE CONCERN      4/3/2024   Social Factors   Frequency of gathering with friends or relatives Once a week   Worry food won't last until get money to buy more No   Food not last or not have enough money for food? No   Do you have housing?  Yes   Are you worried about losing your housing? No   Lack of transportation? No   Unable to get utilities (heat,electricity)? No         4/3/2024   Activities of Daily Living- Home Safety   Needs help with the following daily activites None of the above   Safety concerns in the home None of the above         4/3/2024   Dental   Dentist two  times every year? Yes         4/3/2024   Hearing Screening   Hearing concerns? None of the above         4/3/2024   Driving Risk Screening   Patient/family members have concerns about driving No         4/3/2024   General Alertness/Fatigue Screening   Have you been more tired than usual lately? No         4/3/2024   Urinary Incontinence Screening   Bothered by leaking urine in past 6 months No         4/3/2024   TB Screening   Were you born outside of the [place]? No         Today's PHQ-2 Score:       4/3/2024    10:02 AM   PHQ-2 ( 1999 [employer])   Q1: Little interest or pleasure in doing things 0   Q2: Feeling down, depressed or hopeless 0   PHQ-2 Score 0   Q1: Little interest or pleasure in doing things Not at all   Q2: Feeling down, depressed or hopeless Not at all   PHQ-2 Score 0           4/3/2024   Substance Use   Alcohol more than 3/day or more than 7/wk No   Do you have a current opioid prescription? No   How severe/bad is pain from 1 to 10? 0/10 (No Pain)   Do you use any other substances recreationally? No     Social History     Tobacco Use    Smoking status: Never    Smokeless tobacco: Never   Vaping Use    Vaping Use: Never used   Substance Use Topics    Alcohol use: No    Drug use: No                        Reviewed and updated as needed this visit by Provider   Tobacco  Allergies  Meds  Problems  Med Hx  Surg Hx  Fam Hx              Current providers sharing in care for this patient include:  Patient Care Team:  Apryl Olvera MD as PCP - General (Family Practice)  Apryl Olvera MD as Assigned PCP  Margareth Talley MD as MD (Dermatology)  More, Carly Perrin PA-C as Assigned Heart and Vascular Provider  Margareth Talley MD as Assigned Surgical Provider  Bentley Pardo MD as MD (Ophthalmology)    The following health maintenance items are reviewed in Epic and correct as of today:  Health Maintenance   Topic Date Due    MICROALBUMIN  12/26/2023    A1C  10/03/2024    EYE EXAM  10/12/2024    BMP   "11/15/2024    CMP  11/15/2024    LIPID  11/15/2024    TSH W/FREE T4 REFLEX  11/15/2024    DIABETIC FOOT EXAM  11/15/2024    MEDICARE ANNUAL WELLNESS VISIT  04/03/2025    ANNUAL REVIEW OF HM ORDERS  04/03/2025    FALL RISK ASSESSMENT  04/03/2025    ADVANCE CARE PLANNING  04/03/2029    DTAP/TDAP/TD IMMUNIZATION (3 - Td or Tdap) 12/23/2031    PHQ-2 (once per calendar year)  Completed    INFLUENZA VACCINE  Completed    Pneumococcal Vaccine: 65+ Years  Completed    URINALYSIS  Completed    RSV VACCINE (Pregnancy & 60+)  Completed    COVID-19 Vaccine  Completed    IPV IMMUNIZATION  Aged Out    HPV IMMUNIZATION  Aged Out    MENINGITIS IMMUNIZATION  Aged Out    RSV MONOCLONAL ANTIBODY  Aged Out    COLORECTAL CANCER SCREENING  Discontinued    ZOSTER IMMUNIZATION  Discontinued            Objective    Exam  /82   Pulse 93   Temp 97  F (36.1  C) (Temporal)   Resp 20   Ht 1.66 m (5' 5.35\")   Wt 79.5 kg (175 lb 4.8 oz)   LMP  (LMP Unknown)   SpO2 99%   BMI 28.86 kg/m     Estimated body mass index is 28.86 kg/m  as calculated from the following:    Height as of this encounter: 1.66 m (5' 5.35\").    Weight as of this encounter: 79.5 kg (175 lb 4.8 oz).    Physical Exam  GENERAL: alert and no distress  EYES: Eyes grossly normal to inspection, PERRL and conjunctivae and sclerae normal  HENT: normal cephalic/atraumatic, right ear: Blocked by cerumen, left ear is normal, nose and mouth without ulcers or lesions, oropharynx clear, and oral mucous membranes moist  NECK: no adenopathy, no asymmetry, masses, or scars  RESP: lungs clear to auscultation - no rales, rhonchi or wheezes  CV: regular rate and rhythm, normal S1 S2, no S3 or S4, no murmur, click or rub, no peripheral edema  ABDOMEN: soft, nontender, no hepatosplenomegaly, no masses and bowel sounds normal  MS: no gross musculoskeletal defects noted, no edema  SKIN: no suspicious lesions or rashes  NEURO: Normal strength and tone, mentation intact and speech " normal  PSYCH: mentation appears normal, affect normal/bright        4/3/2024   Mini Cog   Clock Draw Score 2 Normal   3 Item Recall 2 objects recalled   Mini Cog Total Score 4              Signed Electronically by: Apryl Olvera MD, MD

## 2024-04-18 ENCOUNTER — NURSE TRIAGE (OUTPATIENT)
Dept: FAMILY MEDICINE | Facility: OTHER | Age: 84
End: 2024-04-18
Payer: COMMERCIAL

## 2024-04-18 NOTE — TELEPHONE ENCOUNTER
Patient calling regarding symptoms of cough, chills and fatigue that started Tuesday 4/16. She denies any difficulty breathing outside of coughing notes cough is keeping he up at night. Denies any fever.     Patient is requesting antibiotics or to be seen. RN discussed with patient that this could be a viral illness as well. Patient has not taken a covid test. RN advised patient to test for covid and give us a call back. Patient understood and agreed. If test is postive, we can go with the route of Paxlovid if patient chooses, or if negative then we can look at scheduling an appointment or home care.     RN reviewed red flag symptoms with patient and when to see emergency care. They agreed and understood.     JIMENEZ Garibay, RN         Reason for Disposition   Cough with no complications   HIGH RISK patient (e.g., weak immune system, age > 64 years, obesity with BMI of 30 or higher, pregnant, chronic lung disease or other chronic medical condition) and COVID symptoms (e.g., cough, fever)  (Exceptions: Already seen by doctor or NP/PA and no new or worsening symptoms.)   COVID-19 infection suspected by caller or triager and mild symptoms (cough, fever, or others) and has not gotten tested yet    Additional Information   Negative: Bluish (or gray) lips or face   Negative: SEVERE difficulty breathing (e.g., struggling for each breath, speaks in single words)   Negative: Rapid onset of cough and has hives   Negative: Coughing started suddenly after medicine, an allergic food or bee sting   Negative: Difficulty breathing after exposure to flames, smoke, or fumes   Negative: Sounds like a life-threatening emergency to the triager   Negative: Previous asthma attacks and this feels like asthma attack   Negative: Dry cough (non-productive; no sputum or minimal clear sputum) and within 14 days of COVID-19 Exposure   Negative: MODERATE difficulty breathing (e.g., speaks in phrases, SOB even at rest, pulse 100-120) and  still present when not coughing   Negative: Chest pain present when not coughing   Negative: Passed out (i.e., fainted, collapsed and was not responding)   Negative: Patient sounds very sick or weak to the triager   Negative: MILD difficulty breathing (e.g., minimal/no SOB at rest, SOB with walking, pulse <100) and still present when not coughing   Negative: Coughed up > 1 tablespoon (15 ml) blood (Exception: Blood-tinged sputum.)   Negative: Fever > 103 F (39.4 C)   Negative: Fever > 101 F (38.3 C) and over 60 years of age   Negative: Fever > 100.0 F (37.8 C) and has diabetes mellitus or a weak immune system (e.g., HIV positive, cancer chemotherapy, organ transplant, splenectomy, chronic steroids)   Negative: Fever > 100.0 F (37.8 C) and bedridden (e.g., CVA, chronic illness, recovering from surgery)   Negative: Increasing ankle swelling   Negative: Wheezing is present   Negative: SEVERE coughing spells (e.g., whooping sound after coughing, vomiting after coughing)   Negative: Coughing up roni-colored (reddish-brown) or blood-tinged sputum   Negative: Fever present > 3 days (72 hours)   Negative: Fever returns after gone for over 24 hours and symptoms worse or not improved   Negative: Using nasal washes and pain medicine > 24 hours and sinus pain persists   Negative: Known COPD or other severe lung disease (i.e., bronchiectasis, cystic fibrosis, lung surgery) and worsening symptoms (i.e., increased sputum purulence or amount, increased breathing difficulty)   Negative: Continuous (nonstop) coughing interferes with work or school and no improvement using cough treatment per Care Advice   Negative: Patient wants to be seen   Negative: Cough has been present for > 3 weeks   Negative: Allergy symptoms are also present (e.g., itchy eyes, clear nasal discharge, postnasal drip)   Negative: Nasal discharge present > 10 days   Negative: Exposure to TB (Tuberculosis)   Negative: Taking an ACE Inhibitor medicine (e.g.,  benazepril/LOTENSIN, captopril/CAPOTEN, enalapril/VASOTEC, lisinopril/ZESTRIL)   Negative: SEVERE difficulty breathing (e.g., struggling for each breath, speaks in single words)   Negative: Difficult to awaken or acting confused (e.g., disoriented, slurred speech)   Negative: Bluish (or gray) lips or face now   Negative: Shock suspected (e.g., cold/pale/clammy skin, too weak to stand, low BP, rapid pulse)   Negative: Sounds like a life-threatening emergency to the triager   Negative: Diagnosed or suspected COVID-19 and symptoms lasting 3 or more weeks   Negative: COVID-19 exposure and no symptoms   Negative: COVID-19 vaccine reaction suspected (e.g., fever, headache, muscle aches) occurring 1 to 3 days after getting vaccine   Negative: COVID-19 vaccine, questions about   Negative: Lives with someone known to have influenza (flu test positive) and flu-like symptoms (e.g., cough, runny nose, sore throat, SOB; with or without fever)   Negative: Possible COVID-19 symptoms and triager concerned about severity of symptoms or other causes   Negative: COVID-19 and breastfeeding, questions about   Negative: SEVERE or constant chest pain or pressure  (Exception: Mild central chest pain, present only when coughing.)   Negative: MODERATE difficulty breathing (e.g., speaks in phrases, SOB even at rest, pulse 100-120)   Negative: Headache and stiff neck (can't touch chin to chest)   Negative: Oxygen level (e.g., pulse oximetry) 90% or lower   Negative: Chest pain or pressure  (Exception: MILD central chest pain, present only when coughing.)   Negative: Drinking very little and dehydration suspected (e.g., no urine > 12 hours, very dry mouth, very lightheaded)   Negative: Patient sounds very sick or weak to the triager    Protocols used: Cough-A-OH, Coronavirus (COVID-19) Diagnosed or [id number]

## 2024-05-23 ENCOUNTER — DOCUMENTATION ONLY (OUTPATIENT)
Dept: CARDIOLOGY | Facility: CLINIC | Age: 84
End: 2024-05-23
Payer: COMMERCIAL

## 2024-05-23 NOTE — PROGRESS NOTES
Pt was in [place] [facility] today accompanying her  to his device check appointment. Pt said that Carly KOO suggested a new primary doctor to her at an OV a while ago, probably the OV in 6/2023. She forgot the name of the PMD that was suggested. She would like to inquire with Carly KOO because she is hoping to find a new PMD.     Will route to Carly KOO to ask.

## 2024-06-18 DIAGNOSIS — E78.5 HYPERLIPIDEMIA WITH TARGET LDL LESS THAN 100: ICD-10-CM

## 2024-06-18 RX ORDER — ATORVASTATIN CALCIUM 40 MG/1
40 TABLET, FILM COATED ORAL DAILY
Qty: 90 TABLET | Refills: 3 | Status: SHIPPED | OUTPATIENT
Start: 2024-06-18

## 2024-06-18 NOTE — TELEPHONE ENCOUNTER
Last Written Prescription Date:  6/13/23  Last Fill Quantity: 90,  # refills: 3   Last office visit: 3/20/2024   Future Office Visit:  derrick SCHMID is to follow up in 1 year around 3/20/25.    bAby Duenas on 6/18/2024 at 2:09 PM

## 2024-07-12 NOTE — H&P (VIEW-ONLY)
[facility]  [id number] [place] [id number]  [phone]  Dept: [phone]    PRE-OP EVALUATION:  Today's date: 2017    Ni Maya (: 1940) presents for pre-operative evaluation assessment as requested by Dr. Garcia.  She requires evaluation and anesthesia risk assessment prior to undergoing surgery/procedure for treatment of back .  Proposed procedure: Inject epidural lumbar    Date of Surgery/ Procedure: 8/10/17  Time of Surgery/ Procedure: 11:00 am  Hospital/Surgical Facility: [facility]  Primary Physician: Violeta Lin  Type of Anesthesia Anticipated: to be determined    Patient has a Health Care Directive or Living Will:  YES     1. NO - Do you have a history of heart attack, stroke, stent, bypass or surgery on an artery in the head, neck, heart or legs?  2. NO - Do you ever have any pain or discomfort in your chest?  3. NO - Do you have a history of  Heart Failure?  4. NO - Are you troubled by shortness of breath when: walking on the level, up a slight hill or at night?  5. NO - Do you currently have a cold, bronchitis or other respiratory infection?  6. NO - Do you have a cough, shortness of breath or wheezing?  7. NO - Do you sometimes get pains in the calves of your legs when you walk?  8. NO - Do you or anyone in your family have previous history of blood clots?  9. NO - Do you or does anyone in your family have a serious bleeding problem such as prolonged bleeding following surgeries or cuts?  10. YES - HAVE YOU EVER HAD PROBLEMS WITH ANEMIA OR BEEN TOLD TO TAKE IRON PILLS? Had anemia with her periods in the past  11. NO - Have you had any abnormal blood loss such as black, tarry or bloody stools, or abnormal vaginal bleeding?  12. NO - Have you ever had a blood transfusion?  13. NO - Have you or any of your relatives ever had problems with anesthesia?  14. NO - Do you have sleep apnea, excessive snoring or daytime  Pt to floor   drowsiness?  15. NO - Do you have any prosthetic heart valves?  16. NO - Do you have prosthetic joints?  17. NO - Is there any chance that you may be pregnant?  After eating takes a walk and gets pain in middle area under ribs, and now it's starting to happen when she walks or overeats.        HPI:                                                      Brief HPI related to upcoming procedure: Patient is having a steroid injection for chronic back pain. She has recently seen cardiology for evaluation of atypical pain and new onset A.fib.       A-FIB - Patient has a new history of  A-fib currently on Metoprolol and Apixaban control . Patient does not take coumadin for stroke prevention and denies significant symptoms of lightheadedness, palpitations or dyspnea.                                                                                                                                          .    MEDICAL HISTORY:                                                    Patient Active Problem List    Diagnosis Date Noted     Type 2 diabetes mellitus with hyperglycemia, without long-term current use of insulin (H) 05/17/2017     Priority: Medium     Essential hypertension with goal blood pressure less than 140/90 05/27/2016     Priority: Medium     ACP (advance care planning) 05/17/2016     Priority: Medium     Advance Care Planning 5/17/2016: Receipt of ACP document:  Received: Health Care Directive which was witnessed or notarized on 11/22/2013.  Document not previously scanned.  Validation form completed and sent with document to be scanned.  Code Status needs to be updated to reflect choices in most recent ACP document. Notification sent to Dr. Lin for followup.  Confirmed/documented designated decision maker(s).  Added by Kassidy Sousa.             Hypothyroidism, unspecified hypothyroidism type 10/14/2015     Priority: Medium     Type 2 diabetes mellitus with hyperglycemia (H) 10/14/2015     Priority: Medium     HTN,  goal below 140/90 11/06/2013     Priority: Medium     Hyperlipidemia with target LDL less than 100 10/16/2013     Priority: Medium     Diagnosis updated by automated process. Provider to review and confirm.       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 Final path MICKIE III with free surgical margins       Urethral stricture 01/03/2008     Priority: Medium     Problem list name updated by automated process. Provider to review       Edema 12/29/2006     Priority: Medium     Obesity 12/29/2006     Priority: Medium     Problem list name updated by automated process. Provider to review       Chronic kidney disease, stage II (mild) 11/27/2006     Priority: Medium     Symptomatic menopausal or female climacteric states 01/13/2005     Priority: Medium     Allergic rhinitis 10/21/2004     Priority: Medium     Problem list name updated by automated process. Provider to review       Hypothyroidism 06/25/2002     Priority: Medium     Problem list name updated by automated process. Provider to review        Past Medical History:   Diagnosis Date     Allergic rhinitis, cause unspecified      Cystitis NEC     1999,6-2-01, 10-10-01     Essential hypertension, benign      Infection of kidney, unspecified 1999     Other specified acquired hypothyroidism      Past Surgical History:   Procedure Laterality Date     BIOPSY VULVA/PERINEUM,ADDNL  BLANCHE  9/18/09    Wide -excision of MICKIE III- right labia      C LAPAROSCOPY, SURGICAL; W/ VAGINAL HYSTERECTOMY W/WO REMOVAL OVARY(S)/TUBES  1984    for bleeding     C LIGATE FALLOPIAN TUBE,POSTPARTUM  1978    Tubal Ligation     COLONOSCOPY  9/13/2013    Procedure: COLONOSCOPY;  Colonoscopy;  Surgeon: Yanick Ramsey MD;  Location: [facility]     HC ANGIOGRAPHY ARCH  4-3-98     HC BIOPSY OF BREAST, OPEN INCISIONAL  1960    Benign     HC COLONOSCOPY THRU STOMA, DIAGNOSTIC  4-24-98    Diverticuli - due in 2008     HC ERCP W SPHINCTEROTOMY  1996 6/2/96 and 8/30/96     HC REDUCTION OF LARGE BREAST  1988     HC REMOVAL GALLBLADDER  1995     HC UGI ENDOSCOPY DIAG W OR W/O BRUSH/WASH  7-     Current Outpatient Prescriptions   Medication Sig Dispense Refill     omeprazole (PRILOSEC OTC) 20 MG tablet Take 1 tablet (20 mg) by mouth daily 30 tablet      apixaban ANTICOAGULANT (ELIQUIS) 5 MG tablet Take 1 tablet (5 mg) by mouth 2 times daily 90 tablet 3     metFORMIN (GLUCOPHAGE-XR) 500 MG 24 hr tablet Take 1 tablet (500 mg) by mouth daily (with dinner) 90 tablet 3     cloNIDine (CATAPRES) 0.1 MG tablet Take 2 tablets (0.2 mg) by mouth At Bedtime 270 tablet 1     lisinopril-hydrochlorothiazide (PRINZIDE/ZESTORETIC) 20-25 MG per tablet Take 1 tablet by mouth daily 90 tablet 3     metoprolol (LOPRESSOR) 100 MG tablet Take 1 tablet (100 mg) by mouth 2 times daily 180 tablet 3     levothyroxine (SYNTHROID/LEVOTHROID) 100 MCG tablet Take 1 tablet (100 mcg) by mouth daily 90 tablet 3     HUMBERTO CONTOUR NEXT test strip TEST THREE TIMES A  each 1     Lancets (E-Z JECT LANCET MICRO-THIN 33G) MISC        Blood Glucose Monitoring Suppl W/DEVICE KIT 1 kit 3 times daily. 1 kit 0     fish oil-omega-3 fatty acids (FISH OIL) 1000 MG capsule Take 2 g by mouth daily. 2 caps per day       ALLEGRA 180 MG PO TABS 1 TABLET DAILY 90 Tab 3     NUTRITIONAL SUPPLEMENT OR VIBE       OTC products: None, except as noted above    Allergies  "  Allergen Reactions     Sulfa Drugs Rash      Latex Allergy: NO    Social History   Substance Use Topics     Smoking status: Never Smoker     Smokeless tobacco: Never Used     Alcohol use No     History   Drug Use No       REVIEW OF SYSTEMS:                                                    Constitutional, neuro, ENT, endocrine, pulmonary, cardiac, gastrointestinal, genitourinary, musculoskeletal, integument and psychiatric systems are negative, except as otherwise noted.      EXAM:                                                    /83 (BP Location: Left arm, Patient Position: Chair, Cuff Size: Adult Regular)  Pulse 84  Temp 97.6  F (36.4  C) (Temporal)  Resp 16  Ht 5' 5\" (1.651 m)  Wt 189 lb 14.4 oz (86.1 kg)  BMI 31.6 kg/m2    GENERAL APPEARANCE: healthy, alert and no distress     EYES: EOMI, PERRL     HENT: ear canals and TM's normal and nose and mouth without ulcers or lesions     NECK: no adenopathy, no asymmetry, masses, or scars and thyroid normal to palpation     RESP: lungs clear to auscultation - no rales, rhonchi or wheezes     CV: regular rates and rhythm     ABDOMEN:  soft, nontender, no HSM or masses and bowel sounds normal     MS: extremities normal- no gross deformities noted, no evidence of inflammation in joints, FROM in all extremities.     SKIN: no suspicious lesions or rashes     NEURO: Normal strength and tone, sensory exam grossly normal, mentation intact and speech normal     PSYCH: mentation appears normal. and affect normal/bright     LYMPHATICS: No axillary, cervical, or supraclavicular nodes    DIAGNOSTICS:                                                    Recent stress test 6/27/17 showed no evidence of ischemia    Recent Labs   Lab Test  05/17/17   0838 04/03/17 09/14/16   0808   01/12/15   1143   HGB  14.6   --    --    --    --   15.3   PLT  134*   --    --    --    --   134*   INR   --    --    --    --    --   0.95   NA  142   --    --   141   < >  140   POTASSIUM  " 3.6   --    --   3.6   < >  3.4   CR  0.86   --    --   0.85   < >  0.84   A1C  7.2*  5.9   < >   --    < >   --     < > = values in this interval not displayed.      Component      Latest Ref Rng & Units 8/7/2017   Sodium      133 - 144 mmol/L 140   Potassium      3.4 - 5.3 mmol/L 3.6   Chloride      94 - 109 mmol/L 103   Carbon Dioxide      20 - 32 mmol/L 28   Anion Gap      3 - 14 mmol/L 9   Glucose      70 - 99 mg/dL 200 (H)   Urea Nitrogen      7 - 30 mg/dL 13   Creatinine      0.52 - 1.04 mg/dL 0.87   GFR Estimate      >60 mL/min/1.7m2 62   GFR Estimate If Black      >60 mL/min/1.7m2 76   Calcium      8.5 - 10.1 mg/dL 8.9   WBC      4.0 - 11.0 10e9/L 5.6   RBC Count      3.8 - 5.2 10e12/L 4.57   Hemoglobin      11.7 - 15.7 g/dL 14.0   Hematocrit      35.0 - 47.0 % 41.2   MCV      78 - 100 fl 90   MCH      26.5 - 33.0 pg 30.6   MCHC      31.5 - 36.5 g/dL 34.0   RDW      10.0 - 15.0 % 14.0   Platelet Count      150 - 450 10e9/L 123 (L)   INR      0.86 - 1.14 0.96     IMPRESSION:                                                    Reason for surgery/procedure: Epidural injection  Diagnosis/reason for consult: Pre Op    The proposed surgical procedure is considered INTERMEDIATE risk.    REVISED CARDIAC RISK INDEX  The patient has the following serious cardiovascular risks for perioperative complications such as (MI, PE, VFib and 3  AV Block):  No serious cardiac risks  INTERPRETATION: 0 risks: Class I (very low risk - 0.4% complication rate)    The patient has the following additional risks for perioperative complications:  No identified additional risks  The 10-year ASCVD risk score (Karena XANDER Jr, et al., 2013) is: 61.5%    Values used to calculate the score:      Age: 77 years      Sex: Female      Is Non- : No      Diabetic: Yes      Tobacco smoker: No      Systolic Blood Pressure: 148 mmHg      Is BP treated: Yes      HDL Cholesterol: 49 mg/dL      Total Cholesterol: 180 mg/dL       ICD-10-CM    1. Preop general physical exam Z01.818 Basic metabolic panel     CBC with platelets     INR   2. Chronic back pain, unspecified back location, unspecified back pain laterality M54.9 Basic metabolic panel    G89.29 CBC with platelets     INR   3. Type 2 diabetes mellitus with hyperglycemia, without long-term current use of insulin (H) E11.65    4. Atrial fibrillation, unspecified type (H) I48.91        RECOMMENDATIONS:                                                        --Patient is to take all scheduled medications on the day of surgery EXCEPT for modifications listed below.    She is off the Apixaban now and will be off 3 days before and 2 days following procedure,  She will hold metformin day of procedure.     APPROVAL GIVEN to proceed with proposed procedure, without further diagnostic evaluation       Signed Electronically by: Johanna Wagner PA-C    Copy of this evaluation report is provided to requesting physician.    [facility] Preop Guidelines

## 2024-07-18 ENCOUNTER — NURSE TRIAGE (OUTPATIENT)
Dept: FAMILY MEDICINE | Facility: OTHER | Age: 84
End: 2024-07-18
Payer: COMMERCIAL

## 2024-07-18 NOTE — TELEPHONE ENCOUNTER
Patient calling with a positive COVID test this morning. Patient denies fever, shortness of breath or any problems with breathing. Symptoms of cold and runny nose started at the beginning of last week. Past 5 day period for paxlovid. Reviewed red flag symptoms with patient and when she should be concerned.reviwed home care advice and isolation recommendations. Patient expressed understanding. No further questions or concerns at this time.  Yessi Coffey RN on 7/18/2024 at 9:50 AM      Reason for Disposition   COVID-19 diagnosed by positive lab test (e.g., PCR, rapid self-test kit) and NO symptoms (e.g., cough, fever, others)    Additional Information   Negative: SEVERE difficulty breathing (e.g., struggling for each breath, speaks in single words)   Negative: Difficult to awaken or acting confused (e.g., disoriented, slurred speech)   Negative: Bluish (or gray) lips or face now   Negative: Shock suspected (e.g., cold/pale/clammy skin, too weak to stand, low BP, rapid pulse)   Negative: Sounds like a life-threatening emergency to the triager   Negative: Diagnosed or suspected COVID-19 and symptoms lasting 3 or more weeks   Negative: COVID-19 exposure and no symptoms   Negative: COVID-19 vaccine reaction suspected (e.g., fever, headache, muscle aches) occurring 1 to 3 days after getting vaccine   Negative: COVID-19 vaccine, questions about   Negative: Lives with someone known to have influenza (flu test positive) and flu-like symptoms (e.g., cough, runny nose, sore throat, SOB; with or without fever)   Negative: Possible COVID-19 symptoms and triager concerned about severity of symptoms or other causes   Negative: COVID-19 and breastfeeding, questions about   Negative: SEVERE or constant chest pain or pressure  (Exception: Mild central chest pain, present only when coughing.)   Negative: MODERATE difficulty breathing (e.g., speaks in phrases, SOB even at rest, pulse 100-120)   Negative: Headache and stiff neck  (can't touch chin to chest)   Negative: Oxygen level (e.g., pulse oximetry) 90% or lower   Negative: Chest pain or pressure  (Exception: MILD central chest pain, present only when coughing.)   Negative: Drinking very little and dehydration suspected (e.g., no urine > 12 hours, very dry mouth, very lightheaded)   Negative: Patient sounds very sick or weak to the triager   Negative: MILD difficulty breathing (e.g., minimal/no SOB at rest, SOB with walking, pulse <100)   Negative: Fever > 103 F (39.4 C)   Negative: Fever > 101 F (38.3 C) and over 60 years of age   Negative: Fever > 100.0 F (37.8 C) and bedridden (e.g., CVA, chronic illness, recovering from surgery)   Negative: HIGH RISK patient (e.g., weak immune system, age > 64 years, obesity with BMI of 30 or higher, pregnant, chronic lung disease or other chronic medical condition) and COVID symptoms (e.g., cough, fever)  (Exceptions: Already seen by doctor or NP/PA and no new or worsening symptoms.)   Negative: HIGH RISK patient and influenza is widespread in the community and ONE OR MORE respiratory symptoms: cough, sore throat, runny or stuffy nose   Negative: HIGH RISK patient and influenza exposure within the last 7 days and ONE OR MORE respiratory symptoms: cough, sore throat, runny or stuffy nose   Negative: Oxygen level (e.g., pulse oximetry) 91 to 94%   Negative: COVID-19 infection suspected by caller or triager and mild symptoms (cough, fever, or others) and negative COVID-19 rapid test   Negative: Fever present > 3 days (72 hours)   Negative: Fever returns after gone for over 24 hours and symptoms worse or not improved   Negative: Continuous (nonstop) coughing interferes with work or school and no improvement using cough treatment per Care Advice   Negative: Cough present > 3 weeks    Protocols used: Coronavirus (COVID-19) Diagnosed or [id number]

## 2024-11-25 ENCOUNTER — TRANSFERRED RECORDS (OUTPATIENT)
Dept: HEALTH INFORMATION MANAGEMENT | Facility: CLINIC | Age: 84
End: 2024-11-25
Payer: COMMERCIAL

## 2024-11-25 LAB — RETINOPATHY: NEGATIVE

## 2024-12-12 DIAGNOSIS — E11.65 TYPE 2 DIABETES MELLITUS WITH HYPERGLYCEMIA, WITHOUT LONG-TERM CURRENT USE OF INSULIN (H): ICD-10-CM

## 2024-12-12 RX ORDER — METFORMIN HYDROCHLORIDE 500 MG/1
TABLET, EXTENDED RELEASE ORAL
Qty: 90 TABLET | Refills: 0 | Status: SHIPPED | OUTPATIENT
Start: 2024-12-12

## 2025-01-20 ENCOUNTER — OFFICE VISIT (OUTPATIENT)
Dept: FAMILY MEDICINE | Facility: OTHER | Age: 85
End: 2025-01-20
Payer: COMMERCIAL

## 2025-01-20 VITALS
RESPIRATION RATE: 20 BRPM | DIASTOLIC BLOOD PRESSURE: 64 MMHG | HEART RATE: 88 BPM | OXYGEN SATURATION: 98 % | BODY MASS INDEX: 28.49 KG/M2 | WEIGHT: 171 LBS | SYSTOLIC BLOOD PRESSURE: 134 MMHG | TEMPERATURE: 97.3 F | HEIGHT: 65 IN

## 2025-01-20 DIAGNOSIS — I10 BENIGN ESSENTIAL HYPERTENSION: ICD-10-CM

## 2025-01-20 DIAGNOSIS — I10 ESSENTIAL HYPERTENSION WITH GOAL BLOOD PRESSURE LESS THAN 140/90: ICD-10-CM

## 2025-01-20 DIAGNOSIS — E11.51 DIABETES MELLITUS WITH PERIPHERAL VASCULAR DISEASE (H): ICD-10-CM

## 2025-01-20 DIAGNOSIS — I48.20 CHRONIC ATRIAL FIBRILLATION (H): ICD-10-CM

## 2025-01-20 DIAGNOSIS — E11.65 TYPE 2 DIABETES MELLITUS WITH HYPERGLYCEMIA, WITHOUT LONG-TERM CURRENT USE OF INSULIN (H): Primary | ICD-10-CM

## 2025-01-20 DIAGNOSIS — N18.2 CHRONIC KIDNEY DISEASE, STAGE II (MILD): ICD-10-CM

## 2025-01-20 DIAGNOSIS — E03.9 HYPOTHYROIDISM, UNSPECIFIED TYPE: ICD-10-CM

## 2025-01-20 LAB
ALBUMIN SERPL BCG-MCNC: 4.5 G/DL (ref 3.5–5.2)
ALP SERPL-CCNC: 106 U/L (ref 40–150)
ALT SERPL W P-5'-P-CCNC: 28 U/L (ref 0–50)
ANION GAP SERPL CALCULATED.3IONS-SCNC: 12 MMOL/L (ref 7–15)
AST SERPL W P-5'-P-CCNC: 30 U/L (ref 0–45)
BILIRUB SERPL-MCNC: 0.4 MG/DL
BUN SERPL-MCNC: 17.6 MG/DL (ref 8–23)
CALCIUM SERPL-MCNC: 9.6 MG/DL (ref 8.8–10.4)
CHLORIDE SERPL-SCNC: 101 MMOL/L (ref 98–107)
CHOLEST SERPL-MCNC: 119 MG/DL
CREAT SERPL-MCNC: 0.94 MG/DL (ref 0.51–0.95)
CREAT UR-MCNC: 32.9 MG/DL
EGFRCR SERPLBLD CKD-EPI 2021: 59 ML/MIN/1.73M2
EST. AVERAGE GLUCOSE BLD GHB EST-MCNC: 177 MG/DL
FASTING STATUS PATIENT QL REPORTED: NO
FASTING STATUS PATIENT QL REPORTED: NO
GLUCOSE SERPL-MCNC: 176 MG/DL (ref 70–99)
HBA1C MFR BLD: 7.8 % (ref 0–5.6)
HCO3 SERPL-SCNC: 28 MMOL/L (ref 22–29)
HDLC SERPL-MCNC: 46 MG/DL
LDLC SERPL CALC-MCNC: 39 MG/DL
MICROALBUMIN UR-MCNC: <12 MG/L
MICROALBUMIN/CREAT UR: NORMAL MG/G{CREAT}
NONHDLC SERPL-MCNC: 73 MG/DL
POTASSIUM SERPL-SCNC: 3.6 MMOL/L (ref 3.4–5.3)
PROT SERPL-MCNC: 7 G/DL (ref 6.4–8.3)
SODIUM SERPL-SCNC: 141 MMOL/L (ref 135–145)
TRIGL SERPL-MCNC: 170 MG/DL
TSH SERPL DL<=0.005 MIU/L-ACNC: 2.88 UIU/ML (ref 0.3–4.2)

## 2025-01-20 PROCEDURE — 82570 ASSAY OF URINE CREATININE: CPT | Performed by: FAMILY MEDICINE

## 2025-01-20 PROCEDURE — 82043 UR ALBUMIN QUANTITATIVE: CPT | Performed by: FAMILY MEDICINE

## 2025-01-20 PROCEDURE — 84443 ASSAY THYROID STIM HORMONE: CPT | Performed by: FAMILY MEDICINE

## 2025-01-20 PROCEDURE — 36415 COLL VENOUS BLD VENIPUNCTURE: CPT | Performed by: FAMILY MEDICINE

## 2025-01-20 PROCEDURE — 80061 LIPID PANEL: CPT | Performed by: FAMILY MEDICINE

## 2025-01-20 PROCEDURE — 99214 OFFICE O/P EST MOD 30 MIN: CPT | Performed by: FAMILY MEDICINE

## 2025-01-20 PROCEDURE — 80053 COMPREHEN METABOLIC PANEL: CPT | Performed by: FAMILY MEDICINE

## 2025-01-20 PROCEDURE — 83036 HEMOGLOBIN GLYCOSYLATED A1C: CPT | Performed by: FAMILY MEDICINE

## 2025-01-20 RX ORDER — METFORMIN HYDROCHLORIDE 500 MG/1
500 TABLET, EXTENDED RELEASE ORAL
Qty: 90 TABLET | Refills: 1 | Status: SHIPPED | OUTPATIENT
Start: 2025-01-20

## 2025-01-20 RX ORDER — LEVOTHYROXINE SODIUM 88 UG/1
88 TABLET ORAL DAILY
Qty: 90 TABLET | Refills: 3 | Status: SHIPPED | OUTPATIENT
Start: 2025-01-20

## 2025-01-20 RX ORDER — AMLODIPINE BESYLATE 5 MG/1
5 TABLET ORAL DAILY
Qty: 90 TABLET | Refills: 3 | Status: CANCELLED | OUTPATIENT
Start: 2025-01-20

## 2025-01-20 RX ORDER — LISINOPRIL 40 MG/1
40 TABLET ORAL DAILY
Qty: 90 TABLET | Refills: 3 | Status: CANCELLED | OUTPATIENT
Start: 2025-01-20

## 2025-01-20 ASSESSMENT — PAIN SCALES - GENERAL: PAINLEVEL_OUTOF10: MODERATE PAIN (6)

## 2025-01-20 NOTE — PROGRESS NOTES
"  Assessment & Plan         ICD-10-CM    1. Type 2 diabetes mellitus with hyperglycemia, without long-term current use of insulin (H)  E11.65 Albumin Random Urine Quantitative with Creat Ratio     COMPREHENSIVE METABOLIC PANEL     Lipid panel reflex to direct LDL Fasting     HEMOGLOBIN A1C     Albumin Random Urine Quantitative with Creat Ratio     COMPREHENSIVE METABOLIC PANEL     Lipid panel reflex to direct LDL Fasting     HEMOGLOBIN A1C     metFORMIN (GLUCOPHAGE XR) 500 MG 24 hr tablet      2. Hypothyroidism, unspecified type  E03.9 TSH WITH FREE T4 REFLEX     TSH WITH FREE T4 REFLEX     levothyroxine (SYNTHROID/LEVOTHROID) 88 MCG tablet      3. Benign essential hypertension  I10 COMPREHENSIVE METABOLIC PANEL     COMPREHENSIVE METABOLIC PANEL      4. Chronic atrial fibrillation (H)  I48.20       5. Chronic kidney disease, stage II (mild)  N18.2       6. Essential hypertension with goal blood pressure less than 140/90  I10       7. Diabetes mellitus with peripheral vascular disease (H)  E11.51           Blood sugars have gone up and we are seeing her more dehydrated today. Likely the higher blood sugars have contributed. Would advise focusing on more water to help. Also noting dry nails and can improve these through moisturizing the nail bed as well. Will check thyroid and other labs today to see if more is playing a part on this.    I spent a total of 22 minutes on the day of the visit.   Time spent by me today doing chart review, history and exam, documentation and further activities per the note    Apryl Olvera MD           BMI  Estimated body mass index is 28.15 kg/m  as calculated from the following:    Height as of this encounter: 1.66 m (5' 5.35\").    Weight as of this encounter: 77.6 kg (171 lb).   Weight management plan: Discussed healthy diet and exercise guidelines          Claude Dan is a 84 year old, presenting for the following health issues:  Recheck Medication and Diabetes        1/20/2025 " "    2:45 PM   Additional Questions   Roomed by melissa   Accompanied by SELF     History of Present Illness       Diabetes:   She presents for follow up of diabetes.  She is checking home blood glucose one time daily.   She checks blood glucose before meals.  Blood glucose is never over 200 and never under 70.  When her blood glucose is low, the patient is asymptomatic for confusion, blurred vision, lethargy and reports not feeling dizzy, shaky, or weak.   She has no concerns regarding her diabetes at this time.   She is not experiencing numbness or burning in feet, excessive thirst, blurry vision, weight changes or redness, sores or blisters on feet.           She eats 2-3 servings of fruits and vegetables daily.She consumes 0 sweetened beverage(s) daily.She exercises with enough effort to increase her heart rate 10 to 19 minutes per day.  She exercises with enough effort to increase her heart rate 7 days per week.   She is taking medications regularly.     Questions aout atorvastatin        Review of Systems  Constitutional, HEENT, cardiovascular, pulmonary, GI, , musculoskeletal, neuro, skin, endocrine and psych systems are negative, except as otherwise noted.      Objective    /64   Pulse 88   Temp 97.3  F (36.3  C) (Temporal)   Resp 20   Ht 1.66 m (5' 5.35\")   Wt 77.6 kg (171 lb)   LMP  (LMP Unknown)   SpO2 98%   BMI 28.15 kg/m    Body mass index is 28.15 kg/m .  Physical Exam   GENERAL: alert and no distress  HEENT: dry mucous membranes  RESP: lungs clear to auscultation - no rales, rhonchi or wheezes  CV: regular rate and rhythm, normal S1 S2, no S3 or S4, no murmur, click or rub, no peripheral edema  ABDOMEN: soft, nontender, no hepatosplenomegaly, no masses and bowel sounds normal  MS: no gross musculoskeletal defects noted, no edema  SKIN: no suspicious lesions or rashes, tenting of the skin  NEURO: Normal strength and tone, mentation intact and speech normal  PSYCH: mentation appears normal, " affect normal/bright  Diabetic foot exam: normal DP and PT pulses, no trophic changes or ulcerative lesions, normal sensory exam, and normal monofilament exam            Signed Electronically by: Apryl Olvera MD, MD

## 2025-01-21 NOTE — RESULT ENCOUNTER NOTE
Labs stable other than the higher blood sugar which we discussed yesterday at clinic.  Apryl Olvera MD

## 2025-03-07 NOTE — PROGRESS NOTES
"[facility] Dermatology Note  Encounter Date: Apr 2, 2025  Office Visit     Reviewed patients past medical history and pertinent chart review prior to patients visit today.     Dermatology Problem List:  Last skin check: 04/02/2025    0. NUB left occipital scalp, shave biopsy 04/02/25 .    1. \"Precancerous\" lesion removed from the vagina per patient report. ?MICKIE III (documented in chart)  -s/p removal, patient thought done in [place] but no surg path reports available   2. 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 There was some brown pigment at edges of biopsy noted 8/4/2020.      Social History: Retired. Lives with . Has son with down syndrome that lives in [facility]. Daughter lives in [place].  Family History: Negative for skin cancer.  ____________________________________________    Assessment & Plan:     # Neoplasm of uncertain behavior:  L occipital scalp  DDx includes rule out pigmented BCC. Shave biopsy today.    Procedure Note: Biopsy by shave technique  The risks and benefits of the procedure were described to the patient. These include but are not limited to bleeding, infection, scar, incomplete removal, and non-diagnostic biopsy. Verbal informed consent was obtained. The above site(s) was cleansed with an alcohol pad and injected with 1% lidocaine with epinephrine. Once anesthesia was obtained, a biopsy(ies) was performed with Gilette blade. The tissue(s) was placed in a labeled container(s) with formalin and sent to pathology. Hemostasis was achieved with aluminum chloride. Vaseline and a bandage were applied to the wound(s). The patient tolerated the " procedure well and was given post biopsy care instructions.     # Personal history of nonmelanoma skin cancer  - No signs of recurrence. Continued observation recommended.   - Sun protection: Counseled SPF 30+ sunscreen, UPF clothing, sun avoidance, tanning bed avoidance.    # Multiple nevi, trunk and extremities  # Solar lentigines  - Nevi demonstrate no concerning features on dermoscopy. We discussed the importance of self exams at home.   - ABCDEs: Counseled ABCDEs of melanoma: Asymmetry, Border (irregularity), Color (not uniform, changes in color), Diameter (greater than 6 mm which is about the size of a pencil eraser), and Evolving (any changes in preexisting moles).    # Cherry angiomas  # Seborrheic keratoses  - We discussed the benign nature of the skin lesions. No treatment required. Continued observation recommended. Follow up with any concerns.      Follow-up:  Annual for follow up full body skin exam, as needed for new or changing lesions or new concerns    All risks, benefits and alternatives were discussed with patient.  Patient is in agreement and understands the assessment and plan.  All questions were answered.  Nikky Webb PA-C  [facility] Dermatology    ____________________________________________    CC: No chief complaint on file.    HPI:  Ms. Ni Maya is a(n) 84 year old female who presents today as a return patient for a full body skin cancer screening. The patient has a history of nonmelanoma skin cancer. Today, the patient reports spot on neck. This lesion has been present for several months and will not heal. She is being diligent with photoprotection.      Physical Exam:  Vitals: LMP  (LMP Unknown)    SKIN: Total skin excluding the genitalia areas was performed. The exam included the scalp, face, neck, bilateral arms, chest, back, abdomen, bilateral legs, digits, mons pubis, buttocks, and nails.   - Franks II.  - NUB, left occipital scalp, pink papule with pigment  globules and leaf like structures on dermoscopy  - Multiple tan/brown macules and papules scattered throughout exam, consistent with benign nevi. No concerning features on dermoscopy.   - Scattered tan, homogenous macules scattered on sun exposed skin, consistent with solar lentigines.   - Scattered waxy, stuck on appearing papules and patches, consistent with seborrheic keratoses.  - Several 1-2 mm red dome shaped symmetric papules, consistent with cherry angiomas.     - No other lesions of concern on areas examined.     Medications:  Current Outpatient Medications   Medication Sig Dispense Refill    alcohol swab prep pads Use to swab area of injection/geoffrey as directed. 100 each 3    amLODIPine (NORVASC) 5 MG tablet Take 1 tablet (5 mg) by mouth daily 90 tablet 3    apixaban ANTICOAGULANT (ELIQUIS) 5 MG tablet Take 1 tablet (5 mg) by mouth 2 times daily 180 tablet 3    atorvastatin (LIPITOR) 40 MG tablet Take 1 tablet (40 mg) by mouth daily 90 tablet 3    blood glucose (RELION PRIME TEST) test strip 1 strip by In Vitro route 2 times daily Use to test blood sugar 3 times daily or as directed. 100 strip 1    blood glucose calibration (NO BRAND SPECIFIED) solution To accompany: Blood Glucose Monitor Brands: per insurance. 1 Bottle 3    blood glucose monitoring (NO BRAND SPECIFIED) meter device kit Use to test blood sugar 3 times daily or as directed. Preferred blood glucose meter OR supplies to accompany: Blood Glucose Monitor Brands: per insurance. 1 kit 0    Blood Glucose Monitoring Suppl W/DEVICE KIT 1 kit 3 times daily. 1 kit 0    cetirizine (ZYRTEC) 10 MG tablet Take 10 mg by mouth daily (Patient not taking: Reported on 1/20/2025)      co-enzyme Q-10 100 MG CAPS capsule Take 100 mg by mouth daily      fish oil-omega-3 fatty acids 1000 MG capsule Take 2 g by mouth daily. 2 caps per day      Lancets (E-Z JECT LANCET MICRO-THIN 33G) MISC       levothyroxine (SYNTHROID/LEVOTHROID) 88 MCG tablet Take 1 tablet (88  mcg) by mouth daily. 90 tablet 3    lisinopril (ZESTRIL) 40 MG tablet Take 1 tablet (40 mg) by mouth daily 90 tablet 3    loratadine (CLARITIN) 10 MG tablet Take 1 tablet (10 mg) by mouth daily 90 tablet 1    metFORMIN (GLUCOPHAGE XR) 500 MG 24 hr tablet Take 1 tablet (500 mg) by mouth daily (with dinner). 90 tablet 1    metoprolol tartrate (LOPRESSOR) 50 MG tablet Take 1 tablet (50 mg) by mouth 2 times daily 180 tablet 3    thin (NO BRAND SPECIFIED) lancets Use with lanceting device. To accompany: Blood Glucose Monitor Brands: per insurance. 100 each 6    UNABLE TO FIND MEDICATION NAME: resvante supplement       No current facility-administered medications for this visit.      Past Medical History:   Patient Active Problem List   Diagnosis    Hypothyroidism    Allergic rhinitis    Symptomatic menopausal or female climacteric states    Chronic kidney disease, stage II (mild)    Edema    Obesity    Urethral stricture    MICKIE III (vulvar intraepithelial neoplasia III)    Vulval lesion    Type 2 diabetes, HbA1C goal < 8% (H)    Hyperlipidemia with target LDL less than 100    HTN, goal below 150/90    ACP (advance care planning)    Essential hypertension with goal blood pressure less than 150/90    Diabetes mellitus with peripheral vascular disease (H)    Cellulitis of toe of right foot    Chronic atrial fibrillation (H)    Thrombocytopenia    Acute gout involving toe of right foot    Lumbar radiculopathy    Cerebral infarction, unspecified mechanism (H)    Long term current use of anticoagulant therapy    History of nonmelanoma skin cancer    Pigmented purpuric dermatosis    Sacroiliitis    Late effects of cerebral ischemic stroke    Gout    Chronic back pain     Past Medical History:   Diagnosis Date    Allergic rhinitis, cause unspecified     Essential hypertension, benign     Infection of kidney, unspecified 1999    Other specified acquired hypothyroidism     Other specified types of cystitis(595.89)     1999,6-2-01,  10-10-01        Margareth Talley MD  [address] [facility] [address]  [place] [address] on close of this encounter.

## 2025-03-25 ENCOUNTER — PATIENT OUTREACH (OUTPATIENT)
Dept: CARE COORDINATION | Facility: CLINIC | Age: 85
End: 2025-03-25
Payer: COMMERCIAL

## 2025-04-02 ENCOUNTER — OFFICE VISIT (OUTPATIENT)
Dept: DERMATOLOGY | Facility: CLINIC | Age: 85
End: 2025-04-02
Attending: DERMATOLOGY
Payer: COMMERCIAL

## 2025-04-02 DIAGNOSIS — D48.5 NEOPLASM OF UNCERTAIN BEHAVIOR OF SKIN: ICD-10-CM

## 2025-04-02 DIAGNOSIS — Z85.828 HISTORY OF NONMELANOMA SKIN CANCER: ICD-10-CM

## 2025-04-02 DIAGNOSIS — L81.4 SOLAR LENTIGINOSIS: ICD-10-CM

## 2025-04-02 DIAGNOSIS — D22.9 MULTIPLE BENIGN NEVI: Primary | ICD-10-CM

## 2025-04-02 DIAGNOSIS — D18.01 CHERRY ANGIOMA: ICD-10-CM

## 2025-04-02 DIAGNOSIS — L82.1 SEBORRHEIC KERATOSIS: ICD-10-CM

## 2025-04-02 NOTE — NURSING NOTE
Ni Maya's goals for this visit include:   Chief Complaint   Patient presents with    Skin Check     Patient is here for a skin check, area of concern spot on the neck, HX of NMSC       She requests these members of her care team be copied on today's visit information:     PCP: Apryl Olvera    Referring Provider:  Margareth Talley MD  [address] [place] [address] [address]  [place] [address]    LMP  (LMP Unknown)     Do you need any medication refills at today's visit?       Brnady Ortega EMT

## 2025-04-02 NOTE — LETTER
"4/2/2025      Ni Maya  [id number] [facility] [address]      Dear Colleague,    Thank you for referring your patient, Ni Maya, to the [facility]. Please see a copy of my visit note below.    [facility] Dermatology Note  Encounter Date: Apr 2, 2025  Office Visit     Reviewed patients past medical history and pertinent chart review prior to patients visit today.     Dermatology Problem List:  Last skin check: 04/02/2025    0. NUB left occipital scalp, shave biopsy 04/02/25 .    1. \"Precancerous\" lesion removed from the vagina per patient report. ?MICKIE III (documented in chart)  -s/p removal, patient thought done in [place] but no surg path reports available   2. 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 There was some brown pigment at edges of biopsy noted 8/4/2020.      Social History: Retired. Lives with . Has son with down syndrome that lives in senior care. Daughter lives in [place].  Family History: Negative for skin cancer.  ____________________________________________    Assessment & Plan:     # Neoplasm of uncertain behavior:  L occipital scalp  DDx includes rule out pigmented BCC. Shave biopsy today.    Procedure Note: Biopsy by shave technique  The risks and benefits of the procedure were described to the patient. These include but are not limited to bleeding, infection, scar, incomplete removal, and non-diagnostic biopsy. Verbal informed consent was obtained. The above site(s) was cleansed with an alcohol pad and injected with 1% lidocaine with epinephrine. Once anesthesia was obtained, a biopsy(ies) was " performed with Gilette blade. The tissue(s) was placed in a labeled container(s) with formalin and sent to pathology. Hemostasis was achieved with aluminum chloride. Vaseline and a bandage were applied to the wound(s). The patient tolerated the procedure well and was given post biopsy care instructions.     # Personal history of nonmelanoma skin cancer  - No signs of recurrence. Continued observation recommended.   - Sun protection: Counseled SPF 30+ sunscreen, UPF clothing, sun avoidance, tanning bed avoidance.    # Multiple nevi, trunk and extremities  # Solar lentigines  - Nevi demonstrate no concerning features on dermoscopy. We discussed the importance of self exams at home.   - ABCDEs: Counseled ABCDEs of melanoma: Asymmetry, Border (irregularity), Color (not uniform, changes in color), Diameter (greater than 6 mm which is about the size of a pencil eraser), and Evolving (any changes in preexisting moles).    # Cherry angiomas  # Seborrheic keratoses  - We discussed the benign nature of the skin lesions. No treatment required. Continued observation recommended. Follow up with any concerns.      Follow-up:  Annual for follow up full body skin exam, as needed for new or changing lesions or new concerns    All risks, benefits and alternatives were discussed with patient.  Patient is in agreement and understands the assessment and plan.  All questions were answered.  Nikky Webb PA-C  [facility] Dermatology    ____________________________________________    CC: No chief complaint on file.    HPI:  Ms. Ni Maya is a(n) 84 year old female who presents today as a return patient for a full body skin cancer screening. The patient has a history of nonmelanoma skin cancer. Today, the patient reports spot on neck. This lesion has been present for several months and will not heal. She is being diligent with photoprotection.      Physical Exam:  Vitals: LMP  (LMP Unknown)    SKIN: Total skin excluding  the genitalia areas was performed. The exam included the scalp, face, neck, bilateral arms, chest, back, abdomen, bilateral legs, digits, mons pubis, buttocks, and nails.   - Franks II.  - NUB, left occipital scalp, pink papule with pigment globules and leaf like structures on dermoscopy  - Multiple tan/brown macules and papules scattered throughout exam, consistent with benign nevi. No concerning features on dermoscopy.   - Scattered tan, homogenous macules scattered on sun exposed skin, consistent with solar lentigines.   - Scattered waxy, stuck on appearing papules and patches, consistent with seborrheic keratoses.  - Several 1-2 mm red dome shaped symmetric papules, consistent with cherry angiomas.     - No other lesions of concern on areas examined.     Medications:  Current Outpatient Medications   Medication Sig Dispense Refill     alcohol swab prep pads Use to swab area of injection/geoffrey as directed. 100 each 3     amLODIPine (NORVASC) 5 MG tablet Take 1 tablet (5 mg) by mouth daily 90 tablet 3     apixaban ANTICOAGULANT (ELIQUIS) 5 MG tablet Take 1 tablet (5 mg) by mouth 2 times daily 180 tablet 3     atorvastatin (LIPITOR) 40 MG tablet Take 1 tablet (40 mg) by mouth daily 90 tablet 3     blood glucose (RELION PRIME TEST) test strip 1 strip by In Vitro route 2 times daily Use to test blood sugar 3 times daily or as directed. 100 strip 1     blood glucose calibration (NO BRAND SPECIFIED) solution To accompany: Blood Glucose Monitor Brands: per insurance. 1 Bottle 3     blood glucose monitoring (NO BRAND SPECIFIED) meter device kit Use to test blood sugar 3 times daily or as directed. Preferred blood glucose meter OR supplies to accompany: Blood Glucose Monitor Brands: per insurance. 1 kit 0     Blood Glucose Monitoring Suppl W/DEVICE KIT 1 kit 3 times daily. 1 kit 0     cetirizine (ZYRTEC) 10 MG tablet Take 10 mg by mouth daily (Patient not taking: Reported on 1/20/2025)       co-enzyme Q-10 100 MG  CAPS capsule Take 100 mg by mouth daily       fish oil-omega-3 fatty acids 1000 MG capsule Take 2 g by mouth daily. 2 caps per day       Lancets (E-Z JECT LANCET MICRO-THIN 33G) MISC        levothyroxine (SYNTHROID/LEVOTHROID) 88 MCG tablet Take 1 tablet (88 mcg) by mouth daily. 90 tablet 3     lisinopril (ZESTRIL) 40 MG tablet Take 1 tablet (40 mg) by mouth daily 90 tablet 3     loratadine (CLARITIN) 10 MG tablet Take 1 tablet (10 mg) by mouth daily 90 tablet 1     metFORMIN (GLUCOPHAGE XR) 500 MG 24 hr tablet Take 1 tablet (500 mg) by mouth daily (with dinner). 90 tablet 1     metoprolol tartrate (LOPRESSOR) 50 MG tablet Take 1 tablet (50 mg) by mouth 2 times daily 180 tablet 3     thin (NO BRAND SPECIFIED) lancets Use with lanceting device. To accompany: Blood Glucose Monitor Brands: per insurance. 100 each 6     UNABLE TO FIND MEDICATION NAME: resvante supplement       No current facility-administered medications for this visit.      Past Medical History:   Patient Active Problem List   Diagnosis     Hypothyroidism     Allergic rhinitis     Symptomatic menopausal or female climacteric states     Chronic kidney disease, stage II (mild)     Edema     Obesity     Urethral stricture     MICKIE III (vulvar intraepithelial neoplasia III)     Vulval lesion     Type 2 diabetes, HbA1C goal < 8% (H)     Hyperlipidemia with target LDL less than 100     HTN, goal below 150/90     ACP (advance care planning)     Essential hypertension with goal blood pressure less than 150/90     Diabetes mellitus with peripheral vascular disease (H)     Cellulitis of toe of right foot     Chronic atrial fibrillation (H)     Thrombocytopenia     Acute gout involving toe of right foot     Lumbar radiculopathy     Cerebral infarction, unspecified mechanism (H)     Long term current use of anticoagulant therapy     History of nonmelanoma skin cancer     Pigmented purpuric dermatosis     Sacroiliitis     Late effects of cerebral ischemic stroke      Gout     Chronic back pain     Past Medical History:   Diagnosis Date     Allergic rhinitis, cause unspecified      Essential hypertension, benign      Infection of kidney, unspecified 1999     Other specified acquired hypothyroidism      Other specified types of cystitis(595.89)     1999,6-2-01, 10-10-01       CC Margareth Talley MD  [address] [place] [address] [address]  [place] [address] on close of this encounter.      Again, thank you for allowing me to participate in the care of your patient.        Sincerely,        Nikky Webb PA-C    Electronically signed

## 2025-04-06 LAB
PATH REPORT.COMMENTS IMP SPEC: ABNORMAL
PATH REPORT.COMMENTS IMP SPEC: ABNORMAL
PATH REPORT.COMMENTS IMP SPEC: YES
PATH REPORT.FINAL DX SPEC: ABNORMAL
PATH REPORT.GROSS SPEC: ABNORMAL
PATH REPORT.MICROSCOPIC SPEC OTHER STN: ABNORMAL
PATH REPORT.RELEVANT HX SPEC: ABNORMAL

## 2025-04-08 ENCOUNTER — PATIENT OUTREACH (OUTPATIENT)
Dept: CARE COORDINATION | Facility: CLINIC | Age: 85
End: 2025-04-08
Payer: COMMERCIAL

## 2025-04-09 ENCOUNTER — ANCILLARY PROCEDURE (OUTPATIENT)
Dept: GENERAL RADIOLOGY | Facility: CLINIC | Age: 85
End: 2025-04-09
Attending: PHYSICIAN ASSISTANT
Payer: COMMERCIAL

## 2025-04-09 ENCOUNTER — OFFICE VISIT (OUTPATIENT)
Dept: FAMILY MEDICINE | Facility: CLINIC | Age: 85
End: 2025-04-09
Payer: COMMERCIAL

## 2025-04-09 ENCOUNTER — TELEPHONE (OUTPATIENT)
Dept: DERMATOLOGY | Facility: CLINIC | Age: 85
End: 2025-04-09

## 2025-04-09 VITALS
SYSTOLIC BLOOD PRESSURE: 112 MMHG | DIASTOLIC BLOOD PRESSURE: 86 MMHG | HEART RATE: 65 BPM | HEIGHT: 65 IN | TEMPERATURE: 97 F | WEIGHT: 177.1 LBS | RESPIRATION RATE: 19 BRPM | OXYGEN SATURATION: 99 % | BODY MASS INDEX: 29.51 KG/M2

## 2025-04-09 DIAGNOSIS — Z23 NEED FOR VACCINATION: ICD-10-CM

## 2025-04-09 DIAGNOSIS — G89.29 CHRONIC PAIN OF RIGHT KNEE: ICD-10-CM

## 2025-04-09 DIAGNOSIS — M53.3 SACROILIAC JOINT PAIN: Primary | ICD-10-CM

## 2025-04-09 DIAGNOSIS — M25.561 CHRONIC PAIN OF RIGHT KNEE: ICD-10-CM

## 2025-04-09 DIAGNOSIS — M53.3 SACROILIAC JOINT PAIN: ICD-10-CM

## 2025-04-09 PROCEDURE — 90480 ADMN SARSCOV2 VAC 1/ONLY CMP: CPT | Performed by: PHYSICIAN ASSISTANT

## 2025-04-09 PROCEDURE — 3074F SYST BP LT 130 MM HG: CPT | Performed by: PHYSICIAN ASSISTANT

## 2025-04-09 PROCEDURE — 99214 OFFICE O/P EST MOD 30 MIN: CPT | Performed by: PHYSICIAN ASSISTANT

## 2025-04-09 PROCEDURE — 3079F DIAST BP 80-89 MM HG: CPT | Performed by: PHYSICIAN ASSISTANT

## 2025-04-09 PROCEDURE — 72202 X-RAY EXAM SI JOINTS 3/> VWS: CPT | Mod: TC | Performed by: RADIOLOGY

## 2025-04-09 PROCEDURE — 73562 X-RAY EXAM OF KNEE 3: CPT | Mod: TC | Performed by: RADIOLOGY

## 2025-04-09 PROCEDURE — 91320 SARSCV2 VAC 30MCG TRS-SUC IM: CPT | Performed by: PHYSICIAN ASSISTANT

## 2025-04-09 PROCEDURE — 1125F AMNT PAIN NOTED PAIN PRSNT: CPT | Performed by: PHYSICIAN ASSISTANT

## 2025-04-09 ASSESSMENT — PAIN SCALES - GENERAL: PAINLEVEL_OUTOF10: MODERATE PAIN (5)

## 2025-04-09 NOTE — TELEPHONE ENCOUNTER
Excision/Mohs previsit information                                                    Diagnosis: basal cell carcinoma  Site(s): Left occipital scalp    Is the surgical site below the waist?  NO  If yes, instruct the patient to purchase over the counter chlorhexidine surgical soap and wash all skin below the belly button twice before surgery    Allergies   Allergen Reactions    Sulfa Antibiotics Rash       Review and update allergy and medication list    Do you take the following medications:  Coumadin, Eliquis, Pradaxa, Xarelto:  YES, Eliquis If on Coumadin, INR should be checked within 7 days of surgery.  Range should be 3.5 or less or within therapeutic range.    Do you currently or have you previously had any of the following conditions:  Hepatitis:  NO  HIV/AIDS:  NO  Prolonged bleeding or bleeding disorder:  NO  Pacemaker: NO  Defibrillator:  NO  History of artificial or heart valve replacement:  NO  Endocarditis (inflammation of the inner lining of the heart's chambers and valves):  NO  Have you ever had a prosthetic joint infection:  NO  Pregnant or Breastfeeding:  NO  Mobility device (wheelchair, transfer difficulty): NO    Important Reminders:                                                      -For Mohs, notify patient they may be here through the lunch hour.  Be prepared to have down time and we recommend they bring a book or something to do.  -Ok to take all of their medications as prescribed  -Notify patient to eat prior to appointment.  The medication is more likely to cause you to feel jittery if you have an empty stomach.  -If face is being treated, please come with a make-up free face  -Avoid wearing earrings and necklaces  -If scalp is being treated, please come with clean hair free from product  -Patient will not be able to get the site wet for 48 hrs  -No submerging wound in standing water (lake, pool, bathtub, hot tub) for 2 weeks  -No physical activity for 48 hrs (further restrictions will be  discussed by MD at time of visit)    If any positives, send to RN for further review  Gilma Soria RN     Writer called pt to discuss pathology results. Pt scheduled for mohs procedure. Excision checklist complete, pt takes Eliquis. Pt denied having any questions or concerns at this time.    Gilma Soria RN on 4/9/2025 at 1:39 PM        Final Diagnosis  Left occipital scalp:  - Pigmented basal cell carcinoma, superficial and nodular types - (see description)  Electronically signed by Kurt Gonzalez MD on 4/6/2025 at  5:47 PM      Result Notes     Gilma Soria RN  4/7/2025 11:06 AM CDT Back to Top    Writer called pt, no answer. Left message for pt to return our call at [phone].     Gilma Soria RN on 4/7/2025 at 11:06 AM   Nikky Webb PA-C  4/7/2025 10:09 AM CDT     Please let patient know that biopsy(s) showed the following with the below treatment recommendations:     A. Left occipital scalp, pigmented BCC, Mohs Micrographic Surgery needed.

## 2025-04-09 NOTE — PROGRESS NOTES
Assessment & Plan     Sacroiliac joint pain  She has done well with injections in the past, she questions how much her knee pain is affecting her back which is a valid question we will start with referral to sports med and may consider spine referral as needed  - XR Sacroiliac Joint G/E 3 Views; Future  - Orthopedic  Referral; Future    Chronic pain of right knee  Will refer to sports med for this as well to consider further treatment recommendations, she is avoiding NSAIDs due to the fact that she is on apixaban  - XR Knee Right 3 Views; Future  - Orthopedic  Referral; Future    Need for vaccination  COVID booster given  - COVID-19 12+ (PFIZER)        This chart documentation was completed in part with Dragon voice recognition software.  Documentation is reviewed after completion, however, some words and grammatical errors may remain.  AVTAR Del Real   Ni is a 84 year old, presenting for the following health issues:  Pain (R knee + Lower back )        4/9/2025     9:05 AM   Additional Questions   Roomed by Lesvia   Accompanied by Self     History of Present Illness       Back Pain:  She presents for follow up of back pain. Patient's back pain is a chronic problem.  Location of back pain:  Right lower back  Description of back pain: dull ache  Back pain spreads: nowhere    Since patient first noticed back pain, pain is: gradually worsening  Does back pain interfere with her job:  Not applicable       Diabetes:   She presents for follow up of diabetes.  She is checking home blood glucose one time daily.   She checks blood glucose before meals.  Blood glucose is never over 200 and never under 70.  When her blood glucose is low, the patient is asymptomatic for confusion, blurred vision, lethargy and reports not feeling dizzy, shaky, or weak.   She has no concerns regarding her diabetes at this time.   She is not experiencing numbness or burning in feet, excessive thirst,  blurry vision, weight changes or redness, sores or blisters on feet.           Hyperlipidemia:  She presents for follow up of hyperlipidemia.   She is not taking medication to lower cholesterol. She is not having myalgia or other side effects to statin medications.    She eats 2-3 servings of fruits and vegetables daily.She consumes 0 sweetened beverage(s) daily.She exercises with enough effort to increase her heart rate 10 to 19 minutes per day.  She exercises with enough effort to increase her heart rate 5 days per week.   She is taking medications regularly.   Concern - R knee pain + Lower back pain  Onset: On going for years  Description: painful, dull  Intensity: severe  Progression of Symptoms:  worsening  Accompanying Signs & Symptoms: believes the back and knee pain are going hand and hand--in the past she has had an SI joint injection October 20, 2021 and possibly a knee injection as well on the left knee which really seem to help her symptoms for quite a while.  Previous history of similar problem: Yes  Precipitating factors:        Worsened by: walking, she knows she is not walking correctly anymore because of the pain she is walking bent over which she is aware causes increased symptoms to other areas of her body.  Alleviating factors:        Improved by: relaxing, cortisone shots did provide some relief  Therapies tried and outcome: cortisone shots     She has some questions about her labs.  Her triglycerides were elevated she was not fasting at the time.  She wonders if that would make a difference she also thinks she may have had a gout about a month ago she had red hot and very painful left bunion.  It is much better now but she still has some slight edema across the top of her foot.              Review of Systems  Constitutional, HEENT, cardiovascular, pulmonary, gi and gu systems are negative, except as otherwise noted.      Objective    /86   Pulse 65   Temp 97  F (36.1  C) (Temporal)    "Resp 19   Ht 1.658 m (5' 5.28\")   Wt 80.3 kg (177 lb 1.6 oz)   LMP  (LMP Unknown)   SpO2 99%   BMI 29.22 kg/m    Body mass index is 29.22 kg/m .  Physical Exam   GENERAL: alert and no distress  RESP: lungs clear to auscultation - no rales, rhonchi or wheezes  CV: regular rate and rhythm, normal S1 S2, no S3 or S4, no murmur, click or rub, no peripheral edema  MS: She has no vertebral tenderness of her lumbosacral spine she does have right SI joint tenderness but no left SI joint tenderness she does walk with a stooped over position  Right knee is very smooth full range of motion without crepitus she does have medial joint line tenderness and some mild popliteal tenderness otherwise no prepatellar tenderness or tibial tuberosity tenderness  Minimal pedal edema over her dorsal foot near her toes only no further erythema, warmth or tenderness  SKIN: no suspicious lesions or rashes  PSYCH: mentation appears normal, affect normal/bright    Xray - Reviewed and interpreted by me.  SI joints-appear to have some degenerative changes right greater than left and right knee- mild medial joint line narrowing of her right knee official report pending        Signed Electronically by: Cony Leonardo PA-C    "

## 2025-04-17 ENCOUNTER — TELEPHONE (OUTPATIENT)
Dept: CARDIOLOGY | Facility: CLINIC | Age: 85
End: 2025-04-17
Payer: COMMERCIAL

## 2025-04-17 DIAGNOSIS — I48.20 CHRONIC ATRIAL FIBRILLATION (H): ICD-10-CM

## 2025-04-17 DIAGNOSIS — I10 BENIGN ESSENTIAL HYPERTENSION: ICD-10-CM

## 2025-04-17 RX ORDER — AMLODIPINE BESYLATE 5 MG/1
5 TABLET ORAL DAILY
Qty: 90 TABLET | Refills: 3 | Status: SHIPPED | OUTPATIENT
Start: 2025-04-17

## 2025-04-17 NOTE — TELEPHONE ENCOUNTER
Reason for Call:  Medication or medication refill:    Do you use a [facility]?  Name of the pharmacy and phone number for the current request:  No, [facility]    Name of the medication requested: amLODIPine (NORVASC) 5 MG tablet & apixaban ANTICOAGULANT (ELIQUIS) 5 MG tablet. Patient is almost out of both. Has a future appt scheduled with Dr. Deleon for May. Per [facility], they need authorization from Apryl Baker sent in for patient to be able to refill both of these medications.    Other request: N/A    Can we leave a detailed message on this number? YES    Phone number patient can be reached at: Cell number on file:    Telephone Information:   Mobile [phone]     Best Time: N/A    Call taken on 4/17/2025 at 3:41 PM by Ryley Neal

## 2025-04-23 ENCOUNTER — TELEPHONE (OUTPATIENT)
Dept: ORTHOPEDICS | Facility: CLINIC | Age: 85
End: 2025-04-23
Payer: COMMERCIAL

## 2025-04-23 NOTE — PROGRESS NOTES
Ni Maya  :  1940  DOS: 2025  MRN: [id number]  PCP: Apryl Olvera    Sports Medicine Clinic Visit      HPI  Ni Maya is a 84 year old female who is seen at the request of Cony Leonardo PA-C presenting with right low back pain and right knee pain    - Mechanism of Injury:    - No inciting injury  - Pertinent history and prior evaluations:    - 2025 right knee xray shows no acute fracture or malalignment. Minimal medial and patellofemoral compartment degenerative changes. No knee joint effusion. Osteopenia. Atherosclerosis.    - 2025 sacroiliac joint xray shows no acute fracture or malalignment. Mild bilateral hip and sacroiliac joint degenerative changes. No sacroiliac joint erosions or ankylosis. Severe degenerative changes of the lower lumbar spine. Osteopenia.     - On Eloquis  - Pain Character:    - Location:  right lower back, generalized right knee  - Character:  dull ache  - Duration:  less than a year  - Course:  worsening  - Endorses:    - walking in a lumbar extended position tends to bring on a lot of the back pain.  Right knee pain with prolonged weightbearing  - Denies:    - swelling, clicking/popping, grinding, mechanical locking symptoms, instability, numbness, tingling, radicular shooting pain, weakness  - Alleviating factors:    - Lumbar flexion, leaning on a counter or shopping cart, rest, activity modifications  - Aggravating factors:    -  spine extension, ambulation for the knee  - Other treatments tried:    - Past right SI joint injection 10/20/2021 (feels did not help) and past LEFT knee corticosteroid injection that did help    - Patient Goals:    - get a formal diagnosis, discuss treatment options  - Social History:   - Retired.        Review of Systems  Musculoskeletal: as above  Remainder of review of systems is negative including constitutional, CV, pulmonary, GI, Skin and Neurologic except as noted in HPI or medical history.    Past Medical  History:   Diagnosis Date    Allergic rhinitis, cause unspecified     Essential hypertension, benign     Infection of kidney, unspecified 1999    Other specified acquired hypothyroidism     Other specified types of cystitis(595.89)     1999,6-2-01, 10-10-01     Past Surgical History:   Procedure Laterality Date    BIOPSY VULVA/PERINEUM,ADDNL LESN  9/18/09    Wide -excision of MICKIE III- right labia     COLONOSCOPY  9/13/2013    Procedure: COLONOSCOPY;  Colonoscopy;  Surgeon: Yanick Ramsey MD;  Location: [facility]    HC BIOPSY OF BREAST, OPEN INCISIONAL  1960    Benign    HC ERCP W SPHINCTEROTOMY  1996 6/2/96 and 8/30/96    HC REDUCTION OF LARGE BREAST  1988    HC REMOVAL GALLBLADDER  1995    HC UGI ENDOSCOPY DIAG W OR W/O BRUSH/WASH  7-    INJECT EPIDURAL LUMBAR N/A 8/10/2017    Procedure: INJECT EPIDURAL LUMBAR;  Lumbar 2-3 Epidural Steroid Injection;  Surgeon: Kimani Garcia MD;  Location: [facility]    INJECT EPIDURAL LUMBAR Right 5/10/2018    Procedure: INJECT EPIDURAL LUMBAR;  right lumbar 2 - lumbar 3 epidural steroid injection;  Surgeon: Kimani Garcia MD;  Location: [facility]    ZZC LAPAROSCOPY, SURGICAL; W/ VAGINAL HYSTERECTOMY W/WO REMOVAL OVARY(S)/TUBES  1984    for bleeding    ZZC LIGATE FALLOPIAN TUBE,POSTPARTUM  1978    Tubal Ligation    ZZ ANGIOGRAPHY ARCH  4-3-98    ZZHC COLONOSCOPY THRU STOMA, DIAGNOSTIC  4-24-98    Diverticuli - due in 2008     Family History   Problem Relation Age of Onset    Cardiovascular Father         Aortic aneurysm    Hypertension Father     Hypertension Mother     Gastrointestinal Disease Mother         GI Bleed    Lipids Sister     Hypertension Sister     Genitourinary Problems Sister     Allergies Sister     Cancer Brother         Lung    Alcohol/Drug Brother     Connective Tissue Disorder Son         Down Syndrome    Respiratory Son         Pneumonia    Cancer Maternal Grandmother         Stomach    Hypertension Maternal Grandfather     Allergies Daughter           Objective  LMP  (LMP Unknown)     General: healthy, alert and in no acute distress.    HEENT: no scleral icterus or conjunctival erythema.   Skin: no suspicious lesions or rash. No jaundice.   CV: regular rhythm by palpation, 2+ distal pulses.  Resp: normal respiratory effort without conversational dyspnea.   Psych: normal mood and affect.    Gait: antalgic, appropriate coordination and balance.    Neuro:        - Sensation to light touch:    - Intact throughout the BLE including all peripheral nerve distributions.        - MSR:      RLE  LLE  - Patella 2+ 2+  - Achilles 2+ 2+       - Special tests:   - Slump/SLR:  Neg     MSK - LE:       - Inspection:    - No significant swelling, erythema, warmth, ecchymosis, lesion.        - ROM:    - Full AROM/PROM with back pain with lumbar extension and facet loading       - Palpation:    - TTP at the lumbar paraspinals, medial joint line of the right knee.   - NTTP elsewhere.        - Strength:  (*antalgic)  RLE LLE  - Hip Flexion  5 5   - Hip Extension 5 5   - Hip Abduction 5 5   - Hip Adduction 5 5  - Knee Flexion  5 5  - Knee Extension 5 5  - Dorsiflexion  5 5  - Plantarflexion 5 5  - Ext. Jose Antonio. Longus 5 5  - Inversion  5 5  - Eversion  5 5       - Special tests:        - Lachman:  Neg        - A/P drawer:  Neg        - Pivot shift:  Neg    - Renetta:  Neg     - Varus stress:  Neg for laxity or pain     - Valgus stress:  Neg for laxity or pain    - Patellar grind:  Pos   - Facet loading: Pos      Radiology  I independently reviewed the available relevant imaging in the chart with my interpretations as above in HPI.       Procedure  Large Joint Injection/Arthocentesis: R knee joint    Date/Time: 4/24/2025 9:49 AM    Performed by: Roberto Taylor DO  Authorized by: Roberto Taylor DO    Indications:  Pain  Needle Size:  22 G  Guidance: landmark guided    Approach:  Anterolateral  Location:  Knee      Medications:  40 mg triamcinolone 40 MG/ML; 2 mL BUPivacaine (PF)  0.5 %; 2 mL lidocaine 1 %  Outcome:  Tolerated well, no immediate complications  Procedure discussed: discussed risks, benefits, and alternatives    Consent Given by:  Patient  Prep: patient was prepped and draped in usual sterile fashion         PROCEDURE  Intraarticular Knee Joint Injection  The patient was informed of the risks and benefits of the procedure and alternatives were discussed. A written consent was signed by the patient.   The injection site was prepped with chlorhexidine in sterile fashion.   An injectate solution containing 2 mL of 1% lidocaine, 2 mL of 0.5% bupivacaine, and 1 mL of Kenalog (40 mg/mL) was drawn up into a 5 mL syringe.  Injection was performed using sterile technique.  A 1.5-inch 22-gauge needle was used to enter the knee joint using a lateral infrapatellar approach and injectate was injected successfully. After the injection, the site was cleaned and a bandage applied. The patient tolerated the procedure well without complications.          Assessment  1. Primary osteoarthritis of right knee    2. Lumbar facet arthropathy    3. Spinal stenosis of lumbar region without neurogenic claudication        Plan  Ni Maya is a pleasant 84 year old female that presents with chronic low back pain and right knee pain.      She feels pain in the low back with lumbar extension and often has to lean on the counter or shopping cart to relieve her discomfort.  No significant radicular shooting pain, no numbness/tingling, no neurologic red flags.  Pain is reproduced in clinic with lumbar extension and facet loading. History and physical exam appear most consistent with lumbar spondylosis with facet arthropathy and possible spinal stenosis.     She also feels generalized right knee pain that hurts worse with prolonged weightbearing.  Mild to moderate degenerative changes present on radiographs, which is consistent with her physical exam.  Most evidence of primary osteoarthritis of the right  knee.    We discussed the nature of the condition and available treatment options, and mutually agreed upon the following plan:    - Imaging:          - Reviewed and independently interpreted the relevant imaging in the chart, including any imaging ordered for today's clinic.  - Reviewed results and images with patient.   - Medications:          - Discussed pharmacologic options for pain relief.   - May use NSAIDs (Ibuprofen, Naproxen) or Acetaminophen (Tylenol) as needed for pain control.   - Do not take these if previously advised to avoid them for other medical conditions.  - May also use topical medications such as lidocaine, IcyHot, BioFreeze, or Voltaren gel as needed for pain control.    - Voltaren gel is an anti-inflammatory cream that may be used up to 4 times per day over the painful area.   - Injections:          - Discussed possible injection options and alternatives.    - Injection options include: Intra-articular knee joint injection with corticosteroid, viscosupplementation, or PRP.  - Performed a corticosteroid injection of the right knee joint today in clinic. Patient tolerated the procedure well without complications.     - Post-procedure instructions:    - Keep the injection site clean and dry.   - Do not submerge the injection site for 24 hours (no baths, pools). Showers are ok.   - Rest the area for 24-48 hours before resuming normal activities. Avoid overexerting the area for the first few weeks.   - It may take 2-3 days to start noticing the effects of the injection and up to 3-4 weeks to feel significant benefits.   - Therapy:          - Discussed the benefits of therapy vs home exercise program for optimization of range of motion, flexibility, strength, stability and function.   - Preference is for a home exercise program.   - Home Exercise Program for the Erma flexion-based back program given today in clinic and recommendation given to perform HEP daily and after exacerbations.  -  Modalities:          - May use ice, heat, massage or other modalities as needed.   - Bracing:          - Discussed bracing options and may consider OTC knee compression sleeve or hinged knee brace for ambulation if needed.  - Options presented in clinic and choice given to take our brace from clinic or purchase an equivalent brace from an outside source.   - Surgery:          - Discussed non-operative and operative treatment options for the patient's condition.   - Goal is to continue conservative care for as long as possible before surgical intervention would need to be considered.  - Activity:          - Encouraged to remain active and participate in regular activities as symptoms allow.   Avoid or modify exacerbating activities as needed.  - Follow up:          - As needed for re-evaluation and update to treatment plan.  - May follow up sooner for new/worsening symptoms.  - May contact clinic by phone or MyChart for questions or concerns.       Roberto Taylor DO, JENI  [facility] - Sports Medicine  [facility] - Department of Orthopedic Surgery       Disclaimer:  This note was prepared and written using Dragon Medical dictation software. As a result, there may be errors in the script that have gone undetected. Please consider this when interpreting the information in this note.

## 2025-04-23 NOTE — TELEPHONE ENCOUNTER
"Per Dr. Taylor, \"looks like this is for a consult to consider an SI joint injection. If an injection is decided, there is no need to hold eliquis for an injection. Thanks.\"    Carol Quintana MS, ATC  Certified Athletic Trainer         "

## 2025-04-23 NOTE — TELEPHONE ENCOUNTER
Patient  thinks she is coming in for a cortisone injection tomorrow. She is calling today as she feels she needs to be off Eliquis for a day before the injection.

## 2025-04-24 ENCOUNTER — OFFICE VISIT (OUTPATIENT)
Dept: ORTHOPEDICS | Facility: CLINIC | Age: 85
End: 2025-04-24
Attending: PHYSICIAN ASSISTANT
Payer: COMMERCIAL

## 2025-04-24 DIAGNOSIS — M48.061 SPINAL STENOSIS OF LUMBAR REGION WITHOUT NEUROGENIC CLAUDICATION: ICD-10-CM

## 2025-04-24 DIAGNOSIS — M47.816 LUMBAR FACET ARTHROPATHY: ICD-10-CM

## 2025-04-24 DIAGNOSIS — M17.11 PRIMARY OSTEOARTHRITIS OF RIGHT KNEE: Primary | ICD-10-CM

## 2025-04-24 PROCEDURE — 99204 OFFICE O/P NEW MOD 45 MIN: CPT | Mod: 25 | Performed by: STUDENT IN AN ORGANIZED HEALTH CARE EDUCATION/TRAINING PROGRAM

## 2025-04-24 PROCEDURE — 20610 DRAIN/INJ JOINT/BURSA W/O US: CPT | Mod: RT | Performed by: STUDENT IN AN ORGANIZED HEALTH CARE EDUCATION/TRAINING PROGRAM

## 2025-04-24 RX ADMIN — LIDOCAINE HYDROCHLORIDE 2 ML: 10 INJECTION, SOLUTION INFILTRATION; PERINEURAL at 09:49

## 2025-04-24 RX ADMIN — BUPIVACAINE HYDROCHLORIDE 2 ML: 5 INJECTION, SOLUTION EPIDURAL; INTRACAUDAL; PERINEURAL at 09:49

## 2025-04-24 RX ADMIN — TRIAMCINOLONE ACETONIDE 40 MG: 40 INJECTION, SUSPENSION INTRA-ARTICULAR; INTRAMUSCULAR at 09:49

## 2025-04-24 NOTE — LETTER
2025      Ni Maya  [address] [facility] [address]      Dear Colleague,    Thank you for referring your patient, Ni Maya, to the [facility] [place]. Please see a copy of my visit note below.    Ni Maya  :  1940  DOS: 2025  MRN: [id number]  PCP: Apryl Olvera    Sports Medicine Clinic Visit      HPI  Ni Maya is a 84 year old female who is seen at the request of Coyn Leonardo PA-C presenting with right low back pain and right knee pain    - Mechanism of Injury:    - No inciting injury  - Pertinent history and prior evaluations:    - 2025 right knee xray shows no acute fracture or malalignment. Minimal medial and patellofemoral compartment degenerative changes. No knee joint effusion. Osteopenia. Atherosclerosis.    - 2025 sacroiliac joint xray shows no acute fracture or malalignment. Mild bilateral hip and sacroiliac joint degenerative changes. No sacroiliac joint erosions or ankylosis. Severe degenerative changes of the lower lumbar spine. Osteopenia.     - On Eloquis  - Pain Character:    - Location:  right lower back, generalized right knee  - Character:  dull ache  - Duration:  less than a year  - Course:  worsening  - Endorses:    - walking in a lumbar extended position tends to bring on a lot of the back pain.  Right knee pain with prolonged weightbearing  - Denies:    - swelling, clicking/popping, grinding, mechanical locking symptoms, instability, numbness, tingling, radicular shooting pain, weakness  - Alleviating factors:    - Lumbar flexion, leaning on a counter or shopping cart, rest, activity modifications  - Aggravating factors:    -  spine extension, ambulation for the knee  - Other treatments tried:    - Past right SI joint injection 10/20/2021 (feels did not help) and past LEFT knee corticosteroid injection that did help    - Patient Goals:    - get a formal diagnosis, discuss treatment options  -  Social History:   - Retired.        Review of Systems  Musculoskeletal: as above  Remainder of review of systems is negative including constitutional, CV, pulmonary, GI, Skin and Neurologic except as noted in HPI or medical history.    Past Medical History:   Diagnosis Date     Allergic rhinitis, cause unspecified      Essential hypertension, benign      Infection of kidney, unspecified 1999     Other specified acquired hypothyroidism      Other specified types of cystitis(595.89)     1999,6-2-01, 10-10-01     Past Surgical History:   Procedure Laterality Date     BIOPSY VULVA/PERINEUM,ADDNL LESN  9/18/09    Wide -excision of MICKIE III- right labia      COLONOSCOPY  9/13/2013    Procedure: COLONOSCOPY;  Colonoscopy;  Surgeon: Yanick Ramsey MD;  Location: [facility]     HC BIOPSY OF BREAST, OPEN INCISIONAL  1960    Benign     HC ERCP W SPHINCTEROTOMY  1996 6/2/96 and 8/30/96     HC REDUCTION OF LARGE BREAST  1988     HC REMOVAL GALLBLADDER  1995     HC UGI ENDOSCOPY DIAG W OR W/O BRUSH/WASH  7-     INJECT EPIDURAL LUMBAR N/A 8/10/2017    Procedure: INJECT EPIDURAL LUMBAR;  Lumbar 2-3 Epidural Steroid Injection;  Surgeon: Kimani Garcia MD;  Location: [facility] OR     INJECT EPIDURAL LUMBAR Right 5/10/2018    Procedure: INJECT EPIDURAL LUMBAR;  right lumbar 2 - lumbar 3 epidural steroid injection;  Surgeon: Kimani Garcia MD;  Location: [facility] OR     ZZC LAPAROSCOPY, SURGICAL; W/ VAGINAL HYSTERECTOMY W/WO REMOVAL OVARY(S)/TUBES  1984    for bleeding     Z LIGATE FALLOPIAN TUBE,POSTPARTUM  1978    Tubal Ligation     [facility]     ZZ COLONOSCOPY THRU STOMA, DIAGNOSTIC  4-24-98    Diverticuli - due in 2008     Family History   Problem Relation Age of Onset     Cardiovascular Father         Aortic aneurysm     Hypertension Father      Hypertension Mother      Gastrointestinal Disease Mother         GI Bleed     Lipids Sister      Hypertension Sister      Genitourinary Problems Sister      Allergies  Sister      Cancer Brother         Lung     Alcohol/Drug Brother      Connective Tissue Disorder Son         Down Syndrome     Respiratory Son         Pneumonia     Cancer Maternal Grandmother         Stomach     Hypertension Maternal Grandfather      Allergies Daughter          Objective  LMP  (LMP Unknown)     General: healthy, alert and in no acute distress.    HEENT: no scleral icterus or conjunctival erythema.   Skin: no suspicious lesions or rash. No jaundice.   CV: regular rhythm by palpation, 2+ distal pulses.  Resp: normal respiratory effort without conversational dyspnea.   Psych: normal mood and affect.    Gait: antalgic, appropriate coordination and balance.    Neuro:        - Sensation to light touch:    - Intact throughout the BLE including all peripheral nerve distributions.        - MSR:      RLE  LLE  - Patella 2+ 2+  - Achilles 2+ 2+       - Special tests:   - Slump/SLR:  Neg     MSK - LE:       - Inspection:    - No significant swelling, erythema, warmth, ecchymosis, lesion.        - ROM:    - Full AROM/PROM with back pain with lumbar extension and facet loading       - Palpation:    - TTP at the lumbar paraspinals, medial joint line of the right knee.   - NTTP elsewhere.        - Strength:  (*antalgic)  RLE LLE  - Hip Flexion  5 5   - Hip Extension 5 5   - Hip Abduction 5 5   - Hip Adduction 5 5  - Knee Flexion  5 5  - Knee Extension 5 5  - Dorsiflexion  5 5  - Plantarflexion 5 5  - Ext. Jose Antonio. Longus 5 5  - Inversion  5 5  - Eversion  5 5       - Special tests:        - Lachman:  Neg        - A/P drawer:  Neg        - Pivot shift:  Neg    - Renetta:  Neg     - Varus stress:  Neg for laxity or pain     - Valgus stress:  Neg for laxity or pain    - Patellar grind:  Pos   - Facet loading: Pos      Radiology  I independently reviewed the available relevant imaging in the chart with my interpretations as above in HPI.       Procedure  Large Joint Injection/Arthocentesis: R knee joint    Date/Time:  4/24/2025 9:49 AM    Performed by: Roberto Taylor DO  Authorized by: Roberto Taylor DO    Indications:  Pain  Needle Size:  22 G  Guidance: landmark guided    Approach:  Anterolateral  Location:  Knee      Medications:  40 mg triamcinolone 40 MG/ML; 2 mL BUPivacaine (PF) 0.5 %; 2 mL lidocaine 1 %  Outcome:  Tolerated well, no immediate complications  Procedure discussed: discussed risks, benefits, and alternatives    Consent Given by:  Patient  Prep: patient was prepped and draped in usual sterile fashion         PROCEDURE  Intraarticular Knee Joint Injection  The patient was informed of the risks and benefits of the procedure and alternatives were discussed. A written consent was signed by the patient.   The injection site was prepped with chlorhexidine in sterile fashion.   An injectate solution containing 2 mL of 1% lidocaine, 2 mL of 0.5% bupivacaine, and 1 mL of Kenalog (40 mg/mL) was drawn up into a 5 mL syringe.  Injection was performed using sterile technique.  A 1.5-inch 22-gauge needle was used to enter the knee joint using a lateral infrapatellar approach and injectate was injected successfully. After the injection, the site was cleaned and a bandage applied. The patient tolerated the procedure well without complications.          Assessment  1. Primary osteoarthritis of right knee    2. Lumbar facet arthropathy    3. Spinal stenosis of lumbar region without neurogenic claudication        Plan  Ni Maya is a pleasant 84 year old female that presents with chronic low back pain and right knee pain.      She feels pain in the low back with lumbar extension and often has to lean on the counter or shopping cart to relieve her discomfort.  No significant radicular shooting pain, no numbness/tingling, no neurologic red flags.  Pain is reproduced in clinic with lumbar extension and facet loading. History and physical exam appear most consistent with lumbar spondylosis with facet arthropathy and possible  spinal stenosis.     She also feels generalized right knee pain that hurts worse with prolonged weightbearing.  Mild to moderate degenerative changes present on radiographs, which is consistent with her physical exam.  Most evidence of primary osteoarthritis of the right knee.    We discussed the nature of the condition and available treatment options, and mutually agreed upon the following plan:    - Imaging:          - Reviewed and independently interpreted the relevant imaging in the chart, including any imaging ordered for today's clinic.  - Reviewed results and images with patient.   - Medications:          - Discussed pharmacologic options for pain relief.   - May use NSAIDs (Ibuprofen, Naproxen) or Acetaminophen (Tylenol) as needed for pain control.   - Do not take these if previously advised to avoid them for other medical conditions.  - May also use topical medications such as lidocaine, IcyHot, BioFreeze, or Voltaren gel as needed for pain control.    - Voltaren gel is an anti-inflammatory cream that may be used up to 4 times per day over the painful area.   - Injections:          - Discussed possible injection options and alternatives.    - Injection options include: Intra-articular knee joint injection with corticosteroid, viscosupplementation, or PRP.  - Performed a corticosteroid injection of the right knee joint today in clinic. Patient tolerated the procedure well without complications.     - Post-procedure instructions:    - Keep the injection site clean and dry.   - Do not submerge the injection site for 24 hours (no baths, pools). Showers are ok.   - Rest the area for 24-48 hours before resuming normal activities. Avoid overexerting the area for the first few weeks.   - It may take 2-3 days to start noticing the effects of the injection and up to 3-4 weeks to feel significant benefits.   - Therapy:          - Discussed the benefits of therapy vs home exercise program for optimization of range of  motion, flexibility, strength, stability and function.   - Preference is for a home exercise program.   - Home Exercise Program for the Erma flexion-based back program given today in clinic and recommendation given to perform HEP daily and after exacerbations.  - Modalities:          - May use ice, heat, massage or other modalities as needed.   - Bracing:          - Discussed bracing options and may consider OTC knee compression sleeve or hinged knee brace for ambulation if needed.  - Options presented in clinic and choice given to take our brace from clinic or purchase an equivalent brace from an outside source.   - Surgery:          - Discussed non-operative and operative treatment options for the patient's condition.   - Goal is to continue conservative care for as long as possible before surgical intervention would need to be considered.  - Activity:          - Encouraged to remain active and participate in regular activities as symptoms allow.   Avoid or modify exacerbating activities as needed.  - Follow up:          - As needed for re-evaluation and update to treatment plan.  - May follow up sooner for new/worsening symptoms.  - May contact clinic by phone or MyChart for questions or concerns.       Roberto Taylor DO, CAQSM  [facility] Sports Medicine  [facility] - Department of Orthopedic Surgery       Disclaimer:  This note was prepared and written using Dragon Medical dictation software. As a result, there may be errors in the script that have gone undetected. Please consider this when interpreting the information in this note.        Again, thank you for allowing me to participate in the care of your patient.        Sincerely,        Roberto Taylor DO    Electronically signed

## 2025-04-24 NOTE — PATIENT INSTRUCTIONS
- May also use topical medications such as lidocaine, IcyHot, BioFreeze, or Voltaren gel as needed for pain control.   - May use NSAIDs (Ibuprofen, Naproxen) or Acetaminophen (Tylenol) as needed for pain control.   - Do not take these if previously advised to avoid them for other medical conditions.  - May use ice, heat, massage or other modalities as needed.   - Perform the Erma Flexion-Based Back Program to relieve pain in the lumbar spine    - Performed a corticosteroid injection of the right knee joint today in clinic. Patient tolerated the procedure well without complications.                           - Post-procedure instructions:    - Keep the injection site clean and dry.   - Do not submerge the injection site for 24 hours (no baths, pools). Showers are ok.   - Rest the area for 24-48 hours before resuming normal activities. Avoid overexerting the area for the first few weeks.   - It may take 2-3 days to start noticing the effects of the injection and up to 3-4 weeks to feel significant benefits.

## 2025-04-25 DIAGNOSIS — I10 ESSENTIAL HYPERTENSION WITH GOAL BLOOD PRESSURE LESS THAN 140/90: ICD-10-CM

## 2025-04-25 DIAGNOSIS — E11.65 TYPE 2 DIABETES MELLITUS WITH HYPERGLYCEMIA, WITHOUT LONG-TERM CURRENT USE OF INSULIN (H): ICD-10-CM

## 2025-04-25 DIAGNOSIS — N18.2 CHRONIC KIDNEY DISEASE, STAGE II (MILD): ICD-10-CM

## 2025-04-25 NOTE — TELEPHONE ENCOUNTER
Reason for Call:  Other prescription    Detailed comments: lisinopril (ZESTRIL) 40 MG tablet   Ni called her pharmacy for a refill last Sunday and they stated they still have not received the prescription as of yet.  Our record does not indicate that we received a refill request.    Please send the prescription for lisinopril (ZESTRIL) 40 MG tablet to  [facility] in [place]    Phone Number Patient can be reached at: Cell number on file:    Telephone Information:   Mobile [phone]       Best Time: anytime    Can we leave a detailed message on this number? YES    Call taken on 4/25/2025 at 11:03 AM by Clau Huston

## 2025-04-28 RX ORDER — LISINOPRIL 40 MG/1
40 TABLET ORAL DAILY
Qty: 90 TABLET | Refills: 3 | Status: SHIPPED | OUTPATIENT
Start: 2025-04-28

## 2025-05-01 ENCOUNTER — OFFICE VISIT (OUTPATIENT)
Dept: DERMATOLOGY | Facility: CLINIC | Age: 85
End: 2025-05-01
Payer: COMMERCIAL

## 2025-05-01 VITALS — OXYGEN SATURATION: 97 % | SYSTOLIC BLOOD PRESSURE: 129 MMHG | DIASTOLIC BLOOD PRESSURE: 76 MMHG | HEART RATE: 77 BPM

## 2025-05-01 DIAGNOSIS — C44.41: Primary | ICD-10-CM

## 2025-05-01 DIAGNOSIS — D48.9 NEOPLASM OF UNCERTAIN BEHAVIOR: ICD-10-CM

## 2025-05-01 ASSESSMENT — PAIN SCALES - GENERAL: PAINLEVEL_OUTOF10: MODERATE PAIN (5)

## 2025-05-01 NOTE — NURSING NOTE
The following medication was given:     MEDICATION:  Lidocaine with epinephrine 1% [id number]  ROUTE: SQ  SITE: see procedure note  DOSE: 12 mL  LOT #: [id number]  : [employer]  EXPIRATION DATE: 11/30/2025  NDC#: 0293-8139-52  Was there drug waste? no  Multi-dose vial: Yes    Vaseline and pressure dressing applied to Mohs site on left occipital scalp.  Wound care instructions reviewed with patient and AVS provided.  Patient verbalized understanding.  Post op appointment scheduled: Patient declined. Patient will follow up for suture removal: N/A  No further questions or concerns at this time.    Patient is scheduled for 6 month skin check with Nikky Webb on 10/27/2025.    Bre Morales CMA  May 1, 2025

## 2025-05-01 NOTE — NURSING NOTE
Ni Maya's chief complaint for this visit includes:  Chief Complaint   Patient presents with    Mohs     BCC left occipital scalp     PCP: Apryl Olvera    Referring Provider:  Nikky Webb PA-C  [address] [place] [address]    /76   Pulse 77   LMP  (LMP Unknown)   SpO2 97%   Moderate Pain (5)        Allergies   Allergen Reactions    Sulfa Antibiotics Rash         Do you need any medication refills at today's visit? Eliana Morales, CMA

## 2025-05-01 NOTE — PROGRESS NOTES
"[facility] [place] Procedure Note    Dermatology Problem List:  Last skin check: 04/02/2025     0. NUB, R superior lateral forehead, s/p bx 5/1/25  0. NUB, R supra brow, s/p bx 5/1/25  0. NUB, L perispinal upper back, s/p bx 5/1/25    1. \"Precancerous\" lesion removed from the vagina per patient report. ?MICKIE III (documented in chart)  -s/p removal, patient thought done in [place] but no surg path reports available   2. History of NMSC  - BCC, L occipital scalp, s/p bx 4/2/25, s/p Mohs 5/1/25  - BCC, vertex scalp, s/p MMS 3/17/2021  - BCC, vertex scalp anterior, s/p MMS 3/17/2021  - BCC, frontal scalp, s/p MMS 3/17/2021  - BCC, vertex scalp left, s/p MMS 3/17/2021  - BCC, right superior lateral forehead, s/p biopsy 8/13/19, s/p MMS with rhombic  3. 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 There was some brown pigment at edges of biopsy noted 8/4/2020.      Social History: Retired. Lives with . Has son with down syndrome that lives in [facility]. Daughter lives in [place].  Family History: Negative for skin cancer.  _________________________    Date of Service:  May 1, 2025  Surgery: Mohs micrographic surgery    Case 1  Repair Type: intermediate  Repair Size: 4.7 cm  Suture Material: 3-0 vicryl, 4-0 Monocryl, Fast Absorbing Gut 5-0  Tumor Type: BCC - Basal cell carcinoma  Location: left occipital scalp  Derm-Path Accession #: [id number]  PreOp Size: 0.8x0.7 cm  PostOp Size: 2.5x1.7 cm  Mohs Accession #: [id number]  Level of Defect: fascia      Procedure:  We discussed the principles of treatment and most likely complications including scarring, bleeding, infection, swelling, pain, crusting, nerve damage, large wound,  incomplete excision, wound dehiscence,  nerve damage, recurrence, and a second procedure may be recommended to obtain the best cosmetic or functional result.    Informed consent was obtained and the " patient underwent the procedure as follows:  The patient was placed right lateral decubitus on the operating table.  The cancer was identified, outlined with a marker, and verified by the patient.  The entire surgical field was prepped with ChoraPrep.  The surgical site was anesthetized using Lidocaine 1% with epi 1:100,000.      The area of clinically apparent tumor was debulked. The layer of tissue was then surgically excised using a #15 blade and was then transferred onto a specimen sheet maintaining the orientation of the specimen. Hemostasis was obtained using Other (comments) hyfrecation. The wound site was then covered with a dressing while the tissue samples were processed for examination.    The excised tissue was transported to the Mohs histology laboratory maintaining the tissue orientation.  The tissue specimen was relaxed so that the entire surgical margin was in a a single horizontal plane for sectioning and inked for precise mapping.  A precise reference map was drawn to reflect the sectioning of the specimen, colored inking of the margins, and orientation on the patient. The tissue was processed using horizontal sectioning of the base and continuous peripheral margins.  The histopathologic sections were reviewed in conjunction with the reference map.    Total blocks: 1    Total slides:  1    There were no cancer cells visualized on examination, therefore Mohs surgery was complete.    Mohs repair type: Reconstruction: Intermediate Linear Closure      The patient was taken to the operative suite and placed right lateral decubitus on the operating room table. The defect was identified. Appropriate markings were made with a marking pen to plan the repair. The area was infiltrated with Lidocaine 1% with epi 1:100,000 and prepped with ChoraPrep and draped with sterile towels.     The wound was debeveled and undermined widely. Cones were excised within relaxed skin tension lines on both sides of the defect.  Hemostasis was obtained using Other (comments). The deeper layers of subcutaneous and superficial (nonmuscle) fascia tissues were then approximated using vicryl 3-0;monocryl 4-0 buried vertical mattress sutures (deep layer suturing). The wound edges were then approximated; additional buried sutures were placed in a similar fashion where needed. Fast Absorbing Gut 5-0 simple running (superficial layer suturing) were carefully placed for maximum eversion and meticulous approximation.    Estimated blood loss was less than 10 ml for all surgical sites. The wound was cleansed with saline and ointment was applied along the wound surface. A sterile pressure dressing was applied and wound care instructions were given verbally and in writing. Patient was informed that additional refinement of the resulting surgical scar may be used as a second stage of this reconstruction. The patient was discharged from the [facility]atologic Surgery Center alert and ambulatory.    The patient elected for pathology results to automatically release and understands that the clinical staff will contact them as soon as possible to notify them of the results.    Repair Size: 4.7 cm  Sutures Used:  Deep: vicryl 3-0;monocryl 4-0 Superficial: Fast Absorbing Gut 5-0     Dr. Zach Hardin MD was physically present  for the entire procedure and always immediately available.     ___________________________________________    Assessment & Plan:    # NUB x2, right superior lateral forehead (ddx: BCC vs inflamed sebaceous hyperplasia vs AK), right supra brow (ddx: BCC vs inflamed sebaceous hyperplasia vs AK)   # Irritated seborrheic keratosis  - Left paraspinal upper back (ddx: irritated SK vs other)  - Shave biopsies for NUBs, Shave removal for ISK were performed today (see procedure notes below).      Additional Procedures Performed:     - Shave biopsy procedure note, location(s): right superior lateral forehead, right supra brow and left perispinal upper  back. After discussion of benefits and risks including but not limited to bleeding, infection, scar, incomplete removal, recurrence, and non-diagnostic biopsy, verbal consent and photographs were obtained. The area was cleaned with isopropyl alcohol. 0.5mL of 1% lidocaine with epinephrine was injected to obtain adequate anesthesia of lesion(s). Shave biopsy at site(s) performed. Hemostasis was achieved with aluminium chloride. Petrolatum ointment and a sterile dressing were applied. The patient tolerated the procedure and no complications were noted. The patient was provided with verbal and written post care instructions.     HPI:  Ms. iN Maya is a(n) 84 year old female who presents today as a return patient for Mohs surgery.  - Patient pointed out 3 spots on the: right superior lateral forehead, right supra brow and left perispinal upper back that she would like checked.    Physical Exam:  SKIN: Focused examination of right superior lateral forehead, right supra brow and left perispinal upper back was performed.  - right superior lateral forehead, there is pink pearly papule with arborizing vessels   - right supra brow, there is a pink peraly papule with arborizing vessels  - left perispinal upper back, there is large tan and brown waxy stuck on appearing plaque                Staff Involved:  Scribe/Staff     Scribe Disclosure:   I, Monet Ramirez, am serving as a scribe; to document services personally performed by Dr. Zach Hardin - -based on data collection and the provider's statements to me.     Provider Disclosure:   The documentation recorded by the scribe accurately reflects the services I personally performed and the decisions made by me. I personally performed the procedures today.    Zach Hardin DO    Department of Dermatology  [facility]: Phone: [phone], Fax:[phone]  [facility]  Clinical Surgery Center: Phone: [phone], Fax: [phone]    Care and Laboratory Testing Performed at:  [facility]   Dermatology Clinic  [address] [place] [address]

## 2025-05-01 NOTE — LETTER
"5/1/2025      Ni Maya  [address] [address] [address]      Dear Colleague,    Thank you for referring your patient, Ni Maya, to the [facility]. Please see a copy of my visit note below.    [facility] Roll Procedure Note    Dermatology Problem List:  Last skin check: 04/02/2025     0. NUB, R superior lateral forehead, s/p bx 5/1/25  0. NUB, R supra brow, s/p bx 5/1/25  0. NUB, L perispinal upper back, s/p bx 5/1/25    1. \"Precancerous\" lesion removed from the vagina per patient report. ?MICKIE III (documented in chart)  -s/p removal, patient thought done in [place] but no surg path reports available   2. History of NMSC  - BCC, L occipital scalp, s/p bx 4/2/25, s/p Mohs 5/1/25  - BCC, vertex scalp, s/p MMS 3/17/2021  - BCC, vertex scalp anterior, s/p MMS 3/17/2021  - BCC, frontal scalp, s/p MMS 3/17/2021  - BCC, vertex scalp left, s/p MMS 3/17/2021  - BCC, right superior lateral forehead, s/p biopsy 8/13/19, s/p MMS with rhombic  3. 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 There was some brown pigment at edges of biopsy noted 8/4/2020.      Social History: Retired. Lives with . Has son with down syndrome that lives in senior living. Daughter lives in [place].  Family History: Negative for skin cancer.  _________________________    Date of Service:  May 1, 2025  Surgery: Mohs micrographic surgery    Case 1  Repair Type: intermediate  Repair Size: 4.7 cm  Suture Material: 3-0 vicryl, 4-0 Monocryl, Fast Absorbing Gut 5-0  Tumor Type: BCC - Basal cell carcinoma  Location: left occipital scalp  Derm-Path Accession #: [id number]  PreOp Size: 0.8x0.7 cm  PostOp Size: 2.5x1.7 cm  Mohs Accession #: [id number]  Level of Defect: fascia      Procedure:  We discussed the principles of treatment and most likely complications including scarring, bleeding, infection, swelling, pain, " crusting, nerve damage, large wound,  incomplete excision, wound dehiscence,  nerve damage, recurrence, and a second procedure may be recommended to obtain the best cosmetic or functional result.    Informed consent was obtained and the patient underwent the procedure as follows:  The patient was placed right lateral decubitus on the operating table.  The cancer was identified, outlined with a marker, and verified by the patient.  The entire surgical field was prepped with ChoraPrep.  The surgical site was anesthetized using Lidocaine 1% with epi 1:100,000.      The area of clinically apparent tumor was debulked. The layer of tissue was then surgically excised using a #15 blade and was then transferred onto a specimen sheet maintaining the orientation of the specimen. Hemostasis was obtained using Other (comments) hyfrecation. The wound site was then covered with a dressing while the tissue samples were processed for examination.    The excised tissue was transported to the Mohs histology laboratory maintaining the tissue orientation.  The tissue specimen was relaxed so that the entire surgical margin was in a a single horizontal plane for sectioning and inked for precise mapping.  A precise reference map was drawn to reflect the sectioning of the specimen, colored inking of the margins, and orientation on the patient. The tissue was processed using horizontal sectioning of the base and continuous peripheral margins.  The histopathologic sections were reviewed in conjunction with the reference map.    Total blocks: 1    Total slides:  1    There were no cancer cells visualized on examination, therefore Mohs surgery was complete.    Mohs repair type: Reconstruction: Intermediate Linear Closure      The patient was taken to the operative suite and placed right lateral decubitus on the operating room table. The defect was identified. Appropriate markings were made with a marking pen to plan the repair. The area was  infiltrated with Lidocaine 1% with epi 1:100,000 and prepped with ChoraPrep and draped with sterile towels.     The wound was debeveled and undermined widely. Cones were excised within relaxed skin tension lines on both sides of the defect. Hemostasis was obtained using Other (comments). The deeper layers of subcutaneous and superficial (nonmuscle) fascia tissues were then approximated using vicryl 3-0;monocryl 4-0 buried vertical mattress sutures (deep layer suturing). The wound edges were then approximated; additional buried sutures were placed in a similar fashion where needed. Fast Absorbing Gut 5-0 simple running (superficial layer suturing) were carefully placed for maximum eversion and meticulous approximation.    Estimated blood loss was less than 10 ml for all surgical sites. The wound was cleansed with saline and ointment was applied along the wound surface. A sterile pressure dressing was applied and wound care instructions were given verbally and in writing. Patient was informed that additional refinement of the resulting surgical scar may be used as a second stage of this reconstruction. The patient was discharged from the [facility] alert and ambulatory.    The patient elected for pathology results to automatically release and understands that the clinical staff will contact them as soon as possible to notify them of the results.    Repair Size: 4.7 cm  Sutures Used:  Deep: vicryl 3-0;monocryl 4-0 Superficial: Fast Absorbing Gut 5-0     Dr. Zach Hardin MD was physically present  for the entire procedure and always immediately available.     ___________________________________________    Assessment & Plan:    # NUB x2, right superior lateral forehead (ddx: BCC vs inflamed sebaceous hyperplasia vs AK), right supra brow (ddx: BCC vs inflamed sebaceous hyperplasia vs AK)   # Irritated seborrheic keratosis  - Left paraspinal upper back (ddx: irritated SK vs other)  - Shave biopsies for  NUBs, Shave removal for ISK were performed today (see procedure notes below).      Additional Procedures Performed:     - Shave biopsy procedure note, location(s): right superior lateral forehead, right supra brow and left perispinal upper back. After discussion of benefits and risks including but not limited to bleeding, infection, scar, incomplete removal, recurrence, and non-diagnostic biopsy, verbal consent and photographs were obtained. The area was cleaned with isopropyl alcohol. 0.5mL of 1% lidocaine with epinephrine was injected to obtain adequate anesthesia of lesion(s). Shave biopsy at site(s) performed. Hemostasis was achieved with aluminium chloride. Petrolatum ointment and a sterile dressing were applied. The patient tolerated the procedure and no complications were noted. The patient was provided with verbal and written post care instructions.     HPI:  Ms. Ni Maya is a(n) 84 year old female who presents today as a return patient for Mohs surgery.  - Patient pointed out 3 spots on the: right superior lateral forehead, right supra brow and left perispinal upper back that she would like checked.    Physical Exam:  SKIN: Focused examination of right superior lateral forehead, right supra brow and left perispinal upper back was performed.  - right superior lateral forehead, there is pink pearly papule with arborizing vessels   - right supra brow, there is a pink peraly papule with arborizing vessels  - left perispinal upper back, there is large tan and brown waxy stuck on appearing plaque                Staff Involved:  Scribe/Staff     Scribe Disclosure:   I, Monet Ramirez, am serving as a scribe; to document services personally performed by Dr. Zach Hardin - -based on data collection and the provider's statements to me.     Provider Disclosure:   The documentation recorded by the scribe accurately reflects the services I personally performed and the decisions made by me. I personally performed  the procedures today.    Zach Hardin DO    Department of Dermatology  [facility]: Phone: [phone], Fax:[phone]  [facility]: Phone: [phone], Fax: [phone]    Care and Laboratory Testing Performed at:  [facility]   Dermatology Clinic  [address] [place] [address]      Again, thank you for allowing me to participate in the care of your patient.        Sincerely,        Zach Hardin MD    Electronically signed

## 2025-05-01 NOTE — PATIENT INSTRUCTIONS
Caring for your skin after surgery    For the first 48 hours after your surgery:  Leave the pressure dressing on and keep it dry. If it should come loose, you may re-tape it, but do not take it off.  Relax and take it easy.  Do not do any vigorous exercise, heavy lifting or bending forward. This could cause the wound to bleed.  If the wound is on your head, sleeping with your head elevated for the first few nights will help with swelling and bleeding. (Use linens/pillow cases that would be ok to get blood on in the event there is some oozing from the bandage).  Post-operative pain is usually mild.  You may alternate between 1000 mg of Tylenol (acetaminophen) and 400 mg of Ibuprofen every 4 hours.  This means, for example, that you could take the followin,000 mg of Tylenol, followed 4 hours later by 400 mg Ibuprofen, followed 4 hours later with 1,000 mg of Tylenol, and so forth.  Do not take more than 4,000 mg of acetaminophen in a 24-hour period or 3200 mg of Ibuprofen in a 24-hr period.    Avoid alcohol and vitamin E as these may increase your tendency to bleed.  Apply an ice pack for 20 min every 2-3 hours while awake.  This may help reduce swelling, bruising, and pain.  Make sure the ice pack is waterproof so that the pressure bandage doesn't get wet.  You may see a small amount of drainage or blood on your pressure bandage. This is normal. However:  If drainage or bleeding continues or saturates the bandage, you will need to apply firm pressure over the bandage with a clean washcloth for 15 minutes.    Remove the saturated bandage.  If bleeding has stopped, apply Vaseline over the suture line and cover with a non-stick bandage.  To add some pressure over the wound, fold a piece of gauze and tape over the area.  If bleeding continues after applying pressure for 15 minutes, apply an ice pack with gentle pressure to the bandaged area for another 15 minutes.  If bleeding still continues, call our office or go to  the nearest emergency room.    48 Hours After Surgery:  Remove the bandage.  If it seems sticky or too difficult to get off, you may need to soak it off in the shower.  Wash wound with a mild soap and water.  Use caution when washing the wound, be gentle and do not let the forceful shower stream hit the wound directly.  Pat dry.  Apply Vaseline or Aquaphor ointment (from a new container or tube) over the suture line with a Q-tip.  Cover the site with a bandage.  Do this daily until the sutures have dissolved.    What to expect:  The first couple of days your wound may be tender and may bleed slightly when doing wound care.  There may be swelling and bruising around the wound, especially if it is near the eyes. For your comfort, you may apply ice or cold compresses to the area.  The area around your wound may be numb for several weeks or even months.  You may experience periodic sharp pain or mild itching around the wound as it heals.    Call Us If:  You have bleeding that will not stop after applying pressure and ice.  You have pain that is not controlled with Tylenol and Ibuprofen.  You have signs or symptoms of an infection such as fever over 100 degrees Fahrenheit, redness, swelling, or warmth spreading from the wound, increasing pain after the first 48 hours, or white/yellow/green drainage from the wound (may or may not have a foul odor).    [facility], Dermatology [phone].  Available M- 8-5.  For urgent needs outside of business hours, call the [facility] at [phone] and ask to speak with/page the dermatology resident on call.       Wound Care After a Biopsy    What is a skin biopsy?  A skin biopsy allows the doctor to examine a very small piece of tissue under the microscope to determine the diagnosis and the best treatment for the skin condition. A local anesthetic (numbing medicine) is injected with a very small needle into the skin area to be tested. A small  piece of skin is taken from the area. Sometimes a suture (stitch) is used.     What are the risks of a skin biopsy?  I will experience scar, bleeding, swelling, pain, crusting and redness. I may experience incomplete removal or recurrence. Risks of this procedure are excessive bleeding, bruising, infection, nerve damage, numbness, thick (hypertrophic or keloidal) scar and non-diagnostic biopsy.    How should I care for my wound for the first 24 hours?  Keep the wound dry and covered for 24 hours  If it bleeds, hold direct pressure on the area for 15 minutes. If bleeding does not stop, call us or go to the emergency room  Avoid strenuous exercise the first 1-2 days or as your doctor instructs you    How should I care for the wound after 24 hours?  After 24 hours, remove the bandage  You may bathe or shower as normal  If you had a scalp biopsy, you can shampoo as usual and can use shower water to clean the biopsy site daily  Clean the wound once a day with gentle soap and water  Do not scrub, be gentle  Apply white petroleum/Vaseline after cleaning the wound with a cotton swab or a clean finger, and keep the site covered with a Bandaid /bandage. Bandages are not necessary with a scalp biopsy  If you are unable to cover the site with a Bandaid /bandage, re-apply ointment 2-3 times a day to keep the site moist. Moisture will help with healing  Avoid strenuous activity for first 1-2 days  Avoid lakes, rivers, pools, and oceans until the stitches are removed or the site is healed    How do I clean my wound?  Wash hands thoroughly with soap or use hand  before all wound care  Clean the wound with gentle soap and water  Apply white petroleum/Vaseline  to wound after it is clean  Replace the Bandaid /bandage to keep the wound covered for the first few days or as instructed by your doctor  If you had a scalp biopsy, warm shower water to the area on a daily basis should suffice    What should I use to clean my wound?    Cotton-tipped applicators (Qtips )  White petroleum jelly (Vaseline ). Use a clean new container and use Q-tips to apply.  Bandaids  as needed  Gentle soap     How should I care for my wound long term?  Do not get your wound dirty  Keep up with wound care for one week or until the area is healed.  A small scab will form and fall off by itself when the area is completely healed. The area will be red and will become pink in color as it heals. Sun protection is very important for how your scar will turn out. Sunscreen with an SPF 30 or greater is recommended once the area is healed.  You should have some soreness but it should be mild and slowly go away over several days. Talk to your doctor about using tylenol for pain,    When should I call my doctor?  If you have increased:   Pain or swelling  Pus or drainage (clear or slightly yellow drainage is ok)  Temperature over 100F  Spreading redness or warmth around wound    When will I hear about my results?  The biopsy results can take 2 weeks to come back.  Your results will automatically release to [employer] before your provider has even reviewed them.  The clinic will call you with the results, send you a [employer] message, or have you schedule a follow-up clinic or phone time to discuss the results.  Contact our clinics if you do not hear from us in 2 weeks.    Who should I call with questions?  [facility]: [phone]  [facility]: [phone]  For urgent needs outside of business hours call the [facility] at [phone] and ask for the dermatology resident on call

## 2025-05-04 RX ORDER — LIDOCAINE HYDROCHLORIDE 10 MG/ML
2 INJECTION, SOLUTION INFILTRATION; PERINEURAL
Status: COMPLETED | OUTPATIENT
Start: 2025-04-24 | End: 2025-04-24

## 2025-05-04 RX ORDER — BUPIVACAINE HYDROCHLORIDE 5 MG/ML
2 INJECTION, SOLUTION EPIDURAL; INTRACAUDAL; PERINEURAL
Status: COMPLETED | OUTPATIENT
Start: 2025-04-24 | End: 2025-04-24

## 2025-05-04 RX ORDER — TRIAMCINOLONE ACETONIDE 40 MG/ML
40 INJECTION, SUSPENSION INTRA-ARTICULAR; INTRAMUSCULAR
Status: COMPLETED | OUTPATIENT
Start: 2025-04-24 | End: 2025-04-24

## 2025-05-06 ENCOUNTER — TELEPHONE (OUTPATIENT)
Dept: DERMATOLOGY | Facility: CLINIC | Age: 85
End: 2025-05-06
Payer: COMMERCIAL

## 2025-05-06 NOTE — TELEPHONE ENCOUNTER
Excision/Mohs previsit information                                                    Diagnosis: basal cell carcinoma  Site(s): right suprabrow and right superior lateral forehead    Is the surgical site below the waist?  NO  If yes, instruct the patient to purchase over the counter chlorhexidine surgical soap and wash all skin below the belly button twice before surgery    Allergies   Allergen Reactions    Sulfa Antibiotics Rash       Review and update allergy and medication list    Do you take the following medications:  Coumadin, Eliquis, Pradaxa, Xarelto:  YES, on Eliquis.    Do you currently or have you previously had any of the following conditions:  Hepatitis:  NO  HIV/AIDS:  NO  Prolonged bleeding or bleeding disorder:  YES  Pacemaker: NO  Defibrillator:  NO  History of artificial or heart valve replacement:  NO  Endocarditis (inflammation of the inner lining of the heart's chambers and valves):  NO  Have you ever had a prosthetic joint infection:  NO  Pregnant or Breastfeeding:  N/A  Mobility device (wheelchair, transfer difficulty): NO    Important Reminders:                                                      -For Mohs, notify patient they may be here through the lunch hour.  Be prepared to have down time and we recommend they bring a book or something to do.  -Ok to take all of their medications as prescribed  -Notify patient to eat prior to appointment.  The medication is more likely to cause you to feel jittery if you have an empty stomach.  -If face is being treated, please come with a make-up free face  -Avoid wearing earrings and necklaces  -If scalp is being treated, please come with clean hair free from product  -Patient will not be able to get the site wet for 48 hrs  -No submerging wound in standing water (lake, pool, bathtub, hot tub) for 2 weeks  -No physical activity for 48 hrs (further restrictions will be discussed by MD at time of visit)    Lexis Griffiths RN     Otolaryngologist Procedure Text (D): After obtaining clear surgical margins the patient was sent to otolaryngology for surgical repair.  The patient understands they will receive post-surgical care and follow-up from the referring physician's office.

## 2025-05-06 NOTE — TELEPHONE ENCOUNTER
Results and plan reviewed with Ni.  Procedure scheduled for 6/11/25.  I reminded her to come with freshly washed hair free of any hairspray or other product.     I advised patient that they don't need to be fasting and they can take medications as scheduled.  I reviewed that they will have a pressure bandage on for 48 hrs following procedure and that we don't want this to get wet.  I reviewed that following the 48 hrs they will begin daily wound care for 2 weeks, but should not submerge the wound in standing water such as a bathtub, pool, hot tub, etc.     Patient verbalized understanding and agreed with the plan.     RICARDO Hinton MD  5/5/2025  5:36 PM CDT       Please call the patient with pathology results.     Both biopsies on her forehead were basal cell carcinoma. She's had several of these. My treatment recommendation is Mohs for both.     The biopsy on her upper back was a benign SK. As long as the wound is healing well, no additional surgery is necessary.  Thank you.

## 2025-05-08 ENCOUNTER — HOSPITAL ENCOUNTER (OUTPATIENT)
Dept: CARDIOLOGY | Facility: CLINIC | Age: 85
Discharge: HOME | End: 2025-05-08
Attending: PHYSICIAN ASSISTANT
Payer: COMMERCIAL

## 2025-05-08 ENCOUNTER — RESULTS FOLLOW-UP (OUTPATIENT)
Dept: CARDIOLOGY | Facility: CLINIC | Age: 85
End: 2025-05-08

## 2025-05-08 DIAGNOSIS — I48.20 CHRONIC ATRIAL FIBRILLATION (H): ICD-10-CM

## 2025-05-08 LAB — LVEF ECHO: NORMAL

## 2025-05-08 PROCEDURE — 93306 TTE W/DOPPLER COMPLETE: CPT

## 2025-05-12 ENCOUNTER — OFFICE VISIT (OUTPATIENT)
Dept: CARDIOLOGY | Facility: CLINIC | Age: 85
End: 2025-05-12
Payer: COMMERCIAL

## 2025-05-12 ENCOUNTER — TELEPHONE (OUTPATIENT)
Dept: CARDIOLOGY | Facility: CLINIC | Age: 85
End: 2025-05-12

## 2025-05-12 VITALS
BODY MASS INDEX: 28.82 KG/M2 | DIASTOLIC BLOOD PRESSURE: 80 MMHG | OXYGEN SATURATION: 97 % | WEIGHT: 173 LBS | HEART RATE: 71 BPM | HEIGHT: 65 IN | SYSTOLIC BLOOD PRESSURE: 136 MMHG | RESPIRATION RATE: 16 BRPM

## 2025-05-12 DIAGNOSIS — I48.20 CHRONIC ATRIAL FIBRILLATION (H): Primary | ICD-10-CM

## 2025-05-12 DIAGNOSIS — M48.061 SPINAL STENOSIS OF LUMBAR REGION WITHOUT NEUROGENIC CLAUDICATION: Primary | ICD-10-CM

## 2025-05-12 PROCEDURE — 3079F DIAST BP 80-89 MM HG: CPT | Performed by: INTERNAL MEDICINE

## 2025-05-12 PROCEDURE — 99214 OFFICE O/P EST MOD 30 MIN: CPT | Performed by: INTERNAL MEDICINE

## 2025-05-12 PROCEDURE — 3075F SYST BP GE 130 - 139MM HG: CPT | Performed by: INTERNAL MEDICINE

## 2025-05-12 PROCEDURE — G2211 COMPLEX E/M VISIT ADD ON: HCPCS | Performed by: INTERNAL MEDICINE

## 2025-05-12 PROCEDURE — 1125F AMNT PAIN NOTED PAIN PRSNT: CPT | Performed by: INTERNAL MEDICINE

## 2025-05-12 ASSESSMENT — PAIN SCALES - GENERAL: PAINLEVEL_OUTOF10: MODERATE PAIN (6)

## 2025-05-12 NOTE — TELEPHONE ENCOUNTER
Patient complaining of severe back pain. Lidocaine patches have not helped. Patient has seen Dr. Taylor in the past and now patient is wondering if she would need further imaging like an MRI or to be seen by Neurosurge[facility]. Will route to ortho team to review and get back to patient with further recommendations.

## 2025-05-12 NOTE — LETTER
5/12/2025    Apryl Olvera MD, MD  [address] [facility] [address]    RE: Ni Gardineron       Dear Colleague,     I had the pleasure of seeing Ni Maya in the [facility].  HPI and Plan:   Ms Maya is a very pleasant 84-year-old female with history of chronic atrial fibrillation, essentially asymptomatic from it, on rate control strategy with metoprolol and on Eliquis for stroke prophylaxis with CHADS2 Vascor of 7.  She had history of stroke (cerebellar stroke back on MRI in 2019).  She is coming today for routine follow-up.  She is accompanied by her .  Overall cardiac health wise she feels well.  She recently had an echocardiogram that showed normal LV function mild mitral regurgitation moderate to severe biatrial enlargement.  Overall similar to previous echocardiogram.  She denies any chest discomfort shortness of breath or any dizziness.  Her biggest issue is chronic low back pain.  She is tolerating apixaban quite well without any bleeding issues.  She is on metoprolol 50 mg twice daily and ventricular rates appear controlled.    Assessment and plan  Chronic atrial fibrillation on rate control strategy with CHADS2 Vascor of 7 on apixaban.  Prophylaxis.  Normal LV function.  Moderate to severe biatrial enlargement on recent echo.  Recommend continuing current strategy of rate control with metoprolol and Eliquis for stroke prophylaxis.  Hypertension controlled on current medications    Recommendations  Continue metoprolol, Eliquis  Follow-up in a year, sooner if she notes any change in clinical status.    Orders Placed This Encounter   Procedures     Follow-Up with Cardiology       No orders of the defined types were placed in this encounter.      There are no discontinued medications.      Encounter Diagnosis   Name Primary?     Chronic atrial fibrillation (H) Yes       CURRENT MEDICATIONS:  Current Outpatient Medications   Medication Sig Dispense Refill     alcohol swab  prep pads Use to swab area of injection/geoffrey as directed. 100 each 3     amLODIPine (NORVASC) 5 MG tablet Take 1 tablet (5 mg) by mouth daily. 90 tablet 3     apixaban ANTICOAGULANT (ELIQUIS) 5 MG tablet Take 1 tablet (5 mg) by mouth 2 times daily. 180 tablet 3     atorvastatin (LIPITOR) 40 MG tablet Take 1 tablet (40 mg) by mouth daily 90 tablet 3     blood glucose (RELION PRIME TEST) test strip 1 strip by In Vitro route 2 times daily Use to test blood sugar 3 times daily or as directed. 100 strip 1     blood glucose calibration (NO BRAND SPECIFIED) solution To accompany: Blood Glucose Monitor Brands: per insurance. 1 Bottle 3     blood glucose monitoring (NO BRAND SPECIFIED) meter device kit Use to test blood sugar 3 times daily or as directed. Preferred blood glucose meter OR supplies to accompany: Blood Glucose Monitor Brands: per insurance. 1 kit 0     Blood Glucose Monitoring Suppl W/DEVICE KIT 1 kit 3 times daily. 1 kit 0     co-enzyme Q-10 100 MG CAPS capsule Take 100 mg by mouth daily       fish oil-omega-3 fatty acids 1000 MG capsule Take 2 g by mouth daily. 2 caps per day       Lancets (E-Z JECT LANCET MICRO-THIN 33G) MISC        levothyroxine (SYNTHROID/LEVOTHROID) 88 MCG tablet Take 1 tablet (88 mcg) by mouth daily. 90 tablet 3     lisinopril (ZESTRIL) 40 MG tablet Take 1 tablet (40 mg) by mouth daily. 90 tablet 3     loratadine (CLARITIN) 10 MG tablet Take 1 tablet (10 mg) by mouth daily 90 tablet 1     metFORMIN (GLUCOPHAGE XR) 500 MG 24 hr tablet Take 1 tablet (500 mg) by mouth daily (with dinner). 90 tablet 1     metoprolol tartrate (LOPRESSOR) 50 MG tablet Take 1 tablet (50 mg) by mouth 2 times daily 180 tablet 3     thin (NO BRAND SPECIFIED) lancets Use with lanceting device. To accompany: Blood Glucose Monitor Brands: per insurance. 100 each 6     UNABLE TO FIND MEDICATION NAME: resvante supplement       cetirizine (ZYRTEC) 10 MG tablet Take 10 mg by mouth daily. (Patient not taking: Reported on  5/12/2025)         ALLERGIES     Allergies   Allergen Reactions     Sulfa Antibiotics Rash       PAST MEDICAL HISTORY:  Past Medical History:   Diagnosis Date     Allergic rhinitis, cause unspecified      Essential hypertension, benign      Infection of kidney, unspecified 1999     Other specified acquired hypothyroidism      Other specified types of cystitis(595.89)     1999,6-2-01, 10-10-01       PAST SURGICAL HISTORY:  Past Surgical History:   Procedure Laterality Date     BIOPSY VULVA/PERINEUM,ADDNL LESN  9/18/09    Wide -excision of MICKIE III- right labia      COLONOSCOPY  9/13/2013    Procedure: COLONOSCOPY;  Colonoscopy;  Surgeon: Yanick Ramsey MD;  Location: [facility]     HC BIOPSY OF BREAST, OPEN INCISIONAL  1960    Benign     HC ERCP W SPHINCTEROTOMY  1996 6/2/96 and 8/30/96     HC REDUCTION OF LARGE BREAST  1988     HC REMOVAL GALLBLADDER  1995     HC UGI ENDOSCOPY DIAG W OR W/O BRUSH/WASH  7-     INJECT EPIDURAL LUMBAR N/A 8/10/2017    Procedure: INJECT EPIDURAL LUMBAR;  Lumbar 2-3 Epidural Steroid Injection;  Surgeon: Kimani Garcia MD;  [facility]     INJECT EPIDURAL LUMBAR Right 5/10/2018    Procedure: INJECT EPIDURAL LUMBAR;  right lumbar 2 - lumbar 3 epidural steroid injection;  Surgeon: Kimani Garcia MD;  [facility]     ZZC LAPAROSCOPY, SURGICAL; W/ VAGINAL HYSTERECTOMY W/WO REMOVAL OVARY(S)/TUBES  1984    for bleeding     Z LIGATE FALLOPIAN TUBE,POSTPARTUM  1978    Tubal Ligation     ZZ ANGIOGRAPHY ARCH  4-3-98     ZZ COLONOSCOPY THRU STOMA, DIAGNOSTIC  4-24-98    Diverticuli - due in 2008       FAMILY HISTORY:  Family History   Problem Relation Age of Onset     Cardiovascular Father         Aortic aneurysm     Hypertension Father      Hypertension Mother      Gastrointestinal Disease Mother         GI Bleed     Lipids Sister      Hypertension Sister      Genitourinary Problems Sister      Allergies Sister      Cancer Brother         Lung     Alcohol/Drug Brother       Connective Tissue Disorder Son         Down Syndrome     Respiratory Son         Pneumonia     Cancer Maternal Grandmother         Stomach     Hypertension Maternal Grandfather      Allergies Daughter        SOCIAL HISTORY:  Social History     Socioeconomic History     Marital status:      Spouse name: Marco Antonio     Number of children: 2     Years of education: 17     Highest education level: None   Occupational History     Occupation: Retired in 1994     Employer: RETIRED   Tobacco Use     Smoking status: Never     Smokeless tobacco: Never   Vaping Use     Vaping status: Never Used   Substance and Sexual Activity     Alcohol use: No     Drug use: No     Sexual activity: Not Currently     Partners: Male     Birth control/protection: Female Surgical   Other Topics Concern     Parent/sibling w/ CABG, MI or angioplasty before 65F 55M? No     Social Drivers of Health     Financial Resource Strain: Low Risk  (4/3/2024)    Financial Resource Strain      Within the past 12 months, have you or your family members you live with been unable to get utilities (heat, electricity) when it was really needed?: No   Food Insecurity: Low Risk  (4/3/2024)    Food Insecurity      Within the past 12 months, did you worry that your food would run out before you got money to buy more?: No      Within the past 12 months, did the food you bought just not last and you didn t have money to get more?: No   Transportation Needs: Low Risk  (4/3/2024)    Transportation Needs      Within the past 12 months, has lack of transportation kept you from medical appointments, getting your medicines, non-medical meetings or appointments, work, or from getting things that you need?: No   Physical Activity: Inactive (4/3/2024)    Exercise Vital Sign      Days of Exercise per Week: 0 days      Minutes of Exercise per Session: 0 min   Stress: No Stress Concern Present (4/3/2024)    [employer] [employer] - Occupational Stress  "Questionnaire      Feeling of Stress : Not at all   Social Connections: Unknown (4/3/2024)    Social Connection and Isolation Panel [NHANES]      Frequency of Social Gatherings with Friends and Family: Once a week   Interpersonal Safety: Low Risk  (1/20/2025)    Interpersonal Safety      Do you feel physically and emotionally safe where you currently live?: Yes      Within the past 12 months, have you been hit, slapped, kicked or otherwise physically hurt by someone?: No      Within the past 12 months, have you been humiliated or emotionally abused in other ways by your partner or ex-partner?: No   Housing Stability: Low Risk  (4/3/2024)    Housing Stability      Do you have housing? : Yes      Are you worried about losing your housing?: No       Review of Systems:  Skin:          Eyes:         ENT:         Respiratory:  Negative shortness of breath, cough, wheezing     Cardiovascular:  Negative, palpitations, chest pain, edema, dizziness, syncope or near-syncope   Gastroenterology:        Genitourinary:         Musculoskeletal:         Neurologic:  Negative numbness or tingling of hands, numbness or tingling of feet    Psychiatric:         Heme/Lymph/Imm:         Endocrine:           Physical Exam:  Vitals: /80 (BP Location: Left arm, Patient Position: Sitting, Cuff Size: Adult Regular)   Pulse 71   Resp 16   Ht 1.658 m (5' 5.28\")   Wt 78.5 kg (173 lb)   LMP  (LMP Unknown)   SpO2 97%   BMI 28.54 kg/m    General Patient appears comfortable  Neck normal JVP  Cardiovascular system irregular, normal rate, no significant murmur rub or gallop  Respiratory system clear to auscultation  Extremities no edema      CC  Apryl Olvera MD  [address] [place] [address]                      Thank you for allowing me to participate in the care of your patient.      Sincerely,     Sohan Deleon MD     [facility]  cc:   Apryl Olvera MD  [address] [place] [address]" [place] [address]

## 2025-05-12 NOTE — PROGRESS NOTES
HPI and Plan:   Ms Maya is a very pleasant 84-year-old female with history of chronic atrial fibrillation, essentially asymptomatic from it, on rate control strategy with metoprolol and on Eliquis for stroke prophylaxis with CHADS2 Vascor of 7.  She had history of stroke (cerebellar stroke back on MRI in 2019).  She is coming today for routine follow-up.  She is accompanied by her .  Overall cardiac health wise she feels well.  She recently had an echocardiogram that showed normal LV function mild mitral regurgitation moderate to severe biatrial enlargement.  Overall similar to previous echocardiogram.  She denies any chest discomfort shortness of breath or any dizziness.  Her biggest issue is chronic low back pain.  She is tolerating apixaban quite well without any bleeding issues.  She is on metoprolol 50 mg twice daily and ventricular rates appear controlled.    Assessment and plan  Chronic atrial fibrillation on rate control strategy with CHADS2 Vascor of 7 on apixaban.  Prophylaxis.  Normal LV function.  Moderate to severe biatrial enlargement on recent echo.  Recommend continuing current strategy of rate control with metoprolol and Eliquis for stroke prophylaxis.  Hypertension controlled on current medications    Recommendations  Continue metoprolol, Eliquis  Follow-up in a year, sooner if she notes any change in clinical status.    Orders Placed This Encounter   Procedures    Follow-Up with Cardiology       No orders of the defined types were placed in this encounter.      There are no discontinued medications.      Encounter Diagnosis   Name Primary?    Chronic atrial fibrillation (H) Yes       CURRENT MEDICATIONS:  Current Outpatient Medications   Medication Sig Dispense Refill    alcohol swab prep pads Use to swab area of injection/geoffrey as directed. 100 each 3    amLODIPine (NORVASC) 5 MG tablet Take 1 tablet (5 mg) by mouth daily. 90 tablet 3    apixaban ANTICOAGULANT (ELIQUIS) 5 MG tablet Take  1 tablet (5 mg) by mouth 2 times daily. 180 tablet 3    atorvastatin (LIPITOR) 40 MG tablet Take 1 tablet (40 mg) by mouth daily 90 tablet 3    blood glucose (RELION PRIME TEST) test strip 1 strip by In Vitro route 2 times daily Use to test blood sugar 3 times daily or as directed. 100 strip 1    blood glucose calibration (NO BRAND SPECIFIED) solution To accompany: Blood Glucose Monitor Brands: per insurance. 1 Bottle 3    blood glucose monitoring (NO BRAND SPECIFIED) meter device kit Use to test blood sugar 3 times daily or as directed. Preferred blood glucose meter OR supplies to accompany: Blood Glucose Monitor Brands: per insurance. 1 kit 0    Blood Glucose Monitoring Suppl W/DEVICE KIT 1 kit 3 times daily. 1 kit 0    co-enzyme Q-10 100 MG CAPS capsule Take 100 mg by mouth daily      fish oil-omega-3 fatty acids 1000 MG capsule Take 2 g by mouth daily. 2 caps per day      Lancets (E-Copperfasten JECT LANCET MICRO-THIN 33G) MISC       levothyroxine (SYNTHROID/LEVOTHROID) 88 MCG tablet Take 1 tablet (88 mcg) by mouth daily. 90 tablet 3    lisinopril (ZESTRIL) 40 MG tablet Take 1 tablet (40 mg) by mouth daily. 90 tablet 3    loratadine (CLARITIN) 10 MG tablet Take 1 tablet (10 mg) by mouth daily 90 tablet 1    metFORMIN (GLUCOPHAGE XR) 500 MG 24 hr tablet Take 1 tablet (500 mg) by mouth daily (with dinner). 90 tablet 1    metoprolol tartrate (LOPRESSOR) 50 MG tablet Take 1 tablet (50 mg) by mouth 2 times daily 180 tablet 3    thin (NO BRAND SPECIFIED) lancets Use with lanceting device. To accompany: Blood Glucose Monitor Brands: per insurance. 100 each 6    UNABLE TO FIND MEDICATION NAME: resvante supplement      cetirizine (ZYRTEC) 10 MG tablet Take 10 mg by mouth daily. (Patient not taking: Reported on 5/12/2025)         ALLERGIES     Allergies   Allergen Reactions    Sulfa Antibiotics Rash       PAST MEDICAL HISTORY:  Past Medical History:   Diagnosis Date    Allergic rhinitis, cause unspecified     Essential hypertension,  benign     Infection of kidney, unspecified 1999    Other specified acquired hypothyroidism     Other specified types of cystitis(595.89)     1999,6-2-01, 10-10-01       PAST SURGICAL HISTORY:  Past Surgical History:   Procedure Laterality Date    BIOPSY VULVA/PERINEUM,ADDNL LESN  9/18/09    Wide -excision of MICKIE III- right labia     COLONOSCOPY  9/13/2013    Procedure: COLONOSCOPY;  Colonoscopy;  Surgeon: Yanick Ramsey MD;  [facility]: [facility]    HC BIOPSY OF BREAST, OPEN INCISIONAL  1960    Benign    HC ERCP W SPHINCTEROTOMY  1996 6/2/96 and 8/30/96    HC REDUCTION OF LARGE BREAST  1988    HC REMOVAL GALLBLADDER  1995    HC UGI ENDOSCOPY DIAG W OR W/O BRUSH/WASH  7-    INJECT EPIDURAL LUMBAR N/A 8/10/2017    Procedure: INJECT EPIDURAL LUMBAR;  Lumbar 2-3 Epidural Steroid Injection;  Surgeon: Kimani Garcia MD;  [facility]    INJECT EPIDURAL LUMBAR Right 5/10/2018    Procedure: INJECT EPIDURAL LUMBAR;  right lumbar 2 - lumbar 3 epidural steroid injection;  Surgeon: Kimani Garcia MD;  [facility]    ZZC LAPAROSCOPY, SURGICAL; W/ VAGINAL HYSTERECTOMY W/WO REMOVAL OVARY(S)/TUBES  1984    for bleeding    ZZC LIGATE FALLOPIAN TUBE,POSTPARTUM  1978    Tubal Ligation    ZZ ANGIOGRAPHY ARCH  4-3-98    ZZHC COLONOSCOPY THRU STOMA, DIAGNOSTIC  4-24-98    Diverticuli - due in 2008       FAMILY HISTORY:  Family History   Problem Relation Age of Onset    Cardiovascular Father         Aortic aneurysm    Hypertension Father     Hypertension Mother     Gastrointestinal Disease Mother         GI Bleed    Lipids Sister     Hypertension Sister     Genitourinary Problems Sister     Allergies Sister     Cancer Brother         Lung    Alcohol/Drug Brother     Connective Tissue Disorder Son         Down Syndrome    Respiratory Son         Pneumonia    Cancer Maternal Grandmother         Stomach    Hypertension Maternal Grandfather     Allergies Daughter        SOCIAL HISTORY:  Social History     Socioeconomic  History    Marital status:      Spouse name: Marco Antonio    Number of children: 2    Years of education: 17    Highest education level: None   Occupational History    Occupation: Retired in 1994     Employer: RETIRED   Tobacco Use    Smoking status: Never    Smokeless tobacco: Never   Vaping Use    Vaping status: Never Used   Substance and Sexual Activity    Alcohol use: No    Drug use: No    Sexual activity: Not Currently     Partners: Male     Birth control/protection: Female Surgical   Other Topics Concern    Parent/sibling w/ CABG, MI or angioplasty before 65F 55M? No     Social Drivers of Health     Financial Resource Strain: Low Risk  (4/3/2024)    Financial Resource Strain     Within the past 12 months, have you or your family members you live with been unable to get utilities (heat, electricity) when it was really needed?: No   Food Insecurity: Low Risk  (4/3/2024)    Food Insecurity     Within the past 12 months, did you worry that your food would run out before you got money to buy more?: No     Within the past 12 months, did the food you bought just not last and you didn t have money to get more?: No   Transportation Needs: Low Risk  (4/3/2024)    Transportation Needs     Within the past 12 months, has lack of transportation kept you from medical appointments, getting your medicines, non-medical meetings or appointments, work, or from getting things that you need?: No   Physical Activity: Inactive (4/3/2024)    Exercise Vital Sign     Days of Exercise per Week: 0 days     Minutes of Exercise per Session: 0 min   Stress: No Stress Concern Present (4/3/2024)    [place] citizen [employer] - Occupational Stress Questionnaire     Feeling of Stress : Not at all   Social Connections: Unknown (4/3/2024)    Social Connection and Isolation Panel [NHANES]     Frequency of Social Gatherings with Friends and Family: Once a week   Interpersonal Safety: Low Risk  (1/20/2025)    Interpersonal Safety      "Do you feel physically and emotionally safe where you currently live?: Yes     Within the past 12 months, have you been hit, slapped, kicked or otherwise physically hurt by someone?: No     Within the past 12 months, have you been humiliated or emotionally abused in other ways by your partner or ex-partner?: No   Housing Stability: Low Risk  (4/3/2024)    Housing Stability     Do you have housing? : Yes     Are you worried about losing your housing?: No       Review of Systems:  Skin:          Eyes:         ENT:         Respiratory:  Negative shortness of breath, cough, wheezing     Cardiovascular:  Negative, palpitations, chest pain, edema, dizziness, syncope or near-syncope   Gastroenterology:        Genitourinary:         Musculoskeletal:         Neurologic:  Negative numbness or tingling of hands, numbness or tingling of feet    Psychiatric:         Heme/Lymph/Imm:         Endocrine:           Physical Exam:  Vitals: /80 (BP Location: Left arm, Patient Position: Sitting, Cuff Size: Adult Regular)   Pulse 71   Resp 16   Ht 1.658 m (5' 5.28\")   Wt 78.5 kg (173 lb)   LMP  (LMP Unknown)   SpO2 97%   BMI 28.54 kg/m    General Patient appears comfortable  Neck normal JVP  Cardiovascular system irregular, normal rate, no significant murmur rub or gallop  Respiratory system clear to auscultation  Extremities no edema      CC  Apryl Olvera MD  [address] [place] [id number]                    "

## 2025-05-13 RX ORDER — METHYLPREDNISOLONE 4 MG/1
TABLET ORAL
Qty: 21 TABLET | Refills: 0 | Status: SHIPPED | OUTPATIENT
Start: 2025-05-13

## 2025-05-13 NOTE — TELEPHONE ENCOUNTER
Flexion based Erma program given in clinic. Please advise on next steps as HEP is not relieving her pain. Rajwinder Landin, ATC

## 2025-05-14 NOTE — TELEPHONE ENCOUNTER
Spoke with patient. She notes that her pain has improved with lidocaine patches. If pain gets worse again, she is now aware that a medrol dosepak and MRI orders have been placed for her if needed. Rajwinder Landin ATC

## 2025-05-14 NOTE — TELEPHONE ENCOUNTER
Rx for Medrol Dosepak has been prescribed and sent to her pharmacy on file, to treat her acute symptoms.  I also placed an order for an MRI lumbar spine.  Plan for follow-up after results are available to discuss the results and additional treatment options.    Roberto Taylor DO, CAQSM  [facility]  [facility] - Department of Orthopedic Surgery     __________________      MRI Scheduling Instructions  Please follow both steps below    1.  Advanced imaging is done by appointment. Please call [facility] ([facility] [phone] to schedule your MRI at your earliest convenience.   - Some insurance companies may require a prior authorization to be completed which can delay the time until you are able to schedule your appointment.     - If you are active on [employer], you may have access to your test results before your provider is able to review the study and advise on next steps.      2. After the date of your MRI has been scheduled, please call [phone] to get on my schedule for an in-person or telephone follow up appointment to discuss the results and updated treatment recommendations. This follow up should be scheduled for 1-2 days after the date of your MRI.

## 2025-05-27 DIAGNOSIS — I48.20 CHRONIC ATRIAL FIBRILLATION (H): ICD-10-CM

## 2025-05-27 RX ORDER — METOPROLOL TARTRATE 50 MG
50 TABLET ORAL 2 TIMES DAILY
Qty: 180 TABLET | Refills: 3 | Status: SHIPPED | OUTPATIENT
Start: 2025-05-27

## 2025-06-11 ENCOUNTER — OFFICE VISIT (OUTPATIENT)
Dept: DERMATOLOGY | Facility: CLINIC | Age: 85
End: 2025-06-11
Payer: COMMERCIAL

## 2025-06-11 VITALS — DIASTOLIC BLOOD PRESSURE: 78 MMHG | SYSTOLIC BLOOD PRESSURE: 139 MMHG | OXYGEN SATURATION: 96 % | HEART RATE: 77 BPM

## 2025-06-11 DIAGNOSIS — C44.310 BCC (BASAL CELL CARCINOMA), FACE: ICD-10-CM

## 2025-06-11 DIAGNOSIS — C44.319 BASAL CELL CARCINOMA (BCC) OF RIGHT FOREHEAD: Primary | ICD-10-CM

## 2025-06-11 NOTE — PATIENT INSTRUCTIONS
Caring for your skin after surgery    For the first 48 hours after your surgery:  Leave the pressure dressing on and keep it dry. If it should come loose, you may re-tape it, but do not take it off.  Relax and take it easy.  Do not do any vigorous exercise, heavy lifting or bending forward. This could cause the wound to bleed.  If the wound is on your head, sleeping with your head elevated for the first few nights will help with swelling and bleeding. (Use linens/pillow cases that would be ok to get blood on in the event there is some oozing from the bandage).  Post-operative pain is usually mild.  You may alternate between 1000 mg of Tylenol (acetaminophen) and 400 mg of Ibuprofen every 4 hours.  This means, for example, that you could take the followin,000 mg of Tylenol, followed 4 hours later by 400 mg Ibuprofen, followed 4 hours later with 1,000 mg of Tylenol, and so forth.  Do not take more than 4,000 mg of acetaminophen in a 24-hour period or 3200 mg of Ibuprofen in a 24-hr period.    Avoid alcohol and vitamin E as these may increase your tendency to bleed.  Apply an ice pack for 20 min every 2-3 hours while awake.  This may help reduce swelling, bruising, and pain.  Make sure the ice pack is waterproof so that the pressure bandage doesn't get wet.  You may see a small amount of drainage or blood on your pressure bandage. This is normal. However:  If drainage or bleeding continues or saturates the bandage, you will need to apply firm pressure over the bandage with a clean washcloth for 15 minutes.    Remove the saturated bandage.  If bleeding has stopped, apply Vaseline over the suture line and cover with a non-stick bandage.  To add some pressure over the wound, fold a piece of gauze and tape over the area.  If bleeding continues after applying pressure for 15 minutes, apply an ice pack with gentle pressure to the bandaged area for another 15 minutes.  If bleeding still continues, call our office or go to  the nearest emergency room.    48 Hours After Surgery:  Remove the bandage.  If it seems sticky or too difficult to get off, you may need to soak it off in the shower.  Wash wound with a mild soap and water.  Use caution when washing the wound, be gentle and do not let the forceful shower stream hit the wound directly.  Pat dry.  Apply Vaseline or Aquaphor ointment (from a new container or tube) over the suture line with a Q-tip.  Cover the site with a bandage.  Do this daily until the sutures have been removed or dissolved.    What to expect:  The first couple of days your wound may be tender and may bleed slightly when doing wound care.  There may be swelling and bruising around the wound, especially if it is near the eyes. For your comfort, you may apply ice or cold compresses to the area.  The area around your wound may be numb for several weeks or even months.  You may experience periodic sharp pain or mild itching around the wound as it heals.    Call Us If:  You have bleeding that will not stop after applying pressure and ice.  You have pain that is not controlled with Tylenol and Ibuprofen.  You have signs or symptoms of an infection such as fever over 100 degrees Fahrenheit, redness, swelling, or warmth spreading from the wound, increasing pain after the first 48 hours, or white/yellow/green drainage from the wound (may or may not have a foul odor).    [facility], Dermatology [phone].  Available M- 8-5.  For urgent needs outside of business hours, call the [facility] at [phone] and ask to speak with/page the dermatology resident on call.

## 2025-06-11 NOTE — PROGRESS NOTES
"[facility] Procedure Note    Dermatology Problem List:  Last skin check: 04/02/2025     1. \"Precancerous\" lesion removed from the vagina per patient report. ?MICKIE III (documented in chart)  -s/p removal, patient thought done in [place] but no surg path reports available   2. History of NMSC  - BCC, R superior lateral forehead, s/p MMS 6/11/25  - BCC, R supra brow, s/p MMS 6/11/25  - BCC, L occipital scalp, s/p Mohs 5/1/25  - BCC, vertex scalp, s/p MMS 3/17/2021  - BCC, vertex scalp anterior, s/p MMS 3/17/2021  - BCC, frontal scalp, s/p MMS 3/17/2021  - BCC, vertex scalp left, s/p MMS 3/17/2021  - BCC, right superior lateral forehead, s/p biopsy 8/13/19, s/p MMS with rhombic  3. 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 There was some brown pigment at edges of biopsy noted 8/4/2020.   - Left perispinal upper back, s/p bx 5/1/25     Social History: Retired. Lives with . Has son with down syndrome that lives in [facility]. Daughter lives in [place].  Family History: Negative for skin cancer.  __________________________________________    Date of Service:  Jun 11, 2025  Surgery: Mohs micrographic surgery    Case 1  Repair Type: intermediate  Repair Size: 6.0 cm  Suture Material: 4-0 Monocryl, Fast Absorbing Gut 5-0  Tumor Type: BCC - Basal cell carcinoma  Location: R superior lateral forehead  Derm-Path Accession #: [id number]  PreOp Size: 1.1 x 1.2 cm  PostOp Size: 2.8 x 2.0 cm  Mohs Accession #: [id number]  Level of Defect: fascia      Procedure:  We discussed the principles of treatment and most likely complications including scarring, bleeding, infection, swelling, pain, crusting, nerve damage, large wound,  incomplete excision, wound dehiscence,  nerve damage, recurrence, and a second procedure may be recommended to obtain the best cosmetic or functional result.    Informed consent was obtained and the " patient underwent the procedure as follows:  The patient was placed supine on the operating table.  The cancer was identified, outlined with a marker, and verified by the patient.  The entire surgical field was prepped with ChoraPrep.  The surgical site was anesthetized using Lidocaine 1% with epi 1:100,000.      The area of clinically apparent tumor was debulked. The layer of tissue was then surgically excised using a #15 blade and was then transferred onto a specimen sheet maintaining the orientation of the specimen. Hemostasis was obtained using bipolar electrocoagulation. The wound site was then covered with a dressing while the tissue samples were processed for examination.    The excised tissue was transported to the Mohs histology laboratory maintaining the tissue orientation.  The tissue specimen was relaxed so that the entire surgical margin was in a a single horizontal plane for sectioning and inked for precise mapping.  A precise reference map was drawn to reflect the sectioning of the specimen, colored inking of the margins, and orientation on the patient.  The tissue was processed using horizontal sectioning of the base and continuous peripheral margins.  The histopathologic sections were reviewed in conjunction with the reference map.    Total blocks: 1    Total slides:  1    Residual tumor was identified and indicated in red on the reference map, identifying the location where further tissue excision was necessary. Therefore, an additional stage of Mohs Micrographic surgery was deemed necessary.     Stage II   The patient was returned to the operating room, and the area prepped in the usual manner. The residual tumor was excised using the reference map as a guide. The specimen was transfered to a labeled specimen sheet maintaining the orientation of the specimen. Hemostasis was obtained and the wound site was covered with a dressing while the tissue was processed for examination.     The excised  tissue was transported to the Mohs histology laboratory maintaining orientation. The specimen margins were inked for precise mapping and a reference map was prepared for the is additional stage to maintain precise orientation as described above. The tissue was processed using horizontal sectioning of the base and continuous peripheral margins. The histopathologic sections were reviewed in conjunction with the reference map.     Total blocks: 1    Total slides:  1    There were no cancer cells visualized on examination, therefore Mohs surgery was complete.     Reconstruction: Intermediate Linear Closure      The patient was taken to the operative suite and placed supine on the operating room table. The defect was identified. Appropriate markings were made with a marking pen to plan the repair. The area was infiltrated with Lidocaine 1% with epi 1:100,000 and prepped with ChoraPrep and draped with sterile towels.     The wound was debeveled and undermined widely. Cones were excised within relaxed skin tension lines on both sides of the defect. Hemostasis was obtained using bipolar electrocoagulation. The deeper layers of subcutaneous and superficial (nonmuscle) fascia tissues were then approximated using monocryl 4-0 buried vertical mattress sutures (deep layer suturing). The wound edges were then approximated; additional buried sutures were placed in a similar fashion where needed. Fast Absorbing Gut 5-0 simple running (superficial layer suturing) were carefully placed for maximum eversion and meticulous approximation.    Estimated blood loss was less than 10 ml for all surgical sites. The wound was cleansed with saline and ointment was applied along the wound surface. A sterile pressure dressing was applied and wound care instructions were given verbally and in writing. Patient was informed that additional refinement of the resulting surgical scar may be used as a second stage of this reconstruction. The patient was  discharged from the [facility] alert and ambulatory.    Repair Size: 6.0 cm  Sutures Used:  Deep: monocryl 4-0 Superficial: Fast Absorbing Gut 5-0     Dr. Zach Hardin MD was physically present  for the entire procedure, key portions of the reconstruction and always immediately available.       Date of Service:  Jun 11, 2025  Surgery: Mohs micrographic surgery    Case 2  Repair Type: advancement flap  Repair Size: 1.8 x 1.8 cm  Suture Material: 4-0/5-0 Monocryl, Fast Absorbing Gut 5-0  Tumor Type: BCC - Basal cell carcinoma  Location: R suprabrow  Derm-Path Accession #: [id number]  PreOp Size: 0.5 x 0.5 cm  PostOp Size: 1.4 x 1.2 cm  Mohs Accession #: [id number]  Level of Defect: fascia      Procedure:  We discussed the principles of treatment and most likely complications including scarring, bleeding, infection, swelling, pain, crusting, nerve damage, large wound,  incomplete excision, wound dehiscence,  nerve damage, recurrence, and a second procedure may be recommended to obtain the best cosmetic or functional result.    Informed consent was obtained and the patient underwent the procedure as follows:  The patient was placed supine on the operating table.  The cancer was identified, outlined with a marker, and verified by the patient.  The entire surgical field was prepped with ChoraPrep.  The surgical site was anesthetized using Lidocaine 1% with epi 1:100,000.      The area of clinically apparent tumor was debulked. The layer of tissue was then surgically excised using a #15 blade and was then transferred onto a specimen sheet maintaining the orientation of the specimen. Hemostasis was obtained using bipolar electrocoagulation. The wound site was then covered with a dressing while the tissue samples were processed for examination.    The excised tissue was transported to the Mohs histology laboratory maintaining the tissue orientation.  The tissue specimen was relaxed so that the entire  surgical margin was in a a single horizontal plane for sectioning and inked for precise mapping.  A precise reference map was drawn to reflect the sectioning of the specimen, colored inking of the margins, and orientation on the patient.  The tissue was processed using horizontal sectioning of the base and continuous peripheral margins.  The histopathologic sections were reviewed in conjunction with the reference map.    Total blocks: 1    Total slides:  2    There were no cancer cells visualized on examination, therefore Mohs surgery was complete.     Reconstruction:  Advancement Flap    PROCEDURE:  The wound was de-beveled and undermined broadly in all directions to the level of fat. Hemostasis was obtained using bipolar electrocoagulation. The advancement flap was incised to the level of fat removing a cone of redundant tissue from the superior aspect of the primary defect. The flap was further undermined in all directions.    Hemostasis was again obtained. The flap was then advanced into (carried over) the defect and secured using buried dermal sutures. Redundant areas of tissue were sewn out using the rule of halves. The flap wound edges of both the primary and secondary defects were then approximated with buried dermal monocryl 4-0/ 5-0 buried vertical mattress sutures and the epidermis was then carefully approximated using Fast Absorbing Gut 5-0 simple running sutures. The wound was cleansed with saline, and ointment was applied along the wound surface. A sterile pressure dressing of non-adherent gauze was applied and wound care instructions were given verbally and in writing. The patient left the operating suite in stable condition. Anticipate Derma-Abrasion to be used as a second stage of this reconstruction.     Repair Size: 1.8 x 1.8 cm  Sutures Used:  DEEP: monocryl 4-0/5-0 SUPERFICIAL: Fast Absorbing Gut 5-0      Dr. Zach Hardin MD was physically present  for the entire procedure, key portions of  the reconstruction and always immediately available.       Staff Involved:  Scribe/Staff/Fellow    Scribe Disclosure:   I, Kassandra Murphy, am serving as a scribe; to document services personally performed by Zach Hardin MD -based on data collection and the provider's statements to me.     Electronically signed by:  Monet Garcia MD  Mohs Micrographic Surgery and Dermatologic Oncology Fellow  [facility]     Staff Physician Comments:   I saw and evaluated the patient with the Mohs Surgery Fellow (Dr. Monet Garcia) and I agree with the assessment and plan and the above description of the procedure as documented by the scribe. I was present for the key portions of the procedure and entire exam. I was immediately available in the clinic throughout the procedures.     Zach Hardin DO    Department of Dermatology  [facility]: Phone: [phone], Fax:[phone]  [facility]: Phone: [phone], Fax: [phone]    Care and Laboratory Testing Performed at:  [facility]   Dermatology Clinic  [address] [place] [address]

## 2025-06-11 NOTE — NURSING NOTE
The following medication was given:     MEDICATION:  Lidocaine with epinephrine 1% 1:523007  ROUTE: SQ  SITE: see procedure note  DOSE: 9.75 mL  LOT #: [id number]  : [employer]  EXPIRATION DATE: 12/31/2025  NDC#: 0409-007-01  Was there drug waste? 2.25 mL  Multi-dose vial: Yes    Vaseline and pressure dressing applied to Mohs site on right forehead (2 sites).  Wound care instructions reviewed with patient and AVS provided.  Patient verbalized understanding.  Post op appointment scheduled: Patient declined. Patient will follow up for suture removal: N/A  No further questions or concerns at this time.      Bre Morales CMA  June 11, 2025

## 2025-06-11 NOTE — NURSING NOTE
Ni Maya's goals for this visit include:   Chief Complaint   Patient presents with    Procedure     BCC R suprabrow and R superior lateral forehead       She requests these members of her care team be copied on today's visit information:     PCP: Apryl Olvera    Referring Provider:  Referred Self, MD  No address on file    /78   Pulse 77   LMP  (LMP Unknown)   SpO2 96%     Do you need any medication refills at today's visit?       Brandy Ortega EMT

## 2025-06-11 NOTE — LETTER
"6/11/2025      Ni Maya  [address] [facility] [address]      Dear Colleague,    Thank you for referring your patient, Ni Maya, to the [facility]. Please see a copy of my visit note below.    [facility] [place] Procedure Note    Dermatology Problem List:  Last skin check: 04/02/2025     1. \"Precancerous\" lesion removed from the vagina per patient report. ?MICKIE III (documented in chart)  -s/p removal, patient thought done in [place] but no surg path reports available   2. History of NMSC  - BCC, R superior lateral forehead, s/p MMS 6/11/25  - BCC, R supra brow, s/p MMS 6/11/25  - BCC, L occipital scalp, s/p Mohs 5/1/25  - BCC, vertex scalp, s/p MMS 3/17/2021  - BCC, vertex scalp anterior, s/p MMS 3/17/2021  - BCC, frontal scalp, s/p MMS 3/17/2021  - BCC, vertex scalp left, s/p MMS 3/17/2021  - BCC, right superior lateral forehead, s/p biopsy 8/13/19, s/p MMS with rhombic  3. 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 There was some brown pigment at edges of biopsy noted 8/4/2020.   - Left perispinal upper back, s/p bx 5/1/25     Social History: Retired. Lives with . Has son with down syndrome that lives in [facility]. Daughter lives in [place].  Family History: Negative for skin cancer.  __________________________________________    Date of Service:  Jun 11, 2025  Surgery: Mohs micrographic surgery    Case 1  Repair Type: intermediate  Repair Size: 6.0 cm  Suture Material: 4-0 Monocryl, Fast Absorbing Gut 5-0  Tumor Type: BCC - Basal cell carcinoma  Location: R superior lateral forehead  Derm-Path Accession #: [id number]  PreOp Size: 1.1 x 1.2 cm  PostOp Size: 2.8 x 2.0 cm  Mohs Accession #: [id number]  Level of Defect: fascia      Procedure:  We discussed the principles of treatment and most likely complications including scarring, bleeding, infection, swelling, pain, " crusting, nerve damage, large wound,  incomplete excision, wound dehiscence,  nerve damage, recurrence, and a second procedure may be recommended to obtain the best cosmetic or functional result.    Informed consent was obtained and the patient underwent the procedure as follows:  The patient was placed supine on the operating table.  The cancer was identified, outlined with a marker, and verified by the patient.  The entire surgical field was prepped with ChoraPrep.  The surgical site was anesthetized using Lidocaine 1% with epi 1:100,000.      The area of clinically apparent tumor was debulked. The layer of tissue was then surgically excised using a #15 blade and was then transferred onto a specimen sheet maintaining the orientation of the specimen. Hemostasis was obtained using bipolar electrocoagulation. The wound site was then covered with a dressing while the tissue samples were processed for examination.    The excised tissue was transported to the Mohs histology laboratory maintaining the tissue orientation.  The tissue specimen was relaxed so that the entire surgical margin was in a a single horizontal plane for sectioning and inked for precise mapping.  A precise reference map was drawn to reflect the sectioning of the specimen, colored inking of the margins, and orientation on the patient.  The tissue was processed using horizontal sectioning of the base and continuous peripheral margins.  The histopathologic sections were reviewed in conjunction with the reference map.    Total blocks: 1    Total slides:  1    Residual tumor was identified and indicated in red on the reference map, identifying the location where further tissue excision was necessary. Therefore, an additional stage of Mohs Micrographic surgery was deemed necessary.     Stage II   The patient was returned to the operating room, and the area prepped in the usual manner. The residual tumor was excised using the reference map as a guide. The  specimen was transfered to a labeled specimen sheet maintaining the orientation of the specimen. Hemostasis was obtained and the wound site was covered with a dressing while the tissue was processed for examination.     The excised tissue was transported to the Mohs histology laboratory maintaining orientation. The specimen margins were inked for precise mapping and a reference map was prepared for the is additional stage to maintain precise orientation as described above. The tissue was processed using horizontal sectioning of the base and continuous peripheral margins. The histopathologic sections were reviewed in conjunction with the reference map.     Total blocks: 1    Total slides:  1    There were no cancer cells visualized on examination, therefore Mohs surgery was complete.     Reconstruction: Intermediate Linear Closure      The patient was taken to the operative suite and placed supine on the operating room table. The defect was identified. Appropriate markings were made with a marking pen to plan the repair. The area was infiltrated with Lidocaine 1% with epi 1:100,000 and prepped with ChoraPrep and draped with sterile towels.     The wound was debeveled and undermined widely. Cones were excised within relaxed skin tension lines on both sides of the defect. Hemostasis was obtained using bipolar electrocoagulation. The deeper layers of subcutaneous and superficial (nonmuscle) fascia tissues were then approximated using monocryl 4-0 buried vertical mattress sutures (deep layer suturing). The wound edges were then approximated; additional buried sutures were placed in a similar fashion where needed. Fast Absorbing Gut 5-0 simple running (superficial layer suturing) were carefully placed for maximum eversion and meticulous approximation.    Estimated blood loss was less than 10 ml for all surgical sites. The wound was cleansed with saline and ointment was applied along the wound surface. A sterile pressure  dressing was applied and wound care instructions were given verbally and in writing. Patient was informed that additional refinement of the resulting surgical scar may be used as a second stage of this reconstruction. The patient was discharged from the [facility] alert and ambulatory.    Repair Size: 6.0 cm  Sutures Used:  Deep: monocryl 4-0 Superficial: Fast Absorbing Gut 5-0     Dr. Zach Hardin MD was physically present  for the entire procedure, key portions of the reconstruction and always immediately available.       Date of Service:  Jun 11, 2025  Surgery: Mohs micrographic surgery    Case 2  Repair Type: advancement flap  Repair Size: 1.8 x 1.8 cm  Suture Material: 4-0/5-0 Monocryl, Fast Absorbing Gut 5-0  Tumor Type: BCC - Basal cell carcinoma  Location: R suprabrow  Derm-Path Accession #: [id number]  PreOp Size: 0.5 x 0.5 cm  PostOp Size: 1.4 x 1.2 cm  Mohs Accession #: [id number]  Level of Defect: fascia      Procedure:  We discussed the principles of treatment and most likely complications including scarring, bleeding, infection, swelling, pain, crusting, nerve damage, large wound,  incomplete excision, wound dehiscence,  nerve damage, recurrence, and a second procedure may be recommended to obtain the best cosmetic or functional result.    Informed consent was obtained and the patient underwent the procedure as follows:  The patient was placed supine on the operating table.  The cancer was identified, outlined with a marker, and verified by the patient.  The entire surgical field was prepped with ChoraPrep.  The surgical site was anesthetized using Lidocaine 1% with epi 1:100,000.      The area of clinically apparent tumor was debulked. The layer of tissue was then surgically excised using a #15 blade and was then transferred onto a specimen sheet maintaining the orientation of the specimen. Hemostasis was obtained using bipolar electrocoagulation. The wound site was then covered with  a dressing while the tissue samples were processed for examination.    The excised tissue was transported to the Mohs histology laboratory maintaining the tissue orientation.  The tissue specimen was relaxed so that the entire surgical margin was in a a single horizontal plane for sectioning and inked for precise mapping.  A precise reference map was drawn to reflect the sectioning of the specimen, colored inking of the margins, and orientation on the patient.  The tissue was processed using horizontal sectioning of the base and continuous peripheral margins.  The histopathologic sections were reviewed in conjunction with the reference map.    Total blocks: 1    Total slides:  2    There were no cancer cells visualized on examination, therefore Mohs surgery was complete.     Reconstruction:  Advancement Flap    PROCEDURE:  The wound was de-beveled and undermined broadly in all directions to the level of fat. Hemostasis was obtained using bipolar electrocoagulation. The advancement flap was incised to the level of fat removing a cone of redundant tissue from the superior aspect of the primary defect. The flap was further undermined in all directions.    Hemostasis was again obtained. The flap was then advanced into (carried over) the defect and secured using buried dermal sutures. Redundant areas of tissue were sewn out using the rule of halves. The flap wound edges of both the primary and secondary defects were then approximated with buried dermal monocryl 4-0/ 5-0 buried vertical mattress sutures and the epidermis was then carefully approximated using Fast Absorbing Gut 5-0 simple running sutures. The wound was cleansed with saline, and ointment was applied along the wound surface. A sterile pressure dressing of non-adherent gauze was applied and wound care instructions were given verbally and in writing. The patient left the operating suite in stable condition. Anticipate Derma-Abrasion to be used as a second  stage of this reconstruction.     Repair Size: 1.8 x 1.8 cm  Sutures Used:  DEEP: monocryl 4-0/5-0 SUPERFICIAL: Fast Absorbing Gut 5-0      Dr. Zach Hardin MD was physically present  for the entire procedure, key portions of the reconstruction and always immediately available.       Staff Involved:  Scribe/Staff/Fellow    Scribe Disclosure:   I, Kassandra Murphy, am serving as a scribe; to document services personally performed by Zach Hardin MD -based on data collection and the provider's statements to me.     Electronically signed by:  Monet Garcia MD  Mohs Micrographic Surgery and Dermatologic Oncology Fellow  [facility]     Staff Physician Comments:   I saw and evaluated the patient with the Mohs Surgery Fellow (Dr. Monet Garcia) and I agree with the assessment and plan and the above description of the procedure as documented by the scribe. I was present for the key portions of the procedure and entire exam. I was immediately available in the clinic throughout the procedures.     Zach Hardin DO    Department of Dermatology  [facility]: Phone: [phone], Fax:[phone]  [facility]: Phone: [phone], Fax: [phone]    Care and Laboratory Testing Performed at:  [facility]   Dermatology Clinic  [address] [place] [address]    Again, thank you for allowing me to participate in the care of your patient.        Sincerely,        Zach Hardin MD    Electronically signed

## 2025-06-19 ENCOUNTER — PATIENT OUTREACH (OUTPATIENT)
Dept: CARE COORDINATION | Facility: CLINIC | Age: 85
End: 2025-06-19
Payer: COMMERCIAL

## 2025-06-27 DIAGNOSIS — E78.5 HYPERLIPIDEMIA WITH TARGET LDL LESS THAN 100: ICD-10-CM

## 2025-06-27 NOTE — TELEPHONE ENCOUNTER
[facility] Call Center    Phone Message    May a detailed message be left on voicemail: yes     Reason for Call: Medication Refill Request    Has the patient contacted the pharmacy for the refill? Yes   Name of medication being requested: atorvastatin (LIPITOR) 40 MG tablet    Provider who prescribed the medication: Carly Antonio    Pharmacy: [id number] - [place], [place] - [address] [facility]    Date medication is needed: ASAP       Action Taken: Message routed to:  Other:   CARDIOLOGY ADULT [place] [[id number]]    Travel Screening: Not Applicable     Date of Service:

## 2025-06-30 RX ORDER — ATORVASTATIN CALCIUM 40 MG/1
40 TABLET, FILM COATED ORAL DAILY
Qty: 90 TABLET | Refills: 3 | Status: SHIPPED | OUTPATIENT
Start: 2025-06-30

## (undated) DEVICE — PREP CHLORAPREP 26ML TINTED ORANGE  260815

## (undated) DEVICE — TRAY PROCEDURE SUPPORT PAIN MANAGEMENT 332114

## (undated) DEVICE — SYR 05ML LL W/O NDL

## (undated) DEVICE — SYR 10ML FINGER CONTROL W/O NDL 309695

## (undated) DEVICE — NDL ECLIPSE 18GA 1.5"

## (undated) DEVICE — NDL SPINAL 22GA 5" QUINCKE 405148

## (undated) DEVICE — TUBING IV EXTENSION SET 34"

## (undated) DEVICE — GLOVE PROTEXIS W/NEU-THERA 7.5  2D73TE75

## (undated) RX ORDER — LIDOCAINE HYDROCHLORIDE 10 MG/ML
INJECTION, SOLUTION EPIDURAL; INFILTRATION; INTRACAUDAL; PERINEURAL
Status: DISPENSED
Start: 2018-05-10

## (undated) RX ORDER — LIDOCAINE HYDROCHLORIDE 10 MG/ML
INJECTION, SOLUTION EPIDURAL; INFILTRATION; INTRACAUDAL; PERINEURAL
Status: DISPENSED
Start: 2017-08-10